# Patient Record
Sex: FEMALE | Race: WHITE | Employment: FULL TIME | URBAN - METROPOLITAN AREA
[De-identification: names, ages, dates, MRNs, and addresses within clinical notes are randomized per-mention and may not be internally consistent; named-entity substitution may affect disease eponyms.]

---

## 2019-01-28 ENCOUNTER — TELEPHONE (OUTPATIENT)
Dept: FAMILY MEDICINE CLINIC | Age: 54
End: 2019-01-28

## 2019-01-28 ENCOUNTER — HOSPITAL ENCOUNTER (EMERGENCY)
Age: 54
Discharge: HOME | End: 2019-01-28
Payer: COMMERCIAL

## 2019-01-28 VITALS
DIASTOLIC BLOOD PRESSURE: 78 MMHG | TEMPERATURE: 98.1 F | WEIGHT: 110 LBS | RESPIRATION RATE: 16 BRPM | OXYGEN SATURATION: 97 % | HEART RATE: 84 BPM | SYSTOLIC BLOOD PRESSURE: 138 MMHG

## 2019-01-28 DIAGNOSIS — M17.12 OSTEOARTHRITIS OF LEFT KNEE, UNSPECIFIED OSTEOARTHRITIS TYPE: Primary | ICD-10-CM

## 2019-01-28 PROCEDURE — 2709999900 HC NON-CHARGEABLE SUPPLY

## 2019-01-28 PROCEDURE — 99202 OFFICE O/P NEW SF 15 MIN: CPT

## 2019-01-28 PROCEDURE — 99202 OFFICE O/P NEW SF 15 MIN: CPT | Performed by: NURSE PRACTITIONER

## 2019-01-28 RX ORDER — NAPROXEN 500 MG/1
500 TABLET ORAL 2 TIMES DAILY WITH MEALS
Qty: 20 TABLET | Refills: 0 | Status: SHIPPED | OUTPATIENT
Start: 2019-01-28 | End: 2019-06-12 | Stop reason: ALTCHOICE

## 2019-01-28 RX ORDER — LISINOPRIL 10 MG/1
10 TABLET ORAL 2 TIMES DAILY
COMMUNITY
End: 2019-02-13 | Stop reason: SDUPTHER

## 2019-01-28 RX ORDER — METAXALONE 400 MG/1
400 TABLET ORAL 3 TIMES DAILY PRN
Qty: 20 TABLET | Refills: 0 | Status: SHIPPED | OUTPATIENT
Start: 2019-01-28 | End: 2019-02-01 | Stop reason: SDUPTHER

## 2019-01-28 RX ORDER — MAGNESIUM SULFATE 100 %
CRYSTALS MISCELLANEOUS
Qty: 1 G | Refills: 0 | Status: SHIPPED | OUTPATIENT
Start: 2019-01-28 | End: 2019-01-30

## 2019-01-28 RX ORDER — MENTHOL AND METHYL SALICYLATE 10; 30 G/100G; G/100G
CREAM TOPICAL
Qty: 1 BOTTLE | Refills: 0 | Status: SHIPPED | OUTPATIENT
Start: 2019-01-28 | End: 2020-06-01

## 2019-01-28 ASSESSMENT — ENCOUNTER SYMPTOMS
STRIDOR: 0
SHORTNESS OF BREATH: 0
CHEST TIGHTNESS: 0
COLOR CHANGE: 0
NAUSEA: 0
COUGH: 0
BACK PAIN: 0
WHEEZING: 0
VOMITING: 0
CHOKING: 0
APNEA: 0
CONSTIPATION: 0
DIARRHEA: 0
ABDOMINAL PAIN: 0

## 2019-01-28 ASSESSMENT — PAIN DESCRIPTION - PAIN TYPE: TYPE: ACUTE PAIN

## 2019-01-28 ASSESSMENT — PAIN DESCRIPTION - DESCRIPTORS: DESCRIPTORS: CRAMPING

## 2019-01-28 ASSESSMENT — PAIN SCALES - GENERAL: PAINLEVEL_OUTOF10: 10

## 2019-01-28 ASSESSMENT — PAIN - FUNCTIONAL ASSESSMENT: PAIN_FUNCTIONAL_ASSESSMENT: PREVENTS OR INTERFERES SOME ACTIVE ACTIVITIES AND ADLS

## 2019-01-28 ASSESSMENT — PAIN DESCRIPTION - ORIENTATION: ORIENTATION: LEFT

## 2019-01-28 ASSESSMENT — PAIN DESCRIPTION - LOCATION: LOCATION: KNEE

## 2019-01-30 ENCOUNTER — TELEPHONE (OUTPATIENT)
Dept: FAMILY MEDICINE CLINIC | Age: 54
End: 2019-01-30

## 2019-01-30 ENCOUNTER — OFFICE VISIT (OUTPATIENT)
Dept: FAMILY MEDICINE CLINIC | Age: 54
End: 2019-01-30
Payer: COMMERCIAL

## 2019-01-30 VITALS
RESPIRATION RATE: 20 BRPM | TEMPERATURE: 98.1 F | DIASTOLIC BLOOD PRESSURE: 74 MMHG | HEART RATE: 84 BPM | SYSTOLIC BLOOD PRESSURE: 160 MMHG

## 2019-01-30 DIAGNOSIS — M25.562 ACUTE PAIN OF LEFT KNEE: ICD-10-CM

## 2019-01-30 DIAGNOSIS — I10 ESSENTIAL HYPERTENSION: ICD-10-CM

## 2019-01-30 DIAGNOSIS — Z00.00 WELLNESS EXAMINATION: Primary | ICD-10-CM

## 2019-01-30 LAB
ALBUMIN SERPL-MCNC: 4.8 G/DL (ref 3.5–5.1)
ALP BLD-CCNC: 71 U/L (ref 38–126)
ALT SERPL-CCNC: 28 U/L (ref 11–66)
ANION GAP SERPL CALCULATED.3IONS-SCNC: 16 MEQ/L (ref 8–16)
AST SERPL-CCNC: 34 U/L (ref 5–40)
BASOPHILS # BLD: 0.8 %
BASOPHILS ABSOLUTE: 0.1 THOU/MM3 (ref 0–0.1)
BILIRUB SERPL-MCNC: 0.2 MG/DL (ref 0.3–1.2)
BILIRUBIN DIRECT: < 0.2 MG/DL (ref 0–0.3)
BUN BLDV-MCNC: 9 MG/DL (ref 7–22)
CALCIUM SERPL-MCNC: 8.7 MG/DL (ref 8.5–10.5)
CHLORIDE BLD-SCNC: 90 MEQ/L (ref 98–111)
CO2: 21 MEQ/L (ref 23–33)
CREAT SERPL-MCNC: 0.7 MG/DL (ref 0.4–1.2)
EOSINOPHIL # BLD: 1.2 %
EOSINOPHILS ABSOLUTE: 0.1 THOU/MM3 (ref 0–0.4)
ERYTHROCYTE [DISTWIDTH] IN BLOOD BY AUTOMATED COUNT: 11.9 % (ref 11.5–14.5)
ERYTHROCYTE [DISTWIDTH] IN BLOOD BY AUTOMATED COUNT: 45.1 FL (ref 35–45)
FOLATE: 16.2 NG/ML (ref 4.8–24.2)
GFR SERPL CREATININE-BSD FRML MDRD: 87 ML/MIN/1.73M2
GLUCOSE BLD-MCNC: 100 MG/DL (ref 70–108)
HCT VFR BLD CALC: 43.8 % (ref 37–47)
HEMOGLOBIN: 14.7 GM/DL (ref 12–16)
IMMATURE GRANS (ABS): 0.11 THOU/MM3 (ref 0–0.07)
IMMATURE GRANULOCYTES: 1 %
LYMPHOCYTES # BLD: 18.3 %
LYMPHOCYTES ABSOLUTE: 1.9 THOU/MM3 (ref 1–4.8)
MCH RBC QN AUTO: 34.2 PG (ref 26–33)
MCHC RBC AUTO-ENTMCNC: 33.6 GM/DL (ref 32.2–35.5)
MCV RBC AUTO: 101.9 FL (ref 81–99)
MONOCYTES # BLD: 7.6 %
MONOCYTES ABSOLUTE: 0.8 THOU/MM3 (ref 0.4–1.3)
NUCLEATED RED BLOOD CELLS: 0 /100 WBC
PLATELET # BLD: 353 THOU/MM3 (ref 130–400)
PMV BLD AUTO: 10.5 FL (ref 9.4–12.4)
POTASSIUM SERPL-SCNC: 4.9 MEQ/L (ref 3.5–5.2)
RBC # BLD: 4.3 MILL/MM3 (ref 4.2–5.4)
SEG NEUTROPHILS: 71.1 %
SEGMENTED NEUTROPHILS ABSOLUTE COUNT: 7.5 THOU/MM3 (ref 1.8–7.7)
SODIUM BLD-SCNC: 127 MEQ/L (ref 135–145)
TOTAL PROTEIN: 7.8 G/DL (ref 6.1–8)
VITAMIN B-12: 571 PG/ML (ref 211–911)
WBC # BLD: 10.5 THOU/MM3 (ref 4.8–10.8)

## 2019-01-30 PROCEDURE — 36415 COLL VENOUS BLD VENIPUNCTURE: CPT | Performed by: NURSE PRACTITIONER

## 2019-01-30 PROCEDURE — 99386 PREV VISIT NEW AGE 40-64: CPT | Performed by: NURSE PRACTITIONER

## 2019-01-30 RX ORDER — PREDNISONE 20 MG/1
20 TABLET ORAL 2 TIMES DAILY
Qty: 10 TABLET | Refills: 0 | Status: SHIPPED | OUTPATIENT
Start: 2019-01-30 | End: 2019-02-04

## 2019-01-30 RX ORDER — LISINOPRIL AND HYDROCHLOROTHIAZIDE 12.5; 1 MG/1; MG/1
1 TABLET ORAL DAILY
Qty: 30 TABLET | Refills: 0 | Status: SHIPPED | OUTPATIENT
Start: 2019-01-30 | End: 2019-02-13

## 2019-01-30 ASSESSMENT — ENCOUNTER SYMPTOMS
NAUSEA: 0
ABDOMINAL PAIN: 0
SORE THROAT: 0
DIARRHEA: 0
CONSTIPATION: 0
WHEEZING: 0
SHORTNESS OF BREATH: 0
COUGH: 0

## 2019-01-30 ASSESSMENT — PATIENT HEALTH QUESTIONNAIRE - PHQ9
SUM OF ALL RESPONSES TO PHQ9 QUESTIONS 1 & 2: 0
SUM OF ALL RESPONSES TO PHQ QUESTIONS 1-9: 0
1. LITTLE INTEREST OR PLEASURE IN DOING THINGS: 0
2. FEELING DOWN, DEPRESSED OR HOPELESS: 0
SUM OF ALL RESPONSES TO PHQ QUESTIONS 1-9: 0

## 2019-01-31 ENCOUNTER — TELEPHONE (OUTPATIENT)
Dept: FAMILY MEDICINE CLINIC | Age: 54
End: 2019-01-31

## 2019-01-31 ENCOUNTER — HOSPITAL ENCOUNTER (OUTPATIENT)
Dept: GENERAL RADIOLOGY | Age: 54
Discharge: HOME | End: 2019-01-31
Payer: COMMERCIAL

## 2019-01-31 ENCOUNTER — HOSPITAL ENCOUNTER (OUTPATIENT)
Age: 54
Discharge: HOME | End: 2019-01-31
Payer: COMMERCIAL

## 2019-01-31 DIAGNOSIS — M25.562 ACUTE PAIN OF LEFT KNEE: ICD-10-CM

## 2019-01-31 PROCEDURE — 73564 X-RAY EXAM KNEE 4 OR MORE: CPT

## 2019-02-01 ENCOUNTER — OFFICE VISIT (OUTPATIENT)
Dept: FAMILY MEDICINE CLINIC | Age: 54
End: 2019-02-01
Payer: COMMERCIAL

## 2019-02-01 VITALS
RESPIRATION RATE: 16 BRPM | BODY MASS INDEX: 22.89 KG/M2 | HEIGHT: 60 IN | WEIGHT: 116.6 LBS | SYSTOLIC BLOOD PRESSURE: 132 MMHG | HEART RATE: 66 BPM | DIASTOLIC BLOOD PRESSURE: 68 MMHG

## 2019-02-01 DIAGNOSIS — M25.562 ACUTE PAIN OF LEFT KNEE: Primary | ICD-10-CM

## 2019-02-01 DIAGNOSIS — I10 ESSENTIAL HYPERTENSION: ICD-10-CM

## 2019-02-01 PROCEDURE — 99214 OFFICE O/P EST MOD 30 MIN: CPT | Performed by: NURSE PRACTITIONER

## 2019-02-01 RX ORDER — NEBIVOLOL 2.5 MG/1
2.5 TABLET ORAL DAILY
Qty: 90 TABLET | Refills: 1 | Status: SHIPPED | OUTPATIENT
Start: 2019-02-01 | End: 2019-06-12 | Stop reason: ALTCHOICE

## 2019-02-01 RX ORDER — METAXALONE 400 MG/1
400 TABLET ORAL 3 TIMES DAILY PRN
Qty: 20 TABLET | Refills: 0 | Status: SHIPPED | OUTPATIENT
Start: 2019-02-01 | End: 2019-02-11

## 2019-02-04 ASSESSMENT — ENCOUNTER SYMPTOMS
SHORTNESS OF BREATH: 0
ABDOMINAL PAIN: 0
CONSTIPATION: 0
SORE THROAT: 0
WHEEZING: 0
NAUSEA: 0
COUGH: 0
DIARRHEA: 0

## 2019-02-13 ENCOUNTER — TELEPHONE (OUTPATIENT)
Dept: FAMILY MEDICINE CLINIC | Age: 54
End: 2019-02-13

## 2019-02-13 RX ORDER — LISINOPRIL 10 MG/1
10 TABLET ORAL 2 TIMES DAILY
Qty: 60 TABLET | Refills: 5 | Status: SHIPPED | OUTPATIENT
Start: 2019-02-13 | End: 2019-02-25 | Stop reason: ALTCHOICE

## 2019-02-21 ENCOUNTER — HOSPITAL ENCOUNTER (OUTPATIENT)
Age: 54
Discharge: HOME | End: 2019-02-21
Payer: COMMERCIAL

## 2019-02-21 LAB
ANION GAP SERPL CALCULATED.3IONS-SCNC: 13 MEQ/L (ref 8–16)
BASOPHILS # BLD: 0.7 %
BASOPHILS ABSOLUTE: 0.1 THOU/MM3 (ref 0–0.1)
BUN BLDV-MCNC: 10 MG/DL (ref 7–22)
CALCIUM SERPL-MCNC: 9.1 MG/DL (ref 8.5–10.5)
CHLORIDE BLD-SCNC: 85 MEQ/L (ref 98–111)
CO2: 23 MEQ/L (ref 23–33)
CREAT SERPL-MCNC: 0.5 MG/DL (ref 0.4–1.2)
EKG ATRIAL RATE: 62 BPM
EKG P AXIS: 58 DEGREES
EKG P-R INTERVAL: 180 MS
EKG Q-T INTERVAL: 380 MS
EKG QRS DURATION: 96 MS
EKG QTC CALCULATION (BAZETT): 385 MS
EKG R AXIS: 64 DEGREES
EKG T AXIS: 65 DEGREES
EKG VENTRICULAR RATE: 62 BPM
EOSINOPHIL # BLD: 1.9 %
EOSINOPHILS ABSOLUTE: 0.2 THOU/MM3 (ref 0–0.4)
ERYTHROCYTE [DISTWIDTH] IN BLOOD BY AUTOMATED COUNT: 11.4 % (ref 11.5–14.5)
ERYTHROCYTE [DISTWIDTH] IN BLOOD BY AUTOMATED COUNT: 41 FL (ref 35–45)
GFR SERPL CREATININE-BSD FRML MDRD: > 90 ML/MIN/1.73M2
GLUCOSE BLD-MCNC: 105 MG/DL (ref 70–108)
HCT VFR BLD CALC: 41.2 % (ref 37–47)
HEMOGLOBIN: 14.4 GM/DL (ref 12–16)
IMMATURE GRANS (ABS): 0.08 THOU/MM3 (ref 0–0.07)
IMMATURE GRANULOCYTES: 0.9 %
LYMPHOCYTES # BLD: 26.6 %
LYMPHOCYTES ABSOLUTE: 2.3 THOU/MM3 (ref 1–4.8)
MCH RBC QN AUTO: 34.4 PG (ref 26–33)
MCHC RBC AUTO-ENTMCNC: 35 GM/DL (ref 32.2–35.5)
MCV RBC AUTO: 98.3 FL (ref 81–99)
MONOCYTES # BLD: 10.8 %
MONOCYTES ABSOLUTE: 1 THOU/MM3 (ref 0.4–1.3)
NUCLEATED RED BLOOD CELLS: 0 /100 WBC
PLATELET # BLD: 313 THOU/MM3 (ref 130–400)
PMV BLD AUTO: 9.3 FL (ref 9.4–12.4)
POTASSIUM SERPL-SCNC: 4.7 MEQ/L (ref 3.5–5.2)
RBC # BLD: 4.19 MILL/MM3 (ref 4.2–5.4)
SEG NEUTROPHILS: 59.1 %
SEGMENTED NEUTROPHILS ABSOLUTE COUNT: 5.2 THOU/MM3 (ref 1.8–7.7)
SODIUM BLD-SCNC: 121 MEQ/L (ref 135–145)
WBC # BLD: 8.8 THOU/MM3 (ref 4.8–10.8)

## 2019-02-21 PROCEDURE — 93005 ELECTROCARDIOGRAM TRACING: CPT | Performed by: ORTHOPAEDIC SURGERY

## 2019-02-21 PROCEDURE — 80048 BASIC METABOLIC PNL TOTAL CA: CPT

## 2019-02-21 PROCEDURE — 36415 COLL VENOUS BLD VENIPUNCTURE: CPT

## 2019-02-21 PROCEDURE — 85025 COMPLETE CBC W/AUTO DIFF WBC: CPT

## 2019-02-23 PROCEDURE — 93010 ELECTROCARDIOGRAM REPORT: CPT | Performed by: INTERNAL MEDICINE

## 2019-02-25 ENCOUNTER — OFFICE VISIT (OUTPATIENT)
Dept: FAMILY MEDICINE CLINIC | Age: 54
End: 2019-02-25
Payer: COMMERCIAL

## 2019-02-25 VITALS
DIASTOLIC BLOOD PRESSURE: 64 MMHG | HEART RATE: 100 BPM | RESPIRATION RATE: 16 BRPM | SYSTOLIC BLOOD PRESSURE: 134 MMHG | HEIGHT: 61 IN | TEMPERATURE: 98.3 F | BODY MASS INDEX: 21.9 KG/M2 | WEIGHT: 116 LBS

## 2019-02-25 DIAGNOSIS — R94.31 NONSPECIFIC ST-T WAVE ELECTROCARDIOGRAPHIC CHANGES: ICD-10-CM

## 2019-02-25 DIAGNOSIS — E87.1 HYPONATREMIA: Primary | ICD-10-CM

## 2019-02-25 DIAGNOSIS — S83.241D ACUTE MEDIAL MENISCUS TEAR, RIGHT, SUBSEQUENT ENCOUNTER: ICD-10-CM

## 2019-02-25 DIAGNOSIS — I10 ESSENTIAL HYPERTENSION: ICD-10-CM

## 2019-02-25 LAB
T4 FREE: 0.85 NG/DL (ref 0.93–1.76)
TSH SERPL DL<=0.05 MIU/L-ACNC: 1.84 UIU/ML (ref 0.4–4.2)

## 2019-02-25 PROCEDURE — 99214 OFFICE O/P EST MOD 30 MIN: CPT | Performed by: NURSE PRACTITIONER

## 2019-02-25 RX ORDER — LISINOPRIL 10 MG/1
10 TABLET ORAL DAILY
Qty: 30 TABLET | Refills: 3 | Status: SHIPPED | OUTPATIENT
Start: 2019-02-25 | End: 2019-03-12 | Stop reason: ALTCHOICE

## 2019-02-25 RX ORDER — IBUPROFEN 800 MG/1
TABLET ORAL
Refills: 3 | COMMUNITY
Start: 2019-02-05 | End: 2019-09-30 | Stop reason: SDUPTHER

## 2019-02-25 RX ORDER — METAXALONE 800 MG/1
800 TABLET ORAL 3 TIMES DAILY
COMMUNITY
End: 2019-03-12 | Stop reason: SDUPTHER

## 2019-02-25 ASSESSMENT — ENCOUNTER SYMPTOMS
DIARRHEA: 0
COUGH: 0
CONSTIPATION: 0
WHEEZING: 0
ABDOMINAL PAIN: 0
SORE THROAT: 0
NAUSEA: 0
SHORTNESS OF BREATH: 0

## 2019-02-26 ENCOUNTER — NURSE ONLY (OUTPATIENT)
Dept: FAMILY MEDICINE CLINIC | Age: 54
End: 2019-02-26

## 2019-02-26 DIAGNOSIS — E87.1 HYPONATREMIA: ICD-10-CM

## 2019-02-26 LAB
OSMOLALITY URINE: 240 MOSMOL/KG (ref 250–750)
SODIUM URINE: 43 MEQ/L

## 2019-02-28 ENCOUNTER — OFFICE VISIT (OUTPATIENT)
Dept: CARDIOLOGY CLINIC | Age: 54
End: 2019-02-28
Payer: COMMERCIAL

## 2019-02-28 VITALS
BODY MASS INDEX: 23.56 KG/M2 | WEIGHT: 120 LBS | SYSTOLIC BLOOD PRESSURE: 172 MMHG | DIASTOLIC BLOOD PRESSURE: 78 MMHG | HEIGHT: 60 IN

## 2019-02-28 DIAGNOSIS — Z01.810 PREOP CARDIOVASCULAR EXAM: Primary | ICD-10-CM

## 2019-02-28 PROCEDURE — 99204 OFFICE O/P NEW MOD 45 MIN: CPT | Performed by: INTERNAL MEDICINE

## 2019-03-12 ENCOUNTER — OFFICE VISIT (OUTPATIENT)
Dept: FAMILY MEDICINE CLINIC | Age: 54
End: 2019-03-12
Payer: COMMERCIAL

## 2019-03-12 VITALS
DIASTOLIC BLOOD PRESSURE: 64 MMHG | BODY MASS INDEX: 22.73 KG/M2 | TEMPERATURE: 98.4 F | HEART RATE: 76 BPM | SYSTOLIC BLOOD PRESSURE: 142 MMHG | HEIGHT: 61 IN | OXYGEN SATURATION: 98 % | RESPIRATION RATE: 14 BRPM | WEIGHT: 120.4 LBS

## 2019-03-12 DIAGNOSIS — E87.1 HYPONATREMIA: ICD-10-CM

## 2019-03-12 DIAGNOSIS — S83.241D ACUTE MEDIAL MENISCUS TEAR, RIGHT, SUBSEQUENT ENCOUNTER: Primary | ICD-10-CM

## 2019-03-12 DIAGNOSIS — I10 ESSENTIAL HYPERTENSION: ICD-10-CM

## 2019-03-12 LAB
ANION GAP SERPL CALCULATED.3IONS-SCNC: 14 MEQ/L (ref 8–16)
BUN BLDV-MCNC: 12 MG/DL (ref 7–22)
CALCIUM SERPL-MCNC: 9.9 MG/DL (ref 8.5–10.5)
CHLORIDE BLD-SCNC: 99 MEQ/L (ref 98–111)
CO2: 25 MEQ/L (ref 23–33)
CREAT SERPL-MCNC: 0.6 MG/DL (ref 0.4–1.2)
GFR SERPL CREATININE-BSD FRML MDRD: > 90 ML/MIN/1.73M2
GLUCOSE BLD-MCNC: 102 MG/DL (ref 70–108)
POTASSIUM SERPL-SCNC: 5.6 MEQ/L (ref 3.5–5.2)
SODIUM BLD-SCNC: 138 MEQ/L (ref 135–145)

## 2019-03-12 PROCEDURE — 99214 OFFICE O/P EST MOD 30 MIN: CPT | Performed by: NURSE PRACTITIONER

## 2019-03-12 RX ORDER — METAXALONE 800 MG/1
800 TABLET ORAL 3 TIMES DAILY
Qty: 90 TABLET | Refills: 2 | Status: SHIPPED | OUTPATIENT
Start: 2019-03-12 | End: 2019-06-12 | Stop reason: ALTCHOICE

## 2019-03-12 RX ORDER — LISINOPRIL 10 MG/1
10 TABLET ORAL 2 TIMES DAILY
Qty: 60 TABLET | Refills: 5 | Status: SHIPPED | OUTPATIENT
Start: 2019-03-12 | End: 2019-08-20 | Stop reason: SDUPTHER

## 2019-03-12 ASSESSMENT — ENCOUNTER SYMPTOMS
DIARRHEA: 0
WHEEZING: 0
COUGH: 0
NAUSEA: 0
SORE THROAT: 0
CONSTIPATION: 0
SHORTNESS OF BREATH: 0
ABDOMINAL PAIN: 0

## 2019-03-18 ENCOUNTER — HOSPITAL ENCOUNTER (OUTPATIENT)
Dept: NON INVASIVE DIAGNOSTICS | Age: 54
Discharge: HOME | End: 2019-03-18
Payer: COMMERCIAL

## 2019-03-18 VITALS — BODY MASS INDEX: 23.56 KG/M2 | WEIGHT: 120 LBS | HEIGHT: 60 IN

## 2019-03-18 DIAGNOSIS — Z01.810 PREOP CARDIOVASCULAR EXAM: ICD-10-CM

## 2019-03-18 LAB
LV EF: 58 %
LVEF MODALITY: NORMAL

## 2019-03-18 PROCEDURE — 93306 TTE W/DOPPLER COMPLETE: CPT

## 2019-03-18 PROCEDURE — 93017 CV STRESS TEST TRACING ONLY: CPT

## 2019-03-18 PROCEDURE — 2709999900 HC NON-CHARGEABLE SUPPLY

## 2019-03-18 PROCEDURE — 78452 HT MUSCLE IMAGE SPECT MULT: CPT

## 2019-03-18 PROCEDURE — A9500 TC99M SESTAMIBI: HCPCS | Performed by: INTERNAL MEDICINE

## 2019-03-18 PROCEDURE — 3430000000 HC RX DIAGNOSTIC RADIOPHARMACEUTICAL: Performed by: INTERNAL MEDICINE

## 2019-03-18 RX ADMIN — Medication 11 MILLICURIE: at 10:35

## 2019-03-18 RX ADMIN — Medication 35 MILLICURIE: at 12:35

## 2019-03-21 ENCOUNTER — TELEPHONE (OUTPATIENT)
Dept: CARDIOLOGY CLINIC | Age: 54
End: 2019-03-21

## 2019-05-31 ENCOUNTER — OFFICE VISIT (OUTPATIENT)
Dept: CARDIOLOGY CLINIC | Age: 54
End: 2019-05-31
Payer: COMMERCIAL

## 2019-05-31 ENCOUNTER — TELEPHONE (OUTPATIENT)
Dept: FAMILY MEDICINE CLINIC | Age: 54
End: 2019-05-31

## 2019-05-31 VITALS
HEART RATE: 77 BPM | DIASTOLIC BLOOD PRESSURE: 83 MMHG | WEIGHT: 124.25 LBS | BODY MASS INDEX: 24.39 KG/M2 | SYSTOLIC BLOOD PRESSURE: 181 MMHG | HEIGHT: 60 IN

## 2019-05-31 DIAGNOSIS — S83.241D ACUTE MEDIAL MENISCUS TEAR, RIGHT, SUBSEQUENT ENCOUNTER: Primary | ICD-10-CM

## 2019-05-31 DIAGNOSIS — I25.10 ASCVD (ARTERIOSCLEROTIC CARDIOVASCULAR DISEASE): Primary | ICD-10-CM

## 2019-05-31 PROCEDURE — 99214 OFFICE O/P EST MOD 30 MIN: CPT | Performed by: INTERNAL MEDICINE

## 2019-05-31 RX ORDER — AMLODIPINE BESYLATE 5 MG/1
5 TABLET ORAL DAILY
Qty: 30 TABLET | Refills: 0 | Status: SHIPPED | OUTPATIENT
Start: 2019-05-31 | End: 2019-06-12 | Stop reason: ALTCHOICE

## 2019-05-31 NOTE — LETTER
[address] [facility] Cardiology  [facility] [address]  [place] [address]  Phone: [phone]  Fax: [phone]    Cedrick Bautista MD        May 31, 2019     Patient: Sejal Sage   YOB: 1965   Date of Visit: 5/31/2019       To Whom It May Concern: It is my medical opinion that Sejal Sage may return to work on 6-1-2019. If you have any questions or concerns, please don't hesitate to call.     Sincerely,        Cedrick Bautista MD

## 2019-05-31 NOTE — PROGRESS NOTES
[facility]  [address]  [address] [place] [address] [address] [address]  Dept: [phone]  Dept Fax: [phone]  Loc: [phone]    Visit Date: 5/31/2019    Ms. Isis Franklin is a 47 y.o. female  who presented for:  Chief Complaint   Patient presents with    Check-Up    Hypertension       HPI:   47 y oF chx HTN, HLD, smoker, anxiety and insomnia, is here for a follow up. She was last seen for preop for left knee surgery. She underwent surgery and is doing well. Denies any chest pain, sob, palpitations, lightheadedness, dizziness, orthopnea, PND or pedal edema. Current Outpatient Medications:     amLODIPine (NORVASC) 5 MG tablet, Take 1 tablet by mouth daily, Disp: 30 tablet, Rfl: 0    metaxalone (SKELAXIN) 800 MG tablet, Take 1 tablet by mouth 3 times daily, Disp: 90 tablet, Rfl: 2    lisinopril (PRINIVIL;ZESTRIL) 10 MG tablet, Take 1 tablet by mouth 2 times daily, Disp: 60 tablet, Rfl: 5    ibuprofen (ADVIL;MOTRIN) 800 MG tablet, TAKE 1 TABLET BY MOUTH THREE TIMES A DAY FOR 30 DAYS, Disp: , Rfl: 3    nebivolol (BYSTOLIC) 2.5 MG tablet, Take 1 tablet by mouth daily (Patient taking differently: Take 5 mg by mouth daily ), Disp: 90 tablet, Rfl: 1    menthol-methyl salicylate (ICY HOT) 13-53 % external cream, Follow package directions for topical use., Disp: 1 Bottle, Rfl: 0    naproxen (NAPROSYN) 500 MG tablet, Take 1 tablet by mouth 2 times daily (with meals) for 10 days, Disp: 20 tablet, Rfl: 0    Past Medical History  Jareth Sweeney  has a past medical history of Hypertension. Social History  Jareth Sweeney  reports that she has been smoking cigarettes. She has a 35.00 pack-year smoking history. She has never used smokeless tobacco. She reports that she drinks alcohol. She reports that she has current or past drug history. Drug: Marijuana.     Family History  Jareth Sweeney family history includes Alcohol Abuse in her father; Coronary Art Dis in her brother; are normal. No distension. There is no tenderness. Musculoskeletal: Normal range of motion. No edema. Neurological: Alert and oriented to person, place, and time. No cranial nerve deficit. Coordination normal.   Skin: Skin is warm and dry. Psychiatric: Normal mood and affect.        No results found for: CKTOTAL, CKMB, CKMBINDEX    Lab Results   Component Value Date    WBC 8.8 02/21/2019    RBC 4.19 02/21/2019    HGB 14.4 02/21/2019    HCT 41.2 02/21/2019    MCV 98.3 02/21/2019    MCH 34.4 02/21/2019    MCHC 35.0 02/21/2019     02/21/2019    MPV 9.3 02/21/2019       Lab Results   Component Value Date     03/12/2019    K 5.6 03/12/2019    CL 99 03/12/2019    CO2 25 03/12/2019    BUN 12 03/12/2019    LABALBU 4.8 01/30/2019    CREATININE 0.6 03/12/2019    CALCIUM 9.9 03/12/2019    LABGLOM >90 03/12/2019    GLUCOSE 102 03/12/2019       Lab Results   Component Value Date    ALKPHOS 71 01/30/2019    ALT 28 01/30/2019    AST 34 01/30/2019    PROT 7.8 01/30/2019    BILITOT 0.2 01/30/2019    BILIDIR <0.2 01/30/2019    LABALBU 4.8 01/30/2019       No results found for: MG    No results found for: INR, PROTIME      No results found for: LABA1C    No results found for: TRIG, HDL, LDLCALC, LDLDIRECT, LABVLDL    Lab Results   Component Value Date    TSH 1.840 02/25/2019         Testing Reviewed:      I haveindividually reviewed the below cardiac tests    EKG:    ECHO:   Results for orders placed during the hospital encounter of 03/18/19   Echo 2D w doppler w color complete    Narrative Transthoracic Echocardiography Report (TTE)     Demographics      Patient Name   Lkuas Elizalde       Gender              Female                  Sammie Lesch      MR #           [id number]     Race                                                   Ethnicity      Account #      [de-identified]     Room Number      Accession      [id number]     Date of Study       03/18/2019   Number      Date of Birth  1965    Referring Physician Kennedy Champagne Staci Reece MD      Age            47 year(s)    Sonographer         Naveen Lin                                                    T                                   Interpreting        Jacinta Reece MD                                Physician     Procedure    Type of Study      TTE procedure:ECHOCARDIOGRAM COMPLETE 2D W DOPPLER W COLOR. Procedure Date  Date: 03/18/2019 Start: 09:06 AM    Study Location: Echo Lab  Technical Quality: Adequate visualization    Indications:Preop cardiac evaluation. Additional Medical History:smoker, hypertension, hyperlipidemia, alcohol  abuse    Patient Status: Routine    Height: 60 inches Weight: 120 pounds BSA: 1.5 m^2 BMI: 23.44 kg/m^2    BP: 188/80 mmHg     Conclusions      Summary   Normal left ventricle size and systolic function. Ejection fraction was   estimated at 55-60%. Mild to moderate aortic regurgitation is noted. Mild mitral regurgitation is present. Mild tricuspid regurgitation. IVC size is within normal limits with normal respiratory phasic changes. Signature      ----------------------------------------------------------------   Electronically signed by Jacinta Reece MD (Interpreting   physician) on 03/19/2019 at 04:47 PM   ----------------------------------------------------------------      Findings      Mitral Valve   The mitral valve structure was normal with normal leaflet separation. DOPPLER: The transmitral velocity was within the normal range with no   evidence for mitral stenosis. Mild mitral regurgitation is present. Aortic Valve   The aortic valve was trileaflet with normal thickness and cuspal   separation. DOPPLER: Transaortic velocity was within the normal range with   no evidence of aortic stenosis. Mild to moderate aortic regurgitation is noted.       Tricuspid Valve   The tricuspid valve structure was normal with normal leaflet separation. DOPPLER: There was no evidence of tricuspid stenosis. Mild tricuspid regurgitation. Pulmonic Valve   The pulmonic valve was not well visualized . Left Atrium   Left atrial size was normal.      Left Ventricle   Normal left ventricle size and systolic function. Ejection fraction was   estimated at 55-60%. Right Atrium   Right atrial size was normal.      Right Ventricle   The right ventricular size was normal with normal systolic function and   wall thickness. Pericardial Effusion   The pericardium was normal in appearance with no evidence of a pericardial   effusion. Pleural Effusion   No evidence of pleural effusion. Aorta / Great Vessels   -Aortic root dimension within normal limits. -IVC size is within normal limits with normal respiratory phasic changes.      M-Mode/2D Measurements & Calculations      LV Diastolic   LV Systolic Dimension: 3  AV Cusp Separation: 1.7 cmLA   Dimension: 4.4 cm                        Dimension: 3.5 cmAO Root   cm             LV Volume Diastolic: 00.7 Dimension: 1.8 cmLA Area: 20.7   LV FS:31.8 %   ml                        cm^2   LV PW          LV Volume Systolic: 35 ml   Diastolic: 1   LV EDV/LV EDV Index: 87.7   cm             ml/58 m^2LV ESV/LV ESV   Septum         Index: 35 ml/23 m^2       RV Diastolic Dimension: 2.3 cm   Diastolic: 1.1 EF Calculated: 60.1 %   cm                                       LA/Aorta: 1.94                                               LA volume/Index: 60.2 ml /40m^2     Doppler Measurements & Calculations      MV Peak E-Wave: 106 cm/s   AV Peak Velocity: 197  LVOT Peak Velocity: 146   MV Peak A-Wave: 101 cm/s   cm/s                   cm/s   MV E/A Ratio: 1.05         AV Peak Gradient:      LVOT Peak Gradient: 9   MV Peak Gradient: 4.49     15.52 mmHg             mmHg   mmHg                                                     TV Peak E-Wave: 34.5   MV Deceleration Time: 201                         cm/s   msec TV Peak A-Wave: 46.7   MV P1/2t: 59 msec          AV P1/2t: 330 msec     cm/s   MVA by PHT:3.73 cm^2                                                     TV Peak Gradient: 0.48   MV E' Septal Velocity: 9                          mmHg   cm/s                       AV DVI (Vmax):0.74     TR Velocity:244 cm/s   MV A' Septal Velocity: 8.9                        TR Gradient:23.81 mmHg   cm/s                                              PV Peak Velocity: 107   MV E' Lateral Velocity:                           cm/s   9.5 cm/s                                          PV Peak Gradient: 4.58   MV A' Lateral Velocity:                           mmHg   10.8 cm/s   E/E' septal: 11.78   E/E' lateral: 11.16     [email or web address] [id number]%3d       STRESS:3/18/19: Lexiscan EKG stress test is not suggestive for ischemia.   Calculated gated LVEF 61 %.   The T.I.D. ratio was 1.22 .   There was a small sized, mild in intensity, fixed myocardial perfusion   defect of the apical wall.   This study was negative for ischemia.      Recommendation   Clinical correlation is recommended.   Medical management.   Aggressive risk factor management. CATH:    Assessment/Plan       Diagnosis Orders   1. ASCVD (arteriosclerotic cardiovascular disease)         Extensive family hx of CAD with multiple brother and mother   Heavy smoker  Hyponatremia  Alcohol abuse  HTN-uncontrolled     Reviewed echo and stress  Cut down on smoking  BP elevated, will add amlodipine 5mg daily  Advised to follow up with PCP   Advised to stop smoking because smoking increases risk of heart disease, morbidity, mortality and end organ damage. Advised to decrease alcohol intake  The patient is asked to make an attempt to improve diet and exercise patterns to aid in medical management of this problem.   Advised more plant based nutrition/meditarrean diet   Advised patient to call office or seek immediate medical attention if there is any new onset of  any chest pain, sob, palpitations, lightheadedness, dizziness, orthopnea, PND or pedal edema. All medication side effects were discussed in details. Thank youfor allowing me to participate in the care of this patient. Please do not hesitate to contact me for any further questions. Return in about 1 year (around 5/31/2020), or if symptoms worsen or fail to improve, for Regular follow up, Review testing.        Electronically signed by Lan Unger MD [facility]  5/31/2019 at 10:53 AM

## 2019-05-31 NOTE — TELEPHONE ENCOUNTER
Pt. Calling and said her cardiologist today wanted to see Dakotah Lozada before 6/12/19 concerning her blood pressure but she is not able to come in before that. She is on metaxalone 800mg and they are not working for her. She is wanting to know if he will prescribe Soma for her as she said this works. Please advise pt.  At [phone]

## 2019-05-31 NOTE — PROGRESS NOTES
Pt here for 3 mo check up     Pt denies chest pain, heart palpitations, dizziness, sob, peripheral edema    Pt continues with all over body pain     Pt c/o sees floaties at times , more anxiety

## 2019-06-03 RX ORDER — CARISOPRODOL 350 MG/1
350 TABLET ORAL 3 TIMES DAILY PRN
Qty: 42 TABLET | Refills: 0 | Status: SHIPPED | OUTPATIENT
Start: 2019-06-03 | End: 2019-06-12 | Stop reason: SDUPTHER

## 2019-06-12 ENCOUNTER — OFFICE VISIT (OUTPATIENT)
Dept: FAMILY MEDICINE CLINIC | Age: 54
End: 2019-06-12
Payer: COMMERCIAL

## 2019-06-12 VITALS
WEIGHT: 124.8 LBS | RESPIRATION RATE: 18 BRPM | DIASTOLIC BLOOD PRESSURE: 80 MMHG | TEMPERATURE: 98 F | SYSTOLIC BLOOD PRESSURE: 160 MMHG | HEART RATE: 90 BPM | BODY MASS INDEX: 24.37 KG/M2

## 2019-06-12 DIAGNOSIS — L02.91 ABSCESS: ICD-10-CM

## 2019-06-12 DIAGNOSIS — I10 ESSENTIAL HYPERTENSION: Primary | ICD-10-CM

## 2019-06-12 DIAGNOSIS — S83.241D ACUTE MEDIAL MENISCUS TEAR, RIGHT, SUBSEQUENT ENCOUNTER: ICD-10-CM

## 2019-06-12 PROBLEM — M25.562 ACUTE PAIN OF LEFT KNEE: Status: RESOLVED | Noted: 2019-01-30 | Resolved: 2019-06-12

## 2019-06-12 PROBLEM — M85.80 OSTEOPENIA: Status: ACTIVE | Noted: 2019-06-12

## 2019-06-12 PROCEDURE — 99214 OFFICE O/P EST MOD 30 MIN: CPT | Performed by: NURSE PRACTITIONER

## 2019-06-12 RX ORDER — AMLODIPINE BESYLATE 10 MG/1
10 TABLET ORAL DAILY
Qty: 90 TABLET | Refills: 1 | Status: SHIPPED | OUTPATIENT
Start: 2019-06-12 | End: 2019-12-01 | Stop reason: SDUPTHER

## 2019-06-12 RX ORDER — CARISOPRODOL 350 MG/1
350 TABLET ORAL 3 TIMES DAILY PRN
Qty: 42 TABLET | Refills: 0 | Status: SHIPPED | OUTPATIENT
Start: 2019-06-12 | End: 2019-07-01 | Stop reason: SDUPTHER

## 2019-06-12 ASSESSMENT — ENCOUNTER SYMPTOMS
NAUSEA: 0
DIARRHEA: 0
SHORTNESS OF BREATH: 0
ABDOMINAL PAIN: 0
CONSTIPATION: 0
SORE THROAT: 0
COUGH: 0
WHEEZING: 0

## 2019-06-12 NOTE — PATIENT INSTRUCTIONS
Patient Education        Meniscus Surgery: What to Expect at Home  Your Recovery  Meniscus surgery removes or fixes the cartilage (meniscus) between the bones in the knee. Each knee has two of these rubbery pads of cartilage, one on either side. Meniscus repair is usually done with arthroscopic surgery. Your doctor put a lighted tube--called an arthroscope or scope--and other surgical tools through small cuts (incisions) in your knee. The incisions leave scars that usually fade in time. You will feel tired for several days. Your knee will be swollen, and you may have numbness around the cuts the doctor made (incisions) on your knee. You can put ice on the knee to reduce swelling. Most of this will go away in a few days. You should soon start seeing improvement in your knee. You may be able to return to most of your regular activities within a few weeks. But it will be several months before you have complete use of your knee. It may take as long as 6 months before your knee is strong enough for hard physical work or certain sports. You will need to build your strength and the motion of your joint with rehabilitation (rehab) exercises. In time, your knee will likely be stronger and more stable than it was before the surgery. How soon you can return to sports or exercise depends on how well you follow your rehab program and how well your knee heals. Your doctor or physical therapist will give you an idea of when you can return to these activities. If you had a partial meniscectomy, you might be able to play sports in about 4 to 6 weeks. If you had meniscus repair, it may be 3 to 6 months before you can play sports. This care sheet gives you a general idea about how long it will take for you to recover. But each person recovers at a different pace. Follow the steps below to get better as quickly as possible. How can you care for yourself at home? Activity    · Rest when you feel tired.  Getting enough sleep will help you recover. Sleep with your knee raised, but not bent. Put a pillow under your foot.     · Keep your leg raised as much as possible for the first few days.     · You may shower 24 to 48 hours after surgery, if your doctor okays it. When you shower, keep your bandage and incisions dry by taping a sheet of plastic to cover them. If you have a brace, take it off if your doctor says it is okay. It might help to sit on a shower stool.     · You will be able to stand if you have a brace or use crutches. Do not put weight on your leg until your doctor says you can. You can move around the house to do daily tasks.     · If you have a brace, leave it on except when you exercise your knee or you shower. Be careful not to put the brace on too tight. You will use it for about 2 to 6 weeks.     · If your doctor does not want you to shower or remove your brace, you can take a sponge bath.     · Wait 2 weeks or until your doctor says it is okay before you take a bath, swim, use a hot tub, or soak your leg.     · You can drive when you are no longer using crutches or a knee brace, are no longer taking prescription pain medicine, and have some control over your knee. This usually takes 1 to 2 weeks.     · How soon you can return to work depends on your job. If you sit at work, you may be able to go back in 1 to 2 weeks. But if you are on your feet at work, it may take 4 to 6 weeks. If you are very physically active in your job, it may take 3 to 6 months. Diet    · You can eat your normal diet. If your stomach is upset, try bland, low-fat foods like plain rice, broiled chicken, toast, and yogurt. Drink plenty of fluids.     · You may notice that your bowel movements are not regular right after your surgery. This is common. Try to avoid constipation and straining with bowel movements. You may want to take a fiber supplement every day.  If you have not had a bowel movement after a couple of days, ask your doctor about taking a mild laxative. Medicines    · Your doctor will tell you if and when you can restart your medicines. He or she will also give you instructions about taking any new medicines.     · If you take blood thinners, such as warfarin (Coumadin), clopidogrel (Plavix), or aspirin, be sure to talk to your doctor. He or she will tell you if and when to start taking those medicines again. Make sure that you understand exactly what your doctor wants you to do.     · Be safe with medicines. Take pain medicines exactly as directed. ? If the doctor gave you a prescription medicine for pain, take it as prescribed. ? If you are not taking a prescription pain medicine, ask your doctor if you can take an over-the-counter medicine.     · If your doctor prescribed antibiotics, take them as directed. Do not stop taking them just because you feel better. You need to take the full course of antibiotics.     · If you think your pain medicine is making you sick to your stomach:  ? Take your medicine after meals (unless your doctor has told you not to). ? Ask your doctor for a different pain medicine. Incision care    · If you have a bandage over your incisions, keep the bandage clean and dry. Follow your doctor's instructions. Some doctors want to see you before you take it off, while others may let you take it off 48 to 72 hours after your surgery.     · If you have strips of tape on the incisions, leave the tape on for a week or until it falls off. Keep the area clean and dry. Exercise    · Rehab exercises are an important part of your treatment. Your first exercises will help you improve your knee's movement and regain your muscle strength. Ice and elevation    · To reduce swelling and pain, put ice or a cold pack on your knee for 10 to 20 minutes at a time. Do this every few hours.  Put a thin cloth between the ice and your skin.     · For 3 days after surgery, prop up the sore leg on a pillow when you ice it or anytime you sit or lie down. Try to keep it above the level of your heart. This will help reduce swelling.     · If your doctor gave you support stockings, wear them as long as he or she tells you to. These help prevent blood clots. Follow-up care is a key part of your treatment and safety. Be sure to make and go to all appointments, and call your doctor if you are having problems. It's also a good idea to know your test results and keep a list of the medicines you take. When should you call for help? Call 911 anytime you think you may need emergency care. For example, call if:    · You passed out (lost consciousness).     · You have severe trouble breathing.     · You have sudden chest pain and shortness of breath, or you cough up blood.    Call your doctor now or seek immediate medical care if:    · You have pain that does not go away after you take pain medicine.     · You have loose stitches, or your incisions come open.     · Bright red blood has soaked through the bandage over your incision.     · You have signs of infection, such as:  ? Increased pain, swelling, warmth, or redness. ? Red streaks leading from the incision. ? Pus draining from the incision. ? Swollen lymph nodes in your neck, armpits, or groin. ? A fever.     · You have signs of a blood clot, such as:  ? Pain in your calf, back of the knee, thigh, or groin. ? Redness and swelling in your leg or groin.    Watch closely for any changes in your health, and be sure to contact your doctor if:    · You feel a catching or locking in your knee.     · You are sick to your stomach or cannot keep fluids down.     · You have swelling, tingling, pain, or numbness in your toes that does not go away when you raise your knee above the level of your heart.     · You do not have a bowel movement after taking a laxative. Where can you learn more? Go to https://chjohnson.[employer]. org and sign in to your [employer] account.  Enter M873 in the [address] Information box to learn more about \"Meniscus Surgery: What to Expect at Home. \"     If you do not have an account, please click on the \"Sign Up Now\" link. Current as of: September 20, 2018  Content Version: 12.0  © 3062-0949 Healthwise, Incorporated. Care instructions adapted under license by [facility] ([facility]). If you have questions about a medical condition or this instruction, always ask your healthcare professional. [address] any warranty or liability for your use of this information.

## 2019-06-12 NOTE — PROGRESS NOTES
[address] [address]  Dept: [phone]  Dept Fax: [phone]  Loc: [phone]       SUBJECTIVE     Luda John is a 47 y. o.female here for follow-up postoperatively after a left knee meniscus repair, her hypertension and lesion on her right upper arm that seems to be getting worse over time. Patient is doing extremely well with her meniscus repair and she is back to work full-time. She states she feels like she returned a little bit too soon because her work is very physical and she has had a lot of muscle aches pains and cramping. She did visit a cardiologist postoperatively and he had taken her off of Bystolic and put her on amlodipine 5 mg. She has noticed somewhat of a reduction in her systolic blood pressure since then but has still been around 160. Patient Active Problem List   Diagnosis    Essential hypertension    Osteopenia       Current Outpatient Medications   Medication Sig Dispense Refill    carisoprodol (SOMA) 350 MG tablet Take 1 tablet by mouth 3 times daily as needed for Muscle spasms for up to 14 days. 42 tablet 0    amLODIPine (NORVASC) 10 MG tablet Take 1 tablet by mouth daily 90 tablet 1    lisinopril (PRINIVIL;ZESTRIL) 10 MG tablet Take 1 tablet by mouth 2 times daily 60 tablet 5    ibuprofen (ADVIL;MOTRIN) 800 MG tablet TAKE 1 TABLET BY MOUTH THREE TIMES A DAY FOR 30 DAYS  3    menthol-methyl salicylate (ICY HOT) 14-70 % external cream Follow package directions for topical use. 1 Bottle 0     No current facility-administered medications for this visit. Review of Systems   Constitutional: Negative for appetite change, diaphoresis, fatigue and fever. HENT: Negative for congestion, sore throat and tinnitus. Eyes: Negative for visual disturbance. Respiratory: Negative for cough, shortness of breath and wheezing.     Cardiovascular: Negative for chest pain and leg swelling. Gastrointestinal: Negative for abdominal pain, constipation, diarrhea and nausea. Genitourinary: Negative for frequency. Musculoskeletal: Positive for myalgias. Negative for arthralgias and neck stiffness. Skin: Negative for rash. Right upper arm lesion   Neurological: Negative for dizziness, weakness and headaches. Psychiatric/Behavioral: The patient is not nervous/anxious. All other systems reviewed and are negative. OBJECTIVE     BP (!) 160/80 (Site: Left Upper Arm)   Pulse 90   Temp 98 °F (36.7 °C) (Oral)   Resp 18   Wt 124 lb 12.8 oz (56.6 kg)   BMI 24.37 kg/m²     Wt Readings from Last 3 Encounters:   06/12/19 124 lb 12.8 oz (56.6 kg)   05/31/19 124 lb 4 oz (56.4 kg)   03/18/19 120 lb (54.4 kg)     Body mass index is 24.37 kg/m². Physical Exam   Constitutional: She is oriented to person, place, and time. She appears well-developed and well-nourished. No distress. Eyes: Conjunctivae are normal. Right eye exhibits no discharge. Left eye exhibits no discharge. Cardiovascular: Normal rate and regular rhythm. Pulmonary/Chest: Effort normal and breath sounds normal. No respiratory distress. Musculoskeletal: Normal range of motion. She exhibits no edema or tenderness. Neurological: She is alert and oriented to person, place, and time. Skin: Skin is warm and dry. No rash noted. She is not diaphoretic. No erythema. No pallor. Psychiatric: She has a normal mood and affect. Her behavior is normal. Judgment and thought content normal.   Nursing note and vitals reviewed.       No results found for: LABA1C    No results found for: CHOL, TRIG, HDL, LDLCALC, LDLDIRECT    The ASCVD Risk score (Biju Núñez., et al., 2013) failed to calculate for the following reasons:    Cannot find a previous HDL lab    Cannot find a previous total cholesterol lab    Lab Results   Component Value Date     03/12/2019    K 5.6 (H) 03/12/2019    CL 99 03/12/2019    CO2 25 03/12/2019    BUN 12 03/12/2019    CREATININE 0.6 03/12/2019    GLUCOSE 102 03/12/2019    CALCIUM 9.9 03/12/2019    PROT 7.8 01/30/2019    LABALBU 4.8 01/30/2019    BILITOT 0.2 (L) 01/30/2019    ALKPHOS 71 01/30/2019    AST 34 01/30/2019    ALT 28 01/30/2019    LABGLOM >90 03/12/2019       No results found for: LABMICR, [id number]    Lab Results   Component Value Date    TSH 1.840 02/25/2019       Lab Results   Component Value Date    WBC 8.8 02/21/2019    HGB 14.4 02/21/2019    HCT 41.2 02/21/2019    MCV 98.3 02/21/2019     02/21/2019       No results found for: PSA, PSADIA      There is no immunization history on file for this patient. Health Maintenance   Topic Date Due    Hepatitis C screen  1965    Pneumococcal 0-64 years Vaccine (1 of 1 - PPSV23) 02/10/1971    HIV screen  02/10/1980    DTaP/Tdap/Td vaccine (1 - Tdap) 02/10/1984    Cervical cancer screen  02/10/1986    Lipid screen  02/10/2005    Shingles Vaccine (1 of 2) 02/10/2015    Flu vaccine (Season Ended) 01/30/2020 (Originally 9/1/2019)    Potassium monitoring  03/12/2020    Creatinine monitoring  03/12/2020    Breast cancer screen  06/19/2020    Colon cancer screen colonoscopy  05/03/2027       Future Appointments   Date Time Provider Jim Song   8/20/2019  9:20 AM Gigi Simeon APRN - CNP [facility]       ASSESSMENT      Patient has a circular scar on the underside of her right upper arm from a previous large punch biopsy in which the remains of a spider bite that became infected were removed by a dermatologist.  Patient states this area is getting larger and more bothersome and she would like a referral back to dermatology to look at it again. He does not appear infected at all today. Patient's left knee exam is completely normal today. Patient is extremely happy about this and has no pain, no swelling or no postoperative complications. Diagnosis Orders   1.  Essential hypertension  amLODIPine (NORVASC) 10 MG tablet    Lipid Panel   2. Acute medial meniscus tear, right, subsequent encounter  carisoprodol (SOMA) 350 MG tablet   3. Abscess  External Referral To Dermatology       PLAN      1. Increasing amlodipine to 10 mg daily. 2.  Referral to dermatology. 3.  Follow-up when you are ready for your annual Pap exam.  4.  Lab given for lipid level which has not been drawn yet.     Electronically signed by SHERYL Bullock CNP on 6/12/2019 at 2:33 PM

## 2019-06-12 NOTE — LETTER
[address] [address]  Phone: [phone]  Fax: [phone]    SHERYL Da Silva CNP        June 12, 2019     Patient: Gary Phillips   YOB: 1965   Date of Visit: 6/12/2019       To Whom it May Concern:    Gary Phillips was seen in my clinic on 6/12/2019. If you have any questions or concerns, please don't hesitate to call.     Sincerely,         SHERYL Da Silva CNP

## 2019-07-01 DIAGNOSIS — S83.241D ACUTE MEDIAL MENISCUS TEAR, RIGHT, SUBSEQUENT ENCOUNTER: ICD-10-CM

## 2019-07-01 RX ORDER — CARISOPRODOL 350 MG/1
350 TABLET ORAL 2 TIMES DAILY PRN
Qty: 28 TABLET | Refills: 0 | Status: SHIPPED | OUTPATIENT
Start: 2019-07-01 | End: 2019-07-15

## 2019-07-01 NOTE — TELEPHONE ENCOUNTER
Vance Aleman called requesting a refill on the following medications:  Requested Prescriptions     Pending Prescriptions Disp Refills    carisoprodol (SOMA) 350 MG tablet 42 tablet 0     Sig: Take 1 tablet by mouth 3 times daily as needed for Muscle spasms for up to 14 days. Pharmacy verified: [facility]   . pv      Date of last visit: 6/12/19  Date of next visit (if applicable): 9/39/0167

## 2019-07-16 ENCOUNTER — TELEPHONE (OUTPATIENT)
Dept: FAMILY MEDICINE CLINIC | Age: 54
End: 2019-07-16

## 2019-07-16 RX ORDER — AMOXICILLIN 500 MG/1
500 CAPSULE ORAL 3 TIMES DAILY
Qty: 30 CAPSULE | Refills: 0 | Status: SHIPPED | OUTPATIENT
Start: 2019-07-16 | End: 2019-07-26

## 2019-08-20 ENCOUNTER — OFFICE VISIT (OUTPATIENT)
Dept: FAMILY MEDICINE CLINIC | Age: 54
End: 2019-08-20
Payer: COMMERCIAL

## 2019-08-20 VITALS
HEART RATE: 94 BPM | SYSTOLIC BLOOD PRESSURE: 138 MMHG | RESPIRATION RATE: 16 BRPM | BODY MASS INDEX: 23.67 KG/M2 | DIASTOLIC BLOOD PRESSURE: 70 MMHG | TEMPERATURE: 97.6 F | WEIGHT: 121.2 LBS

## 2019-08-20 DIAGNOSIS — I10 ESSENTIAL HYPERTENSION: Primary | ICD-10-CM

## 2019-08-20 DIAGNOSIS — G89.29 CHRONIC RIGHT SHOULDER PAIN: ICD-10-CM

## 2019-08-20 DIAGNOSIS — M25.511 CHRONIC RIGHT SHOULDER PAIN: ICD-10-CM

## 2019-08-20 PROCEDURE — 99214 OFFICE O/P EST MOD 30 MIN: CPT | Performed by: NURSE PRACTITIONER

## 2019-08-20 RX ORDER — BACLOFEN 10 MG/1
10 TABLET ORAL 2 TIMES DAILY
Qty: 60 TABLET | Refills: 0 | Status: SHIPPED | OUTPATIENT
Start: 2019-08-20 | End: 2019-09-30 | Stop reason: SDUPTHER

## 2019-08-20 RX ORDER — LISINOPRIL 10 MG/1
10 TABLET ORAL 2 TIMES DAILY
Qty: 180 TABLET | Refills: 1 | Status: SHIPPED | OUTPATIENT
Start: 2019-08-20 | End: 2020-03-02

## 2019-08-20 ASSESSMENT — ENCOUNTER SYMPTOMS
DIARRHEA: 0
BACK PAIN: 1
EYE DISCHARGE: 0
EYE REDNESS: 0
ALLERGIC/IMMUNOLOGIC NEGATIVE: 1
VOMITING: 0
SORE THROAT: 0
ABDOMINAL PAIN: 0
COUGH: 0
SHORTNESS OF BREATH: 0
NAUSEA: 0
WHEEZING: 0
RHINORRHEA: 0
EYE PAIN: 0
CONSTIPATION: 0
TROUBLE SWALLOWING: 0

## 2019-08-20 NOTE — PATIENT INSTRUCTIONS
THE MOST IMPORTANT ACTION YOU CAN TAKE TO IMPROVE YOUR CURRENT AND FUTURE HEALTH IS TO QUIT SMOKING. Call the [facility] at [place] NOW ([phone])    Smoking harms nonsmokers. When nonsmokers are around people who smoke, they absorb nicotine, carbon monoxide, and other ingredients of tobacco smoke. DO NOT SMOKE AROUND CHILDREN    Children exposed to secondhand smoke are at an increased risk of:  Sudden Infant Death Syndrome (SIDS), acute respiratory infections, inflammation of the middle ear, and severe asthma. Over a longer time, it causes heart disease and lung cancer. There is no safe level of exposure to secondhand smoke.

## 2019-08-20 NOTE — PROGRESS NOTES
vomiting. Endocrine: Negative. Genitourinary: Negative for dysuria, frequency and urgency. Musculoskeletal: Positive for arthralgias and back pain. Negative for myalgias. Skin: Negative for rash. Allergic/Immunologic: Negative. Neurological: Negative for dizziness, tremors, weakness and headaches. Hematological: Negative. Psychiatric/Behavioral: Negative for dysphoric mood and sleep disturbance. The patient is not nervous/anxious. OBJECTIVE     /70 (Site: Right Upper Arm)   Pulse 94   Temp 97.6 °F (36.4 °C) (Oral)   Resp 16   Wt 121 lb 3.2 oz (55 kg)   BMI 23.67 kg/m²     Wt Readings from Last 3 Encounters:   08/20/19 121 lb 3.2 oz (55 kg)   06/12/19 124 lb 12.8 oz (56.6 kg)   05/31/19 124 lb 4 oz (56.4 kg)     Body mass index is 23.67 kg/m². Physical Exam   Constitutional: She is oriented to person, place, and time. She appears well-developed and well-nourished. No distress. HENT:   Right Ear: External ear normal.   Left Ear: External ear normal.   Nose: Nose normal.   Eyes: Pupils are equal, round, and reactive to light. Conjunctivae and EOM are normal. Right eye exhibits no discharge. Left eye exhibits no discharge. Neck: Normal range of motion. No JVD present. Cardiovascular: Normal rate and regular rhythm. Murmur heard. Systolic murmur is present with a grade of 2/6. Pulmonary/Chest: Effort normal and breath sounds normal. No respiratory distress. Musculoskeletal: Normal range of motion. She exhibits no edema, tenderness or deformity. Neurological: She is alert and oriented to person, place, and time. Coordination normal.   Skin: Skin is warm and dry. Capillary refill takes less than 2 seconds. No rash noted. She is not diaphoretic. No erythema. No pallor. Psychiatric: She has a normal mood and affect.  Her behavior is normal. Judgment and thought content normal.       No results found for: LABA1C    No results found for: CHOL, TRIG, HDL, LDLCALC,

## 2019-09-27 DIAGNOSIS — S83.241D ACUTE MEDIAL MENISCUS TEAR, RIGHT, SUBSEQUENT ENCOUNTER: ICD-10-CM

## 2019-09-30 RX ORDER — IBUPROFEN 800 MG/1
TABLET ORAL
Qty: 90 TABLET | Refills: 3 | Status: SHIPPED | OUTPATIENT
Start: 2019-09-30 | End: 2020-06-15 | Stop reason: SDUPTHER

## 2019-09-30 RX ORDER — BACLOFEN 10 MG/1
10 TABLET ORAL 2 TIMES DAILY
Qty: 60 TABLET | Refills: 3 | Status: SHIPPED | OUTPATIENT
Start: 2019-09-30 | End: 2020-01-31 | Stop reason: SDUPTHER

## 2019-12-01 DIAGNOSIS — I10 ESSENTIAL HYPERTENSION: ICD-10-CM

## 2019-12-02 RX ORDER — AMLODIPINE BESYLATE 10 MG/1
TABLET ORAL
Qty: 90 TABLET | Refills: 2 | Status: SHIPPED | OUTPATIENT
Start: 2019-12-02 | End: 2020-08-28

## 2020-01-31 RX ORDER — BACLOFEN 10 MG/1
10 TABLET ORAL 2 TIMES DAILY
Qty: 60 TABLET | Refills: 3 | Status: SHIPPED | OUTPATIENT
Start: 2020-01-31 | End: 2020-03-01

## 2020-01-31 NOTE — TELEPHONE ENCOUNTER
Lawanda Merrill called requesting a refill on the following medications:  Requested Prescriptions     Pending Prescriptions Disp Refills    baclofen (LIORESAL) 10 MG tablet 60 tablet 3     Sig: Take 1 tablet by mouth 2 times daily     [facility]      Date of last visit: 8/20/19  Date of next visit (if applicable): Visit date not found

## 2020-03-02 RX ORDER — LISINOPRIL 10 MG/1
TABLET ORAL
Qty: 180 TABLET | Refills: 1 | Status: SHIPPED | OUTPATIENT
Start: 2020-03-02 | End: 2020-08-28

## 2020-06-01 ENCOUNTER — HOSPITAL ENCOUNTER (EMERGENCY)
Age: 55
Discharge: HOME | End: 2020-06-01
Payer: COMMERCIAL

## 2020-06-01 VITALS
OXYGEN SATURATION: 97 % | BODY MASS INDEX: 23.44 KG/M2 | RESPIRATION RATE: 16 BRPM | DIASTOLIC BLOOD PRESSURE: 72 MMHG | TEMPERATURE: 97.3 F | SYSTOLIC BLOOD PRESSURE: 192 MMHG | WEIGHT: 120 LBS | HEART RATE: 89 BPM

## 2020-06-01 PROCEDURE — 6360000002 HC RX W HCPCS: Performed by: NURSE PRACTITIONER

## 2020-06-01 PROCEDURE — 99213 OFFICE O/P EST LOW 20 MIN: CPT | Performed by: NURSE PRACTITIONER

## 2020-06-01 PROCEDURE — 99212 OFFICE O/P EST SF 10 MIN: CPT

## 2020-06-01 PROCEDURE — 96372 THER/PROPH/DIAG INJ SC/IM: CPT

## 2020-06-01 RX ORDER — BACLOFEN 10 MG/1
10 TABLET ORAL 3 TIMES DAILY
Qty: 21 TABLET | Refills: 0 | Status: SHIPPED | OUTPATIENT
Start: 2020-06-01 | End: 2020-06-23 | Stop reason: ALTCHOICE

## 2020-06-01 RX ORDER — PREDNISONE 20 MG/1
20 TABLET ORAL 2 TIMES DAILY
Qty: 10 TABLET | Refills: 0 | Status: SHIPPED | OUTPATIENT
Start: 2020-06-01 | End: 2020-06-06

## 2020-06-01 RX ORDER — CARISOPRODOL 350 MG/1
350 TABLET ORAL 3 TIMES DAILY PRN
Qty: 9 TABLET | Refills: 0 | Status: SHIPPED | OUTPATIENT
Start: 2020-06-01 | End: 2020-06-04

## 2020-06-01 RX ORDER — KETOROLAC TROMETHAMINE 30 MG/ML
30 INJECTION, SOLUTION INTRAMUSCULAR; INTRAVENOUS ONCE
Status: COMPLETED | OUTPATIENT
Start: 2020-06-01 | End: 2020-06-01

## 2020-06-01 RX ADMIN — KETOROLAC TROMETHAMINE 30 MG: 30 INJECTION, SOLUTION INTRAMUSCULAR at 16:55

## 2020-06-01 ASSESSMENT — PAIN SCALES - GENERAL
PAINLEVEL_OUTOF10: 5
PAINLEVEL_OUTOF10: 5

## 2020-06-01 ASSESSMENT — ENCOUNTER SYMPTOMS
BOWEL INCONTINENCE: 0
PHOTOPHOBIA: 0

## 2020-06-01 ASSESSMENT — PAIN DESCRIPTION - LOCATION: LOCATION: NECK

## 2020-06-01 NOTE — ED NOTES
Patient understood instructions verbally,  Follow up with PCP with any concerns, or worse neck pain ,ollow up with ED. E-script, ambulated self to lobby,stable condition.       Bear Galloway LPN  25/97/76 5543

## 2020-06-01 NOTE — ED PROVIDER NOTES
well-developed. She is not ill-appearing, toxic-appearing or diaphoretic. HENT:      Head: Normocephalic. Right Ear: External ear normal.      Left Ear: External ear normal.      Nose: Nose normal.   Neck:      Musculoskeletal: Neck supple. Decreased range of motion. Pain with movement and muscular tenderness present. No neck rigidity, injury or spinous process tenderness. Comments: Patient has limited range of motion with turning her head more to the right versus left. Patient denies any paresthesias and grasp are equal and strong patient rates her pain 5 on a 10 scale patient denies any pain to direct palpation on the spine but does have muscle tenderness. Cardiovascular:      Rate and Rhythm: Normal rate. Pulmonary:      Effort: Pulmonary effort is normal.   Musculoskeletal:         General: Tenderness present. No signs of injury. Cervical back: She exhibits decreased range of motion, tenderness, pain and spasm. She exhibits no bony tenderness and no deformity. Back:       Comments:  Cervical spine   Skin:     General: Skin is warm and dry. Capillary Refill: Capillary refill takes less than 2 seconds. Neurological:      Mental Status: She is alert and oriented to person, place, and time. Psychiatric:         Behavior: Behavior is cooperative. DIAGNOSTIC RESULTS     Labs:No results found for this visit on 06/01/20. IMAGING:    No orders to display         EKG:      URGENT CARE COURSE:     Vitals:    06/01/20 1626   Pulse: 91   Resp: 16   Temp: 97.3 °F (36.3 °C)   TempSrc: Temporal   SpO2: 97%   Weight: 120 lb (54.4 kg)       Medications   ketorolac (TORADOL) injection 30 mg (30 mg Intramuscular Given 6/1/20 1393)            PROCEDURES:  None    FINAL IMPRESSION      1. Chronic neck pain    2.  Strain of calf muscle, right, initial encounter          DISPOSITION/ PLAN      The patient or patient's representative and I have discussed the diagnosis and risks, and we Apply 1 patch topically daily    MENTHOL-METHYL SALICYLATE (ICY HOT) 44-66 % EXTERNAL CREAM    Follow package directions for topical use.        Current Discharge Medication List          SHERYL Ortiz CNP    (Please note that portions of this note were completed with a voice recognition program. Efforts were made to edit the dictations but occasionally words are mis-transcribed.)           SHERYL Ortiz CNP  06/01/20 4463

## 2020-06-04 ENCOUNTER — VIRTUAL VISIT (OUTPATIENT)
Dept: FAMILY MEDICINE CLINIC | Age: 55
End: 2020-06-04
Payer: COMMERCIAL

## 2020-06-04 PROCEDURE — 99214 OFFICE O/P EST MOD 30 MIN: CPT | Performed by: NURSE PRACTITIONER

## 2020-06-04 RX ORDER — CLINDAMYCIN HYDROCHLORIDE 300 MG/1
300 CAPSULE ORAL 2 TIMES DAILY
Qty: 30 CAPSULE | Refills: 0 | Status: SHIPPED | OUTPATIENT
Start: 2020-06-04 | End: 2020-06-14

## 2020-06-04 RX ORDER — CARISOPRODOL 350 MG/1
350 TABLET ORAL 3 TIMES DAILY PRN
Qty: 21 TABLET | Refills: 0 | Status: SHIPPED | OUTPATIENT
Start: 2020-06-04 | End: 2020-06-11

## 2020-06-04 ASSESSMENT — ENCOUNTER SYMPTOMS
CONSTIPATION: 0
COUGH: 0
VOMITING: 0
SORE THROAT: 0
BACK PAIN: 0
ALLERGIC/IMMUNOLOGIC NEGATIVE: 1
WHEEZING: 0
EYE REDNESS: 0
TROUBLE SWALLOWING: 0
DIARRHEA: 0
NAUSEA: 0
SHORTNESS OF BREATH: 0
ABDOMINAL PAIN: 0
RHINORRHEA: 0
EYE DISCHARGE: 0
EYE PAIN: 0

## 2020-06-04 NOTE — PROGRESS NOTES
[address] [address]  Dept: [phone]  Dept Fax: [phone]  Loc: [phone]       JENNYFER Powell is a 54 y. o.female who was seen in urgent care on June 1 for neck pain, strain of left calf. Was treated with Soma, Baclofen, and prednisone. She is uncertain of the etiology but thinks its related to an episode 2 weeks ago when se physically assisted her  when fell at home. She has increased pain when she works at her job, but Chirpify helps with this. Over she has made some improvement. Complains of labial cyst for several days that is hardened. Patient Active Problem List   Diagnosis    Essential hypertension    Osteopenia       Current Outpatient Medications   Medication Sig Dispense Refill    clindamycin (CLEOCIN) 300 MG capsule Take 1 capsule by mouth 2 times daily for 10 days 30 capsule 0    carisoprodol (SOMA) 350 MG tablet Take 1 tablet by mouth 3 times daily as needed for Muscle spasms for up to 7 days. 21 tablet 0    baclofen (LIORESAL) 10 MG tablet Take 1 tablet by mouth 3 times daily 21 tablet 0    predniSONE (DELTASONE) 20 MG tablet Take 1 tablet by mouth 2 times daily for 5 days 10 tablet 0    lisinopril (PRINIVIL;ZESTRIL) 10 MG tablet TAKE 1 TABLET BY MOUTH TWO TIMES A DAY  180 tablet 1    amLODIPine (NORVASC) 10 MG tablet TAKE 1 TABLET BY MOUTH ONE TIME A DAY  90 tablet 2    ibuprofen (ADVIL;MOTRIN) 800 MG tablet TAKE 1 TABLET BY MOUTH THREE TIMES A DAY FOR 30 DAYS 90 tablet 3     No current facility-administered medications for this visit. Review of Systems   Constitutional: Negative for activity change, fatigue and fever. HENT: Negative for congestion, ear pain, rhinorrhea, sore throat and trouble swallowing. Eyes: Negative for pain, discharge and redness. Respiratory: Negative for cough, shortness of breath and wheezing. Cardiovascular: Negative.

## 2020-06-16 ENCOUNTER — TELEPHONE (OUTPATIENT)
Dept: ADMINISTRATIVE | Age: 55
End: 2020-06-16

## 2020-06-16 RX ORDER — IBUPROFEN 800 MG/1
TABLET ORAL
Qty: 90 TABLET | Refills: 3 | Status: SHIPPED | OUTPATIENT
Start: 2020-06-16 | End: 2020-11-19 | Stop reason: SDUPTHER

## 2020-06-16 NOTE — TELEPHONE ENCOUNTER
Her neck is still bothering her, feels like her muscles are very tight. Very painful. Asking for more Soma, she is still using ibuprofen, too.      Work till [employer] [phone]

## 2020-06-16 NOTE — TELEPHONE ENCOUNTER
Left VM for her to return call to gather more information regarding the [facility] request.

## 2020-06-17 ENCOUNTER — TELEPHONE (OUTPATIENT)
Dept: FAMILY MEDICINE CLINIC | Age: 55
End: 2020-06-17

## 2020-06-17 RX ORDER — CARISOPRODOL 350 MG/1
350 TABLET ORAL 3 TIMES DAILY PRN
Qty: 21 TABLET | Refills: 0 | Status: SHIPPED | OUTPATIENT
Start: 2020-06-17 | End: 2020-06-24

## 2020-06-17 NOTE — TELEPHONE ENCOUNTER
Please notify the patient I sent a prescription for Soma, and also a neck x-ray since this is been going on so long.

## 2020-06-19 ENCOUNTER — HOSPITAL ENCOUNTER (OUTPATIENT)
Dept: GENERAL RADIOLOGY | Age: 55
Discharge: HOME | End: 2020-06-19
Payer: COMMERCIAL

## 2020-06-19 ENCOUNTER — HOSPITAL ENCOUNTER (OUTPATIENT)
Age: 55
Discharge: HOME | End: 2020-06-19
Payer: COMMERCIAL

## 2020-06-19 PROCEDURE — 72040 X-RAY EXAM NECK SPINE 2-3 VW: CPT

## 2020-06-22 ENCOUNTER — TELEPHONE (OUTPATIENT)
Dept: FAMILY MEDICINE CLINIC | Age: 55
End: 2020-06-22

## 2020-06-22 NOTE — TELEPHONE ENCOUNTER
Joanne Presley from Dr Dumont [place] office called. He is requesting that you order a CTA of the head and neck prior to patient being seen.  Ok?

## 2020-06-22 NOTE — TELEPHONE ENCOUNTER
----- Message from SHERYL Fernandez CNP sent at 6/22/2020  8:27 AM EDT -----  Please advise patient cervical x-ray showed mild spondylosis. An incidental finding is that of severe carotid artery calcification which means her carotid arteries are likely quite narrowed and this is a risk for stroke. I am putting in a referral to neurology for further evaluation.

## 2020-06-23 ENCOUNTER — OFFICE VISIT (OUTPATIENT)
Dept: FAMILY MEDICINE CLINIC | Age: 55
End: 2020-06-23
Payer: COMMERCIAL

## 2020-06-23 VITALS
HEIGHT: 60 IN | SYSTOLIC BLOOD PRESSURE: 140 MMHG | RESPIRATION RATE: 20 BRPM | TEMPERATURE: 98.8 F | WEIGHT: 121 LBS | DIASTOLIC BLOOD PRESSURE: 70 MMHG | BODY MASS INDEX: 23.75 KG/M2 | HEART RATE: 100 BPM

## 2020-06-23 PROCEDURE — 99214 OFFICE O/P EST MOD 30 MIN: CPT | Performed by: NURSE PRACTITIONER

## 2020-06-23 RX ORDER — BACLOFEN 10 MG/1
10 TABLET ORAL 3 TIMES DAILY
Qty: 45 TABLET | Refills: 0 | Status: SHIPPED | OUTPATIENT
Start: 2020-06-23 | End: 2020-07-08

## 2020-06-23 RX ORDER — HYDROCODONE BITARTRATE AND ACETAMINOPHEN 7.5; 325 MG/1; MG/1
1 TABLET ORAL EVERY 6 HOURS PRN
Qty: 28 TABLET | Refills: 0 | Status: SHIPPED | OUTPATIENT
Start: 2020-06-23 | End: 2020-06-30 | Stop reason: SDUPTHER

## 2020-06-23 SDOH — HEALTH STABILITY: MENTAL HEALTH: HOW MANY STANDARD DRINKS CONTAINING ALCOHOL DO YOU HAVE ON A TYPICAL DAY?: 3 OR 4

## 2020-06-23 SDOH — HEALTH STABILITY: MENTAL HEALTH: HOW OFTEN DO YOU HAVE A DRINK CONTAINING ALCOHOL?: 4 OR MORE TIMES A WEEK

## 2020-06-23 ASSESSMENT — ENCOUNTER SYMPTOMS
ABDOMINAL PAIN: 0
DIARRHEA: 0
NAUSEA: 0
SORE THROAT: 0
VOMITING: 0
COUGH: 0
CONSTIPATION: 0
ALLERGIC/IMMUNOLOGIC NEGATIVE: 1
EYE PAIN: 0
BACK PAIN: 1
RHINORRHEA: 0
TROUBLE SWALLOWING: 0
EYE DISCHARGE: 0
EYE REDNESS: 0
WHEEZING: 0
SHORTNESS OF BREATH: 0

## 2020-06-23 ASSESSMENT — PATIENT HEALTH QUESTIONNAIRE - PHQ9
SUM OF ALL RESPONSES TO PHQ9 QUESTIONS 1 & 2: 1
1. LITTLE INTEREST OR PLEASURE IN DOING THINGS: 1
SUM OF ALL RESPONSES TO PHQ QUESTIONS 1-9: 1
2. FEELING DOWN, DEPRESSED OR HOPELESS: 0
SUM OF ALL RESPONSES TO PHQ QUESTIONS 1-9: 1

## 2020-06-30 RX ORDER — HYDROCODONE BITARTRATE AND ACETAMINOPHEN 7.5; 325 MG/1; MG/1
1 TABLET ORAL EVERY 8 HOURS PRN
Qty: 42 TABLET | Refills: 0 | Status: SHIPPED | OUTPATIENT
Start: 2020-06-30 | End: 2020-07-14 | Stop reason: SDUPTHER

## 2020-07-02 ENCOUNTER — TELEPHONE (OUTPATIENT)
Dept: FAMILY MEDICINE CLINIC | Age: 55
End: 2020-07-02

## 2020-07-02 NOTE — TELEPHONE ENCOUNTER
Needs MRI's of lumbar and cervical spine ordered prior to being seen at [facility]. Ok to order?

## 2020-07-03 ENCOUNTER — HOSPITAL ENCOUNTER (OUTPATIENT)
Dept: CT IMAGING | Age: 55
Discharge: HOME | End: 2020-07-03
Payer: COMMERCIAL

## 2020-07-03 PROCEDURE — 6360000004 HC RX CONTRAST MEDICATION: Performed by: NURSE PRACTITIONER

## 2020-07-03 PROCEDURE — 70496 CT ANGIOGRAPHY HEAD: CPT

## 2020-07-03 PROCEDURE — 70498 CT ANGIOGRAPHY NECK: CPT

## 2020-07-03 RX ADMIN — IOPAMIDOL 85 ML: 755 INJECTION, SOLUTION INTRAVENOUS at 09:50

## 2020-07-06 ENCOUNTER — TELEPHONE (OUTPATIENT)
Dept: FAMILY MEDICINE CLINIC | Age: 55
End: 2020-07-06

## 2020-07-06 NOTE — TELEPHONE ENCOUNTER
----- Message from SHERYL Crabtree CNP sent at 7/6/2020  2:36 PM EDT -----  Please advise patient that her CT scans did not show any concerning structural issues. It did show she has some artery narrowing from plaque that is not overly concerning. I had Dr. Megan Becerril review it and he recommends that she takes a daily baby ASA and be placed on a statin, to try and help reduce the plaque. I will prescribe if she agrees.

## 2020-07-07 RX ORDER — CYCLOBENZAPRINE HCL 10 MG
10 TABLET ORAL 3 TIMES DAILY PRN
Qty: 90 TABLET | Refills: 0 | Status: SHIPPED | OUTPATIENT
Start: 2020-07-07 | End: 2020-08-14 | Stop reason: SDUPTHER

## 2020-07-07 RX ORDER — BACLOFEN 10 MG/1
10 TABLET ORAL 3 TIMES DAILY
Qty: 45 TABLET | Refills: 0 | Status: CANCELLED | OUTPATIENT
Start: 2020-07-07 | End: 2020-07-22

## 2020-07-08 ENCOUNTER — OFFICE VISIT (OUTPATIENT)
Dept: FAMILY MEDICINE CLINIC | Age: 55
End: 2020-07-08
Payer: COMMERCIAL

## 2020-07-08 VITALS
OXYGEN SATURATION: 98 % | HEIGHT: 60 IN | HEART RATE: 106 BPM | BODY MASS INDEX: 23.2 KG/M2 | RESPIRATION RATE: 14 BRPM | WEIGHT: 118.2 LBS | DIASTOLIC BLOOD PRESSURE: 81 MMHG | SYSTOLIC BLOOD PRESSURE: 120 MMHG | TEMPERATURE: 98.8 F

## 2020-07-08 PROBLEM — I65.23 CAROTID STENOSIS, NON-SYMPTOMATIC, BILATERAL: Status: ACTIVE | Noted: 2020-07-08

## 2020-07-08 PROBLEM — M54.16 CHRONIC RADICULAR LUMBAR PAIN: Status: ACTIVE | Noted: 2020-07-08

## 2020-07-08 PROBLEM — G89.29 CHRONIC RADICULAR LUMBAR PAIN: Status: ACTIVE | Noted: 2020-07-08

## 2020-07-08 PROCEDURE — 99214 OFFICE O/P EST MOD 30 MIN: CPT | Performed by: NURSE PRACTITIONER

## 2020-07-08 RX ORDER — ATORVASTATIN CALCIUM 20 MG/1
20 TABLET, FILM COATED ORAL DAILY
Qty: 90 TABLET | Refills: 1 | Status: SHIPPED | OUTPATIENT
Start: 2020-07-08 | End: 2021-01-11 | Stop reason: SDUPTHER

## 2020-07-08 ASSESSMENT — ENCOUNTER SYMPTOMS
EYE REDNESS: 0
CONSTIPATION: 0
NAUSEA: 0
BACK PAIN: 1
SHORTNESS OF BREATH: 0
EYE PAIN: 0
DIARRHEA: 0
RHINORRHEA: 0
WHEEZING: 0
ALLERGIC/IMMUNOLOGIC NEGATIVE: 1
COUGH: 0
SORE THROAT: 0
VOMITING: 0
ABDOMINAL PAIN: 0
TROUBLE SWALLOWING: 0
EYE DISCHARGE: 0

## 2020-07-08 NOTE — PROGRESS NOTES
[address] [address]  Dept: [phone]  Dept Fax: [phone]  Loc: [phone]       JENNYFER Singleton is a 54 y. o.female here for follow-up on her recent head and neck CTA scans. Patient Active Problem List   Diagnosis    Essential hypertension    Osteopenia    Carotid stenosis, non-symptomatic, bilateral    Chronic radicular lumbar pain       Current Outpatient Medications   Medication Sig Dispense Refill    atorvastatin (LIPITOR) 20 MG tablet Take 1 tablet by mouth daily 90 tablet 1    cyclobenzaprine (FLEXERIL) 10 MG tablet Take 1 tablet by mouth 3 times daily as needed for Muscle spasms 90 tablet 0    HYDROcodone-acetaminophen (NORCO) 7.5-325 MG per tablet Take 1 tablet by mouth every 8 hours as needed for Pain for up to 14 days. Intended supply: 7 days. Take lowest dose possible to manage pain 42 tablet 0    baclofen (LIORESAL) 10 MG tablet Take 1 tablet by mouth 3 times daily for 15 days 45 tablet 0    ibuprofen (ADVIL;MOTRIN) 800 MG tablet TAKE 1 TABLET BY MOUTH THREE TIMES A DAY FOR 30 DAYS 90 tablet 3    lisinopril (PRINIVIL;ZESTRIL) 10 MG tablet TAKE 1 TABLET BY MOUTH TWO TIMES A DAY  180 tablet 1    amLODIPine (NORVASC) 10 MG tablet TAKE 1 TABLET BY MOUTH ONE TIME A DAY  90 tablet 2     No current facility-administered medications for this visit. Review of Systems   Constitutional: Negative for activity change, fatigue and fever. HENT: Negative for congestion, ear pain, rhinorrhea, sore throat and trouble swallowing. Eyes: Negative for pain, discharge and redness. Respiratory: Negative for cough, shortness of breath and wheezing. Cardiovascular: Negative. Gastrointestinal: Negative for abdominal pain, constipation, diarrhea, nausea and vomiting. Endocrine: Negative. Genitourinary: Negative for dysuria, frequency and urgency.    Musculoskeletal: Positive for back pain, neck pain and neck stiffness. Negative for arthralgias and myalgias. Skin: Negative for rash. Allergic/Immunologic: Negative. Neurological: Negative for dizziness, tremors, weakness and headaches. Hematological: Negative. Psychiatric/Behavioral: Negative for dysphoric mood and sleep disturbance. The patient is not nervous/anxious. OBJECTIVE     /81   Pulse 106   Temp 98.8 °F (37.1 °C) (Temporal)   Resp 14   Ht 5' (1.524 m)   Wt 118 lb 3.2 oz (53.6 kg)   SpO2 98%   BMI 23.08 kg/m²     Wt Readings from Last 3 Encounters:   07/08/20 118 lb 3.2 oz (53.6 kg)   06/23/20 121 lb (54.9 kg)   06/01/20 120 lb (54.4 kg)     Body mass index is 23.08 kg/m². Physical Exam  Constitutional:       General: She is not in acute distress. Appearance: She is well-developed. She is not diaphoretic. HENT:      Right Ear: External ear normal.      Left Ear: External ear normal.      Nose: Nose normal.   Eyes:      General:         Right eye: No discharge. Left eye: No discharge. Conjunctiva/sclera: Conjunctivae normal.      Pupils: Pupils are equal, round, and reactive to light. Neck:      Musculoskeletal: Normal range of motion. Vascular: No JVD. Cardiovascular:      Rate and Rhythm: Normal rate and regular rhythm. Pulmonary:      Effort: Pulmonary effort is normal. No respiratory distress. Musculoskeletal: Normal range of motion. General: Tenderness present. No swelling or deformity. Skin:     General: Skin is warm and dry. Capillary Refill: Capillary refill takes less than 2 seconds. Coloration: Skin is not pale. Findings: No erythema or rash. Neurological:      Mental Status: She is alert and oriented to person, place, and time. Coordination: Coordination normal.   Psychiatric:         Behavior: Behavior normal.         Thought Content:  Thought content normal.         Judgment: Judgment normal.         No results found for: LABA1C    No results found for: CHOL, TRIG, HDL, LDLCALC, LDLDIRECT    The ASCVD Risk score (Diane Alfredo, et al., 2013) failed to calculate for the following reasons:    Cannot find a previous HDL lab    Cannot find a previous total cholesterol lab    Lab Results   Component Value Date     03/12/2019    K 5.6 (H) 03/12/2019    CL 99 03/12/2019    CO2 25 03/12/2019    BUN 12 03/12/2019    CREATININE 0.6 03/12/2019    GLUCOSE 102 03/12/2019    CALCIUM 9.9 03/12/2019    PROT 7.8 01/30/2019    LABALBU 4.8 01/30/2019    BILITOT 0.2 (L) 01/30/2019    ALKPHOS 71 01/30/2019    AST 34 01/30/2019    ALT 28 01/30/2019    LABGLOM >90 03/12/2019       No results found for: LABMICR, [id number]    Lab Results   Component Value Date    TSH 1.840 02/25/2019       Lab Results   Component Value Date    WBC 8.8 02/21/2019    HGB 14.4 02/21/2019    HCT 41.2 02/21/2019    MCV 98.3 02/21/2019     02/21/2019       No results found for: PSA, PSADIA      There is no immunization history on file for this patient.     Health Maintenance   Topic Date Due    Hepatitis C screen  1965    Pneumococcal 0-64 years Vaccine (1 of 1 - PPSV23) 02/10/1971    Lipid screen  02/10/1975    HIV screen  02/10/1980    DTaP/Tdap/Td vaccine (1 - Tdap) 02/10/1984    Cervical cancer screen  02/10/1986    Shingles Vaccine (1 of 2) 02/10/2015    Low dose CT lung screening  02/10/2020    Potassium monitoring  03/12/2020    Creatinine monitoring  03/12/2020    Breast cancer screen  06/19/2020    Flu vaccine (1) 09/01/2020    Colon cancer screen colonoscopy  05/03/2027    Hepatitis A vaccine  Aged Out    Hepatitis B vaccine  Aged Out    Hib vaccine  Aged Out    Meningococcal (ACWY) vaccine  Aged Out       Future Appointments   Date Time Provider Jim Song   7/14/2020  8:30 AM STR MRI RM1 STR[facility] MRI STR Radiolog   7/14/2020  9:00 AM STR MRI RM1 STRZ MRI STR Radiolog   7/21/2020 10:00 AM Tavia Rodriguez APRN - CNP [facility] [place]   7/27/2020 11:20 AM SHERYL Thompson CNP SRPX [address]       ASSESSMENT      CT results and implications reviewed. Also discussed the patient's fairly recent diagnosis of osteopenia. Diagnosis Orders   1. Osteopenia after menopause  [address]   2. High cholesterol  atorvastatin (LIPITOR) 20 MG tablet   3. Chronic radicular lumbar pain     4. Carotid stenosis, non-symptomatic, bilateral         PLAN      1. Prescription for Lipitor. Patient also to be taking aspirin 84 mg daily. 2.  Referral to bone fragility clinic. 3.  Continue with follow-up at orthopedics and scheduled MRI.     Electronically signed by SHERYL Ambrocio CNP on 7/8/2020 at 9:52 AM

## 2020-07-13 NOTE — TELEPHONE ENCOUNTER
[facility] called requesting a refill on the following medications:  Requested Prescriptions     Pending Prescriptions Disp Refills    HYDROcodone-acetaminophen (NORCO) 7.5-325 MG per tablet 42 tablet 0     Sig: Take 1 tablet by mouth every 8 hours as needed for Pain for up to 14 days. Intended supply: 7 days.  Take lowest dose possible to manage pain     MRI & Dr. Appointment at [facility] scheduled for Thursday 07/16/2020    Pharmacy verified:  [facility]    Date of last visit: 07/08/2020  Date of next visit (if applicable): 35/11/1721

## 2020-07-14 RX ORDER — HYDROCODONE BITARTRATE AND ACETAMINOPHEN 7.5; 325 MG/1; MG/1
1 TABLET ORAL EVERY 8 HOURS PRN
Qty: 90 TABLET | Refills: 0 | Status: SHIPPED | OUTPATIENT
Start: 2020-07-14 | End: 2020-09-09 | Stop reason: SDUPTHER

## 2020-07-16 ENCOUNTER — TELEPHONE (OUTPATIENT)
Dept: FAMILY MEDICINE CLINIC | Age: 55
End: 2020-07-16

## 2020-07-16 NOTE — TELEPHONE ENCOUNTER
Suman Goodrich referred Kimberly Koch to [facility] for a bone consult, for osteopenia, [facility]'s policy is to have referring doctor order Dexa scan, good for 2 yrs, her last dexa scan was . Please order. Jacki's appt is 08-04.

## 2020-07-21 ENCOUNTER — HOSPITAL ENCOUNTER (OUTPATIENT)
Dept: MRI IMAGING | Age: 55
Discharge: HOME | End: 2020-07-21
Payer: COMMERCIAL

## 2020-07-21 ENCOUNTER — OFFICE VISIT (OUTPATIENT)
Dept: FAMILY MEDICINE CLINIC | Age: 55
End: 2020-07-21
Payer: COMMERCIAL

## 2020-07-21 VITALS
HEART RATE: 93 BPM | OXYGEN SATURATION: 98 % | SYSTOLIC BLOOD PRESSURE: 130 MMHG | DIASTOLIC BLOOD PRESSURE: 62 MMHG | HEIGHT: 61 IN | BODY MASS INDEX: 22.54 KG/M2 | WEIGHT: 119.4 LBS | RESPIRATION RATE: 16 BRPM | TEMPERATURE: 98.1 F

## 2020-07-21 PROBLEM — Z87.891 HISTORY OF SMOKING 30 OR MORE PACK YEARS: Status: ACTIVE | Noted: 2020-07-21

## 2020-07-21 PROCEDURE — 90471 IMMUNIZATION ADMIN: CPT | Performed by: NURSE PRACTITIONER

## 2020-07-21 PROCEDURE — 99215 OFFICE O/P EST HI 40 MIN: CPT | Performed by: NURSE PRACTITIONER

## 2020-07-21 PROCEDURE — 90715 TDAP VACCINE 7 YRS/> IM: CPT | Performed by: NURSE PRACTITIONER

## 2020-07-21 PROCEDURE — 72148 MRI LUMBAR SPINE W/O DYE: CPT

## 2020-07-21 PROCEDURE — 72141 MRI NECK SPINE W/O DYE: CPT

## 2020-07-21 PROCEDURE — 90670 PCV13 VACCINE IM: CPT | Performed by: NURSE PRACTITIONER

## 2020-07-21 PROCEDURE — 90472 IMMUNIZATION ADMIN EACH ADD: CPT | Performed by: NURSE PRACTITIONER

## 2020-07-21 RX ORDER — ACETAMINOPHEN 500 MG
500 TABLET ORAL EVERY 6 HOURS PRN
COMMUNITY

## 2020-07-21 RX ORDER — LISINOPRIL 30 MG/1
15 TABLET ORAL EVERY EVENING
COMMUNITY
End: 2020-09-14

## 2020-07-21 RX ORDER — ASPIRIN 81 MG/1
81 TABLET ORAL DAILY
COMMUNITY

## 2020-07-21 ASSESSMENT — ENCOUNTER SYMPTOMS
CONSTIPATION: 0
BACK PAIN: 1
NAUSEA: 0
RHINORRHEA: 0
VOMITING: 0
ABDOMINAL PAIN: 0
TROUBLE SWALLOWING: 0
EYE PAIN: 0
EYE DISCHARGE: 0
SORE THROAT: 0
DIARRHEA: 0
EYE REDNESS: 0
COUGH: 0
WHEEZING: 0
SHORTNESS OF BREATH: 0
ALLERGIC/IMMUNOLOGIC NEGATIVE: 1

## 2020-07-21 NOTE — LETTER
[address]  [address]  [facility] [address]  Phone: [phone]  Fax: [phone]    SHERYL Sethi CNP        July 21, 2020     Patient: Lynn Vivar   YOB: 1965   Date of Visit: 7/21/2020       To Whom it May Concern:    Zoe Dancer was seen in my clinic on 7/21/2020. She may return to work on August 24, 2020. If you have any questions or concerns, please don't hesitate to call.     Sincerely,     Electronically signed by SHERYL Sethi CNP on 7/21/2020 at 10:14 AM

## 2020-07-21 NOTE — PROGRESS NOTES
[address] [place] [address]  [address] [address]  Dept: [phone]  Dept Fax: [phone]  Loc: [phone]       JENNYFER Jo is a 54 y. o.female for one month follow up on several issues. She has MRI scheduled for tomorrow, has an appointment with the bone fragility clinic but is scheduled for a DEXA scan first, and is currently not working due to all of her physical problems. Patient Active Problem List   Diagnosis    Essential hypertension    Osteopenia    Carotid stenosis, non-symptomatic, bilateral    Chronic radicular lumbar pain    History of smoking 30 or more pack years       Current Outpatient Medications   Medication Sig Dispense Refill    acetaminophen (TYLENOL) 500 MG tablet Take 500 mg by mouth every 6 hours as needed for Pain      lisinopril (PRINIVIL;ZESTRIL) 30 MG tablet Take 15 mg by mouth every evening      aspirin 81 MG EC tablet Take 81 mg by mouth daily      HYDROcodone-acetaminophen (NORCO) 7.5-325 MG per tablet Take 1 tablet by mouth every 8 hours as needed for Pain for up to 30 days. 90 tablet 0    atorvastatin (LIPITOR) 20 MG tablet Take 1 tablet by mouth daily 90 tablet 1    cyclobenzaprine (FLEXERIL) 10 MG tablet Take 1 tablet by mouth 3 times daily as needed for Muscle spasms 90 tablet 0    ibuprofen (ADVIL;MOTRIN) 800 MG tablet TAKE 1 TABLET BY MOUTH THREE TIMES A DAY FOR 30 DAYS 90 tablet 3    lisinopril (PRINIVIL;ZESTRIL) 10 MG tablet TAKE 1 TABLET BY MOUTH TWO TIMES A DAY  (Patient taking differently: Take 10 mg by mouth every morning ) 180 tablet 1    amLODIPine (NORVASC) 10 MG tablet TAKE 1 TABLET BY MOUTH ONE TIME A DAY  90 tablet 2     No current facility-administered medications for this visit. Review of Systems   Constitutional: Negative for activity change, fatigue and fever.    HENT: Negative for congestion, ear pain, rhinorrhea, sore throat and trouble swallowing. Eyes: Negative for pain, discharge and redness. Respiratory: Negative for cough, shortness of breath and wheezing. Cardiovascular: Negative. Gastrointestinal: Negative for abdominal pain, constipation, diarrhea, nausea and vomiting. Endocrine: Negative. Genitourinary: Negative for dysuria, frequency and urgency. Musculoskeletal: Positive for back pain. Negative for arthralgias and myalgias. Skin: Negative for rash. Allergic/Immunologic: Negative. Neurological: Negative for dizziness, tremors, weakness and headaches. Hematological: Negative. Psychiatric/Behavioral: Negative for dysphoric mood and sleep disturbance. The patient is not nervous/anxious. OBJECTIVE     /62   Pulse 93   Temp 98.1 °F (36.7 °C) (Oral)   Resp 16   Ht 5' 0.63\" (1.54 m)   Wt 119 lb 6.4 oz (54.2 kg)   SpO2 98%   BMI 22.84 kg/m²     Wt Readings from Last 3 Encounters:   07/21/20 119 lb 6.4 oz (54.2 kg)   07/08/20 118 lb 3.2 oz (53.6 kg)   06/23/20 121 lb (54.9 kg)     Body mass index is 22.84 kg/m². Physical Exam  Constitutional:       General: She is not in acute distress. Appearance: She is well-developed. She is not diaphoretic. HENT:      Right Ear: External ear normal.      Left Ear: External ear normal.      Nose: Nose normal.   Eyes:      General:         Right eye: No discharge. Left eye: No discharge. Conjunctiva/sclera: Conjunctivae normal.      Pupils: Pupils are equal, round, and reactive to light. Neck:      Musculoskeletal: Normal range of motion. Vascular: No JVD. Cardiovascular:      Rate and Rhythm: Normal rate and regular rhythm. Pulmonary:      Effort: Pulmonary effort is normal. No respiratory distress. Musculoskeletal: Normal range of motion. General: No tenderness or deformity. Skin:     General: Skin is warm and dry. Capillary Refill: Capillary refill takes less than 2 seconds.       Coloration: Skin is not pale.      Findings: No erythema or rash. Neurological:      Mental Status: She is alert and oriented to person, place, and time. Coordination: Coordination normal.   Psychiatric:         Behavior: Behavior normal.         Thought Content:  Thought content normal.         Judgment: Judgment normal.         No results found for: LABA1C    No results found for: CHOL, TRIG, HDL, LDLCALC, LDLDIRECT    The ASCVD Risk score (Brennen Greco et al., 2013) failed to calculate for the following reasons:    Cannot find a previous HDL lab    Cannot find a previous total cholesterol lab    Lab Results   Component Value Date     03/12/2019    K 5.6 (H) 03/12/2019    CL 99 03/12/2019    CO2 25 03/12/2019    BUN 12 03/12/2019    CREATININE 0.6 03/12/2019    GLUCOSE 102 03/12/2019    CALCIUM 9.9 03/12/2019    PROT 7.8 01/30/2019    LABALBU 4.8 01/30/2019    BILITOT 0.2 (L) 01/30/2019    ALKPHOS 71 01/30/2019    AST 34 01/30/2019    ALT 28 01/30/2019    LABGLOM >90 03/12/2019       No results found for: LABMICR, [id number]    Lab Results   Component Value Date    TSH 1.840 02/25/2019       Lab Results   Component Value Date    WBC 8.8 02/21/2019    HGB 14.4 02/21/2019    HCT 41.2 02/21/2019    MCV 98.3 02/21/2019     02/21/2019       No results found for: PSA, PSADIA    Immunization History   Administered Date(s) Administered    Pneumococcal Conjugate 13-valent ([id number]) 07/21/2020    Tdap (Boostrix, Adacel) 07/21/2020       Health Maintenance   Topic Date Due    Hepatitis C screen  1965    Lipid screen  02/10/1975    HIV screen  02/10/1980    Cervical cancer screen  02/10/1986    Shingles Vaccine (1 of 2) 02/10/2015    Low dose CT lung screening  02/10/2020    Potassium monitoring  03/12/2020    Creatinine monitoring  03/12/2020    Breast cancer screen  06/19/2020    Flu vaccine (1) 09/01/2020    Pneumococcal 0-64 years Vaccine (1 of 1 - PPSV23) 09/15/2020    Colon cancer screen colonoscopy 05/03/2027    DTaP/Tdap/Td vaccine (2 - Td) 07/21/2030    Hepatitis A vaccine  Aged Out    Hepatitis B vaccine  Aged Out    Hib vaccine  Aged Out    Meningococcal (ACWY) vaccine  Aged Out       Future Appointments   Date Time Provider Jim Song   7/21/2020  1:30 PM STR MRI RM1 STRZ MRI STR Radiolog   7/21/2020  2:00 PM STR MRI RM1 STRZ MRI STR Radiolog   7/27/2020 11:20 AM SHERYL Montiel CNP [facility] - [place]   7/28/2020 10:00 AM SHERYL Sheppard CNP [facility] - [address] [facility]   8/13/2020  2:50 PM [facility] STR Radiolog       ASSESSMENT       Diagnosis Orders   1. Osteopenia, unspecified location     2. Screening for breast cancer  SB DIGITAL SCREEN SELF REFERRAL W OR WO CAD BILATERAL   3. Cigarette nicotine dependence, uncomplicated  CT chest diagnostic low dose   4. Need for Tdap vaccination     5. Immunization due  Tdap (age 6y and older) IM (BOOSTRIX)    PREVNAR 13 IM (Pneumococcal conjugate vaccine 13-valent)   6. History of smoking 30 or more pack years         PLAN      1. Recommended she begin taking calcium 600 mg with vitamin D daily. 2.  Tdap and Prevnar 13 ordered today. 3.  Referral for breast cancer screening. 4.  Make an appointment in 1 week for Pap smear here in the office. 5. Low-dose CT scan ordered due to smoking history    Approximately 45 minutes was spent in evaluation, consultation and planning patient care.       Electronically signed by SHERYL Sheppard CNP on 7/21/2020 at 11:37 AM

## 2020-07-22 ENCOUNTER — TELEPHONE (OUTPATIENT)
Dept: FAMILY MEDICINE CLINIC | Age: 55
End: 2020-07-22

## 2020-07-22 NOTE — TELEPHONE ENCOUNTER
Patient notified. She had an appointment yesterday with them. They want to put her through therapy but she doesn't want to. She will be following up with them regarding her concerns.

## 2020-07-22 NOTE — TELEPHONE ENCOUNTER
----- Message from SHERYL Hernandes CNP sent at 7/22/2020  8:50 AM EDT -----  Please notify patient that her MRIs showed significant problems with stenosis and other issues. She will definitely need to be followed by orthopedics for all of this.

## 2020-07-27 ENCOUNTER — INITIAL CONSULT (OUTPATIENT)
Dept: NEUROLOGY | Age: 55
End: 2020-07-27
Payer: COMMERCIAL

## 2020-07-27 VITALS
HEIGHT: 60 IN | SYSTOLIC BLOOD PRESSURE: 98 MMHG | BODY MASS INDEX: 23.95 KG/M2 | WEIGHT: 122 LBS | DIASTOLIC BLOOD PRESSURE: 56 MMHG | HEART RATE: 92 BPM

## 2020-07-27 PROCEDURE — 99244 OFF/OP CNSLTJ NEW/EST MOD 40: CPT | Performed by: PSYCHIATRY & NEUROLOGY

## 2020-07-28 ENCOUNTER — OFFICE VISIT (OUTPATIENT)
Dept: FAMILY MEDICINE CLINIC | Age: 55
End: 2020-07-28
Payer: COMMERCIAL

## 2020-07-28 VITALS
RESPIRATION RATE: 20 BRPM | WEIGHT: 119 LBS | SYSTOLIC BLOOD PRESSURE: 106 MMHG | TEMPERATURE: 97.9 F | HEIGHT: 61 IN | HEART RATE: 80 BPM | DIASTOLIC BLOOD PRESSURE: 56 MMHG | BODY MASS INDEX: 22.47 KG/M2

## 2020-07-28 PROCEDURE — 99214 OFFICE O/P EST MOD 30 MIN: CPT | Performed by: NURSE PRACTITIONER

## 2020-07-28 RX ORDER — LISINOPRIL 10 MG/1
10 TABLET ORAL 2 TIMES DAILY
COMMUNITY
End: 2021-08-16 | Stop reason: SDUPTHER

## 2020-07-28 ASSESSMENT — ENCOUNTER SYMPTOMS
COUGH: 0
CONSTIPATION: 0
VOMITING: 0
RHINORRHEA: 0
EYE REDNESS: 0
DIARRHEA: 0
BACK PAIN: 0
EYE DISCHARGE: 0
ABDOMINAL PAIN: 0
SORE THROAT: 0
SHORTNESS OF BREATH: 0
WHEEZING: 0
ALLERGIC/IMMUNOLOGIC NEGATIVE: 1
EYE PAIN: 0
NAUSEA: 0
TROUBLE SWALLOWING: 0

## 2020-07-28 NOTE — PATIENT INSTRUCTIONS
are trying to quit smoking. · Consider signing up for a smoking cessation program, such as the American Lung Association's Freedom from Smoking program.  · Get text messaging support. Go to the website at www.smokefree. gov to sign up for the [facility] program.  · Set a quit date. Pick your date carefully so that it is not right in the middle of a big deadline or stressful time. Once you quit, do not even take a puff. Get rid of all ashtrays and lighters after your last cigarette. Clean your house and your clothes so that they do not smell of smoke. · Learn how to be a nonsmoker. Think about ways you can avoid those things that make you reach for a cigarette. ? Avoid situations that put you at greatest risk for smoking. For some people, it is hard to have a drink with friends without smoking. For others, they might skip a coffee break with coworkers who smoke. ? Change your daily routine. Take a different route to work or eat a meal in a different place. · Cut down on stress. Calm yourself or release tension by doing an activity you enjoy, such as reading a book, taking a hot bath, or gardening. · Talk to your doctor or pharmacist about nicotine replacement therapy, which replaces the nicotine in your body. You still get nicotine but you do not use tobacco. Nicotine replacement products help you slowly reduce the amount of nicotine you need. These products come in several forms, many of them available over-the-counter:  ? Nicotine patches  ? Nicotine gum and lozenges  ? Nicotine inhaler  · Ask your doctor about bupropion (Wellbutrin) or varenicline (Chantix), which are prescription medicines. They do not contain nicotine. They help you by reducing withdrawal symptoms, such as stress and anxiety. · Some people find hypnosis, acupuncture, and massage helpful for ending the smoking habit. · Eat a healthy diet and get regular exercise.  Having healthy habits will help your body move past its craving for nicotine. · Be prepared to keep trying. Most people are not successful the first few times they try to quit. Do not get mad at yourself if you smoke again. Make a list of things you learned and think about when you want to try again, such as next week, next month, or next year. Where can you learn more? Go to https://[employer] Networksperichewferdinand.[email or web address]. TATTOFF. org and sign in to your [employer] account. Enter U743 in the [employer] box to learn more about \"Stopping Smoking: Care Instructions. \"     If you do not have an account, please click on the \"Sign Up Now\" link. Current as of: March 12, 2020               Content Version: 12.5  © 2006-2020 Healthwise, Incorporated. Care instructions adapted under license by [facility] ([facility]). If you have questions about a medical condition or this instruction, always ask your healthcare professional. [address] any warranty or liability for your use of this information.

## 2020-07-28 NOTE — PROGRESS NOTES
[address] [address]  Dept: [phone]  Dept Fax: [phone]  Loc: [phone]       SUBJECTIVE     Kathryn Neal is a 54 y. o.female for routine Pap smear after her 3-year interval.  History of 1 abnormal Pap smear in the past with no finding on subsequent evaluation. Patient Active Problem List   Diagnosis    Essential hypertension    Osteopenia    Carotid stenosis, non-symptomatic, bilateral    Chronic radicular lumbar pain    History of smoking 30 or more pack years       Current Outpatient Medications   Medication Sig Dispense Refill    lisinopril (PRINIVIL;ZESTRIL) 10 MG tablet Take by mouth 10mg by mouth in the morning and 15mg by mouth nightly      acetaminophen (TYLENOL) 500 MG tablet Take 500 mg by mouth every 6 hours as needed for Pain      aspirin 81 MG EC tablet Take 81 mg by mouth daily      HYDROcodone-acetaminophen (NORCO) 7.5-325 MG per tablet Take 1 tablet by mouth every 8 hours as needed for Pain for up to 30 days. 90 tablet 0    atorvastatin (LIPITOR) 20 MG tablet Take 1 tablet by mouth daily 90 tablet 1    cyclobenzaprine (FLEXERIL) 10 MG tablet Take 1 tablet by mouth 3 times daily as needed for Muscle spasms 90 tablet 0    ibuprofen (ADVIL;MOTRIN) 800 MG tablet TAKE 1 TABLET BY MOUTH THREE TIMES A DAY FOR 30 DAYS 90 tablet 3    amLODIPine (NORVASC) 10 MG tablet TAKE 1 TABLET BY MOUTH ONE TIME A DAY  90 tablet 2    lisinopril (PRINIVIL;ZESTRIL) 30 MG tablet Take 15 mg by mouth every evening      lisinopril (PRINIVIL;ZESTRIL) 10 MG tablet TAKE 1 TABLET BY MOUTH TWO TIMES A DAY  (Patient taking differently: Take 10 mg by mouth every morning ) 180 tablet 1     No current facility-administered medications for this visit. Review of Systems   Constitutional: Negative for activity change, fatigue and fever.    HENT: Negative for congestion, ear pain, rhinorrhea, sore throat and trouble swallowing. Eyes: Negative for pain, discharge and redness. Respiratory: Negative for cough, shortness of breath and wheezing. Cardiovascular: Negative. Gastrointestinal: Negative for abdominal pain, constipation, diarrhea, nausea and vomiting. Endocrine: Negative. Genitourinary: Negative for dysuria, frequency and urgency. Musculoskeletal: Negative for arthralgias, back pain and myalgias. Skin: Negative for rash. Allergic/Immunologic: Negative. Neurological: Negative for dizziness, tremors, weakness and headaches. Hematological: Negative. Psychiatric/Behavioral: Negative for dysphoric mood and sleep disturbance. The patient is not nervous/anxious. OBJECTIVE     BP (!) 106/56   Pulse 80   Temp 97.9 °F (36.6 °C) (Temporal)   Resp 20   Ht 5' 1\" (1.549 m)   Wt 119 lb (54 kg)   BMI 22.48 kg/m²     Wt Readings from Last 3 Encounters:   07/28/20 119 lb (54 kg)   07/27/20 122 lb (55.3 kg)   07/21/20 119 lb 6.4 oz (54.2 kg)     Body mass index is 22.48 kg/m². Physical Exam  Exam conducted with a chaperone present. Constitutional:       General: She is not in acute distress. Appearance: She is well-developed. She is not diaphoretic. HENT:      Right Ear: External ear normal.      Left Ear: External ear normal.      Nose: Nose normal.   Eyes:      General:         Right eye: No discharge. Left eye: No discharge. Conjunctiva/sclera: Conjunctivae normal.      Pupils: Pupils are equal, round, and reactive to light. Neck:      Musculoskeletal: Normal range of motion. Vascular: No JVD. Cardiovascular:      Rate and Rhythm: Normal rate and regular rhythm. Pulmonary:      Effort: Pulmonary effort is normal. No respiratory distress. Abdominal:      Hernia: There is no hernia in the left inguinal area or right inguinal area. Genitourinary:     General: Normal vulva. Exam position: Supine. Pubic Area: No rash or pubic lice. Labia:         Right: No rash, tenderness, lesion or injury. Left: No rash, tenderness, lesion or injury. Urethra: No prolapse, urethral pain, urethral swelling or urethral lesion. Musculoskeletal: Normal range of motion. General: No tenderness or deformity. Lymphadenopathy:      Lower Body: No right inguinal adenopathy. No left inguinal adenopathy. Skin:     General: Skin is warm and dry. Capillary Refill: Capillary refill takes less than 2 seconds. Coloration: Skin is not pale. Findings: No erythema or rash. Neurological:      Mental Status: She is alert and oriented to person, place, and time. Coordination: Coordination normal.   Psychiatric:         Behavior: Behavior normal.         Thought Content:  Thought content normal.         Judgment: Judgment normal.         No results found for: LABA1C    No results found for: CHOL, TRIG, HDL, LDLCALC, LDLDIRECT    The ASCVD Risk score (Paul Ward, et al., 2013) failed to calculate for the following reasons:    Cannot find a previous HDL lab    Cannot find a previous total cholesterol lab    Lab Results   Component Value Date     03/12/2019    K 5.6 (H) 03/12/2019    CL 99 03/12/2019    CO2 25 03/12/2019    BUN 12 03/12/2019    CREATININE 0.6 03/12/2019    GLUCOSE 102 03/12/2019    CALCIUM 9.9 03/12/2019    PROT 7.8 01/30/2019    LABALBU 4.8 01/30/2019    BILITOT 0.2 (L) 01/30/2019    ALKPHOS 71 01/30/2019    AST 34 01/30/2019    ALT 28 01/30/2019    LABGLOM >90 03/12/2019       No results found for: LABMICR, [id number]    Lab Results   Component Value Date    TSH 1.840 02/25/2019       Lab Results   Component Value Date    WBC 8.8 02/21/2019    HGB 14.4 02/21/2019    HCT 41.2 02/21/2019    MCV 98.3 02/21/2019     02/21/2019       No results found for: PSA, PSADIA    Immunization History   Administered Date(s) Administered    Pneumococcal Conjugate 13-valent ([id number]) 07/21/2020    Tdap (Boostrix, Adacel) 07/21/2020       Health Maintenance   Topic Date Due    Hepatitis C screen  1965    Lipid screen  02/10/1975    HIV screen  02/10/1980    Cervical cancer screen  02/10/1986    Shingles Vaccine (1 of 2) 02/10/2015    Low dose CT lung screening  02/10/2020    Potassium monitoring  03/12/2020    Creatinine monitoring  03/12/2020    Breast cancer screen  06/19/2020    Flu vaccine (1) 09/01/2020    Pneumococcal 0-64 years Vaccine (1 of 1 - PPSV23) 09/15/2020    Colon cancer screen colonoscopy  05/03/2027    DTaP/Tdap/Td vaccine (2 - Td) 07/21/2030    Hepatitis A vaccine  Aged Out    Hepatitis B vaccine  Aged Out    Hib vaccine  Aged Out    Meningococcal (ACWY) vaccine  Aged Out       Future Appointments   Date Time Provider Jim Song   7/31/2020  1:45 PM Yesenia Akbar, PT [facility] [address] [facility]   8/13/2020  2:50 PM [facility]  [facility]OM[facility] STR Radiolog   8/17/2020 11:00 AM SHERYL Silva CNP [facility] - [address]      Patient tolerated the procedure very well, which was completed by NP student Jason Green in my presence. Diagnosis Orders   1. Encounter for Papanicolaou smear of cervix  PAP SMEAR       PLAN      Lab pending. We discussed vaginal dryness and different ways to deal with it.      Electronically signed by SHERYL Ribeiro CNP on 7/28/2020 at 10:59 AM

## 2020-07-30 ENCOUNTER — TELEPHONE (OUTPATIENT)
Dept: FAMILY MEDICINE CLINIC | Age: 55
End: 2020-07-30

## 2020-07-30 NOTE — TELEPHONE ENCOUNTER
If the diagnosis is for Cigarette nicotine dependence, uncomplicated, the order needs changed to a CT lung screening. CPT .  Thank you

## 2020-07-30 NOTE — TELEPHONE ENCOUNTER
I don't understand the problem. I also listed Z87.891 History of smoking more than 30 years, diagnosis.

## 2020-07-31 ENCOUNTER — HOSPITAL ENCOUNTER (OUTPATIENT)
Dept: PHYSICAL THERAPY | Age: 55
Setting detail: THERAPIES SERIES
Discharge: HOME | End: 2020-07-31
Payer: COMMERCIAL

## 2020-07-31 PROCEDURE — 97162 PT EVAL MOD COMPLEX 30 MIN: CPT

## 2020-07-31 NOTE — TELEPHONE ENCOUNTER
The order is currently for a CT chest without contrast CPT 18061. If the provider wants the pt to have a CT lung screening for the history of smoking then the order needs changed to an order of CPT .  The insurance will not cover it the way its currently ordered

## 2020-07-31 NOTE — FLOWSHEET NOTE
** PLEASE SIGN, DATE AND TIME CERTIFICATION BELOW AND RETURN TO [facility] OUTPATIENT REHABILITATION (FAX #: [phone]). ATTEST/CO-SIGN IF ACCESSING VIA [employer]. THANK YOU.**    I certify that I have examined the patient below and determined that Physical Medicine and Rehabilitation service is necessary and that I approve the established plan of care for up to 90 days or as specifically noted. Attestation, signature or co-signature of physician indicates approval of certification requirements.    ________________________ ____________ __________  Physician Signature   Date   Time  [address] [facility]  PHYSICAL THERAPY  [x] EVALUATION  [] DAILY NOTE (LAND) [] DAILY NOTE (AQUATIC ) [] PROGRESS NOTE [] DISCHARGE NOTE[x] [address] [facility]   [] [place] [address]    [] [address] [facility]   [] [facility]    Date: 2020  Patient Name:  Shanelle Coombs  : 1965  MRN: [id number]    Referring Practitioner Maria Antonia Driver MD   Diagnosis Other intervertebral disc degeneration, lumbar region [M51.36]  Spondylosis without myelopathy or radiculopathy, lumbar region [M47.816]  Spinal stenosis, lumbar region with neurogenic claudication [M48.062]  Spondylosis without myelopathy or radiculopathy, cervical region [M47.812]  Spondylolisthesis, cervical region [M43.12]  Radiculopathy, cervical region [M54.12]  Spinal stenosis, cervical region [M48.02]   Treatment Diagnosis Neck pain with radiculopathy, lumbar pain with radiculopathy. Difficulty tolerating standing and walking.     Date of Evaluation 20    Additional Pertinent History HTN, osteopenia, chronic lumbar pain,       Functional Outcome Measure Used Neck Disability Index, Oswestry   Functional Outcome Score    NDI 50%, Oswestry 54%  (20)       Insurance: Primary: Payor: Sarbjit Brian /  /  / ,   Secondary:    Authorization Information: No precertification, no visit limit, Telehealth is not covered, aquatic therapy not covered, modalities yes   Visit # 1, 1/10 for progress note   Visits Allowed:    Recertification Date: 7/29/6031   Physician Follow-Up: 9/9/2020   Physician Orders: Cervical spine PT order isometrics and strengthening with modalities, Lumbar spine PT order isometrics and strengthening with modaliteis as needed 2-3x per week for 6 weeks   History of Present Illness: See below     SUBJECTIVE: Patient reports of history of neck fracture since she was 12years old. \"I have had neck pain almost all my life. \" Went to urgent care on 6/1/2020 for increased neck pain which onset 2 weeks prior. Patient was holding  up in standing and  passed out and tried to catch her  in May 2020. Symptoms at that time were neck pain, back pain, bilateral arm and leg pain intermittent. Notes symptoms worsen with washing dishes, going down to do laundry, negotiating stairsteps, gardening, driving. Neck tends to bother her more on right>left more often. Notes cannot look up without neck pain and shooting pain down spine to low back. To manage symptoms after activity she sits in recliner sideways left side more so than right side. Medication: Norco, Flexeril, Tylenol-extra strength and alternates with Ibuprofen. Notes that this does make pain less and a little more manageable. Low back and neck pain are together most of the time. Back hurts at lumbosacral region, travels to both buttocks and goes down legs within 15 minutes with left back of her calf gets tense and tight/cramps>right calf. Denies of any symptoms into bilateral feet. Back is worse with walking, standing, negotiating steps  (ascending worse than descending) , bending and twisting. Positions of comfort is lying on her sides. Does not tolerate lying on her back very long. Sidelying in recliner helps the most.  She uses ice on her low back but only helps temporarily sometimes.  Medication includes Flexeril, Norco and Tylenol-Extra strength. MRI completed at [facility] for cspine, Back MRI at [facility]. Dr. Julian Fry told her she will probably have surgery on her spine. Ordered therapy due to insurance requiring it. Feels PT would help with pain management and strength. Social/Functional History and Current Status:  Medications and Allergies have been reviewed and are listed on Medical History Questionnaire. Anuja Wasserman lives with spouse in a multiple floor home with stairs and a handrail to enter. Task Previous Current   ADLs  Independent Independent   Ambulation Independent Independent   Transfers Independent Independent   Recreation Independent Assistance Required limited time to complete gardening and activities   Community Integration Independent Independent   Driving Active  Active    Work [employer]. Occupation: photo lab picking up paper, [employer], [address], returns, set up new products,   short term disability till 8/24/2020     OBJECTIVE:    Pain: 7/10 neck and back pain. Bilateral upper shoulders, buttocks to posterior thighs to knees. Palpation Note mild to moderate tension at upper traps and bilatera cervical region. Observation Note patient changin position frequently in session. Sitting, standing pacing in room. Patient demonstrated in session ability to complete floor transfer independently while showing therapist how she gardens. Posture Note left shoulder higher than right Note left neck tilt. Note left iliac crest higher than right with decreased wt shift noted through RLE. Note normal lumbar curve, Note forward neck and shoulders mild. Range of Motion Cervical rotation right 15 degrees, left 10 degrees, cervical flexion 2 inches chin from chest, (symptoms produced with neck flexion and neck extension down UEs. Neck in neutral position better. Instructed to avoid neck extension and flexion painfree ranges only. UE active ROM WFLS Above shoulder level increases her neck pain. Lumbar ROM: flexion major restriction, extension major restriction. Gaurded. supinelying SKTC right 115 degrees, left 120 degrees, SLR 90/90 position left 40 degrees, right 90 degrees from knee extension in 90/90 position. Strength MMT not completed at bilateral shoulders due to pain into neck with UE AROM. Biceps, wrist exte/flexors and  WNLS. LE strength hip flexors 4-/5, knee 4/5, ankle df/pf, inversion/eversion WNLS. Equal bilaterally. Sensation Intermittent reports of numbness /tingling reported at arms and legs. Bed Mobility Patient independent with bed mobility supine<>sit, no gaurding noted Prefers to left/right lie on right side during portions of eval due to not tolerated sitting or standing   Transfers Independent with transfers sit<>stand without UE support on armrests of chair. Ambulation Patient ambulates with normal gait pattern and radha in and out of clinic. Stairs Not assessed. Balance Not assessed. Special Tests Repeated movement testing: Cervical spine and lumbar spine would not be tolerated. 1 rep for assessing ROM increased and aggravated UE and LE symptoms. SLR test supine + for dural signs. TREATMENT   Precautions: Cervical and lumbar with bilateral UE and LE radiculopathy. Conservative plan for pain management. Pain: 5/10 to 6/10 during evaluation. X in shaded column indicates activity completed today   Modalities Parameters/  Location  Notes   US combo to upper trap and levator, bilateral cervical regions      CP with interferential estim to bilateral lumbar region, mid gluteal      Manual therapy: soft tissue mobilizations, muscle inhibition of traps, bilateral cervical, region.        Manual Therapy Time/Technique  Notes                     Exercise/Intervention Sets/Sec  Notes   Seated: scapular ex with retrorolls, scap retraction/depression              Supine in hooklying: abdominal engagement with isometrics       Marches with core engagement                                                          Specific Interventions Next Treatment: US combo bilateral upper trap, levator, bilateral cervical,  CP with interferential estim to bilateral lumbosacral. Seated scapular ex, posture, supine in hooklying abdominal engagements core work. Activity/Treatment Tolerance:Frequent position changes throughout session (sit<>stand, pacing, hooklying or sidelying on mat table. []  Patient tolerated treatment well  []  Patient limited by fatigue  [x]  Patient limited by pain   []  Patient limited by medical complications  []  Other:     Assessment: Pain complaints throughout session with frequent position changes throughout evaluation from sit<>stand, pacing, hooklying, sidelying on mat table. Major restriction lumbar ROM, cervical ROM major restriction in rotation right/left. Limited ROM produces bilateral UE/LE symptoms. Strength distal from shoulders elbow, wrist and  WNLS, Strength knees and ankles Bilaterally 4/5 to WNLs. Will follow 2x per week to address pain management, core engagement with conservative core strengthening ex, scap mobility/ stabilization, cervical rotation painfree ranges. Body Structures/Functions/Activity Limitations: impaired activity tolerance, impaired ROM, impaired sensation, impaired skin integrity, impaired strength and pain  Prognosis: fair    GOALS:  Patient Goal: To have less pain and be able to garden, work and be able to walk her dog. Walk further distances. Short Term Goals:  Time Frame: 4 weeks  1. Patient to report of decreased pain level from 5-6/10 to no greater than 2-3/10 at bilateral cervical region with less radicular symptoms BUE sitting. 2. Patient to report of decreased pain level from 5-6/10 to no greater than 3/10 at bilateral lumbosacral region with less radicular symptoms BLE with standing and walking.    3. Patient to demonstrate increased neck rotation right 15 degrees to 30 degrees and left rotation from 10 degrees to 20 degrees. With increased ease with scanning to right and left when walking and driving. 4. Patient to demonstrate painfree neck region with  AROM bilateral shoulders with flexion and abduction with increased ease with reaching at shoulder level and above. 5. Patient to demonstrate increased lumbar ROM with flexion from mid thighs to mid patellar level from major to moderate restriction with increased ease with dressing, retrieving object from thigh/knee height. Long Term Goals:  Time Frame: 8 weeks  1. Patient to demonstrate increased core strength with knowledge and demonstration of core engagement with conservative ex program of ex in hooklying position from 10 reps to 15 reps. 2. Neck Disability Index from 50% to 30%,   3. Oswestry from 54% to 30%. 4. Patient independent in HEP with ability to manage pain with positions in hooklying or sidelying, therap ex for core work conservatively, and posture awareness, tolerance of home activities of laundry, dishes and walking. Patient Education:   []  HEP/Education Completed: Plan of Care, Goals,    [employer] Access Code:  [x]  No new Education completed  []  Reviewed Prior HEP      []  Patient verbalized and/or demonstrated understanding of education provided. []  Patient unable to verbalize and/or demonstrate understanding of education provided. Will continue education. [x]  Barriers to learning: none    PLAN:  Treatment Recommendations: Strengthening, Range of Motion, Neuromuscular Re-education, Manual Therapy - Soft Tissue Mobilization, Pain Management, Home Exercise Program, Patient Education and Safety Education and Training    [x]  Plan of care initiated. Plan to see patient 2 times per week for 8 weeks to address the treatment planned outlined above.   []  Continue with current plan of care  []  Modify plan of care as follows:    []  Hold pending physician visit  []  Discharge    Time In 1415   Time Out 1503       Timed Code Minutes: Min Units   ADL (06412)     Aquatics (65458)     Gait (20493)     Manual Therapy (93728)     Massage (59003)     Neuro (05687)     Th. Activities (00403)     Th. Exercise (05216)     Ultrasound (91111)     Ionto (99218)     Manual E-Stim (04554)     eval 48 minutes back/neck     TOTAL SESSION TIME: 48 min       Electronically Signed by: Aline Barrett

## 2020-08-01 NOTE — PROGRESS NOTES
Chief Complaint   Patient presents with    Consultation     bilat carotid artery stenosis       Shelly Diana is a 54 y.o. female who presents today for evaluation of abnormal CTA neck showing Prominent calcified and noncalcified mural plaque at the bilateral carotid bifurcations with 58% stenosis at the bilateral internal carotid artery takeoffs by NASCET criteria. No flow-limiting stenosis or aneurysm in the intracranial circulation. There is a mild stenosis in the distal common carotid arteries. Moderate stenosis of the takeoff of the nondominant left vertebral artery. This was found as she had a neck xray that showed  Severe carotid artery calcification bilaterally. She was placed on Lipitor and baby aspirin. She denies any dizziness. No history of stroke. She is not diabetic. She has high blood pressure, well controlled on medication. She smokes approximately 1 ppd. She consumes 3-4 beers a couple of times a week. She  denies chest pain. No shortness of breath, no neck pain. No vision changes. No dysphagia. No fever. No rash. No weight loss. History provided by patient.         Past Medical History:   Diagnosis Date    Hypertension        Patient Active Problem List   Diagnosis    Essential hypertension    Osteopenia    Carotid stenosis, non-symptomatic, bilateral    Chronic radicular lumbar pain    History of smoking 30 or more pack years       Allergies   Allergen Reactions    Bactrim [Sulfamethoxazole-Trimethoprim] Rash    Codeine Nausea And Vomiting    Ultram [Tramadol Hcl] Other (See Comments)     Pt states causes shaking and feels bad       Current Outpatient Medications   Medication Sig Dispense Refill    acetaminophen (TYLENOL) 500 MG tablet Take 500 mg by mouth every 6 hours as needed for Pain      lisinopril (PRINIVIL;ZESTRIL) 30 MG tablet Take 15 mg by mouth every evening      aspirin 81 MG EC tablet Take 81 mg by mouth daily      HYDROcodone-acetaminophen (NORCO) 7.5-325 MG per tablet Take 1 tablet by mouth every 8 hours as needed for Pain for up to 30 days. 90 tablet 0    atorvastatin (LIPITOR) 20 MG tablet Take 1 tablet by mouth daily 90 tablet 1    cyclobenzaprine (FLEXERIL) 10 MG tablet Take 1 tablet by mouth 3 times daily as needed for Muscle spasms 90 tablet 0    ibuprofen (ADVIL;MOTRIN) 800 MG tablet TAKE 1 TABLET BY MOUTH THREE TIMES A DAY FOR 30 DAYS 90 tablet 3    lisinopril (PRINIVIL;ZESTRIL) 10 MG tablet TAKE 1 TABLET BY MOUTH TWO TIMES A DAY  (Patient taking differently: Take 10 mg by mouth every morning ) 180 tablet 1    amLODIPine (NORVASC) 10 MG tablet TAKE 1 TABLET BY MOUTH ONE TIME A DAY  90 tablet 2     No current facility-administered medications for this visit.         Social History     Socioeconomic History    Marital status:      Spouse name: None    Number of children: None    Years of education: None    Highest education level: None   Occupational History    None   Social Needs    Financial resource strain: None    Food insecurity     Worry: None     Inability: None    Transportation needs     Medical: None     Non-medical: None   Tobacco Use    Smoking status: Current Every Day Smoker     Packs/day: 1.00     Years: 35.00     Pack years: 35.00     Types: Cigarettes    Smokeless tobacco: Never Used    Tobacco comment: printed to avs   Substance and Sexual Activity    Alcohol use: Yes     Frequency: 4 or more times a week     Drinks per session: 3 or 4     Binge frequency: Less than monthly    Drug use: Yes     Frequency: 2.0 times per week     Types: Marijuana    Sexual activity: Yes     Partners: Female   Lifestyle    Physical activity     Days per week: None     Minutes per session: None    Stress: None   Relationships    Social connections     Talks on phone: None     Gets together: None     Attends Orthodox service: None     Active member of club or organization: None     Attends meetings of clubs or organizations: None Relationship status: None    Intimate partner violence     Fear of current or ex partner: None     Emotionally abused: None     Physically abused: None     Forced sexual activity: None   Other Topics Concern    None   Social History Narrative    None       Family History   Problem Relation Age of Onset    Heart Disease Mother     Diabetes Mother     Alcohol Abuse Father     Heart Disease Brother     Diabetes Maternal Aunt     Parkinsonism Maternal Cousin     Heart Disease Brother     Coronary Art Dis Brother        Review of Systems   All systems reviewed, and are all negative, except what is mentioned in HPI      Vitals:    07/27/20 1151   BP: (!) 98/56   Site: Left Upper Arm   Position: Sitting   Cuff Size: Medium Adult   Pulse: 92   Weight: 122 lb (55.3 kg)   Height: 5' (1.524 m)       Physical Examination:  General appearance - alert, well appearing, and in no distress, oriented to person, place, and time and normal weight  Mental status- Level of Alertness: awake  Orientation: person, place, time  Memory: normal  Fund of Knowledge: normal  Attention/Concentration: normal  Language: normal. Mood is normal.   Neck - supple, no significant adenopathy, carotids upstroke normal bilaterally. There is no axillary lymphadenopathy. There is no carotid bruit. No neck lymphadenopathy. No thyroid enlargement   Neurological -   Cranial Ggmomx-EL-IUT:.   Cranial nerve II: Normal   Cranial nerve III: Pupils: equal, round, reactive to light  Cranial nerves III, IV, VI: Extraocular Movements: intact   Cranial nerve V: Facial sensation: intact   Cranial nerve VII:Facial strength: intact   Cranial nerve VIII: Hearing: intact   Cranial nerve IX: Palate Elevation intact bilaterally  Cranial nerve XI: Shoulder shrug intact bilaterally  Cranial nerve XII: Tongue midline   neck supple without rigidity, there is  limitation of range of motion of the neck, bilaterally.   DTR's are brisk distal and symmetric  Babinski sign negative  Motor exam is 5/5 in the upper and lower extremities. Normal muscle tone. There is no muscle atrophy. Sensory is intact for light touch. Coordination: finger to nose, intact  Gait and station intact  Abnormal movement none, vibration normal, proprioception normal  Skin - warm, dry to touch, normal coloration, no rashes, no suspicious skin lesions  Superficial temporal artery pulses are normal.   There is no limitation of range of motion of the neck. There is no resting tremor, no pin rolling, no bradykinesia, no Hypohonia, normal blink rate. Musculoskeletal: Has no hand arthritis, no limitation of ROM in any of the four extremities. There is no leg edema. The Heart was regular in rate and rhythm. No heart murmur  Chest Clear, with  good effort. Abdomen soft, intact bowel sounds. I reviewed the MRI cervical spine and agree with interpretation. Results for orders placed during the hospital encounter of 07/21/20   MRI CERVICAL SPINE WO CONTRAST    Narrative PROCEDURE: MRI CERVICAL SPINE WO CONTRAST    CLINICAL INFORMATION: Cervical muscle pain, Neck pain, Cervical spondylosis . Neck and back pain after preventing  from falling. COMPARISON: CTA neck dated 7/3/2020. TECHNIQUE: Sagittal T1, T2 and STIR sequences were obtained through the cervical spine. Axial fast and echo and gradient echo T2-weighted images were obtained. FINDINGS:  There is minimal, grade 1, anterolisthesis of C6 relative to C7 on the basis of degenerative change, stable compared to prior exam. There is otherwise anatomic vertebral body height and alignment. Marrow signal is within normal limits. The craniocervical   junction appears intact. Cervical spinal cord is normal in caliber without focal area of abnormal T2 signal identified. Paraspinal soft tissues are unremarkable. At C2-3 there is no significant spinal canal or neuroforaminal stenosis.   At C3-4 there is a shallow disc bulge which contributes to mild spinal canal stenosis in association with posterior ligament flavum thickening. There is moderate left neural foraminal stenosis in association with uncovertebral joint degenerative change   and facet hypertrophy. At C4-5 there is a minimal disc bulge without significant spinal canal stenosis. There is mild right and moderate left neural foraminal stenosis in association with uncovertebral joint degenerative change and facet hypertrophy. At C5-6 there is a minimal disc bulge without significant spinal canal stenosis and moderate to severe right and severe left neural foraminal stenosis in association with uncovertebral joint degenerative change and facet hypertrophy. At C6-7 there is minimal partial uncovering the disc with superimposed shallow disc bulge which contributes to mild spinal canal stenosis and moderate right and mild left neural foraminal stenosis in association with uncovertebral joint degenerative   change and facet hypertrophy. At C7-T1 there is no significant spinal canal or neuroforaminal stenosis. Impression  Multilevel degenerative changes with up to mild spinal canal stenosis at C3-4 and up to severe neuroforaminal stenosis at C5-6. **This report has been created using voice recognition software. It may contain minor errors which are inherent in voice recognition technology. **    Final report electronically signed by Dr. Lew Matthews MD on 7/21/2020 3:42 PM     No results found for this or any previous visit. Results for orders placed during the hospital encounter of 07/03/20   CTA HEAD W WO CONTRAST    Narrative PROCEDURE: CTA HEAD W WO CONTRAST, CTA NECK W WO CONTRAST    CLINICAL INFORMATION: Stenosis of carotid artery, unspecified laterality . COMPARISON: No prior study.     TECHNIQUE: Helical CT from the aortic arch through the head in arterial phase during fast bolus administration of 85 mL Isovue-370 injected in the right forearm with multiplanar reconstructions to include volumetric maximum intensity projection   sequences. All CT scans at this facility use dose modulation, iterative reconstruction, and/or weight-based dosing when appropriate to reduce radiation dose to as low as reasonably achievable. FINDINGS:     CTA HEAD:  There are mural calcifications in the cavernous and clinoid segments of the right internal carotid artery without flow-limiting stenosis. No aneurysmal dilatation is identified in the intracranial segments of the internal carotid arteries. The bilateral   middle cerebral and anterior cerebral arteries are patent without focal abnormality identified. The basilar artery is patent without focal stenosis or visualized aneurysm. The bilateral posterior cerebral arteries are patent without focal abnormality   identified. Dural venous sinuses appear patent without focal filling defect. No focal areas of abnormal parenchymal enhancement are identified. Ventricles are normal in size. No mass effect is identified. The calvarium appears intact. Orbits are   unremarkable. There is mild mucosal thickening in the maxillary sinuses. Mastoid air cells are clear. There is leftward deviation of the nasal septum with superimposed leftward directed nasal septal spur which contacts the inferior turbinate on the left. CTA NECK:  There is a typical 3 vessel arch. There is no flow-limiting stenosis in the brachiocephalic artery or left subclavian arteries. There is calcified and noncalcified mural plaque in the distal segments of the common carotid arteries with areas of mild   stenosis. There is calcified and noncalcified mural plaque at the carotid bifurcations. On the right side, there is moderate to severe stenosis of the takeoff of the right external carotid artery. The narrowest luminal diameter in the proximal right   internal carotid artery is 1.5 mm with a point more distal, where the walls are parallel, measuring 3.6 mm.  On the left side, there is severe stenosis at the takeoff of left external carotid artery. Narrowest luminal diameter at the carotid bulb is 1.5   mm with a point more distal, where the walls are parallel, measuring 3.6 mm. Cervical segments of internal carotid arteries are otherwise patent without focal stenosis. The right vertebral artery is dominant. There is moderate stenosis of the takeoff of   the left vertebral artery. The vertebral arteries are otherwise patent without focal stenosis. There is streak artifact from dental amalgam which partially obscures oral and perioral soft tissues. The vocal cords are closely apposed limiting evaluation of this area. Given these caveats, mucosal surfaces of the aerodigestive tract are symmetric   without focal nodular thickening or visualized mass. No cervical lymphadenopathy is identified. The parotid, submandibular and thyroid glands are unremarkable. There are mild emphysematous changes in the visualized portions of the lungs. There are mild   degenerative changes of the cervical spine. Impression    1. No flow-limiting stenosis or aneurysm in the intracranial circulation. 2. Prominent calcified and noncalcified mural plaque at the bilateral carotid bifurcations with 58% stenosis at the bilateral internal carotid artery takeoffs by NASCET criteria. 3. There is a mild stenosis in the distal common carotid arteries. 4. Moderate stenosis of the takeoff of the nondominant left vertebral artery. **This report has been created using voice recognition software. It may contain minor errors which are inherent in voice recognition technology. **    Final report electronically signed by Dr. Corean Meigs, MD on 7/3/2020 11:09 AM    I reviewed the CTA neck and agree with interpretation.     Results for orders placed during the hospital encounter of 07/03/20   CTA NECK W WO CONTRAST    Narrative PROCEDURE: CTA HEAD W [address] INFORMATION: Stenosis of carotid artery, unspecified laterality . COMPARISON: No prior study. TECHNIQUE: Helical CT from the aortic arch through the head in arterial phase during fast bolus administration of 85 mL Isovue-370 injected in the right forearm with multiplanar reconstructions to include volumetric maximum intensity projection   sequences. All CT scans at this facility use dose modulation, iterative reconstruction, and/or weight-based dosing when appropriate to reduce radiation dose to as low as reasonably achievable. FINDINGS:     CTA HEAD:  There are mural calcifications in the cavernous and clinoid segments of the right internal carotid artery without flow-limiting stenosis. No aneurysmal dilatation is identified in the intracranial segments of the internal carotid arteries. The bilateral   middle cerebral and anterior cerebral arteries are patent without focal abnormality identified. The basilar artery is patent without focal stenosis or visualized aneurysm. The bilateral posterior cerebral arteries are patent without focal abnormality   identified. Dural venous sinuses appear patent without focal filling defect. No focal areas of abnormal parenchymal enhancement are identified. Ventricles are normal in size. No mass effect is identified. The calvarium appears intact. Orbits are   unremarkable. There is mild mucosal thickening in the maxillary sinuses. Mastoid air cells are clear. There is leftward deviation of the nasal septum with superimposed leftward directed nasal septal spur which contacts the inferior turbinate on the left. CTA NECK:  There is a typical 3 vessel arch. There is no flow-limiting stenosis in the brachiocephalic artery or left subclavian arteries. There is calcified and noncalcified mural plaque in the distal segments of the common carotid arteries with areas of mild   stenosis. There is calcified and noncalcified mural plaque at the carotid bifurcations.  On the right side, there is moderate to severe stenosis of the takeoff of the right external carotid artery. The narrowest luminal diameter in the proximal right   internal carotid artery is 1.5 mm with a point more distal, where the walls are parallel, measuring 3.6 mm. On the left side, there is severe stenosis at the takeoff of left external carotid artery. Narrowest luminal diameter at the carotid bulb is 1.5   mm with a point more distal, where the walls are parallel, measuring 3.6 mm. Cervical segments of internal carotid arteries are otherwise patent without focal stenosis. The right vertebral artery is dominant. There is moderate stenosis of the takeoff of   the left vertebral artery. The vertebral arteries are otherwise patent without focal stenosis. There is streak artifact from dental amalgam which partially obscures oral and perioral soft tissues. The vocal cords are closely apposed limiting evaluation of this area. Given these caveats, mucosal surfaces of the aerodigestive tract are symmetric   without focal nodular thickening or visualized mass. No cervical lymphadenopathy is identified. The parotid, submandibular and thyroid glands are unremarkable. There are mild emphysematous changes in the visualized portions of the lungs. There are mild   degenerative changes of the cervical spine. Impression    1. No flow-limiting stenosis or aneurysm in the intracranial circulation. 2. Prominent calcified and noncalcified mural plaque at the bilateral carotid bifurcations with 58% stenosis at the bilateral internal carotid artery takeoffs by NASCET criteria. 3. There is a mild stenosis in the distal common carotid arteries. 4. Moderate stenosis of the takeoff of the nondominant left vertebral artery. **This report has been created using voice recognition software. It may contain minor errors which are inherent in voice recognition technology. **    Final report electronically signed by Dr. Sidney Hameed MD [facility] on 7/3/2020 11:09 AM       We reviewed the patient records from referring provider and available information in the EHR       ASSESSMENT:      Diagnosis Orders   1. Bilateral carotid artery stenosis        This is a 70-year-old female who presents with bilateral carotid artery stenosis found on CTA head and neck. CTA head and neck were done following up on cervical spine x-ray that showed severe calcification of the bilateral carotid arteries. CTA head and neck done 7/3/2020 showed 58% stenosis at the bilateral internal carotid artery takeoffs. No flow-limiting stenosis or aneurysm in the intracranial circulation. Mild stenosis in the distal common carotid arteries. Moderate stenosis of the takeoff of the nondominant left vertebral artery. No known previous history of stroke. She denies any dizziness, no visual disturbance. Her exam is nonfocal.  Her risk factors for stroke include hypertension and smoking. She was started on Lipitor and baby aspirin by her PCP. We will arrange for her to undergo an MRI of the brain without contrast to evaluate for stroke. After detailed discussion with the patient we agreed on the following plan. Plan    1. MRI brain WO contrast  2. Continue with antiplatelets  3. Continue with lipid lowering medications, target LDL below 70  4. Proceed with lipid panel  5. Homocysteine level  6. You need to quit smoking as discussed. 7. Modify risk factors for stroke (blood pressure, cholesterol, diabetes, smoking cessation etc.. Control)  8. Call with any new symptoms or concerns. 9. Follow up after testing is completed         Time spent evaluating patient, reviewing records/imaging, counseling, arranging for care was 60 min. All patient's questions were answered. Please call if any questions.      Tonja Gaucher MD

## 2020-08-03 LAB — CYTOLOGY THIN PREP PAP: NORMAL

## 2020-08-06 ENCOUNTER — HOSPITAL ENCOUNTER (OUTPATIENT)
Dept: PHYSICAL THERAPY | Age: 55
Setting detail: THERAPIES SERIES
Discharge: HOME | End: 2020-08-06
Payer: COMMERCIAL

## 2020-08-06 PROCEDURE — 97035 APP MDLTY 1+ULTRASOUND EA 15: CPT

## 2020-08-06 PROCEDURE — 97110 THERAPEUTIC EXERCISES: CPT

## 2020-08-06 NOTE — FLOWSHEET NOTE
[address] [facility]  PHYSICAL THERAPY  [] EVALUATION  [x] DAILY NOTE (LAND) [] DAILY NOTE (AQUATIC ) [] PROGRESS NOTE [] DISCHARGE NOTE[x] OUTPATIENT REHABILITATION [facility]   [] [place] [address]    [] [facility]   [] Matthew Snyder    Date: 2020  Patient Name:  Noe Wilkinson  : 1965  MRN: [id number]    Referring Practitioner Pardeep Sheppard MD   Diagnosis Other intervertebral disc degeneration, lumbar region [M51.36]  Spondylosis without myelopathy or radiculopathy, lumbar region [M47.816]  Spinal stenosis, lumbar region with neurogenic claudication [M48.062]  Spondylosis without myelopathy or radiculopathy, cervical region [M47.812]  Spondylolisthesis, cervical region [M43.12]  Radiculopathy, cervical region [M54.12]  Spinal stenosis, cervical region [M48.02]   Treatment Diagnosis Neck pain with radiculopathy, lumbar pain with radiculopathy. Difficulty tolerating standing and walking. Date of Evaluation 20    Additional Pertinent History HTN, osteopenia, chronic lumbar pain,       Functional Outcome Measure Used Neck Disability Index, Oswestry   Functional Outcome Score    NDI 50%, Oswestry 54%  (20)       Insurance: Primary: Payor: Juan Howell /  /  / ,   Secondary:    Authorization Information: No precertification, no visit limit, Telehealth is not covered, aquatic therapy not covered, modalities yes   Visit # 2, 2/10 for progress note   Visits Allowed:    Recertification Date:    Physician Follow-Up: 2020   Physician Orders: Cervical spine PT order isometrics and strengthening with modalities, Lumbar spine PT order isometrics and strengthening with modaliteis as needed 2-3x per week for 6 weeks   History of Present Illness: See below     SUBJECTIVE: Patient reports not being able to walk or stand more than 15 min. TREATMENT   Precautions: Cervical and lumbar with bilateral UE and LE radiculopathy.  Conservative plan for pain management. Pain:7-8/10 Neck and back    X in shaded column indicates activity completed today   Modalities Parameters/  Location  Notes   US combo to upper trap and levator, bilateral cervical regions X 8 min 1.0w/cm2 x    CP with interferential estim to bilateral lumbar region, mid gluteal      Manual therapy: soft tissue mobilizations, muscle inhibition of traps, bilateral cervical, region. Manual Therapy Time/Technique  Notes                     Exercise/Intervention Sets/Sec  Notes                 Supine in hooklying: Glute sets,abdominal isometrics x10 ea 5 sec x    Pelvic tilts in hooklying x10  x    Marches with core engagement x1o ea  x           Seated shoulder shrugs x10  x    Shoulder rolls x10  x    cerv lat flexion stretch x3 15 sec x    cerv rot  x3 15 sec x    Chin tucks x5  x      Specific Interventions Next Treatment: US combo bilateral upper trap, levator, bilateral cervical,  CP with interferential estim to bilateral lumbosacral. Seated scapular ex, posture, supine in hooklying abdominal engagements core work. Activity/Treatment Tolerance:Frequent position changes throughout session (sit<>stand, pacing, hooklying or sidelying on mat table. []  Patient tolerated treatment well  []  Patient limited by fatigue  [x]  Patient limited by pain   []  Patient limited by medical complications  []  Other:     Assessment: Pain increased with all stretches and ther ex. Pain decreased after US/E stim . Prognosis: fair    GOALS:  Patient Goal: To have less pain and be able to garden, work and be able to walk her dog. Walk further distances. Short Term Goals:  Time Frame: 4 weeks  1. Patient to report of decreased pain level from 5-6/10 to no greater than 2-3/10 at bilateral cervical region with less radicular symptoms BUE sitting.   2. Patient to report of decreased pain level from 5-6/10 to no greater than 3/10 at bilateral lumbosacral region with less radicular symptoms BLE with standing and walking. 3. Patient to demonstrate increased neck rotation right 15 degrees to 30 degrees and left rotation from 10 degrees to 20 degrees. With increased ease with scanning to right and left when walking and driving. 4. Patient to demonstrate painfree neck region with  AROM bilateral shoulders with flexion and abduction with increased ease with reaching at shoulder level and above. 5. Patient to demonstrate increased lumbar ROM with flexion from mid thighs to mid patellar level from major to moderate restriction with increased ease with dressing, retrieving object from thigh/knee height. Long Term Goals:  Time Frame: 8 weeks  1. Patient to demonstrate increased core strength with knowledge and demonstration of core engagement with conservative ex program of ex in hooklying position from 10 reps to 15 reps. 2. Neck Disability Index from 50% to 30%,   3. Oswestry from 54% to 30%. 4. Patient independent in HEP with ability to manage pain with positions in hooklying or sidelying, therap ex for core work conservatively, and posture awareness, tolerance of home activities of laundry, dishes and walking. Patient Education:   []  HEP/Education Completed: Plan of Care, Goals,    Medbridge Access Code:  [x]  No new Education completed  []  Reviewed Prior HEP      []  Patient verbalized and/or demonstrated understanding of education provided. []  Patient unable to verbalize and/or demonstrate understanding of education provided. Will continue education. [x]  Barriers to learning: none    PLAN:  Treatment Recommendations: Strengthening, Range of Motion, Neuromuscular Re-education, Manual Therapy - Soft Tissue Mobilization, Pain Management, Home Exercise Program, Patient Education and Safety Education and Training    [x]  Plan of care initiated. Plan to see patient 2 times per week for 8 weeks to address the treatment planned outlined above.   []  Continue with current plan of care  [] Modify plan of care as follows:    []  Hold pending physician visit  []  Discharge    Time In 0755   Time Out 0840       Timed Code Minutes: Min Units   ADL (19947)     Aquatics (54770)     Gait (59637)     Manual Therapy (54483)     Massage (71955)     Neuro (19861)     Th. Activities (29159)     Th. Exercise (51893) 30 2   Ultrasound (09728) 15 1   Ionto (04227)     Manual E-Stim (75490)     eval 48 minutes back/neck     TOTAL SESSION TIME: 45 min       Electronically Signed by: Brionna Alfonso [id number]

## 2020-08-10 ENCOUNTER — HOSPITAL ENCOUNTER (OUTPATIENT)
Dept: PHYSICAL THERAPY | Age: 55
Setting detail: THERAPIES SERIES
Discharge: HOME | End: 2020-08-10
Payer: COMMERCIAL

## 2020-08-10 PROCEDURE — 97110 THERAPEUTIC EXERCISES: CPT

## 2020-08-10 PROCEDURE — 97032 APPL MODALITY 1+ESTIM EA 15: CPT

## 2020-08-10 NOTE — FLOWSHEET NOTE
[address] [facility]  PHYSICAL THERAPY  [] EVALUATION  [x] DAILY NOTE (LAND) [] DAILY NOTE (AQUATIC ) [] PROGRESS NOTE [] DISCHARGE NOTE[x] OUTPATIENT REHABILITATION CENTER [facility]   [] [place] [address]    [] [facility]   [] Aly Reynoso    Date: 8/10/2020  Patient Name:  Kathryn Neal  : 1965  MRN: [id number]    Referring Practitioner Gabriela Albarado MD   Diagnosis Other intervertebral disc degeneration, lumbar region [M51.36]  Spondylosis without myelopathy or radiculopathy, lumbar region [M47.816]  Spinal stenosis, lumbar region with neurogenic claudication [M48.062]  Spondylosis without myelopathy or radiculopathy, cervical region [M47.812]  Spondylolisthesis, cervical region [M43.12]  Radiculopathy, cervical region [M54.12]  Spinal stenosis, cervical region [M48.02]   Treatment Diagnosis Neck pain with radiculopathy, lumbar pain with radiculopathy. Difficulty tolerating standing and walking. Date of Evaluation 20    Additional Pertinent History HTN, osteopenia, chronic lumbar pain,       Functional Outcome Measure Used Neck Disability Index, Oswestry   Functional Outcome Score    NDI 50%, Oswestry 54%  (20)       Insurance: Primary: Payor: Juan Carlos Rucker /  /  / ,   Secondary:    Authorization Information: No precertification, no visit limit, Telehealth is not covered, aquatic therapy not covered, modalities yes   Visit # 3, 3/10 for progress note   Visits Allowed:    Recertification Date:    Physician Follow-Up: 2020   Physician Orders: Cervical spine PT order isometrics and strengthening with modalities, Lumbar spine PT order isometrics and strengthening with modaliteis as needed 2-3x per week for 6 weeks   History of Present Illness: See below     SUBJECTIVE: Patient reports increased pain after last session and through to the weekend. 8/10 in low back, 6/10 pain in neck.  Went to [facility] this weekend and sitting for a long period of time made her pain worse. Pt has not tried ice/heat at home, and in too much pain to try HEP. TREATMENT   Precautions: Cervical and lumbar with bilateral UE and LE radiculopathy. Conservative plan for pain management. Pain:  See above    X in shaded column indicates activity completed today   Modalities Parameters/  Location  Notes   US combo to upper trap and levator, bilateral cervical regions X 8 min 1.0w/cm2     CP with interferential estim to bilateral lumbar region, mid gluteal x12 min, 80-150hz x Completed with Lumbar exs, intensity ranging from 8.0 to 14.0, HP to cervical region x8min d/t pt c/o increased pain   Manual therapy: soft tissue mobilizations, muscle inhibition of traps, bilateral cervical, region. Manual Therapy Time/Technique  Notes                     Exercise/Intervention Sets/Sec  Notes   Supine SKTC x2 15 sec x    Supine LTR x10  x    Supine in hooklying: Glute sets,abdominal isometrics x10 ea 5 sec x    Pelvic tilts in hooklying x10 3 sec x    Marches with core engagement x10 ea  x           Seated shoulder shrugs x10 5 sec x    Shoulder rolls x10  x    cerv lat flexion stretch x3 15 sec x    cerv rot  x3 15 sec x    Chin tucks x10  x      Specific Interventions Next Treatment: US combo bilateral upper trap, levator, bilateral cervical,  CP with interferential estim to bilateral lumbosacral. Seated scapular ex, posture, supine in hooklying abdominal engagements core work. Activity/Treatment Tolerance:Frequent position changes throughout session (sit<>stand, pacing, hooklying or sidelying on mat table. []  Patient tolerated treatment well  []  Patient limited by fatigue  [x]  Patient limited by pain   []  Patient limited by medical complications  []  Other:     Assessment: Pt tolerated session fair, very limited ROM in cervical region, very guarded with mobility.  Pt c/o of pain in neck and shoulders while laying supine and completing lumbar exs- applied MHP to cervical region to attempt to decrease pain. Pt reports feeling better after session, 6/10 pain In LB, however increased cervical pain to 7-8/10. Prognosis: fair    GOALS:  Patient Goal: To have less pain and be able to garden, work and be able to walk her dog. Walk further distances. Short Term Goals:  Time Frame: 4 weeks  1. Patient to report of decreased pain level from 5-6/10 to no greater than 2-3/10 at bilateral cervical region with less radicular symptoms BUE sitting. 2. Patient to report of decreased pain level from 5-6/10 to no greater than 3/10 at bilateral lumbosacral region with less radicular symptoms BLE with standing and walking. 3. Patient to demonstrate increased neck rotation right 15 degrees to 30 degrees and left rotation from 10 degrees to 20 degrees. With increased ease with scanning to right and left when walking and driving. 4. Patient to demonstrate painfree neck region with  AROM bilateral shoulders with flexion and abduction with increased ease with reaching at shoulder level and above. 5. Patient to demonstrate increased lumbar ROM with flexion from mid thighs to mid patellar level from major to moderate restriction with increased ease with dressing, retrieving object from thigh/knee height. Long Term Goals:  Time Frame: 8 weeks  1. Patient to demonstrate increased core strength with knowledge and demonstration of core engagement with conservative ex program of ex in hooklying position from 10 reps to 15 reps. 2. Neck Disability Index from 50% to 30%,   3. Oswestry from 54% to 30%. 4. Patient independent in HEP with ability to manage pain with positions in hooklying or sidelying, therap ex for core work conservatively, and posture awareness, tolerance of home activities of laundry, dishes and walking.        Patient Education:   []  HEP/Education Completed: Plan of Care, Goals,    Medbridge Access Code:  [x]  No new Education completed  [x]  Reviewed Prior HEP      []  Patient verbalized and/or demonstrated understanding of education provided. []  Patient unable to verbalize and/or demonstrate understanding of education provided. Will continue education. [x]  Barriers to learning: none    PLAN:  Treatment Recommendations: Strengthening, Range of Motion, Neuromuscular Re-education, Manual Therapy - Soft Tissue Mobilization, Pain Management, Home Exercise Program, Patient Education and Safety Education and Training    []  Plan of care initiated. Plan to see patient 2 times per week for 8 weeks to address the treatment planned outlined above.   [x]  Continue with current plan of care  []  Modify plan of care as follows:    []  Hold pending physician visit  []  Discharge    Time In 0845   Time Out 0927       Timed Code Minutes: Min Units   ADL (28605)     Aquatics (56344)     Gait (25506)     Manual Therapy (15989)     Massage (93764)     Neuro (60907)     Th. Activities (92649)     Th. Exercise (51490) 30 2   Ultrasound (17851)     Ionto (22794)     Manual E-Stim (63656) 12 1   eval 48 minutes back/neck     TOTAL SESSION TIME: 42 min       Electronically Signed by: Shira Gallardo

## 2020-08-12 ENCOUNTER — HOSPITAL ENCOUNTER (OUTPATIENT)
Dept: CT IMAGING | Age: 55
Discharge: HOME | End: 2020-08-12
Payer: COMMERCIAL

## 2020-08-12 PROCEDURE — G0297 LDCT FOR LUNG CA SCREEN: HCPCS

## 2020-08-13 ENCOUNTER — HOSPITAL ENCOUNTER (OUTPATIENT)
Dept: PHYSICAL THERAPY | Age: 55
Setting detail: THERAPIES SERIES
Discharge: HOME | End: 2020-08-13
Payer: COMMERCIAL

## 2020-08-13 ENCOUNTER — HOSPITAL ENCOUNTER (OUTPATIENT)
Dept: WOMENS IMAGING | Age: 55
Discharge: HOME | End: 2020-08-13
Payer: COMMERCIAL

## 2020-08-13 PROCEDURE — 97032 APPL MODALITY 1+ESTIM EA 15: CPT

## 2020-08-13 PROCEDURE — 77080 DXA BONE DENSITY AXIAL: CPT

## 2020-08-13 PROCEDURE — 97035 APP MDLTY 1+ULTRASOUND EA 15: CPT

## 2020-08-13 PROCEDURE — 97110 THERAPEUTIC EXERCISES: CPT

## 2020-08-13 NOTE — PROGRESS NOTES
[address] [facility]  PHYSICAL THERAPY  [] EVALUATION  [x] DAILY NOTE (LAND) [] DAILY NOTE (AQUATIC ) [] PROGRESS NOTE [] DISCHARGE NOTE[x] OUTPATIENT REHABILITATION [facility]   [] [place] [address]    [] [facility]   [] Barrett Gordon    Date: 2020  Patient Name:  Dayanna Owens  : 1965  MRN: [id number]    Referring Practitioner Geneva Soares MD   Diagnosis Other intervertebral disc degeneration, lumbar region [M51.36]  Spondylosis without myelopathy or radiculopathy, lumbar region [M47.816]  Spinal stenosis, lumbar region with neurogenic claudication [M48.062]  Spondylosis without myelopathy or radiculopathy, cervical region [M47.812]  Spondylolisthesis, cervical region [M43.12]  Radiculopathy, cervical region [M54.12]  Spinal stenosis, cervical region [M48.02]   Treatment Diagnosis Neck pain with radiculopathy, lumbar pain with radiculopathy. Difficulty tolerating standing and walking. Date of Evaluation 20    Additional Pertinent History HTN, osteopenia, chronic lumbar pain,       Functional Outcome Measure Used Neck Disability Index, Oswestry   Functional Outcome Score    NDI 50%, Oswestry 54%  (20)       Insurance: Primary: Payor: Ana Laura Ceballos 150 /  /  / ,   Secondary:    Authorization Information: No precertification, no visit limit, Telehealth is not covered, aquatic therapy not covered, modalities yes   Visit # 3, 3/10 for progress note   Visits Allowed:    Recertification Date: 4661   Physician Follow-Up: 2020   Physician Orders: Cervical spine PT order isometrics and strengthening with modalities, Lumbar spine PT order isometrics and strengthening with modaliteis as needed 2-3x per week for 6 weeks   History of Present Illness: See below     SUBJECTIVE: Patient reports  fell about 2 months ago when  fell and she caught him unexpectedly.  Pt ambulated into clinic very guarded and stated pain is high today in further distances. Short Term Goals:  Time Frame: 4 weeks  1. Patient to report of decreased pain level from 5-6/10 to no greater than 2-3/10 at bilateral cervical region with less radicular symptoms BUE sitting. 2. Patient to report of decreased pain level from 5-6/10 to no greater than 3/10 at bilateral lumbosacral region with less radicular symptoms BLE with standing and walking. 3. Patient to demonstrate increased neck rotation right 15 degrees to 30 degrees and left rotation from 10 degrees to 20 degrees. With increased ease with scanning to right and left when walking and driving. 4. Patient to demonstrate painfree neck region with  AROM bilateral shoulders with flexion and abduction with increased ease with reaching at shoulder level and above. 5. Patient to demonstrate increased lumbar ROM with flexion from mid thighs to mid patellar level from major to moderate restriction with increased ease with dressing, retrieving object from thigh/knee height. Long Term Goals:  Time Frame: 8 weeks  1. Patient to demonstrate increased core strength with knowledge and demonstration of core engagement with conservative ex program of ex in hooklying position from 10 reps to 15 reps. 2. Neck Disability Index from 50% to 30%,   3. Oswestry from 54% to 30%. 4. Patient independent in HEP with ability to manage pain with positions in hooklying or sidelying, therap ex for core work conservatively, and posture awareness, tolerance of home activities of laundry, dishes and walking. Patient Education:   []  HEP/Education Completed: Plan of Care, Goals,    Medbridge Access Code:  [x]  No new Education completed  [x]  Reviewed Prior HEP      []  Patient verbalized and/or demonstrated understanding of education provided. []  Patient unable to verbalize and/or demonstrate understanding of education provided. Will continue education.   [x]  Barriers to learning: none    PLAN:  Treatment Recommendations: Strengthening, Range of Motion, Neuromuscular Re-education, Manual Therapy - Soft Tissue Mobilization, Pain Management, Home Exercise Program, Patient Education and Safety Education and Training    []  Plan of care initiated. Plan to see patient 2 times per week for 8 weeks to address the treatment planned outlined above.   [x]  Continue with current plan of care  []  Modify plan of care as follows:    []  Hold pending physician visit  []  Discharge    Time In 1330   Time Out 1414       Timed Code Minutes: Min Units   ADL (82236)     Aquatics (37631)     Gait (18938)     Manual Therapy (73363)     Massage (64570)     Neuro (99464)     Th. Activities (55954)     Th. Exercise (58525) 21 1   Ultrasound (13027) 8 1   Ionto (04762)     Manual E-Stim (67036) 15 1   eval 48 minutes back/neck     TOTAL SESSION TIME: 44 min       Electronically Signed by: Stacie Buckley

## 2020-08-14 RX ORDER — CYCLOBENZAPRINE HCL 10 MG
10 TABLET ORAL 3 TIMES DAILY PRN
Qty: 90 TABLET | Refills: 0 | Status: SHIPPED | OUTPATIENT
Start: 2020-08-14 | End: 2020-09-13

## 2020-08-17 ENCOUNTER — TELEPHONE (OUTPATIENT)
Dept: FAMILY MEDICINE CLINIC | Age: 55
End: 2020-08-17

## 2020-08-17 ENCOUNTER — HOSPITAL ENCOUNTER (OUTPATIENT)
Dept: PHYSICAL THERAPY | Age: 55
Setting detail: THERAPIES SERIES
Discharge: HOME | End: 2020-08-17
Payer: COMMERCIAL

## 2020-08-17 ENCOUNTER — OFFICE VISIT (OUTPATIENT)
Dept: RHEUMATOLOGY | Age: 55
End: 2020-08-17
Payer: COMMERCIAL

## 2020-08-17 VITALS
HEIGHT: 60 IN | DIASTOLIC BLOOD PRESSURE: 70 MMHG | OXYGEN SATURATION: 98 % | SYSTOLIC BLOOD PRESSURE: 100 MMHG | HEART RATE: 70 BPM | WEIGHT: 120 LBS | BODY MASS INDEX: 23.56 KG/M2

## 2020-08-17 PROCEDURE — 97140 MANUAL THERAPY 1/> REGIONS: CPT

## 2020-08-17 PROCEDURE — 97110 THERAPEUTIC EXERCISES: CPT

## 2020-08-17 PROCEDURE — 99243 OFF/OP CNSLTJ NEW/EST LOW 30: CPT | Performed by: NURSE PRACTITIONER

## 2020-08-17 PROCEDURE — 97035 APP MDLTY 1+ULTRASOUND EA 15: CPT

## 2020-08-17 ASSESSMENT — ENCOUNTER SYMPTOMS
EYE ITCHING: 0
SHORTNESS OF BREATH: 0
ABDOMINAL PAIN: 0
TROUBLE SWALLOWING: 0
DIARRHEA: 0
EYE PAIN: 0
NAUSEA: 0
COUGH: 0
BACK PAIN: 1
CONSTIPATION: 0

## 2020-08-17 NOTE — PROGRESS NOTES
[facility]  Bone Fragility Adult Consult/Referral     Visit Date: 8/17/2020  MRN: [id number]  Cc:   Chief Complaint   Patient presents with    Established New Doctor     BONE CONSULT- OSTEOPOROSIS         HPI:   Spencer Mccann  is a(n)55 y.o. female with a hx of HTN here today as a consultation from Cesar Llanes CNP for the evaluation of Osteopenia. She was diagnosed with osteopenia when most recent bone density scan on 8/13/2020 revealed T score -1.70 at left femoral neck. Patient admits to history of fracture. Neck fracture when she was 16 while lifting weights, broken right wrist and right ankle as a child, broken right shoulder in 2013 after fall from standing height and hitting her arm on steps. Broken ribs 4 years ago when she was leaning over the tub wall giving her grand daughter a bath. +fmhx of osteoporosis in maternal grandmother. She is . Has been postmenopausal since mid 40's. She does drink about 2-3 beers 3-4 times per week. Currently smokes about 1 PPD for 40 years. Does not drink any caffeine. Drinks carnation breakfast drinks with milk in them every day. Frequently consumes cheese. Just started taking calcium and vitamin D supplements. Denies any history of frequent falls. Exercises by walking at work, gardening, and housework.      + Pet, + throw rugs, - nightlights, -  railing in bathroom, - falls or near fall, - muscle weakness      ROS:  Review of Systems   Constitutional: Negative for fatigue, fever and unexpected weight change. HENT: Negative for congestion and trouble swallowing. Eyes: Negative for pain and itching. Respiratory: Negative for cough and shortness of breath. Cardiovascular: Negative for chest pain and leg swelling. Gastrointestinal: Negative for abdominal pain, constipation, diarrhea and nausea. Endocrine: Negative for cold intolerance and heat intolerance.    Genitourinary: Negative for difficulty urinating, frequency and urgency. Musculoskeletal: Positive for back pain and neck pain. Negative for arthralgias and joint swelling. Skin: Negative for rash. Neurological: Negative for dizziness, weakness, numbness and headaches. Psychiatric/Behavioral: The patient is not nervous/anxious.           PAST MEDICAL HISTORY  Past Medical History:   Diagnosis Date    Hypertension        SOCIAL HISTORY  Social History     Socioeconomic History    Marital status:      Spouse name: None    Number of children: None    Years of education: None    Highest education level: None   Occupational History    None   Social Needs    Financial resource strain: None    Food insecurity     Worry: None     Inability: None    Transportation needs     Medical: None     Non-medical: None   Tobacco Use    Smoking status: Current Every Day Smoker     Packs/day: 1.00     Years: 35.00     Pack years: 35.00     Types: Cigarettes    Smokeless tobacco: Never Used    Tobacco comment: printed to avs   Substance and Sexual Activity    Alcohol use: Yes     Frequency: 4 or more times a week     Drinks per session: 3 or 4     Binge frequency: Less than monthly    Drug use: Yes     Frequency: 2.0 times per week     Types: Marijuana    Sexual activity: Yes     Partners: Female   Lifestyle    Physical activity     Days per week: None     Minutes per session: None    Stress: None   Relationships    Social connections     Talks on phone: None     Gets together: None     Attends Anglican service: None     Active member of club or organization: None     Attends meetings of clubs or organizations: None     Relationship status: None    Intimate partner violence     Fear of current or ex partner: None     Emotionally abused: None     Physically abused: None     Forced sexual activity: None   Other Topics Concern    None   Social History Narrative    None       FAMILY HISTORY  Family History   Problem Relation Age of Onset    Heart Disease Mother  Diabetes Mother     Alcohol Abuse Father     Heart Disease Brother     Diabetes Maternal Aunt     Parkinsonism Maternal Cousin     Heart Disease Brother     Coronary Art Dis Brother        SURGICAL HISTORY  Past Surgical History:   Procedure Laterality Date     SECTION      x 4    DILATION AND CURETTAGE OF UTERUS      FOOT SURGERY      bone spurs removed both feet    KNEE SURGERY Left     reconstruction of knee       ALLERGIES  Allergies   Allergen Reactions    Bactrim [Sulfamethoxazole-Trimethoprim] Rash    Codeine Nausea And Vomiting    Ultram [Tramadol Hcl] Other (See Comments)     Pt states causes shaking and feels bad       CURRENTMEDICATIONS  Current Outpatient Medications   Medication Sig Dispense Refill    cyclobenzaprine (FLEXERIL) 10 MG tablet Take 1 tablet by mouth 3 times daily as needed for Muscle spasms 90 tablet 0    lisinopril (PRINIVIL;ZESTRIL) 10 MG tablet Take by mouth 10mg by mouth in the morning and 15mg by mouth nightly      acetaminophen (TYLENOL) 500 MG tablet Take 500 mg by mouth every 6 hours as needed for Pain      lisinopril (PRINIVIL;ZESTRIL) 30 MG tablet Take 15 mg by mouth every evening      aspirin 81 MG EC tablet Take 81 mg by mouth daily      atorvastatin (LIPITOR) 20 MG tablet Take 1 tablet by mouth daily 90 tablet 1    ibuprofen (ADVIL;MOTRIN) 800 MG tablet TAKE 1 TABLET BY MOUTH THREE TIMES A DAY FOR 30 DAYS 90 tablet 3    lisinopril (PRINIVIL;ZESTRIL) 10 MG tablet TAKE 1 TABLET BY MOUTH TWO TIMES A DAY  (Patient taking differently: Take 10 mg by mouth every morning ) 180 tablet 1    amLODIPine (NORVASC) 10 MG tablet TAKE 1 TABLET BY MOUTH ONE TIME A DAY  90 tablet 2     No current facility-administered medications for this visit. Objective:  /70 (Site: Left Upper Arm, Position: Sitting, Cuff Size: Medium Adult)   Pulse 70   Ht 5' (1.524 m)   Wt 120 lb (54.4 kg)   SpO2 98%   BMI 23.44 kg/m²     General: No distress. Alert. Eyes: PERRL. No conjunctival injection. ENT: No discharge. Pharynx clear. Neck: Trachea midline. Normal thyroid. Resp: No accessory muscle use. No crackles. No wheezing. No rhonchi. CV: Regular rate. Regularrhythm. No mumur or rub. No edema. GI: Non-tender. Non-distended. M/S:   Upper extremities:  Muscle strength 5/5, FROM, No Synovitis     Lower Extremities:   Muscle strength 5/5, FROM, No Synovitis     Neuro: Oriented to person, place, time. DTR's 2/4 bilat and equal  Psych: No anxiety or agitation. Skin: Warm and dry. No rash on exposed extremities. Lymph: No cervical LAD. No supraclavicular LAD.        Labs:  CBC  Lab Results   Component Value Date    WBC 8.8 02/21/2019    RBC 4.19 02/21/2019    HGB 14.4 02/21/2019    HCT 41.2 02/21/2019    MCV 98.3 02/21/2019    MCH 34.4 02/21/2019    MCHC 35.0 02/21/2019     02/21/2019       CMP  Lab Results   Component Value Date    CALCIUM 9.9 03/12/2019    LABALBU 4.8 01/30/2019    PROT 7.8 01/30/2019     03/12/2019    K 5.6 03/12/2019    CO2 25 03/12/2019    CL 99 03/12/2019    BUN 12 03/12/2019    CREATININE 0.6 03/12/2019    ALKPHOS 71 01/30/2019    ALT 28 01/30/2019    AST 34 01/30/2019       HgBA1c: No components found for: HGBA1C    Lab Results   Component Value Date    TSH 1.840 02/25/2019     No results found for: VITD25    No results found for: SEDRATE  No results found for: CRP    Lab Results   Component Value Date    CALCIUM 9.9 03/12/2019     Lab Results   Component Value Date     03/12/2019    K 5.6 (H) 03/12/2019    CL 99 03/12/2019    CO2 25 03/12/2019    BUN 12 03/12/2019    CREATININE 0.6 03/12/2019    GLUCOSE 102 03/12/2019    CALCIUM 9.9 03/12/2019    PROT 7.8 01/30/2019    LABALBU 4.8 01/30/2019    BILITOT 0.2 (L) 01/30/2019    ALKPHOS 71 01/30/2019    AST 34 01/30/2019    ALT 28 01/30/2019         RADIOLOGY:     DEXA 8/13/2020 (different scanner)  Left hip: - 1.70, 0.658  Right hip: -1.40, 0.689  Lumbar spine: -0.60, 0.976  FRAX: 11% major, 1.6% hip    DEXA 6/20/2018  Left hip: -1.30, 0.860  Right hip: -1.30. 0.842      Assessment and plan:  Osteopenia  - A 54year old postmenopausal female with history of osteopenia dating back at least 2 years. Recent DEXA with T score -1.70 at left femoral neck. Patient with history of a couple childhood fractures which were traumatic, as well as a shoulder fracture from fall from standing height onto steps. Smoking 1 PPD for 40 years, drinking 2-3 alcoholic beverages 3-4 times per week. +fmhx of OP in maternal grandmother.   - Recent DEXA reviewed with patient. - Check CMP, CBC, phos, mag, Vit D, sed rate, CRP, TSH for baseline and to screen for secondary osteoporosis. - Reviewed cervical and lumbar imaging in chart.   - Request records from [facility] for right shoulder imaging- if humerus fracture, may need to consider tx. Discussed possibility of starting alendronate 70 mg PO weekly. The potential for acute phase reaction, worsening renal function, osteonecrosis of the jaw, atypical femoral fracture and hypocalcemia was discussed with the patient. - Continue calcium (1200 mg daily) and vitamin D3 (2000 IU daily) supplementation  - Encouraged regular weight bearing exercises. - Education provided about fall risk prevention.    - Repeat DEXA 8/2022. Return in about 6 months (around 2/17/2021). Electronically signed by SHERYL Srivastava CNP on 8/17/2020 at 12:08 PM      Thank you for allowing me to participate in the care of this patient. Please call if there are any questions.

## 2020-08-17 NOTE — PROGRESS NOTES
lateral neck and upper trap 7/10  4/10 at low back. Noted right UE symptoms today to all fingers. These symptoms are new. TREATMENT   Precautions: Cervical and lumbar with bilateral UE and LE radiculopathy. Conservative plan for pain management. Pain:  7/10 right lateral neck and upper trap. Left not hurting as bad.  , 4/10 in low back (increases with walking)    X in shaded column indicates activity completed today   Modalities Parameters/  Location  Notes   US combo to upper trap and levator, bilateral cervical regions X 8 min 1.0w/cm2 x    CP with interferential estim to bilateral lumbar region, mid gluteal x15 min, 80-150hz x Completed with Lumbar exs, intensity ranging from 15-18, CP to cervical region x15 min    Manual therapy: soft tissue mobilizations, muscle inhibition of traps, bilateral cervical, region. 12 minutes  x supinelying position with pillow under knees. Manual Therapy Time/Technique  Notes                     Exercise/Intervention Sets/Sec  Notes   Supine SKTC x3 15 sec x    Supine LTR x10      Supine in hooklying: Glute sets,abdominal isometrics x10 ea 5 sec     Pelvic tilts in hooklying x10 3 sec x    Marches with core engagement x10 ea  x           Seated shoulder shrugs x10 5 sec     Shoulder rolls x10      cerv lat flexion stretch x3 15 sec     cerv rot  x3 15 sec     Chin tucks x10        Specific Interventions Next Treatment: US combo bilateral upper trap, levator, bilateral cervical,  CP with interferential estim to bilateral. Pain level decreased to lumbosacral 2/10, Neck 5/10  Activity/Treatment Tolerance:  [x]  Patient tolerated treatment well  []  Patient limited by fatigue  [x]  Patient limited by pain   []  Patient limited by medical complications  []  Other:     Assessment: Pt did well with session. Neck symptoms reduced 5/10. Lumbar spine 2/10. Neck reported of some continued soreness. Lumbar ROM with SKTC WNLS. Without pain behaviors.  Added manual therapy with soft tissue mobilizations to neck and upper trap due to more pain in this region. GOALS:  Patient Goal: To have less pain and be able to garden, work and be able to walk her dog. Walk further distances. Short Term Goals:  Time Frame: 4 weeks  1. Patient to report of decreased pain level from 5-6/10 to no greater than 2-3/10 at bilateral cervical region with less radicular symptoms BUE sitting. 2. Patient to report of decreased pain level from 5-6/10 to no greater than 3/10 at bilateral lumbosacral region with less radicular symptoms BLE with standing and walking. 3. Patient to demonstrate increased neck rotation right 15 degrees to 30 degrees and left rotation from 10 degrees to 20 degrees. With increased ease with scanning to right and left when walking and driving. 4. Patient to demonstrate painfree neck region with  AROM bilateral shoulders with flexion and abduction with increased ease with reaching at shoulder level and above. 5. Patient to demonstrate increased lumbar ROM with flexion from mid thighs to mid patellar level from major to moderate restriction with increased ease with dressing, retrieving object from thigh/knee height. Long Term Goals:  Time Frame: 8 weeks  1. Patient to demonstrate increased core strength with knowledge and demonstration of core engagement with conservative ex program of ex in hooklying position from 10 reps to 15 reps. 2. Neck Disability Index from 50% to 30%,   3. Oswestry from 54% to 30%. 4. Patient independent in HEP with ability to manage pain with positions in hooklying or sidelying, therap ex for core work conservatively, and posture awareness, tolerance of home activities of laundry, dishes and walking.        Patient Education: Luciano Benito for low back core and flexibility  []  HEP/Education Completed: Plan of Care, Goals,    Medbridge Access Code:  []  No new Education completed  [x]  Reviewed Prior HEP      []  Patient verbalized and/or demonstrated understanding of education provided. []  Patient unable to verbalize and/or demonstrate understanding of education provided. Will continue education. [x]  Barriers to learning: none    PLAN:  Treatment Recommendations: Strengthening, Range of Motion, Neuromuscular Re-education, Manual Therapy - Soft Tissue Mobilization, Pain Management, Home Exercise Program, Patient Education and Safety Education and Training    []  Plan of care initiated. Plan to see patient 2 times per week for 8 weeks to address the treatment planned outlined above.   [x]  Continue with current plan of care  []  Modify plan of care as follows:    []  Hold pending physician visit  []  Discharge    Time In 1000 am   Time Out 1042 am       Timed Code Minutes: Min Units   ADL (84661)     Aquatics (45596)     Gait (41165)     Manual Therapy (27618)     Massage (03431)     Neuro (08072)     Th. Activities (81834)     Th. Exercise (26213) 15 1   Ultrasound (30891) 8 1   Ionto (38520)     Manual E-Stim (84930) with ex  10 1   eval 48 minutes back/neck     TOTAL SESSION TIME: 42 min       Electronically Signed by: Chidi Centeno

## 2020-08-20 ENCOUNTER — HOSPITAL ENCOUNTER (OUTPATIENT)
Dept: PHYSICAL THERAPY | Age: 55
Setting detail: THERAPIES SERIES
Discharge: HOME | End: 2020-08-20
Payer: COMMERCIAL

## 2020-08-24 ENCOUNTER — HOSPITAL ENCOUNTER (OUTPATIENT)
Dept: PHYSICAL THERAPY | Age: 55
Setting detail: THERAPIES SERIES
Discharge: HOME | End: 2020-08-24
Payer: COMMERCIAL

## 2020-08-24 PROCEDURE — 97110 THERAPEUTIC EXERCISES: CPT

## 2020-08-24 PROCEDURE — 97035 APP MDLTY 1+ULTRASOUND EA 15: CPT

## 2020-08-24 NOTE — PROGRESS NOTES
[address] [facility]  PHYSICAL THERAPY  [] EVALUATION  [x] DAILY NOTE (LAND) [] DAILY NOTE (AQUATIC ) [] PROGRESS NOTE [] DISCHARGE NOTE[x] OUTPATIENT REHABILITATION CENTER [facility]   [] [place] [address]    [] [facility]   [] Adelina Agrawal    Date: 2020  Patient Name:  Tono Duncan  : 1965  MRN: [id number]    Referring Practitioner Vishal Infante MD   Diagnosis Other intervertebral disc degeneration, lumbar region [M51.36]  Spondylosis without myelopathy or radiculopathy, lumbar region [M47.816]  Spinal stenosis, lumbar region with neurogenic claudication [M48.062]  Spondylosis without myelopathy or radiculopathy, cervical region [M47.812]  Spondylolisthesis, cervical region [M43.12]  Radiculopathy, cervical region [M54.12]  Spinal stenosis, cervical region [M48.02]   Treatment Diagnosis Neck pain with radiculopathy, lumbar pain with radiculopathy. Difficulty tolerating standing and walking. Date of Evaluation 20    Additional Pertinent History HTN, osteopenia, chronic lumbar pain,       Functional Outcome Measure Used Neck Disability Index, Oswestry   Functional Outcome Score    NDI 50%, Oswestry 54%  (20)       Insurance: Primary: Payor: Khadar Kohler /  /  / ,   Secondary:    Authorization Information: No precertification, no visit limit, Telehealth is not covered, aquatic therapy not covered, modalities yes   Visit # 5, 5/10 for progress note   Visits Allowed:    Recertification Date: [id number]   Physician Follow-Up: 2020   Physician Orders: Cervical spine PT order isometrics and strengthening with modalities, Lumbar spine PT order isometrics and strengthening with modaliteis as needed 2-3x per week for 6 weeks   History of Present Illness: See below     SUBJECTIVE: Pt reports her \"back is killing her\" today. Pt reports her neck is still bothering her today but not as bad as her back.  7.5/10 pain in low back area and shooting down left leg and into buttock region. Neck pain mostly in R side, 4/10 today. Pt reports she is supposed to go back to work today and doesn't know how she is going to. Pt reports she is out of pain medication and has been alternating between ibuprofen and tylenol. Compliant with HEP. TREATMENT   Precautions: Cervical and lumbar with bilateral UE and LE radiculopathy. Conservative plan for pain management. Pain:  See above    X in shaded column indicates activity completed today   Modalities Parameters/  Location  Notes   US combo to upper trap and levator, bilateral cervical regions X 8 min 1.0w/cm2 x    CP with interferential estim to bilateral lumbar region, mid gluteal x15 min, 80-150hz x Completed with Lumbar exs, intensity ranging from 18-25, CP to cervical region x15 min    Manual therapy: soft tissue mobilizations, muscle inhibition of traps, bilateral cervical, region. 12 minutes   supinelying position with pillow under knees. Manual Therapy Time/Technique  Notes                     Exercise/Intervention Sets/Sec  Notes   Supine SKTC x3 15 sec x    Supine LTR x10  x    Supine in hooklying: Glute sets,abdominal isometrics x10 ea 5 sec x    Pelvic tilts in hooklying x10 3 sec     Marches with core engagement x10 ea             Seated shoulder shrugs x10 5 sec x    Shoulder rolls x10  x    cerv lat flexion stretch x3 15 sec x    cerv rot  x3 15 sec     Chin tucks x10        Specific Interventions Next Treatment: US combo bilateral upper trap, levator, bilateral cervical,  CP with interferential estim to bilateral. Pain level decreased to lumbosacral 2/10, Neck 5/10  Activity/Treatment Tolerance:  [x]  Patient tolerated treatment well  []  Patient limited by fatigue  [x]  Patient limited by pain   []  Patient limited by medical complications  []  Other:     Assessment: Pt tolerated session fair, pain decreased in LB to 3/10, however her neck remained at 4/10.     GOALS:  Patient Goal: To have less pain and be able to garden, work and be able to walk her dog. Walk further distances. Short Term Goals:  Time Frame: 4 weeks  1. Patient to report of decreased pain level from 5-6/10 to no greater than 2-3/10 at bilateral cervical region with less radicular symptoms BUE sitting. 2. Patient to report of decreased pain level from 5-6/10 to no greater than 3/10 at bilateral lumbosacral region with less radicular symptoms BLE with standing and walking. 3. Patient to demonstrate increased neck rotation right 15 degrees to 30 degrees and left rotation from 10 degrees to 20 degrees. With increased ease with scanning to right and left when walking and driving. 4. Patient to demonstrate painfree neck region with  AROM bilateral shoulders with flexion and abduction with increased ease with reaching at shoulder level and above. 5. Patient to demonstrate increased lumbar ROM with flexion from mid thighs to mid patellar level from major to moderate restriction with increased ease with dressing, retrieving object from thigh/knee height. Long Term Goals:  Time Frame: 8 weeks  1. Patient to demonstrate increased core strength with knowledge and demonstration of core engagement with conservative ex program of ex in hooklying position from 10 reps to 15 reps. 2. Neck Disability Index from 50% to 30%,   3. Oswestry from 54% to 30%. 4. Patient independent in HEP with ability to manage pain with positions in hooklying or sidelying, therap ex for core work conservatively, and posture awareness, tolerance of home activities of laundry, dishes and walking. Patient Education: Xuan Hubbard for low back core and flexibility  []  HEP/Education Completed: Plan of Care, Goals,    Medbridge Access Code:  []  No new Education completed  [x]  Reviewed Prior HEP      []  Patient verbalized and/or demonstrated understanding of education provided. []  Patient unable to verbalize and/or demonstrate understanding of education provided.   Will

## 2020-08-27 ENCOUNTER — HOSPITAL ENCOUNTER (OUTPATIENT)
Dept: PHYSICAL THERAPY | Age: 55
Setting detail: THERAPIES SERIES
Discharge: HOME | End: 2020-08-27
Payer: COMMERCIAL

## 2020-08-27 PROCEDURE — 97035 APP MDLTY 1+ULTRASOUND EA 15: CPT

## 2020-08-27 PROCEDURE — 97164 PT RE-EVAL EST PLAN CARE: CPT

## 2020-08-27 NOTE — PROGRESS NOTES
[facility]  PHYSICAL THERAPY  [] EVALUATION  [x] DAILY NOTE (LAND) [] DAILY NOTE (AQUATIC ) [] PROGRESS NOTE [] DISCHARGE NOTE[x] OUTPATIENT REHABILITATION CENTER [facility]   [] [place] [address]    [] [facility]   [] Morena Porter    Date: 2020  Patient Name:  Denver Rm  : 1965  MRN: [id number]    Referring Practitioner Mayelin Leon MD   Diagnosis Other intervertebral disc degeneration, lumbar region [M51.36]  Spondylosis without myelopathy or radiculopathy, lumbar region [M47.816]  Spinal stenosis, lumbar region with neurogenic claudication [M48.062]  Spondylosis without myelopathy or radiculopathy, cervical region [M47.812]  Spondylolisthesis, cervical region [M43.12]  Radiculopathy, cervical region [M54.12]  Spinal stenosis, cervical region [M48.02]   Treatment Diagnosis Neck pain with radiculopathy, lumbar pain with radiculopathy. Difficulty tolerating standing and walking. Date of Evaluation 20    Additional Pertinent History HTN, osteopenia, chronic lumbar pain,       Functional Outcome Measure Used Neck Disability Index, Oswestry   Functional Outcome Score    NDI 30%, Oswestry 50%  2020      Insurance: Primary: Payor: Ana Laura Ceballos 150 /  /  / ,   Secondary:    Authorization Information: No precertification, no visit limit, Telehealth is not covered, aquatic therapy not covered, modalities yes   Visit # 6,   for progress note 0/6    Visits Allowed:    Recertification Date:    Physician Follow-Up: 2020   Physician Orders: Cervical spine PT order isometrics and strengthening with modalities, Lumbar spine PT order isometrics and strengthening with modaliteis as needed 2-3x per week for 6 weeks   History of Present Illness: See below     SUBJECTIVE: Patient feels that her neck is getting better. Pain level and tightness less at neck. Notes no changes with her back pain, shooting pain down legs.  Notes pain with negotiating steps and walking less than 1 block. Follow up with doctor on 9/9/2020. Patient concerned about returning to work due to back pain with walking. TREATMENT   Precautions: Cervical and lumbar with bilateral UE and LE radiculopathy. Conservative plan for pain management. Pain:  Back 7/10 bilateral lumbosacral and buttock and shooting pain to left calf. X in shaded column indicates activity completed today   Modalities Parameters/  Location  Notes   US combo to upper trap and levator, bilateral cervical regions X 8 min 1.0w/cm2     US to bilateral lumbosacral region, mid gluteal region. 8 min @ 1.0 w/cm2 x Left sidelying position pillow between knees. CP with interferential estim to bilateral lumbar region, mid gluteal x15 min, 80-150hz  Completed with Lumbar exs, intensity ranging from 18-25, CP to cervical region x15 min    Manual therapy: soft tissue mobilizations, muscle inhibition of traps, bilateral cervical, region. 12 minutes   supinelying position with pillow under knees.                             Manual Therapy Time/Technique  Notes   Reassessment  x                Exercise/Intervention Sets/Sec  Notes   Supine SKTC x3 15 sec     Supine LTR x10      Supine in hooklying: Glute sets,abdominal isometrics x10 ea 5 sec     Pelvic tilts in hooklying x10 3 sec     Marches with core engagement x10 ea             Seated shoulder shrugs x10 5 sec     Shoulder rolls x10      cerv lat flexion stretch x3 15 sec     cerv rot  x3 15 sec     Chin tucks x10        Specific Interventions Next Treatment: US combo bilateral upper trap, levator, bilateral cervical,  CP with interferential estim to bilateral. Pain level decreased to lumbosacral 2/10, Neck 5/10  Activity/Treatment Tolerance:  [x]  Patient tolerated treatment well  []  Patient limited by fatigue  [x]  Patient limited by pain   []  Patient limited by medical complications  []  Other:     Assessment: Pt tolerated session fair, pain decreased in LB to 6/10, however her neck remained at 1-2/10. GOALS:  Patient Goal: To have less pain and be able to garden, work and be able to walk her dog. Walk further distances. GOAL NOT MET Difficulty walking a block. Difficulty just watering garden. Has not been able to pull weeds. Not working at present. Short Term Goals:  Time Frame: 4 weeks  1. Patient to report of decreased pain level from 5-6/10 to no greater than 2-3/10 at bilateral cervical region with less radicular symptoms BUE sitting. GOAL MET Neck is much better. 3/10 pain level average. 2. Patient to report of decreased pain level from 5-6/10 to no greater than 3/10 at bilateral lumbosacral region with less radicular symptoms BLE with standing and walking. GOAL NOT MET Back pain average at lumbosacral region and leg symptoms 6/10   3. Patient to demonstrate increased neck rotation right 15 degrees to 30 degrees and left rotation from 10 degrees to 20 degrees. With increased ease with scanning to right and left when walking and driving. GOAL MET left 60 degrees right 72 degrees rotation  4. Patient to demonstrate painfree neck region with  AROM bilateral shoulders with flexion and abduction with increased ease with reaching at shoulder level and above. GOAL MET NOTE full active ROM Bilateral shoulders without neck strain/pain noted. 5. Patient to demonstrate increased lumbar ROM with flexion from mid thighs to mid patellar level from major to moderate restriction with increased ease with dressing, retrieving object from thigh/knee height. GOAL MET Patient able to flex forward to distal patella region. Painful with motion in flexion. Revise goal: Patient to demonstrate improved trunk flexion with fingertips to mid shin level with increased ease with dressing, retrieving object from thigh/knee height. Long Term Goals:  Time Frame: 8 weeks  1.  Patient to demonstrate increased core strength with knowledge and demonstration of core engagement with conservative ex program of ex in hooklying position from 10 reps to 15 reps. ONGOING  2. Neck Disability Index from 50% to 30%, GOAL MET NDI 30%   3. Oswestry from 54% to 30%. GOAL NOT MET Oswestry 50%    4. Patient independent in HEP with ability to manage pain with positions in hooklying or sidelying, therap ex for core work conservatively, and posture awareness, tolerance of home activities of laundry, dishes and walking. ONGOING       Patient Education: Continue HEP Monitor pain following US to back. bAck pain decreased 5/10   []  HEP/Education Completed: Plan of Care, Goals,    Medbridge Access Code:  []  No new Education completed  [x]  Reviewed Prior HEP      []  Patient verbalized and/or demonstrated understanding of education provided. []  Patient unable to verbalize and/or demonstrate understanding of education provided. Will continue education. [x]  Barriers to learning: none    PLAN:  Treatment Recommendations: Strengthening, Range of Motion, Neuromuscular Re-education, Manual Therapy - Soft Tissue Mobilization, Pain Management, Home Exercise Program, Patient Education and Safety Education and Training    []  Plan of care initiated. Plan to see patient 2 times per week for 8 weeks to address the treatment planned outlined above.   [x]  Continue with current plan of care  []  Modify plan of care as follows:    []  Hold pending physician visit  []  Discharge    Time In 0945 am   Time Out 1030 am       Timed Code Minutes: Min Units   ADL (38865)     Aquatics (60350)     Gait (51156)     Manual Therapy (12175)     Massage (08121)     Neuro (23679)     Th. Activities (57126)     Th. Exercise (21947)     Ultrasound (68023) 8 1   Ionto (43518)     Manual E-Stim (36494) with ex      Reassesssment:  minutes back/neck     TOTAL SESSION TIME: 45 min       Electronically Signed by: Camilla Bloom PTA [id number]

## 2020-09-03 ENCOUNTER — HOSPITAL ENCOUNTER (OUTPATIENT)
Dept: PHYSICAL THERAPY | Age: 55
Setting detail: THERAPIES SERIES
Discharge: HOME | End: 2020-09-03
Payer: COMMERCIAL

## 2020-09-03 ENCOUNTER — NURSE ONLY (OUTPATIENT)
Dept: LAB | Age: 55
End: 2020-09-03

## 2020-09-03 LAB
ALBUMIN SERPL-MCNC: 4.7 G/DL (ref 3.5–5.1)
ALP BLD-CCNC: 71 U/L (ref 38–126)
ALT SERPL-CCNC: 51 U/L (ref 11–66)
ANION GAP SERPL CALCULATED.3IONS-SCNC: 13 MEQ/L (ref 8–16)
AST SERPL-CCNC: 33 U/L (ref 5–40)
BASOPHILS # BLD: 0.8 %
BASOPHILS ABSOLUTE: 0.1 THOU/MM3 (ref 0–0.1)
BILIRUB SERPL-MCNC: 0.2 MG/DL (ref 0.3–1.2)
BILIRUBIN DIRECT: < 0.2 MG/DL (ref 0–0.3)
BUN BLDV-MCNC: 10 MG/DL (ref 7–22)
C-REACTIVE PROTEIN: 0.25 MG/DL (ref 0–1)
CALCIUM SERPL-MCNC: 9.5 MG/DL (ref 8.5–10.5)
CHLORIDE BLD-SCNC: 102 MEQ/L (ref 98–111)
CHOLESTEROL, TOTAL: 209 MG/DL (ref 100–199)
CO2: 22 MEQ/L (ref 23–33)
CREAT SERPL-MCNC: 0.4 MG/DL (ref 0.4–1.2)
EOSINOPHIL # BLD: 1.4 %
EOSINOPHILS ABSOLUTE: 0.1 THOU/MM3 (ref 0–0.4)
ERYTHROCYTE [DISTWIDTH] IN BLOOD BY AUTOMATED COUNT: 11.9 % (ref 11.5–14.5)
ERYTHROCYTE [DISTWIDTH] IN BLOOD BY AUTOMATED COUNT: 44.8 FL (ref 35–45)
GFR SERPL CREATININE-BSD FRML MDRD: > 90 ML/MIN/1.73M2
GLUCOSE BLD-MCNC: 100 MG/DL (ref 70–108)
HCT VFR BLD CALC: 42 % (ref 37–47)
HDLC SERPL-MCNC: 81 MG/DL
HEMOGLOBIN: 13.9 GM/DL (ref 12–16)
HEPATITIS C ANTIBODY: NEGATIVE
IMMATURE GRANS (ABS): 0.08 THOU/MM3 (ref 0–0.07)
IMMATURE GRANULOCYTES: 0.8 %
LDL CHOLESTEROL CALCULATED: 113 MG/DL
LYMPHOCYTES # BLD: 26.9 %
LYMPHOCYTES ABSOLUTE: 2.7 THOU/MM3 (ref 1–4.8)
MAGNESIUM: 2.1 MG/DL (ref 1.6–2.4)
MCH RBC QN AUTO: 33.5 PG (ref 26–33)
MCHC RBC AUTO-ENTMCNC: 33.1 GM/DL (ref 32.2–35.5)
MCV RBC AUTO: 101.2 FL (ref 81–99)
MONOCYTES # BLD: 8.4 %
MONOCYTES ABSOLUTE: 0.9 THOU/MM3 (ref 0.4–1.3)
NUCLEATED RED BLOOD CELLS: 0 /100 WBC
PHOSPHORUS: 4 MG/DL (ref 2.4–4.7)
PLATELET # BLD: 295 THOU/MM3 (ref 130–400)
PMV BLD AUTO: 10.3 FL (ref 9.4–12.4)
POTASSIUM SERPL-SCNC: 4.6 MEQ/L (ref 3.5–5.2)
RBC # BLD: 4.15 MILL/MM3 (ref 4.2–5.4)
SEDIMENTATION RATE, ERYTHROCYTE: 5 MM/HR (ref 0–20)
SEG NEUTROPHILS: 61.7 %
SEGMENTED NEUTROPHILS ABSOLUTE COUNT: 6.3 THOU/MM3 (ref 1.8–7.7)
SODIUM BLD-SCNC: 137 MEQ/L (ref 135–145)
TOTAL PROTEIN: 7.6 G/DL (ref 6.1–8)
TRIGL SERPL-MCNC: 75 MG/DL (ref 0–199)
TSH SERPL DL<=0.05 MIU/L-ACNC: 0.81 UIU/ML (ref 0.4–4.2)
VITAMIN D 25-HYDROXY: 18 NG/ML (ref 30–100)
WBC # BLD: 10.2 THOU/MM3 (ref 4.8–10.8)

## 2020-09-03 PROCEDURE — 97035 APP MDLTY 1+ULTRASOUND EA 15: CPT

## 2020-09-03 PROCEDURE — 97110 THERAPEUTIC EXERCISES: CPT

## 2020-09-03 RX ORDER — ERGOCALCIFEROL 1.25 MG/1
50000 CAPSULE ORAL WEEKLY
Qty: 12 CAPSULE | Refills: 0 | Status: SHIPPED | OUTPATIENT
Start: 2020-09-03 | End: 2020-12-07 | Stop reason: SDUPTHER

## 2020-09-03 NOTE — PROGRESS NOTES
UE and LE radiculopathy. Conservative plan for pain management. Pain:  Back 6/10 bilateral lumbosacral and buttocks 4/10 in cervical     X in shaded column indicates activity completed today   Modalities Parameters/  Location  Notes   US combo to upper trap and levator, bilateral cervical regions X 8 min 1.0w/cm2     US to bilateral lumbosacral region, mid gluteal region. 8 min @ 1.0 w/cm2 x Left sidelying position pillow between knees. CP with interferential estim to bilateral lumbar region, mid gluteal x15 min, 80-150hz  Completed with Lumbar exs, intensity ranging from 18-25, CP to cervical region x15 min    Manual therapy: soft tissue mobilizations, muscle inhibition of traps, bilateral cervical, region. 12 minutes   supinelying position with pillow under knees. Manual Therapy Time/Technique  Notes   Reassessment                  Exercise/Intervention Sets/Sec  Notes   Hooklying SKTC with towel x3 15 sec x    Supine LTR x10  x    Supine in hooklying: Glute sets,abdominal isometrics x10 ea 5 sec x    Pelvic tilts in hooklying x10 3 sec x    Marches with core engagement x10 ea  x    Hip abduction  X10  x    Seated shoulder shrugs x10 5 sec x    Shoulder rolls x10  x    cerv lat flexion stretch x3 15 sec     cerv rot  x3 15 sec     Chin tucks in supine x10  x      Specific Interventions Next Treatment: US combo bilateral upper trap, levator, bilateral cervical,  CP with interferential estim to bilateral.       Activity/Treatment Tolerance:  [x]  Patient tolerated treatment well  []  Patient limited by fatigue  [x]  Patient limited by pain   []  Patient limited by medical complications  []  Other:     Assessment: Continued focus on core engagement to decrease pain levels in low back and sacrum. Added SKTC and continued with US. Pt pain decreased at end of session to 4/10 in back.     GOALS:  Patient Goal: To have less pain and be able to garden, work and be able to walk her dog. Walk further distances. GOAL NOT MET Difficulty walking a block. Difficulty just watering garden. Has not been able to pull weeds. Not working at present. Short Term Goals:  Time Frame: 4 weeks  1. Patient to report of decreased pain level from 5-6/10 to no greater than 2-3/10 at bilateral cervical region with less radicular symptoms BUE sitting. GOAL MET Neck is much better. 3/10 pain level average. 2. Patient to report of decreased pain level from 5-6/10 to no greater than 3/10 at bilateral lumbosacral region with less radicular symptoms BLE with standing and walking. GOAL NOT MET Back pain average at lumbosacral region and leg symptoms 6/10   3. Patient to demonstrate increased neck rotation right 15 degrees to 30 degrees and left rotation from 10 degrees to 20 degrees. With increased ease with scanning to right and left when walking and driving. GOAL MET left 60 degrees right 72 degrees rotation  4. Patient to demonstrate painfree neck region with  AROM bilateral shoulders with flexion and abduction with increased ease with reaching at shoulder level and above. GOAL MET NOTE full active ROM Bilateral shoulders without neck strain/pain noted. 5. Patient to demonstrate increased lumbar ROM with flexion from mid thighs to mid patellar level from major to moderate restriction with increased ease with dressing, retrieving object from thigh/knee height. GOAL MET Patient able to flex forward to distal patella region. Painful with motion in flexion. Revise goal: Patient to demonstrate improved trunk flexion with fingertips to mid shin level with increased ease with dressing, retrieving object from thigh/knee height. Long Term Goals:  Time Frame: 8 weeks  1. Patient to demonstrate increased core strength with knowledge and demonstration of core engagement with conservative ex program of ex in hooklying position from 10 reps to 15 reps. ONGOING  2.  Neck Disability Index from 50% to 30%, GOAL MET NDI 30%   3. Oswestry from 54% to 30%. GOAL NOT MET Oswestry 50%    4. Patient independent in HEP with ability to manage pain with positions in hooklying or sidelying, therap ex for core work conservatively, and posture awareness, tolerance of home activities of laundry, dishes and walking. ONGOING       Patient Education:    [x]  HEP/Education Completed: SKTC, continued with neck stretches previously given   Medbridge Access Code:  []  No new Education completed  [x]  Reviewed Prior HEP      [x]  Patient verbalized and/or demonstrated understanding of education provided. []  Patient unable to verbalize and/or demonstrate understanding of education provided. Will continue education. [x]  Barriers to learning: none    PLAN:  Treatment Recommendations: Strengthening, Range of Motion, Neuromuscular Re-education, Manual Therapy - Soft Tissue Mobilization, Pain Management, Home Exercise Program, Patient Education and Safety Education and Training    []  Plan of care initiated. Plan to see patient 2 times per week for 8 weeks to address the treatment planned outlined above.   [x]  Continue with current plan of care  []  Modify plan of care as follows:    []  Hold pending physician visit  []  Discharge    Time In 1000 am   Time Out 1038 am       Timed Code Minutes: Min Units   ADL (26278)     Aquatics (54019)     Gait (60737)     Manual Therapy (20981)     Massage (80798)     Neuro (60370)     Th. Activities (31509)     Th. Exercise (99779) 30 2   Ultrasound (53756) 8 1   Ionto (96421)     Manual E-Stim (03926) with ex      Reassesssment:  minutes back/neck     TOTAL SESSION TIME: 38 min       Electronically Signed by: Aminta Hinds

## 2020-09-04 LAB — HIV-2 AB: NEGATIVE

## 2020-09-08 ENCOUNTER — APPOINTMENT (OUTPATIENT)
Dept: PHYSICAL THERAPY | Age: 55
End: 2020-09-08
Payer: COMMERCIAL

## 2020-09-09 ENCOUNTER — PATIENT MESSAGE (OUTPATIENT)
Dept: FAMILY MEDICINE CLINIC | Age: 55
End: 2020-09-09

## 2020-09-09 RX ORDER — HYDROCODONE BITARTRATE AND ACETAMINOPHEN 7.5; 325 MG/1; MG/1
1 TABLET ORAL EVERY 8 HOURS PRN
Qty: 42 TABLET | Refills: 0 | Status: SHIPPED | OUTPATIENT
Start: 2020-09-09 | End: 2020-09-23

## 2020-09-09 NOTE — TELEPHONE ENCOUNTER
From: Anuja Wasserman  To: Liz Arrington APRN - CNP  Sent: 9/9/2020 12:06 PM EDT  Subject: Prescription Question    I am having surgery on my back on Sept 22. Instead of renewing my prescription they want me to go to pain management. I was wondering if you could prescribe 2 weeks of pain meds to hold me over until surgery. I have over $3000 in medical bills already and would just like to have some relief before the surgery without adding more expenses if possible. Thank you for considering this matter. If you have any questions please call. thanks again.  Geneva Pitts

## 2020-09-11 ENCOUNTER — HOSPITAL ENCOUNTER (OUTPATIENT)
Dept: PHYSICAL THERAPY | Age: 55
Setting detail: THERAPIES SERIES
Discharge: HOME | End: 2020-09-11
Payer: COMMERCIAL

## 2020-09-14 ENCOUNTER — OFFICE VISIT (OUTPATIENT)
Dept: FAMILY MEDICINE CLINIC | Age: 55
End: 2020-09-14
Payer: COMMERCIAL

## 2020-09-14 VITALS
TEMPERATURE: 97.2 F | DIASTOLIC BLOOD PRESSURE: 50 MMHG | WEIGHT: 122 LBS | HEART RATE: 100 BPM | SYSTOLIC BLOOD PRESSURE: 96 MMHG | HEIGHT: 60 IN | BODY MASS INDEX: 23.95 KG/M2 | RESPIRATION RATE: 16 BRPM

## 2020-09-14 PROCEDURE — 99214 OFFICE O/P EST MOD 30 MIN: CPT | Performed by: NURSE PRACTITIONER

## 2020-09-14 RX ORDER — CYCLOBENZAPRINE HCL 10 MG
10 TABLET ORAL 3 TIMES DAILY PRN
COMMUNITY
End: 2020-09-14 | Stop reason: SDUPTHER

## 2020-09-14 RX ORDER — CYCLOBENZAPRINE HCL 10 MG
10 TABLET ORAL 3 TIMES DAILY PRN
Qty: 60 TABLET | Refills: 0 | Status: SHIPPED | OUTPATIENT
Start: 2020-09-14 | End: 2020-10-19 | Stop reason: SDUPTHER

## 2020-09-14 ASSESSMENT — ENCOUNTER SYMPTOMS
SORE THROAT: 0
TROUBLE SWALLOWING: 0
BACK PAIN: 1
DIARRHEA: 0
EYE DISCHARGE: 0
COUGH: 0
CONSTIPATION: 0
RHINORRHEA: 0
ALLERGIC/IMMUNOLOGIC NEGATIVE: 1
VOMITING: 0
WHEEZING: 0
ABDOMINAL PAIN: 0
NAUSEA: 0
EYE PAIN: 0
EYE REDNESS: 0
SHORTNESS OF BREATH: 0

## 2020-09-14 NOTE — PROGRESS NOTES
Rfl:     aspirin 81 MG EC tablet, Take 81 mg by mouth daily, Disp: , Rfl:     atorvastatin (LIPITOR) 20 MG tablet, Take 1 tablet by mouth daily, Disp: 90 tablet, Rfl: 1    ibuprofen (ADVIL;MOTRIN) 800 MG tablet, TAKE 1 TABLET BY MOUTH THREE TIMES A DAY FOR 30 DAYS, Disp: 90 tablet, Rfl: 3        Family History   Problem Relation Age of Onset    Heart Disease Mother     Diabetes Mother     Alcohol Abuse Father     Heart Disease Brother     Diabetes Maternal Aunt     Parkinsonism Maternal Cousin     Heart Disease Brother     Coronary Art Dis Brother        Social History     Tobacco Use    Smoking status: Current Every Day Smoker     Packs/day: 1.00     Years: 35.00     Pack years: 35.00     Types: Cigarettes    Smokeless tobacco: Never Used    Tobacco comment: printed to avs   Substance Use Topics    Alcohol use: Yes     Frequency: 4 or more times a week     Drinks per session: 3 or 4     Binge frequency: Less than monthly    Drug use: Yes     Frequency: 2.0 times per week     Types: Marijuana       Review of Systems   Constitutional: Negative for activity change, fatigue and fever. HENT: Negative for congestion, ear pain, rhinorrhea, sore throat and trouble swallowing. Eyes: Negative for pain, discharge and redness. Respiratory: Negative for cough, shortness of breath and wheezing. Cardiovascular: Negative. Gastrointestinal: Negative for abdominal pain, constipation, diarrhea, nausea and vomiting. Endocrine: Negative. Genitourinary: Negative for dysuria, frequency and urgency. Musculoskeletal: Positive for back pain and neck pain. Negative for arthralgias and myalgias. Skin: Negative for rash. Allergic/Immunologic: Negative. Neurological: Negative for dizziness, tremors, weakness and headaches. Hematological: Negative. Psychiatric/Behavioral: Negative for dysphoric mood and sleep disturbance. The patient is not nervous/anxious.             OBJECTIVE     BP (!) 96/50 Pre-op testing  cyclobenzaprine (FLEXERIL) 10 MG tablet       STOP-Bang Questionnaire  * Do you currently see a pulmonologist?               If yes STOP, do not complete. 1.  Do you snore loudly (able to be heard in the next room)? No     2. Do you often feel tired or sleepy during the daytime? No     3. Has anyone ever told you that you stop breathing during your sleep? No            4. Do you have or are you being treated for high blood pressure? Yes            5. BMI more than 35? BMI (Calculated):     24     6. Age over 48 years? Yes     7. Neck Circumference greater than 17 inches for male or 16 inches for female?           14     8. Gender Male? No                TOTAL SCORE: 2    ZOILA - Low Risk : Yes to 0 - 2 questions  ZOILA - Intermediate Risk : Yes to 3 - 4 questions  ZOILA - High Risk : Yes to 5 - 8 questions       PLAN      Labs, chest x-ray, EKG reviewed and all are within acceptable limits. Patient is cleared for surgery. Paperwork will be faxed to [address] [place] [address].       Electronically signed by SHERYL Gonzalez CNP on 9/14/2020 at 9:28 AM

## 2020-09-14 NOTE — PATIENT INSTRUCTIONS
are trying to quit smoking. · Consider signing up for a smoking cessation program, such as the American Lung Association's Freedom from Smoking program.  · Get text messaging support. Go to the website at www.smokefree. gov to sign up for the [facility] program.  · Set a quit date. Pick your date carefully so that it is not right in the middle of a big deadline or stressful time. Once you quit, do not even take a puff. Get rid of all ashtrays and lighters after your last cigarette. Clean your house and your clothes so that they do not smell of smoke. · Learn how to be a nonsmoker. Think about ways you can avoid those things that make you reach for a cigarette. ? Avoid situations that put you at greatest risk for smoking. For some people, it is hard to have a drink with friends without smoking. For others, they might skip a coffee break with coworkers who smoke. ? Change your daily routine. Take a different route to work or eat a meal in a different place. · Cut down on stress. Calm yourself or release tension by doing an activity you enjoy, such as reading a book, taking a hot bath, or gardening. · Talk to your doctor or pharmacist about nicotine replacement therapy, which replaces the nicotine in your body. You still get nicotine but you do not use tobacco. Nicotine replacement products help you slowly reduce the amount of nicotine you need. These products come in several forms, many of them available over-the-counter:  ? Nicotine patches  ? Nicotine gum and lozenges  ? Nicotine inhaler  · Ask your doctor about bupropion (Wellbutrin) or varenicline (Chantix), which are prescription medicines. They do not contain nicotine. They help you by reducing withdrawal symptoms, such as stress and anxiety. · Some people find hypnosis, acupuncture, and massage helpful for ending the smoking habit. · Eat a healthy diet and get regular exercise.  Having healthy habits will help your body move past its craving for

## 2020-09-15 NOTE — PROGRESS NOTES
Pre-op clearance office visit, labs, cxr and ekg results were faxed to [facility] at [phone]. Confirmation was received.

## 2020-09-17 ENCOUNTER — HOSPITAL ENCOUNTER (OUTPATIENT)
Dept: WOMENS IMAGING | Age: 55
Discharge: HOME | End: 2020-09-17
Payer: COMMERCIAL

## 2020-09-17 PROCEDURE — 77063 BREAST TOMOSYNTHESIS BI: CPT

## 2020-09-22 ENCOUNTER — HOSPITAL ENCOUNTER (OUTPATIENT)
Dept: WOMENS IMAGING | Age: 55
Discharge: HOME | End: 2020-09-22

## 2020-09-22 PROCEDURE — 3209999900 MAM COMPARISON OF OUTSIDE IMAGES

## 2020-09-24 ENCOUNTER — PATIENT MESSAGE (OUTPATIENT)
Dept: FAMILY MEDICINE CLINIC | Age: 55
End: 2020-09-24

## 2020-09-24 NOTE — TELEPHONE ENCOUNTER
From: Fabiana West  To: AlexaSHERYL Lovell - CNP  Sent: 9/24/2020 3:55 PM EDT  Subject: Prescription Question    Hi, I had the back surgery and I have been in tremendous pain ever since. I asked them if I could take my prescription sooner than prescribed because I am not getting any relief what so ever. They told me that this could last 5-7 days yet they only prescribed me Percoset 5mg every 8 hrs. and the flexerall three times a day. I have been able to walk with minimal pain but sitting, lying and getting up and down is down right crazy! ! I didn't sleep at all the first night and didn't get to bed till 3am this morning and got up twice in pain and then was fully up by 7:45. This is crazy! If I can't get this under control I am going to have to go to the ER. What do you suggest I do? Thank you.

## 2020-09-25 ENCOUNTER — APPOINTMENT (OUTPATIENT)
Dept: MRI IMAGING | Age: 55
End: 2020-09-25
Payer: COMMERCIAL

## 2020-09-25 ENCOUNTER — HOSPITAL ENCOUNTER (EMERGENCY)
Age: 55
Discharge: HOME | End: 2020-09-25
Payer: COMMERCIAL

## 2020-09-25 VITALS
DIASTOLIC BLOOD PRESSURE: 75 MMHG | TEMPERATURE: 98.4 F | HEART RATE: 82 BPM | HEIGHT: 60 IN | OXYGEN SATURATION: 99 % | BODY MASS INDEX: 23.56 KG/M2 | RESPIRATION RATE: 18 BRPM | WEIGHT: 120 LBS | SYSTOLIC BLOOD PRESSURE: 138 MMHG

## 2020-09-25 LAB
ANION GAP SERPL CALCULATED.3IONS-SCNC: 14 MEQ/L (ref 8–16)
BASOPHILS # BLD: 0.6 %
BASOPHILS ABSOLUTE: 0.1 THOU/MM3 (ref 0–0.1)
BILIRUBIN URINE: NEGATIVE
BLOOD, URINE: NEGATIVE
BUN BLDV-MCNC: 10 MG/DL (ref 7–22)
CALCIUM SERPL-MCNC: 9.8 MG/DL (ref 8.5–10.5)
CHARACTER, URINE: CLEAR
CHLORIDE BLD-SCNC: 101 MEQ/L (ref 98–111)
CO2: 22 MEQ/L (ref 23–33)
COLOR: YELLOW
CREAT SERPL-MCNC: 0.5 MG/DL (ref 0.4–1.2)
EOSINOPHIL # BLD: 1.4 %
EOSINOPHILS ABSOLUTE: 0.2 THOU/MM3 (ref 0–0.4)
ERYTHROCYTE [DISTWIDTH] IN BLOOD BY AUTOMATED COUNT: 12.3 % (ref 11.5–14.5)
ERYTHROCYTE [DISTWIDTH] IN BLOOD BY AUTOMATED COUNT: 46.3 FL (ref 35–45)
GFR SERPL CREATININE-BSD FRML MDRD: > 90 ML/MIN/1.73M2
GLUCOSE BLD-MCNC: 101 MG/DL (ref 70–108)
GLUCOSE URINE: NEGATIVE MG/DL
HCT VFR BLD CALC: 41.1 % (ref 37–47)
HEMOGLOBIN: 13.6 GM/DL (ref 12–16)
IMMATURE GRANS (ABS): 0.14 THOU/MM3 (ref 0–0.07)
IMMATURE GRANULOCYTES: 1 %
KETONES, URINE: NEGATIVE
LEUKOCYTE ESTERASE, URINE: NEGATIVE
LYMPHOCYTES # BLD: 24 %
LYMPHOCYTES ABSOLUTE: 3.5 THOU/MM3 (ref 1–4.8)
MAGNESIUM: 2.1 MG/DL (ref 1.6–2.4)
MCH RBC QN AUTO: 33.5 PG (ref 26–33)
MCHC RBC AUTO-ENTMCNC: 33.1 GM/DL (ref 32.2–35.5)
MCV RBC AUTO: 101.2 FL (ref 81–99)
MONOCYTES # BLD: 9.8 %
MONOCYTES ABSOLUTE: 1.4 THOU/MM3 (ref 0.4–1.3)
NITRITE, URINE: NEGATIVE
NUCLEATED RED BLOOD CELLS: 0 /100 WBC
OSMOLALITY CALCULATION: 273 MOSMOL/KG (ref 275–300)
PH UA: 6.5 (ref 5–9)
PLATELET # BLD: 336 THOU/MM3 (ref 130–400)
PMV BLD AUTO: 9.5 FL (ref 9.4–12.4)
POTASSIUM SERPL-SCNC: 3.8 MEQ/L (ref 3.5–5.2)
PROTEIN UA: NEGATIVE
RBC # BLD: 4.06 MILL/MM3 (ref 4.2–5.4)
SEG NEUTROPHILS: 63.2 %
SEGMENTED NEUTROPHILS ABSOLUTE COUNT: 9.1 THOU/MM3 (ref 1.8–7.7)
SODIUM BLD-SCNC: 137 MEQ/L (ref 135–145)
SPECIFIC GRAVITY, URINE: < 1.005 (ref 1–1.03)
UROBILINOGEN, URINE: 0.2 EU/DL (ref 0–1)
WBC # BLD: 14.4 THOU/MM3 (ref 4.8–10.8)

## 2020-09-25 PROCEDURE — 36415 COLL VENOUS BLD VENIPUNCTURE: CPT

## 2020-09-25 PROCEDURE — 96375 TX/PRO/DX INJ NEW DRUG ADDON: CPT

## 2020-09-25 PROCEDURE — 96376 TX/PRO/DX INJ SAME DRUG ADON: CPT

## 2020-09-25 PROCEDURE — 99285 EMERGENCY DEPT VISIT HI MDM: CPT

## 2020-09-25 PROCEDURE — 6360000004 HC RX CONTRAST MEDICATION: Performed by: PHYSICIAN ASSISTANT

## 2020-09-25 PROCEDURE — A9579 GAD-BASE MR CONTRAST NOS,1ML: HCPCS | Performed by: PHYSICIAN ASSISTANT

## 2020-09-25 PROCEDURE — 99284 EMERGENCY DEPT VISIT MOD MDM: CPT

## 2020-09-25 PROCEDURE — 80048 BASIC METABOLIC PNL TOTAL CA: CPT

## 2020-09-25 PROCEDURE — 85025 COMPLETE CBC W/AUTO DIFF WBC: CPT

## 2020-09-25 PROCEDURE — 96374 THER/PROPH/DIAG INJ IV PUSH: CPT

## 2020-09-25 PROCEDURE — 81003 URINALYSIS AUTO W/O SCOPE: CPT

## 2020-09-25 PROCEDURE — 72158 MRI LUMBAR SPINE W/O & W/DYE: CPT

## 2020-09-25 PROCEDURE — 83735 ASSAY OF MAGNESIUM: CPT

## 2020-09-25 PROCEDURE — 6360000002 HC RX W HCPCS: Performed by: PHYSICIAN ASSISTANT

## 2020-09-25 RX ORDER — DEXAMETHASONE SODIUM PHOSPHATE 4 MG/ML
10 INJECTION, SOLUTION INTRA-ARTICULAR; INTRALESIONAL; INTRAMUSCULAR; INTRAVENOUS; SOFT TISSUE ONCE
Status: COMPLETED | OUTPATIENT
Start: 2020-09-25 | End: 2020-09-25

## 2020-09-25 RX ORDER — METHYLPREDNISOLONE 4 MG/1
TABLET ORAL
Qty: 1 KIT | Refills: 0 | Status: SHIPPED | OUTPATIENT
Start: 2020-09-25 | End: 2020-10-01

## 2020-09-25 RX ORDER — DIAZEPAM 5 MG/1
5 TABLET ORAL EVERY 6 HOURS PRN
Qty: 20 TABLET | Refills: 0 | Status: SHIPPED | OUTPATIENT
Start: 2020-09-25 | End: 2020-09-30

## 2020-09-25 RX ORDER — ONDANSETRON 2 MG/ML
4 INJECTION INTRAMUSCULAR; INTRAVENOUS ONCE
Status: COMPLETED | OUTPATIENT
Start: 2020-09-25 | End: 2020-09-25

## 2020-09-25 RX ORDER — DIAZEPAM 5 MG/ML
5 INJECTION, SOLUTION INTRAMUSCULAR; INTRAVENOUS ONCE
Status: COMPLETED | OUTPATIENT
Start: 2020-09-25 | End: 2020-09-25

## 2020-09-25 RX ADMIN — HYDROMORPHONE HYDROCHLORIDE 1 MG: 1 INJECTION, SOLUTION INTRAMUSCULAR; INTRAVENOUS; SUBCUTANEOUS at 12:28

## 2020-09-25 RX ADMIN — ONDANSETRON 4 MG: 2 INJECTION INTRAMUSCULAR; INTRAVENOUS at 12:28

## 2020-09-25 RX ADMIN — DIAZEPAM 5 MG: 5 INJECTION, SOLUTION INTRAMUSCULAR; INTRAVENOUS at 17:15

## 2020-09-25 RX ADMIN — HYDROMORPHONE HYDROCHLORIDE 1 MG: 1 INJECTION, SOLUTION INTRAMUSCULAR; INTRAVENOUS; SUBCUTANEOUS at 14:24

## 2020-09-25 RX ADMIN — GADOTERIDOL 10 ML: 279.3 INJECTION, SOLUTION INTRAVENOUS at 15:10

## 2020-09-25 RX ADMIN — DEXAMETHASONE SODIUM PHOSPHATE 10 MG: 4 INJECTION, SOLUTION INTRAMUSCULAR; INTRAVENOUS at 17:08

## 2020-09-25 ASSESSMENT — PAIN DESCRIPTION - ORIENTATION: ORIENTATION: LOWER

## 2020-09-25 ASSESSMENT — ENCOUNTER SYMPTOMS
RHINORRHEA: 0
SORE THROAT: 0
COUGH: 0
WHEEZING: 0
COLOR CHANGE: 0
NAUSEA: 0
EYE DISCHARGE: 0
EYE PAIN: 0
BACK PAIN: 1
VOMITING: 0
ABDOMINAL PAIN: 0
SHORTNESS OF BREATH: 0
DIARRHEA: 0
EYE ITCHING: 0

## 2020-09-25 ASSESSMENT — PAIN SCALES - GENERAL
PAINLEVEL_OUTOF10: 5
PAINLEVEL_OUTOF10: 5
PAINLEVEL_OUTOF10: 10

## 2020-09-25 ASSESSMENT — PAIN DESCRIPTION - PAIN TYPE: TYPE: SURGICAL PAIN

## 2020-09-25 ASSESSMENT — PAIN DESCRIPTION - DESCRIPTORS: DESCRIPTORS: SHARP

## 2020-09-25 ASSESSMENT — PAIN DESCRIPTION - LOCATION: LOCATION: BACK

## 2020-09-25 NOTE — ED PROVIDER NOTES
Arlene Yost 13 COMPLAINT       Chief Complaint   Patient presents with    Back Pain    Medication Refill       Nurses Notes reviewed and I agree except as notedin the HPI. HISTORY OF PRESENT ILLNESS    Ariadna Maldonado is a 54 y.o. female who presents discectomy done by Dr. Yohannes Lennon on . The patient is here for intractable pain. The patient states that she is not been able to sleep since she has had the surgery. She denies any bowel or bladder incontinence. She denies any radicular pain. She has been using Percocet at home without any relief. She contacted Dr. Yohannes Lennon was advised to come the emergency department. Location/Symptom: low back pain  Timing/Onset: 2020   Context/Setting: home  Quality: sharp  Duration: constant  Modifying Factors: none  Severity:10    REVIEW OF SYSTEMS     Review of Systems   Constitutional: Negative for activity change, appetite change, chills and fever. HENT: Negative for congestion, ear pain, rhinorrhea and sore throat. Eyes: Negative for pain, discharge and itching. Respiratory: Negative for cough, shortness of breath and wheezing. Cardiovascular: Negative for chest pain. Gastrointestinal: Negative for abdominal pain, diarrhea, nausea and vomiting. Genitourinary: Negative for difficulty urinating and dysuria. Musculoskeletal: Positive for back pain. Negative for arthralgias and myalgias. Skin: Negative for color change and rash. Neurological: Negative for dizziness, seizures, light-headedness and headaches. Psychiatric/Behavioral: Negative for agitation, confusion, self-injury and suicidal ideas. All other systems reviewed and are negative. PAST MEDICAL HISTORY    has a past medical history of Hypertension. SURGICAL HISTORY      has a past surgical history that includes  section;  Foot surgery; Dilation and curettage of uterus (); and knee lb (54.4 kg). Her oral temperature is 98.4 °F (36.9 °C). Her blood pressure is 138/75 and her pulse is 82. Her respiration is 18 and oxygen saturation is 99%. Physical Exam  Vitals signs and nursing note reviewed. Constitutional:       Appearance: She is well-developed. HENT:      Head: Normocephalic and atraumatic. Right Ear: Tympanic membrane normal.      Left Ear: Tympanic membrane normal.   Eyes:      Pupils: Pupils are equal, round, and reactive to light. Neck:      Musculoskeletal: Normal range of motion and neck supple. Cardiovascular:      Rate and Rhythm: Normal rate. Pulmonary:      Effort: Pulmonary effort is normal. No respiratory distress. Breath sounds: Normal breath sounds. Abdominal:      General: There is no distension. Palpations: Abdomen is soft. Musculoskeletal: Normal range of motion. Comments: Lumbar incision looks fine. No evidence of infection. Good strength and sensation lower extremities. Skin:     General: Skin is warm and dry. Neurological:      Mental Status: She is alert and oriented to person, place, and time. Psychiatric:         Behavior: Behavior normal.         DIFFERENTIAL DIAGNOSIS:   Intractable chronic low back pain. We will get an MRI to exclude any hematoma. Or abscess    DIAGNOSTIC RESULTS     EKG: All EKG's are interpreted by the Emergency Department Physician who either signs or Co-signs this chart in the absence of a cardiologist.      RADIOLOGY: non-plain film images(s) such as CT, Ultrasound and MRI are read by the radiologist.  MRI lumbar spine read per radiology       MRI Chuck Villegas 157 (Final result)   Result time 09/25/20 15:32:39   Final result by Sherlyn Leonardo MD (09/25/20 15:32:39)                 Impression:         1. The patient has undergone interval surgery at L5-S1 on the left side.  There is a 6.9 mm ventral and left-sided extradural defect resulting in mild to moderate canal, moderate to severe left and moderate right-sided foraminal stenosis. 2. There is mild canal and moderate bilateral foraminal stenosis at L4-5.   3. There is mild canal and mild to moderate bilateral foraminal stenosis at L3-4.   4. There is an enhancing nerve root within the thecal sac seen best on the sagittal contrast-enhanced images, possibly representing the right L3 nerve root. Please correlate clinically. 5. Degenerative change involving the sacroiliac joints bilaterally. 6. Increased signal intensity in the subcutaneous soft tissues dorsally between T12 and L5 which may represent postoperative change. **This report has been created using voice recognition software. It may contain minor errors which are inherent in voice recognition technology. **     Final report electronically signed by DR Donnlel Coles on 9/25/2020 3:32 PM             Narrative:     PROCEDURE: MRI LUMBAR SPINE W WO CONTRAST     CLINICAL INFORMATION: low back pain.  Rule out hematoma. COMPARISON: MRI scan of the lumbar spine dated 21 July 2020. TECHNIQUE: Sagittal and axial T1 and T2-weighted images were obtained to the lumbar spine. Postcontrast axial and sagittal T1-weighted images were also obtained. FINDINGS:     There are postoperative changes on the left side at L5. There is normal marrow signal in the remaining lumbar vertebral bodies. There is no evidence for acute fracture. The  tip of the conus is behind L1. At T12-L1, L1-2 and L2-3, there is no disc herniation, canal or foraminal stenosis. At L3-4, there is a 2.8 mm bulging disc and facet hypertrophy. This causes mild canal and mild-to-moderate bilateral foraminal stenosis. At L4-5, there is a 2.2 mm bulging disc and facet hypertrophy. This causes mild canal and moderate bilateral foraminal stenosis. At L5-S1, there appears be a left laminotomy defect. There is a 6.9 mm ventral and left-sided extradural defect.  This causes mild to moderate canal, moderate to severe left and moderate right-sided foraminal stenosis. There is degenerative change involving the sacroiliac joints bilaterally. This an enhancing nerve root within the thecal sac, seen best on the contrast-enhanced sagittal images, possibly representing the right L3 nerve root, please correlate clinically     There is increased signal intensity in the subcutaneous soft tissues dorsally between T12 and L5 which may represent postoperative changes. LABS:   Labs Reviewed   CBC WITH AUTO DIFFERENTIAL - Abnormal; Notable for the following components:       Result Value    WBC 14.4 (*)     RBC 4.06 (*)     .2 (*)     MCH 33.5 (*)     RDW-SD 46.3 (*)     Segs Absolute 9.1 (*)     Monocytes Absolute 1.4 (*)     Immature Grans (Abs) 0.14 (*)     All other components within normal limits   BASIC METABOLIC PANEL - Abnormal; Notable for the following components:    CO2 22 (*)     All other components within normal limits   OSMOLALITY - Abnormal; Notable for the following components:    Osmolality Calc 273.0 (*)     All other components within normal limits   MAGNESIUM   URINE RT REFLEX TO CULTURE   ANION GAP   GLOMERULAR FILTRATION RATE, ESTIMATED       EMERGENCY DEPARTMENT COURSE:   :    Vitals:    09/25/20 1430 09/25/20 1524 09/25/20 1625 09/25/20 1710   BP: (!) 98/50 111/61 125/65 138/75   Pulse: 87 85 80 82   Resp: 16 16 16 18   Temp:       TempSrc:       SpO2: 98% 97% 93% 99%   Weight:       Height:         Patient was seen history physical exam was performed. Patient was given Dilaudid x2 doses, was also given a dose of Valium and Decadron here. the patient is doing better. Dr. Loc Ackerman recommends discharge home with Valium and Medrol Dosepak. I did review the MRI scan with him. Patient is able to ambulate. See disposition below    CRITICAL CARE:  None    CONSULTS:  Dr. Sweeney Slight:  None    FINAL IMPRESSION      1.  Post-op pain          DISPOSITION/PLAN

## 2020-10-05 ENCOUNTER — OFFICE VISIT (OUTPATIENT)
Dept: FAMILY MEDICINE CLINIC | Age: 55
End: 2020-10-05
Payer: COMMERCIAL

## 2020-10-05 VITALS
DIASTOLIC BLOOD PRESSURE: 62 MMHG | RESPIRATION RATE: 16 BRPM | SYSTOLIC BLOOD PRESSURE: 108 MMHG | OXYGEN SATURATION: 98 % | HEART RATE: 104 BPM | BODY MASS INDEX: 23.75 KG/M2 | TEMPERATURE: 97.2 F | WEIGHT: 121.6 LBS

## 2020-10-05 PROCEDURE — 90686 IIV4 VACC NO PRSV 0.5 ML IM: CPT | Performed by: NURSE PRACTITIONER

## 2020-10-05 PROCEDURE — 99214 OFFICE O/P EST MOD 30 MIN: CPT | Performed by: NURSE PRACTITIONER

## 2020-10-05 PROCEDURE — 90471 IMMUNIZATION ADMIN: CPT | Performed by: NURSE PRACTITIONER

## 2020-10-05 RX ORDER — DIAZEPAM 2 MG/1
2 TABLET ORAL EVERY 12 HOURS PRN
Qty: 20 TABLET | Refills: 1 | Status: SHIPPED | OUTPATIENT
Start: 2020-10-05 | End: 2020-10-26 | Stop reason: SDUPTHER

## 2020-10-05 RX ORDER — HYDROCODONE BITARTRATE AND ACETAMINOPHEN 5; 325 MG/1; MG/1
1 TABLET ORAL EVERY 8 HOURS PRN
Qty: 21 TABLET | Refills: 0 | Status: SHIPPED | OUTPATIENT
Start: 2020-10-05 | End: 2021-04-12 | Stop reason: SDUPTHER

## 2020-10-05 ASSESSMENT — ENCOUNTER SYMPTOMS
CONSTIPATION: 0
RHINORRHEA: 0
WHEEZING: 0
VOMITING: 0
EYE DISCHARGE: 0
ALLERGIC/IMMUNOLOGIC NEGATIVE: 1
EYE REDNESS: 0
SORE THROAT: 0
TROUBLE SWALLOWING: 0
ABDOMINAL PAIN: 0
EYE PAIN: 0
SHORTNESS OF BREATH: 0
NAUSEA: 0
COUGH: 0
DIARRHEA: 0
BACK PAIN: 0

## 2020-10-05 NOTE — PROGRESS NOTES
[address] [address]  Dept: [phone]  Dept Fax: [phone]  Loc: [phone]       JENNYFER Martinez is a 54 y. o.female who is 11 days postop from lumbar surgery. She continues to have significant and treated postop pain and is having trouble moving through her activities of daily living due to that. Her anxiety is worsened because of it. She states that she does not have the radicular pain down her left leg anymore and that the surgical site has been healing well. No constipation or diarrhea, no fever. Patient also has a longstanding lesion on her left upper arm that she states is been getting bigger over the last few weeks. She had a punch biopsy at one time done for this and it was some type of benign cyst.      Patient Active Problem List   Diagnosis    Essential hypertension    Osteopenia    Carotid stenosis, non-symptomatic, bilateral    Chronic radicular lumbar pain    History of smoking 30 or more pack years       Current Outpatient Medications   Medication Sig Dispense Refill    HYDROcodone-acetaminophen (NORCO) 5-325 MG per tablet Take 1 tablet by mouth every 8 hours as needed for Pain for up to 7 days. Intended supply: 3 days. Take lowest dose possible to manage pain 21 tablet 0    diazePAM (VALIUM) 2 MG tablet Take 1 tablet by mouth every 12 hours as needed for Anxiety for up to 10 days.  20 tablet 1    cyclobenzaprine (FLEXERIL) 10 MG tablet Take 1 tablet by mouth 3 times daily as needed for Muscle spasms 60 tablet 0    vitamin D (ERGOCALCIFEROL) 1.25 MG (55614 UT) CAPS capsule Take 1 capsule by mouth once a week 12 capsule 0    amLODIPine (NORVASC) 10 MG tablet TAKE 1 TABLET BY MOUTH ONE TIME A DAY  90 tablet 3    lisinopril (PRINIVIL;ZESTRIL) 10 MG tablet 10mg by mouth in the morning and 15mg by mouth nightly       acetaminophen (TYLENOL) 500 MG tablet Take 500 mg by mouth every 6 hours as needed for Pain      aspirin 81 MG EC tablet Take 81 mg by mouth daily      atorvastatin (LIPITOR) 20 MG tablet Take 1 tablet by mouth daily 90 tablet 1    ibuprofen (ADVIL;MOTRIN) 800 MG tablet TAKE 1 TABLET BY MOUTH THREE TIMES A DAY FOR 30 DAYS 90 tablet 3     No current facility-administered medications for this visit. Review of Systems   Constitutional: Negative for activity change, fatigue and fever. HENT: Negative for congestion, ear pain, rhinorrhea, sore throat and trouble swallowing. Eyes: Negative for pain, discharge and redness. Respiratory: Negative for cough, shortness of breath and wheezing. Cardiovascular: Negative. Gastrointestinal: Negative for abdominal pain, constipation, diarrhea, nausea and vomiting. Endocrine: Negative. Genitourinary: Negative for dysuria, frequency and urgency. Musculoskeletal: Negative for arthralgias, back pain and myalgias. Skin: Negative for rash. Right upper arm cyst   Allergic/Immunologic: Negative. Neurological: Negative for dizziness, tremors, weakness and headaches. Hematological: Negative. Psychiatric/Behavioral: Positive for agitation. Negative for dysphoric mood and sleep disturbance. The patient is nervous/anxious. OBJECTIVE     /62   Pulse 104   Temp 97.2 °F (36.2 °C) (Temporal)   Resp 16   Wt 121 lb 9.6 oz (55.2 kg)   SpO2 98%   BMI 23.75 kg/m²     Wt Readings from Last 3 Encounters:   10/05/20 121 lb 9.6 oz (55.2 kg)   09/25/20 120 lb (54.4 kg)   09/14/20 122 lb (55.3 kg)     Body mass index is 23.75 kg/m².       Physical Exam    No results found for: LABA1C    Lab Results   Component Value Date    CHOL 209 (H) 09/03/2020    TRIG 75 09/03/2020    HDL 81 09/03/2020    LDLCALC 113 09/03/2020       The 10-year ASCVD risk score (Lo Mason, et al., 2013) is: 3.4%    Values used to calculate the score:      Age: 54 years      Sex: Female      Is Non-

## 2020-10-05 NOTE — LETTER
[facility]  [address]  [facility] [address]  Phone: [phone]  Fax: [phone]    SHERYL Limon CNP        October 5, 2020     Patient: Court Lennon   YOB: 1965   Date of Visit: 10/5/2020       To Whom It May Concern: It is my medical opinion that Fabrizio Kemp requires a disability parking placard for the following reasons:  She cannot walk 200 feet without stopping to rest.  Duration of need: 1 month    If you have any questions or concerns, please don't hesitate to call.     Sincerely,        SHERYL Limon CNP

## 2020-10-19 RX ORDER — CYCLOBENZAPRINE HCL 10 MG
10 TABLET ORAL 3 TIMES DAILY PRN
Qty: 60 TABLET | Refills: 0 | Status: SHIPPED | OUTPATIENT
Start: 2020-10-19 | End: 2020-11-06 | Stop reason: SDUPTHER

## 2020-11-06 RX ORDER — CYCLOBENZAPRINE HCL 10 MG
10 TABLET ORAL 3 TIMES DAILY PRN
Qty: 60 TABLET | Refills: 0 | Status: SHIPPED | OUTPATIENT
Start: 2020-11-06 | End: 2020-11-23 | Stop reason: SDUPTHER

## 2020-11-19 RX ORDER — IBUPROFEN 800 MG/1
TABLET ORAL
Qty: 90 TABLET | Refills: 3 | Status: SHIPPED | OUTPATIENT
Start: 2020-11-19 | End: 2021-01-11 | Stop reason: SDUPTHER

## 2020-11-23 RX ORDER — CYCLOBENZAPRINE HCL 10 MG
10 TABLET ORAL 3 TIMES DAILY PRN
Qty: 60 TABLET | Refills: 0 | Status: SHIPPED | OUTPATIENT
Start: 2020-11-23 | End: 2021-01-11 | Stop reason: SDUPTHER

## 2020-11-30 RX ORDER — ERGOCALCIFEROL 1.25 MG/1
CAPSULE ORAL
Qty: 12 CAPSULE | Refills: 0 | OUTPATIENT
Start: 2020-11-30

## 2020-12-07 RX ORDER — DIAZEPAM 2 MG/1
2 TABLET ORAL EVERY 12 HOURS PRN
Qty: 60 TABLET | Refills: 0 | Status: SHIPPED | OUTPATIENT
Start: 2020-12-07 | End: 2021-01-12 | Stop reason: SDUPTHER

## 2020-12-08 RX ORDER — ERGOCALCIFEROL 1.25 MG/1
50000 CAPSULE ORAL WEEKLY
Qty: 12 CAPSULE | Refills: 0 | Status: SHIPPED | OUTPATIENT
Start: 2020-12-08 | End: 2021-02-18 | Stop reason: SDUPTHER

## 2020-12-29 ENCOUNTER — NURSE TRIAGE (OUTPATIENT)
Dept: OTHER | Facility: CLINIC | Age: 55
End: 2020-12-29

## 2020-12-29 ENCOUNTER — APPOINTMENT (OUTPATIENT)
Dept: CT IMAGING | Age: 55
End: 2020-12-29
Payer: COMMERCIAL

## 2020-12-29 ENCOUNTER — HOSPITAL ENCOUNTER (EMERGENCY)
Age: 55
Discharge: HOME | End: 2020-12-29
Payer: COMMERCIAL

## 2020-12-29 VITALS
HEIGHT: 60 IN | BODY MASS INDEX: 23.56 KG/M2 | RESPIRATION RATE: 16 BRPM | WEIGHT: 120 LBS | TEMPERATURE: 98.5 F | OXYGEN SATURATION: 96 % | DIASTOLIC BLOOD PRESSURE: 89 MMHG | HEART RATE: 122 BPM | SYSTOLIC BLOOD PRESSURE: 126 MMHG

## 2020-12-29 LAB
ALBUMIN SERPL-MCNC: 5.1 G/DL (ref 3.5–5.1)
ALP BLD-CCNC: 72 U/L (ref 38–126)
ALT SERPL-CCNC: 28 U/L (ref 11–66)
ANION GAP SERPL CALCULATED.3IONS-SCNC: 17 MEQ/L (ref 8–16)
AST SERPL-CCNC: 27 U/L (ref 5–40)
BACTERIA: ABNORMAL /HPF
BASOPHILS # BLD: 0.3 %
BASOPHILS ABSOLUTE: 0 THOU/MM3 (ref 0–0.1)
BILIRUB SERPL-MCNC: 0.3 MG/DL (ref 0.3–1.2)
BILIRUBIN URINE: NEGATIVE
BLOOD, URINE: NEGATIVE
BUN BLDV-MCNC: 25 MG/DL (ref 7–22)
CALCIUM SERPL-MCNC: 10.4 MG/DL (ref 8.5–10.5)
CASTS 2: ABNORMAL /LPF
CASTS UA: ABNORMAL /LPF
CHARACTER, URINE: CLEAR
CHLORIDE BLD-SCNC: 94 MEQ/L (ref 98–111)
CO2: 20 MEQ/L (ref 23–33)
COLOR: YELLOW
CREAT SERPL-MCNC: 1.3 MG/DL (ref 0.4–1.2)
CRYSTALS, UA: ABNORMAL
EKG ATRIAL RATE: 79 BPM
EKG P AXIS: -17 DEGREES
EKG P-R INTERVAL: 130 MS
EKG Q-T INTERVAL: 374 MS
EKG QRS DURATION: 88 MS
EKG QTC CALCULATION (BAZETT): 428 MS
EKG R AXIS: 57 DEGREES
EKG T AXIS: 53 DEGREES
EKG VENTRICULAR RATE: 79 BPM
EOSINOPHIL # BLD: 0.5 %
EOSINOPHILS ABSOLUTE: 0.1 THOU/MM3 (ref 0–0.4)
EPITHELIAL CELLS, UA: ABNORMAL /HPF
ERYTHROCYTE [DISTWIDTH] IN BLOOD BY AUTOMATED COUNT: 13.2 % (ref 11.5–14.5)
ERYTHROCYTE [DISTWIDTH] IN BLOOD BY AUTOMATED COUNT: 46.9 FL (ref 35–45)
GFR SERPL CREATININE-BSD FRML MDRD: 42 ML/MIN/1.73M2
GLUCOSE BLD-MCNC: 131 MG/DL (ref 70–108)
GLUCOSE URINE: NEGATIVE MG/DL
HCT VFR BLD CALC: 43.7 % (ref 37–47)
HEMOGLOBIN: 14.7 GM/DL (ref 12–16)
IMMATURE GRANS (ABS): 0.08 THOU/MM3 (ref 0–0.07)
IMMATURE GRANULOCYTES: 0.5 %
KETONES, URINE: NEGATIVE
LEUKOCYTE ESTERASE, URINE: NEGATIVE
LIPASE: 43.4 U/L (ref 5.6–51.3)
LYMPHOCYTES # BLD: 19.9 %
LYMPHOCYTES ABSOLUTE: 3 THOU/MM3 (ref 1–4.8)
MCH RBC QN AUTO: 32.3 PG (ref 26–33)
MCHC RBC AUTO-ENTMCNC: 33.6 GM/DL (ref 32.2–35.5)
MCV RBC AUTO: 96 FL (ref 81–99)
MISCELLANEOUS 2: ABNORMAL
MONOCYTES # BLD: 8.9 %
MONOCYTES ABSOLUTE: 1.4 THOU/MM3 (ref 0.4–1.3)
NITRITE, URINE: NEGATIVE
NUCLEATED RED BLOOD CELLS: 0 /100 WBC
OSMOLALITY CALCULATION: 268.9 MOSMOL/KG (ref 275–300)
PH UA: 5 (ref 5–9)
PLATELET # BLD: 401 THOU/MM3 (ref 130–400)
PMV BLD AUTO: 9.6 FL (ref 9.4–12.4)
POTASSIUM REFLEX MAGNESIUM: 3.9 MEQ/L (ref 3.5–5.2)
PROTEIN UA: 30
RBC # BLD: 4.55 MILL/MM3 (ref 4.2–5.4)
RBC URINE: ABNORMAL /HPF
RENAL EPITHELIAL, UA: ABNORMAL
SEG NEUTROPHILS: 69.9 %
SEGMENTED NEUTROPHILS ABSOLUTE COUNT: 10.6 THOU/MM3 (ref 1.8–7.7)
SODIUM BLD-SCNC: 131 MEQ/L (ref 135–145)
SPECIFIC GRAVITY, URINE: > 1.03 (ref 1–1.03)
TOTAL PROTEIN: 8.5 G/DL (ref 6.1–8)
TROPONIN T: < 0.01 NG/ML
UROBILINOGEN, URINE: 0.2 EU/DL (ref 0–1)
WBC # BLD: 15.2 THOU/MM3 (ref 4.8–10.8)
WBC UA: ABNORMAL /HPF
YEAST: ABNORMAL

## 2020-12-29 PROCEDURE — 83690 ASSAY OF LIPASE: CPT

## 2020-12-29 PROCEDURE — 6360000004 HC RX CONTRAST MEDICATION: Performed by: EMERGENCY MEDICINE

## 2020-12-29 PROCEDURE — 36415 COLL VENOUS BLD VENIPUNCTURE: CPT

## 2020-12-29 PROCEDURE — 84484 ASSAY OF TROPONIN QUANT: CPT

## 2020-12-29 PROCEDURE — 80053 COMPREHEN METABOLIC PANEL: CPT

## 2020-12-29 PROCEDURE — 85025 COMPLETE CBC W/AUTO DIFF WBC: CPT

## 2020-12-29 PROCEDURE — 99283 EMERGENCY DEPT VISIT LOW MDM: CPT

## 2020-12-29 PROCEDURE — 93010 ELECTROCARDIOGRAM REPORT: CPT | Performed by: NUCLEAR MEDICINE

## 2020-12-29 PROCEDURE — 2500000003 HC RX 250 WO HCPCS: Performed by: EMERGENCY MEDICINE

## 2020-12-29 PROCEDURE — 93005 ELECTROCARDIOGRAM TRACING: CPT | Performed by: EMERGENCY MEDICINE

## 2020-12-29 PROCEDURE — 96375 TX/PRO/DX INJ NEW DRUG ADDON: CPT

## 2020-12-29 PROCEDURE — 74177 CT ABD & PELVIS W/CONTRAST: CPT

## 2020-12-29 PROCEDURE — 81001 URINALYSIS AUTO W/SCOPE: CPT

## 2020-12-29 PROCEDURE — 6360000002 HC RX W HCPCS: Performed by: EMERGENCY MEDICINE

## 2020-12-29 PROCEDURE — 2580000003 HC RX 258: Performed by: EMERGENCY MEDICINE

## 2020-12-29 PROCEDURE — 96374 THER/PROPH/DIAG INJ IV PUSH: CPT

## 2020-12-29 PROCEDURE — 6370000000 HC RX 637 (ALT 250 FOR IP): Performed by: EMERGENCY MEDICINE

## 2020-12-29 RX ORDER — 0.9 % SODIUM CHLORIDE 0.9 %
1000 INTRAVENOUS SOLUTION INTRAVENOUS ONCE
Status: COMPLETED | OUTPATIENT
Start: 2020-12-29 | End: 2020-12-29

## 2020-12-29 RX ORDER — KETOROLAC TROMETHAMINE 30 MG/ML
30 INJECTION, SOLUTION INTRAMUSCULAR; INTRAVENOUS ONCE
Status: COMPLETED | OUTPATIENT
Start: 2020-12-29 | End: 2020-12-29

## 2020-12-29 RX ORDER — ONDANSETRON 2 MG/ML
4 INJECTION INTRAMUSCULAR; INTRAVENOUS ONCE
Status: COMPLETED | OUTPATIENT
Start: 2020-12-29 | End: 2020-12-29

## 2020-12-29 RX ORDER — FAMOTIDINE 20 MG/1
20 TABLET, FILM COATED ORAL 2 TIMES DAILY
Qty: 60 TABLET | Refills: 3 | Status: SHIPPED | OUTPATIENT
Start: 2020-12-29 | End: 2022-02-09 | Stop reason: ALTCHOICE

## 2020-12-29 RX ADMIN — KETOROLAC TROMETHAMINE 30 MG: 30 INJECTION, SOLUTION INTRAMUSCULAR; INTRAVENOUS at 14:11

## 2020-12-29 RX ADMIN — IOPAMIDOL 80 ML: 755 INJECTION, SOLUTION INTRAVENOUS at 15:41

## 2020-12-29 RX ADMIN — LIDOCAINE HYDROCHLORIDE: 20 SOLUTION ORAL; TOPICAL at 16:45

## 2020-12-29 RX ADMIN — ONDANSETRON 4 MG: 2 INJECTION INTRAMUSCULAR; INTRAVENOUS at 14:11

## 2020-12-29 RX ADMIN — FAMOTIDINE 20 MG: 10 INJECTION INTRAVENOUS at 16:45

## 2020-12-29 RX ADMIN — SODIUM CHLORIDE 1000 ML: 9 INJECTION, SOLUTION INTRAVENOUS at 14:10

## 2020-12-29 ASSESSMENT — ENCOUNTER SYMPTOMS
NAUSEA: 1
SHORTNESS OF BREATH: 0
DIARRHEA: 0
VOMITING: 1
COUGH: 0
COLOR CHANGE: 0
BACK PAIN: 1
ABDOMINAL PAIN: 1

## 2020-12-29 ASSESSMENT — PAIN SCALES - GENERAL
PAINLEVEL_OUTOF10: 7
PAINLEVEL_OUTOF10: 8

## 2020-12-29 ASSESSMENT — PAIN DESCRIPTION - LOCATION: LOCATION: BACK;LEG

## 2020-12-29 ASSESSMENT — PAIN DESCRIPTION - FREQUENCY: FREQUENCY: INTERMITTENT

## 2020-12-29 ASSESSMENT — PAIN DESCRIPTION - PAIN TYPE: TYPE: ACUTE PAIN

## 2020-12-29 ASSESSMENT — PAIN DESCRIPTION - ORIENTATION: ORIENTATION: LEFT;LOWER

## 2020-12-29 ASSESSMENT — PAIN DESCRIPTION - DESCRIPTORS: DESCRIPTORS: SHARP

## 2020-12-29 NOTE — TELEPHONE ENCOUNTER
Patient called pre-service center [facility]) [address] with red flag complaint. Brief description of triage: abdominal pain    Triage indicates for patient to Go to Er now    Care advice provided, patient verbalizes understanding; denies any other questions or concerns; instructed to call back for any new or worsening symptoms. Attention Provider: Thank you for allowing me to participate in the care of your patient. The patient was connected to triage in response to information provided to the [facility]. Please do not respond through this encounter as the response is not directed to a shared pool. Reason for Disposition   SEVERE abdominal pain (e.g., excruciating)    Answer Assessment - Initial Assessment Questions  1. LOCATION: \"Where does it hurt? \"       Upper abdomen into chest/epigastric area    2. RADIATION: \"Does the pain shoot anywhere else? \" (e.g., chest, back)      Pain in epigastric area resolved as of yesterday and is now having Mid abdomen pain right above naval    3. ONSET: \"When did the pain begin? \" (e.g., minutes, hours or days ago)       Tuesday of last week    4. SUDDEN: \"Gradual or sudden onset? \"      Started when returned to work last week following a back surgery    5. PATTERN \"Does the pain come and go, or is it constant? \"     - If constant: \"Is it getting better, staying the same, or worsening? \"       (Note: Constant means the pain never goes away completely; most serious pain is constant and it progresses)      - If intermittent: \"How long does it last?\" \"Do you have pain now? \"      (Note: Intermittent means the pain goes away completely between bouts)      Sharp pain that comes and goes    6. SEVERITY: \"How bad is the pain? \"  (e.g., Scale 1-10; mild, moderate, or severe)     - MILD (1-3): doesn't interfere with normal activities, abdomen soft and not tender to touch      - MODERATE (4-7): interferes with normal activities or awakens from sleep, tender to touch      - SEVERE (8-10): excruciating pain, doubled over, unable to do any normal activities        8/10 sharp pain - lasts about 10-30 seconds    7. RECURRENT SYMPTOM: \"Have you ever had this type of abdominal pain before? \" If so, ask: \"When was the last time? \" and \"What happened that time? \"       No    8. AGGRAVATING FACTORS: \"Does anything seem to cause this pain? \" (e.g., foods, stress, alcohol)      Unsure, intermittent episodes of vomiting with onset of epigastric pain last week, no vomiting since yesterday, laying in shower improves pain, unsure if related to increased back pain after returning to work    9. CARDIAC SYMPTOMS: \"Do you have any of the following symptoms: chest pain, difficulty breathing, sweating, nausea? \"     See below    10. OTHER SYMPTOMS: \"Do you have any other symptoms? \" (e.g., fever, vomiting, diarrhea)        N/V, chills     11. PREGNANCY: \"Is there any chance you are pregnant? \" \"When was your last menstrual period? \"        NA    Protocols used: ABDOMINAL PAIN - UPPER-ADULT-OH

## 2020-12-29 NOTE — ED PROVIDER NOTES
Musculoskeletal: Positive for back pain. Negative for arthralgias. Skin: Negative for color change. Neurological: Negative for weakness and headaches. Psychiatric/Behavioral: Negative for behavioral problems. PAST MEDICAL HISTORY    has a past medical history of Hypertension. SURGICAL HISTORY      has a past surgical history that includes  section; Foot surgery; Dilation and curettage of uterus (); and knee surgery (Left). CURRENT MEDICATIONS       Discharge Medication List as of 2020  5:05 PM      CONTINUE these medications which have NOT CHANGED    Details   vitamin D (ERGOCALCIFEROL) 1.25 MG (62459 UT) CAPS capsule Take 1 capsule by mouth once a week, Disp-12 capsule, R-0Normal      diazePAM (VALIUM) 2 MG tablet Take 1 tablet by mouth every 12 hours as needed for Anxiety for up to 30 days. , Disp-60 tablet, R-0Normal      cyclobenzaprine (FLEXERIL) 10 MG tablet Take 1 tablet by mouth 3 times daily as needed for Muscle spasms, Disp-60 tablet, R-0Normal      ibuprofen (ADVIL;MOTRIN) 800 MG tablet TAKE 1 TABLET BY MOUTH THREE TIMES A DAY FOR 30 DAYS, Disp-90 tablet, R-3Normal      amLODIPine (NORVASC) 10 MG tablet TAKE 1 TABLET BY MOUTH ONE TIME A DAY , Disp-90 tablet,R-3Normal      lisinopril (PRINIVIL;ZESTRIL) 10 MG tablet 10mg by mouth in the morning and 15mg by mouth nightly Historical Med      acetaminophen (TYLENOL) 500 MG tablet Take 500 mg by mouth every 6 hours as needed for PainHistorical Med      aspirin 81 MG EC tablet Take 81 mg by mouth dailyHistorical Med      atorvastatin (LIPITOR) 20 MG tablet Take 1 tablet by mouth daily, Disp-90 tablet, R-1Normal             ALLERGIES     is allergic to bactrim [sulfamethoxazole-trimethoprim]; codeine; medrol [methylprednisolone]; and ultram [tramadol hcl].     FAMILY HISTORY Psychiatric:         Mood and Affect: Mood normal.         Behavior: Behavior normal.          DIFFERENTIAL DIAGNOSIS:   Peptic ulcer disease, GERD, pancreatitis, bowel obstruction less likely, cholecystitis, cholelithiasis    DIAGNOSTIC RESULTS     EKG: All EKG's are interpreted by the Emergency Department Physician who either signs or Co-signs this chart in the absence of a cardiologist.    Normal sinus rhythm ventricular rate 79 bpm.  HI interval 130, QRS duration 88, corrected  ms. No STEMI. RADIOLOGY: non-plainfilm images(s) such as CT, Ultrasound and MRI are read by the radiologist.    CT ABDOMEN PELVIS W IV CONTRAST Additional Contrast? None   Final Result   Diverticulosis without evidence for diverticulitis. **This report has been created using voice recognition software. It may contain minor errors which are inherent in voice recognition technology. **      Final report electronically signed by Dr. Jose Willis on 12/29/2020 3:55 PM          LABS:     Labs Reviewed   CBC WITH AUTO DIFFERENTIAL - Abnormal; Notable for the following components:       Result Value    WBC 15.2 (*)     RDW-SD 46.9 (*)     Platelets 333 (*)     Segs Absolute 10.6 (*)     Monocytes Absolute 1.4 (*)     Immature Grans (Abs) 0.08 (*)     All other components within normal limits   COMPREHENSIVE METABOLIC PANEL W/ REFLEX TO MG FOR LOW K - Abnormal; Notable for the following components:    Glucose 131 (*)     CREATININE 1.3 (*)     BUN 25 (*)     Sodium 131 (*)     Chloride 94 (*)     CO2 20 (*)     Total Protein 8.5 (*)     All other components within normal limits   ANION GAP - Abnormal; Notable for the following components:    Anion Gap 17.0 (*)     All other components within normal limits   GLOMERULAR FILTRATION RATE, ESTIMATED - Abnormal; Notable for the following components:    Est, Glom Filt Rate 42 (*)     All other components within normal limits

## 2020-12-29 NOTE — ED NOTES
Pt resting in bed. VSS. Upddated on POC. Pt ambulates to restroom without assistance.       Charli Kent RN  12/29/20 [id number]

## 2020-12-30 ENCOUNTER — PATIENT MESSAGE (OUTPATIENT)
Dept: FAMILY MEDICINE CLINIC | Age: 55
End: 2020-12-30

## 2020-12-30 NOTE — TELEPHONE ENCOUNTER
From: Rolan Chatterjee  To: SHERYL Stratton - CNP  Sent: 12/30/2020 9:28 AM EST  Subject: Non-Urgent Medical Question    Hi, I would like to know if you could write me out a light duty and no running vladislav for work. I am suppose to follow up with you in a week per the ER. Thankfully it was nothing serious but I do believe my stress and anxiety is getting the best of me let alone the pain. My first day back was Tues. I worked the whole day but was in excruciating pain. I thought maybe that was why I got sick. Wed. I had to leave an hour early because they put me up on vladislav and it killed me. I called in on Sat. and Mon. because I was so sick and in so much pain. They asked me about if I needed less hours or less days and I said let me give it my best effort first. I could pick it up today if you would do that for me so I can go back to work tomorrow.

## 2021-01-11 DIAGNOSIS — E78.00 HIGH CHOLESTEROL: ICD-10-CM

## 2021-01-11 DIAGNOSIS — S83.241D ACUTE MEDIAL MENISCUS TEAR, RIGHT, SUBSEQUENT ENCOUNTER: ICD-10-CM

## 2021-01-11 DIAGNOSIS — Z01.818 PRE-OP TESTING: ICD-10-CM

## 2021-01-12 ENCOUNTER — OFFICE VISIT (OUTPATIENT)
Dept: FAMILY MEDICINE CLINIC | Age: 56
End: 2021-01-12
Payer: COMMERCIAL

## 2021-01-12 VITALS
SYSTOLIC BLOOD PRESSURE: 110 MMHG | WEIGHT: 121 LBS | HEART RATE: 100 BPM | BODY MASS INDEX: 23.63 KG/M2 | DIASTOLIC BLOOD PRESSURE: 60 MMHG | RESPIRATION RATE: 20 BRPM

## 2021-01-12 DIAGNOSIS — G89.18 POST-OP PAIN: ICD-10-CM

## 2021-01-12 DIAGNOSIS — Z23 NEED FOR SHINGLES VACCINE: ICD-10-CM

## 2021-01-12 DIAGNOSIS — M54.16 CHRONIC RADICULAR LUMBAR PAIN: Primary | ICD-10-CM

## 2021-01-12 DIAGNOSIS — G89.29 CHRONIC RADICULAR LUMBAR PAIN: Primary | ICD-10-CM

## 2021-01-12 PROCEDURE — 90750 HZV VACC RECOMBINANT IM: CPT | Performed by: NURSE PRACTITIONER

## 2021-01-12 PROCEDURE — 99214 OFFICE O/P EST MOD 30 MIN: CPT | Performed by: NURSE PRACTITIONER

## 2021-01-12 PROCEDURE — 90471 IMMUNIZATION ADMIN: CPT | Performed by: NURSE PRACTITIONER

## 2021-01-12 RX ORDER — IBUPROFEN 800 MG/1
TABLET ORAL
Qty: 90 TABLET | Refills: 3 | Status: SHIPPED | OUTPATIENT
Start: 2021-01-12 | End: 2021-03-30 | Stop reason: ALTCHOICE

## 2021-01-12 RX ORDER — HYDROCODONE BITARTRATE AND ACETAMINOPHEN 5; 325 MG/1; MG/1
1 TABLET ORAL EVERY 8 HOURS PRN
Qty: 21 TABLET | Refills: 0 | Status: SHIPPED | OUTPATIENT
Start: 2021-01-12 | End: 2021-01-19 | Stop reason: SDUPTHER

## 2021-01-12 RX ORDER — DIAZEPAM 2 MG/1
2 TABLET ORAL EVERY 12 HOURS PRN
Qty: 60 TABLET | Refills: 0 | Status: SHIPPED | OUTPATIENT
Start: 2021-01-12 | End: 2021-02-11

## 2021-01-12 RX ORDER — CYCLOBENZAPRINE HCL 10 MG
10 TABLET ORAL 3 TIMES DAILY PRN
Qty: 60 TABLET | Refills: 0 | Status: SHIPPED | OUTPATIENT
Start: 2021-01-12 | End: 2021-04-12 | Stop reason: SDUPTHER

## 2021-01-12 RX ORDER — ATORVASTATIN CALCIUM 20 MG/1
20 TABLET, FILM COATED ORAL DAILY
Qty: 90 TABLET | Refills: 3 | Status: ON HOLD | OUTPATIENT
Start: 2021-01-12 | End: 2021-03-18 | Stop reason: SDUPTHER

## 2021-01-12 SDOH — HEALTH STABILITY: MENTAL HEALTH: HOW MANY STANDARD DRINKS CONTAINING ALCOHOL DO YOU HAVE ON A TYPICAL DAY?: 1 OR 2

## 2021-01-12 ASSESSMENT — PATIENT HEALTH QUESTIONNAIRE - PHQ9
1. LITTLE INTEREST OR PLEASURE IN DOING THINGS: 0
SUM OF ALL RESPONSES TO PHQ QUESTIONS 1-9: 0

## 2021-01-12 ASSESSMENT — ENCOUNTER SYMPTOMS
BACK PAIN: 1
SHORTNESS OF BREATH: 0
TROUBLE SWALLOWING: 0
DIARRHEA: 0
VOMITING: 0
ABDOMINAL PAIN: 0
EYE REDNESS: 0
CONSTIPATION: 0
COUGH: 0
ALLERGIC/IMMUNOLOGIC NEGATIVE: 1
RHINORRHEA: 0
WHEEZING: 0
NAUSEA: 0
EYE DISCHARGE: 0
EYE PAIN: 0
SORE THROAT: 0

## 2021-01-12 NOTE — PROGRESS NOTES
[address] [address]  Dept: [phone]  Dept Fax: [phone]  Loc: [phone]       JENNYFER Reeves is a 54 y. o.female here for follow-up with her ongoing chronic postoperative lumbar pain with radiation down her left leg. Patient has been struggling with this since October 2020. She has managed to go back to work and is able to work in some limited fashion, which is what she wants to do. She states the pain in her knees has been unbearable to the point that she has vomited. She has not really been on any pain management for a longer period of time as it was expected that she would be doing better. Patient Active Problem List   Diagnosis    Essential hypertension    Osteopenia    Carotid stenosis, non-symptomatic, bilateral    Chronic radicular lumbar pain    History of smoking 30 or more pack years       Current Outpatient Medications   Medication Sig Dispense Refill    cyclobenzaprine (FLEXERIL) 10 MG tablet Take 1 tablet by mouth 3 times daily as needed for Muscle spasms 60 tablet 0    atorvastatin (LIPITOR) 20 MG tablet Take 1 tablet by mouth daily 90 tablet 3    ibuprofen (ADVIL;MOTRIN) 800 MG tablet TAKE 1 TABLET BY MOUTH THREE TIMES A DAY FOR 30 DAYS (Patient taking differently: Take 800 mg by mouth 2 times daily as needed ) 90 tablet 3    HYDROcodone-acetaminophen (NORCO) 5-325 MG per tablet Take 1 tablet by mouth every 8 hours as needed for Pain for up to 7 days. Intended supply: 7 days. Take lowest dose possible to manage pain 21 tablet 0    diazePAM (VALIUM) 2 MG tablet Take 1 tablet by mouth every 12 hours as needed for Anxiety for up to 30 days.  60 tablet 0    famotidine (PEPCID) 20 MG tablet Take 1 tablet by mouth 2 times daily 60 tablet 3    vitamin D (ERGOCALCIFEROL) 1.25 MG (63451 UT) CAPS capsule Take 1 capsule by mouth once a week 12 capsule 0 Nose: Nose normal.   Eyes:      General:         Right eye: No discharge. Left eye: No discharge. Conjunctiva/sclera: Conjunctivae normal.      Pupils: Pupils are equal, round, and reactive to light. Neck:      Musculoskeletal: Normal range of motion. Vascular: No JVD. Cardiovascular:      Rate and Rhythm: Normal rate and regular rhythm. Pulmonary:      Effort: Pulmonary effort is normal. No respiratory distress. Musculoskeletal: Normal range of motion. General: No tenderness or deformity. Skin:     General: Skin is warm and dry. Capillary Refill: Capillary refill takes less than 2 seconds. Coloration: Skin is not pale. Findings: No erythema or rash. Neurological:      Mental Status: She is alert and oriented to person, place, and time. Coordination: Coordination normal.   Psychiatric:         Behavior: Behavior normal.         Thought Content:  Thought content normal.         Judgment: Judgment normal.         No results found for: LABA1C    Lab Results   Component Value Date    CHOL 209 (H) 09/03/2020    TRIG 75 09/03/2020    HDL 81 09/03/2020    LDLCALC 113 09/03/2020       The 10-year ASCVD risk score (Virgen Paula et al., 2013) is: 3.5%    Values used to calculate the score:      Age: 54 years      Sex: Female      Is Non- : No      Diabetic: No      Tobacco smoker: Yes      Systolic Blood Pressure: 763 mmHg      Is BP treated: Yes      HDL Cholesterol: 81 mg/dL      Total Cholesterol: 209 mg/dL    Lab Results   Component Value Date     (L) 12/29/2020    K 3.9 12/29/2020    CL 94 (L) 12/29/2020    CO2 20 (L) 12/29/2020    BUN 25 (H) 12/29/2020    CREATININE 1.3 (H) 12/29/2020    GLUCOSE 131 (H) 12/29/2020    CALCIUM 10.4 12/29/2020    PROT 8.5 (H) 12/29/2020    LABALBU 5.1 12/29/2020    BILITOT 0.3 12/29/2020    ALKPHOS 72 12/29/2020    AST 27 12/29/2020    ALT 28 12/29/2020    LABGLOM 42 (A) 12/29/2020 No results found for: Benja Sevillaml    Lab Results   Component Value Date    TSH 0.808 09/03/2020       Lab Results   Component Value Date    WBC 15.2 (H) 12/29/2020    HGB 14.7 12/29/2020    HCT 43.7 12/29/2020    MCV 96.0 12/29/2020     (H) 12/29/2020       No results found for: PSA, PSADIA    Immunization History   Administered Date(s) Administered    Influenza, Quadv, IM, PF (6 mo and older Fluzone, Flulaval, Fluarix, and 3 yrs and older Afluria) 10/05/2020    Pneumococcal Conjugate 13-valent (Martha Denver) 07/21/2020    Tdap (Boostrix, Adacel) 07/21/2020       Health Maintenance   Topic Date Due    Shingles Vaccine (1 of 2) 02/10/2015    Pneumococcal 0-64 years Vaccine (1 of 1 - PPSV23) 09/15/2020    Low dose CT lung screening  08/12/2021    Lipid screen  09/03/2021    Potassium monitoring  12/29/2021    Creatinine monitoring  12/29/2021    Breast cancer screen  09/17/2022    Cervical cancer screen  07/28/2023    Colon cancer screen colonoscopy  05/03/2027    DTaP/Tdap/Td vaccine (2 - Td) 07/21/2030    Flu vaccine  Completed    Hepatitis C screen  Completed    HIV screen  Completed    Hepatitis A vaccine  Aged Out    Hepatitis B vaccine  Aged Out    Hib vaccine  Aged Out    Meningococcal (ACWY) vaccine  Aged Out       Future Appointments   Date Time Provider Jim Song   2/17/2021 10:00 AM SHERYL Guthrie - CNP N [facility]       ASSESSMENT       Diagnosis Orders   1. Chronic radicular lumbar pain  HYDROcodone-acetaminophen (NORCO) 5-325 MG per tablet   2. Post-op pain  diazePAM (VALIUM) 2 MG tablet   3. Need for shingles vaccine  zoster recombinant adjuvanted vaccine [facility]) injection 50 mcg       PLAN      1. Patient will be restarted on Norco and is given 1 weeks worth. 2.  Valium refilled. 3.  If patient continues with unmanageable pain for couple of more months, she will be referred for second opinion. 4.  Patient requests Shingrix vaccination today. 5.  Follow-up in 3 months.   Electronically signed by SHERYL Teague CNP on 1/12/2021 at 1:52 PM

## 2021-01-12 NOTE — PATIENT INSTRUCTIONS
Patient Education        Stopping Smoking: Care Instructions  Your Care Instructions     Cigarette smokers crave the nicotine in cigarettes. Giving it up is much harder than simply changing a habit. Your body has to stop craving the nicotine. It is hard to quit, but you can do it. There are many tools that people use to quit smoking. You may find that combining tools works best for you. There are several steps to quitting. First you get ready to quit. Then you get support to help you. After that, you learn new skills and behaviors to become a nonsmoker. For many people, a necessary step is getting and using medicine. Your doctor will help you set up the plan that best meets your needs. You may want to attend a smoking cessation program to help you quit smoking. When you choose a program, look for one that has proven success. Ask your doctor for ideas. You will greatly increase your chances of success if you take medicine as well as get counseling or join a cessation program.  Some of the changes you feel when you first quit tobacco are uncomfortable. Your body will miss the nicotine at first, and you may feel short-tempered and grumpy. You may have trouble sleeping or concentrating. Medicine can help you deal with these symptoms. You may struggle with changing your smoking habits and rituals. The last step is the tricky one: Be prepared for the smoking urge to continue for a time. This is a lot to deal with, but keep at it. You will feel better. Follow-up care is a key part of your treatment and safety. Be sure to make and go to all appointments, and call your doctor if you are having problems. It's also a good idea to know your test results and keep a list of the medicines you take. How can you care for yourself at home? · Ask your family, friends, and coworkers for support. You have a better chance of quitting if you have help and support. · Join a support group, such as Nicotine Anonymous, for people who are trying to quit smoking. · Consider signing up for a smoking cessation program, such as the American Lung Association's Freedom from Smoking program.  · Get text messaging support. Go to the website at www.smokefree. gov to sign up for the [facility] program.  · Set a quit date. Pick your date carefully so that it is not right in the middle of a big deadline or stressful time. Once you quit, do not even take a puff. Get rid of all ashtrays and lighters after your last cigarette. Clean your house and your clothes so that they do not smell of smoke. · Learn how to be a nonsmoker. Think about ways you can avoid those things that make you reach for a cigarette. ? Avoid situations that put you at greatest risk for smoking. For some people, it is hard to have a drink with friends without smoking. For others, they might skip a coffee break with coworkers who smoke. ? Change your daily routine. Take a different route to work or eat a meal in a different place. · Cut down on stress. Calm yourself or release tension by doing an activity you enjoy, such as reading a book, taking a hot bath, or gardening. · Talk to your doctor or pharmacist about nicotine replacement therapy, which replaces the nicotine in your body. You still get nicotine but you do not use tobacco. Nicotine replacement products help you slowly reduce the amount of nicotine you need. These products come in several forms, many of them available over-the-counter:  ? Nicotine patches  ? Nicotine gum and lozenges  ? Nicotine inhaler  · Ask your doctor about bupropion (Wellbutrin) or varenicline (Chantix), which are prescription medicines. They do not contain nicotine. They help you by reducing withdrawal symptoms, such as stress and anxiety. · Some people find hypnosis, acupuncture, and massage helpful for ending the smoking habit. · Eat a healthy diet and get regular exercise. Having healthy habits will help your body move past its craving for nicotine. · Be prepared to keep trying. Most people are not successful the first few times they try to quit. Do not get mad at yourself if you smoke again. Make a list of things you learned and think about when you want to try again, such as next week, next month, or next year. Where can you learn more? Go to https://WithingspeEZbuildingEHS.Stylyt. org and sign in to your [employer] account. Enter X788 in the [employer] box to learn more about \"Stopping Smoking: Care Instructions. \"     If you do not have an account, please click on the \"Sign Up Now\" link. Current as of: March 12, 2020               Content Version: 12.6  © 6665-5239 [employer]. Care instructions adapted under license by [facility] ([facility]). If you have questions about a medical condition or this instruction, always ask your healthcare professional. [place] [address] any warranty or liability for your use of this information.

## 2021-01-12 NOTE — PROGRESS NOTES
Immunizations Administered     Name Date Dose Route    Zoster Recombinant (Shingrix) 1/12/2021 0.5 mL Intramuscular    Site: Deltoid- Left    Lot: [id number]    NDC: 91220-659-28        ABN waiver scanned to chart.

## 2021-01-12 NOTE — LETTER
[address]  [address]  [facility] [address]  Phone: [phone]  Fax: [phone]    SHERYL Garza CNP        January 12, 2021     Patient: Cammy [place]   YOB: 1965   Date of Visit: 1/12/2021       To Whom it May Concern:    Piyush Ballard was seen in my clinic on 1/12/2021. She should be permitted to work no more than 7 hours in one 24 hour day. Maximum days worked should be no more than 3 days in a row. She should not be permitted to \"run vladislav\". She should not be required to lift more than 15 pounds at one time. These restrictions will be reviewed in one month. If you have any questions or concerns, please don't hesitate to call.     Sincerely,     Electronically signed by SHERYL Garza CNP on 1/12/2021 at 1:44 PM

## 2021-01-18 DIAGNOSIS — G89.29 CHRONIC RADICULAR LUMBAR PAIN: ICD-10-CM

## 2021-01-18 DIAGNOSIS — E78.00 HIGH CHOLESTEROL: ICD-10-CM

## 2021-01-18 DIAGNOSIS — M54.16 CHRONIC RADICULAR LUMBAR PAIN: ICD-10-CM

## 2021-01-18 RX ORDER — HYDROCODONE BITARTRATE AND ACETAMINOPHEN 5; 325 MG/1; MG/1
1 TABLET ORAL EVERY 8 HOURS PRN
Qty: 21 TABLET | Refills: 0 | Status: CANCELLED | OUTPATIENT
Start: 2021-01-18 | End: 2021-01-25

## 2021-01-19 DIAGNOSIS — M54.16 CHRONIC RADICULAR LUMBAR PAIN: ICD-10-CM

## 2021-01-19 DIAGNOSIS — G89.29 CHRONIC RADICULAR LUMBAR PAIN: ICD-10-CM

## 2021-01-19 RX ORDER — HYDROCODONE BITARTRATE AND ACETAMINOPHEN 5; 325 MG/1; MG/1
1 TABLET ORAL EVERY 8 HOURS PRN
Qty: 90 TABLET | Refills: 0 | Status: SHIPPED | OUTPATIENT
Start: 2021-01-19 | End: 2021-02-16 | Stop reason: SDUPTHER

## 2021-01-19 RX ORDER — ATORVASTATIN CALCIUM 20 MG/1
20 TABLET, FILM COATED ORAL DAILY
Qty: 90 TABLET | Refills: 3 | OUTPATIENT
Start: 2021-01-19

## 2021-02-04 ENCOUNTER — OFFICE VISIT (OUTPATIENT)
Dept: FAMILY MEDICINE CLINIC | Age: 56
End: 2021-02-04
Payer: COMMERCIAL

## 2021-02-04 VITALS
RESPIRATION RATE: 18 BRPM | TEMPERATURE: 97.9 F | WEIGHT: 120 LBS | HEART RATE: 118 BPM | OXYGEN SATURATION: 97 % | BODY MASS INDEX: 23.44 KG/M2 | DIASTOLIC BLOOD PRESSURE: 50 MMHG | SYSTOLIC BLOOD PRESSURE: 110 MMHG

## 2021-02-04 DIAGNOSIS — M54.16 CHRONIC RADICULAR LUMBAR PAIN: Primary | ICD-10-CM

## 2021-02-04 DIAGNOSIS — G89.29 CHRONIC RADICULAR LUMBAR PAIN: Primary | ICD-10-CM

## 2021-02-04 PROCEDURE — 99214 OFFICE O/P EST MOD 30 MIN: CPT | Performed by: NURSE PRACTITIONER

## 2021-02-04 RX ORDER — CARISOPRODOL 350 MG/1
350 TABLET ORAL 3 TIMES DAILY PRN
Qty: 63 TABLET | Refills: 0 | Status: SHIPPED | OUTPATIENT
Start: 2021-02-04 | End: 2021-02-25

## 2021-02-04 ASSESSMENT — ENCOUNTER SYMPTOMS
COUGH: 0
SHORTNESS OF BREATH: 0
VOMITING: 0
WHEEZING: 0
CONSTIPATION: 0
TROUBLE SWALLOWING: 0
EYE DISCHARGE: 0
EYE PAIN: 0
SORE THROAT: 0
EYE REDNESS: 0
RHINORRHEA: 0
ALLERGIC/IMMUNOLOGIC NEGATIVE: 1
DIARRHEA: 0
ABDOMINAL PAIN: 0
BACK PAIN: 1
NAUSEA: 0

## 2021-02-04 NOTE — PROGRESS NOTES
[place] [address]  [facility] [address]  Dept: [phone]  Dept Fax: [phone]  Loc: [phone]     Visit Date:  2/4/2021      Patient:  Christiano Jules  YOB: 1965    HPI:     Chief Complaint   Patient presents with    Pain     left leg pain and lower back pain, needs intermittent FMLA. Patient is following up about her ongoing postoperative lumbar problems. She had surgery last October in 2020 and ever since then she has actually been worse with increased pain in her lumbar area, coccyx and radiation of pain and numbness down to her left leg and foot. She states her left foot feels like it is on fire and burning all of the time. States it is making it almost impossible for her to work and we have been giving her allowances, treating her with Norco.      Medications    Current Outpatient Medications:     carisoprodol (SOMA) 350 MG tablet, Take 1 tablet by mouth 3 times daily as needed for Muscle spasms for up to 21 days. , Disp: 63 tablet, Rfl: 0    HYDROcodone-acetaminophen (NORCO) 5-325 MG per tablet, Take 1 tablet by mouth every 8 hours as needed for Pain for up to 30 days. , Disp: 90 tablet, Rfl: 0    cyclobenzaprine (FLEXERIL) 10 MG tablet, Take 1 tablet by mouth 3 times daily as needed for Muscle spasms, Disp: 60 tablet, Rfl: 0    atorvastatin (LIPITOR) 20 MG tablet, Take 1 tablet by mouth daily, Disp: 90 tablet, Rfl: 3    ibuprofen (ADVIL;MOTRIN) 800 MG tablet, TAKE 1 TABLET BY MOUTH THREE TIMES A DAY FOR 30 DAYS (Patient taking differently: Take 800 mg by mouth 2 times daily as needed ), Disp: 90 tablet, Rfl: 3    diazePAM (VALIUM) 2 MG tablet, Take 1 tablet by mouth every 12 hours as needed for Anxiety for up to 30 days. , Disp: 60 tablet, Rfl: 0    famotidine (PEPCID) 20 MG tablet, Take 1 tablet by mouth 2 times daily, Disp: 60 tablet, Rfl: 3    vitamin D (ERGOCALCIFEROL) 1.25 MG (59153 UT) CAPS capsule, Take 1 capsule by mouth once a week, Disp: 12 capsule, Rfl: 0    amLODIPine (NORVASC) 10 MG tablet, TAKE 1 TABLET BY MOUTH ONE TIME A DAY , Disp: 90 tablet, Rfl: 3    lisinopril (PRINIVIL;ZESTRIL) 10 MG tablet, Take 10 mg by mouth 2 times daily , Disp: , Rfl:     acetaminophen (TYLENOL) 500 MG tablet, Take 500 mg by mouth every 6 hours as needed for Pain, Disp: , Rfl:     aspirin 81 MG EC tablet, Take 81 mg by mouth daily, Disp: , Rfl:     The patient is allergic to bactrim [sulfamethoxazole-trimethoprim]; codeine; medrol [methylprednisolone]; and ultram [tramadol hcl]. Past Medical History  Leobardo Garcia  has a past medical history of Hypertension. Subjective:      Review of Systems   Constitutional: Negative for activity change, fatigue and fever. HENT: Negative for congestion, ear pain, rhinorrhea, sore throat and trouble swallowing. Eyes: Negative for pain, discharge and redness. Respiratory: Negative for cough, shortness of breath and wheezing. Cardiovascular: Negative. Gastrointestinal: Negative for abdominal pain, constipation, diarrhea, nausea and vomiting. Endocrine: Negative. Genitourinary: Negative for dysuria, frequency and urgency. Musculoskeletal: Positive for back pain and myalgias. Negative for arthralgias. Skin: Negative for rash. Allergic/Immunologic: Negative. Neurological: Negative for dizziness, tremors, weakness and headaches. Hematological: Negative. Psychiatric/Behavioral: Negative for dysphoric mood and sleep disturbance. The patient is not nervous/anxious. Objective:     BP (!) 110/50   Pulse 118   Temp 97.9 °F (36.6 °C) (Temporal)   Resp 18   Wt 120 lb (54.4 kg)   SpO2 97%   BMI 23.44 kg/m²     Physical Exam  Constitutional:       General: She is not in acute distress. Appearance: She is well-developed. She is not diaphoretic.    HENT:      Right Ear: External ear normal.      Left Ear: External ear normal.      Nose: Nose normal. Eyes:      General:         Right eye: No discharge. Left eye: No discharge. Conjunctiva/sclera: Conjunctivae normal.      Pupils: Pupils are equal, round, and reactive to light. Neck:      Musculoskeletal: Normal range of motion. Vascular: No JVD. Cardiovascular:      Rate and Rhythm: Normal rate and regular rhythm. Pulmonary:      Effort: Pulmonary effort is normal. No respiratory distress. Musculoskeletal: Normal range of motion. General: Tenderness present. No deformity or signs of injury. Skin:     General: Skin is warm and dry. Capillary Refill: Capillary refill takes less than 2 seconds. Coloration: Skin is not pale. Findings: No erythema or rash. Neurological:      Mental Status: She is alert and oriented to person, place, and time. Coordination: Coordination normal.   Psychiatric:         Behavior: Behavior normal.         Thought Content: Thought content normal.         Judgment: Judgment normal.         Assessment/Plan:      Lonny Quiñones was seen today for pain. Patient is of course quite frustrated with what has become an extremely long process of chronic back issues, then arranging for surgery, and now doing worse with her outcome. She is working part-time at her job but is having a lot of loss of confidence in her left leg when she stands. Patient agrees to a second opinion referral from a different physician in a different system. Also agrees to being sent to pain management and I am also giving her Aidafreeman Bermeo today which is worked well for her in the past.    Diagnoses and all orders for this visit:    Chronic radicular lumbar pain  -     External Referral To Orthopedic Surgery  -     carisoprodol (SOMA) 350 MG tablet; Take 1 tablet by mouth 3 times daily as needed for Muscle spasms for up to 21 days. Return if symptoms worsen or fail to improve. Patient instructions given andreviewed.         Electronically signed by Spero Mcburney, APRN

## 2021-02-04 NOTE — PROGRESS NOTES
[address] [place] [address]  [facility] [address]  Dept: [phone]  Dept Fax: [phone]  Loc: [phone]     Visit Date:  2/4/2021      Patient:  Thad Rojas  YOB: 1965    HPI:     Chief Complaint   Patient presents with    Pain     left leg pain and lower back pain, needs intermittent FMLA. HPI    Medications    Current Outpatient Medications:     HYDROcodone-acetaminophen (NORCO) 5-325 MG per tablet, Take 1 tablet by mouth every 8 hours as needed for Pain for up to 30 days. , Disp: 90 tablet, Rfl: 0    cyclobenzaprine (FLEXERIL) 10 MG tablet, Take 1 tablet by mouth 3 times daily as needed for Muscle spasms, Disp: 60 tablet, Rfl: 0    atorvastatin (LIPITOR) 20 MG tablet, Take 1 tablet by mouth daily, Disp: 90 tablet, Rfl: 3    ibuprofen (ADVIL;MOTRIN) 800 MG tablet, TAKE 1 TABLET BY MOUTH THREE TIMES A DAY FOR 30 DAYS (Patient taking differently: Take 800 mg by mouth 2 times daily as needed ), Disp: 90 tablet, Rfl: 3    diazePAM (VALIUM) 2 MG tablet, Take 1 tablet by mouth every 12 hours as needed for Anxiety for up to 30 days. , Disp: 60 tablet, Rfl: 0    famotidine (PEPCID) 20 MG tablet, Take 1 tablet by mouth 2 times daily, Disp: 60 tablet, Rfl: 3    vitamin D (ERGOCALCIFEROL) 1.25 MG (38368 UT) CAPS capsule, Take 1 capsule by mouth once a week, Disp: 12 capsule, Rfl: 0    amLODIPine (NORVASC) 10 MG tablet, TAKE 1 TABLET BY MOUTH ONE TIME A DAY , Disp: 90 tablet, Rfl: 3    lisinopril (PRINIVIL;ZESTRIL) 10 MG tablet, Take 10 mg by mouth 2 times daily , Disp: , Rfl:     acetaminophen (TYLENOL) 500 MG tablet, Take 500 mg by mouth every 6 hours as needed for Pain, Disp: , Rfl:     aspirin 81 MG EC tablet, Take 81 mg by mouth daily, Disp: , Rfl:     The patient is allergic to bactrim [sulfamethoxazole-trimethoprim]; codeine; medrol [methylprednisolone]; and ultram [tramadol hcl].     Past Medical History  Zain Maciel  has a past medical history of Hypertension. Subjective:      Review of Systems    Objective:     BP (!) 110/50   Pulse 118   Temp 97.9 °F (36.6 °C) (Temporal)   Resp 18   Wt 120 lb (54.4 kg)   SpO2 97%   BMI 23.44 kg/m²     Physical Exam    Assessment/Plan:      There are no diagnoses linked to this encounter. No follow-ups on file. Patient instructions given andreviewed.         Electronically signed by SHERYL Santana CNP on 2/4/2021 at 2:04 PM

## 2021-02-10 ENCOUNTER — PATIENT MESSAGE (OUTPATIENT)
Dept: FAMILY MEDICINE CLINIC | Age: 56
End: 2021-02-10

## 2021-02-10 NOTE — LETTER
[address]  [address]  [facility] [address]  Phone: [phone]  Fax: [phone]    SHERYL Garza CNP        February 10, 2021     Patient: Cammy [place]   YOB: 1965   Date of Visit: 2/10/2021       To Whom it May Concern: It is my medical opinion that Sheyla Mcdaniel should remain out of work until 2/22/21. If you have any questions or concerns, please don't hesitate to call.     Sincerely,         SHERYL Garza CNP

## 2021-02-10 NOTE — TELEPHONE ENCOUNTER
From: Nayana Akbar  To: Harsha Howard, APRN - CNP  Sent: 2/10/2021 9:40 AM EST  Subject: Visit Follow-Up Question    Good morning Andrew. I have been down since late Sunday early Monday morning. I had to call off Mon., Tues. and today. I would like to know if you can give me the rest of this week and next week off. I can come in for appt. if needed or just come and  the letter. I have not been contacted by a pain management clinic yet and I am researching some doctors for my second opinion. I don't know if I am eligible for STD again or if it will just be FMLA but I would appreciate it very much if you could help me with this. Thanks so much for your time and assistance! I can be contacted at [phone] all day.

## 2021-02-15 ENCOUNTER — APPOINTMENT (OUTPATIENT)
Dept: INTERVENTIONAL RADIOLOGY/VASCULAR | Age: 56
End: 2021-02-15
Payer: COMMERCIAL

## 2021-02-15 ENCOUNTER — APPOINTMENT (OUTPATIENT)
Dept: CT IMAGING | Age: 56
End: 2021-02-15
Payer: COMMERCIAL

## 2021-02-15 ENCOUNTER — HOSPITAL ENCOUNTER (EMERGENCY)
Age: 56
Discharge: HOME | End: 2021-02-15
Attending: EMERGENCY MEDICINE
Payer: COMMERCIAL

## 2021-02-15 VITALS
HEIGHT: 60 IN | WEIGHT: 120 LBS | OXYGEN SATURATION: 99 % | HEART RATE: 96 BPM | RESPIRATION RATE: 18 BRPM | BODY MASS INDEX: 23.56 KG/M2 | DIASTOLIC BLOOD PRESSURE: 76 MMHG | TEMPERATURE: 97.7 F | SYSTOLIC BLOOD PRESSURE: 138 MMHG

## 2021-02-15 DIAGNOSIS — I73.9 PERIPHERAL VASCULAR DISEASE (HCC): Primary | ICD-10-CM

## 2021-02-15 DIAGNOSIS — M79.605 LEFT LEG PAIN: ICD-10-CM

## 2021-02-15 DIAGNOSIS — Z72.0 TOBACCO USE: ICD-10-CM

## 2021-02-15 LAB
ALBUMIN SERPL-MCNC: 4.4 G/DL (ref 3.5–5.1)
ALP BLD-CCNC: 59 U/L (ref 38–126)
ALT SERPL-CCNC: 27 U/L (ref 11–66)
ANION GAP SERPL CALCULATED.3IONS-SCNC: 9 MEQ/L (ref 8–16)
APTT: 30 SECONDS (ref 22–38)
AST SERPL-CCNC: 29 U/L (ref 5–40)
BASOPHILS # BLD: 0.3 %
BASOPHILS ABSOLUTE: 0 THOU/MM3 (ref 0–0.1)
BILIRUB SERPL-MCNC: 0.2 MG/DL (ref 0.3–1.2)
BUN BLDV-MCNC: 6 MG/DL (ref 7–22)
C-REACTIVE PROTEIN: < 0.3 MG/DL (ref 0–1)
CALCIUM SERPL-MCNC: 9.5 MG/DL (ref 8.5–10.5)
CHLORIDE BLD-SCNC: 105 MEQ/L (ref 98–111)
CO2: 21 MEQ/L (ref 23–33)
CREAT SERPL-MCNC: 0.4 MG/DL (ref 0.4–1.2)
EOSINOPHIL # BLD: 1.2 %
EOSINOPHILS ABSOLUTE: 0.1 THOU/MM3 (ref 0–0.4)
ERYTHROCYTE [DISTWIDTH] IN BLOOD BY AUTOMATED COUNT: 13.2 % (ref 11.5–14.5)
ERYTHROCYTE [DISTWIDTH] IN BLOOD BY AUTOMATED COUNT: 49.1 FL (ref 35–45)
GFR SERPL CREATININE-BSD FRML MDRD: > 90 ML/MIN/1.73M2
GLUCOSE BLD-MCNC: 96 MG/DL (ref 70–108)
HCT VFR BLD CALC: 37.9 % (ref 37–47)
HEMOGLOBIN: 12.7 GM/DL (ref 12–16)
IMMATURE GRANS (ABS): 0.05 THOU/MM3 (ref 0–0.07)
IMMATURE GRANULOCYTES: 0.4 %
INR BLD: 0.91 (ref 0.85–1.13)
LYMPHOCYTES # BLD: 23 %
LYMPHOCYTES ABSOLUTE: 2.7 THOU/MM3 (ref 1–4.8)
MCH RBC QN AUTO: 33.7 PG (ref 26–33)
MCHC RBC AUTO-ENTMCNC: 33.5 GM/DL (ref 32.2–35.5)
MCV RBC AUTO: 100.5 FL (ref 81–99)
MONOCYTES # BLD: 7.4 %
MONOCYTES ABSOLUTE: 0.9 THOU/MM3 (ref 0.4–1.3)
NUCLEATED RED BLOOD CELLS: 0 /100 WBC
OSMOLALITY CALCULATION: 267.6 MOSMOL/KG (ref 275–300)
PLATELET # BLD: 356 THOU/MM3 (ref 130–400)
PMV BLD AUTO: 9.8 FL (ref 9.4–12.4)
POTASSIUM REFLEX MAGNESIUM: 4.3 MEQ/L (ref 3.5–5.2)
RBC # BLD: 3.77 MILL/MM3 (ref 4.2–5.4)
SEDIMENTATION RATE, ERYTHROCYTE: 7 MM/HR (ref 0–20)
SEG NEUTROPHILS: 67.7 %
SEGMENTED NEUTROPHILS ABSOLUTE COUNT: 7.9 THOU/MM3 (ref 1.8–7.7)
SODIUM BLD-SCNC: 135 MEQ/L (ref 135–145)
TOTAL PROTEIN: 7.2 G/DL (ref 6.1–8)
URIC ACID: 4.9 MG/DL (ref 2.4–5.7)
WBC # BLD: 11.6 THOU/MM3 (ref 4.8–10.8)

## 2021-02-15 PROCEDURE — 85610 PROTHROMBIN TIME: CPT

## 2021-02-15 PROCEDURE — 85651 RBC SED RATE NONAUTOMATED: CPT

## 2021-02-15 PROCEDURE — 36415 COLL VENOUS BLD VENIPUNCTURE: CPT

## 2021-02-15 PROCEDURE — 80053 COMPREHEN METABOLIC PANEL: CPT

## 2021-02-15 PROCEDURE — 85025 COMPLETE CBC W/AUTO DIFF WBC: CPT

## 2021-02-15 PROCEDURE — 75635 CT ANGIO ABDOMINAL ARTERIES: CPT

## 2021-02-15 PROCEDURE — 99283 EMERGENCY DEPT VISIT LOW MDM: CPT

## 2021-02-15 PROCEDURE — 84550 ASSAY OF BLOOD/URIC ACID: CPT

## 2021-02-15 PROCEDURE — 93926 LOWER EXTREMITY STUDY: CPT

## 2021-02-15 PROCEDURE — 6360000004 HC RX CONTRAST MEDICATION: Performed by: EMERGENCY MEDICINE

## 2021-02-15 PROCEDURE — 6370000000 HC RX 637 (ALT 250 FOR IP): Performed by: PHYSICIAN ASSISTANT

## 2021-02-15 PROCEDURE — 86140 C-REACTIVE PROTEIN: CPT

## 2021-02-15 PROCEDURE — 85730 THROMBOPLASTIN TIME PARTIAL: CPT

## 2021-02-15 RX ORDER — ONDANSETRON 4 MG/1
4 TABLET, ORALLY DISINTEGRATING ORAL ONCE
Status: COMPLETED | OUTPATIENT
Start: 2021-02-15 | End: 2021-02-15

## 2021-02-15 RX ORDER — CYCLOBENZAPRINE HCL 10 MG
10 TABLET ORAL ONCE
Status: DISCONTINUED | OUTPATIENT
Start: 2021-02-15 | End: 2021-02-15

## 2021-02-15 RX ORDER — TIZANIDINE 4 MG/1
4 TABLET ORAL ONCE
Status: COMPLETED | OUTPATIENT
Start: 2021-02-15 | End: 2021-02-15

## 2021-02-15 RX ORDER — ONDANSETRON 4 MG/1
TABLET, ORALLY DISINTEGRATING ORAL
Status: DISCONTINUED
Start: 2021-02-15 | End: 2021-02-15 | Stop reason: HOSPADM

## 2021-02-15 RX ORDER — ONDANSETRON 2 MG/ML
4 INJECTION INTRAMUSCULAR; INTRAVENOUS ONCE
Status: DISCONTINUED | OUTPATIENT
Start: 2021-02-15 | End: 2021-02-15 | Stop reason: HOSPADM

## 2021-02-15 RX ORDER — ONDANSETRON 4 MG/1
4 TABLET, ORALLY DISINTEGRATING ORAL EVERY 8 HOURS PRN
Qty: 20 TABLET | Refills: 0 | Status: SHIPPED | OUTPATIENT
Start: 2021-02-15

## 2021-02-15 RX ORDER — CLOPIDOGREL BISULFATE 75 MG/1
75 TABLET ORAL DAILY
Qty: 30 TABLET | Refills: 0 | Status: ON HOLD | OUTPATIENT
Start: 2021-02-15 | End: 2021-03-08 | Stop reason: HOSPADM

## 2021-02-15 RX ORDER — OXYCODONE HYDROCHLORIDE AND ACETAMINOPHEN 5; 325 MG/1; MG/1
1 TABLET ORAL ONCE
Status: COMPLETED | OUTPATIENT
Start: 2021-02-15 | End: 2021-02-15

## 2021-02-15 RX ORDER — 0.9 % SODIUM CHLORIDE 0.9 %
500 INTRAVENOUS SOLUTION INTRAVENOUS ONCE
Status: DISCONTINUED | OUTPATIENT
Start: 2021-02-15 | End: 2021-02-15 | Stop reason: HOSPADM

## 2021-02-15 RX ADMIN — IOPAMIDOL 100 ML: 755 INJECTION, SOLUTION INTRAVENOUS at 17:59

## 2021-02-15 RX ADMIN — OXYCODONE HYDROCHLORIDE AND ACETAMINOPHEN 1 TABLET: 5; 325 TABLET ORAL at 16:51

## 2021-02-15 RX ADMIN — TIZANIDINE 4 MG: 4 TABLET ORAL at 19:49

## 2021-02-15 RX ADMIN — ONDANSETRON 4 MG: 4 TABLET, ORALLY DISINTEGRATING ORAL at 19:49

## 2021-02-15 ASSESSMENT — ENCOUNTER SYMPTOMS
COLOR CHANGE: 1
BACK PAIN: 1

## 2021-02-15 ASSESSMENT — PAIN DESCRIPTION - LOCATION: LOCATION: FOOT

## 2021-02-15 NOTE — ED PROVIDER NOTES
[facility]  eMERGENCY dEPARTMENT eNCOUnter          [address] [facility]       Chief Complaint   Patient presents with    Foot Pain     left    Numbness       Nurses Notes reviewed and I agree except as noted inthe HPI. HISTORY OF PRESENT ILLNESS    Rosalee Rodriguez is a 64 y.o. female who presents to the Emergency Department for the evaluation of left foot problem. Patient reports that she had back surgery for herniated disc in the fall 2020. However, she has had worsening pain in her left leg since the surgery and has been having recurrent problems for which she has followed with her PCP. Reports she returned to work in December and symptoms have been significantly worse since that time. She has been off of work last week and this week due to the pain. 1.5 days ago, she began noticing some redness in the left foot and ankle without any known injury. She states it has persisted with some mild edema and after consulting with multiple family members, they had concern about her symptoms and her PCP recommended she come to the ED for evaluation. Patient states that since her surgery she has continued to have pain in the left leg as well as numbness primarily in the foot, with the patient reporting it feels as though it is constantly asleep. She is also having increasing difficulty moving the left foot and ankle associated with this. The symptoms have been ongoing for the past 2 months, gradually worsening. She is now having to use a walker to get in and out of work at [employer]. States she cannot walk even 1 block anymore due to the severity of pain and weakness in her left leg. She states she has had follow-up MRI of her back after the surgery which did not give an explanation for her pain and she is in the process of being referred to pain management. She has not had any vascular studies performed in the past.  She denies any known coronary artery disease history.   She does have history of hypertension, hyperlipidemia and is a 1 pack/day smoker. The HPI was provided by the patient. REVIEW OF SYSTEMS     Review of Systems   Constitutional: Negative for chills and fever. Musculoskeletal: Positive for arthralgias, back pain and gait problem. Skin: Positive for color change. Neurological: Positive for weakness and numbness. All other systems reviewed and are negative. PAST MEDICAL HISTORY    has a past medical history of Hypertension. SURGICAL HISTORY      has a past surgical history that includes  section; Foot surgery; Dilation and curettage of uterus (); knee surgery (Left); and lumbar discectomy (2020). CURRENT MEDICATIONS       Discharge Medication List as of 2/15/2021  7:39 PM      CONTINUE these medications which have NOT CHANGED    Details   carisoprodol (SOMA) 350 MG tablet Take 1 tablet by mouth 3 times daily as needed for Muscle spasms for up to 21 days. , Disp-63 tablet, R-0Normal      HYDROcodone-acetaminophen (NORCO) 5-325 MG per tablet Take 1 tablet by mouth every 8 hours as needed for Pain for up to 30 days. , Disp-90 tablet, R-0Normal      cyclobenzaprine (FLEXERIL) 10 MG tablet Take 1 tablet by mouth 3 times daily as needed for Muscle spasms, Disp-60 tablet, R-0Normal      atorvastatin (LIPITOR) 20 MG tablet Take 1 tablet by mouth daily, Disp-90 tablet, R-3Normal      ibuprofen (ADVIL;MOTRIN) 800 MG tablet TAKE 1 TABLET BY MOUTH THREE TIMES A DAY FOR 30 DAYS, Disp-90 tablet, R-3Normal      famotidine (PEPCID) 20 MG tablet Take 1 tablet by mouth 2 times daily, Disp-60 tablet, R-3Normal      vitamin D (ERGOCALCIFEROL) 1.25 MG (01565 UT) CAPS capsule Take 1 capsule by mouth once a week, Disp-12 capsule, R-0Normal      amLODIPine (NORVASC) 10 MG tablet TAKE 1 TABLET BY MOUTH ONE TIME A DAY , Disp-90 tablet,R-3Normal      lisinopril (PRINIVIL;ZESTRIL) 10 MG tablet Take 10 mg by mouth 2 times daily Historical Med      acetaminophen (TYLENOL) 500 MG tablet Take 500 mg by mouth every 6 hours as needed for PainHistorical Med      aspirin 81 MG EC tablet Take 81 mg by mouth dailyHistorical Med             ALLERGIES     is allergic to bactrim [sulfamethoxazole-trimethoprim]; codeine; medrol [methylprednisolone]; and ultram [tramadol hcl]. FAMILY HISTORY     She indicated that her mother is . She indicated that her father is . She indicated that the status of her maternal aunt is unknown. She indicated that the status of her maternal cousin is unknown.   family history includes Alcohol Abuse in her father; Coronary Art Dis in her brother; Diabetes in her maternal aunt and mother; Heart Disease in her brother, brother, and mother; Parkinsonism in her maternal cousin. SOCIAL HISTORY      reports that she has been smoking cigarettes. She has a 35.00 pack-year smoking history. She has never used smokeless tobacco. She reports current alcohol use. She reports current drug use. Frequency: 2.00 times per week. Drug: Marijuana. PHYSICAL EXAM     INITIAL VITALS:  height is 5' (1.524 m) and weight is 120 lb (54.4 kg). Her oral temperature is 97.7 °F (36.5 °C). Her blood pressure is 138/76 and her pulse is 96. Her respiration is 18 and oxygen saturation is 99%. Physical Exam  Vitals signs and nursing note reviewed. Constitutional:       Appearance: Normal appearance. HENT:      Head: Normocephalic and atraumatic. Eyes:      Conjunctiva/sclera: Conjunctivae normal.   Neck:      Musculoskeletal: Normal range of motion. Cardiovascular:      Rate and Rhythm: Normal rate. Pulses:           Dorsalis pedis pulses are 1+ on the right side and 1+ on the left side. Pulmonary:      Effort: Pulmonary effort is normal. No respiratory distress. Musculoskeletal:      Left upper leg: Normal.      Left lower leg: Normal.      Left foot: No tenderness, bony tenderness or deformity. Comments:  Toes are cool bilaterally without **This report has been created using voice recognition software. It may contain minor errors which are inherent in voice recognition technology. **      Final report electronically signed by Dr. Pancho Amezquita on 2/15/2021 1:52 PM          LABS:      Labs Reviewed   CBC WITH AUTO DIFFERENTIAL - Abnormal; Notable for the following components:       Result Value    WBC 11.6 (*)     RBC 3.77 (*)     .5 (*)     MCH 33.7 (*)     RDW-SD 49.1 (*)     Segs Absolute 7.9 (*)     All other components within normal limits   COMPREHENSIVE METABOLIC PANEL W/ REFLEX TO MG FOR LOW K - Abnormal; Notable for the following components:    BUN 6 (*)     CO2 21 (*)     Total Bilirubin 0.2 (*)     All other components within normal limits   OSMOLALITY - Abnormal; Notable for the following components:    Osmolality Calc 267.6 (*)     All other components within normal limits   C-REACTIVE PROTEIN   SEDIMENTATION RATE   URIC ACID   PROTIME-INR   APTT   ANION GAP   GLOMERULAR FILTRATION RATE, ESTIMATED       EMERGENCY DEPARTMENT COURSE:   Vitals:    Vitals:    02/15/21 1144 02/15/21 1651   BP: 138/76    Pulse: 111 96   Resp: 18 18   Temp: 97.7 °F (36.5 °C)    TempSrc: Oral    SpO2: 99% 99%   Weight: 120 lb (54.4 kg)    Height: 5' (1.524 m)       3:41 PM EST: The patient was seen and evaluated. Patient presents for complaints of some ongoing left leg pain that is been present for several months now and progressively worsening with new color change over the past 36 hours. She arrives with minimally palpable pulses in the bilateral lower extremities. They are somewhat cool bilaterally without any evidence of cyanosis. Borderline delayed capillary refill in the left lower extremity. Laboratory results reveal 11,000 white blood cell count and otherwise reassuring results.   Arterial ultrasound shows severe peripheral vascular disease throughout the left lower extremity with diminished but pulsatile blood flow throughout the left lower extremity and no evidence of occlusion. Results were discussed with Dr. Obdulia Sotomayor, vascular surgery who recommends CTA with runoff. This was obtained and shows atherosclerotic calcification of the abdominal aorta and bilateral common iliac arteries and superficial femoral and popliteal arteries. There is relatively normal flow in the abdominal aorta, iliac arteries, common and superficial femoral arteries, popliteal arteries, anterior and posterior tibial arteries bilaterally and attenuated flow in the peroneal trunks bilaterally. These results were discussed with attending provider as well as vascular surgery. We are agreeable with discharge home and outpatient follow-up with the vascular office at this time. Per vascular surgery recommendation, will initiate Plavix treatment during this time as well. Patient will be given Zofran for home nausea control as well. She is receiving pain medication and muscle relaxers through her PCP and may continue to do so. Importance of smoking cessation was discussed extensively with the patient who verbalized understanding. Return precautions were discussed as well and she was agreeable with this, denying further needs upon discharge. CRITICAL CARE:   None    CONSULTS:  Vascular surgery    PROCEDURES:  None    FINAL IMPRESSION      1. Peripheral vascular disease ([address]    2. Left leg pain    3. Tobacco use          DISPOSITION/PLAN   Discharge    PATIENT REFERRED TO:  Claudia Funk, APRN - CNP  [address], [place] [facility] [address] [facility]  [phone]    Call in 1 day  to discuss your pain management    Shayla Lipscomb MD  [address]  [phone]    Call in 1 day  to schedule follow-up for Monday, as recommended by Dr. Boyd [facility] [phone]    If symptoms worsen      DISCHARGEMEDICATIONS:  Discharge Medication List as of 2/15/2021  7:39 PM      START taking these medications    Details clopidogrel (PLAVIX) 75 MG tablet Take 1 tablet by mouth daily, Disp-30 tablet, R-0Normal      ondansetron (ZOFRAN ODT) 4 MG disintegrating tablet Take 1 tablet by mouth every 8 hours as needed for Nausea or Vomiting, Disp-20 tablet, R-0Normal             (Please note that portions of this note were completedwith a voice recognition program.  Efforts were made to edit the dictations but occasionally words are mis-transcribed.)        Sheila Mcnulty PA-C  02/15/21 [id number]

## 2021-02-15 NOTE — ED NOTES
Patient presents to the ED with complaints of left foot red,swollen, painful and numb. She states that she has been having problems with her left leg and foot every since having surgery on her sciatica in September 2020. She has no other complaints at this time.       Gi Harrell LPN  53/25/95 5688

## 2021-02-16 ENCOUNTER — PATIENT MESSAGE (OUTPATIENT)
Dept: FAMILY MEDICINE CLINIC | Age: 56
End: 2021-02-16

## 2021-02-16 DIAGNOSIS — G89.29 CHRONIC RADICULAR LUMBAR PAIN: ICD-10-CM

## 2021-02-16 DIAGNOSIS — G89.29 CHRONIC RADICULAR LUMBAR PAIN: Primary | ICD-10-CM

## 2021-02-16 DIAGNOSIS — M54.16 CHRONIC RADICULAR LUMBAR PAIN: Primary | ICD-10-CM

## 2021-02-16 DIAGNOSIS — I73.9 ARTERIAL INSUFFICIENCY OF LOWER EXTREMITY (HCC): ICD-10-CM

## 2021-02-16 DIAGNOSIS — M54.16 CHRONIC RADICULAR LUMBAR PAIN: ICD-10-CM

## 2021-02-16 RX ORDER — HYDROCODONE BITARTRATE AND ACETAMINOPHEN 5; 325 MG/1; MG/1
1 TABLET ORAL EVERY 8 HOURS PRN
Qty: 90 TABLET | Refills: 0 | Status: SHIPPED | OUTPATIENT
Start: 2021-02-16 | End: 2021-03-11 | Stop reason: SDUPTHER

## 2021-02-16 RX ORDER — HYDROCODONE BITARTRATE AND ACETAMINOPHEN 5; 325 MG/1; MG/1
1 TABLET ORAL EVERY 8 HOURS PRN
Qty: 90 TABLET | Refills: 0 | Status: CANCELLED | OUTPATIENT
Start: 2021-02-16 | End: 2021-03-18

## 2021-02-16 NOTE — TELEPHONE ENCOUNTER
From: Charlene Lebron  To: Chen Talbot, APRN - CNP  Sent: 2/16/2021 11:01 AM EST  Subject: Visit Follow-Up Question    Would you like to see me before the appt. with Dr. Valeri Flores or after? My appt. with him is at 9:30 am Mon. which is my return to work date. Besides the nerve pain this made a lot of sense to me about the symptoms I have been having even before the surgery. I will also need my second dose of Shingles vaccine and have a serious talk about quitting smoking. Thank you, look forward to seeing you and discussing all of this.

## 2021-02-17 ENCOUNTER — OFFICE VISIT (OUTPATIENT)
Dept: RHEUMATOLOGY | Age: 56
End: 2021-02-17
Payer: COMMERCIAL

## 2021-02-17 ENCOUNTER — NURSE ONLY (OUTPATIENT)
Dept: LAB | Age: 56
End: 2021-02-17

## 2021-02-17 VITALS
DIASTOLIC BLOOD PRESSURE: 58 MMHG | HEIGHT: 60 IN | TEMPERATURE: 96.7 F | SYSTOLIC BLOOD PRESSURE: 112 MMHG | BODY MASS INDEX: 23.48 KG/M2 | HEART RATE: 78 BPM | OXYGEN SATURATION: 92 % | WEIGHT: 119.6 LBS

## 2021-02-17 DIAGNOSIS — E55.9 VITAMIN D DEFICIENCY: ICD-10-CM

## 2021-02-17 DIAGNOSIS — M85.80 OSTEOPENIA, UNSPECIFIED LOCATION: Primary | ICD-10-CM

## 2021-02-17 LAB — VITAMIN D 25-HYDROXY: 25 NG/ML (ref 30–100)

## 2021-02-17 PROCEDURE — 99213 OFFICE O/P EST LOW 20 MIN: CPT | Performed by: NURSE PRACTITIONER

## 2021-02-17 RX ORDER — ALENDRONATE SODIUM 70 MG/1
70 TABLET ORAL
Qty: 12 TABLET | Refills: 3 | Status: SHIPPED | OUTPATIENT
Start: 2021-02-17 | End: 2022-02-17 | Stop reason: SDUPTHER

## 2021-02-17 ASSESSMENT — ENCOUNTER SYMPTOMS
NAUSEA: 0
SHORTNESS OF BREATH: 0
EYE PAIN: 0
DIARRHEA: 0
COUGH: 0
BACK PAIN: 1
TROUBLE SWALLOWING: 0
CONSTIPATION: 0
EYE ITCHING: 0
ABDOMINAL PAIN: 0

## 2021-02-17 NOTE — PROGRESS NOTES
avs   Substance and Sexual Activity    Alcohol use: Yes     Frequency: 2-4 times a month     Drinks per session: 1 or 2     Binge frequency: Less than monthly    Drug use: Yes     Frequency: 2.0 times per week     Types: Marijuana    Sexual activity: Yes     Partners: Female   Lifestyle    Physical activity     Days per week: None     Minutes per session: None    Stress: None   Relationships    Social connections     Talks on phone: None     Gets together: None     Attends Restoration service: None     Active member of club or organization: None     Attends meetings of clubs or organizations: None     Relationship status: None    Intimate partner violence     Fear of current or ex partner: None     Emotionally abused: None     Physically abused: None     Forced sexual activity: None   Other Topics Concern    None   Social History Narrative    None       FAMILY HISTORY  Family History   Problem Relation Age of Onset    Heart Disease Mother     Diabetes Mother     Alcohol Abuse Father     Heart Disease Brother     Diabetes Maternal Aunt     Parkinsonism Maternal Cousin     Heart Disease Brother     Coronary Art Dis Brother        SURGICAL HISTORY  Past Surgical History:   Procedure Laterality Date     SECTION      x 4    DILATION AND CURETTAGE OF UTERUS  1996    FOOT SURGERY      bone spurs removed both feet    KNEE SURGERY Left     reconstruction of knee    LUMBAR DISCECTOMY  2020    Dr. Olsen. Leslee Lizarraga  Allergies   Allergen Reactions    Bactrim [Sulfamethoxazole-Trimethoprim] Rash    Codeine Nausea And Vomiting    Medrol [Methylprednisolone] Palpitations    Ultram [Tramadol Hcl] Other (See Comments)     Pt states causes shaking and feels bad       CURRENTMEDICATIONS  Current Outpatient Medications   Medication Sig Dispense Refill    HYDROcodone-acetaminophen (NORCO) 5-325 MG per tablet Take 1 tablet by mouth every 8 hours as needed for Pain for up to 30 days.  80 tablet 0    clopidogrel (PLAVIX) 75 MG tablet Take 1 tablet by mouth daily 30 tablet 0    ondansetron (ZOFRAN ODT) 4 MG disintegrating tablet Take 1 tablet by mouth every 8 hours as needed for Nausea or Vomiting 20 tablet 0    carisoprodol (SOMA) 350 MG tablet Take 1 tablet by mouth 3 times daily as needed for Muscle spasms for up to 21 days. 63 tablet 0    atorvastatin (LIPITOR) 20 MG tablet Take 1 tablet by mouth daily 90 tablet 3    ibuprofen (ADVIL;MOTRIN) 800 MG tablet TAKE 1 TABLET BY MOUTH THREE TIMES A DAY FOR 30 DAYS (Patient taking differently: Take 800 mg by mouth 2 times daily as needed ) 90 tablet 3    vitamin D (ERGOCALCIFEROL) 1.25 MG (93537 UT) CAPS capsule Take 1 capsule by mouth once a week 12 capsule 0    amLODIPine (NORVASC) 10 MG tablet TAKE 1 TABLET BY MOUTH ONE TIME A DAY  90 tablet 3    lisinopril (PRINIVIL;ZESTRIL) 10 MG tablet Take 10 mg by mouth 2 times daily       acetaminophen (TYLENOL) 500 MG tablet Take 500 mg by mouth every 6 hours as needed for Pain      aspirin 81 MG EC tablet Take 81 mg by mouth daily      cyclobenzaprine (FLEXERIL) 10 MG tablet Take 1 tablet by mouth 3 times daily as needed for Muscle spasms (Patient not taking: Reported on 2/17/2021) 60 tablet 0    famotidine (PEPCID) 20 MG tablet Take 1 tablet by mouth 2 times daily (Patient not taking: Reported on 2/17/2021) 60 tablet 3     No current facility-administered medications for this visit. Objective:  BP (!) 112/58 (Site: Left Upper Arm, Position: Sitting, Cuff Size: Medium Adult)   Pulse 78   Temp 96.7 °F (35.9 °C)   Ht 5' (1.524 m)   Wt 119 lb 9.6 oz (54.3 kg)   SpO2 92%   BMI 23.36 kg/m²     General: No distress. Alert. Eyes: PERRL. No conjunctival injection. ENT: No discharge. Pharynx clear. Neck: Trachea midline  Resp: No accessory muscle use. Lung sounds clear. CV: Regular rate. Regularrhythm. No mumur or rub. No edema.      M/S:   Upper extremities:  Muscle strength 5/5, FROM, No Synovitis     Lower Extremities:   Muscle strength 5/5, FROM, No Synovitis     Neuro: Oriented to person, place, time. DTR's 2/4 bilat and equal  Psych:  No anxiety or agitation. Skin: Warm and dry. No rash on exposed extremities.          Labs:  CBC  Lab Results   Component Value Date    WBC 11.6 02/15/2021    RBC 3.77 02/15/2021    HGB 12.7 02/15/2021    HCT 37.9 02/15/2021    .5 02/15/2021    MCH 33.7 02/15/2021    MCHC 33.5 02/15/2021     02/15/2021       CMP  Lab Results   Component Value Date    CALCIUM 9.5 02/15/2021    LABALBU 4.4 02/15/2021    PROT 7.2 02/15/2021     02/15/2021    K 4.3 02/15/2021    CO2 21 02/15/2021     02/15/2021    BUN 6 02/15/2021    CREATININE 0.4 02/15/2021    ALKPHOS 59 02/15/2021    ALT 27 02/15/2021    AST 29 02/15/2021       HgBA1c: No components found for: HGBA1C    Lab Results   Component Value Date    TSH 0.808 09/03/2020     Lab Results   Component Value Date    VITD25 18 09/03/2020       Lab Results   Component Value Date    SEDRATE 7 02/15/2021     Lab Results   Component Value Date    CRP < 0.30 02/15/2021       Lab Results   Component Value Date    VITD25 18 (L) 09/03/2020    CALCIUM 9.5 02/15/2021    MG 2.1 09/25/2020     Lab Results   Component Value Date     02/15/2021    K 4.3 02/15/2021     02/15/2021    CO2 21 (L) 02/15/2021    BUN 6 (L) 02/15/2021    CREATININE 0.4 02/15/2021    GLUCOSE 96 02/15/2021    CALCIUM 9.5 02/15/2021    PROT 7.2 02/15/2021    LABALBU 4.4 02/15/2021    BILITOT 0.2 (L) 02/15/2021    ALKPHOS 59 02/15/2021    AST 29 02/15/2021    ALT 27 02/15/2021         RADIOLOGY:     DEXA 8/13/2020 (different scanner)  Left hip: - 1.70, 0.658  Right hip: -1.40, 0.689  Lumbar spine: -0.60, 0.976  FRAX: 11% major, 1.6% hip     DEXA 6/20/2018  Left hip: -1.30, 0.860  Right hip: -1.30. 0.842         Assessment and plan:  Osteopenia  - A 54year old postmenopausal female with history of osteopenia dating back at least 2 years. Recent DEXA with T score -1.70 at left femoral neck. Patient with history of a couple childhood fractures which were traumatic, as well as a shoulder fracture from fall from standing height onto steps. Smoking 1 PPD for 40 years, drinking 2-3 alcoholic beverages 3-4 times per week. +fmhx of OP in maternal grandmother.   - start alendronate 70 mg PO weekly given osteopenia with prior fragility fracture. - Continue calcium & vit D supplements  - Encouraged regular weight bearing exercises. - Repeat DEXA 8/2022. Vit D def   - continue ergocalciferol 50,000 units weekly   -  repeat level now      No follow-ups on file. Electronically signed by SHERYL Bundy CNP on 2/17/2021 at 9:57 AM      Thank you for allowing me to participate in the care of this patient. Please call if there are any questions.

## 2021-02-18 RX ORDER — ERGOCALCIFEROL 1.25 MG/1
50000 CAPSULE ORAL WEEKLY
Qty: 12 CAPSULE | Refills: 0 | Status: SHIPPED | OUTPATIENT
Start: 2021-02-18 | End: 2021-05-17

## 2021-02-22 ENCOUNTER — OFFICE VISIT (OUTPATIENT)
Dept: CARDIOTHORACIC SURGERY | Age: 56
End: 2021-02-22
Payer: COMMERCIAL

## 2021-02-22 VITALS
WEIGHT: 118 LBS | DIASTOLIC BLOOD PRESSURE: 58 MMHG | HEIGHT: 60 IN | BODY MASS INDEX: 23.16 KG/M2 | HEART RATE: 98 BPM | SYSTOLIC BLOOD PRESSURE: 109 MMHG

## 2021-02-22 DIAGNOSIS — I73.9 LEFT LEG CLAUDICATION (HCC): ICD-10-CM

## 2021-02-22 DIAGNOSIS — I73.9 PAD (PERIPHERAL ARTERY DISEASE) (HCC): Primary | ICD-10-CM

## 2021-02-22 PROCEDURE — 99204 OFFICE O/P NEW MOD 45 MIN: CPT | Performed by: THORACIC SURGERY (CARDIOTHORACIC VASCULAR SURGERY)

## 2021-02-22 RX ORDER — CILOSTAZOL 50 MG/1
50 TABLET ORAL 2 TIMES DAILY
Qty: 14 TABLET | Refills: 0 | Status: SHIPPED | OUTPATIENT
Start: 2021-02-22 | End: 2021-03-04 | Stop reason: SINTOL

## 2021-02-22 RX ORDER — CILOSTAZOL 100 MG/1
100 TABLET ORAL 2 TIMES DAILY
Qty: 60 TABLET | Refills: 3 | Status: SHIPPED | OUTPATIENT
Start: 2021-02-22 | End: 2021-03-04 | Stop reason: SINTOL

## 2021-02-22 NOTE — PROGRESS NOTES
CT/CV Surgery New Patient Office Visit      Patient's Name/Date of Birth: Dania Delgado / 1965 (9 y.o.)      PCP: SHERYL Rosas CNP    Date: 2021     CC: Left lower extremity claudication at 1 block of walking with occasional rest pain for 6 months. Reddish skin color changes of the left foot. HPI:   We had the pleasure of seeing Dania Delgado in the office today, as you know this is a very pleasant 64y.o. year old female with a history of hypertension, hyperlipidemia, status post lumbar disc hernia surgery 2020, who has been complaining of pain in her left leg when she walks more than 1 block. She also reports occasional pain at night waking her up. She also reports reddish discoloration of left foot. She described the pain as a cramping in nature, moderate in intensity, associated with the weakness of the left leg. And it got worse over last 6-month. She denied any skin ulcer or nonhealing wound in the left lower extremity. She had a CTA of abdomen, pelvis and distal runoff. This showed small vessel disease below the trifurcation bilaterally. She also had a noninvasive vascular study. The JOAN of the left KIRBY 0.48, TPA 0.46, right KIRBY 0.94, and right PTA 0.91. PastMedical History:  Romi Rosales  has a past medical history of Hypertension. Past Surgical History:  The patient  has a past surgical history that includes  section; Foot surgery; Dilation and curettage of uterus (); knee surgery (Left); and lumbar discectomy (2020). Allergies: The patient is allergic to bactrim [sulfamethoxazole-trimethoprim]; codeine; medrol [methylprednisolone]; and ultram [tramadol hcl].     Medications:    Current Outpatient Medications:     cilostazol (PLETAL) 50 MG tablet, Take 1 tablet by mouth 2 times daily for 7 days, Disp: 14 tablet, Rfl: 0    cilostazol (PLETAL) 100 MG tablet, Take 1 tablet by mouth 2 times daily, Disp: 60 tablet, Rfl: 3   vitamin D (ERGOCALCIFEROL) 1.25 MG (25304 UT) CAPS capsule, Take 1 capsule by mouth once a week, Disp: 12 capsule, Rfl: 0    alendronate (FOSAMAX) 70 MG tablet, Take 1 tablet by mouth every 7 days, Disp: 12 tablet, Rfl: 3    HYDROcodone-acetaminophen (NORCO) 5-325 MG per tablet, Take 1 tablet by mouth every 8 hours as needed for Pain for up to 30 days. , Disp: 90 tablet, Rfl: 0    clopidogrel (PLAVIX) 75 MG tablet, Take 1 tablet by mouth daily, Disp: 30 tablet, Rfl: 0    ondansetron (ZOFRAN ODT) 4 MG disintegrating tablet, Take 1 tablet by mouth every 8 hours as needed for Nausea or Vomiting, Disp: 20 tablet, Rfl: 0    carisoprodol (SOMA) 350 MG tablet, Take 1 tablet by mouth 3 times daily as needed for Muscle spasms for up to 21 days. , Disp: 63 tablet, Rfl: 0    cyclobenzaprine (FLEXERIL) 10 MG tablet, Take 1 tablet by mouth 3 times daily as needed for Muscle spasms, Disp: 60 tablet, Rfl: 0    atorvastatin (LIPITOR) 20 MG tablet, Take 1 tablet by mouth daily, Disp: 90 tablet, Rfl: 3    ibuprofen (ADVIL;MOTRIN) 800 MG tablet, TAKE 1 TABLET BY MOUTH THREE TIMES A DAY FOR 30 DAYS (Patient taking differently: Take 800 mg by mouth 2 times daily as needed ), Disp: 90 tablet, Rfl: 3    famotidine (PEPCID) 20 MG tablet, Take 1 tablet by mouth 2 times daily, Disp: 60 tablet, Rfl: 3    amLODIPine (NORVASC) 10 MG tablet, TAKE 1 TABLET BY MOUTH ONE TIME A DAY , Disp: 90 tablet, Rfl: 3    lisinopril (PRINIVIL;ZESTRIL) 10 MG tablet, Take 10 mg by mouth 2 times daily , Disp: , Rfl:     acetaminophen (TYLENOL) 500 MG tablet, Take 500 mg by mouth every 6 hours as needed for Pain, Disp: , Rfl:     aspirin 81 MG EC tablet, Take 81 mg by mouth daily, Disp: , Rfl:     Family History: This patient's family history includes Alcohol Abuse in her father; Coronary Art Dis in her brother; Diabetes in her maternal aunt and mother; Heart Disease in her brother, brother, and mother; Parkinsonism in her maternal cousin.     Social History:  Sheyla Mcdaniel  reports that she has been smoking cigarettes. She has a 35.00 pack-year smoking history. She has never used smokeless tobacco. She reports current alcohol use. She reports current drug use. Frequency: 2.00 times per week. Drug: Marijuana. Vital Signs:   BP (!) 109/58 (Site: Right Upper Arm, Position: Sitting, Cuff Size: Medium Adult)   Pulse 98   Ht 5' (1.524 m)   Wt 118 lb (53.5 kg)   BMI 23.05 kg/m²     ROS:  Constitutional: Left leg claudication with cramping pain. HENT: Negative for congestion, facial swelling, sore throat, and changes in voice. Eyes: Negative for photophobia, redness, itching and visual disturbance. Respiratory: Negative for apnea, choking, shortness of breath, wheezing and stridor. Cardiovascular: No midsternal chest pain. Left leg claudication. Gastrointestinal: Negative for abdominal distention, constipation, nausea and vomiting. Endocrine: Negative for cold intolerance, heat intolerance, polyphagia and polyuria. Musculoskeletal: Negative for arthralgias, gait problem, and myalgias. Skin: Reddish discoloration of left foot. Allergic/Immunologic: Negative for food allergies and immunocompromised state. Neurological: Negative for dizziness, tremors, speech difficulty, weakness, numbness and headaches. Hematological: Negative for adenopathy. Does not bruise/bleed easily. Psychiatric/Behavioral: Negative for agitation, confusion, and dysphoric mood. Physical Exam:   General appearance:  Left leg claudication. HEENT:  Normal cephalic, atraumatic without obvious deformity. Conjunctivae/corneas clear. Neck: Supple, with full range of motion. No jugular venous distention. Trachea midline. Respiratory:  Normal respiratory effort. Clear to auscultation, bilaterally without rales/wheezes/rhonchi. Cardiovascular:  Regular rate and rhythm with normal S1/S2 without murmurs, rubs or gallops.   Abdomen: Soft, non-tender, non-distended with normal bowel sounds. Musculoskeletal:  No clubbing, cyanosis or edema bilaterally. Full range of motion without deformity. Skin: Reddish discoloration of left foot. No skin ulcer or nonhealing wound in the lower extremities. Neurologic:  Neurovascularly intact without any focal sensory/motor deficits. Psychiatric:  Alert and oriented, thought content appropriate, normal insight. Capillary Refill: Brisk,< 3 seconds   Peripheral Pulses: +2 palpable pulses over femoral artery. 1+ pulse over left femoral artery. Palpable pulse over right dorsalis pedis. Left dorsalis pedis pulses are not palpable. Hand-held Doppler exam: Biphasic Doppler signal over right DP and PT. Monophasic Doppler signal over left DP and PT. Labs:    CBC:  Lab Results   Component Value Date    WBC 11.6 02/15/2021    HGB 12.7 02/15/2021    HCT 37.9 02/15/2021    .5 02/15/2021     02/15/2021    INR 0.91 02/15/2021     BMP:   Lab Results   Component Value Date     02/15/2021    K 4.3 02/15/2021     02/15/2021    CO2 21 02/15/2021    PHOS 4.0 09/03/2020    BUN 6 02/15/2021    CREATININE 0.4 02/15/2021    MG 2.1 09/25/2020       Imaging  I have reviewed all imaging: A CTA of abdomen, pelvis, and distal runoff. Problem List:  Patient Active Problem List   Diagnosis    Essential hypertension    Osteopenia    Carotid stenosis, non-symptomatic, bilateral    Chronic radicular lumbar pain    History of smoking 30 or more pack years       Assessment: Critical limb ischemia, left lower extremity. With occasional rest pain. Plan 2/22/21:  1) cilostazol 1 week after trial dose with the 50 mg, and increased to 100 mg twice daily. 2) refer to interventional cardiology for femoral angiogram.    Thank you for allowing us to be involved in the patient's care.     Electronically by Les Townsend MD  on 2/22/2021 at 10:22 AM

## 2021-02-26 ENCOUNTER — TELEPHONE (OUTPATIENT)
Dept: CARDIOTHORACIC SURGERY | Age: 56
End: 2021-02-26

## 2021-02-26 NOTE — TELEPHONE ENCOUNTER
Patient called into office  Started taking Pletal 50 MG as prescribed yesterday and believes she is having a reaction to it  Described her symptoms as itchy skin, lips tingling, heart rate increase, nausea  She did take medication today for this reason    Dr Campbell Lazar - what would you recommend for this patient to do?    I will call patient back with advice

## 2021-03-01 ENCOUNTER — TELEPHONE (OUTPATIENT)
Dept: CARDIOLOGY CLINIC | Age: 56
End: 2021-03-01

## 2021-03-01 NOTE — TELEPHONE ENCOUNTER
Short term disability in Dr. Princess Tyler box. Patient aware paperwork will be addressed at appt on 3/4/2021.

## 2021-03-04 ENCOUNTER — OFFICE VISIT (OUTPATIENT)
Dept: CARDIOLOGY CLINIC | Age: 56
End: 2021-03-04
Payer: COMMERCIAL

## 2021-03-04 VITALS
HEIGHT: 60 IN | WEIGHT: 118 LBS | SYSTOLIC BLOOD PRESSURE: 162 MMHG | HEART RATE: 96 BPM | DIASTOLIC BLOOD PRESSURE: 78 MMHG | BODY MASS INDEX: 23.16 KG/M2

## 2021-03-04 DIAGNOSIS — I73.9 CLAUDICATION IN PERIPHERAL VASCULAR DISEASE (HCC): Primary | ICD-10-CM

## 2021-03-04 PROCEDURE — 99204 OFFICE O/P NEW MOD 45 MIN: CPT | Performed by: INTERNAL MEDICINE

## 2021-03-04 NOTE — PROGRESS NOTES
New patient here for check up ref by Dr. Yeimi Lorenzo femoral agniogram    Pt states med list is correct from 2/22/21    Pt has short tem disability forms

## 2021-03-04 NOTE — PROGRESS NOTES
[id number] [facility] [place] [address]  [facility] [address]  Dept: [phone]  Dept Fax: [phone]  Loc: [phone]    Visit Date: 3/4/2021    Ms. Sil Diaz is a 64 y.o. female  who presented for:  Chief Complaint   Patient presents with    New Patient     ref by Dr. Cleo Anaya fermoral angiogram     Hypertension       HPI:   63 yo F c hx of PAD, Smoker is referred by vascular surgery for abnormal JOAN and CTA with runoff. Patient complaints of severe claudication and event rest pain. JOAN of 0.48 on the left side. Had been to the ED also. Continues to smoke. Current Outpatient Medications:     vitamin D (ERGOCALCIFEROL) 1.25 MG (42757 UT) CAPS capsule, Take 1 capsule by mouth once a week, Disp: 12 capsule, Rfl: 0    alendronate (FOSAMAX) 70 MG tablet, Take 1 tablet by mouth every 7 days, Disp: 12 tablet, Rfl: 3    HYDROcodone-acetaminophen (NORCO) 5-325 MG per tablet, Take 1 tablet by mouth every 8 hours as needed for Pain for up to 30 days. , Disp: 90 tablet, Rfl: 0    clopidogrel (PLAVIX) 75 MG tablet, Take 1 tablet by mouth daily, Disp: 30 tablet, Rfl: 0    ondansetron (ZOFRAN ODT) 4 MG disintegrating tablet, Take 1 tablet by mouth every 8 hours as needed for Nausea or Vomiting, Disp: 20 tablet, Rfl: 0    cyclobenzaprine (FLEXERIL) 10 MG tablet, Take 1 tablet by mouth 3 times daily as needed for Muscle spasms, Disp: 60 tablet, Rfl: 0    atorvastatin (LIPITOR) 20 MG tablet, Take 1 tablet by mouth daily, Disp: 90 tablet, Rfl: 3    ibuprofen (ADVIL;MOTRIN) 800 MG tablet, TAKE 1 TABLET BY MOUTH THREE TIMES A DAY FOR 30 DAYS (Patient taking differently: Take 800 mg by mouth 2 times daily as needed ), Disp: 90 tablet, Rfl: 3    famotidine (PEPCID) 20 MG tablet, Take 1 tablet by mouth 2 times daily, Disp: 60 tablet, Rfl: 3    amLODIPine (NORVASC) 10 MG tablet, TAKE 1 TABLET BY MOUTH ONE TIME A DAY , Disp: 90 tablet, Rfl: 3   96   Ht 5' (1.524 m)   Wt 118 lb (53.5 kg)   BMI 23.05 kg/m²     Wt Readings from Last 3 Encounters:   03/04/21 118 lb (53.5 kg)   02/22/21 118 lb (53.5 kg)   02/17/21 119 lb 9.6 oz (54.3 kg)     BP Readings from Last 3 Encounters:   03/04/21 (!) 162/78   02/22/21 (!) 109/58   02/17/21 (!) 112/58       PHYSICAL EXAM  Constitutional: Oriented to person, place, and time. Appears well-developed and well-nourished. HENT:   Head: Normocephalic and atraumatic. Eyes: EOM are normal. Pupils are equal, round, and reactive to light. Neck: Normal range of motion. Neck supple. No JVD present. Cardiovascular: Normal rate , normal heart sounds and intact distal pulses. Pulmonary/Chest: Effort normal and breath sounds normal. No respiratory distress. No wheezes. No rales. Abdominal: Soft. Bowel sounds are normal. No distension. There is no tenderness. Musculoskeletal: Normal range of motion. No edema. Neurological: Alert and oriented to person, place, and time. No cranial nerve deficit. Coordination normal.   Skin: Skin is warm and dry. Psychiatric: Normal mood and affect.        No results found for: CKTOTAL, CKMB, CKMBINDEX    Lab Results   Component Value Date    WBC 11.6 02/15/2021    RBC 3.77 02/15/2021    HGB 12.7 02/15/2021    HCT 37.9 02/15/2021    .5 02/15/2021    MCH 33.7 02/15/2021    MCHC 33.5 02/15/2021     02/15/2021    MPV 9.8 02/15/2021       Lab Results   Component Value Date     02/15/2021    K 4.3 02/15/2021     02/15/2021    CO2 21 02/15/2021    BUN 6 02/15/2021    LABALBU 4.4 02/15/2021    CREATININE 0.4 02/15/2021    CALCIUM 9.5 02/15/2021    LABGLOM >90 02/15/2021    GLUCOSE 96 02/15/2021       Lab Results   Component Value Date    ALKPHOS 59 02/15/2021    ALT 27 02/15/2021    AST 29 02/15/2021    PROT 7.2 02/15/2021    BILITOT 0.2 02/15/2021    BILIDIR <0.2 09/03/2020    LABALBU 4.4 02/15/2021       Lab Results   Component Value Date    MG 2.1 09/25/2020 Lab Results   Component Value Date    INR 0.91 02/15/2021         No results found for: LABA1C    Lab Results   Component Value Date    TRIG 75 09/03/2020    HDL 81 09/03/2020    LDLCALC 113 09/03/2020       Lab Results   Component Value Date    TSH 0.808 09/03/2020         Testing Reviewed:      I haveindividually reviewed the below cardiac tests    EKG:    ECHO:   Results for orders placed during the hospital encounter of 03/18/19   Echo 2D w doppler w color complete    Narrative Transthoracic Echocardiography Report (TTE)     Demographics      Patient Name   De Osborn       Gender              Female                  Adwoa Carreon      MR #           [id number]     Race                                                   Ethnicity      Account #      [de-identified]     Room Number      Accession      [id number]     Date of Study       03/18/2019   Number      Date of Birth  1965    Referring Physician Leisa Durham MD      Age            47 year(s)    Sonographer         Ellen López                                                    RVT                                   Interpreting        Ashok Durham MD                                Physician     Procedure    Type of Study      TTE procedure:ECHOCARDIOGRAM COMPLETE 2D W DOPPLER W COLOR. Procedure Date  Date: 03/18/2019 Start: 09:06 AM    Study Location: Echo Lab  Technical Quality: Adequate visualization    Indications:Preop cardiac evaluation. Additional Medical History:smoker, hypertension, hyperlipidemia, alcohol  abuse    Patient Status: Routine    Height: 60 inches Weight: 120 pounds BSA: 1.5 m^2 BMI: 23.44 kg/m^2    BP: 188/80 mmHg     Conclusions      Summary   Normal left ventricle size and systolic function. Ejection fraction was   estimated at 55-60%. Mild to moderate aortic regurgitation is noted. Mild mitral regurgitation is present.    Mild tricuspid regurgitation. IVC size is within normal limits with normal respiratory phasic changes. Signature      ----------------------------------------------------------------   Electronically signed by Krystian Pfeiffer MD (Interpreting   physician) on 03/19/2019 at 04:47 PM   ----------------------------------------------------------------      Findings      Mitral Valve   The mitral valve structure was normal with normal leaflet separation. DOPPLER: The transmitral velocity was within the normal range with no   evidence for mitral stenosis. Mild mitral regurgitation is present. Aortic Valve   The aortic valve was trileaflet with normal thickness and cuspal   separation. DOPPLER: Transaortic velocity was within the normal range with   no evidence of aortic stenosis. Mild to moderate aortic regurgitation is noted. Tricuspid Valve   The tricuspid valve structure was normal with normal leaflet separation. DOPPLER: There was no evidence of tricuspid stenosis. Mild tricuspid regurgitation. Pulmonic Valve   The pulmonic valve was not well visualized . Left Atrium   Left atrial size was normal.      Left Ventricle   Normal left ventricle size and systolic function. Ejection fraction was   estimated at 55-60%. Right Atrium   Right atrial size was normal.      Right Ventricle   The right ventricular size was normal with normal systolic function and   wall thickness. Pericardial Effusion   The pericardium was normal in appearance with no evidence of a pericardial   effusion. Pleural Effusion   No evidence of pleural effusion. Aorta / Great Vessels   -Aortic root dimension within normal limits. -IVC size is within normal limits with normal respiratory phasic changes.      M-Mode/2D Measurements & Calculations      LV Diastolic   LV Systolic Dimension: 3  AV Cusp Separation: 1.7 cmLA   Dimension: 4.4 cm                        Dimension: 3.5 cmAO Root   cm             LV Volume claudication and rest pain  Severe PAD  Smoker    Reviewed JOAN and CTA with runoff  Smoking cessation advised  Unable to tolerate pletal, had allergic reaction  Continue Aspirin, /plavix, statin  Discussed peripheral angio  R/B/A were discussed, patient would liek to proceed. The patient is asked to make an attempt to improve diet and exercise patterns to aid in medical management of this problem. Advised more plant based nutrition/meditarrean diet   Advised patient to call office or seek immediate medical attention if there is any new onset of  any chest pain, sob, palpitations, lightheadedness, dizziness, orthopnea, PND or pedal edema. All medication side effects were discussed in details. Thank youfor allowing me to participate in the care of this patient. Please do not hesitate to contact me for any further questions. Return in about 3 months (around 6/4/2021), or if symptoms worsen or fail to improve, for Regular follow up, Review testing.        Electronically signed by Anirudh Adkins MD [facility]  3/4/2021 at 9:01 AM EST

## 2021-03-05 ENCOUNTER — PREP FOR PROCEDURE (OUTPATIENT)
Dept: CARDIOLOGY | Age: 56
End: 2021-03-05

## 2021-03-05 DIAGNOSIS — Z01.818 PREOP TESTING: Primary | ICD-10-CM

## 2021-03-05 RX ORDER — NITROGLYCERIN 0.4 MG/1
0.4 TABLET SUBLINGUAL EVERY 5 MIN PRN
Status: CANCELLED | OUTPATIENT
Start: 2021-03-05

## 2021-03-05 RX ORDER — ASPIRIN 325 MG
325 TABLET ORAL ONCE
Status: CANCELLED | OUTPATIENT
Start: 2021-03-05 | End: 2021-03-05

## 2021-03-05 RX ORDER — SODIUM CHLORIDE 0.9 % (FLUSH) 0.9 %
10 SYRINGE (ML) INJECTION PRN
Status: CANCELLED | OUTPATIENT
Start: 2021-03-05

## 2021-03-05 RX ORDER — DIPHENHYDRAMINE HYDROCHLORIDE 50 MG/ML
50 INJECTION INTRAMUSCULAR; INTRAVENOUS ONCE
Status: CANCELLED | OUTPATIENT
Start: 2021-03-05 | End: 2021-03-05

## 2021-03-05 RX ORDER — SODIUM CHLORIDE 0.9 % (FLUSH) 0.9 %
10 SYRINGE (ML) INJECTION EVERY 12 HOURS SCHEDULED
Status: CANCELLED | OUTPATIENT
Start: 2021-03-05

## 2021-03-05 RX ORDER — SODIUM CHLORIDE 9 MG/ML
INJECTION, SOLUTION INTRAVENOUS CONTINUOUS
Status: CANCELLED | OUTPATIENT
Start: 2021-03-05

## 2021-03-08 ENCOUNTER — TELEPHONE (OUTPATIENT)
Dept: CARDIOTHORACIC SURGERY | Age: 56
End: 2021-03-08

## 2021-03-08 ENCOUNTER — APPOINTMENT (OUTPATIENT)
Dept: CT IMAGING | Age: 56
End: 2021-03-08
Attending: INTERNAL MEDICINE
Payer: COMMERCIAL

## 2021-03-08 ENCOUNTER — HOSPITAL ENCOUNTER (OUTPATIENT)
Dept: INTERVENTIONAL RADIOLOGY/VASCULAR | Age: 56
Discharge: HOME | End: 2021-03-08
Attending: INTERNAL MEDICINE | Admitting: RADIOLOGY
Payer: COMMERCIAL

## 2021-03-08 VITALS
TEMPERATURE: 98.1 F | HEIGHT: 60 IN | HEART RATE: 90 BPM | BODY MASS INDEX: 23.56 KG/M2 | OXYGEN SATURATION: 98 % | DIASTOLIC BLOOD PRESSURE: 70 MMHG | SYSTOLIC BLOOD PRESSURE: 147 MMHG | WEIGHT: 120 LBS | RESPIRATION RATE: 22 BRPM

## 2021-03-08 DIAGNOSIS — Z01.818 PRE-OP EXAMINATION: Primary | ICD-10-CM

## 2021-03-08 DIAGNOSIS — I73.9 LEFT LEG CLAUDICATION (HCC): ICD-10-CM

## 2021-03-08 DIAGNOSIS — I73.9 CLAUDICATION IN PERIPHERAL VASCULAR DISEASE (HCC): ICD-10-CM

## 2021-03-08 DIAGNOSIS — I73.9 PAD (PERIPHERAL ARTERY DISEASE) (HCC): ICD-10-CM

## 2021-03-08 LAB
ABO: NORMAL
ACTIVATED CLOTTING TIME: 158 SECONDS (ref 1–150)
ACTIVATED CLOTTING TIME: 246 SECONDS (ref 1–150)
ANION GAP SERPL CALCULATED.3IONS-SCNC: 15 MEQ/L (ref 8–16)
ANTIBODY SCREEN: NORMAL
APTT: 32 SECONDS (ref 22–38)
BUN BLDV-MCNC: 7 MG/DL (ref 7–22)
CALCIUM SERPL-MCNC: 9.8 MG/DL (ref 8.5–10.5)
CHLORIDE BLD-SCNC: 104 MEQ/L (ref 98–111)
CHOLESTEROL, TOTAL: 188 MG/DL (ref 100–199)
CO2: 22 MEQ/L (ref 23–33)
CREAT SERPL-MCNC: 0.6 MG/DL (ref 0.4–1.2)
ERYTHROCYTE [DISTWIDTH] IN BLOOD BY AUTOMATED COUNT: 12.7 % (ref 11.5–14.5)
ERYTHROCYTE [DISTWIDTH] IN BLOOD BY AUTOMATED COUNT: 47.7 FL (ref 35–45)
GFR SERPL CREATININE-BSD FRML MDRD: > 90 ML/MIN/1.73M2
GLUCOSE BLD-MCNC: 135 MG/DL (ref 70–108)
HCT VFR BLD CALC: 40.5 % (ref 37–47)
HDLC SERPL-MCNC: 67 MG/DL
HEMOGLOBIN: 12.9 GM/DL (ref 12–16)
INR BLD: 0.92 (ref 0.85–1.13)
LDL CHOLESTEROL CALCULATED: 85 MG/DL
MCH RBC QN AUTO: 32.3 PG (ref 26–33)
MCHC RBC AUTO-ENTMCNC: 31.9 GM/DL (ref 32.2–35.5)
MCV RBC AUTO: 101.5 FL (ref 81–99)
PLATELET # BLD: 384 THOU/MM3 (ref 130–400)
PMV BLD AUTO: 9.7 FL (ref 9.4–12.4)
POTASSIUM REFLEX MAGNESIUM: 4.5 MEQ/L (ref 3.5–5.2)
RBC # BLD: 3.99 MILL/MM3 (ref 4.2–5.4)
RH FACTOR: NORMAL
SODIUM BLD-SCNC: 141 MEQ/L (ref 135–145)
TRIGL SERPL-MCNC: 181 MG/DL (ref 0–199)
WBC # BLD: 13 THOU/MM3 (ref 4.8–10.8)

## 2021-03-08 PROCEDURE — 75625 CONTRAST EXAM ABDOMINL AORTA: CPT | Performed by: INTERNAL MEDICINE

## 2021-03-08 PROCEDURE — 6360000002 HC RX W HCPCS: Performed by: INTERNAL MEDICINE

## 2021-03-08 PROCEDURE — 6370000000 HC RX 637 (ALT 250 FOR IP): Performed by: INTERNAL MEDICINE

## 2021-03-08 PROCEDURE — 80048 BASIC METABOLIC PNL TOTAL CA: CPT

## 2021-03-08 PROCEDURE — 36200 PLACE CATHETER IN AORTA: CPT | Performed by: INTERNAL MEDICINE

## 2021-03-08 PROCEDURE — 36415 COLL VENOUS BLD VENIPUNCTURE: CPT

## 2021-03-08 PROCEDURE — 36246 INS CATH ABD/L-EXT ART 2ND: CPT | Performed by: INTERNAL MEDICINE

## 2021-03-08 PROCEDURE — 85610 PROTHROMBIN TIME: CPT

## 2021-03-08 PROCEDURE — 86901 BLOOD TYPING SEROLOGIC RH(D): CPT

## 2021-03-08 PROCEDURE — 85027 COMPLETE CBC AUTOMATED: CPT

## 2021-03-08 PROCEDURE — 86850 RBC ANTIBODY SCREEN: CPT

## 2021-03-08 PROCEDURE — 85730 THROMBOPLASTIN TIME PARTIAL: CPT

## 2021-03-08 PROCEDURE — 2580000003 HC RX 258: Performed by: NURSE PRACTITIONER

## 2021-03-08 PROCEDURE — 6360000002 HC RX W HCPCS

## 2021-03-08 PROCEDURE — C1894 INTRO/SHEATH, NON-LASER: HCPCS

## 2021-03-08 PROCEDURE — 75716 ARTERY X-RAYS ARMS/LEGS: CPT | Performed by: INTERNAL MEDICINE

## 2021-03-08 PROCEDURE — C1887 CATHETER, GUIDING: HCPCS

## 2021-03-08 PROCEDURE — 2500000003 HC RX 250 WO HCPCS

## 2021-03-08 PROCEDURE — 6360000004 HC RX CONTRAST MEDICATION: Performed by: INTERNAL MEDICINE

## 2021-03-08 PROCEDURE — C1769 GUIDE WIRE: HCPCS

## 2021-03-08 PROCEDURE — 85347 COAGULATION TIME ACTIVATED: CPT

## 2021-03-08 PROCEDURE — 2709999900 HC NON-CHARGEABLE SUPPLY

## 2021-03-08 PROCEDURE — 86900 BLOOD TYPING SEROLOGIC ABO: CPT

## 2021-03-08 PROCEDURE — 74176 CT ABD & PELVIS W/O CONTRAST: CPT

## 2021-03-08 PROCEDURE — 80061 LIPID PANEL: CPT

## 2021-03-08 RX ORDER — MIDAZOLAM HYDROCHLORIDE 2 MG/2ML
1 INJECTION, SOLUTION INTRAMUSCULAR; INTRAVENOUS ONCE
Status: COMPLETED | OUTPATIENT
Start: 2021-03-08 | End: 2021-03-08

## 2021-03-08 RX ORDER — SODIUM CHLORIDE 0.9 % (FLUSH) 0.9 %
10 SYRINGE (ML) INJECTION EVERY 12 HOURS SCHEDULED
Status: DISCONTINUED | OUTPATIENT
Start: 2021-03-08 | End: 2021-03-08 | Stop reason: HOSPADM

## 2021-03-08 RX ORDER — FENTANYL CITRATE 50 UG/ML
50 INJECTION, SOLUTION INTRAMUSCULAR; INTRAVENOUS ONCE
Status: COMPLETED | OUTPATIENT
Start: 2021-03-08 | End: 2021-03-08

## 2021-03-08 RX ORDER — ACETAMINOPHEN 325 MG/1
650 TABLET ORAL EVERY 4 HOURS PRN
Status: DISCONTINUED | OUTPATIENT
Start: 2021-03-08 | End: 2021-03-08 | Stop reason: SDUPTHER

## 2021-03-08 RX ORDER — SODIUM CHLORIDE 9 MG/ML
INJECTION, SOLUTION INTRAVENOUS CONTINUOUS
Status: DISCONTINUED | OUTPATIENT
Start: 2021-03-08 | End: 2021-03-08 | Stop reason: HOSPADM

## 2021-03-08 RX ORDER — ASPIRIN 325 MG
325 TABLET ORAL ONCE
Status: DISCONTINUED | OUTPATIENT
Start: 2021-03-08 | End: 2021-03-08 | Stop reason: HOSPADM

## 2021-03-08 RX ORDER — SODIUM CHLORIDE 9 MG/ML
75 INJECTION, SOLUTION INTRAVENOUS CONTINUOUS
Status: DISCONTINUED | OUTPATIENT
Start: 2021-03-08 | End: 2021-03-08 | Stop reason: HOSPADM

## 2021-03-08 RX ORDER — DIPHENHYDRAMINE HYDROCHLORIDE 50 MG/ML
50 INJECTION INTRAMUSCULAR; INTRAVENOUS ONCE
Status: DISCONTINUED | OUTPATIENT
Start: 2021-03-08 | End: 2021-03-08

## 2021-03-08 RX ORDER — ONDANSETRON 2 MG/ML
4 INJECTION INTRAMUSCULAR; INTRAVENOUS EVERY 6 HOURS PRN
Status: DISCONTINUED | OUTPATIENT
Start: 2021-03-08 | End: 2021-03-08 | Stop reason: HOSPADM

## 2021-03-08 RX ORDER — MORPHINE SULFATE 2 MG/ML
2 INJECTION, SOLUTION INTRAMUSCULAR; INTRAVENOUS
Status: DISCONTINUED | OUTPATIENT
Start: 2021-03-08 | End: 2021-03-08 | Stop reason: HOSPADM

## 2021-03-08 RX ORDER — SODIUM CHLORIDE 0.9 % (FLUSH) 0.9 %
10 SYRINGE (ML) INJECTION EVERY 12 HOURS SCHEDULED
Status: DISCONTINUED | OUTPATIENT
Start: 2021-03-08 | End: 2021-03-08 | Stop reason: SDUPTHER

## 2021-03-08 RX ORDER — HEPARIN SODIUM 1000 [USP'U]/ML
5000 INJECTION, SOLUTION INTRAVENOUS; SUBCUTANEOUS ONCE
Status: COMPLETED | OUTPATIENT
Start: 2021-03-08 | End: 2021-03-08

## 2021-03-08 RX ORDER — SODIUM CHLORIDE 0.9 % (FLUSH) 0.9 %
10 SYRINGE (ML) INJECTION PRN
Status: DISCONTINUED | OUTPATIENT
Start: 2021-03-08 | End: 2021-03-08 | Stop reason: SDUPTHER

## 2021-03-08 RX ORDER — SODIUM CHLORIDE 0.9 % (FLUSH) 0.9 %
10 SYRINGE (ML) INJECTION PRN
Status: DISCONTINUED | OUTPATIENT
Start: 2021-03-08 | End: 2021-03-08 | Stop reason: HOSPADM

## 2021-03-08 RX ORDER — FENTANYL CITRATE 50 UG/ML
25 INJECTION, SOLUTION INTRAMUSCULAR; INTRAVENOUS
Status: DISCONTINUED | OUTPATIENT
Start: 2021-03-08 | End: 2021-03-08 | Stop reason: HOSPADM

## 2021-03-08 RX ORDER — HYDROCODONE BITARTRATE AND ACETAMINOPHEN 5; 325 MG/1; MG/1
1 TABLET ORAL EVERY 8 HOURS PRN
Status: DISCONTINUED | OUTPATIENT
Start: 2021-03-08 | End: 2021-03-08 | Stop reason: HOSPADM

## 2021-03-08 RX ORDER — ATROPINE SULFATE 0.4 MG/ML
0.5 AMPUL (ML) INJECTION
Status: DISCONTINUED | OUTPATIENT
Start: 2021-03-08 | End: 2021-03-08 | Stop reason: HOSPADM

## 2021-03-08 RX ORDER — NITROGLYCERIN 0.4 MG/1
0.4 TABLET SUBLINGUAL EVERY 5 MIN PRN
Status: DISCONTINUED | OUTPATIENT
Start: 2021-03-08 | End: 2021-03-08 | Stop reason: HOSPADM

## 2021-03-08 RX ADMIN — FENTANYL CITRATE 50 MCG: 50 INJECTION INTRAMUSCULAR; INTRAVENOUS at 09:19

## 2021-03-08 RX ADMIN — FENTANYL CITRATE 25 MCG: 50 INJECTION, SOLUTION INTRAMUSCULAR; INTRAVENOUS at 13:09

## 2021-03-08 RX ADMIN — HEPARIN SODIUM 5000 UNITS: 1000 INJECTION INTRAVENOUS; SUBCUTANEOUS at 09:01

## 2021-03-08 RX ADMIN — MIDAZOLAM 1 MG: 1 INJECTION INTRAMUSCULAR; INTRAVENOUS at 08:33

## 2021-03-08 RX ADMIN — FENTANYL CITRATE 50 MCG: 50 INJECTION INTRAMUSCULAR; INTRAVENOUS at 08:31

## 2021-03-08 RX ADMIN — IOPAMIDOL 160 ML: 612 INJECTION, SOLUTION INTRAVENOUS at 09:35

## 2021-03-08 RX ADMIN — MIDAZOLAM 1 MG: 1 INJECTION INTRAMUSCULAR; INTRAVENOUS at 08:31

## 2021-03-08 RX ADMIN — MIDAZOLAM 1 MG: 1 INJECTION INTRAMUSCULAR; INTRAVENOUS at 08:59

## 2021-03-08 RX ADMIN — FENTANYL CITRATE 50 MCG: 50 INJECTION INTRAMUSCULAR; INTRAVENOUS at 08:59

## 2021-03-08 RX ADMIN — SODIUM CHLORIDE: 9 INJECTION, SOLUTION INTRAVENOUS at 06:43

## 2021-03-08 RX ADMIN — HYDROCODONE BITARTRATE AND ACETAMINOPHEN 1 TABLET: 5; 325 TABLET ORAL at 10:50

## 2021-03-08 ASSESSMENT — PAIN DESCRIPTION - PROGRESSION
CLINICAL_PROGRESSION: GRADUALLY IMPROVING
CLINICAL_PROGRESSION: GRADUALLY WORSENING
CLINICAL_PROGRESSION: GRADUALLY IMPROVING

## 2021-03-08 ASSESSMENT — PAIN DESCRIPTION - ORIENTATION: ORIENTATION: LOWER

## 2021-03-08 ASSESSMENT — PAIN SCALES - GENERAL
PAINLEVEL_OUTOF10: 8
PAINLEVEL_OUTOF10: 4
PAINLEVEL_OUTOF10: 4

## 2021-03-08 ASSESSMENT — PAIN DESCRIPTION - DESCRIPTORS: DESCRIPTORS: ACHING

## 2021-03-08 ASSESSMENT — PAIN DESCRIPTION - LOCATION
LOCATION: LEG;FOOT;BUTTOCKS
LOCATION: BACK

## 2021-03-08 ASSESSMENT — PAIN DESCRIPTION - PAIN TYPE
TYPE: CHRONIC PAIN
TYPE: CHRONIC PAIN

## 2021-03-08 ASSESSMENT — PAIN DESCRIPTION - ONSET: ONSET: ON-GOING

## 2021-03-08 NOTE — PROGRESS NOTES
2505 Patient received in IR for angiogram of the lower extremities with possible intervention. 2115 This procedure has been fully reviewed with the patient and written informed consent has been obtained. 2179 Procedure started with Dr. Maddie Light. 4102 Access into the right femoral artery and 5fr sheath placed. 0840 Angiogram of lower extremities started. 4684 Exchanging sheath for a 6fr ANL . Proceeding into intervention. 0900 Left external Iliac arterial pressure 122/51 (91).  0910 Dr. Sera Dawkins in the room and reviewing the films with Dr. Maddie Light. 0615 Procedure completed; patient tolerated well. ACT drawn and running. 0919 ACt=246. Complains of back pain rated an 8 on scale 1-10 and gave Fentanyl. See MAR.  6531 Exchanged 6fr ANL1 sheath to a short 6fr sheath. 8001 Transduced line to the right femoral artery 6fr sheath. Tegaderm over to secure. Pain is rated a 4 on scale 1-10 in her back. 4282 Patient on bed; comfort ensured. Still has back pain. Stated she gets bad back pain now and then  0940 Called report to [facility] on 2e. 7850 Patient taken to 2e via bed.

## 2021-03-08 NOTE — PROGRESS NOTES
Pt called out in tears, having pain at lower back. RN in room. Pt in tears, shivering stating 10/10 pain at lower back. RN had pt roll to right side, so rolled blanket could be placed under lower back. After patient rolled back, right groin site felt slightly firm, either small hematoma or pt is just so tense with her pain. Manual pressure applied, pt still crying in pain. RN called for additional assistance from IR. Ramakrishna Kaiser from IR in room and took over manual pressure and right groin site. RN called Dr Purnima Nguyễn. RN back in room and helped to turn pt to her right side and prop pillow under her left hip. Dr Purnima Nguyễn in room. See orders. Pt states pain now 8/10. Ramakrishna Kaiser from IR held a total of 20 minutes to right groin. Site soft, no evidence of bleeding or hematoma. Pt medicated, see MAR. Within 5 mins of IV pain med, pt rates pain 4/10. PT's  arrived back to room. Pt is calm and relaxed. Right groin remains soft. Pt remains propped to right side. Awaiting transport to CT scan. Will continue to monitor.

## 2021-03-08 NOTE — TELEPHONE ENCOUNTER
Patient scheduled for surgery on 3/16/21  She is on ASA 81 mg - when does she need to d/c prior to surgery? Please advise asap. Thank you.

## 2021-03-08 NOTE — PROGRESS NOTES
Care taken over from IR, right groin stable. Arterial sheath intact, flushes easily. No bleeding or hematoma noted. Patient given post procedure safety instructions, patient verbalized understanding. Pt's  at bedside. Pt c/o back pain, has chronic pain. See flowsheets. Dr Candelaria Carrion for PRN pain medication- no response at this time. 0.9 NS infusing. Call light within reach. Will continue to monitor.

## 2021-03-08 NOTE — PROGRESS NOTES
Pt admitted to  2E09 ambulatory for peripheral angiogram with Dr Yajaira Duff. Pt NPO. Patient accompanied by her , Ally Stephens. Vital signs obtained. Assessment and data collection initiated. Oriented to room. Policies and procedures for 2E explained   All questions answered with no further questions at this time. Fall prevention and safety precautions discussed with patient.

## 2021-03-08 NOTE — H&P
both feet    KNEE SURGERY Left     reconstruction of knee    LUMBAR DISCECTOMY  09/22/2020    Dr. Wayne Mena [Cilostazol] Other (See Comments)     Pt states numbness in tongue, heart palpitations, itchy, nausea    Bactrim [Sulfamethoxazole-Trimethoprim] Rash    Codeine Nausea And Vomiting    Medrol [Methylprednisolone] Palpitations    Ultram [Tramadol Hcl] Other (See Comments)     Pt states causes shaking and feels bad         MEDICATIONS   Coumadin Use Last 5 Days [x]No []Yes  Antiplatelet drug therapy use last 5 days  []No [x]Yes  Other anticoagulant use last 5 days  [x]No []Yes      No current facility-administered medications on file prior to encounter. Current Outpatient Medications on File Prior to Encounter   Medication Sig Dispense Refill    vitamin D (ERGOCALCIFEROL) 1.25 MG (53061 UT) CAPS capsule Take 1 capsule by mouth once a week 12 capsule 0    alendronate (FOSAMAX) 70 MG tablet Take 1 tablet by mouth every 7 days 12 tablet 3    HYDROcodone-acetaminophen (NORCO) 5-325 MG per tablet Take 1 tablet by mouth every 8 hours as needed for Pain for up to 30 days.  90 tablet 0    clopidogrel (PLAVIX) 75 MG tablet Take 1 tablet by mouth daily 30 tablet 0    ondansetron (ZOFRAN ODT) 4 MG disintegrating tablet Take 1 tablet by mouth every 8 hours as needed for Nausea or Vomiting 20 tablet 0    cyclobenzaprine (FLEXERIL) 10 MG tablet Take 1 tablet by mouth 3 times daily as needed for Muscle spasms 60 tablet 0    atorvastatin (LIPITOR) 20 MG tablet Take 1 tablet by mouth daily 90 tablet 3    ibuprofen (ADVIL;MOTRIN) 800 MG tablet TAKE 1 TABLET BY MOUTH THREE TIMES A DAY FOR 30 DAYS (Patient taking differently: Take 800 mg by mouth 2 times daily as needed ) 90 tablet 3    famotidine (PEPCID) 20 MG tablet Take 1 tablet by mouth 2 times daily 60 tablet 3    amLODIPine (NORVASC) 10 MG tablet TAKE 1 TABLET BY MOUTH ONE TIME A DAY  90 tablet 3    lisinopril (PRINIVIL;ZESTRIL) 10 MG tablet Take 10 mg by mouth 2 times daily       acetaminophen (TYLENOL) 500 MG tablet Take 500 mg by mouth every 6 hours as needed for Pain      aspirin 81 MG EC tablet Take 81 mg by mouth daily         Current Facility-Administered Medications   Medication Dose Route Frequency Provider Last Rate Last Admin    0.9 % sodium chloride infusion   Intravenous Continuous Olden Player, APRN - CNP 75 mL/hr at 03/08/21 0643 New Bag at 03/08/21 5133    aspirin tablet 325 mg  325 mg Oral Once Olden Player, APRN - CNP        famotidine (PEPCID) injection 20 mg  20 mg Intravenous Once Olden Player, APRN - CNP        nitroGLYCERIN (NITROSTAT) SL tablet 0.4 mg  0.4 mg Sublingual Q5 Min PRN Olden Player, APRN - CNP        sodium chloride flush 0.9 % injection 10 mL  10 mL Intravenous 2 times per day Olden Player, APRN - CNP        sodium chloride flush 0.9 % injection 10 mL  10 mL Intravenous PRN Olden Player, APRN - CNP        fentaNYL (SUBLIMAZE) injection 50 mcg  50 mcg Intravenous Once Ann Marie Meier MD        midazolam PF (VERSED) injection 1 mg  1 mg Intravenous Once Teja Hayward MD                 PHYSICAL:     /62 Comment: left  Pulse 90   Resp 20   Ht 5' (1.524 m)   Wt 120 lb (54.4 kg)   SpO2 98%   BMI 23.44 kg/m²     Heart:  [x]Regular rate and rhythm  []Other:    Lungs:  [x]Clear    []Other:    Abdomen: [x]Soft    []Other:    Mental Status: [x]Alert & Oriented  []Other:   Ext:                [x]No edema       []Other:         No results for input(s): CKTOTAL, CKMB, CKMBINDEX, TROPONINI in the last 72 hours.     Lab Results   Component Value Date    WBC 13.0 03/08/2021    RBC 3.99 03/08/2021    HGB 12.9 03/08/2021    HCT 40.5 03/08/2021    .5 03/08/2021    MCH 32.3 03/08/2021    MCHC 31.9 03/08/2021     03/08/2021    MPV 9.7 03/08/2021       Lab Results   Component Value Date     03/08/2021    K 4.5 03/08/2021  03/08/2021    CO2 22 03/08/2021    BUN 7 03/08/2021    LABALBU 4.4 02/15/2021    CREATININE 0.6 03/08/2021    CALCIUM 9.8 03/08/2021    LABGLOM >90 03/08/2021    GLUCOSE 135 03/08/2021       Lab Results   Component Value Date    ALKPHOS 59 02/15/2021    ALT 27 02/15/2021    AST 29 02/15/2021    PROT 7.2 02/15/2021    BILITOT 0.2 02/15/2021    BILIDIR <0.2 09/03/2020    LABALBU 4.4 02/15/2021       Lab Results   Component Value Date    MG 2.1 09/25/2020       No components found for: PTPATWAR, PTINRWAR    No results found for: LABA1C    Lab Results   Component Value Date    TRIG 181 03/08/2021    HDL 67 03/08/2021    LDLCALC 85 03/08/2021       Lab Results   Component Value Date    TSH 0.808 09/03/2020            SEDATION  Planned agent:[x]Midazolam []Meperidine [x]Sublimaze []Morphine []Diazepam  []Other:         ASA Classification:  []1 [x]2 []3 []4 []5  Class 1: A normal healthy patient  Class 2: Pt with mild to moderate systemic disease  Class 3: Severe systemic disease or disturbance  Class 4: Severe systemic disorders that are already life threatening. Class 5: Moribund pt with little chances of survival, for more than 24 hours. Mallampati I Airway Classification:   []1 [x]2 []3 []4      [x]Pre-procedure diagnostic studies complete and results available. Comment:    [x]Previous sedation/anesthesia experiences assessed. Comment:    [x]The patient is an appropriate candidate to undergo the planned procedure sedation and anesthesia. (Refer to nursing sedation/analgesia documentation record)  [x]Formulation and discussion of sedation/procedure plan, risks, and expectations with patient and/or responsible adult completed. [x]Patient examined immediately prior to the procedure.  (Refer to nursing sedation/analgesia documentation record)        Susanne Corea MD, Clare Perry  Electronically signed 3/8/2021 at 8:27 AM

## 2021-03-08 NOTE — PROGRESS NOTES
6fr arterial sheath removed from right groin by MARY Simms. Tip intact. Manual pressure applied with Quick-clot x 20 minutes. Site remains soft. No bleeding or hematoma. Tegaderam and 4x4 applied over quick-clot. Pt tolerated sheath pull well.

## 2021-03-08 NOTE — PROGRESS NOTES
PT OOB and then ambulated in the [place] without complication. Right groin with dressing dry and intact. No bleeding or hematoma. Pt returned to chair, will continue to monitor.

## 2021-03-08 NOTE — PROGRESS NOTES
Right groin soft, no bleeding or palpable hematoma. Home-bonifacio given and discussed with patient and her . Both verbalized understanding of how to care for procedure site, activity restrictions and stopping her Plavix. Dr Aurelia Dallas office will be in touch with patient to schedule surgery. Pt discharged home per wheelchair with her  via central transport.

## 2021-03-08 NOTE — OP NOTE
[address] [address]  Sedation/Analgesia Post Sedation Record        Pt Name: Renée Shah  MRN: [id number]  YOB: 1965  Procedure Performed By: Kassy Bo MD, Mellissa Henson, [address]  Primary Care Physician: SHERYL Perez CNP        POST-PROCEDURE    Physicians/Assistants: Kassy Bo MD, SAM Steven    Procedure Performed:  Lower ext angiogram                                  Sedation/Anesthesia:  Local Anesthesia and IV Conscious Sedation with continuous O2 monitoring    Estimated Blood Loss: 10 cc     Specimens Removed:  [x]None []Other:      Disposition of Specimen:  []Pathology []Other        Complications:   [x]None Immediate []Other:       Post Procedure Diagnosis/Findings:    Peripheral Artery Disease   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 China Balderas  Vascular surgery to schedule for endarterectomy  Monitor groin access site.                Kassy Bo MD, Mellissa Henson, [address]  Electronically signed 3/8/2021 at 9:34 AM

## 2021-03-09 ENCOUNTER — TELEPHONE (OUTPATIENT)
Dept: FAMILY MEDICINE CLINIC | Age: 56
End: 2021-03-09

## 2021-03-09 NOTE — TELEPHONE ENCOUNTER
[facility] Transitions Initial Follow Up Call    Call within 2 business days of discharge: Yes     Patient: David Youssef Patient : 1965    MRN: [id number]  Reason for Admission: Claudication in peripheral vascular disease    Discharge Date: 3/8/21 RARS: No data recorded     Spoke with: LM for call back    Discharge department/facility: [facility]    Non-face-to-face services provided:       Follow Up  Future Appointments   Date Time Provider Jim Song   3/11/2021  3:00 PM SHERYL Parmar - CNP [facility]   3/12/2021  1:00 PM [facility] 1 STR[facility] PAT [facility] HOD   6/3/2021 10:00 AM Alonzo Doran MD N [facility]   2022 11:00 AM SHERYL Shetty CNP N SRPX [facility] [place] [address]

## 2021-03-09 NOTE — TELEPHONE ENCOUNTER
Surgery scheduled for 3/16/21 at 730AM with Dr Jes Paniagua surgery instructions are as follows:  - Arrive to hospitals at [facility] at 530AM  - NPO after midnight the night before surgery  - STOP taking ASA 81MG 7 days prior to surgery (3/9/21)  - STOP taking LISINOPRIL 10 MG 2 days prior to surgery (3/14/21)  - PAT visit on Friday 3/12/21 at 1200PM  - COVI-19 test to be done tomorrow if possible. Order placed. LMOM to contact office to discuss information.

## 2021-03-10 ENCOUNTER — TELEPHONE (OUTPATIENT)
Dept: CARDIOTHORACIC SURGERY | Age: 56
End: 2021-03-10

## 2021-03-10 RX ORDER — SODIUM CHLORIDE 0.9 % (FLUSH) 0.9 %
10 SYRINGE (ML) INJECTION PRN
Status: CANCELLED | OUTPATIENT
Start: 2021-03-10

## 2021-03-10 RX ORDER — SODIUM CHLORIDE 0.9 % (FLUSH) 0.9 %
10 SYRINGE (ML) INJECTION EVERY 12 HOURS SCHEDULED
Status: CANCELLED | OUTPATIENT
Start: 2021-03-10

## 2021-03-10 NOTE — TELEPHONE ENCOUNTER
Spoke to patient. Informed her of surgery preps. She will get COVID-19 test today at [facility]. She voiced understanding. Denied questions or concerns at this time.

## 2021-03-10 NOTE — TELEPHONE ENCOUNTER
PRIOR AUTHORIZATION:    Satinder Sanchez [facility] to initiate authorization  Spoke to Woo zaldivar who informed me that an inpatient request will need a prior auth form filled out and faxed with clinicals  Call reference # [id number]    Faxed form and clinicals to [phone]

## 2021-03-10 NOTE — PROGRESS NOTES
PAT appointment reminder call  Arrival time and location given 3/12/21 arrive at 8755  Bring drivers license and insurance  Bring list of medications with dosage and frequency  If possible bring caregiver for appointment  Appointment may last 2 hours

## 2021-03-11 ENCOUNTER — OFFICE VISIT (OUTPATIENT)
Dept: FAMILY MEDICINE CLINIC | Age: 56
End: 2021-03-11
Payer: COMMERCIAL

## 2021-03-11 ENCOUNTER — HOSPITAL ENCOUNTER (OUTPATIENT)
Age: 56
Discharge: HOME | End: 2021-03-11
Payer: COMMERCIAL

## 2021-03-11 VITALS
BODY MASS INDEX: 23.24 KG/M2 | RESPIRATION RATE: 18 BRPM | SYSTOLIC BLOOD PRESSURE: 128 MMHG | HEART RATE: 90 BPM | DIASTOLIC BLOOD PRESSURE: 60 MMHG | WEIGHT: 119 LBS | TEMPERATURE: 98.2 F

## 2021-03-11 DIAGNOSIS — I73.9 CLAUDICATION IN PERIPHERAL VASCULAR DISEASE (HCC): ICD-10-CM

## 2021-03-11 DIAGNOSIS — Z87.891 HISTORY OF SMOKING 30 OR MORE PACK YEARS: ICD-10-CM

## 2021-03-11 DIAGNOSIS — Z98.890 STATUS POST ANGIOGRAPHY OF EXTREMITY: ICD-10-CM

## 2021-03-11 DIAGNOSIS — Z23 NEED FOR SHINGLES VACCINE: ICD-10-CM

## 2021-03-11 DIAGNOSIS — G89.29 CHRONIC RADICULAR LUMBAR PAIN: ICD-10-CM

## 2021-03-11 DIAGNOSIS — Z09 HOSPITAL DISCHARGE FOLLOW-UP: Primary | ICD-10-CM

## 2021-03-11 DIAGNOSIS — Z71.6 ENCOUNTER FOR SMOKING CESSATION COUNSELING: ICD-10-CM

## 2021-03-11 DIAGNOSIS — M54.16 CHRONIC RADICULAR LUMBAR PAIN: ICD-10-CM

## 2021-03-11 PROCEDURE — 99214 OFFICE O/P EST MOD 30 MIN: CPT | Performed by: NURSE PRACTITIONER

## 2021-03-11 PROCEDURE — 90750 HZV VACC RECOMBINANT IM: CPT | Performed by: NURSE PRACTITIONER

## 2021-03-11 PROCEDURE — 90471 IMMUNIZATION ADMIN: CPT | Performed by: NURSE PRACTITIONER

## 2021-03-11 PROCEDURE — U0003 INFECTIOUS AGENT DETECTION BY NUCLEIC ACID (DNA OR RNA); SEVERE ACUTE RESPIRATORY SYNDROME CORONAVIRUS 2 (SARS-COV-2) (CORONAVIRUS DISEASE [COVID-19]), AMPLIFIED PROBE TECHNIQUE, MAKING USE OF HIGH THROUGHPUT TECHNOLOGIES AS DESCRIBED BY CMS-2020-01-R: HCPCS

## 2021-03-11 RX ORDER — HYDROCODONE BITARTRATE AND ACETAMINOPHEN 5; 325 MG/1; MG/1
1 TABLET ORAL EVERY 8 HOURS PRN
Qty: 90 TABLET | Refills: 0 | Status: SHIPPED | OUTPATIENT
Start: 2021-03-11 | End: 2021-03-12 | Stop reason: SDUPTHER

## 2021-03-11 ASSESSMENT — ENCOUNTER SYMPTOMS
RHINORRHEA: 0
SORE THROAT: 0
BACK PAIN: 0
EYE REDNESS: 0
SHORTNESS OF BREATH: 0
WHEEZING: 0
EYE PAIN: 0
NAUSEA: 0
TROUBLE SWALLOWING: 0
ABDOMINAL PAIN: 0
DIARRHEA: 0
COUGH: 0
ALLERGIC/IMMUNOLOGIC NEGATIVE: 1
EYE DISCHARGE: 0
VOMITING: 0
CONSTIPATION: 0

## 2021-03-11 NOTE — PROGRESS NOTES
Immunizations Administered     Name Date Dose Route    Zoster Recombinant (Shingrix) 3/11/2021 0.5 mL Intramuscular    Site: Deltoid- Left    Lot: [id number]    [address]: 22557-807-24

## 2021-03-11 NOTE — TELEPHONE ENCOUNTER
UPDATE:    NO AUTHORIZATION REQUIRED  CPT code 36795 is covered by patient's health care plan when medically necessary  Facility will need approval upon admission and during length of stay

## 2021-03-11 NOTE — PROGRESS NOTES
[address] [address] [address]  [facility] [address]  Dept: [phone]  Dept Fax: [phone]  Loc: [phone]     Visit Date:  3/11/2021      Patient:  Alcira Herrmann  YOB: 1965    HPI:     Chief Complaint   Patient presents with    Follow-up     from angiogram procedure she had done 02/08. Tuesday she gets bypass done on her hip for the blood clot.  Immunizations     due for 2nd shingrix        Patient following up after recent angiogram of her left hip and femoral artery. Patient is found to have 100% blockage and this is the reason for her longstanding left leg pain. She is scheduled for an arterial bypass within a couple of weeks. She is also due for her second Shingrix vaccine today. Also medication refill for pain control related to her poor circulation in the left leg and foot. Medications    Current Outpatient Medications:     nicotine polacrilex (NICORETTE) 2 MG gum, Take 1 each by mouth as needed for Smoking cessation, Disp: 110 each, Rfl: 3    HYDROcodone-acetaminophen (NORCO) 5-325 MG per tablet, Take 1 tablet by mouth every 8 hours as needed for Pain for up to 30 days. , Disp: 90 tablet, Rfl: 0    vitamin D (ERGOCALCIFEROL) 1.25 MG (85318 UT) CAPS capsule, Take 1 capsule by mouth once a week, Disp: 12 capsule, Rfl: 0    alendronate (FOSAMAX) 70 MG tablet, Take 1 tablet by mouth every 7 days, Disp: 12 tablet, Rfl: 3    ondansetron (ZOFRAN ODT) 4 MG disintegrating tablet, Take 1 tablet by mouth every 8 hours as needed for Nausea or Vomiting, Disp: 20 tablet, Rfl: 0    cyclobenzaprine (FLEXERIL) 10 MG tablet, Take 1 tablet by mouth 3 times daily as needed for Muscle spasms, Disp: 60 tablet, Rfl: 0    atorvastatin (LIPITOR) 20 MG tablet, Take 1 tablet by mouth daily, Disp: 90 tablet, Rfl: 3    ibuprofen (ADVIL;MOTRIN) 800 MG tablet, TAKE 1 TABLET BY MOUTH THREE TIMES A DAY FOR 30 DAYS (Patient taking differently: Take 800 mg by mouth 2 times daily as needed ), Disp: 90 tablet, Rfl: 3    famotidine (PEPCID) 20 MG tablet, Take 1 tablet by mouth 2 times daily, Disp: 60 tablet, Rfl: 3    amLODIPine (NORVASC) 10 MG tablet, TAKE 1 TABLET BY MOUTH ONE TIME A DAY , Disp: 90 tablet, Rfl: 3    lisinopril (PRINIVIL;ZESTRIL) 10 MG tablet, Take 10 mg by mouth 2 times daily , Disp: , Rfl:     acetaminophen (TYLENOL) 500 MG tablet, Take 500 mg by mouth every 6 hours as needed for Pain, Disp: , Rfl:     aspirin 81 MG EC tablet, Take 81 mg by mouth daily, Disp: , Rfl:     The patient is allergic to pletal [cilostazol]; bactrim [sulfamethoxazole-trimethoprim]; codeine; medrol [methylprednisolone]; and ultram [tramadol hcl]. Past Medical History  Darwin Doctor  has a past medical history of Arthritis, Blood circulation, collateral, Hyperlipidemia, Hypertension, and PAD (peripheral artery disease) ([facility] [address] Subjective:      Review of Systems   Constitutional: Negative for activity change, fatigue and fever. HENT: Negative for congestion, ear pain, rhinorrhea, sore throat and trouble swallowing. Eyes: Negative for pain, discharge and redness. Respiratory: Negative for cough, shortness of breath and wheezing. Cardiovascular: Negative. Gastrointestinal: Negative for abdominal pain, constipation, diarrhea, nausea and vomiting. Endocrine: Negative. Genitourinary: Negative for dysuria, frequency and urgency. Musculoskeletal: Positive for arthralgias, gait problem and myalgias. Negative for back pain. Skin: Negative for rash. Allergic/Immunologic: Negative. Neurological: Negative for dizziness, tremors, weakness and headaches. Hematological: Negative. Psychiatric/Behavioral: Negative for dysphoric mood and sleep disturbance. The patient is not nervous/anxious.         Objective:     /60   Pulse 90   Temp 98.2 °F (36.8 °C) (Oral)   Resp 18   Wt 119 lb (54 kg)   BMI 23.24 kg/m²     Physical Exam  Constitutional:       General: She is not in acute distress. Appearance: She is well-developed. She is not diaphoretic. HENT:      Right Ear: External ear normal.      Left Ear: External ear normal.      Nose: Nose normal.   Eyes:      General:         Right eye: No discharge. Left eye: No discharge. Conjunctiva/sclera: Conjunctivae normal.      Pupils: Pupils are equal, round, and reactive to light. Neck:      Musculoskeletal: Normal range of motion. Vascular: No JVD. Cardiovascular:      Rate and Rhythm: Normal rate and regular rhythm. Pulmonary:      Effort: Pulmonary effort is normal. No respiratory distress. Musculoskeletal: Normal range of motion. General: Tenderness present. No deformity. Right lower leg: No edema. Left lower leg: No edema. Skin:     General: Skin is warm and dry. Capillary Refill: Capillary refill takes less than 2 seconds. Coloration: Skin is not pale. Findings: No erythema or rash. Neurological:      Mental Status: She is alert and oriented to person, place, and time. Coordination: Coordination normal.   Psychiatric:         Behavior: Behavior normal.         Thought Content: Thought content normal.         Judgment: Judgment normal.         Assessment/Plan:      Carrie Ladd was seen today for follow-up and immunizations. Diagnoses and all orders for this visit:    Hospital discharge follow-up    Claudication in peripheral vascular disease ([address]  -     HYDROcodone-acetaminophen (NORCO) 5-325 MG per tablet; Take 1 tablet by mouth every 8 hours as needed for Pain for up to 30 days. Status post angiography of extremity     Noted lack of pedal pulse in the left foot. Encounter for smoking cessation counseling  -     nicotine polacrilex (NICORETTE) 2 MG gum; Take 1 each by mouth as needed for Smoking cessation    Chronic radicular lumbar pain  -     HYDROcodone-acetaminophen (NORCO) 5-325 MG per tablet;  Take 1 tablet by mouth every 8 hours as needed for Pain for up to 30 days. Return if symptoms worsen or fail to improve. Patient instructions given andreviewed.         Electronically signed by SHERYL Quintanilla CNP on 3/11/2021 at 3:08 PM

## 2021-03-12 ENCOUNTER — HOSPITAL ENCOUNTER (OUTPATIENT)
Dept: PREADMISSION TESTING | Age: 56
Discharge: HOME | End: 2021-03-16
Payer: COMMERCIAL

## 2021-03-12 VITALS
HEIGHT: 60 IN | SYSTOLIC BLOOD PRESSURE: 101 MMHG | HEART RATE: 77 BPM | WEIGHT: 119.05 LBS | TEMPERATURE: 97.7 F | RESPIRATION RATE: 16 BRPM | BODY MASS INDEX: 23.37 KG/M2 | DIASTOLIC BLOOD PRESSURE: 55 MMHG

## 2021-03-12 DIAGNOSIS — Z01.818 PREOP TESTING: ICD-10-CM

## 2021-03-12 LAB
ABO: NORMAL
ANION GAP SERPL CALCULATED.3IONS-SCNC: 14 MEQ/L (ref 8–16)
ANTIBODY SCREEN: NORMAL
AVERAGE GLUCOSE: 93 MG/DL (ref 70–126)
BILIRUBIN URINE: NEGATIVE
BLOOD, URINE: NEGATIVE
BUN BLDV-MCNC: 9 MG/DL (ref 7–22)
CALCIUM SERPL-MCNC: 9.6 MG/DL (ref 8.5–10.5)
CHARACTER, URINE: CLEAR
CHLORIDE BLD-SCNC: 105 MEQ/L (ref 98–111)
CO2: 21 MEQ/L (ref 23–33)
COLOR: YELLOW
CREAT SERPL-MCNC: 0.5 MG/DL (ref 0.4–1.2)
ERYTHROCYTE [DISTWIDTH] IN BLOOD BY AUTOMATED COUNT: 13 % (ref 11.5–14.5)
ERYTHROCYTE [DISTWIDTH] IN BLOOD BY AUTOMATED COUNT: 49.2 FL (ref 35–45)
GFR SERPL CREATININE-BSD FRML MDRD: > 90 ML/MIN/1.73M2
GLUCOSE BLD-MCNC: 112 MG/DL (ref 70–108)
GLUCOSE, URINE: NEGATIVE MG/DL
HBA1C MFR BLD: 5.1 % (ref 4.4–6.4)
HCT VFR BLD CALC: 35.7 % (ref 37–47)
HEMOGLOBIN: 11.5 GM/DL (ref 12–16)
INR BLD: 0.96 (ref 0.85–1.13)
KETONES, URINE: NEGATIVE
LEUKOCYTE EST, POC: NEGATIVE
MCH RBC QN AUTO: 33.1 PG (ref 26–33)
MCHC RBC AUTO-ENTMCNC: 32.2 GM/DL (ref 32.2–35.5)
MCV RBC AUTO: 102.9 FL (ref 81–99)
MRSA SCREEN RT-PCR: NEGATIVE
NITRITE, URINE: NEGATIVE
PH UA: 6 (ref 5–9)
PLATELET # BLD: 304 THOU/MM3 (ref 130–400)
PMV BLD AUTO: 9.9 FL (ref 9.4–12.4)
POTASSIUM SERPL-SCNC: 4.4 MEQ/L (ref 3.5–5.2)
PROTEIN UA: NEGATIVE MG/DL
RBC # BLD: 3.47 MILL/MM3 (ref 4.2–5.4)
RH FACTOR: NORMAL
SARS-COV-2: NOT DETECTED
SODIUM BLD-SCNC: 140 MEQ/L (ref 135–145)
SOURCE: NORMAL
SPECIFIC GRAVITY UA: 1 (ref 1–1.03)
UROBILINOGEN, URINE: 0.2 EU/DL (ref 0–1)
WBC # BLD: 12.9 THOU/MM3 (ref 4.8–10.8)

## 2021-03-12 PROCEDURE — 80048 BASIC METABOLIC PNL TOTAL CA: CPT

## 2021-03-12 PROCEDURE — 87081 CULTURE SCREEN ONLY: CPT

## 2021-03-12 PROCEDURE — 87641 MR-STAPH DNA AMP PROBE: CPT

## 2021-03-12 PROCEDURE — 81003 URINALYSIS AUTO W/O SCOPE: CPT

## 2021-03-12 PROCEDURE — 83036 HEMOGLOBIN GLYCOSYLATED A1C: CPT

## 2021-03-12 PROCEDURE — 85027 COMPLETE CBC AUTOMATED: CPT

## 2021-03-12 PROCEDURE — 85610 PROTHROMBIN TIME: CPT

## 2021-03-12 PROCEDURE — 86901 BLOOD TYPING SEROLOGIC RH(D): CPT

## 2021-03-12 PROCEDURE — 36415 COLL VENOUS BLD VENIPUNCTURE: CPT

## 2021-03-12 PROCEDURE — 86900 BLOOD TYPING SEROLOGIC ABO: CPT

## 2021-03-12 PROCEDURE — 86850 RBC ANTIBODY SCREEN: CPT

## 2021-03-12 PROCEDURE — 87147 CULTURE TYPE IMMUNOLOGIC: CPT

## 2021-03-12 RX ORDER — HYDROCODONE BITARTRATE AND ACETAMINOPHEN 5; 325 MG/1; MG/1
1 TABLET ORAL EVERY 6 HOURS PRN
Qty: 120 TABLET | Refills: 0 | Status: ON HOLD | OUTPATIENT
Start: 2021-03-12 | End: 2021-03-18 | Stop reason: HOSPADM

## 2021-03-12 ASSESSMENT — PAIN DESCRIPTION - LOCATION: LOCATION: LEG

## 2021-03-12 ASSESSMENT — PAIN DESCRIPTION - ORIENTATION: ORIENTATION: LEFT

## 2021-03-12 ASSESSMENT — PAIN SCALES - GENERAL: PAINLEVEL_OUTOF10: 4

## 2021-03-12 NOTE — PROGRESS NOTES
NPO after midnight  Bring insurance info and 's license  Wear comfortable clean clothing  Do not bring jewelry   Shower as instructed prior to surgery  Bring medications in original bottles  Follow all instructions given by your physician   needed at discharge   Routine preop instructions given for pain, hand hygiene, fall prevention, infection prevention, anesthesia, and coughing and deep breathing.  Do not shave the surgical area! If needed, your nurse will use clippers to remove any excess hair at the surgical site when you come in for surgery. Do not use a razor on the site of your surgery for at least 2 days prior to surgery because shaving can leave tiny nicks in the skin which may allow germs to enter and cause infection. Information regarding hand hygiene, MRSA, general anesthesia, fall precautions, coughing and deep breathing, and blood transfusions reviewed and handouts given to patient. Questions answered. Call [facility] [phone] for any questions  Report to [facility] [place] on 2nd floor  If you would become ill prior to surgery, please call the surgeon  Please bring and wear mask  You will be receiving a phone call one or two days before surgery to review covid screening exposure    Covid screening questionnaire complete and negative for symptoms or exposure see chart for documentation. Covid test negative 3/11/21 at [facility]. [facility]    Please limit your exposure to the public after you have your covid test  Please call your doctor immediately if you develop any symptoms of covid prior to your surgery    [employer]® KIT SHOWER [employer]   __3/14______ (date)   FIRST SHOWER: Two days before surgery: Take a shower and wash your entire body, including your hair and scalp in the following manner:   Wash your hair using normal shampoo. Make sure you rinse the shampoo from your hair and body. Wash your face with your regular soap or cleanser.    Use one of the sponges in the [employer] and apply 1/3 of the Chlorhexidine soap to the sponge, wash from your neck down. This is very important. Do not use the soap on your face or hair and avoid private areas.  Rinse your body thoroughly. This is very important.  Using a fresh, clean towel, dry your body.  Dress in freshly washed clothes.  Do not use lotions, powders, or creams after this shower. __3/15______ (date)   SECOND SHOWER: The day before surgery: Take a shower and wash your entire body, including your hair and scalp in the following manner:   Wash your hair using normal shampoo. Make sure you rinse the shampoo from your hair and body. Wash your face with your regular soap or cleanser.  Use one of the sponges in the [facility] kit and apply 1/3 of the Chlorhexidine soap to the sponge, wash from your neck down. This is very important. Do not use the soap on your face or hair and avoid private areas.  Rinse your body thoroughly. This is very important.  Using a fresh, clean towel, dry your body.  Dress in freshly washed clothes.  Fresh clean sheets and pillow cases should be used after this shower.  Do not use lotions, powders, or creams after this shower. __3/16______ (date)   THE FINAL SHOWER: The morning of surgery: Take a shower and wash your entire body, including your hair and scalp in the following manner:   Wash your hair using normal shampoo. Make sure you rinse the shampoo from your hair and body. Wash your face with your regular soap or cleanser.  Use one of the sponges in the [facility] kit and apply 1/3 of the Chlorhexidine soap to the sponge, wash from your neck down. This is very important. Do not use the soap on your face or hair and avoid private areas.  Rinse your body thoroughly. This is very important.  Using a fresh, clean towel, dry your body.  Dress warmly with freshly washed clean clothes. Keeping warm before surgery decreases your risk of developing and infection.    Do not use lotions, powders, or creams after this shower.

## 2021-03-12 NOTE — PROGRESS NOTES
In preparation for their surgical procedure above patient was screened for Obstructive Sleep Apnea (ZOILA) using the STOP-Bang Questionnaire by the Pre-Admission Testing department. This is a pre-surgical screening tool for patient safety and serves as a recommendation, this WILL NOT cause cancellation of surgery. STOP-Bang Questionnaire  * Do you currently see a pulmonologist?  No     If yes STOP, do not complete. Patient follows with Dr.     1.  Do you snore loudly (able to be heard in the next room)? No    2. Do you often feel tired or sleepy during the daytime? No       3. Has anyone ever told you that you stop breathing during your sleep? No    4. Do you have or are you being treated for high blood pressure? Yes      5. BMI more than 35? BMI (Calculated): 23.4        No    6. Age over 48 years? 64 y.o. Yes    7. Neck Circumference greater than 17 inches for male or 16 inches for female? Measured           (visits only)            Not Applicable    8. Gender Male? No      TOTAL SCORE: 2    ZOILA - Low Risk : Yes to 0 - 2 questions  ZOILA - Intermediate Risk : Yes to 3 - 4 questions  ZOILA - High Risk : Yes to 5 - 8 questions    Adapted from:   STOP Questionnaire: A Tool to Screen Patients for Obstructive Sleep Apnea   [facility] [facility] CHARLA Mayers.B.B.S., Leonarda Henry M.D., [facility]. Chika Young, Ph.D., CHARLA Khoury.B.B.S., CHARLA Bolaños.Sc., Anita Vazquez M.D., [facility]. JEREMY Spivey.P.C.    Anesthesiology 2008; 737:046-70 Copyright 2008, the [address] of Anesthesiologists, [facility] 37.   ----------------------------------------------------------------------------------------------------------------

## 2021-03-14 LAB — VRE CULTURE: NORMAL

## 2021-03-15 ENCOUNTER — ANESTHESIA EVENT (OUTPATIENT)
Dept: OPERATING ROOM | Age: 56
DRG: 254 | End: 2021-03-15
Payer: COMMERCIAL

## 2021-03-15 LAB — MRSA SCREEN: NORMAL

## 2021-03-15 NOTE — PROGRESS NOTES
Attempted to contact Patient regarding COVID-19 screen. No answer at this time.   Test was done on 3/11/21 at [facility]

## 2021-03-16 ENCOUNTER — HOSPITAL ENCOUNTER (INPATIENT)
Age: 56
LOS: 2 days | Discharge: HOME | DRG: 254 | End: 2021-03-18
Attending: THORACIC SURGERY (CARDIOTHORACIC VASCULAR SURGERY) | Admitting: THORACIC SURGERY (CARDIOTHORACIC VASCULAR SURGERY)
Payer: COMMERCIAL

## 2021-03-16 ENCOUNTER — ANESTHESIA (OUTPATIENT)
Dept: OPERATING ROOM | Age: 56
DRG: 254 | End: 2021-03-16
Payer: COMMERCIAL

## 2021-03-16 VITALS — OXYGEN SATURATION: 100 % | DIASTOLIC BLOOD PRESSURE: 60 MMHG | SYSTOLIC BLOOD PRESSURE: 132 MMHG | TEMPERATURE: 97 F

## 2021-03-16 DIAGNOSIS — E78.00 HIGH CHOLESTEROL: ICD-10-CM

## 2021-03-16 DIAGNOSIS — I73.9 PAD (PERIPHERAL ARTERY DISEASE) (HCC): Primary | ICD-10-CM

## 2021-03-16 LAB
MRSA SCREEN RT-PCR: NEGATIVE
VANCOMYCIN RESISTANT ENTEROCOCCUS: NEGATIVE

## 2021-03-16 PROCEDURE — C1768 GRAFT, VASCULAR: HCPCS | Performed by: THORACIC SURGERY (CARDIOTHORACIC VASCULAR SURGERY)

## 2021-03-16 PROCEDURE — 6370000000 HC RX 637 (ALT 250 FOR IP): Performed by: PHYSICIAN ASSISTANT

## 2021-03-16 PROCEDURE — 6360000002 HC RX W HCPCS

## 2021-03-16 PROCEDURE — 6360000002 HC RX W HCPCS: Performed by: PHYSICIAN ASSISTANT

## 2021-03-16 PROCEDURE — APPSS60 APP SPLIT SHARED TIME 46-60 MINUTES: Performed by: PHYSICIAN ASSISTANT

## 2021-03-16 PROCEDURE — 3700000000 HC ANESTHESIA ATTENDED CARE: Performed by: THORACIC SURGERY (CARDIOTHORACIC VASCULAR SURGERY)

## 2021-03-16 PROCEDURE — 88311 DECALCIFY TISSUE: CPT

## 2021-03-16 PROCEDURE — 2500000003 HC RX 250 WO HCPCS

## 2021-03-16 PROCEDURE — 87641 MR-STAPH DNA AMP PROBE: CPT

## 2021-03-16 PROCEDURE — 2580000003 HC RX 258

## 2021-03-16 PROCEDURE — 3600000004 HC SURGERY LEVEL 4 BASE: Performed by: THORACIC SURGERY (CARDIOTHORACIC VASCULAR SURGERY)

## 2021-03-16 PROCEDURE — 88304 TISSUE EXAM BY PATHOLOGIST: CPT

## 2021-03-16 PROCEDURE — 04CL0ZZ EXTIRPATION OF MATTER FROM LEFT FEMORAL ARTERY, OPEN APPROACH: ICD-10-PCS | Performed by: THORACIC SURGERY (CARDIOTHORACIC VASCULAR SURGERY)

## 2021-03-16 PROCEDURE — 35371 RECHANNELING OF ARTERY: CPT | Performed by: PHYSICIAN ASSISTANT

## 2021-03-16 PROCEDURE — 7100000000 HC PACU RECOVERY - FIRST 15 MIN: Performed by: THORACIC SURGERY (CARDIOTHORACIC VASCULAR SURGERY)

## 2021-03-16 PROCEDURE — 94761 N-INVAS EAR/PLS OXIMETRY MLT: CPT

## 2021-03-16 PROCEDURE — 2580000003 HC RX 258: Performed by: THORACIC SURGERY (CARDIOTHORACIC VASCULAR SURGERY)

## 2021-03-16 PROCEDURE — 6360000002 HC RX W HCPCS: Performed by: ANESTHESIOLOGY

## 2021-03-16 PROCEDURE — 2700000000 HC OXYGEN THERAPY PER DAY

## 2021-03-16 PROCEDURE — 7100000001 HC PACU RECOVERY - ADDTL 15 MIN: Performed by: THORACIC SURGERY (CARDIOTHORACIC VASCULAR SURGERY)

## 2021-03-16 PROCEDURE — 3700000001 HC ADD 15 MINUTES (ANESTHESIA): Performed by: THORACIC SURGERY (CARDIOTHORACIC VASCULAR SURGERY)

## 2021-03-16 PROCEDURE — 2000000000 HC ICU R&B

## 2021-03-16 PROCEDURE — 87081 CULTURE SCREEN ONLY: CPT

## 2021-03-16 PROCEDURE — 6360000002 HC RX W HCPCS: Performed by: THORACIC SURGERY (CARDIOTHORACIC VASCULAR SURGERY)

## 2021-03-16 PROCEDURE — 35371 RECHANNELING OF ARTERY: CPT | Performed by: THORACIC SURGERY (CARDIOTHORACIC VASCULAR SURGERY)

## 2021-03-16 PROCEDURE — 87086 URINE CULTURE/COLONY COUNT: CPT

## 2021-03-16 PROCEDURE — 2720000010 HC SURG SUPPLY STERILE: Performed by: THORACIC SURGERY (CARDIOTHORACIC VASCULAR SURGERY)

## 2021-03-16 PROCEDURE — 87500 VANOMYCIN DNA AMP PROBE: CPT

## 2021-03-16 PROCEDURE — 3600000014 HC SURGERY LEVEL 4 ADDTL 15MIN: Performed by: THORACIC SURGERY (CARDIOTHORACIC VASCULAR SURGERY)

## 2021-03-16 PROCEDURE — 2580000003 HC RX 258: Performed by: PHYSICIAN ASSISTANT

## 2021-03-16 PROCEDURE — 04UL07Z SUPPLEMENT LEFT FEMORAL ARTERY WITH AUTOLOGOUS TISSUE SUBSTITUTE, OPEN APPROACH: ICD-10-PCS | Performed by: THORACIC SURGERY (CARDIOTHORACIC VASCULAR SURGERY)

## 2021-03-16 PROCEDURE — 2709999900 HC NON-CHARGEABLE SUPPLY: Performed by: THORACIC SURGERY (CARDIOTHORACIC VASCULAR SURGERY)

## 2021-03-16 DEVICE — XENOSURE BIOLOGIC PATCH, 0.8CM X 8CM, EIFU
Type: IMPLANTABLE DEVICE | Status: FUNCTIONAL
Brand: XENOSURE BIOLOGIC PATCH

## 2021-03-16 RX ORDER — MIDAZOLAM HYDROCHLORIDE 1 MG/ML
INJECTION INTRAMUSCULAR; INTRAVENOUS PRN
Status: DISCONTINUED | OUTPATIENT
Start: 2021-03-16 | End: 2021-03-16 | Stop reason: SDUPTHER

## 2021-03-16 RX ORDER — LABETALOL 20 MG/4 ML (5 MG/ML) INTRAVENOUS SYRINGE
PRN
Status: DISCONTINUED | OUTPATIENT
Start: 2021-03-16 | End: 2021-03-16 | Stop reason: SDUPTHER

## 2021-03-16 RX ORDER — OXYCODONE HYDROCHLORIDE 5 MG/1
5 TABLET ORAL EVERY 4 HOURS PRN
Status: DISCONTINUED | OUTPATIENT
Start: 2021-03-16 | End: 2021-03-18 | Stop reason: HOSPADM

## 2021-03-16 RX ORDER — LISINOPRIL 10 MG/1
10 TABLET ORAL 2 TIMES DAILY
Status: DISCONTINUED | OUTPATIENT
Start: 2021-03-16 | End: 2021-03-18 | Stop reason: HOSPADM

## 2021-03-16 RX ORDER — PROPOFOL 10 MG/ML
INJECTION, EMULSION INTRAVENOUS PRN
Status: DISCONTINUED | OUTPATIENT
Start: 2021-03-16 | End: 2021-03-16 | Stop reason: SDUPTHER

## 2021-03-16 RX ORDER — HEPARIN SODIUM 1000 [USP'U]/ML
INJECTION, SOLUTION INTRAVENOUS; SUBCUTANEOUS PRN
Status: DISCONTINUED | OUTPATIENT
Start: 2021-03-16 | End: 2021-03-16 | Stop reason: SDUPTHER

## 2021-03-16 RX ORDER — FENTANYL CITRATE 50 UG/ML
50 INJECTION, SOLUTION INTRAMUSCULAR; INTRAVENOUS EVERY 5 MIN PRN
Status: DISCONTINUED | OUTPATIENT
Start: 2021-03-16 | End: 2021-03-16 | Stop reason: HOSPADM

## 2021-03-16 RX ORDER — SODIUM CHLORIDE 0.9 % (FLUSH) 0.9 %
10 SYRINGE (ML) INJECTION EVERY 12 HOURS SCHEDULED
Status: DISCONTINUED | OUTPATIENT
Start: 2021-03-16 | End: 2021-03-16 | Stop reason: HOSPADM

## 2021-03-16 RX ORDER — ONDANSETRON 2 MG/ML
INJECTION INTRAMUSCULAR; INTRAVENOUS PRN
Status: DISCONTINUED | OUTPATIENT
Start: 2021-03-16 | End: 2021-03-16 | Stop reason: SDUPTHER

## 2021-03-16 RX ORDER — ALENDRONATE SODIUM 70 MG/1
70 TABLET ORAL
Status: DISCONTINUED | OUTPATIENT
Start: 2021-03-16 | End: 2021-03-16 | Stop reason: RX

## 2021-03-16 RX ORDER — ACETAMINOPHEN 325 MG/1
650 TABLET ORAL EVERY 4 HOURS PRN
Status: DISCONTINUED | OUTPATIENT
Start: 2021-03-16 | End: 2021-03-18 | Stop reason: HOSPADM

## 2021-03-16 RX ORDER — AMLODIPINE BESYLATE 10 MG/1
10 TABLET ORAL DAILY
Status: DISCONTINUED | OUTPATIENT
Start: 2021-03-16 | End: 2021-03-18 | Stop reason: HOSPADM

## 2021-03-16 RX ORDER — SODIUM CHLORIDE 0.9 % (FLUSH) 0.9 %
10 SYRINGE (ML) INJECTION PRN
Status: DISCONTINUED | OUTPATIENT
Start: 2021-03-16 | End: 2021-03-16 | Stop reason: HOSPADM

## 2021-03-16 RX ORDER — PHENYLEPHRINE HYDROCHLORIDE 10 MG/ML
INJECTION INTRAVENOUS PRN
Status: DISCONTINUED | OUTPATIENT
Start: 2021-03-16 | End: 2021-03-16 | Stop reason: SDUPTHER

## 2021-03-16 RX ORDER — PROTAMINE SULFATE 10 MG/ML
INJECTION, SOLUTION INTRAVENOUS PRN
Status: DISCONTINUED | OUTPATIENT
Start: 2021-03-16 | End: 2021-03-16 | Stop reason: SDUPTHER

## 2021-03-16 RX ORDER — LIDOCAINE HCL/PF 100 MG/5ML
SYRINGE (ML) INJECTION PRN
Status: DISCONTINUED | OUTPATIENT
Start: 2021-03-16 | End: 2021-03-16 | Stop reason: SDUPTHER

## 2021-03-16 RX ORDER — PROMETHAZINE HYDROCHLORIDE 25 MG/1
12.5 TABLET ORAL EVERY 6 HOURS PRN
Status: DISCONTINUED | OUTPATIENT
Start: 2021-03-16 | End: 2021-03-18 | Stop reason: HOSPADM

## 2021-03-16 RX ORDER — POLYETHYLENE GLYCOL 3350 17 G
2 POWDER IN PACKET (EA) ORAL PRN
Status: DISCONTINUED | OUTPATIENT
Start: 2021-03-16 | End: 2021-03-18 | Stop reason: HOSPADM

## 2021-03-16 RX ORDER — FAMOTIDINE 20 MG/1
20 TABLET, FILM COATED ORAL 2 TIMES DAILY PRN
Status: DISCONTINUED | OUTPATIENT
Start: 2021-03-16 | End: 2021-03-18 | Stop reason: HOSPADM

## 2021-03-16 RX ORDER — MORPHINE SULFATE 4 MG/ML
4 INJECTION, SOLUTION INTRAMUSCULAR; INTRAVENOUS
Status: DISCONTINUED | OUTPATIENT
Start: 2021-03-16 | End: 2021-03-18 | Stop reason: HOSPADM

## 2021-03-16 RX ORDER — OXYCODONE HYDROCHLORIDE 5 MG/1
10 TABLET ORAL EVERY 4 HOURS PRN
Status: DISCONTINUED | OUTPATIENT
Start: 2021-03-16 | End: 2021-03-18 | Stop reason: HOSPADM

## 2021-03-16 RX ORDER — LABETALOL 20 MG/4 ML (5 MG/ML) INTRAVENOUS SYRINGE
5 EVERY 10 MIN PRN
Status: DISCONTINUED | OUTPATIENT
Start: 2021-03-16 | End: 2021-03-16 | Stop reason: HOSPADM

## 2021-03-16 RX ORDER — SODIUM CHLORIDE 0.9 % (FLUSH) 0.9 %
10 SYRINGE (ML) INJECTION PRN
Status: DISCONTINUED | OUTPATIENT
Start: 2021-03-16 | End: 2021-03-18 | Stop reason: HOSPADM

## 2021-03-16 RX ORDER — SODIUM CHLORIDE 0.9 % (FLUSH) 0.9 %
10 SYRINGE (ML) INJECTION EVERY 12 HOURS SCHEDULED
Status: DISCONTINUED | OUTPATIENT
Start: 2021-03-16 | End: 2021-03-18 | Stop reason: HOSPADM

## 2021-03-16 RX ORDER — DEXTROSE AND SODIUM CHLORIDE 5; .45 G/100ML; G/100ML
INJECTION, SOLUTION INTRAVENOUS CONTINUOUS
Status: DISCONTINUED | OUTPATIENT
Start: 2021-03-16 | End: 2021-03-18 | Stop reason: HOSPADM

## 2021-03-16 RX ORDER — SODIUM CHLORIDE, SODIUM LACTATE, POTASSIUM CHLORIDE, CALCIUM CHLORIDE 600; 310; 30; 20 MG/100ML; MG/100ML; MG/100ML; MG/100ML
INJECTION, SOLUTION INTRAVENOUS CONTINUOUS PRN
Status: DISCONTINUED | OUTPATIENT
Start: 2021-03-16 | End: 2021-03-16 | Stop reason: SDUPTHER

## 2021-03-16 RX ORDER — MORPHINE SULFATE 2 MG/ML
2 INJECTION, SOLUTION INTRAMUSCULAR; INTRAVENOUS
Status: DISCONTINUED | OUTPATIENT
Start: 2021-03-16 | End: 2021-03-18 | Stop reason: HOSPADM

## 2021-03-16 RX ORDER — CYCLOBENZAPRINE HCL 10 MG
10 TABLET ORAL 3 TIMES DAILY PRN
Status: DISCONTINUED | OUTPATIENT
Start: 2021-03-16 | End: 2021-03-18 | Stop reason: HOSPADM

## 2021-03-16 RX ORDER — ONDANSETRON 2 MG/ML
4 INJECTION INTRAMUSCULAR; INTRAVENOUS EVERY 6 HOURS PRN
Status: DISCONTINUED | OUTPATIENT
Start: 2021-03-16 | End: 2021-03-18 | Stop reason: HOSPADM

## 2021-03-16 RX ORDER — ATORVASTATIN CALCIUM 20 MG/1
20 TABLET, FILM COATED ORAL DAILY
Status: DISCONTINUED | OUTPATIENT
Start: 2021-03-16 | End: 2021-03-18 | Stop reason: HOSPADM

## 2021-03-16 RX ORDER — SODIUM CHLORIDE 9 MG/ML
INJECTION, SOLUTION INTRAVENOUS CONTINUOUS PRN
Status: DISCONTINUED | OUTPATIENT
Start: 2021-03-16 | End: 2021-03-16 | Stop reason: SDUPTHER

## 2021-03-16 RX ORDER — ROCURONIUM BROMIDE 10 MG/ML
INJECTION, SOLUTION INTRAVENOUS PRN
Status: DISCONTINUED | OUTPATIENT
Start: 2021-03-16 | End: 2021-03-16 | Stop reason: SDUPTHER

## 2021-03-16 RX ORDER — FENTANYL CITRATE 50 UG/ML
INJECTION, SOLUTION INTRAMUSCULAR; INTRAVENOUS PRN
Status: DISCONTINUED | OUTPATIENT
Start: 2021-03-16 | End: 2021-03-16 | Stop reason: SDUPTHER

## 2021-03-16 RX ORDER — ONDANSETRON 2 MG/ML
4 INJECTION INTRAMUSCULAR; INTRAVENOUS
Status: DISCONTINUED | OUTPATIENT
Start: 2021-03-16 | End: 2021-03-16 | Stop reason: HOSPADM

## 2021-03-16 RX ORDER — MEPERIDINE HYDROCHLORIDE 25 MG/ML
12.5 INJECTION INTRAMUSCULAR; INTRAVENOUS; SUBCUTANEOUS EVERY 5 MIN PRN
Status: DISCONTINUED | OUTPATIENT
Start: 2021-03-16 | End: 2021-03-16 | Stop reason: HOSPADM

## 2021-03-16 RX ORDER — DEXAMETHASONE SODIUM PHOSPHATE 10 MG/ML
INJECTION, EMULSION INTRAMUSCULAR; INTRAVENOUS PRN
Status: DISCONTINUED | OUTPATIENT
Start: 2021-03-16 | End: 2021-03-16 | Stop reason: SDUPTHER

## 2021-03-16 RX ADMIN — FENTANYL CITRATE 50 MCG: 50 INJECTION, SOLUTION INTRAMUSCULAR; INTRAVENOUS at 09:04

## 2021-03-16 RX ADMIN — CEFAZOLIN 2 G: 10 INJECTION, POWDER, FOR SOLUTION INTRAVENOUS at 08:43

## 2021-03-16 RX ADMIN — PROTAMINE SULFATE 20 MG: 10 INJECTION, SOLUTION INTRAVENOUS at 11:21

## 2021-03-16 RX ADMIN — PROTAMINE SULFATE 30 MG: 10 INJECTION, SOLUTION INTRAVENOUS at 11:12

## 2021-03-16 RX ADMIN — LABETALOL 20 MG/4 ML (5 MG/ML) INTRAVENOUS SYRINGE 5 MG: at 12:02

## 2021-03-16 RX ADMIN — SODIUM CHLORIDE: 9 INJECTION, SOLUTION INTRAVENOUS at 08:02

## 2021-03-16 RX ADMIN — SODIUM CHLORIDE, PRESERVATIVE FREE 10 ML: 5 INJECTION INTRAVENOUS at 20:27

## 2021-03-16 RX ADMIN — PHENYLEPHRINE HYDROCHLORIDE 100 MCG: 10 INJECTION INTRAVENOUS at 08:31

## 2021-03-16 RX ADMIN — PROPOFOL 100 MG: 10 INJECTION, EMULSION INTRAVENOUS at 09:06

## 2021-03-16 RX ADMIN — SODIUM CHLORIDE, POTASSIUM CHLORIDE, SODIUM LACTATE AND CALCIUM CHLORIDE: 600; 310; 30; 20 INJECTION, SOLUTION INTRAVENOUS at 11:52

## 2021-03-16 RX ADMIN — CYCLOBENZAPRINE 10 MG: 10 TABLET, FILM COATED ORAL at 16:31

## 2021-03-16 RX ADMIN — HEPARIN SODIUM 3000 UNITS: 1000 INJECTION INTRAVENOUS; SUBCUTANEOUS at 10:36

## 2021-03-16 RX ADMIN — OXYCODONE 10 MG: 5 TABLET ORAL at 19:03

## 2021-03-16 RX ADMIN — LABETALOL 20 MG/4 ML (5 MG/ML) INTRAVENOUS SYRINGE 5 MG: at 09:24

## 2021-03-16 RX ADMIN — ONDANSETRON HYDROCHLORIDE 4 MG: 4 INJECTION, SOLUTION INTRAMUSCULAR; INTRAVENOUS at 11:03

## 2021-03-16 RX ADMIN — HYDROMORPHONE HYDROCHLORIDE 0.25 MG: 1 INJECTION, SOLUTION INTRAMUSCULAR; INTRAVENOUS; SUBCUTANEOUS at 12:40

## 2021-03-16 RX ADMIN — LISINOPRIL 10 MG: 10 TABLET ORAL at 14:40

## 2021-03-16 RX ADMIN — OXYCODONE 10 MG: 5 TABLET ORAL at 23:15

## 2021-03-16 RX ADMIN — MORPHINE SULFATE 4 MG: 4 INJECTION, SOLUTION INTRAMUSCULAR; INTRAVENOUS at 13:35

## 2021-03-16 RX ADMIN — ROCURONIUM BROMIDE 50 MG: 10 INJECTION INTRAVENOUS at 08:10

## 2021-03-16 RX ADMIN — PROPOFOL 50 MG: 10 INJECTION, EMULSION INTRAVENOUS at 08:14

## 2021-03-16 RX ADMIN — FENTANYL CITRATE 100 MCG: 50 INJECTION, SOLUTION INTRAMUSCULAR; INTRAVENOUS at 11:49

## 2021-03-16 RX ADMIN — LABETALOL 20 MG/4 ML (5 MG/ML) INTRAVENOUS SYRINGE 5 MG: at 12:07

## 2021-03-16 RX ADMIN — Medication 100 MG: at 08:09

## 2021-03-16 RX ADMIN — FENTANYL CITRATE 50 MCG: 50 INJECTION, SOLUTION INTRAMUSCULAR; INTRAVENOUS at 08:40

## 2021-03-16 RX ADMIN — HYDROMORPHONE HYDROCHLORIDE 0.25 MG: 1 INJECTION, SOLUTION INTRAMUSCULAR; INTRAVENOUS; SUBCUTANEOUS at 12:35

## 2021-03-16 RX ADMIN — FENTANYL CITRATE 50 MCG: 50 INJECTION, SOLUTION INTRAMUSCULAR; INTRAVENOUS at 12:19

## 2021-03-16 RX ADMIN — FENTANYL CITRATE 50 MCG: 50 INJECTION, SOLUTION INTRAMUSCULAR; INTRAVENOUS at 10:20

## 2021-03-16 RX ADMIN — SODIUM CHLORIDE, POTASSIUM CHLORIDE, SODIUM LACTATE AND CALCIUM CHLORIDE: 600; 310; 30; 20 INJECTION, SOLUTION INTRAVENOUS at 09:14

## 2021-03-16 RX ADMIN — LABETALOL 20 MG/4 ML (5 MG/ML) INTRAVENOUS SYRINGE 5 MG: at 09:07

## 2021-03-16 RX ADMIN — OXYCODONE 10 MG: 5 TABLET ORAL at 14:36

## 2021-03-16 RX ADMIN — MORPHINE SULFATE 4 MG: 4 INJECTION, SOLUTION INTRAMUSCULAR; INTRAVENOUS at 20:32

## 2021-03-16 RX ADMIN — MORPHINE SULFATE 4 MG: 4 INJECTION, SOLUTION INTRAMUSCULAR; INTRAVENOUS at 17:46

## 2021-03-16 RX ADMIN — PROTAMINE SULFATE 20 MG: 10 INJECTION, SOLUTION INTRAVENOUS at 11:52

## 2021-03-16 RX ADMIN — FENTANYL CITRATE 50 MCG: 50 INJECTION, SOLUTION INTRAMUSCULAR; INTRAVENOUS at 12:11

## 2021-03-16 RX ADMIN — LISINOPRIL 10 MG: 10 TABLET ORAL at 20:28

## 2021-03-16 RX ADMIN — MIDAZOLAM HYDROCHLORIDE 2 MG: 1 INJECTION, SOLUTION INTRAMUSCULAR; INTRAVENOUS at 07:59

## 2021-03-16 RX ADMIN — MORPHINE SULFATE 4 MG: 4 INJECTION, SOLUTION INTRAMUSCULAR; INTRAVENOUS at 15:35

## 2021-03-16 RX ADMIN — PHENYLEPHRINE HYDROCHLORIDE 100 MCG: 10 INJECTION INTRAVENOUS at 08:37

## 2021-03-16 RX ADMIN — CEFAZOLIN 2000 MG: 10 INJECTION, POWDER, FOR SOLUTION INTRAVENOUS at 16:34

## 2021-03-16 RX ADMIN — FENTANYL CITRATE 100 MCG: 50 INJECTION, SOLUTION INTRAMUSCULAR; INTRAVENOUS at 09:44

## 2021-03-16 RX ADMIN — DEXAMETHASONE SODIUM PHOSPHATE 10 MG: 10 INJECTION, EMULSION INTRAMUSCULAR; INTRAVENOUS at 08:40

## 2021-03-16 RX ADMIN — ROCURONIUM BROMIDE 50 MG: 10 INJECTION INTRAVENOUS at 09:06

## 2021-03-16 RX ADMIN — ATORVASTATIN CALCIUM 20 MG: 20 TABLET, FILM COATED ORAL at 14:40

## 2021-03-16 RX ADMIN — HYDROMORPHONE HYDROCHLORIDE 0.25 MG: 1 INJECTION, SOLUTION INTRAMUSCULAR; INTRAVENOUS; SUBCUTANEOUS at 12:50

## 2021-03-16 RX ADMIN — FENTANYL CITRATE 100 MCG: 50 INJECTION, SOLUTION INTRAMUSCULAR; INTRAVENOUS at 08:09

## 2021-03-16 RX ADMIN — PROPOFOL 150 MG: 10 INJECTION, EMULSION INTRAVENOUS at 08:09

## 2021-03-16 RX ADMIN — PHENYLEPHRINE HYDROCHLORIDE 50 MCG: 10 INJECTION INTRAVENOUS at 08:51

## 2021-03-16 RX ADMIN — AMLODIPINE BESYLATE 10 MG: 10 TABLET ORAL at 14:40

## 2021-03-16 RX ADMIN — HYDROMORPHONE HYDROCHLORIDE 0.25 MG: 1 INJECTION, SOLUTION INTRAMUSCULAR; INTRAVENOUS; SUBCUTANEOUS at 12:45

## 2021-03-16 RX ADMIN — HEPARIN SODIUM 5000 UNITS: 1000 INJECTION INTRAVENOUS; SUBCUTANEOUS at 09:33

## 2021-03-16 RX ADMIN — DEXTROSE AND SODIUM CHLORIDE: 5; 450 INJECTION, SOLUTION INTRAVENOUS at 14:38

## 2021-03-16 RX ADMIN — FENTANYL CITRATE 50 MCG: 50 INJECTION, SOLUTION INTRAMUSCULAR; INTRAVENOUS at 10:53

## 2021-03-16 RX ADMIN — MORPHINE SULFATE 4 MG: 4 INJECTION, SOLUTION INTRAMUSCULAR; INTRAVENOUS at 22:40

## 2021-03-16 ASSESSMENT — PULMONARY FUNCTION TESTS
PIF_VALUE: 2
PIF_VALUE: 18
PIF_VALUE: 18
PIF_VALUE: 15
PIF_VALUE: 18
PIF_VALUE: 18
PIF_VALUE: 17
PIF_VALUE: 4
PIF_VALUE: 18
PIF_VALUE: 26
PIF_VALUE: 18
PIF_VALUE: 15
PIF_VALUE: 17
PIF_VALUE: 18
PIF_VALUE: 19
PIF_VALUE: 15
PIF_VALUE: 18
PIF_VALUE: 15
PIF_VALUE: 18
PIF_VALUE: 15
PIF_VALUE: 18
PIF_VALUE: 17
PIF_VALUE: 17
PIF_VALUE: 15
PIF_VALUE: 18
PIF_VALUE: 17
PIF_VALUE: 17
PIF_VALUE: 16
PIF_VALUE: 18
PIF_VALUE: 19
PIF_VALUE: 18
PIF_VALUE: 15
PIF_VALUE: 21
PIF_VALUE: 18
PIF_VALUE: 15
PIF_VALUE: 19
PIF_VALUE: 18
PIF_VALUE: 12
PIF_VALUE: 18
PIF_VALUE: 17
PIF_VALUE: 18
PIF_VALUE: 15
PIF_VALUE: 18
PIF_VALUE: 19
PIF_VALUE: 18
PIF_VALUE: 17
PIF_VALUE: 12
PIF_VALUE: 17
PIF_VALUE: 17
PIF_VALUE: 0
PIF_VALUE: 19
PIF_VALUE: 18
PIF_VALUE: 18
PIF_VALUE: 19
PIF_VALUE: 18
PIF_VALUE: 17
PIF_VALUE: 18
PIF_VALUE: 1
PIF_VALUE: 18
PIF_VALUE: 19
PIF_VALUE: 18
PIF_VALUE: 18
PIF_VALUE: 0
PIF_VALUE: 18
PIF_VALUE: 16
PIF_VALUE: 18
PIF_VALUE: 15
PIF_VALUE: 17
PIF_VALUE: 18
PIF_VALUE: 17
PIF_VALUE: 18
PIF_VALUE: 19
PIF_VALUE: 17
PIF_VALUE: 18
PIF_VALUE: 0
PIF_VALUE: 18
PIF_VALUE: 18
PIF_VALUE: 17
PIF_VALUE: 18
PIF_VALUE: 19
PIF_VALUE: 18
PIF_VALUE: 15
PIF_VALUE: 15
PIF_VALUE: 17
PIF_VALUE: 18
PIF_VALUE: 17
PIF_VALUE: 18
PIF_VALUE: 17
PIF_VALUE: 17
PIF_VALUE: 15
PIF_VALUE: 19
PIF_VALUE: 17
PIF_VALUE: 1
PIF_VALUE: 19
PIF_VALUE: 17
PIF_VALUE: 19
PIF_VALUE: 17
PIF_VALUE: 17
PIF_VALUE: 18
PIF_VALUE: 17
PIF_VALUE: 18
PIF_VALUE: 18
PIF_VALUE: 19
PIF_VALUE: 19
PIF_VALUE: 18
PIF_VALUE: 2
PIF_VALUE: 18
PIF_VALUE: 18
PIF_VALUE: 15
PIF_VALUE: 18
PIF_VALUE: 18
PIF_VALUE: 1
PIF_VALUE: 18
PIF_VALUE: 17
PIF_VALUE: 18
PIF_VALUE: 18

## 2021-03-16 ASSESSMENT — PAIN DESCRIPTION - DESCRIPTORS
DESCRIPTORS: ACHING;CONSTANT
DESCRIPTORS: ACHING
DESCRIPTORS: ACHING;CONSTANT
DESCRIPTORS: ACHING

## 2021-03-16 ASSESSMENT — PAIN SCALES - GENERAL
PAINLEVEL_OUTOF10: 10
PAINLEVEL_OUTOF10: 7
PAINLEVEL_OUTOF10: 4
PAINLEVEL_OUTOF10: 4
PAINLEVEL_OUTOF10: 9
PAINLEVEL_OUTOF10: 10

## 2021-03-16 ASSESSMENT — PAIN DESCRIPTION - ONSET
ONSET: ON-GOING
ONSET: ON-GOING

## 2021-03-16 ASSESSMENT — PAIN DESCRIPTION - LOCATION
LOCATION: BACK;LEG

## 2021-03-16 ASSESSMENT — PAIN DESCRIPTION - PROGRESSION
CLINICAL_PROGRESSION: GRADUALLY IMPROVING
CLINICAL_PROGRESSION: GRADUALLY IMPROVING

## 2021-03-16 ASSESSMENT — LIFESTYLE VARIABLES: SMOKING_STATUS: 1

## 2021-03-16 ASSESSMENT — PAIN DESCRIPTION - ORIENTATION: ORIENTATION: LEFT

## 2021-03-16 ASSESSMENT — PAIN DESCRIPTION - FREQUENCY: FREQUENCY: CONTINUOUS

## 2021-03-16 ASSESSMENT — PAIN DESCRIPTION - PAIN TYPE: TYPE: ACUTE PAIN;CHRONIC PAIN

## 2021-03-16 NOTE — H&P
Family History: This patient's family history includes Alcohol Abuse in her father; Coronary Art Dis in her brother; Diabetes in her maternal aunt and mother; Heart Disease in her brother, brother, and mother; Parkinsonism in her maternal cousin. Social History:  Parminder Venegas  reports that she has been smoking cigarettes. She has a 8.75 pack-year smoking history. She has never used smokeless tobacco. She reports current alcohol use. She reports current drug use. Frequency: 2.00 times per week. Drug: Marijuana. ROS:  Constitutional: Negative for activity change, chills, fatigue, fever and unexpected weight change. HENT: Negative for congestion, facial swelling, sore throat, and changes in voice. Eyes: Negative for photophobia, redness, itching and visual disturbance. Respiratory: Negative for apnea, choking, shortness of breath, wheezing and stridor. Cardiovascular: Negative for chest pain, palpitations and leg swelling. Gastrointestinal: Negative for abdominal distention, constipation, nausea and vomiting. Endocrine: Negative for cold intolerance, heat intolerance, polyphagia and polyuria. Musculoskeletal: Negative for arthralgias, gait problem, and myalgias. Skin: Negative for color change, rash and wound. Allergic/Immunologic: Negative for food allergies and immunocompromised state. Neurological: Negative for dizziness, tremors, speech difficulty, weakness, numbness and headaches. Hematological: Negative for adenopathy. Does not bruise/bleed easily. Psychiatric/Behavioral: Negative for agitation, confusion, and dysphoric mood. Physical Exam:   General appearance:  No apparent distress, appears stated age and cooperative. HEENT:  Normal cephalic, atraumatic without obvious deformity. Conjunctivae/corneas clear. Neck: Supple, with full range of motion. No jugular venous distention. Trachea midline. Respiratory:  Normal respiratory effort.  Clear to auscultation, bilaterally without rales/wheezes/rhonchi  Cardiovascular:  Regular rate and rhythm with normal S1/S2 without murmurs, rubs or gallops. Abdomen: Soft, non-tender, non-distended with normal bowel sounds. Musculoskeletal:  No clubbing, cyanosis or edema bilaterally. Full range of motion without deformity. Skin: Skin color, texture, turgor normal.  No rashes or lesions. Neurologic:  Neurovascularly intact without any focal sensory/motor deficits. Psychiatric:  Alert and oriented, thought content appropriate, normal insight. Peripheral Pulses: +2 palpable pulses over femoral artery. 1+ pulse over left femoral artery. Palpable pulse over right dorsalis pedis. Left dorsalis pedis pulses are not palpable. Hand-held Doppler exam: Biphasic Doppler signal over right DP and PT. Monophasic Doppler signal over left DP and PT. Assessment:   Patient Active Problem List   Diagnosis    Essential hypertension    Osteopenia    Carotid stenosis, non-symptomatic, bilateral    Chronic radicular lumbar pain    History of smoking 30 or more pack years    [facility]    PAD (peripheral artery disease) ([facility]     Plan: 3/16/21  1. Left femoral endarterectomy with Dr. Alex Valerio    The plan of care was discussed in detail with Dr. Alex Valerio    Issues discussed in detail with the patient, who understands and has no further questions. Time spent with patient: 80 minutes, of which more than 50% was spent counseling/coordinating the patient's care.     Electronically signed by Gallo Ashford PA-C on 3/16/2021 at 7:34 AM

## 2021-03-16 NOTE — OP NOTE
Operative Note      Patient: Dania Delgado  YOB: 1965  MRN: [id number]    Date of Procedure: 3/16/2021    Pre-Op Diagnosis: Critical limb ischemia, left lower extremity. Post-Op Diagnosis: Critical limb ischemia, left lower extremity, caused by calcified plaque. Name of procedure: Left common femoral artery endarterectomy and repair with bovine pericardial patch    Surgeon(s):  Chantel Samson MD    Assistant:   Physician Assistant: Ly Dash PA-C   Because of the complexity of the procedure, the physician assistant scrubbed throughout the surgery. Anesthesia: General    Estimated Blood Loss (mL): Less than 100 mL. Complications: None. Specimens:   ID Type Source Tests Collected by Time Destination   A : Left Femoral Plaque Tissue Leg SURGICAL PATHOLOGY Chantel Samson MD 3/16/2021 1010        Implants:  Implant Name Type Inv. Item Serial No.  Lot No. LRB No. Used Action   GRAFT VASC W0.8XL8CM THK0.35-0.75MM CAR PERICARD PROC BOV  GRAFT VASC W0.8XL8CM THK0.35-0.75MM CAR PERICARD PROC BOV  [facility] [employer] [id number] Left 1 Implanted         Drains:   Urethral Catheter Non-latex;Straight-tip; Temperature probe 16 fr (Active)   Urine Color Yellow 03/16/21 1217   Urine Appearance Clear 03/16/21 1217       Findings: Severely calcified plaque causing critical stenosis of common femoral artery. Detailed Description of Procedure: The patient was brought into the operating room, and was placed in a supine position. After a routine preparation, a general endotracheal anesthesia was obtained. An intraoperative ultrasound was performed to locate the left common femoral artery. Skin comfort was made. Timeout was called and all necessary measures were taken. The entire left leg and abdomen was painted and draped in a sterile fashion. A longitudinal skin incision was made. The left common femoral artery was exposed. After the dissection, vascular control was obtained.  The

## 2021-03-16 NOTE — PROGRESS NOTES
1217: pt arrived to pacu. Pt drowsy but arouses easily on command. Left femoral dressing cdi. Pedal and post tib pulses via doppler. Left foot warm with good color. Anderson with clear yellow urine. Left radial art line. 1230: Dr Panda Forth at bedside assessing patient.  No needs expressed  1237 : Rn called report to Toni Zacarias

## 2021-03-16 NOTE — ANESTHESIA PRE PROCEDURE
Department of Anesthesiology  Preprocedure Note       Name:  Thad Rojas   Age:  64 y.o.  :  1965                                          MRN:  [id number]         Date:  3/16/2021      Surgeon: Alex Reilly):  Ale Jarquin MD    Procedure: Procedure(s):  LEFT  FEMORAL ARTERY ENDARTERECTOMY    Medications prior to admission:   Prior to Admission medications    Medication Sig Start Date End Date Taking? Authorizing Provider   HYDROcodone-acetaminophen (NORCO) 5-325 MG per tablet Take 1 tablet by mouth every 6 hours as needed for Pain for up to 30 days.  3/12/21 4/11/21 Yes SHERYL James CNP   atorvastatin (LIPITOR) 20 MG tablet Take 1 tablet by mouth daily 21  Yes SHERYL James CNP   ibuprofen (ADVIL;MOTRIN) 800 MG tablet TAKE 1 TABLET BY MOUTH THREE TIMES A DAY FOR 30 DAYS  Patient taking differently: Take 800 mg by mouth 2 times daily as needed  21  Yes SHERYL James CNP   amLODIPine (NORVASC) 10 MG tablet TAKE 1 TABLET BY MOUTH ONE TIME A DAY  20  Yes SHERYL James CNP   acetaminophen (TYLENOL) 500 MG tablet Take 500 mg by mouth every 6 hours as needed for Pain   Yes Historical Provider, MD   nicotine polacrilex (NICORETTE) 2 MG gum Take 1 each by mouth as needed for Smoking cessation 3/11/21   SHERYL James CNP   vitamin D (ERGOCALCIFEROL) 1.25 MG (19383 UT) CAPS capsule Take 1 capsule by mouth once a week  Patient taking differently: Take 50,000 Units by mouth once a week 21  SHERYL Mederos CNP   alendronate (FOSAMAX) 70 MG tablet Take 1 tablet by mouth every 7 days  Patient taking differently: Take 70 mg by mouth every 7 days 21   SHERYL Mederos CNP   ondansetron (ZOFRAN ODT) 4 MG disintegrating tablet Take 1 tablet by mouth every 8 hours as needed for Nausea or Vomiting 2/15/21   Tracey Campa PA-C   cyclobenzaprine (FLEXERIL) 10 MG tablet Take 1 tablet by mouth 3 times daily as needed for Muscle spasms 1/12/21   Malcolm Henao APRN - CNP   famotidine (PEPCID) 20 MG tablet Take 1 tablet by mouth 2 times daily  Patient taking differently: Take 20 mg by mouth 2 times daily as needed  12/29/20   Daniela Encarnacion APRN - CNP   lisinopril (PRINIVIL;ZESTRIL) 10 MG tablet Take 10 mg by mouth 2 times daily     Historical Provider, MD   aspirin 81 MG EC tablet Take 81 mg by mouth daily    Historical Provider, MD       Current medications:    Current Facility-Administered Medications   Medication Dose Route Frequency Provider Last Rate Last Admin    ceFAZolin (ANCEF) 2000 mg in dextrose 5 % 50 mL IVPB  2,000 mg Intravenous On Call to [address] [place], [place] [address]RADHA        sodium chloride flush 0.9 % injection 10 mL  10 mL Intravenous 2 times per day Saurav Betts PA-C        sodium chloride flush 0.9 % injection 10 mL  10 mL Intravenous PRN Saurav Betts PA-C        alendronate (FOSAMAX) tablet 70 mg  70 mg Oral Q7 Days Cata Asp, PA-C        amLODIPine (NORVASC) tablet 10 mg  1 tablet Oral Daily Cata Asp, PA-C        atorvastatin (LIPITOR) tablet 20 mg  20 mg Oral Daily Cata Asp, PA-C        cyclobenzaprine (FLEXERIL) tablet 10 mg  10 mg Oral TID PRN Cata Asp, PA-C        famotidine (PEPCID) tablet 20 mg  20 mg Oral BID PRN Cata Asp, PA-C        lisinopril (PRINIVIL;ZESTRIL) tablet 10 mg  10 mg Oral BID Cata Asp, PA-C        nicotine polacrilex (NICORETTE) gum 2 mg  2 mg Oral PRN Cata Asp, PA-C           Allergies:     Allergies   Allergen Reactions    Pletal [Cilostazol] Other (See Comments)     Pt states numbness in tongue, heart palpitations, itchy, nausea    Bactrim [Sulfamethoxazole-Trimethoprim] Rash    Codeine Nausea And Vomiting    Medrol [Methylprednisolone] Palpitations    Ultram [Tramadol Hcl] Other (See Comments)     Pt states causes shaking and feels bad       Problem List:    Patient Active Problem List   Diagnosis Code    Essential hypertension I10    Osteopenia M85.80    Carotid stenosis, non-symptomatic, bilateral I65.23    Chronic radicular lumbar pain M54.16, G89.29    History of smoking 30 or more pack years Z87.891    Claudication in peripheral vascular disease ([facility]) I73.9    PAD (peripheral artery disease) ([facility]) I73.9       Past Medical History:        Diagnosis Date    Arthritis     Blood circulation, collateral     Hyperlipidemia     Hypertension     PAD (peripheral artery disease) ([facility] 2021       Past Surgical History:        Procedure Laterality Date    BACK SURGERY       SECTION      x 4    COLONOSCOPY      DILATION AND CURETTAGE OF UTERUS      ENDOSCOPY, COLON, DIAGNOSTIC      FOOT SURGERY      bone spurs removed both feet    KNEE SURGERY Left     reconstruction of knee    LUMBAR DISCECTOMY  2020     [facility]. Rene Saavedra History:    Social History     Tobacco Use    Smoking status: Current Every Day Smoker     Packs/day: 0.25     Years: 35.00     Pack years: 8.75     Types: Cigarettes    Smokeless tobacco: Never Used    Tobacco comment: printed to avs   Substance Use Topics    Alcohol use: Yes     Frequency: 2-4 times a month     Drinks per session: 1 or 2     Binge frequency: Less than monthly                                Ready to quit: Not Answered  Counseling given: Not Answered  Comment: printed to avs      Vital Signs (Current):   Vitals:    21 0556   BP: 129/62   Pulse: 80   Resp: 16   Temp: 97.4 °F (36.3 °C)   TempSrc: Temporal   SpO2: 94%   Weight: 117 lb 9.6 oz (53.3 kg)   Height: 5' (1.524 m)                                              BP Readings from Last 3 Encounters:   21 129/62   21 (!) 101/55   21 128/60       NPO Status: Time of last liquid consumption: 2330                        Time of last solid consumption: 1630                        Date of last liquid consumption: 03/15/21                        Date of last solid food consumption: 03/15/21    BMI:   Wt Readings from Last 3 Encounters:   03/16/21 117 lb 9.6 oz (53.3 kg)   03/12/21 119 lb 0.8 oz (54 kg)   03/11/21 119 lb (54 kg)     Body mass index is 22.97 kg/m². CBC:   Lab Results   Component Value Date    WBC 12.9 03/12/2021    RBC 3.47 03/12/2021    HGB 11.5 03/12/2021    HCT 35.7 03/12/2021    .9 03/12/2021     03/12/2021       CMP:   Lab Results   Component Value Date     03/12/2021    K 4.4 03/12/2021    K 4.5 03/08/2021     03/12/2021    CO2 21 03/12/2021    BUN 9 03/12/2021    CREATININE 0.5 03/12/2021    LABGLOM >90 03/12/2021    GLUCOSE 112 03/12/2021    PROT 7.2 02/15/2021    CALCIUM 9.6 03/12/2021    BILITOT 0.2 02/15/2021    ALKPHOS 59 02/15/2021    AST 29 02/15/2021    ALT 27 02/15/2021       POC Tests: No results for input(s): POCGLU, POCNA, POCK, POCCL, POCBUN, POCHEMO, POCHCT in the last 72 hours. Coags:   Lab Results   Component Value Date    INR 0.96 03/12/2021    APTT 32.0 03/08/2021       HCG (If Applicable): No results found for: PREGTESTUR, PREGSERUM, HCG, HCGQUANT     ABGs: No results found for: PHART, PO2ART, IRM2QQA, IWB0ZDW, BEART, Z8HJSCZK     Type & Screen (If Applicable):  Lab Results   Component Value Date    LABRH NEG 03/12/2021       Drug/Infectious Status (If Applicable):  Lab Results   Component Value Date    HEPCAB Negative 09/03/2020       COVID-19 Screening (If Applicable):   Lab Results   Component Value Date    COVID19 Not Detected 03/11/2021           Anesthesia Evaluation  Patient summary reviewed and Nursing notes reviewed no history of anesthetic complications:   Airway: Mallampati: II  TM distance: >3 FB   Neck ROM: full  Mouth opening: > = 3 FB Dental:          Pulmonary:   (+) decreased breath sounds,  current smoker          Patient did not smoke on day of surgery.                  Cardiovascular:  Exercise tolerance: good (>4 METS),   (+) hypertension:,       ECG reviewed Neuro/Psych:   Negative Neuro/Psych ROS              GI/Hepatic/Renal: Neg GI/Hepatic/Renal ROS            Endo/Other: Negative Endo/Other ROS             Pt had no PAT visit       Abdominal:           Vascular:   + PVD, aortic or cerebral, . Anesthesia Plan      general     ASA 3       Induction: intravenous. MIPS: Postoperative opioids intended and Prophylactic antiemetics administered. Anesthetic plan and risks discussed with patient and spouse. Plan discussed with CRNA.                   [facility] Swati, DO   3/16/2021

## 2021-03-16 NOTE — ANESTHESIA PROCEDURE NOTES
Arterial Line:    An arterial line was placed using surface landmarks, in the OR for the following indication(s): continuous blood pressure monitoring and blood sampling needed. A 20 gauge (size), 1 and 3/4 inch (length), Arrow (type) catheter was placed, Seldinger technique used, into the left radial artery, secured by suture, tape and Tegaderm. Anesthesia type: General    Events:  patient tolerated procedure well with no complications and EBL 0mL. 3/16/2021 8:17 AM3/16/2021 8:21 AM  Anesthesiologist: Areli Farias DO  Resident/CRNA: SHERYL Dodd - CRNA  Other anesthesia staff: Abelino Spatz  Performed:  Other anesthesia staff   Preanesthetic Checklist  Completed: patient identified, IV checked, site marked, risks and benefits discussed, surgical consent, monitors and equipment checked, pre-op evaluation, timeout performed, anesthesia consent given, oxygen available and patient being monitored

## 2021-03-17 LAB
ANION GAP SERPL CALCULATED.3IONS-SCNC: 9 MEQ/L (ref 8–16)
BUN BLDV-MCNC: 6 MG/DL (ref 7–22)
CALCIUM SERPL-MCNC: 8.6 MG/DL (ref 8.5–10.5)
CHLORIDE BLD-SCNC: 108 MEQ/L (ref 98–111)
CO2: 23 MEQ/L (ref 23–33)
CREAT SERPL-MCNC: 0.4 MG/DL (ref 0.4–1.2)
ERYTHROCYTE [DISTWIDTH] IN BLOOD BY AUTOMATED COUNT: 12.9 % (ref 11.5–14.5)
ERYTHROCYTE [DISTWIDTH] IN BLOOD BY AUTOMATED COUNT: 48.8 FL (ref 35–45)
GFR SERPL CREATININE-BSD FRML MDRD: > 90 ML/MIN/1.73M2
GLUCOSE BLD-MCNC: 133 MG/DL (ref 70–108)
HCT VFR BLD CALC: 28.7 % (ref 37–47)
HEMOGLOBIN: 9.3 GM/DL (ref 12–16)
MCH RBC QN AUTO: 33 PG (ref 26–33)
MCHC RBC AUTO-ENTMCNC: 32.4 GM/DL (ref 32.2–35.5)
MCV RBC AUTO: 101.8 FL (ref 81–99)
PLATELET # BLD: 265 THOU/MM3 (ref 130–400)
PMV BLD AUTO: 9.7 FL (ref 9.4–12.4)
POTASSIUM SERPL-SCNC: 3.8 MEQ/L (ref 3.5–5.2)
RBC # BLD: 2.82 MILL/MM3 (ref 4.2–5.4)
SODIUM BLD-SCNC: 140 MEQ/L (ref 135–145)
WBC # BLD: 14.6 THOU/MM3 (ref 4.8–10.8)

## 2021-03-17 PROCEDURE — 6360000002 HC RX W HCPCS: Performed by: PHYSICIAN ASSISTANT

## 2021-03-17 PROCEDURE — 85027 COMPLETE CBC AUTOMATED: CPT

## 2021-03-17 PROCEDURE — 6370000000 HC RX 637 (ALT 250 FOR IP): Performed by: PHYSICIAN ASSISTANT

## 2021-03-17 PROCEDURE — APPSS30 APP SPLIT SHARED TIME 16-30 MINUTES: Performed by: PHYSICIAN ASSISTANT

## 2021-03-17 PROCEDURE — 2580000003 HC RX 258: Performed by: PHYSICIAN ASSISTANT

## 2021-03-17 PROCEDURE — 6370000000 HC RX 637 (ALT 250 FOR IP): Performed by: THORACIC SURGERY (CARDIOTHORACIC VASCULAR SURGERY)

## 2021-03-17 PROCEDURE — 80048 BASIC METABOLIC PNL TOTAL CA: CPT

## 2021-03-17 PROCEDURE — 36415 COLL VENOUS BLD VENIPUNCTURE: CPT

## 2021-03-17 PROCEDURE — 2060000000 HC ICU INTERMEDIATE R&B

## 2021-03-17 RX ORDER — CLOPIDOGREL BISULFATE 75 MG/1
75 TABLET ORAL DAILY
Status: DISCONTINUED | OUTPATIENT
Start: 2021-03-17 | End: 2021-03-18 | Stop reason: HOSPADM

## 2021-03-17 RX ORDER — ASPIRIN 81 MG/1
81 TABLET, CHEWABLE ORAL DAILY
Status: DISCONTINUED | OUTPATIENT
Start: 2021-03-17 | End: 2021-03-18 | Stop reason: HOSPADM

## 2021-03-17 RX ORDER — KETOROLAC TROMETHAMINE 30 MG/ML
30 INJECTION, SOLUTION INTRAMUSCULAR; INTRAVENOUS EVERY 6 HOURS PRN
Status: DISCONTINUED | OUTPATIENT
Start: 2021-03-17 | End: 2021-03-17

## 2021-03-17 RX ORDER — KETOROLAC TROMETHAMINE 30 MG/ML
15 INJECTION, SOLUTION INTRAMUSCULAR; INTRAVENOUS EVERY 6 HOURS
Status: DISCONTINUED | OUTPATIENT
Start: 2021-03-17 | End: 2021-03-18 | Stop reason: HOSPADM

## 2021-03-17 RX ADMIN — MORPHINE SULFATE 4 MG: 4 INJECTION, SOLUTION INTRAMUSCULAR; INTRAVENOUS at 00:45

## 2021-03-17 RX ADMIN — ASPIRIN 81 MG: 81 TABLET, CHEWABLE ORAL at 09:02

## 2021-03-17 RX ADMIN — MORPHINE SULFATE 4 MG: 4 INJECTION, SOLUTION INTRAMUSCULAR; INTRAVENOUS at 11:08

## 2021-03-17 RX ADMIN — MORPHINE SULFATE 4 MG: 4 INJECTION, SOLUTION INTRAMUSCULAR; INTRAVENOUS at 09:07

## 2021-03-17 RX ADMIN — OXYCODONE 10 MG: 5 TABLET ORAL at 21:00

## 2021-03-17 RX ADMIN — SODIUM CHLORIDE, PRESERVATIVE FREE 10 ML: 5 INJECTION INTRAVENOUS at 07:52

## 2021-03-17 RX ADMIN — MORPHINE SULFATE 4 MG: 4 INJECTION, SOLUTION INTRAMUSCULAR; INTRAVENOUS at 15:30

## 2021-03-17 RX ADMIN — OXYCODONE 10 MG: 5 TABLET ORAL at 16:22

## 2021-03-17 RX ADMIN — OXYCODONE 10 MG: 5 TABLET ORAL at 07:39

## 2021-03-17 RX ADMIN — MORPHINE SULFATE 4 MG: 4 INJECTION, SOLUTION INTRAMUSCULAR; INTRAVENOUS at 21:58

## 2021-03-17 RX ADMIN — OXYCODONE 10 MG: 5 TABLET ORAL at 11:54

## 2021-03-17 RX ADMIN — CYCLOBENZAPRINE 10 MG: 10 TABLET, FILM COATED ORAL at 18:32

## 2021-03-17 RX ADMIN — CYCLOBENZAPRINE 10 MG: 10 TABLET, FILM COATED ORAL at 09:02

## 2021-03-17 RX ADMIN — MORPHINE SULFATE 4 MG: 4 INJECTION, SOLUTION INTRAMUSCULAR; INTRAVENOUS at 13:24

## 2021-03-17 RX ADMIN — MORPHINE SULFATE 4 MG: 4 INJECTION, SOLUTION INTRAMUSCULAR; INTRAVENOUS at 06:04

## 2021-03-17 RX ADMIN — ACETAMINOPHEN 650 MG: 325 TABLET ORAL at 10:48

## 2021-03-17 RX ADMIN — MORPHINE SULFATE 4 MG: 4 INJECTION, SOLUTION INTRAMUSCULAR; INTRAVENOUS at 02:42

## 2021-03-17 RX ADMIN — ACETAMINOPHEN 650 MG: 325 TABLET ORAL at 16:22

## 2021-03-17 RX ADMIN — CLOPIDOGREL BISULFATE 75 MG: 75 TABLET ORAL at 09:02

## 2021-03-17 RX ADMIN — AMLODIPINE BESYLATE 10 MG: 10 TABLET ORAL at 07:49

## 2021-03-17 RX ADMIN — CYCLOBENZAPRINE 10 MG: 10 TABLET, FILM COATED ORAL at 00:42

## 2021-03-17 RX ADMIN — MORPHINE SULFATE 2 MG: 2 INJECTION, SOLUTION INTRAMUSCULAR; INTRAVENOUS at 18:32

## 2021-03-17 RX ADMIN — OXYCODONE 10 MG: 5 TABLET ORAL at 03:25

## 2021-03-17 RX ADMIN — LISINOPRIL 10 MG: 10 TABLET ORAL at 20:59

## 2021-03-17 RX ADMIN — LISINOPRIL 10 MG: 10 TABLET ORAL at 07:49

## 2021-03-17 RX ADMIN — CEFAZOLIN 2000 MG: 10 INJECTION, POWDER, FOR SOLUTION INTRAVENOUS at 00:42

## 2021-03-17 RX ADMIN — ACETAMINOPHEN 650 MG: 325 TABLET ORAL at 21:08

## 2021-03-17 RX ADMIN — ATORVASTATIN CALCIUM 20 MG: 20 TABLET, FILM COATED ORAL at 07:49

## 2021-03-17 RX ADMIN — KETOROLAC TROMETHAMINE 15 MG: 30 INJECTION, SOLUTION INTRAMUSCULAR; INTRAVENOUS at 17:51

## 2021-03-17 ASSESSMENT — PAIN SCALES - GENERAL
PAINLEVEL_OUTOF10: 1
PAINLEVEL_OUTOF10: 2
PAINLEVEL_OUTOF10: 9
PAINLEVEL_OUTOF10: 8
PAINLEVEL_OUTOF10: 10
PAINLEVEL_OUTOF10: 10
PAINLEVEL_OUTOF10: 9
PAINLEVEL_OUTOF10: 9
PAINLEVEL_OUTOF10: 8
PAINLEVEL_OUTOF10: 10
PAINLEVEL_OUTOF10: 8
PAINLEVEL_OUTOF10: 10
PAINLEVEL_OUTOF10: 4
PAINLEVEL_OUTOF10: 8
PAINLEVEL_OUTOF10: 8

## 2021-03-17 ASSESSMENT — PAIN DESCRIPTION - DESCRIPTORS
DESCRIPTORS: BURNING;ACHING
DESCRIPTORS: ACHING;CONSTANT

## 2021-03-17 ASSESSMENT — PAIN DESCRIPTION - PROGRESSION
CLINICAL_PROGRESSION: GRADUALLY IMPROVING
CLINICAL_PROGRESSION: GRADUALLY IMPROVING

## 2021-03-17 ASSESSMENT — PAIN DESCRIPTION - FREQUENCY
FREQUENCY: CONTINUOUS

## 2021-03-17 ASSESSMENT — PAIN DESCRIPTION - ONSET
ONSET: ON-GOING
ONSET: PROGRESSIVE

## 2021-03-17 ASSESSMENT — PAIN DESCRIPTION - PAIN TYPE: TYPE: ACUTE PAIN;CHRONIC PAIN

## 2021-03-17 ASSESSMENT — PAIN DESCRIPTION - LOCATION
LOCATION: BACK;LEG
LOCATION: BACK;LEG

## 2021-03-17 NOTE — ANESTHESIA POSTPROCEDURE EVALUATION
Department of Anesthesiology  Postprocedure Note    Patient: Charlene Lebron  MRN: [id number]  YOB: 1965  Date of evaluation: 3/17/2021  Time:  7:31 AM     Procedure Summary     Date: 03/16/21 Room / Location: [facility]  / [facility]    Anesthesia Start: 0802 Anesthesia Stop: 7075    Procedure: LEFT  FEMORAL ARTERY ENDARTERECTOMY (Left Leg Upper) Diagnosis: (PAD)    Surgeons: Zo Medrano MD Responsible Provider: Oskar Appiah DO    Anesthesia Type: general ASA Status: 3          Anesthesia Type: general    Estee Phase I: Estee Score: 8    Estee Phase II:      Last vitals: Reviewed and per EMR flowsheets.        Anesthesia Post Evaluation    Patient location during evaluation: PACU  Patient participation: complete - patient participated  Level of consciousness: awake and alert  Pain score: 3  Airway patency: patent  Nausea & Vomiting: no nausea and no vomiting  Complications: no  Cardiovascular status: hemodynamically stable and blood pressure returned to baseline  Respiratory status: spontaneous ventilation, room air and acceptable  Hydration status: stable

## 2021-03-17 NOTE — PROGRESS NOTES
0830 removed lt radial a line . pressure held x5 minutes. Dry dressing appllied. No redness, no bruising, no bleeding noted. 1703 removed العراقي. 0900 pt assisted with walker up to chair. tolerated well.

## 2021-03-17 NOTE — FLOWSHEET NOTE
03/17/21 0930   Patient Goal of the Day(Whiteboard)   Patient Daily Goal reviewed with Patient   Patient's reason for admission PAD - L. Femoral Endartectomy   Precautions   Precautions Fall risk   Negative Pressure Room No   Positive Pressure Room No   Safe Environment   Arm Bands On ID; Allergies   Fall Risk Interventions   Toilet Every 2 Hours-In Advance of Need Yes   Hourly Visual Checks Awake; In bed   Fall Visual Posted Fall sign posted   Room Door Open Yes   Alarm On Bed   Mobility   Activity In bed   Repositioned Turns self;Semi fowlers;Pillow support   Patient Turned Turns self   Head of Bed Elevated  Self regulated   Heels/Feet Heel(s) elevated off bed; Foot of bed elevated   Range of Motion Active; All extremities   Patient Arrival To Unit 0930   Transport Method Wheelchair   Anti-Embolism Devices Bilateral;Pneumatic compression devices, below knee   Anti-Embolism Intervention Medication  (Aspirin & Plavix)   Pain Assessment   Pain Assessment 0-10   Pain Level 4   Pain Type Acute pain;Chronic pain;Surgical pain   Pain Location Back;Leg   Pain Descriptors Aching;Constant   Pain Frequency Continuous   Pain Onset On-going       Pt admitted to  [facility] via wheelchair from ICU - 4D 11. Complaints: Pain - revascularization. IV D5W/0.45 NaCl infusing into the hand right, condition patent and no redness at a rate of 50 mls/ hour with about 200 mls in the bag still. IV site free of s/s of infection or infiltration. Vital signs obtained. Assessment and data collection initiated. Two nurse skin assessment performed by [facility] RN and García Davis RN. Oriented to room. Policies and procedures for  explained. All questions answered with no further questions at this time. Fall prevention and safety brochure discussed with patient. Bed alarm on. Call light in reach. Patient also has an INT in the left hand, 18 g - on room air - telemetry applied - A+O x 4 - pulses found on bilateral feet with doppler.

## 2021-03-17 NOTE — PROGRESS NOTES
CT/CV Surgery Progress Note    3/17/2021 8:01 AM  Surgeon:  Dr. Katia Gonzalez     Subjective: Ms. Michela Davies is resting comfortably in bed, alert, and in no acute distress. Pt denies chest pressure, SOB, fever,chills, N/V/D. Vital Signs: BP (!) 85/49   Pulse 87   Temp 98.2 °F (36.8 °C) (Oral)   Resp 17   Ht 5' (1.524 m)   Wt 124 lb 9 oz (56.5 kg)   SpO2 97%   BMI 24.33 kg/m²    Temp (24hrs), Av.6 °F (35.9 °C), Min:95.9 °F (35.5 °C), Max:98.6 °F (37 °C)    Labs:   CBC:   Recent Labs     21  0445   WBC 14.6*   HGB 9.3*   HCT 28.7*   .8*        BMP:   Recent Labs     21  0445      K 3.8      CO2 23   BUN 6*   CREATININE 0.4       Intake/Output Summary (Last 24 hours) at 3/17/2021 0801  Last data filed at 3/17/2021 0334  Gross per 24 hour   Intake 4216 ml   Output 3551 ml   Net 665 ml     Scheduled Meds:    aspirin  81 mg Oral Daily    clopidogrel  75 mg Oral Daily    amLODIPine  10 mg Oral Daily    atorvastatin  20 mg Oral Daily    lisinopril  10 mg Oral BID    sodium chloride flush  10 mL Intravenous 2 times per day     ROS: All neg unless specifically mentioned in subjective section. Exam:  General Appearance: alert ,conversing, in no acute distress  Cardiovascular: normal rate, regular rhythm, normal S1 and S2, no murmurs, rubs, clicks, or gallops  Pulmonary/Chest: clear to auscultation bilaterally- no wheezes, rales or rhonchi, normal air movement, no respiratory distress  Neurological: alert, oriented, normal speech, no focal findings or movement disorder noted  Left groin: Incision healing appropriately with dressing in place.     Pulses:  Lt DP/PT 1+     Assessment:   Patient Active Problem List   Diagnosis    Essential hypertension    Osteopenia    Carotid stenosis, non-symptomatic, bilateral    Chronic radicular lumbar pain    History of smoking 30 or more pack years    [facility]    [facility]     Plan:   1. CXR reviewed- Continue daily CXR's   2. Continue current therapy   -     Transfer to step down  3.  Home tomorrow     The plan of care was discussed in detail with Dr. Froy Escobedo     Electronically signed by Edy Gonzales PA-C on 3/17/2021 at 8:01 AM

## 2021-03-17 NOTE — PLAN OF CARE
Problem: Pain Control  Goal: Maintain pain level at or below patient's acceptable level (or 5 if patient is unable to determine acceptable level)  Outcome: Not Met This Shift  Goal: Improvement in pain related behaviors BP/HR WNL  Outcome: Not Met This Shift     Problem: Pain Control  Goal: Improvement in pain related behaviors BP/HR WNL  Outcome: Not Met This Shift     Problem: Pain Control  Goal: Improvement in pain related behaviors BP/HR WNL  Outcome: Not Met This Shift     Problem: Neurological  Goal: Maximum potential motor/sensory/cognitive function  Outcome: Ongoing     Problem: Cardiovascular  Goal: No DVT, peripheral vascular complications  Outcome: Ongoing  Goal: Hemodynamic stability  Outcome: Ongoing  Goal: Anticoagulate/Hct stable  Outcome: Ongoing  Goal: Agreement to quit smoking  Outcome: Ongoing  Goal: Weight maintained or lost  Outcome: Ongoing  Goal: Understanding of dietary restrictions  Outcome: Ongoing     Problem: Respiratory  Goal: No pulmonary complications  Outcome: Ongoing  Goal: O2 Sat > 90%  Outcome: Ongoing     Problem: GI  Goal: No bowel complications  Outcome: Ongoing  Goal: Bowel movement at least every other day  Outcome: Ongoing     Problem:   Goal: Adequate urinary output  Outcome: Ongoing  Goal: No urinary complication  Outcome: Ongoing     Problem: Nutrition  Goal: Optimal nutrition therapy  Outcome: Ongoing  Goal: Understanding of nutritional guidelines  Outcome: Ongoing     Problem: Skin Integrity/Risk  Goal: No skin breakdown during hospitalization  Outcome: Ongoing  Goal: Wound healing  Outcome: Ongoing     Problem: Musculor/Skeletal Functional Status  Goal: Highest potential functional level  Outcome: Ongoing  Goal: Absence of falls  Outcome: Ongoing     Problem: Intellectual/Education/Knowledge Deficit  Goal: Teaching initiated upon admission  Outcome: Ongoing

## 2021-03-18 ENCOUNTER — TELEPHONE (OUTPATIENT)
Dept: FAMILY MEDICINE CLINIC | Age: 56
End: 2021-03-18

## 2021-03-18 VITALS
DIASTOLIC BLOOD PRESSURE: 78 MMHG | HEIGHT: 60 IN | TEMPERATURE: 97.9 F | SYSTOLIC BLOOD PRESSURE: 177 MMHG | BODY MASS INDEX: 24.45 KG/M2 | WEIGHT: 124.56 LBS | OXYGEN SATURATION: 98 % | HEART RATE: 82 BPM | RESPIRATION RATE: 18 BRPM

## 2021-03-18 LAB
MRSA SCREEN: NORMAL
URINE CULTURE, ROUTINE: NORMAL

## 2021-03-18 PROCEDURE — 6370000000 HC RX 637 (ALT 250 FOR IP): Performed by: PHYSICIAN ASSISTANT

## 2021-03-18 PROCEDURE — 2580000003 HC RX 258: Performed by: PHYSICIAN ASSISTANT

## 2021-03-18 PROCEDURE — 6370000000 HC RX 637 (ALT 250 FOR IP): Performed by: THORACIC SURGERY (CARDIOTHORACIC VASCULAR SURGERY)

## 2021-03-18 PROCEDURE — 6360000002 HC RX W HCPCS: Performed by: PHYSICIAN ASSISTANT

## 2021-03-18 PROCEDURE — APPSS30 APP SPLIT SHARED TIME 16-30 MINUTES: Performed by: PHYSICIAN ASSISTANT

## 2021-03-18 RX ORDER — OXYCODONE HYDROCHLORIDE 5 MG/1
5 TABLET ORAL EVERY 8 HOURS PRN
Qty: 21 TABLET | Refills: 0 | Status: SHIPPED | OUTPATIENT
Start: 2021-03-18 | End: 2021-03-25

## 2021-03-18 RX ORDER — ATORVASTATIN CALCIUM 40 MG/1
40 TABLET, FILM COATED ORAL DAILY
Qty: 30 TABLET | Refills: 1 | Status: SHIPPED | OUTPATIENT
Start: 2021-03-18 | End: 2021-09-01 | Stop reason: SDUPTHER

## 2021-03-18 RX ORDER — CLOPIDOGREL BISULFATE 75 MG/1
75 TABLET ORAL DAILY
Qty: 30 TABLET | Refills: 3 | Status: SHIPPED | OUTPATIENT
Start: 2021-03-18 | End: 2021-08-11 | Stop reason: SDUPTHER

## 2021-03-18 RX ADMIN — MORPHINE SULFATE 4 MG: 4 INJECTION, SOLUTION INTRAMUSCULAR; INTRAVENOUS at 01:44

## 2021-03-18 RX ADMIN — ACETAMINOPHEN 650 MG: 325 TABLET ORAL at 05:53

## 2021-03-18 RX ADMIN — ATORVASTATIN CALCIUM 20 MG: 20 TABLET, FILM COATED ORAL at 09:50

## 2021-03-18 RX ADMIN — KETOROLAC TROMETHAMINE 15 MG: 30 INJECTION, SOLUTION INTRAMUSCULAR; INTRAVENOUS at 05:53

## 2021-03-18 RX ADMIN — MORPHINE SULFATE 2 MG: 2 INJECTION, SOLUTION INTRAMUSCULAR; INTRAVENOUS at 07:58

## 2021-03-18 RX ADMIN — OXYCODONE 10 MG: 5 TABLET ORAL at 05:53

## 2021-03-18 RX ADMIN — LISINOPRIL 10 MG: 10 TABLET ORAL at 09:50

## 2021-03-18 RX ADMIN — ASPIRIN 81 MG: 81 TABLET, CHEWABLE ORAL at 09:50

## 2021-03-18 RX ADMIN — MORPHINE SULFATE 4 MG: 4 INJECTION, SOLUTION INTRAMUSCULAR; INTRAVENOUS at 04:03

## 2021-03-18 RX ADMIN — AMLODIPINE BESYLATE 10 MG: 10 TABLET ORAL at 09:50

## 2021-03-18 RX ADMIN — SODIUM CHLORIDE, PRESERVATIVE FREE 10 ML: 5 INJECTION INTRAVENOUS at 09:51

## 2021-03-18 RX ADMIN — OXYCODONE 10 MG: 5 TABLET ORAL at 01:00

## 2021-03-18 RX ADMIN — OXYCODONE 5 MG: 5 TABLET ORAL at 11:30

## 2021-03-18 RX ADMIN — CLOPIDOGREL BISULFATE 75 MG: 75 TABLET ORAL at 09:50

## 2021-03-18 RX ADMIN — KETOROLAC TROMETHAMINE 15 MG: 30 INJECTION, SOLUTION INTRAMUSCULAR; INTRAVENOUS at 00:07

## 2021-03-18 ASSESSMENT — PAIN SCALES - GENERAL
PAINLEVEL_OUTOF10: 9
PAINLEVEL_OUTOF10: 10
PAINLEVEL_OUTOF10: 7
PAINLEVEL_OUTOF10: 9
PAINLEVEL_OUTOF10: 10

## 2021-03-18 NOTE — PROCEDURES
[address] [place] [address]                            CARDIAC CATHETERIZATION    PATIENT NAME: Lis Patel                   :        1965  MED REC NO:   [id number]                           ROOM:       0009  ACCOUNT NO:   [de-identified]                           ADMIT DATE: 2021  PROVIDER:     Arnol Islas MD    DATE OF PROCEDURE:  2021    PERIPHERAL ANGIOGRAM AND ANGIOPLASTY REPORT    INDICATION FOR STUDY:  Severe lifestyle limiting claudication and  abnormal noninvasive testing. DESCRIPTION OF PROCEDURE:  After written informed consent was obtained,  the patient was brought to the special procedure laboratory in fasting  nonsedated state. Preprocedure time-out was performed and 2% lidocaine  was used to anesthetize subcutaneous tissue overlying the right groin. Using ultrasound and fluoroscopy guidance, we were able to place a  5-Polish arterial sheath in the right common femoral artery. Once the  sheath was placed, we passed a VCF catheter along with Glidewire into  the distal abdominal aorta and then at the level of renal arteries. Once there, we injected diluted contrast and DSA images were performed. ESTIMATED BLOOD LOSS:  Less than 20 mL. After this image, we were able to bring down the VCF catheter at the  iliac bifurcations and then again DSA images with bolus-eze technique  was taken to look at the femoral and below the knee vessels. FINDINGS:  Bilateral renal arteries have mild diffuse disease, but  patent. Distal abdominal aorta was small, but patent with mild luminal  irregularities. Bilateral iliac arteries were small, but patent with moderate luminal  irregularities. The right common femoral artery was patent with mild luminal  irregularities and then left common femoral artery was totally occluded. There is bridging collaterals to feed the superficial femoral artery.

## 2021-03-18 NOTE — PROGRESS NOTES
Discharge teaching and instructions given to patient. AVS reviewed. Printed prescriptions given to patient. Patient voiced understanding regarding prescriptions, follow up appointments, and care of self at home. Discharged in a wheelchair to  home with support per family. Pt awake and alert, vs stable at time of discharge.

## 2021-03-18 NOTE — PROGRESS NOTES
CLINICAL PHARMACY: DISCHARGE MED RECONCILIATION/REVIEW    [facility]) Select Patient?: Yes  Total # of Interventions Recommended: 1 - Increased Dose #: 1   -   Total # Interventions Accepted: 1  Intervention Severity:   - Level 1 Intervention Present?: No   - Level 2 #: 0   - Level 3 #: 1   Time Spent (min): 30    Additional Documentation:    Bernie Mayen, PharmD, BCPS   3/18/2021  11:51 AM

## 2021-03-18 NOTE — DISCHARGE INSTR - COC
Continuity of Care Form    Patient Name: Camilla Ramirez   :  1965  MRN:  [id number]    Admit date:  3/16/2021  Discharge date:  3/18/2021      Code Status Order: Full Code   Advance Directives:   Advance Care Flowsheet Documentation     Date/Time Healthcare Directive Type of Healthcare Directive Copy in [address] Agent's Name Healthcare Agent's Phone Number    [phone]  No, patient does not have an advance directive for healthcare treatment -- -- -- -- --          Admitting Physician:  Jose Santos MD  PCP: SHERYL Robledo - CNP    Discharging Nurse: [address] [place] [address] Unit/Room#: 4K-08/008-A  Discharging Unit Phone Number: ***    Emergency Contact:   Extended Emergency Contact Information  Primary Emergency Contact: Damon Bai [address] Phone: [phone]  Mobile Phone: [phone]  Relation: Spouse  Secondary Emergency Contact: Kyaw Salehkarlie [address] Phone: [phone]  Mobile Phone: [phone]  Relation: Child    Past Surgical History:  Past Surgical History:   Procedure Laterality Date    BACK SURGERY       SECTION      x 4    COLONOSCOPY      DILATION AND CURETTAGE OF UTERUS  1996    ENDOSCOPY, COLON, DIAGNOSTIC      FEMORAL ENDARTERECTOMY Left 3/16/2021    LEFT  FEMORAL ARTERY ENDARTERECTOMY performed by Jose Santos MD at [facility]      bone spurs removed both feet    KNEE SURGERY Left     reconstruction of knee    LUMBAR DISCECTOMY  2020    Dr. Yang Nicholson    SKIN BIOPSY         Immunization History:   Immunization History   Administered Date(s) Administered    Influenza, Quadv, IM, PF (6 mo and older Fluzone, Flulaval, Fluarix, and 3 yrs and older Afluria) 10/05/2020    Pneumococcal Conjugate 13-valent ([id number]) 2020    Tdap (Boostrix, Adacel) 2020    Zoster Recombinant (Shingrix) 2021, 2021       Active Problems: Last Modified Barium Swallow with Video (Video Swallowing Test): not done    Treatments at the Time of Hospital Discharge:   Respiratory Treatments: none  Oxygen Therapy:  is not on home oxygen therapy. Ventilator:    - No ventilator support    Rehab Therapies: Physical Therapy and Occupational Therapy  Weight Bearing Status/Restrictions: No weight bearing restirctions  Other Medical Equipment (for information only, NOT a DME order):  walker  Other Treatments: none    Patient's personal belongings (please select all that are sent with patient):  Alejandro    RN SIGNATURE:  Electronically signed by Meghana Tomas RN on 3/18/21 at 11:20 AM EDT    CASE MANAGEMENT/SOCIAL WORK SECTION    Inpatient Status Date: ***    Readmission Risk Assessment Score:  Readmission Risk              Risk of Unplanned Readmission:        9           Discharging to Facility/ Agency   · Name:   · Address:  · Phone:  · Fax:    Dialysis Facility (if applicable)   · Name:  · Address:  · Dialysis Schedule:  · Phone:  · Fax:    / signature: {Esignature:[id number]}    PHYSICIAN SECTION    Prognosis: {Prognosis:8395687298}    Condition at Discharge: [address] [place] [address] Patient Condition:556718132}    Rehab Potential (if transferring to Rehab): {Prognosis:9834526832}    Recommended Labs or Other Treatments After Discharge: ***    Physician Certification: I certify the above information and transfer of Cleveland Mcmullen  is necessary for the continuing treatment of the diagnosis listed and that she requires {Admit to Appropriate Level of Care:33138} for {GREATER/LESS:654752016} 30 days.      Update Admission H&P: {CHP DME Changes in VILAB:402514657}    PHYSICIAN SIGNATURE:  {Esignature:[id number]}

## 2021-03-18 NOTE — FLOWSHEET NOTE
[facility]. [facility] Giovana Dubois [facility] PROGRESS NOTE      Patient: Skip Sanchez  Room #: 7B-07/656-E            YOB: 1965  Age: 64 y.o. Gender: female            Admit Date & Time: 3/16/2021  5:36 AM    Assessment:  Carrie Ladd is a 64year old female who is getting ready to leave. She has been at [address] [place] [address] for two nights and is praising God for her recovery. She had difficulty with her back she said. She is looking forward to going home today. Interventions:  Prayers and words of encouragement given    Outcomes:  thankful    Plan: 1. Care Plan:  Continue spiritual and emotional care for patient and family. Including prayers.      Electronically signed by Brii Ramirez on 3/18/2021 at 11:26 AM.  [address] [place] [address]  [phone]

## 2021-03-18 NOTE — DISCHARGE SUMMARY
CT/CV Surgery Discharge Summary     Pt Name: Christiano Jules  MRN: [id number]  YOB: 1965  Primary Care Physician: SHERYL Acharya CNP    Admit date:  3/16/2021  5:36 AM      Discharge date: 03/18/21     Disposition: Home    Admitting Diagnosis:   Critical limb ischemia, left lower extremity, caused by calcified plaque. Discharge Diagnosis:   Critical limb ischemia, left lower extremity, caused by calcified plaque. Condition: Stable    Problem List:   Patient Active Problem List   Diagnosis Code    Essential hypertension I10    Osteopenia M85.80    Carotid stenosis, non-symptomatic, bilateral I65.23    Chronic radicular lumbar pain M54.16, G89.29    History of smoking 30 or more pack years Z87.891    Claudication in peripheral vascular disease ([facility] I73.9    PAD (peripheral artery disease) ([facility] I73.9         Procedures:     Left common femoral artery endarterectomy and repair with bovine pericardial patch    Reason for Admission:   Per H&P, \"We had the pleasure of seeing Thuy Earlyle the office today, as you know this is a very pleasant 64y.o. year old female with a history of hypertension, hyperlipidemia, status post lumbar disc hernia surgery September 2020, who has been complaining of pain in her left leg when she walks more than 1 block, but also at times during the night that will awaken her. The pain as a cramping in nature, moderate in intensity, associated with the weakness of the left leg.  An abn CTA abd aorta with run-off lead to an arteriogram with findings of total occlusion of the left common femoral artery on 3/8/21. \"    Hospital Course: Following an uncomplicated  procedure, the patient had an unremarkable and progressive convalescence without adverse events.     At time of discharge, Deveron Nyhan was alert, tolerating a regular diet, having bowel movements, ambulating on her own accord and had adequate analgesia on oral pain medications, and had no signs of cessation     ondansetron 4 MG disintegrating tablet  Commonly known as: Zofran ODT  Take 1 tablet by mouth every 8 hours as needed for Nausea or Vomiting        STOP taking these medications    HYDROcodone-acetaminophen 5-325 MG per tablet  Commonly known as: Norco           Where to Get Your Medications      These medications were sent to [facility] [place], [address] - F [phone] [address] [facility]/ [facility]asen[facility]    Phone: [phone]   · clopidogrel 75 MG tablet     You can get these medications from any pharmacy    Bring a paper prescription for each of these medications  · oxyCODONE 5 MG immediate release tablet             Follow-up:    1  Follow up with Dr. Ar Fink in 1-2 weeks   2. Continue ASA and Plavix  3. The pt was agreeable to discharge and future plan of care. All questions were thoroughly answered.        Andrew Orona   Electronincally signed 3/18/2021 at 8:34 AM    CC: SHERYL James - CNP

## 2021-03-18 NOTE — PLAN OF CARE
Problem: Pain Control  Goal: Maintain pain level at or below patient's acceptable level (or 5 if patient is unable to determine acceptable level)  Outcome: Ongoing  Goal: Improvement in pain related behaviors BP/HR WNL  Outcome: Ongoing     Problem: Neurological  Goal: Maximum potential motor/sensory/cognitive function  Outcome: Ongoing     Problem: Cardiovascular  Goal: No DVT, peripheral vascular complications  Outcome: Ongoing  Goal: Hemodynamic stability  Outcome: Ongoing  Goal: Anticoagulate/Hct stable  Outcome: Ongoing  Goal: Agreement to quit smoking  Outcome: Ongoing  Goal: Weight maintained or lost  Outcome: Ongoing  Goal: Understanding of dietary restrictions  Outcome: Ongoing     Problem: Respiratory  Goal: No pulmonary complications  Outcome: Ongoing  Goal: O2 Sat > 90%  Outcome: Ongoing     Problem: GI  Goal: No bowel complications  Outcome: Ongoing  Goal: Bowel movement at least every other day  Outcome: Ongoing     Problem:   Goal: Adequate urinary output  Outcome: Ongoing  Goal: No urinary complication  Outcome: Ongoing     Problem: Nutrition  Goal: Optimal nutrition therapy  Outcome: Ongoing  Goal: Understanding of nutritional guidelines  Outcome: Ongoing     Problem: Skin Integrity/Risk  Goal: No skin breakdown during hospitalization  Outcome: Ongoing  Goal: Wound healing  Outcome: Ongoing     Problem: Musculor/Skeletal Functional Status  Goal: Highest potential functional level  Outcome: Ongoing  Goal: Absence of falls  Outcome: Ongoing     Problem: Intellectual/Education/Knowledge Deficit  Goal: Teaching initiated upon admission  Outcome: Ongoing  Goal: Written Disposition Instruction form completed  Outcome: Ongoing     Problem: Spiritual  Goal: Coping methods/spiritual issues explored  Outcome: Ongoing  Goal: Acknowledge understanding of advance directives  Outcome: Ongoing  Goal: Supportive care provided  Outcome: Ongoing

## 2021-03-18 NOTE — CARE COORDINATION
3/18/21, 9:04 AM EDT    Patient goals/plan/ treatment preferences discussed by  and . Patient goals/plan/ treatment preferences reviewed with patient/ family. Patient/ family verbalize understanding of discharge plan and are in agreement with goal/plan/treatment preferences. Understanding was demonstrated using the teach back method. AVS provided by RN at time of discharge, which includes all necessary medical information pertaining to the patients current course of illness, treatment, post-discharge goals of care, and treatment preferences.

## 2021-03-18 NOTE — TELEPHONE ENCOUNTER
.Transition of Care visit scheduled. 3/25/2021  Patient is being discharged to Home  Date of discharge 3/18/21  Discharge from facility [facility]  Reason for admission Critical limb ischemia, left lower extremity, caused by calcified plaque.

## 2021-03-19 ENCOUNTER — TELEPHONE (OUTPATIENT)
Dept: PHARMACY | Age: 56
End: 2021-03-19

## 2021-03-22 ENCOUNTER — TELEPHONE (OUTPATIENT)
Dept: FAMILY MEDICINE CLINIC | Age: 56
End: 2021-03-22

## 2021-03-24 ENCOUNTER — TELEPHONE (OUTPATIENT)
Dept: PHARMACY | Age: 56
End: 2021-03-24

## 2021-03-24 NOTE — TELEPHONE ENCOUNTER
Referral to [facility]. [facility] received from CLEMENTINA Almanzar CNP for Smoking Cessation Patient Education/Counseling. Called patient, left message with instructions for patient to call the clinic back at [phone], option 2.

## 2021-03-29 ENCOUNTER — OFFICE VISIT (OUTPATIENT)
Dept: CARDIOTHORACIC SURGERY | Age: 56
End: 2021-03-29

## 2021-03-29 ENCOUNTER — HOSPITAL ENCOUNTER (OUTPATIENT)
Dept: INTERVENTIONAL RADIOLOGY/VASCULAR | Age: 56
Discharge: HOME | End: 2021-03-29
Payer: COMMERCIAL

## 2021-03-29 VITALS
HEART RATE: 81 BPM | HEIGHT: 60 IN | BODY MASS INDEX: 22.58 KG/M2 | DIASTOLIC BLOOD PRESSURE: 72 MMHG | WEIGHT: 115 LBS | SYSTOLIC BLOOD PRESSURE: 137 MMHG

## 2021-03-29 DIAGNOSIS — S30.1XXA HEMATOMA OF GROIN, INITIAL ENCOUNTER: Primary | ICD-10-CM

## 2021-03-29 DIAGNOSIS — S30.1XXA HEMATOMA OF GROIN, INITIAL ENCOUNTER: ICD-10-CM

## 2021-03-29 PROCEDURE — 99024 POSTOP FOLLOW-UP VISIT: CPT | Performed by: THORACIC SURGERY (CARDIOTHORACIC VASCULAR SURGERY)

## 2021-03-29 PROCEDURE — 93926 LOWER EXTREMITY STUDY: CPT

## 2021-03-29 RX ORDER — CEPHALEXIN 500 MG/1
500 CAPSULE ORAL 3 TIMES DAILY
Qty: 30 CAPSULE | Refills: 0 | Status: SHIPPED | OUTPATIENT
Start: 2021-03-29 | End: 2021-06-14 | Stop reason: ALTCHOICE

## 2021-03-29 NOTE — PROGRESS NOTES
Neri Moura return to cardiovascular surgery clinic for follow-up. She is a 64years old female, status post left common femoral artery endarterectomy and repair with a bovine pericardial patch on 03/16/2021. She reports good recovery except minor oozing from the right groin puncture site, and redness and mild drainage from the left groin incision site. She reports much improved her left leg symptoms with less claudication. And there is no rest pain. Physical examination: A mild induration in the lateral right groin puncture site. Left groin incision; localized superficial wound infection. There is good Doppler signal over posterior tibial artery and dorsalis pedis in the foot. Assessment: Cannot rule out pseudoaneurysm right groin. Left groin superficial wound infection. Plan: Arterial Doppler of right groin to rule out pseudoaneurysm. Local wound care for left groin wound infection. P.o. Keflex. And follow-up in 1 week.

## 2021-03-30 ENCOUNTER — OFFICE VISIT (OUTPATIENT)
Dept: FAMILY MEDICINE CLINIC | Age: 56
End: 2021-03-30
Payer: COMMERCIAL

## 2021-03-30 VITALS
TEMPERATURE: 98.3 F | RESPIRATION RATE: 16 BRPM | BODY MASS INDEX: 22.81 KG/M2 | DIASTOLIC BLOOD PRESSURE: 80 MMHG | HEART RATE: 98 BPM | WEIGHT: 116.8 LBS | SYSTOLIC BLOOD PRESSURE: 120 MMHG

## 2021-03-30 DIAGNOSIS — M54.16 CHRONIC RADICULAR LUMBAR PAIN: ICD-10-CM

## 2021-03-30 DIAGNOSIS — Z78.9 PRESENCE OF SURGICAL INCISION: ICD-10-CM

## 2021-03-30 DIAGNOSIS — Z09 HOSPITAL DISCHARGE FOLLOW-UP: Primary | ICD-10-CM

## 2021-03-30 DIAGNOSIS — G89.29 CHRONIC RADICULAR LUMBAR PAIN: ICD-10-CM

## 2021-03-30 DIAGNOSIS — Z98.890 H/O ENDARTERECTOMY: ICD-10-CM

## 2021-03-30 PROCEDURE — 99495 TRANSJ CARE MGMT MOD F2F 14D: CPT | Performed by: NURSE PRACTITIONER

## 2021-03-30 PROCEDURE — 1111F DSCHRG MED/CURRENT MED MERGE: CPT | Performed by: NURSE PRACTITIONER

## 2021-03-30 RX ORDER — CARISOPRODOL 350 MG/1
350 TABLET ORAL 3 TIMES DAILY PRN
Qty: 15 TABLET | Refills: 0 | Status: SHIPPED | OUTPATIENT
Start: 2021-03-30 | End: 2021-04-05 | Stop reason: SDUPTHER

## 2021-03-30 ASSESSMENT — ENCOUNTER SYMPTOMS
WHEEZING: 0
ALLERGIC/IMMUNOLOGIC NEGATIVE: 1
NAUSEA: 0
EYE REDNESS: 0
VOMITING: 0
CONSTIPATION: 0
RHINORRHEA: 0
BACK PAIN: 0
EYE DISCHARGE: 0
EYE PAIN: 0
SORE THROAT: 0
ABDOMINAL PAIN: 0
DIARRHEA: 0
SHORTNESS OF BREATH: 0
TROUBLE SWALLOWING: 0
COUGH: 0

## 2021-03-30 NOTE — PROGRESS NOTES
Post-Discharge Transitional Care Management Services or Hospital Follow Up      Christiano Jules   YOB: 1965    Date of Office Visit:  3/30/2021  Date of Hospital Admission: 3/16/21  Date of Hospital Discharge: 3/18/21  Readmission Risk Score(high >=14%.  Medium >=10%):Readmission Risk Score: 9      Care management risk score Rising risk (score 2-5) and Complex Care (Scores >=6): 8     Non face to face  following discharge, date last encounter closed (first attempt may have been earlier): 3/22/2021  5:07 PM 3/22/2021  5:07 PM    Call initiated 2 business days of discharge: Yes     Patient Active Problem List   Diagnosis    Essential hypertension    Osteopenia    Carotid stenosis, non-symptomatic, bilateral    Chronic radicular lumbar pain    History of smoking 30 or more pack years    Claudication in peripheral vascular disease ([facility]    PAD (peripheral artery disease) ([facility]       Allergies   Allergen Reactions    Pletal [Cilostazol] Other (See Comments)     Pt states numbness in tongue, heart palpitations, itchy, nausea    Bactrim [Sulfamethoxazole-Trimethoprim] Rash    Codeine Nausea And Vomiting    Medrol [Methylprednisolone] Palpitations    Ultram [Tramadol Hcl] Other (See Comments)     Pt states causes shaking and feels bad       Medications listed as ordered at the time of discharge from hospital   Georgette STEPHENSON   Home Medication Instructions VÍCTOR:    Printed on:03/30/21 1057   Medication Information                      acetaminophen (TYLENOL) 500 MG tablet  Take 500 mg by mouth every 6 hours as needed for Pain             alendronate (FOSAMAX) 70 MG tablet  Take 1 tablet by mouth every 7 days             amLODIPine (NORVASC) 10 MG tablet  TAKE 1 TABLET BY MOUTH ONE TIME A DAY              aspirin 81 MG EC tablet  Take 81 mg by mouth daily             atorvastatin (LIPITOR) 40 MG tablet  Take 1 tablet by mouth daily             carisoprodol (SOMA) 350 MG tablet  Take 1 tablet by mouth 3 times daily as needed for Muscle spasms for up to 5 days. cephALEXin (KEFLEX) 500 MG capsule  Take 1 capsule by mouth 3 times daily             clopidogrel (PLAVIX) 75 MG tablet  Take 1 tablet by mouth daily             cyclobenzaprine (FLEXERIL) 10 MG tablet  Take 1 tablet by mouth 3 times daily as needed for Muscle spasms             famotidine (PEPCID) 20 MG tablet  Take 1 tablet by mouth 2 times daily             lisinopril (PRINIVIL;ZESTRIL) 10 MG tablet  Take 10 mg by mouth 2 times daily              ondansetron (ZOFRAN ODT) 4 MG disintegrating tablet  Take 1 tablet by mouth every 8 hours as needed for Nausea or Vomiting             vitamin D (ERGOCALCIFEROL) 1.25 MG (83303 UT) CAPS capsule  Take 1 capsule by mouth once a week                   Medications marked \"taking\" at this time  Outpatient Medications Marked as Taking for the 3/30/21 encounter (Office Visit) with SHERYL Bucio CNP   Medication Sig Dispense Refill    carisoprodol (SOMA) 350 MG tablet Take 1 tablet by mouth 3 times daily as needed for Muscle spasms for up to 5 days.  15 tablet 0    cephALEXin (KEFLEX) 500 MG capsule Take 1 capsule by mouth 3 times daily 30 capsule 0    clopidogrel (PLAVIX) 75 MG tablet Take 1 tablet by mouth daily 30 tablet 3    atorvastatin (LIPITOR) 40 MG tablet Take 1 tablet by mouth daily 30 tablet 1    vitamin D (ERGOCALCIFEROL) 1.25 MG (80869 UT) CAPS capsule Take 1 capsule by mouth once a week (Patient taking differently: Take 50,000 Units by mouth once a week mondays) 12 capsule 0    alendronate (FOSAMAX) 70 MG tablet Take 1 tablet by mouth every 7 days (Patient taking differently: Take 70 mg by mouth every 7 days Mondays) 12 tablet 3    ondansetron (ZOFRAN ODT) 4 MG disintegrating tablet Take 1 tablet by mouth every 8 hours as needed for Nausea or Vomiting 20 tablet 0    famotidine (PEPCID) 20 MG tablet Take 1 tablet by mouth 2 times daily (Patient taking differently: Take 20 mg by mouth 2 times daily as needed ) 60 tablet 3    amLODIPine (NORVASC) 10 MG tablet TAKE 1 TABLET BY MOUTH ONE TIME A DAY  90 tablet 3    lisinopril (PRINIVIL;ZESTRIL) 10 MG tablet Take 10 mg by mouth 2 times daily       acetaminophen (TYLENOL) 500 MG tablet Take 500 mg by mouth every 6 hours as needed for Pain      aspirin 81 MG EC tablet Take 81 mg by mouth daily          Medications patient taking as of now reconciled against medications ordered at time of hospital discharge: Yes    Chief Complaint   Patient presents with    Follow-Up from [address] [place] [address] 03/18/2021 [facility] Endocrodectomy       Patient status post left femoral endarterectomy. She is here with her  today. She is doing well with minimal pain now compared to her preop status. She is worried about white pus draining from her left groin incisional area where staples are still in place. She denies fever or constitutional symptoms of infection. She is beginning to eat more normally, has normal bowel movements, and sleep is adequate. Inpatient course: Discharge summary reviewed- see chart. Interval history/Current status: stable. Antibiotics prescribed. Follow up with surgeon as planned. Review of Systems   Constitutional: Negative for activity change, fatigue and fever. HENT: Negative for congestion, ear pain, rhinorrhea, sore throat and trouble swallowing. Eyes: Negative for pain, discharge and redness. Respiratory: Negative for cough, shortness of breath and wheezing. Cardiovascular: Negative. Gastrointestinal: Negative for abdominal pain, constipation, diarrhea, nausea and vomiting. Endocrine: Negative. Genitourinary: Negative for dysuria, frequency and urgency. Musculoskeletal: Negative for arthralgias, back pain and myalgias. Skin: Negative for rash. Pus drainage from left groin incision     Allergic/Immunologic: Negative.     Neurological: Negative for dizziness, tremors, weakness and headaches. Hematological: Negative. Psychiatric/Behavioral: Negative for dysphoric mood and sleep disturbance. The patient is not nervous/anxious. Vitals:    03/30/21 0925 03/30/21 0928   BP: (!) 142/80 120/80   Pulse: 98    Resp: 16    Temp: 98.3 °F (36.8 °C)    TempSrc: Oral    Weight: 116 lb 12.8 oz (53 kg)      Body mass index is 22.81 kg/m². Wt Readings from Last 3 Encounters:   03/30/21 116 lb 12.8 oz (53 kg)   03/29/21 115 lb (52.2 kg)   03/17/21 124 lb 9 oz (56.5 kg)     BP Readings from Last 3 Encounters:   03/30/21 120/80   03/29/21 137/72   03/18/21 (!) 177/78       Physical Exam  Constitutional:       General: She is not in acute distress. Appearance: She is well-developed. She is not diaphoretic. HENT:      Right Ear: External ear normal.      Left Ear: External ear normal.      Nose: Nose normal.   Eyes:      General:         Right eye: No discharge. Left eye: No discharge. Conjunctiva/sclera: Conjunctivae normal.      Pupils: Pupils are equal, round, and reactive to light. Neck:      Musculoskeletal: Normal range of motion. Vascular: No JVD. Cardiovascular:      Rate and Rhythm: Normal rate and regular rhythm. Pulmonary:      Effort: Pulmonary effort is normal. No respiratory distress. Musculoskeletal: Normal range of motion. General: Tenderness present. No swelling, deformity or signs of injury. Skin:     General: Skin is warm and dry. Capillary Refill: Capillary refill takes less than 2 seconds. Coloration: Skin is not pale. Findings: Erythema present. No rash. Neurological:      Mental Status: She is alert and oriented to person, place, and time. Coordination: Coordination normal.   Psychiatric:         Behavior: Behavior normal.         Thought Content: Thought content normal.         Judgment: Judgment normal.             Assessment/Plan:  1.  Hospital discharge follow-up    - [place] DISCHARGE MEDS RECONCILED W/ CURRENT

## 2021-03-31 NOTE — TELEPHONE ENCOUNTER
Called pt. Discussed program, pt requests call back 2 weeks as she currently cannot drive. Will plan to call pt on 4/14.

## 2021-04-01 LAB
AEROBIC CULTURE: ABNORMAL
AEROBIC CULTURE: ABNORMAL
GRAM STAIN RESULT: ABNORMAL
ORGANISM: ABNORMAL
ORGANISM: ABNORMAL

## 2021-04-05 DIAGNOSIS — M54.16 CHRONIC RADICULAR LUMBAR PAIN: ICD-10-CM

## 2021-04-05 DIAGNOSIS — G89.29 CHRONIC RADICULAR LUMBAR PAIN: ICD-10-CM

## 2021-04-05 RX ORDER — CARISOPRODOL 350 MG/1
350 TABLET ORAL 3 TIMES DAILY PRN
Qty: 30 TABLET | Refills: 0 | Status: SHIPPED | OUTPATIENT
Start: 2021-04-05 | End: 2021-04-15

## 2021-04-07 ENCOUNTER — OFFICE VISIT (OUTPATIENT)
Dept: CARDIOTHORACIC SURGERY | Age: 56
End: 2021-04-07

## 2021-04-07 VITALS
WEIGHT: 115 LBS | HEART RATE: 85 BPM | BODY MASS INDEX: 22.58 KG/M2 | DIASTOLIC BLOOD PRESSURE: 69 MMHG | HEIGHT: 60 IN | SYSTOLIC BLOOD PRESSURE: 137 MMHG

## 2021-04-07 DIAGNOSIS — I73.9 PVD (PERIPHERAL VASCULAR DISEASE) (HCC): Primary | ICD-10-CM

## 2021-04-07 PROCEDURE — 99024 POSTOP FOLLOW-UP VISIT: CPT | Performed by: PHYSICIAN ASSISTANT

## 2021-04-07 RX ORDER — CIPROFLOXACIN 500 MG/1
500 TABLET, FILM COATED ORAL 2 TIMES DAILY
Qty: 10 TABLET | Refills: 0 | Status: SHIPPED | OUTPATIENT
Start: 2021-04-07 | End: 2021-04-12

## 2021-04-12 ENCOUNTER — OFFICE VISIT (OUTPATIENT)
Dept: CARDIOTHORACIC SURGERY | Age: 56
End: 2021-04-12

## 2021-04-12 VITALS
HEIGHT: 60 IN | HEART RATE: 100 BPM | BODY MASS INDEX: 23.16 KG/M2 | DIASTOLIC BLOOD PRESSURE: 71 MMHG | WEIGHT: 118 LBS | SYSTOLIC BLOOD PRESSURE: 126 MMHG | OXYGEN SATURATION: 98 %

## 2021-04-12 DIAGNOSIS — I73.9 PVD (PERIPHERAL VASCULAR DISEASE) (HCC): Primary | ICD-10-CM

## 2021-04-12 DIAGNOSIS — Z01.818 PRE-OP TESTING: ICD-10-CM

## 2021-04-12 DIAGNOSIS — G89.18 POST-OP PAIN: ICD-10-CM

## 2021-04-12 PROCEDURE — 99024 POSTOP FOLLOW-UP VISIT: CPT | Performed by: PHYSICIAN ASSISTANT

## 2021-04-12 RX ORDER — CYCLOBENZAPRINE HCL 10 MG
10 TABLET ORAL 3 TIMES DAILY PRN
Qty: 60 TABLET | Refills: 0 | Status: SHIPPED | OUTPATIENT
Start: 2021-04-12 | End: 2021-05-12 | Stop reason: ALTCHOICE

## 2021-04-12 RX ORDER — HYDROCODONE BITARTRATE AND ACETAMINOPHEN 5; 325 MG/1; MG/1
1 TABLET ORAL EVERY 8 HOURS PRN
Qty: 90 TABLET | Refills: 0 | Status: SHIPPED | OUTPATIENT
Start: 2021-04-12 | End: 2021-05-12 | Stop reason: SDUPTHER

## 2021-04-12 NOTE — PROGRESS NOTES
Rekha Moura return to cardiovascular surgery clinic for follow-up. She is a 64years old female, status post left common femoral artery endarterectomy and repair with a bovine pericardial patch on 03/16/2021. She reports much improved her left leg symptoms with less claudication and no rest pain. She reports good recovery since the staples were removed and taking Keflex and Cipro. She would like to start PAD rehab clinic. Physical examination: A mild induration in the lateral left groin incision site with 1mm x 1mm x 0.5mm opening in lower 1/3 of incision with near resolution with full healing (SEE MEDIA). There is good Doppler signal over posterior tibial artery and dorsalis pedis in the left foot and 1+ DP/PT in right foot. Assessment:  1. Left groin superficial wound infection - nearly fully resolved - Cipro and Keflex - culture reviewed and will start Cipro 500 mg one po bid x 5 days - staples removed today  2. PVD - continue with Plavix, ASA, and statin - Importance to stop smoking addressed again, as she states she still smokes 3-4 cigs/day    Plan: F/U OV in one month to reassess performance at PVD rehab clinic. Pt instructed it is ok to continue doing routine ADLs. She will continue to be off work until f/u OV in one month. If rehab is going well, she will be able to return to work from our standpoint.

## 2021-04-14 NOTE — TELEPHONE ENCOUNTER
Called pt, she enrolled with a smoking cessation program through her employer, declines enrollment w/ [facility] at this time.

## 2021-05-03 ENCOUNTER — PATIENT MESSAGE (OUTPATIENT)
Dept: FAMILY MEDICINE CLINIC | Age: 56
End: 2021-05-03

## 2021-05-03 DIAGNOSIS — G89.18 POST-OP PAIN: Primary | ICD-10-CM

## 2021-05-03 RX ORDER — CARISOPRODOL 350 MG/1
350 TABLET ORAL 3 TIMES DAILY PRN
Qty: 30 TABLET | Refills: 0 | Status: SHIPPED | OUTPATIENT
Start: 2021-05-03 | End: 2021-05-12 | Stop reason: ALTCHOICE

## 2021-05-03 NOTE — TELEPHONE ENCOUNTER
From: Paul De La Paz  To: Dilma Bermudez APRN - CNP  Sent: 5/3/2021 10:29 AM EDT  Subject: Prescription Question    Good morning Andrew, I haven't been making a lot of progress mostly because of the infection set my healing back. It is still not completely closed and I am still not comfortable wearing underwear. The flexural is not doing the trick right now and I wanted to know if you could prescribe me some Soma again. At this time they have me scheduled to go back to work on the 17 but I do not think I will be ready. Just wanted to give you a heads up. Thanks again and you can call me at [phone] if you have any questions. Have a great day.      Augustine Mansfield

## 2021-05-10 ENCOUNTER — HOSPITAL ENCOUNTER (OUTPATIENT)
Dept: INTERVENTIONAL RADIOLOGY/VASCULAR | Age: 56
Discharge: HOME | End: 2021-05-10
Payer: COMMERCIAL

## 2021-05-10 DIAGNOSIS — I73.9 PVD (PERIPHERAL VASCULAR DISEASE) (HCC): ICD-10-CM

## 2021-05-10 PROCEDURE — 93922 UPR/L XTREMITY ART 2 LEVELS: CPT

## 2021-05-12 ENCOUNTER — PATIENT MESSAGE (OUTPATIENT)
Dept: FAMILY MEDICINE CLINIC | Age: 56
End: 2021-05-12

## 2021-05-12 DIAGNOSIS — I73.9 CLAUDICATION IN PERIPHERAL VASCULAR DISEASE (HCC): Primary | ICD-10-CM

## 2021-05-12 DIAGNOSIS — G89.18 POST-OP PAIN: ICD-10-CM

## 2021-05-12 RX ORDER — HYDROCODONE BITARTRATE AND ACETAMINOPHEN 5; 325 MG/1; MG/1
1 TABLET ORAL EVERY 8 HOURS PRN
Qty: 90 TABLET | Refills: 0 | Status: SHIPPED | OUTPATIENT
Start: 2021-05-12 | End: 2021-06-10 | Stop reason: SDUPTHER

## 2021-05-12 RX ORDER — CYCLOBENZAPRINE HCL 10 MG
10 TABLET ORAL 2 TIMES DAILY PRN
Qty: 60 TABLET | Refills: 1 | Status: SHIPPED | OUTPATIENT
Start: 2021-05-12 | End: 2021-06-11

## 2021-05-12 NOTE — TELEPHONE ENCOUNTER
From: Paul De La Paz  To: Dilma Bermudez APRN - CNP  Sent: 5/12/2021 9:03 AM EDT  Subject: Prescription Question    Good morning Andrew, I requested a refill for my pain meds this morning. I go back to the heart Dr. Cb Larson. and am going to ask them to extend my time off because my incision is still not healed. I am making some progress but still unable to wear underwear and even uncomfortable to wear loose sweats or shorts. Since I have been down for so long I think that is effecting muscles all over my body, I know I am getting old. I would like to have the Soma as well but if I have to choose one or the other I need the pain more. I will be making an appointment with you within the next month to go over my meds, latest blood test results and any further care you think I may need. Until then, thank you for all you have done for me! Have a great day!

## 2021-05-17 ENCOUNTER — OFFICE VISIT (OUTPATIENT)
Dept: CARDIOTHORACIC SURGERY | Age: 56
End: 2021-05-17

## 2021-05-17 VITALS
BODY MASS INDEX: 23.75 KG/M2 | DIASTOLIC BLOOD PRESSURE: 78 MMHG | HEART RATE: 106 BPM | WEIGHT: 121 LBS | HEIGHT: 60 IN | SYSTOLIC BLOOD PRESSURE: 149 MMHG

## 2021-05-17 DIAGNOSIS — I73.9 PVD (PERIPHERAL VASCULAR DISEASE) (HCC): Primary | ICD-10-CM

## 2021-05-17 PROCEDURE — 99024 POSTOP FOLLOW-UP VISIT: CPT | Performed by: PHYSICIAN ASSISTANT

## 2021-05-17 RX ORDER — ERGOCALCIFEROL 1.25 MG/1
CAPSULE ORAL
Qty: 12 CAPSULE | Refills: 0 | Status: SHIPPED | OUTPATIENT
Start: 2021-05-17 | End: 2021-05-19

## 2021-05-17 NOTE — PROGRESS NOTES
Melonie Moura return to cardiovascular surgery clinic for follow-up. She is a 64years old female, status post left common femoral artery endarterectomy and repair with a bovine pericardial patch on 03/16/2021. She reports much improved her left leg symptoms with less claudication and no rest pain. Physical examination: Left groin wound is well-healed, no signs of infection. FROM of left hip                                      Bilat DPSs  2+ and equal  Vitals:    05/17/21 1005   BP: (!) 149/78   Pulse: 106         PROCEDURE: VL JOAN BILATERAL LIMITED 1-2 LEVELS       CLINICAL INFORMATION: PVD (peripheral vascular disease) ([facility])       COMPARISON: Arterial Doppler 2/15/2021       TECHNIQUE: Ankle-brachial indices were calculated bilaterally. . Doppler waveforms were also generated for both ankles.           FINDINGS: ABIs and Doppler waveforms are within normal limits. There is been marked improvement in the ABIs left side since prior study.       JOAN   RIGHT       KIRBY----->1.02   PTA----->1.07           JOAN    LEFT       KIRBY----->0.96   PTA----->0.96               Impression   Normal study.               **This report has been created using voice recognition software.  It may contain minor errors which are inherent in voice recognition technology. **       Final report electronically signed by Dr. Nelson Peres on 5/10/2021 7:59 AM     Assessment:  1. Left groin superficial wound infection - has resolved and wound is well-healed  2. PVD - continue with Plavix, ASA, and statin - Importance to stop smoking addressed again, as she states she is in smoking cessation class     Plan: Continue current medical therapy as above. Plan to return to work full duty on 6/7/21           Follow-up as needed.

## 2021-05-18 ENCOUNTER — TELEPHONE (OUTPATIENT)
Dept: CARDIOTHORACIC SURGERY | Age: 56
End: 2021-05-18

## 2021-05-18 NOTE — TELEPHONE ENCOUNTER
Short term disability paperwork and office notes sent to [address] [place] [address] Management at [phone]

## 2021-05-19 RX ORDER — ERGOCALCIFEROL 1.25 MG/1
CAPSULE ORAL
Qty: 12 CAPSULE | Refills: 0 | Status: SHIPPED | OUTPATIENT
Start: 2021-05-19 | End: 2022-02-09 | Stop reason: ALTCHOICE

## 2021-06-03 ENCOUNTER — OFFICE VISIT (OUTPATIENT)
Dept: CARDIOLOGY CLINIC | Age: 56
End: 2021-06-03
Payer: COMMERCIAL

## 2021-06-03 VITALS
WEIGHT: 126 LBS | SYSTOLIC BLOOD PRESSURE: 108 MMHG | HEART RATE: 92 BPM | HEIGHT: 60 IN | DIASTOLIC BLOOD PRESSURE: 58 MMHG | BODY MASS INDEX: 24.74 KG/M2

## 2021-06-03 DIAGNOSIS — I73.9 PAD (PERIPHERAL ARTERY DISEASE) (HCC): Primary | ICD-10-CM

## 2021-06-03 PROCEDURE — 99213 OFFICE O/P EST LOW 20 MIN: CPT | Performed by: INTERNAL MEDICINE

## 2021-06-03 NOTE — PROGRESS NOTES
Pt here for 3 mo check up - peripheral angiogram     Pt states med list is correct from 5/17/21 appt     Pt continues with tightness at area in groin

## 2021-06-03 NOTE — PROGRESS NOTES
[id number] [facility] [address] [place] [address]  Dept: [phone]  Dept Fax: [phone]  Loc: [phone]    Visit Date: 6/3/2021    Ms. Randall Lee is a 64 y.o. female  who presented for:  Chief Complaint   Patient presents with    Check-Up     ASCVD       HPI:   65 yo F c hx of PAD, Smoker , PAD underwent endarterectomy by vascular surgery. .  She is doing well. Continues to smoke. Had injection at surgical site. Has been seeing vascular surgery. Her claudication symptoms have completely gone. Current Outpatient Medications:     vitamin D (ERGOCALCIFEROL) 1.25 MG (79810 UT) CAPS capsule, TAKE 1 CAPSULE BY MOUTH ONE TIME A WEEK , Disp: 12 capsule, Rfl: 0    HYDROcodone-acetaminophen (NORCO) 5-325 MG per tablet, Take 1 tablet by mouth every 8 hours as needed for Pain for up to 30 days. , Disp: 90 tablet, Rfl: 0    cyclobenzaprine (FLEXERIL) 10 MG tablet, Take 1 tablet by mouth 2 times daily as needed for Muscle spasms, Disp: 60 tablet, Rfl: 1    cephALEXin (KEFLEX) 500 MG capsule, Take 1 capsule by mouth 3 times daily, Disp: 30 capsule, Rfl: 0    clopidogrel (PLAVIX) 75 MG tablet, Take 1 tablet by mouth daily, Disp: 30 tablet, Rfl: 3    atorvastatin (LIPITOR) 40 MG tablet, Take 1 tablet by mouth daily, Disp: 30 tablet, Rfl: 1    alendronate (FOSAMAX) 70 MG tablet, Take 1 tablet by mouth every 7 days (Patient taking differently: Take 70 mg by mouth every 7 days Mondays), Disp: 12 tablet, Rfl: 3    ondansetron (ZOFRAN ODT) 4 MG disintegrating tablet, Take 1 tablet by mouth every 8 hours as needed for Nausea or Vomiting, Disp: 20 tablet, Rfl: 0    famotidine (PEPCID) 20 MG tablet, Take 1 tablet by mouth 2 times daily (Patient taking differently: Take 20 mg by mouth 2 times daily as needed ), Disp: 60 tablet, Rfl: 3    amLODIPine (NORVASC) 10 MG tablet, TAKE 1 TABLET BY MOUTH ONE TIME A DAY , Disp: 90 tablet, Rfl: 3   lisinopril (PRINIVIL;ZESTRIL) 10 MG tablet, Take 10 mg by mouth 2 times daily , Disp: , Rfl:     acetaminophen (TYLENOL) 500 MG tablet, Take 500 mg by mouth every 6 hours as needed for Pain, Disp: , Rfl:     aspirin 81 MG EC tablet, Take 81 mg by mouth daily, Disp: , Rfl:     Past Medical History  Magy Walker  has a past medical history of Arthritis, Blood circulation, collateral, Hyperlipidemia, Hypertension, and PAD (peripheral artery disease) ([facility] [address] Social History  Magy Walker  reports that she has been smoking cigarettes. She has a 8.75 pack-year smoking history. She has never used smokeless tobacco. She reports current alcohol use. She reports current drug use. Frequency: 2.00 times per week. Drug: Marijuana. Family History  Magy Walker family history includes Alcohol Abuse in her father; Coronary Art Dis in her brother; Diabetes in her maternal aunt and mother; Heart Disease in her brother, brother, and mother; Parkinsonism in her maternal cousin. Past Surgical History   Past Surgical History:   Procedure Laterality Date    BACK SURGERY       SECTION      x 4    COLONOSCOPY      DILATION AND CURETTAGE OF UTERUS      ENDOSCOPY, COLON, DIAGNOSTIC      FEMORAL ENDARTERECTOMY Left 3/16/2021    LEFT  FEMORAL ARTERY ENDARTERECTOMY performed by Dary Osborne MD at [facility]      bone spurs removed both feet    KNEE SURGERY Left     reconstruction of knee    LUMBAR DISCECTOMY  2020    Dr. Pepe Barreto BIOPSY         Subjective:     REVIEW OF SYSTEMS  Constitutional: denies sweats, chills and fever  HENT: denies  congestion, sinus pressure, sneezing and sore throat. Eyes: denies  pain, discharge, redness and itching. Respiratory: denies apnea, cough  Gastrointestinal: denies blood in stool, constipation, diarrhea   Endocrine: denies cold intolerance, heat intolerance, polydipsia. Genitourinary: denies dysuria, enuresis, flank pain and hematuria.    Musculoskeletal: denies arthralgias, joint swelling and neck pain. Neurological: denies numbness and headaches. Psychiatric/Behavioral: denies agitation, confusion, decreased concentration and dysphoric mood    All others reviewed and are negative. Objective:     BP (!) 108/58   Pulse 92   Ht 5' (1.524 m)   Wt 126 lb (57.2 kg)   BMI 24.61 kg/m²     Wt Readings from Last 3 Encounters:   06/03/21 126 lb (57.2 kg)   05/17/21 121 lb (54.9 kg)   04/12/21 118 lb (53.5 kg)     BP Readings from Last 3 Encounters:   06/03/21 (!) 108/58   05/17/21 (!) 149/78   04/12/21 126/71       PHYSICAL EXAM  Constitutional: Oriented to person, place, and time. Appears well-developed and well-nourished. HENT:   Head: Normocephalic and atraumatic. Eyes: EOM are normal. Pupils are equal, round, and reactive to light. Neck: Normal range of motion. Neck supple. No JVD present. Cardiovascular: Normal rate , normal heart sounds and intact distal pulses. Pulmonary/Chest: Effort normal and breath sounds normal. No respiratory distress. No wheezes. No rales. Abdominal: Soft. Bowel sounds are normal. No distension. There is no tenderness. Musculoskeletal: Normal range of motion. No edema. Neurological: Alert and oriented to person, place, and time. No cranial nerve deficit. Coordination normal.   Skin: Skin is warm and dry. Psychiatric: Normal mood and affect.        No results found for: CKTOTAL, CKMB, CKMBINDEX    Lab Results   Component Value Date    WBC 14.6 03/17/2021    RBC 2.82 03/17/2021    HGB 9.3 03/17/2021    HCT 28.7 03/17/2021    .8 03/17/2021    MCH 33.0 03/17/2021    MCHC 32.4 03/17/2021     03/17/2021    MPV 9.7 03/17/2021       Lab Results   Component Value Date     03/17/2021    K 3.8 03/17/2021    K 4.5 03/08/2021     03/17/2021    CO2 23 03/17/2021    BUN 6 03/17/2021    LABALBU 4.4 02/15/2021    CREATININE 0.4 03/17/2021    CALCIUM 8.6 03/17/2021    LABGLOM >90 03/17/2021    GLUCOSE 133 03/17/2021 Lab Results   Component Value Date    ALKPHOS 59 02/15/2021    ALT 27 02/15/2021    AST 29 02/15/2021    PROT 7.2 02/15/2021    BILITOT 0.2 02/15/2021    BILIDIR <0.2 09/03/2020    LABALBU 4.4 02/15/2021       Lab Results   Component Value Date    MG 2.1 09/25/2020       Lab Results   Component Value Date    INR 0.96 03/12/2021    INR 0.92 03/08/2021    INR 0.91 02/15/2021         Lab Results   Component Value Date    LABA1C 5.1 03/12/2021       Lab Results   Component Value Date    TRIG 181 03/08/2021    HDL 67 03/08/2021    LDLCALC 85 03/08/2021       Lab Results   Component Value Date    TSH 0.808 09/03/2020         Testing Reviewed:      I haveindividually reviewed the below cardiac tests    EKG:    ECHO:   Results for orders placed during the hospital encounter of 03/18/19   Echo 2D w doppler w color complete    Narrative Transthoracic Echocardiography Report (TTE)     Demographics      Patient Name   Jo Dominion       Gender              Female                  [place]      MR #           [id number]     Race                                                   Ethnicity      Account #      [de-identified]     Room Number      Accession      [id number]     Date of Study       03/18/2019   Number      Date of Birth  1965    Referring Physician Costa Jim MD      Age            47 year(s)    Sonographer         Lauar Rojas                                                    T                                   Interpreting        Tony Jim MD                                Physician     Procedure    Type of Study      TTE procedure:ECHOCARDIOGRAM COMPLETE 2D W DOPPLER W COLOR. Procedure Date  Date: 03/18/2019 Start: 09:06 AM    Study Location: Echo Lab  Technical Quality: Adequate visualization    Indications:Preop cardiac evaluation.     Additional Medical History:smoker, hypertension, hyperlipidemia, alcohol  abuse    Patient Status: Routine    Height: 60 inches Weight: 120 pounds BSA: 1.5 m^2 BMI: 23.44 kg/m^2    BP: 188/80 mmHg     Conclusions      Summary   Normal left ventricle size and systolic function. Ejection fraction was   estimated at 55-60%. Mild to moderate aortic regurgitation is noted. Mild mitral regurgitation is present. Mild tricuspid regurgitation. IVC size is within normal limits with normal respiratory phasic changes. Signature      ----------------------------------------------------------------   Electronically signed by Que Shah MD (Interpreting   physician) on 03/19/2019 at 04:47 PM   ----------------------------------------------------------------      Findings      Mitral Valve   The mitral valve structure was normal with normal leaflet separation. DOPPLER: The transmitral velocity was within the normal range with no   evidence for mitral stenosis. Mild mitral regurgitation is present. Aortic Valve   The aortic valve was trileaflet with normal thickness and cuspal   separation. DOPPLER: Transaortic velocity was within the normal range with   no evidence of aortic stenosis. Mild to moderate aortic regurgitation is noted. Tricuspid Valve   The tricuspid valve structure was normal with normal leaflet separation. DOPPLER: There was no evidence of tricuspid stenosis. Mild tricuspid regurgitation. Pulmonic Valve   The pulmonic valve was not well visualized . Left Atrium   Left atrial size was normal.      Left Ventricle   Normal left ventricle size and systolic function. Ejection fraction was   estimated at 55-60%. Right Atrium   Right atrial size was normal.      Right Ventricle   The right ventricular size was normal with normal systolic function and   wall thickness. Pericardial Effusion   The pericardium was normal in appearance with no evidence of a pericardial   effusion.       Pleural Effusion   No evidence of pleural effusion. Aorta / Great Vessels   -Aortic root dimension within normal limits. -IVC size is within normal limits with normal respiratory phasic changes.      M-Mode/2D Measurements & Calculations      LV Diastolic   LV Systolic Dimension: 3  AV Cusp Separation: 1.7 cmLA   Dimension: 4.4 cm                        Dimension: 3.5 cmAO Root   cm             LV Volume Diastolic: 26.8 Dimension: 1.8 cmLA Area: 20.7   LV FS:31.8 %   ml                        cm^2   LV PW          LV Volume Systolic: 35 ml   Diastolic: 1   LV EDV/LV EDV Index: 87.7   cm             ml/58 m^2LV ESV/LV ESV   Septum         Index: 35 ml/23 m^2       RV Diastolic Dimension: 2.3 cm   Diastolic: 1.1 EF Calculated: 60.1 %   cm                                       LA/Aorta: 1.94                                               LA volume/Index: 60.2 ml /40m^2     Doppler Measurements & Calculations      MV Peak E-Wave: 106 cm/s   AV Peak Velocity: 197  LVOT Peak Velocity: 146   MV Peak A-Wave: 101 cm/s   cm/s                   cm/s   MV E/A Ratio: 1.05         AV Peak Gradient:      LVOT Peak Gradient: 9   MV Peak Gradient: 4.49     15.52 mmHg             mmHg   mmHg                                                     TV Peak E-Wave: 34.5   MV Deceleration Time: 201                         cm/s   msec                                              TV Peak A-Wave: 46.7   MV P1/2t: 59 msec          AV P1/2t: 330 msec     cm/s   MVA by PHT:3.73 cm^2                                                     TV Peak Gradient: 0.48   MV E' Septal Velocity: 9                          mmHg   cm/s                       AV DVI (Vmax):0.74     TR Velocity:244 cm/s   MV A' Septal Velocity: 8.9                        TR Gradient:23.81 mmHg   cm/s                                              PV Peak Velocity: 107   MV E' Lateral Velocity:                           cm/s   9.5 cm/s                                          PV Peak Gradient: 4.58   MV A' Lateral Velocity:                           mmHg   10.8 cm/s   E/E' septal: 11.78   E/E' lateral: 11.16     [email or web address] [id number]%3d       STRESS:    CATH:    Assessment/Plan       Diagnosis Orders   1. PAD (peripheral artery disease) (HCC)       S/p L endarterectomy   Severe PAD  Smoker    Doing well  Smoking cessation advised  Unable to tolerate pletal, had allergic reaction  Continue Aspirin, /plavix, statin  Doing well since endarterectomy  The patient is asked to make an attempt to improve diet and exercise patterns to aid in medical management of this problem. Advised more plant based nutrition/meditarrean diet   Advised patient to call office or seek immediate medical attention if there is any new onset of  any chest pain, sob, palpitations, lightheadedness, dizziness, orthopnea, PND or pedal edema. All medication side effects were discussed in details. Thank youfor allowing me to participate in the care of this patient. Please do not hesitate to contact me for any further questions. Return if symptoms worsen or fail to improve, for Regular follow up, Review testing.        Electronically signed by Sveta Parekh MD [facility]  6/3/2021 at 9:01 AM EST

## 2021-06-10 DIAGNOSIS — I73.9 CLAUDICATION IN PERIPHERAL VASCULAR DISEASE (HCC): ICD-10-CM

## 2021-06-10 DIAGNOSIS — G89.18 POST-OP PAIN: ICD-10-CM

## 2021-06-10 RX ORDER — CYCLOBENZAPRINE HCL 10 MG
10 TABLET ORAL 2 TIMES DAILY PRN
Qty: 60 TABLET | Refills: 1 | Status: CANCELLED | OUTPATIENT
Start: 2021-06-10 | End: 2021-07-10

## 2021-06-11 RX ORDER — HYDROCODONE BITARTRATE AND ACETAMINOPHEN 5; 325 MG/1; MG/1
1 TABLET ORAL EVERY 8 HOURS PRN
Qty: 90 TABLET | Refills: 0 | Status: SHIPPED | OUTPATIENT
Start: 2021-06-11 | End: 2021-06-14

## 2021-06-14 ENCOUNTER — OFFICE VISIT (OUTPATIENT)
Dept: FAMILY MEDICINE CLINIC | Age: 56
End: 2021-06-14
Payer: COMMERCIAL

## 2021-06-14 VITALS
DIASTOLIC BLOOD PRESSURE: 68 MMHG | BODY MASS INDEX: 23.47 KG/M2 | TEMPERATURE: 97.9 F | WEIGHT: 120.2 LBS | OXYGEN SATURATION: 98 % | RESPIRATION RATE: 16 BRPM | SYSTOLIC BLOOD PRESSURE: 134 MMHG | HEART RATE: 97 BPM

## 2021-06-14 DIAGNOSIS — G89.29 CHRONIC RADICULAR LUMBAR PAIN: ICD-10-CM

## 2021-06-14 DIAGNOSIS — Z71.6 ENCOUNTER FOR SMOKING CESSATION COUNSELING: ICD-10-CM

## 2021-06-14 DIAGNOSIS — Z00.00 ANNUAL PHYSICAL EXAM: Primary | ICD-10-CM

## 2021-06-14 DIAGNOSIS — M54.16 CHRONIC RADICULAR LUMBAR PAIN: ICD-10-CM

## 2021-06-14 PROBLEM — R01.1 SYSTOLIC MURMUR: Status: ACTIVE | Noted: 2021-06-14

## 2021-06-14 PROCEDURE — 99396 PREV VISIT EST AGE 40-64: CPT | Performed by: NURSE PRACTITIONER

## 2021-06-14 RX ORDER — BUPROPION HYDROCHLORIDE 150 MG/1
150 TABLET ORAL EVERY MORNING
Qty: 30 TABLET | Refills: 3 | Status: SHIPPED | OUTPATIENT
Start: 2021-06-14 | End: 2021-12-14 | Stop reason: SDUPTHER

## 2021-06-14 RX ORDER — CYCLOBENZAPRINE HCL 10 MG
10 TABLET ORAL 3 TIMES DAILY PRN
Qty: 60 TABLET | Refills: 0 | Status: SHIPPED | OUTPATIENT
Start: 2021-06-14 | End: 2021-06-14 | Stop reason: ALTCHOICE

## 2021-06-14 RX ORDER — IBUPROFEN 800 MG/1
800 TABLET ORAL EVERY 6 HOURS PRN
COMMUNITY
End: 2022-01-25

## 2021-06-14 RX ORDER — CYCLOBENZAPRINE HCL 10 MG
10 TABLET ORAL 3 TIMES DAILY PRN
Qty: 90 TABLET | Refills: 1 | Status: SHIPPED | OUTPATIENT
Start: 2021-06-14 | End: 2021-08-11 | Stop reason: SDUPTHER

## 2021-06-14 SDOH — ECONOMIC STABILITY: FOOD INSECURITY: WITHIN THE PAST 12 MONTHS, THE FOOD YOU BOUGHT JUST DIDN'T LAST AND YOU DIDN'T HAVE MONEY TO GET MORE.: NEVER TRUE

## 2021-06-14 SDOH — ECONOMIC STABILITY: FOOD INSECURITY: WITHIN THE PAST 12 MONTHS, YOU WORRIED THAT YOUR FOOD WOULD RUN OUT BEFORE YOU GOT MONEY TO BUY MORE.: NEVER TRUE

## 2021-06-14 ASSESSMENT — ENCOUNTER SYMPTOMS
COUGH: 0
EYE DISCHARGE: 0
ABDOMINAL PAIN: 0
EYE REDNESS: 0
SORE THROAT: 0
NAUSEA: 0
WHEEZING: 0
DIARRHEA: 0
EYE PAIN: 0
CONSTIPATION: 0
TROUBLE SWALLOWING: 0
RHINORRHEA: 0
ALLERGIC/IMMUNOLOGIC NEGATIVE: 1
SHORTNESS OF BREATH: 0
BACK PAIN: 0
VOMITING: 0

## 2021-06-14 ASSESSMENT — SOCIAL DETERMINANTS OF HEALTH (SDOH): HOW HARD IS IT FOR YOU TO PAY FOR THE VERY BASICS LIKE FOOD, HOUSING, MEDICAL CARE, AND HEATING?: NOT HARD AT ALL

## 2021-06-14 NOTE — PROGRESS NOTES
2021    Sandie Ravi (:  1965) is a 64 y.o. female, here for a preventive medicine evaluation. Patient Active Problem List   Diagnosis    Essential hypertension    Osteopenia    Carotid stenosis, non-symptomatic, bilateral    Chronic radicular lumbar pain    History of smoking 30 or more pack years    Claudication in peripheral vascular disease ([facility] [address]    PAD (peripheral artery disease) ([facility])    Systolic murmur       Review of Systems   Constitutional: Negative for activity change, fatigue and fever. HENT: Negative for congestion, ear pain, rhinorrhea, sore throat and trouble swallowing. Eyes: Negative for pain, discharge and redness. Respiratory: Negative for cough, shortness of breath and wheezing. Cardiovascular: Negative. Gastrointestinal: Negative for abdominal pain, constipation, diarrhea, nausea and vomiting. Endocrine: Negative. Genitourinary: Negative for dysuria, frequency and urgency. Musculoskeletal: Negative for arthralgias, back pain and myalgias. Skin: Negative for rash. Allergic/Immunologic: Negative. Neurological: Negative for dizziness, tremors, weakness and headaches. Hematological: Negative. Psychiatric/Behavioral: Negative for dysphoric mood and sleep disturbance. The patient is not nervous/anxious. Prior to Visit Medications    Medication Sig Taking?  Authorizing Provider   ibuprofen (ADVIL;MOTRIN) 800 MG tablet Take 800 mg by mouth every 6 hours as needed for Pain Yes Historical Provider, MD   cyclobenzaprine (FLEXERIL) 10 MG tablet Take 1 tablet by mouth 3 times daily as needed for Muscle spasms Yes SHERYL Smith CNP   buPROPion (WELLBUTRIN XL) 150 MG extended release tablet Take 1 tablet by mouth every morning Yes SHERYL Smith CNP   vitamin D (ERGOCALCIFEROL) 1.25 MG (71154 UT) CAPS capsule TAKE 1 CAPSULE BY MOUTH ONE TIME A WEEK  Yes SHERYL Burgos CNP   clopidogrel (PLAVIX) 75 MG tablet Take 1 tablet by mouth daily Yes Raegan Espitia PA-C   atorvastatin (LIPITOR) 40 MG tablet Take 1 tablet by mouth daily Yes Raegan Espitia PA-C   alendronate (FOSAMAX) 70 MG tablet Take 1 tablet by mouth every 7 days  Patient taking differently: Take 70 mg by mouth every 7 days  Yes SHERYL Thomson CNP   ondansetron (ZOFRAN ODT) 4 MG disintegrating tablet Take 1 tablet by mouth every 8 hours as needed for Nausea or Vomiting Yes Karma Hollingsworth PA-C   famotidine (PEPCID) 20 MG tablet Take 1 tablet by mouth 2 times daily  Patient taking differently: Take 20 mg by mouth 2 times daily as needed  Yes SHERYL Rand CNP   amLODIPine (NORVASC) 10 MG tablet TAKE 1 TABLET BY MOUTH ONE TIME A DAY  Yes SHERYL Silver CNP   lisinopril (PRINIVIL;ZESTRIL) 10 MG tablet Take 10 mg by mouth 2 times daily  Yes Historical Provider, MD   acetaminophen (TYLENOL) 500 MG tablet Take 500 mg by mouth every 6 hours as needed for Pain Yes Historical Provider, MD   aspirin 81 MG EC tablet Take 81 mg by mouth daily Yes Historical Provider, MD        Allergies   Allergen Reactions    Pletal [Cilostazol] Other (See Comments)     Pt states numbness in tongue, heart palpitations, itchy, nausea    Bactrim [Sulfamethoxazole-Trimethoprim] Rash    Codeine Nausea And Vomiting    Medrol [Methylprednisolone] Palpitations    Ultram [Tramadol Hcl] Other (See Comments)     Pt states causes shaking and feels bad       Past Medical History:   Diagnosis Date    Arthritis     Blood circulation, collateral     Hyperlipidemia     Hypertension     [facility] 2021       Past Surgical History:   Procedure Laterality Date    BACK SURGERY       SECTION      x 4    COLONOSCOPY      DILATION AND CURETTAGE OF UTERUS      ENDOSCOPY, COLON, DIAGNOSTIC      FEMORAL ENDARTERECTOMY Left 3/16/2021    LEFT  FEMORAL ARTERY ENDARTERECTOMY performed by Ashleigh Berkowitz MD at [address] SURGERY      bone spurs removed both feet    KNEE SURGERY Left     reconstruction of knee    LUMBAR DISCECTOMY  09/22/2020    Dr. Cain Longest History     Socioeconomic History    Marital status:      Spouse name: Not on file    Number of children: Not on file    Years of education: Not on file    Highest education level: Not on file   Occupational History    Not on file   Tobacco Use    Smoking status: Current Every Day Smoker     Packs/day: 0.25     Years: 35.00     Pack years: 8.75     Types: Cigarettes    Smokeless tobacco: Never Used    Tobacco comment: 3-5 cigs a day   Vaping Use    Vaping Use: Former   Substance and Sexual Activity    Alcohol use: Yes    Drug use: Yes     Frequency: 2.0 times per week     Types: Marijuana     Comment: last used yesterday    Sexual activity: Yes     Partners: Male   Other Topics Concern    Not on file   Social History Narrative    Not on file     Social Determinants of Health     Financial Resource Strain: Low Risk     Difficulty of Paying Living Expenses: Not hard at all   Food Insecurity: No Food Insecurity    Worried About [address] in the Last Year: Never true    [address] [facility] [address] in the Last Year: Never true   Transportation Needs:     Lack of Transportation (Medical):      Lack of Transportation (Non-Medical):    Physical Activity:     Days of Exercise per Week:     Minutes of Exercise per Session:    Stress:     Feeling of Stress :    Social Connections:     Frequency of Communication with Friends and Family:     Frequency of Social Gatherings with Friends and Family:     Attends [employer] Services:     Active Member of Clubs or Organizations:     Attends Club or Organization Meetings:     Marital Status:    Intimate Partner Violence:     Fear of Current or Ex-Partner:     Emotionally Abused:     Physically Abused:     Sexually Abused:         Family History   Problem Relation Age of Onset    Heart Disease Mother     Diabetes Mother     Alcohol Abuse Father     Heart Disease Brother     Diabetes Maternal Aunt     Parkinsonism Maternal Cousin     Heart Disease Brother     Coronary Art Dis Brother        ADVANCE DIRECTIVE: N, <no information>    Vitals:    06/14/21 1217   BP: 134/68   Site: Right Upper Arm   Pulse: 97   Resp: 16   Temp: 97.9 °F (36.6 °C)   TempSrc: Oral   SpO2: 98%   Weight: 120 lb 3.2 oz (54.5 kg)     Estimated body mass index is 23.47 kg/m² as calculated from the following:    Height as of 6/3/21: 5' (1.524 m). Weight as of this encounter: 120 lb 3.2 oz (54.5 kg). Physical Exam  Vitals reviewed. Constitutional:       General: She is not in acute distress. Appearance: Normal appearance. She is well-developed. She is not toxic-appearing or diaphoretic. HENT:      Head: Normocephalic. No right periorbital erythema or left periorbital erythema. Jaw: No trismus. Right Ear: Hearing, tympanic membrane, ear canal and external ear normal.      Left Ear: Hearing, tympanic membrane, ear canal and external ear normal.      Nose: Nose normal. No mucosal edema or rhinorrhea. Mouth/Throat:      Mouth: Mucous membranes are moist.      Dentition: Normal dentition. Pharynx: Uvula midline. No posterior oropharyngeal erythema. Tonsils: No tonsillar exudate. 0 on the right. 0 on the left. Eyes:      General: Lids are normal.         Right eye: No discharge. Left eye: No discharge. Conjunctiva/sclera: Conjunctivae normal.      Right eye: Right conjunctiva is not injected. No chemosis. Left eye: No chemosis. Pupils: Pupils are equal, round, and reactive to light. Neck:      Vascular: No JVD. Trachea: Trachea normal.   Cardiovascular:      Rate and Rhythm: Normal rate and regular rhythm. Heart sounds: Normal heart sounds. No murmur heard. Pulmonary:      Effort: Pulmonary effort is normal. No respiratory distress.       Breath sounds: Normal breath sounds. No stridor. No wheezing. Abdominal:      General: Bowel sounds are normal. There is no distension. Palpations: Abdomen is soft. Tenderness: There is no abdominal tenderness. Musculoskeletal:         General: No tenderness or signs of injury. Normal range of motion. Cervical back: Full passive range of motion without pain and normal range of motion. Lymphadenopathy:      Cervical: No cervical adenopathy. Skin:     General: Skin is warm and dry. Capillary Refill: Capillary refill takes less than 2 seconds. Findings: No rash. Neurological:      Mental Status: She is alert and oriented to person, place, and time. GCS: GCS eye subscore is 4. GCS verbal subscore is 5. GCS motor subscore is 6. Cranial Nerves: No cranial nerve deficit. Coordination: Coordination normal.      Gait: Gait normal.   Psychiatric:         Mood and Affect: Mood is not anxious or depressed. Speech: Speech normal.         Behavior: Behavior normal. Behavior is not withdrawn or hyperactive. Behavior is cooperative. Thought Content: Thought content normal.         Judgment: Judgment normal.         No flowsheet data found.     Lab Results   Component Value Date    CHOL 188 03/08/2021    CHOL 209 09/03/2020    TRIG 181 03/08/2021    TRIG 75 09/03/2020    HDL 67 03/08/2021    HDL 81 09/03/2020    LDLCALC 85 03/08/2021    LDLCALC 113 09/03/2020    GLUCOSE 133 03/17/2021    LABA1C 5.1 03/12/2021       The 10-year ASCVD risk score (Rodrick Pagan et al., 2013) is: 6%    Values used to calculate the score:      Age: 64 years      Sex: Female      Is Non- : No      Diabetic: No      Tobacco smoker: Yes      Systolic Blood Pressure: 908 mmHg      Is BP treated: Yes      HDL Cholesterol: 67 mg/dL      Total Cholesterol: 188 mg/dL    Immunization History   Administered Date(s) Administered    Influenza, Quadv, IM, PF (6 mo and older Fluzone, Flulaval, Fluarix, and 3 yrs and older Afluria) 10/05/2020    Pneumococcal Conjugate 13-valent (Arthuro Crystal) 07/21/2020    Tdap (Boostrix, Adacel) 07/21/2020    Zoster Recombinant (Shingrix) 01/12/2021, 03/11/2021       Health Maintenance   Topic Date Due    COVID-19 Vaccine (1) Never done    Pneumococcal 0-64 years Vaccine (1 of 2 - PPSV23) 09/15/2020    Lipid screen  03/08/2022    Potassium monitoring  03/17/2022    Creatinine monitoring  03/17/2022    Breast cancer screen  09/17/2022    Cervical cancer screen  07/28/2023    Colon cancer screen colonoscopy  05/03/2027    DTaP/Tdap/Td vaccine (2 - Td or Tdap) 07/21/2030    Flu vaccine  Completed    Shingles Vaccine  Completed    Hepatitis C screen  Completed    HIV screen  Completed    Hepatitis A vaccine  Aged Out    Hepatitis B vaccine  Aged Out    Hib vaccine  Aged Out    Meningococcal (ACWY) vaccine  Aged Out          ASSESSMENT/PLAN:  1. Annual physical exam  -     Basic Metabolic Panel; Future  -     Lipid, Fasting; Future  -     Hepatic Function Panel; Future  -     CBC Auto Differential; Future  2. Chronic radicular lumbar pain  -     cyclobenzaprine (FLEXERIL) 10 MG tablet; Take 1 tablet by mouth 3 times daily as needed for Muscle spasms, Disp-90 tablet, R-1Normal  3. Encounter for smoking cessation counseling  -     buPROPion (WELLBUTRIN XL) 150 MG extended release tablet; Take 1 tablet by mouth every morning, Disp-30 tablet, R-3Normal        Return in about 1 year (around 6/14/2022). An electronic signature was used to authenticate this note.     --SHERYL Barfield - CNP on 6/14/2021 at 12:48 PM

## 2021-07-12 ENCOUNTER — PATIENT MESSAGE (OUTPATIENT)
Dept: FAMILY MEDICINE CLINIC | Age: 56
End: 2021-07-12

## 2021-07-12 DIAGNOSIS — G89.29 CHRONIC RADICULAR LUMBAR PAIN: Primary | ICD-10-CM

## 2021-07-12 DIAGNOSIS — M54.16 CHRONIC RADICULAR LUMBAR PAIN: Primary | ICD-10-CM

## 2021-07-13 RX ORDER — HYDROCODONE BITARTRATE AND ACETAMINOPHEN 5; 325 MG/1; MG/1
1 TABLET ORAL EVERY 12 HOURS PRN
Qty: 60 TABLET | Refills: 0 | Status: SHIPPED | OUTPATIENT
Start: 2021-07-13 | End: 2021-08-12

## 2021-08-11 ENCOUNTER — PATIENT MESSAGE (OUTPATIENT)
Dept: FAMILY MEDICINE CLINIC | Age: 56
End: 2021-08-11

## 2021-08-11 ENCOUNTER — TELEPHONE (OUTPATIENT)
Dept: FAMILY MEDICINE CLINIC | Age: 56
End: 2021-08-11

## 2021-08-11 DIAGNOSIS — Z87.891 HISTORY OF SMOKING 30 OR MORE PACK YEARS: Primary | ICD-10-CM

## 2021-08-11 DIAGNOSIS — M54.16 CHRONIC RADICULAR LUMBAR PAIN: ICD-10-CM

## 2021-08-11 DIAGNOSIS — G89.29 CHRONIC RADICULAR LUMBAR PAIN: ICD-10-CM

## 2021-08-11 RX ORDER — CLOPIDOGREL BISULFATE 75 MG/1
75 TABLET ORAL DAILY
Qty: 30 TABLET | Refills: 5 | Status: SHIPPED | OUTPATIENT
Start: 2021-08-11 | End: 2022-02-09 | Stop reason: SDUPTHER

## 2021-08-11 RX ORDER — CYCLOBENZAPRINE HCL 10 MG
10 TABLET ORAL 3 TIMES DAILY PRN
Qty: 90 TABLET | Refills: 1 | Status: SHIPPED | OUTPATIENT
Start: 2021-08-11 | End: 2021-10-11 | Stop reason: SDUPTHER

## 2021-08-11 RX ORDER — HYDROCODONE BITARTRATE AND ACETAMINOPHEN 5; 325 MG/1; MG/1
1 TABLET ORAL EVERY 12 HOURS PRN
Qty: 60 TABLET | Refills: 0 | OUTPATIENT
Start: 2021-08-11 | End: 2021-09-10

## 2021-08-11 NOTE — TELEPHONE ENCOUNTER
Called and LM to call back. Patient is on recall list to have CT Lung screening done this month. Order placed.

## 2021-08-16 DIAGNOSIS — I10 ESSENTIAL HYPERTENSION: ICD-10-CM

## 2021-08-16 RX ORDER — AMLODIPINE BESYLATE 10 MG/1
TABLET ORAL
Qty: 90 TABLET | Refills: 3 | Status: SHIPPED | OUTPATIENT
Start: 2021-08-16 | End: 2022-08-26

## 2021-08-16 RX ORDER — LISINOPRIL 10 MG/1
10 TABLET ORAL 2 TIMES DAILY
Qty: 180 TABLET | Refills: 3 | Status: SHIPPED | OUTPATIENT
Start: 2021-08-16 | End: 2022-08-26

## 2021-09-01 ENCOUNTER — PATIENT MESSAGE (OUTPATIENT)
Dept: FAMILY MEDICINE CLINIC | Age: 56
End: 2021-09-01

## 2021-09-01 DIAGNOSIS — E78.00 HIGH CHOLESTEROL: ICD-10-CM

## 2021-09-02 RX ORDER — ATORVASTATIN CALCIUM 40 MG/1
40 TABLET, FILM COATED ORAL DAILY
Qty: 90 TABLET | Refills: 1 | Status: SHIPPED | OUTPATIENT
Start: 2021-09-02 | End: 2022-02-09 | Stop reason: SDUPTHER

## 2021-10-11 DIAGNOSIS — G89.29 CHRONIC RADICULAR LUMBAR PAIN: ICD-10-CM

## 2021-10-11 DIAGNOSIS — M54.16 CHRONIC RADICULAR LUMBAR PAIN: ICD-10-CM

## 2021-10-11 RX ORDER — CYCLOBENZAPRINE HCL 10 MG
10 TABLET ORAL 3 TIMES DAILY PRN
Qty: 90 TABLET | Refills: 1 | Status: SHIPPED | OUTPATIENT
Start: 2021-10-11 | End: 2021-12-15 | Stop reason: SDUPTHER

## 2021-10-25 ENCOUNTER — PATIENT MESSAGE (OUTPATIENT)
Dept: FAMILY MEDICINE CLINIC | Age: 56
End: 2021-10-25

## 2021-10-25 DIAGNOSIS — K08.89 PAIN, DENTAL: Primary | ICD-10-CM

## 2021-10-25 RX ORDER — HYDROCODONE BITARTRATE AND ACETAMINOPHEN 5; 325 MG/1; MG/1
1 TABLET ORAL EVERY 6 HOURS PRN
Qty: 12 TABLET | Refills: 0 | Status: SHIPPED | OUTPATIENT
Start: 2021-10-25 | End: 2021-10-28

## 2021-10-25 NOTE — TELEPHONE ENCOUNTER
From: Lis Lock  To: SHERYL Ibarra - CNP  Sent: 10/25/2021 8:27 AM EDT  Subject: Non-Urgent Medical Question    Good morning, I have been having some major pain in my mouth due to a loose tooth. I want to go get it pulled but wanted to know if I should get off the blood thinners for a few days before or what. I want to make sure I don't bleed too much. I plan on getting it pulled Thursday, it would be great if you could also give me some pain medication to last until then because it is so bad. Look forward to hearing from you about this matter.  Thanks,   Kristy Anderson

## 2021-12-14 ENCOUNTER — PATIENT MESSAGE (OUTPATIENT)
Dept: FAMILY MEDICINE CLINIC | Age: 56
End: 2021-12-14

## 2021-12-14 DIAGNOSIS — M54.16 CHRONIC RADICULAR LUMBAR PAIN: ICD-10-CM

## 2021-12-14 DIAGNOSIS — G89.29 CHRONIC RADICULAR LUMBAR PAIN: ICD-10-CM

## 2021-12-15 RX ORDER — CYCLOBENZAPRINE HCL 10 MG
10 TABLET ORAL 3 TIMES DAILY PRN
Qty: 90 TABLET | Refills: 1 | Status: SHIPPED | OUTPATIENT
Start: 2021-12-15 | End: 2022-02-15 | Stop reason: SDUPTHER

## 2021-12-15 NOTE — TELEPHONE ENCOUNTER
From: Rika Barajas  To: Evi Dickens  Sent: 12/14/2021 6:30 PM EST  Subject: Prescription Question    Hi Grecia Reed, Happy Holidays. I was wondering if you could refill my prescription for flexerall. Thank you and I hope you have a Merry Greg and Happy New Year. My  and I will see you at the beginning of the year.     Luisito Quevedo

## 2022-01-25 DIAGNOSIS — G89.29 CHRONIC RADICULAR LUMBAR PAIN: Primary | ICD-10-CM

## 2022-01-25 DIAGNOSIS — M54.16 CHRONIC RADICULAR LUMBAR PAIN: Primary | ICD-10-CM

## 2022-01-25 RX ORDER — CARISOPRODOL 350 MG/1
350 TABLET ORAL 3 TIMES DAILY PRN
Qty: 9 TABLET | Refills: 0 | Status: SHIPPED | OUTPATIENT
Start: 2022-01-25 | End: 2022-01-28

## 2022-01-25 RX ORDER — IBUPROFEN 600 MG/1
600 TABLET ORAL EVERY 6 HOURS PRN
Qty: 60 TABLET | Refills: 0 | Status: SHIPPED | OUTPATIENT
Start: 2022-01-25 | End: 2022-06-15 | Stop reason: ALTCHOICE

## 2022-02-02 ENCOUNTER — HOSPITAL ENCOUNTER (OUTPATIENT)
Age: 57
Discharge: HOME | End: 2022-02-02
Payer: COMMERCIAL

## 2022-02-02 DIAGNOSIS — Z00.00 ANNUAL PHYSICAL EXAM: ICD-10-CM

## 2022-02-02 LAB
ALBUMIN SERPL-MCNC: 4.5 G/DL (ref 3.5–5.1)
ALP BLD-CCNC: 84 U/L (ref 38–126)
ALT SERPL-CCNC: 33 U/L (ref 11–66)
ANION GAP SERPL CALCULATED.3IONS-SCNC: 11 MEQ/L (ref 8–16)
AST SERPL-CCNC: 24 U/L (ref 5–40)
BASOPHILS # BLD: 0.7 %
BASOPHILS ABSOLUTE: 0.1 THOU/MM3 (ref 0–0.1)
BILIRUB SERPL-MCNC: 0.2 MG/DL (ref 0.3–1.2)
BILIRUBIN DIRECT: < 0.2 MG/DL (ref 0–0.3)
BUN BLDV-MCNC: 10 MG/DL (ref 7–22)
CALCIUM SERPL-MCNC: 9.3 MG/DL (ref 8.5–10.5)
CHLORIDE BLD-SCNC: 105 MEQ/L (ref 98–111)
CHOLESTEROL, FASTING: 209 MG/DL (ref 100–199)
CO2: 22 MEQ/L (ref 23–33)
CREAT SERPL-MCNC: 0.6 MG/DL (ref 0.4–1.2)
EOSINOPHIL # BLD: 1.5 %
EOSINOPHILS ABSOLUTE: 0.1 THOU/MM3 (ref 0–0.4)
ERYTHROCYTE [DISTWIDTH] IN BLOOD BY AUTOMATED COUNT: 12.3 % (ref 11.5–14.5)
ERYTHROCYTE [DISTWIDTH] IN BLOOD BY AUTOMATED COUNT: 46.1 FL (ref 35–45)
GFR SERPL CREATININE-BSD FRML MDRD: > 90 ML/MIN/1.73M2
GLUCOSE BLD-MCNC: 104 MG/DL (ref 70–108)
HCT VFR BLD CALC: 39.6 % (ref 37–47)
HDLC SERPL-MCNC: 72 MG/DL
HEMOGLOBIN: 13.2 GM/DL (ref 12–16)
IMMATURE GRANS (ABS): 0.05 THOU/MM3 (ref 0–0.07)
IMMATURE GRANULOCYTES: 0.7 %
LDL CHOLESTEROL CALCULATED: 110 MG/DL
LYMPHOCYTES # BLD: 24.9 %
LYMPHOCYTES ABSOLUTE: 1.8 THOU/MM3 (ref 1–4.8)
MCH RBC QN AUTO: 34.1 PG (ref 26–33)
MCHC RBC AUTO-ENTMCNC: 33.3 GM/DL (ref 32.2–35.5)
MCV RBC AUTO: 102.3 FL (ref 81–99)
MONOCYTES # BLD: 10.4 %
MONOCYTES ABSOLUTE: 0.8 THOU/MM3 (ref 0.4–1.3)
NUCLEATED RED BLOOD CELLS: 0 /100 WBC
PLATELET # BLD: 412 THOU/MM3 (ref 130–400)
PMV BLD AUTO: 9.5 FL (ref 9.4–12.4)
POTASSIUM SERPL-SCNC: 4.9 MEQ/L (ref 3.5–5.2)
RBC # BLD: 3.87 MILL/MM3 (ref 4.2–5.4)
SEG NEUTROPHILS: 61.8 %
SEGMENTED NEUTROPHILS ABSOLUTE COUNT: 4.5 THOU/MM3 (ref 1.8–7.7)
SODIUM BLD-SCNC: 138 MEQ/L (ref 135–145)
TOTAL PROTEIN: 7.3 G/DL (ref 6.1–8)
TRIGLYCERIDE, FASTING: 137 MG/DL (ref 0–199)
WBC # BLD: 7.3 THOU/MM3 (ref 4.8–10.8)

## 2022-02-02 PROCEDURE — 36415 COLL VENOUS BLD VENIPUNCTURE: CPT

## 2022-02-02 PROCEDURE — 85025 COMPLETE CBC W/AUTO DIFF WBC: CPT

## 2022-02-02 PROCEDURE — 82248 BILIRUBIN DIRECT: CPT

## 2022-02-02 PROCEDURE — 80061 LIPID PANEL: CPT

## 2022-02-02 PROCEDURE — 80053 COMPREHEN METABOLIC PANEL: CPT

## 2022-02-09 ENCOUNTER — OFFICE VISIT (OUTPATIENT)
Dept: FAMILY MEDICINE CLINIC | Age: 57
End: 2022-02-09
Payer: COMMERCIAL

## 2022-02-09 VITALS
SYSTOLIC BLOOD PRESSURE: 136 MMHG | TEMPERATURE: 97.7 F | HEIGHT: 62 IN | BODY MASS INDEX: 22.52 KG/M2 | DIASTOLIC BLOOD PRESSURE: 70 MMHG | RESPIRATION RATE: 18 BRPM | OXYGEN SATURATION: 98 % | WEIGHT: 122.4 LBS | HEART RATE: 113 BPM

## 2022-02-09 DIAGNOSIS — E78.00 HIGH CHOLESTEROL: ICD-10-CM

## 2022-02-09 DIAGNOSIS — M79.661 RIGHT CALF PAIN: ICD-10-CM

## 2022-02-09 DIAGNOSIS — Z00.00 ENCOUNTER FOR WELL ADULT EXAM WITHOUT ABNORMAL FINDINGS: Primary | ICD-10-CM

## 2022-02-09 DIAGNOSIS — Z98.890 H/O ENDARTERECTOMY: ICD-10-CM

## 2022-02-09 PROBLEM — M54.2 CHRONIC NECK PAIN: Status: ACTIVE | Noted: 2022-02-09

## 2022-02-09 PROBLEM — G89.29 CHRONIC NECK PAIN: Status: ACTIVE | Noted: 2022-02-09

## 2022-02-09 PROCEDURE — 99396 PREV VISIT EST AGE 40-64: CPT | Performed by: NURSE PRACTITIONER

## 2022-02-09 RX ORDER — CLOPIDOGREL BISULFATE 75 MG/1
75 TABLET ORAL DAILY
Qty: 90 TABLET | Refills: 3 | Status: SHIPPED | OUTPATIENT
Start: 2022-02-09

## 2022-02-09 RX ORDER — HYDROCODONE BITARTRATE AND ACETAMINOPHEN 5; 325 MG/1; MG/1
1 TABLET ORAL EVERY 6 HOURS PRN
Qty: 28 TABLET | Refills: 0 | Status: SHIPPED | OUTPATIENT
Start: 2022-02-09 | End: 2022-02-16 | Stop reason: SDUPTHER

## 2022-02-09 RX ORDER — ATORVASTATIN CALCIUM 40 MG/1
40 TABLET, FILM COATED ORAL DAILY
Qty: 90 TABLET | Refills: 3 | Status: SHIPPED | OUTPATIENT
Start: 2022-02-09 | End: 2022-03-16 | Stop reason: SDUPTHER

## 2022-02-09 ASSESSMENT — ENCOUNTER SYMPTOMS
DIARRHEA: 0
COUGH: 0
SORE THROAT: 0
RHINORRHEA: 0
EYE DISCHARGE: 0
NAUSEA: 0
BACK PAIN: 0
VOMITING: 0
TROUBLE SWALLOWING: 0
EYE PAIN: 0
WHEEZING: 0
CONSTIPATION: 0
EYE REDNESS: 0
SHORTNESS OF BREATH: 0
ABDOMINAL PAIN: 0
ALLERGIC/IMMUNOLOGIC NEGATIVE: 1

## 2022-02-09 ASSESSMENT — PATIENT HEALTH QUESTIONNAIRE - PHQ9
SUM OF ALL RESPONSES TO PHQ QUESTIONS 1-9: 0
SUM OF ALL RESPONSES TO PHQ9 QUESTIONS 1 & 2: 0
SUM OF ALL RESPONSES TO PHQ QUESTIONS 1-9: 0
1. LITTLE INTEREST OR PLEASURE IN DOING THINGS: 0
2. FEELING DOWN, DEPRESSED OR HOPELESS: 0

## 2022-02-09 NOTE — PROGRESS NOTES
[address] [address] [address]  [address] [facility] [address]  Dept: [phone]  Dept Fax: [phone]  Loc: [phone]     Visit Date:  2/9/2022      Patient:  Tabby Ybarra  YOB: 1965    HPI:     Chief Complaint   Patient presents with    Annual Exam     HTN, PAD    Leg Pain     some swelling, denies redness or bump, x1mo, right calf, worsening       Patient here for annual physical, medication refills and complaint of 1 month of right calf pain. Patient is working full-time plus overtime and walks about 18,000 steps per day. States her right calf was swollen somewhat after her workday the other day. Checks her foot pulse and says it is always present in her foot does not turn blue or cold. Medications    Current Outpatient Medications:     clopidogrel (PLAVIX) 75 MG tablet, Take 1 tablet by mouth daily, Disp: 90 tablet, Rfl: 3    atorvastatin (LIPITOR) 40 MG tablet, Take 1 tablet by mouth daily, Disp: 90 tablet, Rfl: 3    HYDROcodone-acetaminophen (NORCO) 5-325 MG per tablet, Take 1 tablet by mouth every 6 hours as needed for Pain for up to 7 days. Intended supply: 7 days.  Take lowest dose possible to manage pain, Disp: 28 tablet, Rfl: 0    ibuprofen (ADVIL;MOTRIN) 600 MG tablet, Take 1 tablet by mouth every 6 hours as needed for Pain, Disp: 60 tablet, Rfl: 0    amLODIPine (NORVASC) 10 MG tablet, TAKE 1 TABLET BY MOUTH ONE TIME A DAY, Disp: 90 tablet, Rfl: 3    lisinopril (PRINIVIL;ZESTRIL) 10 MG tablet, Take 1 tablet by mouth 2 times daily, Disp: 180 tablet, Rfl: 3    alendronate (FOSAMAX) 70 MG tablet, Take 1 tablet by mouth every 7 days (Patient taking differently: Take 70 mg by mouth every 7 days Mondays), Disp: 12 tablet, Rfl: 3    ondansetron (ZOFRAN ODT) 4 MG disintegrating tablet, Take 1 tablet by mouth every 8 hours as needed for Nausea or Vomiting, Disp: 20 tablet, Rfl: 0    acetaminophen (TYLENOL) 500 MG tablet, Take 500 mg by mouth every 6 hours as needed for Pain, Disp: , Rfl:     aspirin 81 MG EC tablet, Take 81 mg by mouth daily, Disp: , Rfl:     The patient is allergic to pletal [cilostazol], bactrim [sulfamethoxazole-trimethoprim], codeine, medrol [methylprednisolone], and ultram [tramadol hcl]. Past Medical History  Isaiah Yates  has a past medical history of Arthritis, Blood circulation, collateral, Hyperlipidemia, Hypertension, and PAD (peripheral artery disease) ([facility] [address] Subjective:      Review of Systems   Constitutional: Negative for activity change, fatigue and fever. HENT: Negative for congestion, ear pain, rhinorrhea, sore throat and trouble swallowing. Eyes: Negative for pain, discharge and redness. Respiratory: Negative for cough, shortness of breath and wheezing. Cardiovascular: Negative. Gastrointestinal: Negative for abdominal pain, constipation, diarrhea, nausea and vomiting. Endocrine: Negative. Genitourinary: Negative for dysuria, frequency and urgency. Musculoskeletal: Positive for myalgias. Negative for arthralgias and back pain. Skin: Negative for rash. Allergic/Immunologic: Negative. Neurological: Negative for dizziness, tremors, weakness and headaches. Hematological: Negative. Psychiatric/Behavioral: Negative for dysphoric mood and sleep disturbance. The patient is not nervous/anxious. Objective:     /70 (Site: Right Upper Arm)   Pulse 113   Temp 97.7 °F (36.5 °C) (Oral)   Resp 18   Ht 5' 1.5\" (1.562 m)   Wt 122 lb 6.4 oz (55.5 kg)   SpO2 98%   BMI 22.75 kg/m²     Physical Exam  Vitals reviewed. Constitutional:       General: She is not in acute distress. Appearance: Normal appearance. She is well-developed. She is not toxic-appearing or diaphoretic. HENT:      Head: Normocephalic. No right periorbital erythema or left periorbital erythema. Jaw: No trismus.       Right Ear: Hearing and external ear normal.      Left Ear: Hearing and external ear normal.      Nose: Nose normal. No mucosal edema or rhinorrhea. Mouth/Throat:      Mouth: Mucous membranes are moist.      Dentition: Normal dentition. Pharynx: Uvula midline. No posterior oropharyngeal erythema. Tonsils: No tonsillar exudate. 0 on the right. 0 on the left. Eyes:      General: Lids are normal.         Right eye: No discharge. Left eye: No discharge. Conjunctiva/sclera: Conjunctivae normal.      Right eye: Right conjunctiva is not injected. No chemosis. Left eye: No chemosis. Pupils: Pupils are equal, round, and reactive to light. Neck:      Vascular: No JVD. Trachea: Trachea normal.   Cardiovascular:      Rate and Rhythm: Normal rate and regular rhythm. Heart sounds: Normal heart sounds. No murmur heard. Pulmonary:      Effort: Pulmonary effort is normal. No respiratory distress. Breath sounds: Normal breath sounds. No stridor. No wheezing. Abdominal:      General: Bowel sounds are normal. There is no distension. Palpations: Abdomen is soft. Tenderness: There is no abdominal tenderness. Musculoskeletal:         General: Tenderness present. No swelling or signs of injury. Normal range of motion. Cervical back: Full passive range of motion without pain and normal range of motion. Right lower leg: No edema. Left lower leg: No edema. Lymphadenopathy:      Cervical: No cervical adenopathy. Skin:     General: Skin is warm and dry. Capillary Refill: Capillary refill takes less than 2 seconds. Findings: No rash. Neurological:      Mental Status: She is alert and oriented to person, place, and time. GCS: GCS eye subscore is 4. GCS verbal subscore is 5. GCS motor subscore is 6. Cranial Nerves: No cranial nerve deficit. Coordination: Coordination normal.      Gait: Gait normal.   Psychiatric:         Mood and Affect: Mood is not anxious or depressed.          Speech: Speech normal.         Behavior: Behavior normal. Behavior is not withdrawn or hyperactive. Behavior is cooperative. Thought Content: Thought content normal.         Judgment: Judgment normal.         Assessment/Plan:      Cher Shaikh was seen today for annual exam and leg pain. Diagnoses and all orders for this visit:    Encounter for well adult exam without abnormal findings    High cholesterol  -     atorvastatin (LIPITOR) 40 MG tablet; Take 1 tablet by mouth daily    Right calf pain  -     VL DUP LOWER EXTREMITY ARTERIES RIGHT; Future  -     HYDROcodone-acetaminophen (NORCO) 5-325 MG per tablet; Take 1 tablet by mouth every 6 hours as needed for Pain for up to 7 days. Intended supply: 7 days. Take lowest dose possible to manage pain    H/O endarterectomy  -     clopidogrel (PLAVIX) 75 MG tablet; Take 1 tablet by mouth daily    Due to patient's history of severe PAD and previous vascular surgery on her left leg within the past year, she will be sent for ultrasound of her right leg to see if the same condition is developing. Labs that were completed are reviewed today. Return in about 6 months (around 8/9/2022). Patient instructions given tanad.         Electronically signed by SHERYL Ward CNP on 2/9/2022 at 11:24 AM

## 2022-02-09 NOTE — PATIENT INSTRUCTIONS
THE MOST IMPORTANT ACTION YOU CAN TAKE TO IMPROVE YOUR CURRENT AND FUTURE HEALTH IS TO QUIT SMOKING. Call the [facility] NOW ([phone])    Smoking harms nonsmokers. When nonsmokers are around people who smoke, they absorb nicotine, carbon monoxide, and other ingredients of tobacco smoke. DO NOT SMOKE AROUND CHILDREN    Children exposed to secondhand smoke are at an increased risk of:  Sudden Infant Death Syndrome (SIDS), acute respiratory infections, inflammation of the middle ear, and severe asthma. Over a longer time, it causes heart disease and lung cancer. There is no safe level of exposure to secondhand smoke. Well Visit, Women 48 to 72: Care Instructions  Overview     Well visits can help you stay healthy. Your doctor has checked your overall health and may have suggested ways to take good care of yourself. Your doctor also may have recommended tests. At home, you can help prevent illness with healthy eating, regular exercise, and other steps. Follow-up care is a key part of your treatment and safety. Be sure to make and go to all appointments, and call your doctor if you are having problems. It's also a good idea to know your test results and keep a list of the medicines you take. How can you care for yourself at home? · Get screening tests that you and your doctor decide on. Screening helps find diseases before any symptoms appear. · Eat healthy foods. Choose fruits, vegetables, whole grains, protein, and low-fat dairy foods. Limit fat, especially saturated fat. Reduce salt in your diet. · Limit alcohol. Have no more than 1 drink a day or 7 drinks a week. · Get at least 30 minutes of exercise on most days of the week. Walking is a good choice. You also may want to do other activities, such as running, swimming, cycling, or playing tennis or team sports. · Reach and stay at a healthy weight.  This will lower your risk for many problems, such as obesity, diabetes, heart disease, and high blood pressure. · Do not smoke. Smoking can make health problems worse. If you need help quitting, talk to your doctor about stop-smoking programs and medicines. These can increase your chances of quitting for good. · Care for your mental health. It is easy to get weighed down by worry and stress. Learn strategies to manage stress, like deep breathing and mindfulness, and stay connected with your family and community. If you find you often feel sad or hopeless, talk with your doctor. Treatment can help. · Talk to your doctor about whether you have any risk factors for sexually transmitted infections (STIs). You can help prevent STIs if you wait to have sex with a new partner (or partners) until you've each been tested for STIs. It also helps if you use condoms (male or female condoms) and if you limit your sex partners to one person who only has sex with you. Vaccines are available for some STIs. · If you think you may have a problem with alcohol or drug use, talk to your doctor. This includes prescription medicines (such as amphetamines and opioids) and illegal drugs (such as cocaine and methamphetamine). Your doctor can help you figure out what type of treatment is best for you. · Protect your skin from too much sun. When you're outdoors from 10 a.m. to 4 p.m., stay in the shade or cover up with clothing and a hat with a wide brim. Wear sunglasses that block UV rays. Even when it's cloudy, put broad-spectrum sunscreen (SPF 30 or higher) on any exposed skin. · See a dentist one or two times a year for checkups and to have your teeth cleaned. · Wear a seat belt in the car. When should you call for help? Watch closely for changes in your health, and be sure to contact your doctor if you have any problems or symptoms that concern you. Where can you learn more? Go to https://pascale.health-partners. org and sign in to your Klique account.  Enter J799 in the Search Health Information box to learn more about \"Well Visit, Women 50 to 72: Care Instructions. \"     If you do not have an account, please click on the \"Sign Up Now\" link. Current as of: October 6, 2021               Content Version: 13.1  © 4889-5137 Healthwise, Incorporated. Care instructions adapted under license by [facility] ([facility]). If you have questions about a medical condition or this instruction, always ask your healthcare professional. [address] any warranty or liability for your use of this information. Patient Education        High Cholesterol: Care Instructions  Overview     Cholesterol is a type of fat in your blood. It is needed for many body functions, such as making new cells. Cholesterol is made by your body. It also comes from food you eat. High cholesterol means that you have too much of the fat in your blood. This raises your risk of a heart attack and stroke. LDL and HDL are part of your total cholesterol. LDL is the \"bad\" cholesterol. High LDL can raise your risk for coronary artery disease, heart attack, and stroke. HDL is the \"good\" cholesterol. It helps clear bad cholesterol from the body. High HDL is linked with a lower risk of coronary artery disease, heart attack, and stroke. Your cholesterol levels help your doctor find out your risk for having a heart attack or stroke. You and your doctor can talk about whether you need to lower your risk and what treatment is best for you. Treatment options include a heart-healthy lifestyle and medicine. Both options can help lower your cholesterol and your risk. The way you choose to lower your risk will depend on how high your risk is for heart attack and stroke. It will also depend on how you feel about taking medicines. Follow-up care is a key part of your treatment and safety. Be sure to make and go to all appointments, and call your doctor if you are having problems.  It's also a good idea to know your test results and keep a list of the medicines you take. How can you care for yourself at home? · Eat heart-healthy foods. ? Eat fruits, vegetables, whole grains, beans, and other high-fiber foods. ? Eat lean proteins, such as seafood, lean meats, beans, nuts, and soy products. ? Eat healthy fats, such as canola and olive oil. ? Choose foods that are low in saturated fat. ? Limit sodium and alcohol. ? Limit drinks and foods with added sugar. · Be physically active. Try to do moderate activity at least 2½ hours a week. Or try to do vigorous activity at least 1¼ hours a week. You may want to walk or try other activities, such as running, swimming, cycling, or playing tennis or team sports. · Stay at a healthy weight or lose weight by making the changes in eating and physical activity listed above. Losing just a small amount of weight, even 5 to 10 pounds, can help reduce your risk for having a heart attack or stroke. · Do not smoke. · Manage other health problems. These include diabetes and high blood pressure. If you think you may have a problem with alcohol or drug use, talk to your doctor. · If you take medicine, take it exactly as prescribed. Call your doctor if you think you are having a problem with your medicine. · Check with your doctor or pharmacist before you use any other medicines, including over-the-counter medicines. Make sure your doctor knows all of the medicines, vitamins, herbal products, and supplements you take. Taking some medicines together can cause problems. When should you call for help? Watch closely for changes in your health, and be sure to contact your doctor if:    · You need help making lifestyle changes.     · You have questions about your medicine. Where can you learn more? Go to https://chhectoreb.Woopie. org and sign in to your Ourpalm account. Enter M534 in the Dexcom box to learn more about \"High Cholesterol: Care Instructions. \"     If you do not have an account, please click on the \"Sign Up Now\" link. Current as of: April 29, 2021               Content Version: 13.1  © 2006-2021 [employer]. Care instructions adapted under license by [facility] ([facility]). If you have questions about a medical condition or this instruction, always ask your healthcare professional. [address] any warranty or liability for your use of this information. Patient Education        Well Visit, Women 48 to 72: Care Instructions  Overview     Well visits can help you stay healthy. Your doctor has checked your overall health and may have suggested ways to take good care of yourself. Your doctor also may have recommended tests. At home, you can help prevent illness with healthy eating, regular exercise, and other steps. Follow-up care is a key part of your treatment and safety. Be sure to make and go to all appointments, and call your doctor if you are having problems. It's also a good idea to know your test results and keep a list of the medicines you take. How can you care for yourself at home? · Get screening tests that you and your doctor decide on. Screening helps find diseases before any symptoms appear. · Eat healthy foods. Choose fruits, vegetables, whole grains, protein, and low-fat dairy foods. Limit fat, especially saturated fat. Reduce salt in your diet. · Limit alcohol. Have no more than 1 drink a day or 7 drinks a week. · Get at least 30 minutes of exercise on most days of the week. Walking is a good choice. You also may want to do other activities, such as running, swimming, cycling, or playing tennis or team sports. · Reach and stay at a healthy weight. This will lower your risk for many problems, such as obesity, diabetes, heart disease, and high blood pressure. · Do not smoke. Smoking can make health problems worse. If you need help quitting, talk to your doctor about stop-smoking programs and medicines.  These can increase your chances of quitting for good. · Care for your mental health. It is easy to get weighed down by worry and stress. Learn strategies to manage stress, like deep breathing and mindfulness, and stay connected with your family and community. If you find you often feel sad or hopeless, talk with your doctor. Treatment can help. · Talk to your doctor about whether you have any risk factors for sexually transmitted infections (STIs). You can help prevent STIs if you wait to have sex with a new partner (or partners) until you've each been tested for STIs. It also helps if you use condoms (male or female condoms) and if you limit your sex partners to one person who only has sex with you. Vaccines are available for some STIs. · If you think you may have a problem with alcohol or drug use, talk to your doctor. This includes prescription medicines (such as amphetamines and opioids) and illegal drugs (such as cocaine and methamphetamine). Your doctor can help you figure out what type of treatment is best for you. · Protect your skin from too much sun. When you're outdoors from 10 a.m. to 4 p.m., stay in the shade or cover up with clothing and a hat with a wide brim. Wear sunglasses that block UV rays. Even when it's cloudy, put broad-spectrum sunscreen (SPF 30 or higher) on any exposed skin. · See a dentist one or two times a year for checkups and to have your teeth cleaned. · Wear a seat belt in the car. When should you call for help? Watch closely for changes in your health, and be sure to contact your doctor if you have any problems or symptoms that concern you. Where can you learn more? Go to https://pascale.health-partners. org and sign in to your Scripped account. Enter D214 in the My Team Zone box to learn more about \"Well Visit, Women 50 to 72: Care Instructions. \"     If you do not have an account, please click on the \"Sign Up Now\" link.   Current as of: October 6, 2021               Content Version: 13.1  © 5260-2726 Healthwise, Incorporated. Care instructions adapted under license by [facility] ([facility]). If you have questions about a medical condition or this instruction, always ask your healthcare professional. [address] any warranty or liability for your use of this information.

## 2022-02-11 ENCOUNTER — HOSPITAL ENCOUNTER (OUTPATIENT)
Dept: INTERVENTIONAL RADIOLOGY/VASCULAR | Age: 57
Discharge: HOME | End: 2022-02-11
Payer: COMMERCIAL

## 2022-02-11 DIAGNOSIS — M79.661 RIGHT CALF PAIN: ICD-10-CM

## 2022-02-11 PROCEDURE — 93926 LOWER EXTREMITY STUDY: CPT

## 2022-02-14 ENCOUNTER — TELEPHONE (OUTPATIENT)
Dept: FAMILY MEDICINE CLINIC | Age: 57
End: 2022-02-14

## 2022-02-14 DIAGNOSIS — I70.201 STENOSIS OF RIGHT POPLITEAL ARTERY (HCC): Primary | ICD-10-CM

## 2022-02-14 NOTE — TELEPHONE ENCOUNTER
Patient informed, angiography of her right leg ordered and faxed to [address] [facility] at [phone]

## 2022-02-14 NOTE — TELEPHONE ENCOUNTER
----- Message from SHERYL Rodriguez CNP sent at 2/12/2022 10:00 AM EST -----  Please order the noted angiography of her right leg, and advise patient of the findings here of narrowing of the artery of her leg, which is most likely causing her pain symptoms.

## 2022-02-15 ENCOUNTER — PATIENT MESSAGE (OUTPATIENT)
Dept: FAMILY MEDICINE CLINIC | Age: 57
End: 2022-02-15

## 2022-02-15 ENCOUNTER — OFFICE VISIT (OUTPATIENT)
Dept: FAMILY MEDICINE CLINIC | Age: 57
End: 2022-02-15
Payer: COMMERCIAL

## 2022-02-15 VITALS
HEART RATE: 88 BPM | RESPIRATION RATE: 20 BRPM | TEMPERATURE: 98.7 F | HEIGHT: 61 IN | DIASTOLIC BLOOD PRESSURE: 60 MMHG | WEIGHT: 124 LBS | SYSTOLIC BLOOD PRESSURE: 130 MMHG | BODY MASS INDEX: 23.41 KG/M2

## 2022-02-15 DIAGNOSIS — I70.209 POPLITEAL ARTERY STENOSIS (HCC): ICD-10-CM

## 2022-02-15 DIAGNOSIS — G89.29 CHRONIC RADICULAR LUMBAR PAIN: ICD-10-CM

## 2022-02-15 DIAGNOSIS — M54.16 CHRONIC RADICULAR LUMBAR PAIN: ICD-10-CM

## 2022-02-15 PROCEDURE — 99212 OFFICE O/P EST SF 10 MIN: CPT | Performed by: NURSE PRACTITIONER

## 2022-02-15 RX ORDER — CYCLOBENZAPRINE HCL 10 MG
10 TABLET ORAL 3 TIMES DAILY PRN
Qty: 90 TABLET | Refills: 1 | Status: SHIPPED | OUTPATIENT
Start: 2022-02-15 | End: 2022-03-16 | Stop reason: SDUPTHER

## 2022-02-15 ASSESSMENT — ENCOUNTER SYMPTOMS
ALLERGIC/IMMUNOLOGIC NEGATIVE: 1
EYE REDNESS: 0
CONSTIPATION: 0
RHINORRHEA: 0
VOMITING: 0
EYE DISCHARGE: 0
ABDOMINAL PAIN: 0
BACK PAIN: 0
WHEEZING: 0
DIARRHEA: 0
SORE THROAT: 0
COUGH: 0
EYE PAIN: 0
NAUSEA: 0
TROUBLE SWALLOWING: 0
SHORTNESS OF BREATH: 0

## 2022-02-15 NOTE — PATIENT INSTRUCTIONS
Patient Education        Stopping Smoking: Care Instructions  Your Care Instructions     Cigarette smokers crave the nicotine in cigarettes. Giving it up is much harder than simply changing a habit. Your body has to stop craving the nicotine. It is hard to quit, but you can do it. There are many tools that people use to quit smoking. You may find that combining tools works best for you. There are several steps to quitting. First you get ready to quit. Then you get support to help you. After that, you learn new skills and behaviors to become a nonsmoker. For many people, a necessary step is getting and using medicine. Your doctor will help you set up the plan that best meets your needs. You may want to attend a smoking cessation program to help you quit smoking. When you choose a program, look for one that has proven success. Ask your doctor for ideas. You will greatly increase your chances of success if you take medicine as well as get counseling or join a cessation program.  Some of the changes you feel when you first quit tobacco are uncomfortable. Your body will miss the nicotine at first, and you may feel short-tempered and grumpy. You may have trouble sleeping or concentrating. Medicine can help you deal with these symptoms. You may struggle with changing your smoking habits and rituals. The last step is the tricky one: Be prepared for the smoking urge to continue for a time. This is a lot to deal with, but keep at it. You will feel better. Follow-up care is a key part of your treatment and safety. Be sure to make and go to all appointments, and call your doctor if you are having problems. It's also a good idea to know your test results and keep a list of the medicines you take. How can you care for yourself at home? · Ask your family, friends, and coworkers for support. You have a better chance of quitting if you have help and support.   · Join a support group, such as Nicotine Anonymous, for people who are trying to quit smoking. · Consider signing up for a smoking cessation program, such as the American Lung Association's Freedom from Smoking program.  · Get text messaging support. Go to the website at www.smokefree. gov to sign up for the [facility] program.  · Set a quit date. Pick your date carefully so that it is not right in the middle of a big deadline or stressful time. Once you quit, do not even take a puff. Get rid of all ashtrays and lighters after your last cigarette. Clean your house and your clothes so that they do not smell of smoke. · Learn how to be a nonsmoker. Think about ways you can avoid those things that make you reach for a cigarette. ? Avoid situations that put you at greatest risk for smoking. For some people, it is hard to have a drink with friends without smoking. For others, they might skip a coffee break with coworkers who smoke. ? Change your daily routine. Take a different route to work or eat a meal in a different place. · Cut down on stress. Calm yourself or release tension by doing an activity you enjoy, such as reading a book, taking a hot bath, or gardening. · Talk to your doctor or pharmacist about nicotine replacement therapy, which replaces the nicotine in your body. You still get nicotine but you do not use tobacco. Nicotine replacement products help you slowly reduce the amount of nicotine you need. These products come in several forms, many of them available over-the-counter:  ? Nicotine patches  ? Nicotine gum and lozenges  ? Nicotine inhaler  · Ask your doctor about bupropion (Wellbutrin) or varenicline (Chantix), which are prescription medicines. They do not contain nicotine. They help you by reducing withdrawal symptoms, such as stress and anxiety. · Some people find hypnosis, acupuncture, and massage helpful for ending the smoking habit. · Eat a healthy diet and get regular exercise.  Having healthy habits will help your body move past its craving for nicotine. · Be prepared to keep trying. Most people are not successful the first few times they try to quit. Do not get mad at yourself if you smoke again. Make a list of things you learned and think about when you want to try again, such as next week, next month, or next year. Where can you learn more? Go to https://OnetoOnetextperichewferdinand.8Trip. org and sign in to your [employer] account. Enter W336 in the Migo.me box to learn more about \"Stopping Smoking: Care Instructions. \"     If you do not have an account, please click on the \"Sign Up Now\" link. Current as of: February 11, 2021               Content Version: 13.1  © 2006-2021 Healthwise, Incorporated. Care instructions adapted under license by [facility] ([facility]). If you have questions about a medical condition or this instruction, always ask your healthcare professional. [address] any warranty or liability for your use of this information.

## 2022-02-15 NOTE — TELEPHONE ENCOUNTER
From: Raymon Brown  To: Bonnie Shi  Sent: 2/15/2022 12:16 PM EST  Subject: muscle relaxer    [employer], I am sorry I forgot to ask for a refill on my muscle relaxers please. Thanks have a great day.     Vadim Mistry

## 2022-02-15 NOTE — PROGRESS NOTES
[address] [address] [address]  [facility] [address]  Dept: [phone]  Dept Fax: [phone]  Loc: [phone]     Visit Date:  2/15/2022      Patient:  Clyde Haines  YOB: 1965    HPI:     Chief Complaint   Patient presents with    Results     DIscuss doppler results. Scheduled for angiogram 2/21. C/o pain in right calf, numbness and tingling in foot-? if she should be working. Patient had a follow-up on recent Doppler results of popliteal stenosis. She has an angiogram scheduled for next week. Medications    Current Outpatient Medications:     clopidogrel (PLAVIX) 75 MG tablet, Take 1 tablet by mouth daily, Disp: 90 tablet, Rfl: 3    atorvastatin (LIPITOR) 40 MG tablet, Take 1 tablet by mouth daily, Disp: 90 tablet, Rfl: 3    HYDROcodone-acetaminophen (NORCO) 5-325 MG per tablet, Take 1 tablet by mouth every 6 hours as needed for Pain for up to 7 days. Intended supply: 7 days.  Take lowest dose possible to manage pain, Disp: 28 tablet, Rfl: 0    ibuprofen (ADVIL;MOTRIN) 600 MG tablet, Take 1 tablet by mouth every 6 hours as needed for Pain, Disp: 60 tablet, Rfl: 0    amLODIPine (NORVASC) 10 MG tablet, TAKE 1 TABLET BY MOUTH ONE TIME A DAY, Disp: 90 tablet, Rfl: 3    lisinopril (PRINIVIL;ZESTRIL) 10 MG tablet, Take 1 tablet by mouth 2 times daily, Disp: 180 tablet, Rfl: 3    alendronate (FOSAMAX) 70 MG tablet, Take 1 tablet by mouth every 7 days (Patient taking differently: Take 70 mg by mouth every 7 days Mondays), Disp: 12 tablet, Rfl: 3    ondansetron (ZOFRAN ODT) 4 MG disintegrating tablet, Take 1 tablet by mouth every 8 hours as needed for Nausea or Vomiting, Disp: 20 tablet, Rfl: 0    acetaminophen (TYLENOL) 500 MG tablet, Take 500 mg by mouth every 6 hours as needed for Pain, Disp: , Rfl:     aspirin 81 MG EC tablet, Take 81 mg by mouth daily, Disp: , Rfl:     The patient is allergic to pletal [cilostazol], bactrim [sulfamethoxazole-trimethoprim], codeine, medrol [methylprednisolone], and ultram [tramadol hcl]. Past Medical History  Bob Jo  has a past medical history of Arthritis, Blood circulation, collateral, Hyperlipidemia, Hypertension, and PAD (peripheral artery disease) ([facility] [address] Subjective:      Review of Systems   Constitutional: Negative for activity change, fatigue and fever. HENT: Negative for congestion, ear pain, rhinorrhea, sore throat and trouble swallowing. Eyes: Negative for pain, discharge and redness. Respiratory: Negative for cough, shortness of breath and wheezing. Cardiovascular: Negative. Gastrointestinal: Negative for abdominal pain, constipation, diarrhea, nausea and vomiting. Endocrine: Negative. Genitourinary: Negative for dysuria, frequency and urgency. Musculoskeletal: Negative for arthralgias, back pain and myalgias. Right lower leg pain   Skin: Negative for rash. Allergic/Immunologic: Negative. Neurological: Negative for dizziness, tremors, weakness and headaches. Hematological: Negative. Psychiatric/Behavioral: Negative for dysphoric mood and sleep disturbance. The patient is not nervous/anxious. Objective:     /60   Pulse 88   Temp 98.7 °F (37.1 °C) (Oral)   Resp 20   Ht 5' 1\" (1.549 m)   Wt 124 lb (56.2 kg)   BMI 23.43 kg/m²     Physical Exam  Constitutional:       General: She is not in acute distress. Appearance: She is well-developed. She is not diaphoretic. HENT:      Right Ear: External ear normal.      Left Ear: External ear normal.      Nose: Nose normal.   Eyes:      General:         Right eye: No discharge. Left eye: No discharge. Conjunctiva/sclera: Conjunctivae normal.      Pupils: Pupils are equal, round, and reactive to light. Neck:      Vascular: No JVD. Cardiovascular:      Rate and Rhythm: Normal rate and regular rhythm.    Pulmonary:      Effort: Pulmonary effort is normal. No respiratory distress. Musculoskeletal:         General: Tenderness present. No deformity. Normal range of motion. Cervical back: Normal range of motion. Skin:     General: Skin is warm and dry. Capillary Refill: Capillary refill takes less than 2 seconds. Coloration: Skin is not pale. Findings: No erythema or rash. Neurological:      Mental Status: She is alert and oriented to person, place, and time. Coordination: Coordination normal.   Psychiatric:         Behavior: Behavior normal.         Thought Content: Thought content normal.         Judgment: Judgment normal.         Assessment/Plan:      Satnam Orourke was seen today for results. Diagnoses and all orders for this visit:    Popliteal artery stenosis (HCC)    We discussed such the stenosis of her popliteal artery, which measures the very significant stenosis she had in her left leg last year which required major surgery. After her angiogram next week we will develop a course of action for possible surgery and likely more time off of work. Pain is currently managed by Norco.  She does work but after several hours the pain is too bad and she generally has quit working. She averages about 18,000 steps a day in her job. Return if symptoms worsen or fail to improve. Patient instructions given andreviewed.         Electronically signed by SHERYL Jewell CNP on 2/15/2022 at 10:58 AM

## 2022-02-15 NOTE — LETTER
[address]  [address]  [facility] [address]  Phone: [phone]  Fax: [phone]    SHERYL Doran CNP        February 15, 2022     Patient: Forrest Gonsalves   YOB: 1965   Date of Visit: 2/15/2022       To Whom it May Concern:    Marshall Curran was seen in my clinic on 2/15/2022. She should be excused from work on February 14-15 and may return on Feb. 16, 2022. If you have any questions or concerns, please don't hesitate to call.     Sincerely,     Electronically signed by SHERYL Doran CNP on 2/15/2022 at 10:38 AM

## 2022-02-15 NOTE — LETTER
[facility]  [address]  [facility] [address]  Phone: [phone]  Fax: [phone]    SHERYL Stewart CNP        February 15, 2022     Patient: Keke Yoon   YOB: 1965   Date of Visit: 2/15/2022       To Whom it May Concern:    Savannah Cuello was seen in my clinic on 2/15/2022. She should be excused from work on February 21, 2022. If you have any questions or concerns, please don't hesitate to call.     Sincerely,     Electronically signed by SHERYL Stewart CNP on 2/15/2022 at 10:38 AM

## 2022-02-16 DIAGNOSIS — M79.661 RIGHT CALF PAIN: ICD-10-CM

## 2022-02-16 RX ORDER — HYDROCODONE BITARTRATE AND ACETAMINOPHEN 5; 325 MG/1; MG/1
1 TABLET ORAL EVERY 6 HOURS PRN
Qty: 28 TABLET | Refills: 0 | Status: SHIPPED | OUTPATIENT
Start: 2022-02-16 | End: 2022-02-28 | Stop reason: SDUPTHER

## 2022-02-16 NOTE — TELEPHONE ENCOUNTER
Please approve or deny     Last Visit Date:  2/15/2022       Next Visit Date:    Visit date not found       Dispense: 28  Refills: 0  Ordered: 2/9/22

## 2022-02-17 ENCOUNTER — OFFICE VISIT (OUTPATIENT)
Dept: RHEUMATOLOGY | Age: 57
End: 2022-02-17
Payer: COMMERCIAL

## 2022-02-17 VITALS
HEIGHT: 61 IN | SYSTOLIC BLOOD PRESSURE: 106 MMHG | OXYGEN SATURATION: 98 % | HEART RATE: 74 BPM | WEIGHT: 124 LBS | DIASTOLIC BLOOD PRESSURE: 64 MMHG | RESPIRATION RATE: 16 BRPM | BODY MASS INDEX: 23.41 KG/M2

## 2022-02-17 DIAGNOSIS — M85.80 OSTEOPENIA, UNSPECIFIED LOCATION: Primary | ICD-10-CM

## 2022-02-17 DIAGNOSIS — Z78.0 POSTMENOPAUSAL: ICD-10-CM

## 2022-02-17 DIAGNOSIS — E55.9 VITAMIN D DEFICIENCY: ICD-10-CM

## 2022-02-17 PROCEDURE — 99213 OFFICE O/P EST LOW 20 MIN: CPT | Performed by: NURSE PRACTITIONER

## 2022-02-17 RX ORDER — ALENDRONATE SODIUM 70 MG/1
70 TABLET ORAL
Qty: 12 TABLET | Refills: 3 | Status: SHIPPED | OUTPATIENT
Start: 2022-02-17 | End: 2022-05-13 | Stop reason: SDUPTHER

## 2022-02-17 ASSESSMENT — ENCOUNTER SYMPTOMS
BACK PAIN: 1
EYE ITCHING: 0
EYE PAIN: 0
DIARRHEA: 0
ABDOMINAL PAIN: 0
COUGH: 0
SHORTNESS OF BREATH: 0
TROUBLE SWALLOWING: 0
NAUSEA: 0
CONSTIPATION: 0

## 2022-02-17 NOTE — PROGRESS NOTES
[facility]  Bone Fragility Follow up     Visit Date: 2/17/2022  MRN: [id number]  Cc:   Chief Complaint   Patient presents with    Follow-up     1 year follow up - osteopenia     Other     R knee blockage - clot - monday angiogram       HPI:   Juan Carlos Sung  is a(n)57 y.o. female here today for follow up of Osteopenia. No falls or fractures. Still smoking- about 5-10 cigarettes per day- trying to quit. Taking calcium, just ran out of vit D supplement. ROS:  Review of Systems   Constitutional: Negative for fatigue, fever and unexpected weight change. HENT: Negative for congestion and trouble swallowing. Eyes: Negative for pain and itching. Respiratory: Negative for cough and shortness of breath. Cardiovascular: Negative for chest pain and leg swelling. Gastrointestinal: Negative for abdominal pain, constipation, diarrhea and nausea. Endocrine: Negative for cold intolerance and heat intolerance. Genitourinary: Negative for difficulty urinating, frequency and urgency. Musculoskeletal: Positive for back pain and neck pain. Negative for arthralgias and joint swelling. Skin: Negative for rash. Neurological: Negative for dizziness, weakness, numbness and headaches. Psychiatric/Behavioral: The patient is not nervous/anxious.           PAST MEDICAL HISTORY  Past Medical History:   Diagnosis Date    Arthritis     Blood circulation, collateral     Hyperlipidemia     Hypertension     PAD (peripheral artery disease) ([facility] [address] 02/2021       SOCIAL HISTORY  Social History     Socioeconomic History    Marital status:      Spouse name: None    Number of children: None    Years of education: None    Highest education level: None   Occupational History    None   Tobacco Use    Smoking status: Current Every Day Smoker     Packs/day: 1.00     Years: 25.00     Pack years: 25.00     Types: Cigarettes    Smokeless tobacco: Never Used    Tobacco comment: Down 1/4PPD since 2020   Vaping Use    Vaping Use: Former   Substance and Sexual Activity    Alcohol use: Yes     Alcohol/week: 10.0 standard drinks     Types: 10 Cans of beer per week    Drug use: Yes     Frequency: 2.0 times per week     Types: Marijuana Lorna Renner)    Sexual activity: Yes     Partners: Male   Other Topics Concern    None   Social History Narrative    None     Social Determinants of Health     Financial Resource Strain: Low Risk     Difficulty of Paying Living Expenses: Not hard at all   Food Insecurity: No Food Insecurity    Worried About Running Out of Food in the Last Year: Never true    José Miguel of Food in the Last Year: Never true   Transportation Needs:     Lack of Transportation (Medical): Not on file    Lack of Transportation (Non-Medical):  Not on file   Physical Activity:     Days of Exercise per Week: Not on file    Minutes of Exercise per Session: Not on file   Stress:     Feeling of Stress : Not on file   Social Connections:     Frequency of Communication with Friends and Family: Not on file    Frequency of Social Gatherings with Friends and Family: Not on file    Attends Jehovah's witness Services: Not on file    Active Member of [address] [place] [address] [employer] or Organizations: Not on file    Attends Club or Organization Meetings: Not on file    Marital Status: Not on file   Intimate Partner Violence:     Fear of Current or Ex-Partner: Not on file    Emotionally Abused: Not on file    Physically Abused: Not on file    Sexually Abused: Not on file   Housing Stability:     Unable to Pay for Housing in the Last Year: Not on file    Number of [place] in the Last Year: Not on file    Unstable Housing in the Last Year: Not on file       FAMILY HISTORY  Family History   Problem Relation Age of Onset    Heart Disease Mother     Diabetes Mother     Alcohol Abuse Father     Heart Disease Brother     Diabetes Maternal Aunt     Parkinsonism Maternal Cousin     Heart Disease Brother     Coronary Art Dis Brother        SURGICAL HISTORY  Past Surgical History:   Procedure Laterality Date    BACK SURGERY       SECTION      x 4    COLONOSCOPY      DILATION AND CURETTAGE OF UTERUS      ENDOSCOPY, COLON, DIAGNOSTIC      FEMORAL ENDARTERECTOMY Left 3/16/2021    LEFT  FEMORAL ARTERY ENDARTERECTOMY performed by Adela Bhatti MD at [address] [place] [address]      bone spurs removed both feet    KNEE SURGERY Left     reconstruction of knee    LUMBAR DISCECTOMY  2020    Dr. Ramo Harris   Allergen Reactions    Pletal [Cilostazol] Other (See Comments)     Pt states numbness in tongue, heart palpitations, itchy, nausea    Bactrim [Sulfamethoxazole-Trimethoprim] Rash    Codeine Nausea And Vomiting    Medrol [Methylprednisolone] Palpitations    Ultram [Tramadol Hcl] Other (See Comments)     Pt states causes shaking and feels bad       CURRENTMEDICATIONS  Current Outpatient Medications   Medication Sig Dispense Refill    HYDROcodone-acetaminophen (NORCO) 5-325 MG per tablet Take 1 tablet by mouth every 6 hours as needed for Pain for up to 14 days. Intended supply: 14 days.  Take lowest dose possible to manage pain 28 tablet 0    cyclobenzaprine (FLEXERIL) 10 MG tablet Take 1 tablet by mouth 3 times daily as needed for Muscle spasms 90 tablet 1    clopidogrel (PLAVIX) 75 MG tablet Take 1 tablet by mouth daily 90 tablet 3    atorvastatin (LIPITOR) 40 MG tablet Take 1 tablet by mouth daily 90 tablet 3    ibuprofen (ADVIL;MOTRIN) 600 MG tablet Take 1 tablet by mouth every 6 hours as needed for Pain 60 tablet 0    amLODIPine (NORVASC) 10 MG tablet TAKE 1 TABLET BY MOUTH ONE TIME A DAY 90 tablet 3    lisinopril (PRINIVIL;ZESTRIL) 10 MG tablet Take 1 tablet by mouth 2 times daily 180 tablet 3    alendronate (FOSAMAX) 70 MG tablet Take 1 tablet by mouth every 7 days (Patient taking differently: Take 70 mg by mouth every 7 days ) 12 tablet 3    ondansetron (ZOFRAN ODT) 4 MG disintegrating tablet Take 1 tablet by mouth every 8 hours as needed for Nausea or Vomiting 20 tablet 0    acetaminophen (TYLENOL) 500 MG tablet Take 500 mg by mouth every 6 hours as needed for Pain      aspirin 81 MG EC tablet Take 81 mg by mouth daily       No current facility-administered medications for this visit. Objective:  /64 (Site: Left Upper Arm, Position: Sitting, Cuff Size: Large Adult)   Pulse 74   Resp 16   Ht 5' 1\" (1.549 m)   Wt 124 lb (56.2 kg)   SpO2 98%   BMI 23.43 kg/m²     General: No distress. Alert. Eyes: No conjunctival injection. ENT: No discharge. Pharynx clear. Neck: Trachea midline  Resp: No accessory muscle use. Lung sounds clear. CV: Regular rate. Regularrhythm. No mumur or rub. No edema. M/S:   Upper extremities:  Muscle strength 5/5    Lower Extremities:   Muscle strength 5/5    Neuro: Oriented to person, place, time. Psych:  No anxiety or agitation. Skin: Warm and dry. No rash on exposed extremities.          Labs:  CBC  Lab Results   Component Value Date    WBC 7.3 02/02/2022    RBC 3.87 02/02/2022    HGB 13.2 02/02/2022    HCT 39.6 02/02/2022    .3 02/02/2022    MCH 34.1 02/02/2022    MCHC 33.3 02/02/2022     02/02/2022       CMP  Lab Results   Component Value Date    CALCIUM 9.3 02/02/2022    LABALBU 4.5 02/02/2022    PROT 7.3 02/02/2022     02/02/2022    K 4.9 02/02/2022    K 4.5 03/08/2021    CO2 22 02/02/2022     02/02/2022    BUN 10 02/02/2022    CREATININE 0.6 02/02/2022    ALKPHOS 84 02/02/2022    ALT 33 02/02/2022    AST 24 02/02/2022       HgBA1c: No components found for: HGBA1C    Lab Results   Component Value Date    TSH 0.808 09/03/2020     Lab Results   Component Value Date    VITD25 25 02/17/2021       Lab Results   Component Value Date    SEDRATE 7 02/15/2021     Lab Results   Component Value Date    CRP < 0.30 02/15/2021       Lab Results   Component Value Date    VITD25 25 (L) 02/17/2021    CALCIUM 9.3 02/02/2022    MG 2.1 09/25/2020     Lab Results   Component Value Date     02/02/2022    K 4.9 02/02/2022     02/02/2022    CO2 22 (L) 02/02/2022    BUN 10 02/02/2022    CREATININE 0.6 02/02/2022    GLUCOSE 104 02/02/2022    CALCIUM 9.3 02/02/2022    PROT 7.3 02/02/2022    LABALBU 4.5 02/02/2022    BILITOT 0.2 (L) 02/02/2022    ALKPHOS 84 02/02/2022    AST 24 02/02/2022    ALT 33 02/02/2022         RADIOLOGY:     DEXA 8/13/2020 (different scanner)  Left hip: - 1.70, 0.658  Right hip: -1.40, 0.689  Lumbar spine: -0.60, 0.976  FRAX: 11% major, 1.6% hip     DEXA 6/20/2018  Left hip: -1.30, 0.860  Right hip: -1.30. 0.842         Assessment and plan:  Osteopenia  - A 54year old postmenopausal female with history of osteopenia dating back at least 2 years. Recent DEXA with T score -1.70 at left femoral neck. Patient with history of a couple childhood fractures which were traumatic, as well as a shoulder fracture from fall from standing height onto steps. Smoking 1 PPD for 40 years, drinking 2-3 alcoholic beverages 3-4 times per week. +fmhx of OP in maternal grandmother.    - alendronate 70 mg PO weekly    - calcium & vit D supplements   - repeat DEXA 8/2022- ordered    Vit D def   - repeat level ordered    No follow-ups on file. Electronically signed by SHERYL Parker CNP on 2/17/2022 at 11:18 AM      Thank you for allowing me to participate in the care of this patient. Please call if there are any questions.

## 2022-02-18 RX ORDER — FENTANYL CITRATE 50 UG/ML
50 INJECTION, SOLUTION INTRAMUSCULAR; INTRAVENOUS ONCE
Status: CANCELLED | OUTPATIENT
Start: 2022-02-18 | End: 2022-02-18

## 2022-02-18 RX ORDER — SODIUM CHLORIDE 450 MG/100ML
INJECTION, SOLUTION INTRAVENOUS CONTINUOUS
Status: CANCELLED | OUTPATIENT
Start: 2022-02-18

## 2022-02-18 RX ORDER — MIDAZOLAM HYDROCHLORIDE 1 MG/ML
1 INJECTION INTRAMUSCULAR; INTRAVENOUS ONCE
Status: CANCELLED | OUTPATIENT
Start: 2022-02-18 | End: 2022-02-18

## 2022-02-21 ENCOUNTER — HOSPITAL ENCOUNTER (OUTPATIENT)
Dept: INTERVENTIONAL RADIOLOGY/VASCULAR | Age: 57
Discharge: HOME | End: 2022-02-21
Attending: RADIOLOGY | Admitting: RADIOLOGY
Payer: COMMERCIAL

## 2022-02-21 VITALS
TEMPERATURE: 97.7 F | HEIGHT: 61 IN | DIASTOLIC BLOOD PRESSURE: 55 MMHG | RESPIRATION RATE: 20 BRPM | WEIGHT: 126 LBS | BODY MASS INDEX: 23.79 KG/M2 | SYSTOLIC BLOOD PRESSURE: 116 MMHG | OXYGEN SATURATION: 96 % | HEART RATE: 95 BPM

## 2022-02-21 DIAGNOSIS — I70.201 STENOSIS OF RIGHT POPLITEAL ARTERY (HCC): ICD-10-CM

## 2022-02-21 LAB
CREAT SERPL-MCNC: 0.6 MG/DL (ref 0.4–1.2)
ERYTHROCYTE [DISTWIDTH] IN BLOOD BY AUTOMATED COUNT: 11.7 % (ref 11.5–14.5)
ERYTHROCYTE [DISTWIDTH] IN BLOOD BY AUTOMATED COUNT: 44.8 FL (ref 35–45)
GFR SERPL CREATININE-BSD FRML MDRD: > 90 ML/MIN/1.73M2
HCT VFR BLD CALC: 39.4 % (ref 37–47)
HEMOGLOBIN: 12.5 GM/DL (ref 12–16)
INR BLD: 0.89 (ref 0.85–1.13)
MCH RBC QN AUTO: 33 PG (ref 26–33)
MCHC RBC AUTO-ENTMCNC: 31.7 GM/DL (ref 32.2–35.5)
MCV RBC AUTO: 104 FL (ref 81–99)
PLATELET # BLD: 344 THOU/MM3 (ref 130–400)
PMV BLD AUTO: 9.6 FL (ref 9.4–12.4)
RBC # BLD: 3.79 MILL/MM3 (ref 4.2–5.4)
WBC # BLD: 11.4 THOU/MM3 (ref 4.8–10.8)

## 2022-02-21 PROCEDURE — 75716 ARTERY X-RAYS ARMS/LEGS: CPT

## 2022-02-21 PROCEDURE — 6360000002 HC RX W HCPCS: Performed by: RADIOLOGY

## 2022-02-21 PROCEDURE — 6370000000 HC RX 637 (ALT 250 FOR IP): Performed by: RADIOLOGY

## 2022-02-21 PROCEDURE — 85610 PROTHROMBIN TIME: CPT

## 2022-02-21 PROCEDURE — 85027 COMPLETE CBC AUTOMATED: CPT

## 2022-02-21 PROCEDURE — 2709999900 IR ANGIOGRAM EXTREMITY RIGHT

## 2022-02-21 PROCEDURE — 75625 CONTRAST EXAM ABDOMINL AORTA: CPT

## 2022-02-21 PROCEDURE — 6360000004 HC RX CONTRAST MEDICATION: Performed by: RADIOLOGY

## 2022-02-21 PROCEDURE — 37224 HC PLASTY UNI FEMPOP: CPT

## 2022-02-21 PROCEDURE — 2580000003 HC RX 258: Performed by: RADIOLOGY

## 2022-02-21 PROCEDURE — 36415 COLL VENOUS BLD VENIPUNCTURE: CPT

## 2022-02-21 PROCEDURE — 82565 ASSAY OF CREATININE: CPT

## 2022-02-21 PROCEDURE — 75774 ARTERY X-RAY EACH VESSEL: CPT

## 2022-02-21 RX ORDER — CLOPIDOGREL BISULFATE 75 MG/1
TABLET ORAL
Status: COMPLETED | OUTPATIENT
Start: 2022-02-21 | End: 2022-02-21

## 2022-02-21 RX ORDER — HYDROCODONE BITARTRATE AND ACETAMINOPHEN 5; 325 MG/1; MG/1
1 TABLET ORAL EVERY 4 HOURS PRN
Status: DISCONTINUED | OUTPATIENT
Start: 2022-02-21 | End: 2022-02-21 | Stop reason: HOSPADM

## 2022-02-21 RX ORDER — SODIUM CHLORIDE 450 MG/100ML
INJECTION, SOLUTION INTRAVENOUS CONTINUOUS
Status: DISCONTINUED | OUTPATIENT
Start: 2022-02-21 | End: 2022-02-21 | Stop reason: HOSPADM

## 2022-02-21 RX ORDER — HEPARIN SODIUM 1000 [USP'U]/ML
INJECTION, SOLUTION INTRAVENOUS; SUBCUTANEOUS
Status: COMPLETED | OUTPATIENT
Start: 2022-02-21 | End: 2022-02-21

## 2022-02-21 RX ORDER — MIDAZOLAM HYDROCHLORIDE 1 MG/ML
1 INJECTION INTRAMUSCULAR; INTRAVENOUS ONCE
Status: COMPLETED | OUTPATIENT
Start: 2022-02-21 | End: 2022-02-21

## 2022-02-21 RX ORDER — FENTANYL CITRATE 50 UG/ML
50 INJECTION, SOLUTION INTRAMUSCULAR; INTRAVENOUS ONCE
Status: COMPLETED | OUTPATIENT
Start: 2022-02-21 | End: 2022-02-21

## 2022-02-21 RX ORDER — BACITRACIN, NEOMYCIN, POLYMYXIN B 400; 3.5; 5 [USP'U]/G; MG/G; [USP'U]/G
OINTMENT TOPICAL
Status: COMPLETED | OUTPATIENT
Start: 2022-02-21 | End: 2022-02-21

## 2022-02-21 RX ORDER — FENTANYL CITRATE 50 UG/ML
INJECTION, SOLUTION INTRAMUSCULAR; INTRAVENOUS
Status: COMPLETED | OUTPATIENT
Start: 2022-02-21 | End: 2022-02-21

## 2022-02-21 RX ORDER — HYDROCODONE BITARTRATE AND ACETAMINOPHEN 5; 325 MG/1; MG/1
1 TABLET ORAL ONCE
Status: COMPLETED | OUTPATIENT
Start: 2022-02-21 | End: 2022-02-21

## 2022-02-21 RX ORDER — MIDAZOLAM HYDROCHLORIDE 1 MG/ML
INJECTION INTRAMUSCULAR; INTRAVENOUS
Status: COMPLETED | OUTPATIENT
Start: 2022-02-21 | End: 2022-02-21

## 2022-02-21 RX ADMIN — FENTANYL CITRATE 25 MCG: 50 INJECTION, SOLUTION INTRAMUSCULAR; INTRAVENOUS at 10:07

## 2022-02-21 RX ADMIN — MIDAZOLAM 1 MG: 1 INJECTION INTRAMUSCULAR; INTRAVENOUS at 09:54

## 2022-02-21 RX ADMIN — CLOPIDOGREL BISULFATE 75 MG: 75 TABLET, FILM COATED ORAL at 11:47

## 2022-02-21 RX ADMIN — HYDROCODONE BITARTRATE AND ACETAMINOPHEN 1 TABLET: 5; 325 TABLET ORAL at 16:44

## 2022-02-21 RX ADMIN — SODIUM CHLORIDE: 4.5 INJECTION, SOLUTION INTRAVENOUS at 07:46

## 2022-02-21 RX ADMIN — FENTANYL CITRATE 25 MCG: 50 INJECTION, SOLUTION INTRAMUSCULAR; INTRAVENOUS at 10:42

## 2022-02-21 RX ADMIN — BACITRACIN ZINC, NEOMYCIN SULFATE, AND POLYMYXIN B SULFATE 1 EACH: 400; 3.5; 5 OINTMENT TOPICAL at 11:40

## 2022-02-21 RX ADMIN — FENTANYL CITRATE 50 MCG: 50 INJECTION INTRAMUSCULAR; INTRAVENOUS at 09:54

## 2022-02-21 RX ADMIN — HEPARIN SODIUM 4000 UNITS: 1000 INJECTION INTRAVENOUS; SUBCUTANEOUS at 10:49

## 2022-02-21 RX ADMIN — HYDROCODONE BITARTRATE AND ACETAMINOPHEN 1 TABLET: 5; 325 TABLET ORAL at 12:27

## 2022-02-21 RX ADMIN — IOPAMIDOL 150 ML: 612 INJECTION, SOLUTION INTRAVENOUS at 11:13

## 2022-02-21 RX ADMIN — MIDAZOLAM 0.5 MG: 1 INJECTION INTRAMUSCULAR; INTRAVENOUS at 10:07

## 2022-02-21 ASSESSMENT — PAIN SCALES - GENERAL
PAINLEVEL_OUTOF10: 7
PAINLEVEL_OUTOF10: 7

## 2022-02-21 NOTE — PROGRESS NOTES
Pt admitted to  2E15 ambulatory for peripheral angiogram.  Pt NPO. Patient unaccompanied, spouse, Cindi Trammell, will pick pt up at discharge. Vital signs obtained. Assessment and data collection initiated. Oriented to room. Policies and procedures for 2E explained   All questions answered with no further questions at this time. Fall prevention and safety precautions discussed with patient. Care plan reviewed with patient. Patient verbalize understanding of the plan of care and contribute to goal setting.     Data collection and medication review per L Darien DOMINGUEZ  4023 spoke to [facility] in [facility]   Informed him of pt history of left femoral artery endarterectomy  0920 to procedure per bed

## 2022-02-21 NOTE — FLOWSHEET NOTE
Received in Cath Recovery. Report received from [facility]. Left femoral site has no signs of bleeding or swelling, area soft. Tegaderm dressing clean, dry, and intact. IV 1000 0.9 NS infusing at 75 ml per hour in left forearm. Monitor showing NSR. See Post Cath Assessment flowsheet.

## 2022-02-21 NOTE — H&P
[address] [address]  Sedation/Analgesia History & Physical    Pt Name: Steven Schmitt  MRN: [id number]  YOB: 1965  Provider Performing Procedure: Jacqlyn Closs, MD, MD  Primary Care Physician: SHERYL Bryant - [facility]    PRE-PROCEDURE   DNR-CCA/DNR-CC []Yes [x]No  Brief History/Pre-Procedure Diagnosis: Right leg pain          MEDICAL HISTORY  []CAD/Valve  []Liver Disease  []Lung Disease []Diabetes  [x]Hypertension []Renal Disease  []Additional information:       has a past medical history of Arthritis, Blood circulation, collateral, COVID-19, Hyperlipidemia, Hypertension, and PAD (peripheral artery disease) ([facility] [address] SURGICAL HISTORY   has a past surgical history that includes  section; Foot surgery; Dilation and curettage of uterus (); knee surgery (Left); lumbar discectomy (2020); back surgery; skin biopsy; Colonoscopy; Endoscopy, colon, diagnostic; and Femoral Endarterectomy (Left, 3/16/2021).   Additional information:       ALLERGIES   Allergies as of 2022 - Fully Reviewed 2022   Allergen Reaction Noted    Pletal [cilostazol] Other (See Comments) 2021    Bactrim [sulfamethoxazole-trimethoprim] Rash 2019    Codeine Nausea And Vomiting 2019    Medrol [methylprednisolone] Palpitations 10/05/2020    Ultram [tramadol hcl] Other (See Comments) 2019     Additional information:       MEDICATIONS   Coumadin Use Last 5 Days [x]No []Yes  Antiplatelet drug therapy use last 5 days  [x]No []Yes  Other anticoagulant use last 5 days  [x]No []Yes    Current Facility-Administered Medications:     0.45 % sodium chloride infusion, , IntraVENous, Continuous, Nelson Peres MD, Last Rate: 20 mL/hr at 22 0746, New Bag at 22 0746    fentaNYL (SUBLIMAZE) injection 50 mcg, 50 mcg, IntraVENous, Once, Nelson Peres MD    iopamidol (ISOVUE-300) 61 % injection 100 mL, 100 mL, IntraVENous, Once, Nelson Peres MD    midazolam (VERSED) injection 1 mg, 1 mg, IntraVENous, Once, Monae Maxwell MD  Prior to Admission medications    Medication Sig Start Date End Date Taking? Authorizing Provider   HYDROcodone-acetaminophen (NORCO) 5-325 MG per tablet Take 1 tablet by mouth every 6 hours as needed for Pain for up to 14 days. Intended supply: 14 days.  Take lowest dose possible to manage pain 2/16/22 3/2/22 Yes SHERYL Caro CNP   cyclobenzaprine (FLEXERIL) 10 MG tablet Take 1 tablet by mouth 3 times daily as needed for Muscle spasms 2/15/22 3/17/22 Yes SHERYL Caro - CNP   clopidogrel (PLAVIX) 75 MG tablet Take 1 tablet by mouth daily 2/9/22  Yes SHERYL Caro CNP   atorvastatin (LIPITOR) 40 MG tablet Take 1 tablet by mouth daily 2/9/22  Yes SHERYL Caro CNP   ibuprofen (ADVIL;MOTRIN) 600 MG tablet Take 1 tablet by mouth every 6 hours as needed for Pain 1/25/22  Yes SHERYL Caro CNP   amLODIPine (NORVASC) 10 MG tablet TAKE 1 TABLET BY MOUTH ONE TIME A DAY 8/16/21  Yes SHERYL Caro CNP   lisinopril (PRINIVIL;ZESTRIL) 10 MG tablet Take 1 tablet by mouth 2 times daily 8/16/21  Yes SHERYL Caro CNP   acetaminophen (TYLENOL) 500 MG tablet Take 500 mg by mouth every 6 hours as needed for Pain   Yes Historical Provider, MD   aspirin 81 MG EC tablet Take 81 mg by mouth daily   Yes Historical Provider, MD   alendronate (FOSAMAX) 70 MG tablet Take 1 tablet by mouth every 7 days 2/17/22   SHERYL Davila - CNP   ondansetron (ZOFRAN ODT) 4 MG disintegrating tablet Take 1 tablet by mouth every 8 hours as needed for Nausea or Vomiting 2/15/21   Maricruz Nguyễn PA-C     Additional information:       VITAL SIGNS   Vitals:    02/21/22 0944   BP: 139/61   Pulse: 78   Resp: 13   Temp:    SpO2: 98%       PHYSICAL:   Heart:  [x]Regular rate and rhythm  []Other:    Lungs:  [x]Clear    []Other:    Abdomen: [x]Soft    []Other:    Mental Status: [x]Alert & Oriented  []Other:      PLANNED PROCEDURE   []Biospy [x]Arteriogram              []Drainage   []Mediport Insertion  []Fistulogram []IV access       []Vertebroplasty / Augmentation  []IVC filter []Dialysis catheter []Biliary drainage  []Other: []CAPD Catheter []Nephrostomy Tube / Stent  SEDATION  Planned agent:[x]Midazolam []Meperidine [x]Sublimaze []Dilaudid []Morphine     []Diazepam  []Other:     ASA Classification:  []1 [x]2 []3 []4 []5  Class 1: A normal healthy patient  Class 2: Pt with mild to moderate systemic disease  Class 3: Severe systemic disease or disturbance  Class 4: Severe systemic disorders that are already life threatening. Class 5: Moribund pt with little chances of survival, for more than 24 hours. Mallampati I Airway Classification:   []1 [x]2 []3 []4    [x]Pre-procedure diagnostic studies complete and results available. Comment:    [x]Previous sedation/anesthesia experiences assessed. Comment:    [x]The patient is an appropriate candidate to undergo the planned procedure sedation and anesthesia. (Refer to nursing sedation/analgesia documentation record)  [x]Formulation and discussion of sedation/procedure plan, risks, and expectations with patient and/or responsible adult completed. [x]Patient examined immediately prior to the procedure.  (Refer to nursing sedation/analgesia documentation record)    Yanci Tatum MD, MD  Electronically signed 2/21/2022 at 9:52 AM

## 2022-02-21 NOTE — PROGRESS NOTES
6751 Patient received in IR for procedure. 6718 This procedure has been fully reviewed with the patient and written informed consent has been obtained. 2252 Patient prepped for procedure. 1005 Procedure started with Dr. Avinash Rasheed. 1008 Access with use of sonosite to left femoral with 5Fr sheath. 1033 5Fr sheath exchanged for 6Fr ANL1.  1045 Angioplasty of popliteal using Picacho 4 x 40 balloon. 1110 Procedure completed; patient tolerated well. 1113 Sheath removed and manual pressure being held by CLEMENTINA Michaud RT.   1140 Bacitracin oint, quick clot, gauze and op site to left femoral site; area soft to touch with no bleeding noted. 1144 Patient on bed; comfort ensured. Left femoral dressing remains dry and intact with area soft. 1150 Report called to Ama Gunn on 2E.  [phone] Patient taken to 2E via bed. Left femoral dressing remains dry and intact with area soft.

## 2022-02-21 NOTE — OP NOTE
Department of Radiology  Post Procedure Progress Note      Pre-Procedure Diagnosis:  Right lower extremity pain    Procedure Performed: Aortogram, RLE arteriogram with angioplasty. Anesthesia: local / versed and fentanyl    Findings: successful     Immediate Complications:  None    Estimated Blood Loss: minimal    SEE DICTATED PROCEDURE NOTE FOR COMPLETE DETAILS.     Electronically signed by Ivan Park MD on 2/21/2022 at 11:31 AM

## 2022-02-21 NOTE — PROGRESS NOTES
1430 report from [facility], bedside rounding done. 1500 Patient resting in bed, states her back hurts from laying, head of bed put up slightly. [phone] Patient states back pain and pain above lt  groin site at a 7, states the back pain is not new but is really hurting now, no  Evidence of swelling seen. States site above insertion site hurts to touch, hurts when she coughs states she can not take this pain any longer. Radiology callled spoke with [facility], states she will come up and check site. Yariel 3970 from radiology in to check site and back , states no problem seen, patient still complaining of pain. Both groins without swelling, bruising or bleeding, states most pain is above insertion site. 1615 Patient up in [place], holding on to nurse and  states her back is killing her, patient grabbed rail on wall and hugging wall, states she cannot walk. Patient returned to bed with help of manager, Ellie Gilliland. Patient laid face down in bed, one leg hanging over side and 1 bent at 90 degree angle, patient states I have to do this to get pressure off the area above insertion site. Radiology called and asked Dr Jed Birmingham to come evaluate patient. 36 Dr Jed Birmingham in to see patient, returned patient to bathroom and hallway, patient is walking better, patient was able to urinate on the toliet. [address] given again as ordered. 1730 Patient feeling better, states pain now a 4. States she feels she can go home now. 1745 Discharge instructions given, voices understanding. 1750 Discharged per wheelchair, stable condition.

## 2022-02-28 DIAGNOSIS — M79.661 RIGHT CALF PAIN: ICD-10-CM

## 2022-02-28 RX ORDER — HYDROCODONE BITARTRATE AND ACETAMINOPHEN 5; 325 MG/1; MG/1
1 TABLET ORAL EVERY 6 HOURS PRN
Qty: 28 TABLET | Refills: 0 | Status: SHIPPED | OUTPATIENT
Start: 2022-02-28 | End: 2022-03-14

## 2022-03-01 ENCOUNTER — HOSPITAL ENCOUNTER (OUTPATIENT)
Dept: INTERVENTIONAL RADIOLOGY/VASCULAR | Age: 57
Discharge: HOME | End: 2022-03-01
Payer: COMMERCIAL

## 2022-03-01 DIAGNOSIS — I70.201 STENOSIS OF RIGHT POPLITEAL ARTERY (HCC): ICD-10-CM

## 2022-03-01 PROCEDURE — 99212 OFFICE O/P EST SF 10 MIN: CPT

## 2022-03-09 ENCOUNTER — HOSPITAL ENCOUNTER (OUTPATIENT)
Age: 57
Discharge: HOME | End: 2022-03-09
Payer: COMMERCIAL

## 2022-03-09 DIAGNOSIS — E55.9 VITAMIN D DEFICIENCY: ICD-10-CM

## 2022-03-09 DIAGNOSIS — E55.9 VITAMIN D DEFICIENCY: Primary | ICD-10-CM

## 2022-03-09 LAB — VITAMIN D 25-HYDROXY: 15 NG/ML (ref 30–100)

## 2022-03-09 PROCEDURE — 36415 COLL VENOUS BLD VENIPUNCTURE: CPT

## 2022-03-09 PROCEDURE — 82306 VITAMIN D 25 HYDROXY: CPT

## 2022-03-09 RX ORDER — ERGOCALCIFEROL 1.25 MG/1
50000 CAPSULE ORAL WEEKLY
Qty: 12 CAPSULE | Refills: 1 | Status: SHIPPED | OUTPATIENT
Start: 2022-03-09 | End: 2022-08-25

## 2022-03-16 DIAGNOSIS — E78.00 HIGH CHOLESTEROL: ICD-10-CM

## 2022-03-16 DIAGNOSIS — M54.16 CHRONIC RADICULAR LUMBAR PAIN: ICD-10-CM

## 2022-03-16 DIAGNOSIS — G89.29 CHRONIC RADICULAR LUMBAR PAIN: ICD-10-CM

## 2022-03-16 RX ORDER — ATORVASTATIN CALCIUM 40 MG/1
40 TABLET, FILM COATED ORAL DAILY
Qty: 90 TABLET | Refills: 3 | Status: SHIPPED | OUTPATIENT
Start: 2022-03-16

## 2022-03-16 RX ORDER — CYCLOBENZAPRINE HCL 10 MG
10 TABLET ORAL 3 TIMES DAILY PRN
Qty: 90 TABLET | Refills: 1 | Status: SHIPPED | OUTPATIENT
Start: 2022-03-16 | End: 2022-04-15

## 2022-03-31 ENCOUNTER — OFFICE VISIT (OUTPATIENT)
Dept: FAMILY MEDICINE CLINIC | Age: 57
End: 2022-03-31
Payer: COMMERCIAL

## 2022-03-31 ENCOUNTER — PATIENT MESSAGE (OUTPATIENT)
Dept: FAMILY MEDICINE CLINIC | Age: 57
End: 2022-03-31

## 2022-03-31 VITALS
RESPIRATION RATE: 16 BRPM | OXYGEN SATURATION: 97 % | TEMPERATURE: 97.9 F | SYSTOLIC BLOOD PRESSURE: 128 MMHG | BODY MASS INDEX: 24.39 KG/M2 | DIASTOLIC BLOOD PRESSURE: 74 MMHG | WEIGHT: 127 LBS | HEART RATE: 100 BPM

## 2022-03-31 DIAGNOSIS — M54.2 CHRONIC NECK PAIN: ICD-10-CM

## 2022-03-31 DIAGNOSIS — G89.29 CHRONIC NECK PAIN: ICD-10-CM

## 2022-03-31 DIAGNOSIS — J01.00 ACUTE NON-RECURRENT MAXILLARY SINUSITIS: Primary | ICD-10-CM

## 2022-03-31 PROCEDURE — 99214 OFFICE O/P EST MOD 30 MIN: CPT | Performed by: NURSE PRACTITIONER

## 2022-03-31 RX ORDER — DOXYCYCLINE HYCLATE 100 MG
100 TABLET ORAL 2 TIMES DAILY
Qty: 20 TABLET | Refills: 0 | Status: SHIPPED | OUTPATIENT
Start: 2022-03-31 | End: 2022-04-10

## 2022-03-31 RX ORDER — FLUTICASONE PROPIONATE 50 MCG
2 SPRAY, SUSPENSION (ML) NASAL DAILY
Qty: 48 G | Refills: 1 | Status: SHIPPED | OUTPATIENT
Start: 2022-03-31 | End: 2022-07-26

## 2022-03-31 RX ORDER — HYDROCODONE BITARTRATE AND ACETAMINOPHEN 5; 325 MG/1; MG/1
1 TABLET ORAL EVERY 8 HOURS PRN
Qty: 9 TABLET | Refills: 0 | Status: SHIPPED | OUTPATIENT
Start: 2022-03-31 | End: 2022-04-03

## 2022-03-31 ASSESSMENT — ENCOUNTER SYMPTOMS
SORE THROAT: 1
SHORTNESS OF BREATH: 0
EYE DISCHARGE: 0
EYE PAIN: 0
HOARSE VOICE: 0
WHEEZING: 0
CONSTIPATION: 0
COUGH: 1
ABDOMINAL PAIN: 0
ALLERGIC/IMMUNOLOGIC NEGATIVE: 1
SINUS PRESSURE: 1
EYE REDNESS: 0
NAUSEA: 0
SWOLLEN GLANDS: 0
RHINORRHEA: 1
BACK PAIN: 0
TROUBLE SWALLOWING: 0
DIARRHEA: 0
VOMITING: 0

## 2022-03-31 NOTE — PATIENT INSTRUCTIONS
Patient Education        Sinusitis: Care Instructions  Your Care Instructions     Sinusitis is an infection of the lining of the sinus cavities in your head. Sinusitis often follows a cold. It causes pain and pressure in your head andface. In most cases, sinusitis gets better on its own in 1 to 2 weeks. But some mildsymptoms may last for several weeks. Sometimes antibiotics are needed. Follow-up care is a key part of your treatment and safety. Be sure to make and go to all appointments, and call your doctor if you are having problems. It's also a good idea to know your test results and keep alist of the medicines you take. How can you care for yourself at home?  Take an over-the-counter pain medicine, such as acetaminophen (Tylenol), ibuprofen (Advil, Motrin), or naproxen (Aleve). Read and follow all instructions on the label.  If the doctor prescribed antibiotics, take them as directed. Do not stop taking them just because you feel better. You need to take the full course of antibiotics.  Be careful when taking over-the-counter cold or flu medicines and Tylenol at the same time. Many of these medicines have acetaminophen, which is Tylenol. Read the labels to make sure that you are not taking more than the recommended dose. Too much acetaminophen (Tylenol) can be harmful.  Breathe warm, moist air from a steamy shower, a hot bath, or a sink filled with hot water. Avoid cold, dry air. Using a humidifier in your home may help. Follow the directions for cleaning the machine.  Use saline (saltwater) nasal washes. This can help keep your nasal passages open and wash out mucus and bacteria. You can buy saline nose drops at a grocery store or drugstore. Or you can make your own at home by adding 1 teaspoon of salt and 1 teaspoon of baking soda to 2 cups of distilled water. If you make your own, fill a bulb syringe with the solution, insert the tip into your nostril, and squeeze gently. Satnam Patter your nose.    Put a hot, wet towel or a warm gel pack on your face 3 or 4 times a day for 5 to 10 minutes each time.  Try a decongestant nasal spray like oxymetazoline (Afrin). Do not use it for more than 3 days in a row. Using it for more than 3 days can make your congestion worse. When should you call for help? Call your doctor now or seek immediate medical care if:     You have new or worse swelling or redness in your face or around your eyes.      You have a new or higher fever. Watch closely for changes in your health, and be sure to contact your doctor if:     You have new or worse facial pain.      The mucus from your nose becomes thicker (like pus) or has new blood in it.      You are not getting better as expected. Where can you learn more? Go to https://[email or web address] org and sign in to your KBLE account. Enter K740 in the Earthineer box to learn more about \"Sinusitis: Care Instructions. \"     If you do not have an account, please click on the \"Sign Up Now\" link. Current as of: September 8, 2021               Content Version: 13.2  © 6328-3780 [employer]. Care instructions adapted under license by [facility] ([facility]). If you have questions about a medical condition or this instruction, always ask your healthcare professional. [address] any warranty or liability for your use of this information.

## 2022-03-31 NOTE — PROGRESS NOTES
[address] [address] [place] [facility] [address]  Dept: [phone]  Dept Fax: [phone]  Loc: [phone]     Visit Date:  3/31/2022      Patient:  Stephane Alexis  YOB: 1965    HPI:     Chief Complaint   Patient presents with    Facial Pain     sinus/facial pain, left ear pain and feels like its causing pain in her neck and shoulder as well. Has had green and yellow mucus, and a loss of appetite. She also notices that she is loosing her voice. Sinusitis  This is a new problem. The current episode started in the past 7 days. The problem has been gradually worsening since onset. There has been no fever. The pain is mild. Associated symptoms include chills, congestion, coughing, ear pain, headaches, sinus pressure and a sore throat. Pertinent negatives include no diaphoresis, hoarse voice, neck pain, shortness of breath, sneezing or swollen glands. Past treatments include oral decongestants. The treatment provided mild relief. Medications    Current Outpatient Medications:     doxycycline hyclate (VIBRA-TABS) 100 MG tablet, Take 1 tablet by mouth 2 times daily for 10 days, Disp: 20 tablet, Rfl: 0    HYDROcodone-acetaminophen (NORCO) 5-325 MG per tablet, Take 1 tablet by mouth every 8 hours as needed for Pain for up to 3 days. Intended supply: 3 days.  Take lowest dose possible to manage pain, Disp: 9 tablet, Rfl: 0    atorvastatin (LIPITOR) 40 MG tablet, Take 1 tablet by mouth daily, Disp: 90 tablet, Rfl: 3    cyclobenzaprine (FLEXERIL) 10 MG tablet, Take 1 tablet by mouth 3 times daily as needed for Muscle spasms, Disp: 90 tablet, Rfl: 1    vitamin D (ERGOCALCIFEROL) 1.25 MG (33292 UT) CAPS capsule, Take 1 capsule by mouth once a week, Disp: 12 capsule, Rfl: 1    alendronate (FOSAMAX) 70 MG tablet, Take 1 tablet by mouth every 7 days, Disp: 12 tablet, Rfl: 3    clopidogrel (PLAVIX) 75 MG tablet, Take 1 tablet by mouth daily, Disp: 90 tablet, Rfl: 3    ibuprofen (ADVIL;MOTRIN) 600 MG tablet, Take 1 tablet by mouth every 6 hours as needed for Pain, Disp: 60 tablet, Rfl: 0    amLODIPine (NORVASC) 10 MG tablet, TAKE 1 TABLET BY MOUTH ONE TIME A DAY, Disp: 90 tablet, Rfl: 3    lisinopril (PRINIVIL;ZESTRIL) 10 MG tablet, Take 1 tablet by mouth 2 times daily, Disp: 180 tablet, Rfl: 3    ondansetron (ZOFRAN ODT) 4 MG disintegrating tablet, Take 1 tablet by mouth every 8 hours as needed for Nausea or Vomiting, Disp: 20 tablet, Rfl: 0    acetaminophen (TYLENOL) 500 MG tablet, Take 500 mg by mouth every 6 hours as needed for Pain, Disp: , Rfl:     aspirin 81 MG EC tablet, Take 81 mg by mouth daily, Disp: , Rfl:     The patient is allergic to pletal [cilostazol], bactrim [sulfamethoxazole-trimethoprim], codeine, medrol [methylprednisolone], and ultram [tramadol hcl]. Past Medical History  Morena Webb  has a past medical history of Arthritis, Hyperlipidemia, and Hypertension. Subjective:      Review of Systems   Constitutional: Positive for chills and fatigue. Negative for activity change, diaphoresis and fever. HENT: Positive for congestion, ear pain, postnasal drip, rhinorrhea, sinus pressure and sore throat. Negative for hoarse voice, sneezing and trouble swallowing. Eyes: Negative for pain, discharge and redness. Respiratory: Positive for cough. Negative for shortness of breath and wheezing. Cardiovascular: Negative. Gastrointestinal: Negative for abdominal pain, constipation, diarrhea, nausea and vomiting. Endocrine: Negative. Genitourinary: Negative for dysuria, frequency and urgency. Musculoskeletal: Negative for arthralgias, back pain, myalgias and neck pain. Skin: Negative for rash. Allergic/Immunologic: Negative. Neurological: Positive for headaches. Negative for dizziness, tremors and weakness. Hematological: Negative.     Psychiatric/Behavioral: Negative for dysphoric mood and sleep disturbance. The patient is not nervous/anxious. Objective:     /74   Pulse 100   Temp 97.9 °F (36.6 °C) (Oral)   Resp 16   Wt 127 lb (57.6 kg)   SpO2 97%   BMI 24.39 kg/m²     Physical Exam  Constitutional:       General: She is not in acute distress. Appearance: Normal appearance. She is well-developed. She is ill-appearing. She is not diaphoretic. HENT:      Right Ear: Hearing, ear canal and external ear normal. A middle ear effusion is present. Tympanic membrane is bulging. Left Ear: Hearing, ear canal and external ear normal. A middle ear effusion is present. Tympanic membrane is bulging. Nose: Mucosal edema, congestion and rhinorrhea present. Right Sinus: Maxillary sinus tenderness present. Left Sinus: Maxillary sinus tenderness present. Mouth/Throat:      Dentition: Normal dentition. Pharynx: Uvula midline. Posterior oropharyngeal erythema present. Tonsils: No tonsillar exudate. 0 on the right. 0 on the left. Eyes:      General: Lids are normal.         Right eye: No discharge. Left eye: No discharge. Conjunctiva/sclera: Conjunctivae normal.      Right eye: Right conjunctiva is not injected. No chemosis. Left eye: No chemosis. Pupils: Pupils are equal, round, and reactive to light. Neck:      Vascular: No JVD. Trachea: Trachea normal.   Cardiovascular:      Rate and Rhythm: Regular rhythm. Heart sounds: Normal heart sounds. No murmur heard. Pulmonary:      Effort: Pulmonary effort is normal. No respiratory distress. Breath sounds: Normal breath sounds. No stridor. No wheezing. Abdominal:      General: Bowel sounds are normal.      Palpations: Abdomen is soft. Tenderness: There is no abdominal tenderness. Musculoskeletal:      Cervical back: Full passive range of motion without pain and normal range of motion. Lymphadenopathy:      Cervical: No cervical adenopathy.    Skin:     General: Skin is warm.      Capillary Refill: Capillary refill takes less than 2 seconds. Findings: No rash. Neurological:      Mental Status: She is alert and oriented to person, place, and time. GCS: GCS eye subscore is 4. GCS verbal subscore is 5. GCS motor subscore is 6. Cranial Nerves: No cranial nerve deficit. Coordination: Coordination normal.      Gait: Gait normal.   Psychiatric:         Mood and Affect: Mood is not anxious or depressed. Speech: Speech normal.         Behavior: Behavior normal. Behavior is not withdrawn or hyperactive. Behavior is cooperative. Thought Content: Thought content normal.         Judgment: Judgment normal.         Assessment/Plan:      Petra Darby was seen today for facial pain. Diagnoses and all orders for this visit:    Acute non-recurrent maxillary sinusitis  -     doxycycline hyclate (VIBRA-TABS) 100 MG tablet; Take 1 tablet by mouth 2 times daily for 10 days    Chronic neck pain  -     HYDROcodone-acetaminophen (NORCO) 5-325 MG per tablet; Take 1 tablet by mouth every 8 hours as needed for Pain for up to 3 days. Intended supply: 3 days. Take lowest dose possible to manage pain        No follow-ups on file. Patient instructions given andreviewed.         Electronically signed by SHERYL Brooke CNP on 3/31/2022 at 2:24 PM

## 2022-04-26 ENCOUNTER — TELEPHONE (OUTPATIENT)
Dept: FAMILY MEDICINE CLINIC | Age: 57
End: 2022-04-26

## 2022-04-26 DIAGNOSIS — M54.9 ACUTE UPPER BACK PAIN: Primary | ICD-10-CM

## 2022-04-26 RX ORDER — HYDROCODONE BITARTRATE AND ACETAMINOPHEN 5; 325 MG/1; MG/1
1 TABLET ORAL EVERY 8 HOURS PRN
Qty: 9 TABLET | Refills: 0 | Status: SHIPPED | OUTPATIENT
Start: 2022-04-26 | End: 2022-04-29

## 2022-04-26 NOTE — TELEPHONE ENCOUNTER
Said she is doing extra work over the past few days and her lower back and shoulders are very tight. She is asking for \"a few days worth of pain pills and if they do not help, she will make an appointment\".     Jarad FELIZ

## 2022-05-09 ENCOUNTER — PATIENT MESSAGE (OUTPATIENT)
Dept: FAMILY MEDICINE CLINIC | Age: 57
End: 2022-05-09

## 2022-05-09 DIAGNOSIS — M54.9 ACUTE UPPER BACK PAIN: Primary | ICD-10-CM

## 2022-05-10 DIAGNOSIS — K04.7 DENTAL INFECTION: Primary | ICD-10-CM

## 2022-05-10 RX ORDER — CLINDAMYCIN HYDROCHLORIDE 300 MG/1
300 CAPSULE ORAL 2 TIMES DAILY PRN
Qty: 20 CAPSULE | Refills: 0 | Status: SHIPPED | OUTPATIENT
Start: 2022-05-10 | End: 2022-05-20

## 2022-05-10 RX ORDER — KETOROLAC TROMETHAMINE 10 MG/1
10 TABLET, FILM COATED ORAL EVERY 8 HOURS PRN
Qty: 15 TABLET | Refills: 0 | Status: SHIPPED | OUTPATIENT
Start: 2022-05-10 | End: 2022-06-09

## 2022-05-10 NOTE — TELEPHONE ENCOUNTER
From: Pio Valderrama  To: Hayden Gold  Sent: 5/9/2022 11:00 PM EDT  Subject: pain and antibiotic medicine    Hi Artielena Heath, I have been working over and doing my best to keep up since Kikedarrick Douglas is not working. I have had no relief in my neck and shoulders since I last talked to you. Now I am dealing with a loose tooth that is causing me tremendous pain and swelling. If you could help me with some pain med and antibiotic I would greatly appreciate it. If you need to see me please call on my cell and I will try to work it out with my managers. I have a daughter due for her first baby any second and just trying to keep up with the house and everything is stressing me out I think. I hope to be able to get into a dentist soon but money is tight and I am just trying to get through this tough time right now. Thank you and look forward to hearing from you.    Lissa Simental

## 2022-05-13 RX ORDER — CARISOPRODOL 350 MG/1
350 TABLET ORAL 3 TIMES DAILY PRN
Qty: 30 TABLET | Refills: 0 | Status: SHIPPED | OUTPATIENT
Start: 2022-05-13 | End: 2022-05-24 | Stop reason: SDUPTHER

## 2022-05-23 ENCOUNTER — PATIENT MESSAGE (OUTPATIENT)
Dept: FAMILY MEDICINE CLINIC | Age: 57
End: 2022-05-23

## 2022-05-23 DIAGNOSIS — M54.9 ACUTE UPPER BACK PAIN: ICD-10-CM

## 2022-05-24 RX ORDER — CARISOPRODOL 350 MG/1
350 TABLET ORAL 3 TIMES DAILY PRN
Qty: 30 TABLET | Refills: 0 | Status: SHIPPED | OUTPATIENT
Start: 2022-05-24 | End: 2022-06-03

## 2022-05-24 NOTE — TELEPHONE ENCOUNTER
From: Zulma Giordano  To: Karthikeyan Mcclain  Sent: 5/23/2022 9:14 PM EDT  Subject: Prescription refill    Hi Traci Talamantes, I have been at [facility] all day, Clara Fox went through surgery fine and the doctor said everything went great. On the other hand I am still have some issues with my neck and shoulders now going down my back. I am making an appointment with you after the 1st when I go to [facility] for my follow up. Can you please refill the Soma's until I come see you and we can figure out what may be going on. I would appreciate it very much. Thanks and see you soon.      Jessica Knapp

## 2022-06-01 ENCOUNTER — HOSPITAL ENCOUNTER (OUTPATIENT)
Dept: INTERVENTIONAL RADIOLOGY/VASCULAR | Age: 57
Discharge: HOME | End: 2022-06-01
Payer: COMMERCIAL

## 2022-06-01 DIAGNOSIS — I70.201 STENOSIS OF RIGHT POPLITEAL ARTERY (HCC): ICD-10-CM

## 2022-06-01 PROCEDURE — 93925 LOWER EXTREMITY STUDY: CPT

## 2022-06-09 ENCOUNTER — OFFICE VISIT (OUTPATIENT)
Dept: CARDIOLOGY CLINIC | Age: 57
End: 2022-06-09
Payer: COMMERCIAL

## 2022-06-09 VITALS
HEART RATE: 80 BPM | HEIGHT: 60 IN | DIASTOLIC BLOOD PRESSURE: 60 MMHG | BODY MASS INDEX: 24.94 KG/M2 | SYSTOLIC BLOOD PRESSURE: 122 MMHG | WEIGHT: 127 LBS

## 2022-06-09 DIAGNOSIS — R06.02 SOB (SHORTNESS OF BREATH): Primary | ICD-10-CM

## 2022-06-09 PROCEDURE — 93000 ELECTROCARDIOGRAM COMPLETE: CPT | Performed by: INTERNAL MEDICINE

## 2022-06-09 PROCEDURE — 99213 OFFICE O/P EST LOW 20 MIN: CPT | Performed by: INTERNAL MEDICINE

## 2022-06-09 NOTE — PROGRESS NOTES
[address] [facility] [place] [address] [place] [address]  Dept: [phone]  Dept Fax: [phone]  Loc: [phone]    Visit Date: 6/9/2022    Ms. Earlene Arriaza is a 62 y.o. female  who presented for:  Chief Complaint   Patient presents with    1 Year Follow Up       HPI:   63 yo F c hx of PAD, Smoker, PAD underwent endarterectomy by vascular surgery. .  She is doing well. Continues to smoke. Had injection at surgical site. Has been seeing vascular surgery. Her claudication symptoms have completely gone. She comes today for a follow up. Current Outpatient Medications:     meloxicam (MOBIC) 15 MG tablet, Take 1 tablet by mouth daily, Disp: 30 tablet, Rfl: 3    HYDROcodone-acetaminophen (NORCO) 5-325 MG per tablet, Take 1 tablet by mouth every 8 hours as needed for Pain for up to 5 days. Intended supply: 3 days.  Take lowest dose possible to manage pain, Disp: 15 tablet, Rfl: 0    alendronate (FOSAMAX) 70 MG tablet, Take 1 tablet by mouth every 7 days, Disp: 12 tablet, Rfl: 3    fluticasone (FLONASE) 50 MCG/ACT nasal spray, 2 sprays by Each Nostril route daily, Disp: 48 g, Rfl: 1    atorvastatin (LIPITOR) 40 MG tablet, Take 1 tablet by mouth daily, Disp: 90 tablet, Rfl: 3    vitamin D (ERGOCALCIFEROL) 1.25 MG (13045 UT) CAPS capsule, Take 1 capsule by mouth once a week, Disp: 12 capsule, Rfl: 1    clopidogrel (PLAVIX) 75 MG tablet, Take 1 tablet by mouth daily, Disp: 90 tablet, Rfl: 3    amLODIPine (NORVASC) 10 MG tablet, TAKE 1 TABLET BY MOUTH ONE TIME A DAY, Disp: 90 tablet, Rfl: 3    lisinopril (PRINIVIL;ZESTRIL) 10 MG tablet, Take 1 tablet by mouth 2 times daily, Disp: 180 tablet, Rfl: 3    ondansetron (ZOFRAN ODT) 4 MG disintegrating tablet, Take 1 tablet by mouth every 8 hours as needed for Nausea or Vomiting, Disp: 20 tablet, Rfl: 0    acetaminophen (TYLENOL) 500 MG tablet, Take 500 mg by mouth every 6 hours as needed for Pain, Disp: , Rfl:     aspirin 81 MG EC tablet, Take 81 mg by mouth daily, Disp: , Rfl:     ibuprofen (ADVIL;MOTRIN) 600 MG tablet, Take 1 tablet by mouth every 6 hours as needed for Pain, Disp: 60 tablet, Rfl: 0    Past Medical History  Patrick Morataya  has a past medical history of Arthritis, Hyperlipidemia, and Hypertension. Social History  Patrick Morataya  reports that she has been smoking cigarettes. She has a 12.50 pack-year smoking history. She has never used smokeless tobacco. She reports current alcohol use of about 8.0 standard drinks of alcohol per week. She reports current drug use. Frequency: 2.00 times per week. Drug: Marijuana louise November). Family History  Patrick Morataya family history includes Alcohol Abuse in her father; Coronary Art Dis in her brother; Diabetes in her maternal aunt and mother; Heart Disease in her brother, brother, brother, and mother; Parkinsonism in her maternal cousin. Past Surgical History   Past Surgical History:   Procedure Laterality Date    BACK SURGERY       SECTION      x 4    COLONOSCOPY      DILATION AND CURETTAGE OF UTERUS      ENDOSCOPY, COLON, DIAGNOSTIC      FEMORAL ENDARTERECTOMY Left 3/16/2021    LEFT  FEMORAL ARTERY ENDARTERECTOMY performed by Tiffanie Cueva MD at [facility]      bone spurs removed both feet    KNEE SURGERY Left     reconstruction of knee    LUMBAR DISCECTOMY  2020    Dr. Patricia Ledesma BIOPSY         Subjective:     REVIEW OF SYSTEMS  Constitutional: denies sweats, chills and fever  HENT: denies  congestion, sinus pressure, sneezing and sore throat. Eyes: denies  pain, discharge, redness and itching. Respiratory: denies apnea, cough  Gastrointestinal: denies blood in stool, constipation, diarrhea   Endocrine: denies cold intolerance, heat intolerance, polydipsia. Genitourinary: denies dysuria, enuresis, flank pain and hematuria. Musculoskeletal: denies arthralgias, joint swelling and neck pain.    Neurological: denies numbness and headaches. Psychiatric/Behavioral: denies agitation, confusion, decreased concentration and dysphoric mood    All others reviewed and are negative. Objective:     /60   Pulse 80   Ht 5' (1.524 m)   Wt 127 lb (57.6 kg)   BMI 24.80 kg/m²     Wt Readings from Last 3 Encounters:   06/09/22 127 lb (57.6 kg)   03/31/22 127 lb (57.6 kg)   02/21/22 126 lb (57.2 kg)     BP Readings from Last 3 Encounters:   06/09/22 122/60   03/31/22 128/74   02/21/22 (!) 116/55       PHYSICAL EXAM  Constitutional: Oriented to person, place, and time. Appears well-developed and well-nourished. HENT:   Head: Normocephalic and atraumatic. Eyes: EOM are normal. Pupils are equal, round, and reactive to light. Neck: Normal range of motion. Neck supple. No JVD present. Cardiovascular: Normal rate , normal heart sounds and intact distal pulses. Pulmonary/Chest: Effort normal and breath sounds normal. No respiratory distress. No wheezes. No rales. Abdominal: Soft. Bowel sounds are normal. No distension. There is no tenderness. Musculoskeletal: Normal range of motion. No edema. Neurological: Alert and oriented to person, place, and time. No cranial nerve deficit. Coordination normal.   Skin: Skin is warm and dry. Psychiatric: Normal mood and affect.        No results found for: CKTOTAL, CKMB, CKMBINDEX    Lab Results   Component Value Date    WBC 11.4 02/21/2022    RBC 3.79 02/21/2022    HGB 12.5 02/21/2022    HCT 39.4 02/21/2022    .0 02/21/2022    MCH 33.0 02/21/2022    MCHC 31.7 02/21/2022     02/21/2022    MPV 9.6 02/21/2022       Lab Results   Component Value Date     02/02/2022    K 4.9 02/02/2022    K 4.5 03/08/2021     02/02/2022    CO2 22 02/02/2022    BUN 10 02/02/2022    LABALBU 4.5 02/02/2022    CREATININE 0.6 02/21/2022    CALCIUM 9.3 02/02/2022    LABGLOM >90 02/21/2022    GLUCOSE 104 02/02/2022       Lab Results   Component Value Date    ALKPHOS 84 02/02/2022    ALT 33 02/02/2022    AST 24 02/02/2022    PROT 7.3 02/02/2022    BILITOT 0.2 02/02/2022    BILIDIR <0.2 02/02/2022    LABALBU 4.5 02/02/2022       Lab Results   Component Value Date    MG 2.1 09/25/2020       Lab Results   Component Value Date    INR 0.89 02/21/2022    INR 0.96 03/12/2021    INR 0.92 03/08/2021         Lab Results   Component Value Date    LABA1C 5.1 03/12/2021       Lab Results   Component Value Date    TRIG 181 03/08/2021    HDL 72 02/02/2022    LDLCALC 110 02/02/2022       Lab Results   Component Value Date    TSH 0.808 09/03/2020         Testing Reviewed:      I haveindividually reviewed the below cardiac tests    EKG:    ECHO:   Results for orders placed during the hospital encounter of 03/18/19   Echo 2D w doppler w color complete    Narrative Transthoracic Echocardiography Report (TTE)     Demographics      Patient Name   Kristina Dash       Gender              Female                  Loly Dixon      MR #           [id number]     Race                                                   Ethnicity      Account #      [de-identified]     Room Number      Accession      [id number]     Date of Study       03/18/2019   Number      Date of Birth  1965    Referring Physician Tiffanie Travis MD      Age            47 year(s)    Sonographer         Maryana Becker RVT                                   Interpreting        Mikal Travis MD                                Physician     Procedure    Type of Study      TTE procedure:ECHOCARDIOGRAM COMPLETE 2D W DOPPLER W COLOR. Procedure Date  Date: 03/18/2019 Start: 09:06 AM    Study Location: Echo Lab  Technical Quality: Adequate visualization    Indications:Preop cardiac evaluation.     Additional Medical History:smoker, hypertension, hyperlipidemia, alcohol  abuse    Patient Status: Routine    Height: 60 inches Weight: 120 pounds BSA: 1.5 m^2 BMI: 23.44 kg/m^2    BP: 188/80 mmHg     Conclusions      Summary   Normal left ventricle size and systolic function. Ejection fraction was   estimated at 55-60%. Mild to moderate aortic regurgitation is noted. Mild mitral regurgitation is present. Mild tricuspid regurgitation. IVC size is within normal limits with normal respiratory phasic changes. Signature      ----------------------------------------------------------------   Electronically signed by Yuval Babin MD (Interpreting   physician) on 03/19/2019 at 04:47 PM   ----------------------------------------------------------------      Findings      Mitral Valve   The mitral valve structure was normal with normal leaflet separation. DOPPLER: The transmitral velocity was within the normal range with no   evidence for mitral stenosis. Mild mitral regurgitation is present. Aortic Valve   The aortic valve was trileaflet with normal thickness and cuspal   separation. DOPPLER: Transaortic velocity was within the normal range with   no evidence of aortic stenosis. Mild to moderate aortic regurgitation is noted. Tricuspid Valve   The tricuspid valve structure was normal with normal leaflet separation. DOPPLER: There was no evidence of tricuspid stenosis. Mild tricuspid regurgitation. Pulmonic Valve   The pulmonic valve was not well visualized . Left Atrium   Left atrial size was normal.      Left Ventricle   Normal left ventricle size and systolic function. Ejection fraction was   estimated at 55-60%. Right Atrium   Right atrial size was normal.      Right Ventricle   The right ventricular size was normal with normal systolic function and   wall thickness. Pericardial Effusion   The pericardium was normal in appearance with no evidence of a pericardial   effusion. Pleural Effusion   No evidence of pleural effusion. Aorta / Great Vessels   -Aortic root dimension within normal limits.    -IVC size is within normal limits with normal respiratory phasic changes.      M-Mode/2D Measurements & Calculations      LV Diastolic   LV Systolic Dimension: 3  AV Cusp Separation: 1.7 cmLA   Dimension: 4.4 cm                        Dimension: 3.5 cmAO Root   cm             LV Volume Diastolic: 68.1 Dimension: 1.8 cmLA Area: 20.7   LV FS:31.8 %   ml                        cm^2   LV PW          LV Volume Systolic: 35 ml   Diastolic: 1   LV EDV/LV EDV Index: 87.7   cm             ml/58 m^2LV ESV/LV ESV   Septum         Index: 35 ml/23 m^2       RV Diastolic Dimension: 2.3 cm   Diastolic: 1.1 EF Calculated: 60.1 %   cm                                       LA/Aorta: 1.94                                               LA volume/Index: 60.2 ml /40m^2     Doppler Measurements & Calculations      MV Peak E-Wave: 106 cm/s   AV Peak Velocity: 197  LVOT Peak Velocity: 146   MV Peak A-Wave: 101 cm/s   cm/s                   cm/s   MV E/A Ratio: 1.05         AV Peak Gradient:      LVOT Peak Gradient: 9   MV Peak Gradient: 4.49     15.52 mmHg             mmHg   mmHg                                                     TV Peak E-Wave: 34.5   MV Deceleration Time: 201                         cm/s   msec                                              TV Peak A-Wave: 46.7   MV P1/2t: 59 msec          AV P1/2t: 330 msec     cm/s   MVA by PHT:3.73 cm^2                                                     TV Peak Gradient: 0.48   MV E' Septal Velocity: 9                          mmHg   cm/s                       AV DVI (Vmax):0.74     TR Velocity:244 cm/s   MV A' Septal Velocity: 8.9                        TR Gradient:23.81 mmHg   cm/s                                              PV Peak Velocity: 107   MV E' Lateral Velocity:                           cm/s   9.5 cm/s                                          PV Peak Gradient: 4.58   MV A' Lateral Velocity:                           mmHg   10.8 cm/s   E/E' septal: 11.78   E/E' lateral: 11.16 [id number]       STRESS:    CATH:    Assessment/Plan       Diagnosis Orders   1. SOB (shortness of breath)  EKG 12 Lead     S/p L endarterectomy   Severe PAD  Smoker    Doing well since endarterectomy  Was seeing us for PAD but was referred to IR lately , underwent angioplasty in 3/2022  Doing ok now  Reports intermittent electrical pain  The patient is asked to make an attempt to improve diet and exercise patterns to aid in medical management of this problem. Advised more plant based nutrition/meditarrean diet   Advised patient to call office or seek immediate medical attention if there is any new onset of  any chest pain, sob, palpitations, lightheadedness, dizziness, orthopnea, PND or pedal edema. All medication side effects were discussed in details. Thank youfor allowing me to participate in the care of this patient. Please do not hesitate to contact me for any further questions. Return in about 1 year (around 6/9/2023), or if symptoms worsen or fail to improve, for Review testing, Regular follow up.        Electronically signed by Wallace Nair MD [facility]  6/9/2022 at 9:01 AM EST

## 2022-06-15 ENCOUNTER — OFFICE VISIT (OUTPATIENT)
Dept: FAMILY MEDICINE CLINIC | Age: 57
End: 2022-06-15
Payer: COMMERCIAL

## 2022-06-15 VITALS
RESPIRATION RATE: 18 BRPM | TEMPERATURE: 98 F | DIASTOLIC BLOOD PRESSURE: 54 MMHG | WEIGHT: 126.8 LBS | BODY MASS INDEX: 24.76 KG/M2 | HEART RATE: 85 BPM | SYSTOLIC BLOOD PRESSURE: 114 MMHG | OXYGEN SATURATION: 96 %

## 2022-06-15 DIAGNOSIS — G89.29 CHRONIC RIGHT SHOULDER PAIN: Primary | ICD-10-CM

## 2022-06-15 DIAGNOSIS — M25.511 CHRONIC RIGHT SHOULDER PAIN: Primary | ICD-10-CM

## 2022-06-15 DIAGNOSIS — M54.2 CHRONIC NECK PAIN: ICD-10-CM

## 2022-06-15 DIAGNOSIS — G89.29 CHRONIC NECK PAIN: ICD-10-CM

## 2022-06-15 DIAGNOSIS — M81.0 OSTEOPOROSIS WITHOUT CURRENT PATHOLOGICAL FRACTURE, UNSPECIFIED OSTEOPOROSIS TYPE: ICD-10-CM

## 2022-06-15 PROCEDURE — 99214 OFFICE O/P EST MOD 30 MIN: CPT | Performed by: NURSE PRACTITIONER

## 2022-06-15 RX ORDER — CYCLOBENZAPRINE HCL 10 MG
10 TABLET ORAL 3 TIMES DAILY PRN
Qty: 30 TABLET | Refills: 2 | Status: SHIPPED | OUTPATIENT
Start: 2022-06-15 | End: 2022-07-26 | Stop reason: SDUPTHER

## 2022-06-15 RX ORDER — HYDROCODONE BITARTRATE AND ACETAMINOPHEN 5; 325 MG/1; MG/1
1 TABLET ORAL EVERY 8 HOURS PRN
Qty: 21 TABLET | Refills: 0 | Status: SHIPPED | OUTPATIENT
Start: 2022-06-15 | End: 2022-06-15 | Stop reason: ALTCHOICE

## 2022-06-15 RX ORDER — HYDROCODONE BITARTRATE AND ACETAMINOPHEN 5; 325 MG/1; MG/1
1 TABLET ORAL EVERY 8 HOURS PRN
Qty: 42 TABLET | Refills: 0 | Status: SHIPPED | OUTPATIENT
Start: 2022-06-15 | End: 2022-06-30 | Stop reason: SDUPTHER

## 2022-06-15 RX ORDER — PREDNISONE 10 MG/1
10 TABLET ORAL 2 TIMES DAILY
Qty: 14 TABLET | Refills: 0 | Status: SHIPPED | OUTPATIENT
Start: 2022-06-15 | End: 2022-06-22

## 2022-06-15 SDOH — ECONOMIC STABILITY: FOOD INSECURITY: WITHIN THE PAST 12 MONTHS, THE FOOD YOU BOUGHT JUST DIDN'T LAST AND YOU DIDN'T HAVE MONEY TO GET MORE.: NEVER TRUE

## 2022-06-15 SDOH — ECONOMIC STABILITY: FOOD INSECURITY: WITHIN THE PAST 12 MONTHS, YOU WORRIED THAT YOUR FOOD WOULD RUN OUT BEFORE YOU GOT MONEY TO BUY MORE.: NEVER TRUE

## 2022-06-15 ASSESSMENT — ENCOUNTER SYMPTOMS
EYE DISCHARGE: 0
SORE THROAT: 0
VOMITING: 0
COUGH: 0
TROUBLE SWALLOWING: 0
NAUSEA: 0
RHINORRHEA: 0
BACK PAIN: 0
CONSTIPATION: 0
DIARRHEA: 0
SHORTNESS OF BREATH: 0
EYE PAIN: 0
EYE REDNESS: 0
WHEEZING: 0
ALLERGIC/IMMUNOLOGIC NEGATIVE: 1
ABDOMINAL PAIN: 0

## 2022-06-15 ASSESSMENT — SOCIAL DETERMINANTS OF HEALTH (SDOH): HOW HARD IS IT FOR YOU TO PAY FOR THE VERY BASICS LIKE FOOD, HOUSING, MEDICAL CARE, AND HEATING?: NOT HARD AT ALL

## 2022-06-15 NOTE — PROGRESS NOTES
[address] [address] [address] [place] [address]  Dept: [phone]  Dept Fax: [phone]  Loc: [phone]     Visit Date:  6/15/2022      Patient:  Scarlett Kumar  YOB: 1965    HPI:     Chief Complaint   Patient presents with    Pain     x2mos, worsening right shoulder, neck, and LBP, today pain is so severe it's making her nauseous, discuss restarting daily flexeril    Other     left leg has nerve damage, req FMLA intermittent pain       Patient is here with ongoing complaint for the last couple of months of right shoulder and cervical pain that is making it difficult to do her job, and the pain has been keeping her awake at nighttime. Patient has had a longstanding intermittent cervical pain and even had surgery at age 12. She has been trying to manage this with NSAIDs and muscle relaxers and states she still has pain to the degree she has trouble lifting above the level plane sometimes. No history of injury or surgery to the shoulder. States the pain is tight and muscle relaxers do help somewhat. Also requesting FML intermittent for flareups of her chronic left leg pain from her previous issue and surgery on the leg. Other  Associated symptoms include arthralgias, myalgias and neck pain. Pertinent negatives include no abdominal pain, congestion, coughing, fatigue, fever, headaches, nausea, rash, sore throat, vomiting or weakness. Medications    Current Outpatient Medications:     cyclobenzaprine (FLEXERIL) 10 MG tablet, Take 1 tablet by mouth 3 times daily as needed for Muscle spasms, Disp: 30 tablet, Rfl: 2    HYDROcodone-acetaminophen (NORCO) 5-325 MG per tablet, Take 1 tablet by mouth every 8 hours as needed for Pain for up to 14 days. , Disp: 42 tablet, Rfl: 0    predniSONE (DELTASONE) 10 MG tablet, Take 1 tablet by mouth 2 times daily for 7 days, Disp: 14 tablet, Rfl: 0    meloxicam (MOBIC) 15 MG tablet, Take 1 tablet by mouth daily, Disp: 30 tablet, Rfl: 3    alendronate (FOSAMAX) 70 MG tablet, Take 1 tablet by mouth every 7 days, Disp: 12 tablet, Rfl: 3    fluticasone (FLONASE) 50 MCG/ACT nasal spray, 2 sprays by Each Nostril route daily, Disp: 48 g, Rfl: 1    atorvastatin (LIPITOR) 40 MG tablet, Take 1 tablet by mouth daily, Disp: 90 tablet, Rfl: 3    vitamin D (ERGOCALCIFEROL) 1.25 MG (84544 UT) CAPS capsule, Take 1 capsule by mouth once a week, Disp: 12 capsule, Rfl: 1    clopidogrel (PLAVIX) 75 MG tablet, Take 1 tablet by mouth daily, Disp: 90 tablet, Rfl: 3    amLODIPine (NORVASC) 10 MG tablet, TAKE 1 TABLET BY MOUTH ONE TIME A DAY, Disp: 90 tablet, Rfl: 3    lisinopril (PRINIVIL;ZESTRIL) 10 MG tablet, Take 1 tablet by mouth 2 times daily, Disp: 180 tablet, Rfl: 3    ondansetron (ZOFRAN ODT) 4 MG disintegrating tablet, Take 1 tablet by mouth every 8 hours as needed for Nausea or Vomiting, Disp: 20 tablet, Rfl: 0    acetaminophen (TYLENOL) 500 MG tablet, Take 500 mg by mouth every 6 hours as needed for Pain, Disp: , Rfl:     aspirin 81 MG EC tablet, Take 81 mg by mouth daily, Disp: , Rfl:     The patient is allergic to pletal [cilostazol], bactrim [sulfamethoxazole-trimethoprim], codeine, medrol [methylprednisolone], and ultram [tramadol hcl]. Past Medical History  Jessica Knapp  has a past medical history of Arthritis, Hyperlipidemia, and Hypertension. Subjective:      Review of Systems   Constitutional: Negative for activity change, fatigue and fever. HENT: Negative for congestion, ear pain, rhinorrhea, sore throat and trouble swallowing. Eyes: Negative for pain, discharge and redness. Respiratory: Negative for cough, shortness of breath and wheezing. Cardiovascular: Negative. Gastrointestinal: Negative for abdominal pain, constipation, diarrhea, nausea and vomiting. Endocrine: Negative. Genitourinary: Negative for dysuria, frequency and urgency.    Musculoskeletal: Positive for arthralgias, myalgias, neck pain and neck stiffness. Negative for back pain. Skin: Negative for rash. Allergic/Immunologic: Negative. Neurological: Negative for dizziness, tremors, weakness and headaches. Hematological: Negative. Psychiatric/Behavioral: Negative for dysphoric mood and sleep disturbance. The patient is not nervous/anxious. Objective:     BP (!) 114/54 (Site: Right Upper Arm)   Pulse 85   Temp 98 °F (36.7 °C) (Oral)   Resp 18   Wt 126 lb 12.8 oz (57.5 kg)   SpO2 96%   BMI 24.76 kg/m²     Physical Exam  Constitutional:       General: She is not in acute distress. Appearance: She is well-developed. She is not diaphoretic. HENT:      Right Ear: External ear normal.      Left Ear: External ear normal.      Nose: Nose normal.   Eyes:      General:         Right eye: No discharge. Left eye: No discharge. Conjunctiva/sclera: Conjunctivae normal.      Pupils: Pupils are equal, round, and reactive to light. Neck:      Vascular: No JVD. Cardiovascular:      Rate and Rhythm: Normal rate and regular rhythm. Pulmonary:      Effort: Pulmonary effort is normal. No respiratory distress. Musculoskeletal:         General: No tenderness or deformity. Normal range of motion. Cervical back: Normal range of motion. Skin:     General: Skin is warm and dry. Capillary Refill: Capillary refill takes less than 2 seconds. Coloration: Skin is not pale. Findings: No erythema or rash. Neurological:      Mental Status: She is alert and oriented to person, place, and time. Coordination: Coordination normal.   Psychiatric:         Behavior: Behavior normal.         Thought Content: Thought content normal.         Judgment: Judgment normal.         Assessment/Plan:      Rhianna Ballesteros was seen today for pain and other.     Diagnoses and all orders for this visit:    Chronic right shoulder pain  -     [facility] - Emily Keith MD, Orthopedic Surgery, [email or web address]  - cyclobenzaprine (FLEXERIL) 10 MG tablet; Take 1 tablet by mouth 3 times daily as needed for Muscle spasms  -     Discontinue: HYDROcodone-acetaminophen (NORCO) 5-325 MG per tablet; Take 1 tablet by mouth every 8 hours as needed for Pain for up to 7 days. Intended supply: 7 days. Take lowest dose possible to manage pain  -     [facility]  -     HYDROcodone-acetaminophen (NORCO) 5-325 MG per tablet; Take 1 tablet by mouth every 8 hours as needed for Pain for up to 14 days. -     predniSONE (DELTASONE) 10 MG tablet; Take 1 tablet by mouth 2 times daily for 7 days    Chronic neck pain  -     cyclobenzaprine (FLEXERIL) 10 MG tablet; Take 1 tablet by mouth 3 times daily as needed for Muscle spasms  -     Discontinue: HYDROcodone-acetaminophen (NORCO) 5-325 MG per tablet; Take 1 tablet by mouth every 8 hours as needed for Pain for up to 7 days. Intended supply: 7 days. Take lowest dose possible to manage pain  -     [facility]  -     HYDROcodone-acetaminophen (NORCO) 5-325 MG per tablet; Take 1 tablet by mouth every 8 hours as needed for Pain for up to 14 days. -     predniSONE (DELTASONE) 10 MG tablet; Take 1 tablet by mouth 2 times daily for 7 days    Osteoporosis without current pathological fracture, unspecified osteoporosis type    Patient is being referred to shoulder specialty at [facility]. She does owe them money so this may be a problem getting in. She will contact them and see what kind of payment plan she might be able to afford. Issues that complicate the matter far patient had a significant palpitation reaction to a dose of IM steroids last year. She agreed that we would try very low-dose amount and see if that is tolerable as our options are fairly narrow for treatment of this pain. She states the Mobic is not helping a lot and she is willing to use more Flexeril. We had a long discussion about chronically being on narcotics, she does not tolerate tramadol. She has already signed a pain contract previously. Refill for [employer] today. Physical therapy ordered. Patient completed a cervical MRI in 2020 and it did show significant problems at that time. She was unable to follow-up due to the more urgent nature of vascular blockage in her left leg. Return in about 3 months (around 9/15/2022), or if symptoms worsen or fail to improve. Patient instructions given andreviewed.         Electronically signed by SHERYL Alexandra CNP on 6/15/2022 at 11:58 AM

## 2022-06-15 NOTE — PATIENT INSTRUCTIONS
THE MOST IMPORTANT ACTION YOU CAN TAKE TO IMPROVE YOUR CURRENT AND FUTURE HEALTH IS TO QUIT SMOKING. Call the [facility] ([phone])    Smoking harms nonsmokers. When nonsmokers are around people who smoke, they absorb nicotine, carbon monoxide, and other ingredients of tobacco smoke. DO NOT SMOKE AROUND CHILDREN    Children exposed to secondhand smoke are at an increased risk of:  Sudden Infant Death Syndrome (SIDS), acute respiratory infections, inflammation of the middle ear, and severe asthma. Over a longer time, it causes heart disease and lung cancer. There is no safe level of exposure to secondhand smoke. Patient Education        Back Pain, Emergency or Urgent Symptoms: Care Instructions  Your Care Instructions     Many people have back pain at one time or another. In most cases, pain gets better with self-care that includes over-the-counter pain medicine, ice, heat,and exercises. Unless you have symptoms of a severe injury or heart attack, you may be able to give yourself a few days before you call a doctor. But some back problems arevery serious. Do not ignore symptoms that need to be checked right away. Follow-up care is a key part of your treatment and safety. Be sure to make and go to all appointments, and call your doctor if you are having problems. It's also a good idea to know your test results and keep alist of the medicines you take. How can you care for yourself at home?  Sit or lie in positions that are most comfortable and that reduce your pain. Try one of these positions when you lie down:  ? Lie on your back with your knees bent and supported by large pillows. ? Lie on the floor with your legs on the seat of a sofa or chair. ? Lie on your side with your knees and hips bent and a pillow between your legs. ? Lie on your stomach if it does not make pain worse.    Do not sit up in bed, and avoid soft couches and twisted positions. Bed rest can help relieve pain at first, but it delays healing. Avoid bed rest after the first day.  Change positions every 30 minutes. If you must sit for long periods of time, take breaks from sitting. Get up and walk around, or lie flat.  Try using a heating pad on a low or medium setting, for 15 to 20 minutes every 2 or 3 hours. Try a warm shower in place of one session with the heating pad. You can also buy single-use heat wraps that last up to 8 hours. You can also try ice or cold packs on your back for 10 to 20 minutes at a time, several times a day. (Put a thin cloth between the ice pack and your skin.) This reduces pain and makes it easier to be active and exercise.  Take pain medicines exactly as directed. ? If the doctor gave you a prescription medicine for pain, take it as prescribed. ? If you are not taking a prescription pain medicine, ask your doctor if you can take an over-the-counter medicine. When should you call for help? Call 911 anytime you think you may need emergency care. For example, call if:     You are unable to move a leg at all.      You have back pain with severe belly pain.      You have symptoms of a heart attack. These may include:  ? Chest pain or pressure, or a strange feeling in the chest.  ? Sweating. ? Shortness of breath. ? Nausea or vomiting. ? Pain, pressure, or a strange feeling in the back, neck, jaw, or upper belly or in one or both shoulders or arms. ? Lightheadedness or sudden weakness. ? A fast or irregular heartbeat. After you call 911, the  may tell you to chew 1 adult-strength or 2 to 4 low-dose aspirin. Wait for an ambulance. Do not try to drive yourself. Call your doctor now or seek immediate medical care if:     You have new or worse symptoms in your arms, legs, chest, belly, or buttocks. Symptoms may include:  ? Numbness or tingling. ? Weakness.   ? Pain.      You lose bladder or bowel control.      You have back pain and:  ? You have injured your back while lifting or doing some other activity. Call if the pain is severe, has not gone away after 1 or 2 days, and you cannot do your normal daily activities. ? You have had a back injury before that needed treatment. ? Your pain has lasted longer than 4 weeks. ? You have had weight loss you cannot explain. ? You have a fever. ? You are age 48 or older. ? You have cancer now or have had it before. Watch closely for changes in your health, and be sure to contact your doctor ifyou are not getting better as expected. Where can you learn more? Go to https://[employer]. org and sign in to your [employer] account. Enter D460 in the Exosect box to learn more about \"Back Pain, Emergency or Urgent Symptoms: Care Instructions. \"     If you do not have an account, please click on the \"Sign Up Now\" link. Current as of: July 1, 2021               Content Version: 13.2  © 2006-2022 [employer]. Care instructions adapted under license by [facility] ([facility]). If you have questions about a medical condition or this instruction, always ask your healthcare professional. [place] [address] any warranty or liability for your use of this information.

## 2022-06-28 ENCOUNTER — PATIENT MESSAGE (OUTPATIENT)
Dept: FAMILY MEDICINE CLINIC | Age: 57
End: 2022-06-28

## 2022-06-28 DIAGNOSIS — G89.29 CHRONIC NECK PAIN: ICD-10-CM

## 2022-06-28 DIAGNOSIS — G89.29 CHRONIC RIGHT SHOULDER PAIN: ICD-10-CM

## 2022-06-28 DIAGNOSIS — M25.511 CHRONIC RIGHT SHOULDER PAIN: ICD-10-CM

## 2022-06-28 DIAGNOSIS — M54.2 CHRONIC NECK PAIN: ICD-10-CM

## 2022-06-29 RX ORDER — HYDROCODONE BITARTRATE AND ACETAMINOPHEN 5; 325 MG/1; MG/1
1 TABLET ORAL EVERY 8 HOURS PRN
Qty: 42 TABLET | Refills: 0 | OUTPATIENT
Start: 2022-06-29 | End: 2022-07-13

## 2022-06-29 RX ORDER — PREDNISONE 10 MG/1
TABLET ORAL
Qty: 30 TABLET | Refills: 0 | Status: SHIPPED | OUTPATIENT
Start: 2022-06-29 | End: 2022-07-09

## 2022-06-29 RX ORDER — PREDNISONE 10 MG/1
10 TABLET ORAL 2 TIMES DAILY
Qty: 14 TABLET | Refills: 0 | OUTPATIENT
Start: 2022-06-29 | End: 2022-07-06

## 2022-06-29 NOTE — TELEPHONE ENCOUNTER
From: Noris Boyce  To: Rich Chacon  Sent: 6/28/2022 5:58 PM EDT  Subject: Pain Medicine    Hi Tisha Flow, I have an appointment for PT on June 22 and my appointment with [facility] isn't until the end of July. I wanted to let you know that I had no problem with the steroids as long as I ate. I do think that the inflammation medicine is starting to work or it was the steroids because it seems to be getting a little better but it is still pretty bad at this time. Just wanted to know if you would give me another refill and hopefully it will continue to go down and I won't need them anymore. Thank you for my FMLA it was approved. Have a great day.     Cynthia Mares

## 2022-06-30 RX ORDER — HYDROCODONE BITARTRATE AND ACETAMINOPHEN 5; 325 MG/1; MG/1
1 TABLET ORAL EVERY 8 HOURS PRN
Qty: 21 TABLET | Refills: 0 | Status: SHIPPED | OUTPATIENT
Start: 2022-06-30 | End: 2022-07-06 | Stop reason: SDUPTHER

## 2022-07-06 ENCOUNTER — PATIENT MESSAGE (OUTPATIENT)
Dept: FAMILY MEDICINE CLINIC | Age: 57
End: 2022-07-06

## 2022-07-06 DIAGNOSIS — G89.29 CHRONIC RIGHT SHOULDER PAIN: ICD-10-CM

## 2022-07-06 DIAGNOSIS — G89.29 CHRONIC NECK PAIN: ICD-10-CM

## 2022-07-06 DIAGNOSIS — M25.511 CHRONIC RIGHT SHOULDER PAIN: ICD-10-CM

## 2022-07-06 DIAGNOSIS — M54.2 CHRONIC NECK PAIN: ICD-10-CM

## 2022-07-06 RX ORDER — HYDROCODONE BITARTRATE AND ACETAMINOPHEN 5; 325 MG/1; MG/1
1 TABLET ORAL EVERY 8 HOURS PRN
Qty: 42 TABLET | Refills: 0 | Status: ON HOLD | OUTPATIENT
Start: 2022-07-06 | End: 2022-07-22 | Stop reason: HOSPADM

## 2022-07-06 NOTE — TELEPHONE ENCOUNTER
From: Leslie Mendosa  To: Maynor Stokes  Sent: 7/6/2022 11:54 AM EDT  Subject: Pain Medicine    Good afternoon Latesha Barker,   I was going to try not to ask but my appointment for PT isn't until the 13th and I already had to use one of my FMLA days. I am still taking the steroids as prescribed but the pain has got much worse and moving down in my shoulder blade. I would rate it a 9 at this moment. I would appreciate another refill of [place] to make it to PT and see what they can do. The [facility] couldn't get me in until the end of this month. Thank you and have a great day. Kelin Chávez.

## 2022-07-19 ENCOUNTER — APPOINTMENT (OUTPATIENT)
Dept: CT IMAGING | Age: 57
DRG: 872 | End: 2022-07-19
Payer: COMMERCIAL

## 2022-07-19 ENCOUNTER — HOSPITAL ENCOUNTER (INPATIENT)
Age: 57
LOS: 2 days | Discharge: HOME | DRG: 872 | End: 2022-07-22
Attending: EMERGENCY MEDICINE | Admitting: INTERNAL MEDICINE
Payer: COMMERCIAL

## 2022-07-19 DIAGNOSIS — D72.829 LEUKOCYTOSIS, UNSPECIFIED TYPE: ICD-10-CM

## 2022-07-19 DIAGNOSIS — E83.51 HYPOCALCEMIA: ICD-10-CM

## 2022-07-19 DIAGNOSIS — E87.1 HYPONATREMIA: ICD-10-CM

## 2022-07-19 DIAGNOSIS — N10 ACUTE PYELONEPHRITIS: Primary | ICD-10-CM

## 2022-07-19 LAB
ALBUMIN SERPL-MCNC: 3.5 G/DL (ref 3.5–5.1)
ALP BLD-CCNC: 104 U/L (ref 38–126)
ALT SERPL-CCNC: 24 U/L (ref 11–66)
ANION GAP SERPL CALCULATED.3IONS-SCNC: 15 MEQ/L (ref 8–16)
AST SERPL-CCNC: 20 U/L (ref 5–40)
BASOPHILS # BLD: 0.1 %
BASOPHILS ABSOLUTE: 0 THOU/MM3 (ref 0–0.1)
BILIRUB SERPL-MCNC: 0.3 MG/DL (ref 0.3–1.2)
BUN BLDV-MCNC: 27 MG/DL (ref 7–22)
CALCIUM SERPL-MCNC: 7.9 MG/DL (ref 8.5–10.5)
CHLORIDE BLD-SCNC: 94 MEQ/L (ref 98–111)
CO2: 16 MEQ/L (ref 23–33)
CREAT SERPL-MCNC: 1.1 MG/DL (ref 0.4–1.2)
EOSINOPHIL # BLD: 0.2 %
EOSINOPHILS ABSOLUTE: 0 THOU/MM3 (ref 0–0.4)
ERYTHROCYTE [DISTWIDTH] IN BLOOD BY AUTOMATED COUNT: 13.4 % (ref 11.5–14.5)
ERYTHROCYTE [DISTWIDTH] IN BLOOD BY AUTOMATED COUNT: 48.7 FL (ref 35–45)
GFR SERPL CREATININE-BSD FRML MDRD: 51 ML/MIN/1.73M2
GLUCOSE BLD-MCNC: 119 MG/DL (ref 70–108)
HCT VFR BLD CALC: 31.1 % (ref 37–47)
HEMOGLOBIN: 10.3 GM/DL (ref 12–16)
IMMATURE GRANS (ABS): 0.1 THOU/MM3 (ref 0–0.07)
IMMATURE GRANULOCYTES: 0.6 %
LACTIC ACID, SEPSIS: 0.5 MMOL/L (ref 0.5–1.9)
LIPASE: 11.6 U/L (ref 5.6–51.3)
LYMPHOCYTES # BLD: 4.9 %
LYMPHOCYTES ABSOLUTE: 0.8 THOU/MM3 (ref 1–4.8)
MCH RBC QN AUTO: 32.6 PG (ref 26–33)
MCHC RBC AUTO-ENTMCNC: 33.1 GM/DL (ref 32.2–35.5)
MCV RBC AUTO: 98.4 FL (ref 81–99)
MONOCYTES # BLD: 9.1 %
MONOCYTES ABSOLUTE: 1.6 THOU/MM3 (ref 0.4–1.3)
NUCLEATED RED BLOOD CELLS: 0 /100 WBC
OSMOLALITY CALCULATION: 257.8 MOSMOL/KG (ref 275–300)
PLATELET # BLD: 256 THOU/MM3 (ref 130–400)
PMV BLD AUTO: 9.7 FL (ref 9.4–12.4)
POTASSIUM REFLEX MAGNESIUM: 4.4 MEQ/L (ref 3.5–5.2)
RBC # BLD: 3.16 MILL/MM3 (ref 4.2–5.4)
SEG NEUTROPHILS: 85.1 %
SEGMENTED NEUTROPHILS ABSOLUTE COUNT: 14.7 THOU/MM3 (ref 1.8–7.7)
SODIUM BLD-SCNC: 125 MEQ/L (ref 135–145)
TOTAL PROTEIN: 7.3 G/DL (ref 6.1–8)
TROPONIN T: < 0.01 NG/ML
WBC # BLD: 17.3 THOU/MM3 (ref 4.8–10.8)

## 2022-07-19 PROCEDURE — 36415 COLL VENOUS BLD VENIPUNCTURE: CPT

## 2022-07-19 PROCEDURE — 83735 ASSAY OF MAGNESIUM: CPT

## 2022-07-19 PROCEDURE — 93005 ELECTROCARDIOGRAM TRACING: CPT | Performed by: EMERGENCY MEDICINE

## 2022-07-19 PROCEDURE — 6360000002 HC RX W HCPCS: Performed by: EMERGENCY MEDICINE

## 2022-07-19 PROCEDURE — 6360000002 HC RX W HCPCS

## 2022-07-19 PROCEDURE — 83605 ASSAY OF LACTIC ACID: CPT

## 2022-07-19 PROCEDURE — 83690 ASSAY OF LIPASE: CPT

## 2022-07-19 PROCEDURE — 85025 COMPLETE CBC W/AUTO DIFF WBC: CPT

## 2022-07-19 PROCEDURE — 87086 URINE CULTURE/COLONY COUNT: CPT

## 2022-07-19 PROCEDURE — 87186 SC STD MICRODIL/AGAR DIL: CPT

## 2022-07-19 PROCEDURE — 74177 CT ABD & PELVIS W/CONTRAST: CPT

## 2022-07-19 PROCEDURE — 2580000003 HC RX 258: Performed by: EMERGENCY MEDICINE

## 2022-07-19 PROCEDURE — 83036 HEMOGLOBIN GLYCOSYLATED A1C: CPT

## 2022-07-19 PROCEDURE — 84484 ASSAY OF TROPONIN QUANT: CPT

## 2022-07-19 PROCEDURE — 87077 CULTURE AEROBIC IDENTIFY: CPT

## 2022-07-19 PROCEDURE — 80053 COMPREHEN METABOLIC PANEL: CPT

## 2022-07-19 PROCEDURE — 96361 HYDRATE IV INFUSION ADD-ON: CPT

## 2022-07-19 PROCEDURE — 99223 1ST HOSP IP/OBS HIGH 75: CPT | Performed by: INTERNAL MEDICINE

## 2022-07-19 PROCEDURE — 81001 URINALYSIS AUTO W/SCOPE: CPT

## 2022-07-19 PROCEDURE — 84100 ASSAY OF PHOSPHORUS: CPT

## 2022-07-19 PROCEDURE — 96375 TX/PRO/DX INJ NEW DRUG ADDON: CPT

## 2022-07-19 PROCEDURE — 6360000004 HC RX CONTRAST MEDICATION: Performed by: EMERGENCY MEDICINE

## 2022-07-19 PROCEDURE — 96365 THER/PROPH/DIAG IV INF INIT: CPT

## 2022-07-19 PROCEDURE — 82306 VITAMIN D 25 HYDROXY: CPT

## 2022-07-19 PROCEDURE — 99285 EMERGENCY DEPT VISIT HI MDM: CPT

## 2022-07-19 RX ORDER — ONDANSETRON 2 MG/ML
4 INJECTION INTRAMUSCULAR; INTRAVENOUS ONCE
Status: COMPLETED | OUTPATIENT
Start: 2022-07-19 | End: 2022-07-19

## 2022-07-19 RX ORDER — 0.9 % SODIUM CHLORIDE 0.9 %
1000 INTRAVENOUS SOLUTION INTRAVENOUS ONCE
Status: COMPLETED | OUTPATIENT
Start: 2022-07-19 | End: 2022-07-19

## 2022-07-19 RX ORDER — MORPHINE SULFATE 4 MG/ML
4 INJECTION, SOLUTION INTRAMUSCULAR; INTRAVENOUS ONCE
Status: COMPLETED | OUTPATIENT
Start: 2022-07-19 | End: 2022-07-19

## 2022-07-19 RX ORDER — ONDANSETRON 2 MG/ML
INJECTION INTRAMUSCULAR; INTRAVENOUS
Status: COMPLETED
Start: 2022-07-19 | End: 2022-07-19

## 2022-07-19 RX ADMIN — MORPHINE SULFATE 4 MG: 4 INJECTION, SOLUTION INTRAMUSCULAR; INTRAVENOUS at 22:07

## 2022-07-19 RX ADMIN — ONDANSETRON 4 MG: 2 INJECTION INTRAMUSCULAR; INTRAVENOUS at 22:08

## 2022-07-19 RX ADMIN — IOPAMIDOL 80 ML: 755 INJECTION, SOLUTION INTRAVENOUS at 22:46

## 2022-07-19 RX ADMIN — HYDROMORPHONE HYDROCHLORIDE 0.5 MG: 1 INJECTION, SOLUTION INTRAMUSCULAR; INTRAVENOUS; SUBCUTANEOUS at 23:23

## 2022-07-19 RX ADMIN — SODIUM CHLORIDE 1000 ML: 9 INJECTION, SOLUTION INTRAVENOUS at 22:09

## 2022-07-19 ASSESSMENT — PAIN DESCRIPTION - ORIENTATION: ORIENTATION: RIGHT;LEFT;MID

## 2022-07-19 ASSESSMENT — PAIN DESCRIPTION - LOCATION
LOCATION: ABDOMEN;BACK
LOCATION: ABDOMEN;BACK

## 2022-07-19 ASSESSMENT — PAIN - FUNCTIONAL ASSESSMENT: PAIN_FUNCTIONAL_ASSESSMENT: 0-10

## 2022-07-19 ASSESSMENT — PAIN SCALES - GENERAL
PAINLEVEL_OUTOF10: 9
PAINLEVEL_OUTOF10: 10

## 2022-07-20 ENCOUNTER — APPOINTMENT (OUTPATIENT)
Dept: ULTRASOUND IMAGING | Age: 57
DRG: 872 | End: 2022-07-20
Payer: COMMERCIAL

## 2022-07-20 PROBLEM — E87.1 HYPONATREMIA: Status: ACTIVE | Noted: 2022-07-20

## 2022-07-20 PROBLEM — E87.1 HYPO-OSMOLALITY AND HYPONATREMIA: Status: ACTIVE | Noted: 2022-07-20

## 2022-07-20 PROBLEM — K57.30 DIVERTICULOSIS OF COLON WITHOUT DIVERTICULITIS: Status: ACTIVE | Noted: 2022-07-20

## 2022-07-20 PROBLEM — N10 ACUTE PYELONEPHRITIS: Status: ACTIVE | Noted: 2022-07-20

## 2022-07-20 PROBLEM — E83.51 HYPOCALCEMIA: Status: ACTIVE | Noted: 2022-07-20

## 2022-07-20 PROBLEM — N30.00 ACUTE CYSTITIS WITHOUT HEMATURIA: Status: ACTIVE | Noted: 2022-07-20

## 2022-07-20 LAB
ANION GAP SERPL CALCULATED.3IONS-SCNC: 12 MEQ/L (ref 8–16)
AVERAGE GLUCOSE: 99 MG/DL (ref 70–126)
BACTERIA: ABNORMAL /HPF
BILIRUBIN URINE: NEGATIVE
BLOOD, URINE: ABNORMAL
BUN BLDV-MCNC: 17 MG/DL (ref 7–22)
CALCIUM IONIZED: 0.91 MMOL/L (ref 1.12–1.32)
CALCIUM SERPL-MCNC: 7.4 MG/DL (ref 8.5–10.5)
CASTS 2: ABNORMAL /LPF
CASTS UA: ABNORMAL /LPF
CHARACTER, URINE: ABNORMAL
CHLORIDE BLD-SCNC: 101 MEQ/L (ref 98–111)
CO2: 18 MEQ/L (ref 23–33)
COLOR: YELLOW
CREAT SERPL-MCNC: 0.8 MG/DL (ref 0.4–1.2)
CRYSTALS, UA: ABNORMAL
CRYSTALS, UA: ABNORMAL
EKG ATRIAL RATE: 104 BPM
EKG P AXIS: 67 DEGREES
EKG P-R INTERVAL: 154 MS
EKG Q-T INTERVAL: 318 MS
EKG QRS DURATION: 78 MS
EKG QTC CALCULATION (BAZETT): 418 MS
EKG R AXIS: 60 DEGREES
EKG T AXIS: 64 DEGREES
EKG VENTRICULAR RATE: 104 BPM
EPITHELIAL CELLS, UA: ABNORMAL /HPF
FERRITIN: 426 NG/ML (ref 10–291)
FOLATE: 14.2 NG/ML (ref 4.8–24.2)
GFR SERPL CREATININE-BSD FRML MDRD: 74 ML/MIN/1.73M2
GLUCOSE BLD-MCNC: 129 MG/DL (ref 70–108)
GLUCOSE URINE: NEGATIVE MG/DL
HBA1C MFR BLD: 5.3 % (ref 4.4–6.4)
IRON SATURATION: 8 % (ref 20–50)
IRON: 19 UG/DL (ref 50–170)
KETONES, URINE: ABNORMAL
LEUKOCYTE ESTERASE, URINE: ABNORMAL
MAGNESIUM: 2.5 MG/DL (ref 1.6–2.4)
MISCELLANEOUS 2: ABNORMAL
NITRITE, URINE: NEGATIVE
PH UA: 5.5 (ref 5–9)
PHOSPHORUS: 3.3 MG/DL (ref 2.4–4.7)
POTASSIUM REFLEX MAGNESIUM: 3.7 MEQ/L (ref 3.5–5.2)
PROTEIN UA: 100
RBC URINE: ABNORMAL /HPF
RENAL EPITHELIAL, UA: ABNORMAL
SODIUM BLD-SCNC: 131 MEQ/L (ref 135–145)
SPECIFIC GRAVITY, URINE: 1.02 (ref 1–1.03)
TOTAL IRON BINDING CAPACITY: 250 UG/DL (ref 171–450)
UROBILINOGEN, URINE: 1 EU/DL (ref 0–1)
VITAMIN B-12: > 2000 PG/ML (ref 211–911)
VITAMIN D 25-HYDROXY: 18 NG/ML (ref 30–100)
WBC UA: ABNORMAL /HPF
YEAST: ABNORMAL

## 2022-07-20 PROCEDURE — 82746 ASSAY OF FOLIC ACID SERUM: CPT

## 2022-07-20 PROCEDURE — 82728 ASSAY OF FERRITIN: CPT

## 2022-07-20 PROCEDURE — 83550 IRON BINDING TEST: CPT

## 2022-07-20 PROCEDURE — 87040 BLOOD CULTURE FOR BACTERIA: CPT

## 2022-07-20 PROCEDURE — 6370000000 HC RX 637 (ALT 250 FOR IP): Performed by: INTERNAL MEDICINE

## 2022-07-20 PROCEDURE — 93010 ELECTROCARDIOGRAM REPORT: CPT | Performed by: INTERNAL MEDICINE

## 2022-07-20 PROCEDURE — 76705 ECHO EXAM OF ABDOMEN: CPT

## 2022-07-20 PROCEDURE — 82330 ASSAY OF CALCIUM: CPT

## 2022-07-20 PROCEDURE — 83540 ASSAY OF IRON: CPT

## 2022-07-20 PROCEDURE — 6360000002 HC RX W HCPCS: Performed by: EMERGENCY MEDICINE

## 2022-07-20 PROCEDURE — 6360000002 HC RX W HCPCS: Performed by: INTERNAL MEDICINE

## 2022-07-20 PROCEDURE — 1200000000 HC SEMI PRIVATE

## 2022-07-20 PROCEDURE — 36415 COLL VENOUS BLD VENIPUNCTURE: CPT

## 2022-07-20 PROCEDURE — 80048 BASIC METABOLIC PNL TOTAL CA: CPT

## 2022-07-20 PROCEDURE — 82607 VITAMIN B-12: CPT

## 2022-07-20 PROCEDURE — 6370000000 HC RX 637 (ALT 250 FOR IP): Performed by: PHYSICIAN ASSISTANT

## 2022-07-20 PROCEDURE — 2580000003 HC RX 258: Performed by: INTERNAL MEDICINE

## 2022-07-20 PROCEDURE — 2580000003 HC RX 258: Performed by: EMERGENCY MEDICINE

## 2022-07-20 RX ORDER — ONDANSETRON 2 MG/ML
4 INJECTION INTRAMUSCULAR; INTRAVENOUS EVERY 6 HOURS PRN
Status: DISCONTINUED | OUTPATIENT
Start: 2022-07-20 | End: 2022-07-22 | Stop reason: HOSPADM

## 2022-07-20 RX ORDER — ACETAMINOPHEN 650 MG/1
650 SUPPOSITORY RECTAL EVERY 6 HOURS PRN
Status: DISCONTINUED | OUTPATIENT
Start: 2022-07-20 | End: 2022-07-22 | Stop reason: HOSPADM

## 2022-07-20 RX ORDER — ENOXAPARIN SODIUM 100 MG/ML
40 INJECTION SUBCUTANEOUS DAILY
Status: DISCONTINUED | OUTPATIENT
Start: 2022-07-20 | End: 2022-07-22 | Stop reason: HOSPADM

## 2022-07-20 RX ORDER — AMLODIPINE BESYLATE 10 MG/1
10 TABLET ORAL DAILY
Status: DISCONTINUED | OUTPATIENT
Start: 2022-07-20 | End: 2022-07-22 | Stop reason: HOSPADM

## 2022-07-20 RX ORDER — ASPIRIN 81 MG/1
81 TABLET ORAL DAILY
Status: DISCONTINUED | OUTPATIENT
Start: 2022-07-20 | End: 2022-07-22 | Stop reason: HOSPADM

## 2022-07-20 RX ORDER — HYDROCODONE BITARTRATE AND ACETAMINOPHEN 5; 325 MG/1; MG/1
1 TABLET ORAL EVERY 4 HOURS PRN
Status: DISCONTINUED | OUTPATIENT
Start: 2022-07-20 | End: 2022-07-22 | Stop reason: HOSPADM

## 2022-07-20 RX ORDER — SODIUM CHLORIDE 9 MG/ML
INJECTION, SOLUTION INTRAVENOUS CONTINUOUS
Status: DISCONTINUED | OUTPATIENT
Start: 2022-07-20 | End: 2022-07-20

## 2022-07-20 RX ORDER — SODIUM CHLORIDE 0.9 % (FLUSH) 0.9 %
10 SYRINGE (ML) INJECTION EVERY 12 HOURS SCHEDULED
Status: DISCONTINUED | OUTPATIENT
Start: 2022-07-20 | End: 2022-07-22 | Stop reason: HOSPADM

## 2022-07-20 RX ORDER — POLYETHYLENE GLYCOL 3350 17 G/17G
17 POWDER, FOR SOLUTION ORAL DAILY PRN
Status: DISCONTINUED | OUTPATIENT
Start: 2022-07-20 | End: 2022-07-22 | Stop reason: HOSPADM

## 2022-07-20 RX ORDER — SODIUM CHLORIDE 9 MG/ML
1000 INJECTION, SOLUTION INTRAVENOUS CONTINUOUS
Status: DISCONTINUED | OUTPATIENT
Start: 2022-07-20 | End: 2022-07-20

## 2022-07-20 RX ORDER — CIPROFLOXACIN 2 MG/ML
400 INJECTION, SOLUTION INTRAVENOUS EVERY 12 HOURS
Status: DISCONTINUED | OUTPATIENT
Start: 2022-07-20 | End: 2022-07-22 | Stop reason: ALTCHOICE

## 2022-07-20 RX ORDER — LISINOPRIL 10 MG/1
10 TABLET ORAL 2 TIMES DAILY
Status: DISCONTINUED | OUTPATIENT
Start: 2022-07-20 | End: 2022-07-22 | Stop reason: HOSPADM

## 2022-07-20 RX ORDER — SODIUM CHLORIDE 9 MG/ML
INJECTION, SOLUTION INTRAVENOUS CONTINUOUS
Status: DISCONTINUED | OUTPATIENT
Start: 2022-07-20 | End: 2022-07-22 | Stop reason: HOSPADM

## 2022-07-20 RX ORDER — CLOPIDOGREL BISULFATE 75 MG/1
75 TABLET ORAL DAILY
Status: DISCONTINUED | OUTPATIENT
Start: 2022-07-20 | End: 2022-07-22 | Stop reason: HOSPADM

## 2022-07-20 RX ORDER — SODIUM CHLORIDE 9 MG/ML
INJECTION, SOLUTION INTRAVENOUS CONTINUOUS
Status: DISCONTINUED | OUTPATIENT
Start: 2022-07-20 | End: 2022-07-20 | Stop reason: SDUPTHER

## 2022-07-20 RX ORDER — ONDANSETRON 4 MG/1
4 TABLET, ORALLY DISINTEGRATING ORAL EVERY 8 HOURS PRN
Status: DISCONTINUED | OUTPATIENT
Start: 2022-07-20 | End: 2022-07-22 | Stop reason: HOSPADM

## 2022-07-20 RX ORDER — SODIUM CHLORIDE 9 MG/ML
INJECTION, SOLUTION INTRAVENOUS PRN
Status: DISCONTINUED | OUTPATIENT
Start: 2022-07-20 | End: 2022-07-22 | Stop reason: HOSPADM

## 2022-07-20 RX ORDER — ATORVASTATIN CALCIUM 40 MG/1
40 TABLET, FILM COATED ORAL DAILY
Status: DISCONTINUED | OUTPATIENT
Start: 2022-07-20 | End: 2022-07-22 | Stop reason: HOSPADM

## 2022-07-20 RX ORDER — HYDROCODONE BITARTRATE AND ACETAMINOPHEN 5; 325 MG/1; MG/1
1 TABLET ORAL EVERY 8 HOURS PRN
Status: DISCONTINUED | OUTPATIENT
Start: 2022-07-20 | End: 2022-07-20

## 2022-07-20 RX ORDER — ACETAMINOPHEN 325 MG/1
650 TABLET ORAL EVERY 6 HOURS PRN
Status: DISCONTINUED | OUTPATIENT
Start: 2022-07-20 | End: 2022-07-22 | Stop reason: HOSPADM

## 2022-07-20 RX ORDER — SODIUM CHLORIDE 0.9 % (FLUSH) 0.9 %
10 SYRINGE (ML) INJECTION PRN
Status: DISCONTINUED | OUTPATIENT
Start: 2022-07-20 | End: 2022-07-22 | Stop reason: HOSPADM

## 2022-07-20 RX ADMIN — HYDROMORPHONE HYDROCHLORIDE 0.5 MG: 1 INJECTION, SOLUTION INTRAMUSCULAR; INTRAVENOUS; SUBCUTANEOUS at 22:46

## 2022-07-20 RX ADMIN — HYDROCODONE BITARTRATE AND ACETAMINOPHEN 1 TABLET: 5; 325 TABLET ORAL at 02:10

## 2022-07-20 RX ADMIN — HYDROCODONE BITARTRATE AND ACETAMINOPHEN 1 TABLET: 5; 325 TABLET ORAL at 21:17

## 2022-07-20 RX ADMIN — SODIUM CHLORIDE 1000 ML: 9 INJECTION, SOLUTION INTRAVENOUS at 01:30

## 2022-07-20 RX ADMIN — ASPIRIN 81 MG: 81 TABLET, COATED ORAL at 08:00

## 2022-07-20 RX ADMIN — LIDOCAINE HYDROCHLORIDE: 20 SOLUTION ORAL; TOPICAL at 12:09

## 2022-07-20 RX ADMIN — ACETAMINOPHEN 325MG 650 MG: 325 TABLET ORAL at 22:45

## 2022-07-20 RX ADMIN — LISINOPRIL 10 MG: 10 TABLET ORAL at 21:17

## 2022-07-20 RX ADMIN — HYDROMORPHONE HYDROCHLORIDE 0.5 MG: 1 INJECTION, SOLUTION INTRAMUSCULAR; INTRAVENOUS; SUBCUTANEOUS at 13:07

## 2022-07-20 RX ADMIN — CIPROFLOXACIN 400 MG: 2 INJECTION, SOLUTION INTRAVENOUS at 21:17

## 2022-07-20 RX ADMIN — ATORVASTATIN CALCIUM 40 MG: 40 TABLET, FILM COATED ORAL at 22:45

## 2022-07-20 RX ADMIN — CIPROFLOXACIN 400 MG: 2 INJECTION, SOLUTION INTRAVENOUS at 08:09

## 2022-07-20 RX ADMIN — CALCIUM GLUCONATE 1000 MG: 98 INJECTION, SOLUTION INTRAVENOUS at 10:05

## 2022-07-20 RX ADMIN — CLOPIDOGREL BISULFATE 75 MG: 75 TABLET ORAL at 08:00

## 2022-07-20 RX ADMIN — HYDROMORPHONE HYDROCHLORIDE 0.5 MG: 1 INJECTION, SOLUTION INTRAMUSCULAR; INTRAVENOUS; SUBCUTANEOUS at 06:43

## 2022-07-20 RX ADMIN — SODIUM CHLORIDE: 9 INJECTION, SOLUTION INTRAVENOUS at 21:15

## 2022-07-20 RX ADMIN — HYDROMORPHONE HYDROCHLORIDE 0.5 MG: 1 INJECTION, SOLUTION INTRAMUSCULAR; INTRAVENOUS; SUBCUTANEOUS at 16:20

## 2022-07-20 RX ADMIN — AMLODIPINE BESYLATE 10 MG: 10 TABLET ORAL at 08:01

## 2022-07-20 RX ADMIN — HYDROCODONE BITARTRATE AND ACETAMINOPHEN 1 TABLET: 5; 325 TABLET ORAL at 08:01

## 2022-07-20 RX ADMIN — LISINOPRIL 10 MG: 10 TABLET ORAL at 12:09

## 2022-07-20 RX ADMIN — HYDROMORPHONE HYDROCHLORIDE 0.5 MG: 1 INJECTION, SOLUTION INTRAMUSCULAR; INTRAVENOUS; SUBCUTANEOUS at 10:00

## 2022-07-20 RX ADMIN — LIDOCAINE HYDROCHLORIDE: 20 SOLUTION ORAL; TOPICAL at 16:22

## 2022-07-20 RX ADMIN — LIDOCAINE HYDROCHLORIDE: 20 SOLUTION ORAL; TOPICAL at 08:01

## 2022-07-20 RX ADMIN — CALCIUM GLUCONATE 1000 MG: 98 INJECTION, SOLUTION INTRAVENOUS at 02:05

## 2022-07-20 RX ADMIN — LIDOCAINE HYDROCHLORIDE: 20 SOLUTION ORAL; TOPICAL at 21:18

## 2022-07-20 RX ADMIN — LIDOCAINE HYDROCHLORIDE: 20 SOLUTION ORAL; TOPICAL at 04:52

## 2022-07-20 RX ADMIN — SODIUM CHLORIDE: 9 INJECTION, SOLUTION INTRAVENOUS at 12:13

## 2022-07-20 RX ADMIN — HYDROCODONE BITARTRATE AND ACETAMINOPHEN 1 TABLET: 5; 325 TABLET ORAL at 17:04

## 2022-07-20 RX ADMIN — ENOXAPARIN SODIUM 40 MG: 100 INJECTION SUBCUTANEOUS at 08:01

## 2022-07-20 RX ADMIN — CEFTRIAXONE SODIUM 1000 MG: 1 INJECTION, POWDER, FOR SOLUTION INTRAMUSCULAR; INTRAVENOUS at 01:31

## 2022-07-20 RX ADMIN — HYDROCODONE BITARTRATE AND ACETAMINOPHEN 1 TABLET: 5; 325 TABLET ORAL at 12:09

## 2022-07-20 RX ADMIN — HYDROMORPHONE HYDROCHLORIDE 0.5 MG: 1 INJECTION, SOLUTION INTRAMUSCULAR; INTRAVENOUS; SUBCUTANEOUS at 03:27

## 2022-07-20 RX ADMIN — HYDROMORPHONE HYDROCHLORIDE 0.5 MG: 1 INJECTION, SOLUTION INTRAMUSCULAR; INTRAVENOUS; SUBCUTANEOUS at 19:33

## 2022-07-20 ASSESSMENT — PAIN DESCRIPTION - DIRECTION
RADIATING_TOWARDS: FLANK

## 2022-07-20 ASSESSMENT — PAIN DESCRIPTION - ONSET
ONSET: ON-GOING

## 2022-07-20 ASSESSMENT — ENCOUNTER SYMPTOMS
ABDOMINAL PAIN: 1
ALLERGIC/IMMUNOLOGIC NEGATIVE: 1
BACK PAIN: 1
SHORTNESS OF BREATH: 0
RESPIRATORY NEGATIVE: 1
EYES NEGATIVE: 1
VOMITING: 0
COUGH: 0
COLOR CHANGE: 0

## 2022-07-20 ASSESSMENT — PAIN DESCRIPTION - DESCRIPTORS
DESCRIPTORS: SHARP;STABBING
DESCRIPTORS: SHARP;SPASM
DESCRIPTORS: SHARP;STABBING
DESCRIPTORS: SHARP;STABBING
DESCRIPTORS: STABBING
DESCRIPTORS: STABBING;SHARP
DESCRIPTORS: SHARP;STABBING
DESCRIPTORS: SHARP;BURNING;STABBING
DESCRIPTORS: SHARP;STABBING
DESCRIPTORS: SHARP;SPASM
DESCRIPTORS: SPASM;SHARP
DESCRIPTORS: SPASM;SHARP

## 2022-07-20 ASSESSMENT — PAIN DESCRIPTION - LOCATION
LOCATION: BACK
LOCATION: ABDOMEN;BACK
LOCATION: ABDOMEN;BACK
LOCATION: BACK
LOCATION: BACK
LOCATION: ABDOMEN;BACK
LOCATION: ABDOMEN;BACK
LOCATION: BACK

## 2022-07-20 ASSESSMENT — PAIN DESCRIPTION - PAIN TYPE
TYPE: ACUTE PAIN

## 2022-07-20 ASSESSMENT — PAIN SCALES - GENERAL
PAINLEVEL_OUTOF10: 7
PAINLEVEL_OUTOF10: 7
PAINLEVEL_OUTOF10: 3
PAINLEVEL_OUTOF10: 7
PAINLEVEL_OUTOF10: 8
PAINLEVEL_OUTOF10: 5
PAINLEVEL_OUTOF10: 3
PAINLEVEL_OUTOF10: 9
PAINLEVEL_OUTOF10: 7
PAINLEVEL_OUTOF10: 7
PAINLEVEL_OUTOF10: 6
PAINLEVEL_OUTOF10: 3
PAINLEVEL_OUTOF10: 4
PAINLEVEL_OUTOF10: 3
PAINLEVEL_OUTOF10: 4
PAINLEVEL_OUTOF10: 6
PAINLEVEL_OUTOF10: 3
PAINLEVEL_OUTOF10: 7
PAINLEVEL_OUTOF10: 6

## 2022-07-20 ASSESSMENT — PAIN - FUNCTIONAL ASSESSMENT
PAIN_FUNCTIONAL_ASSESSMENT: PREVENTS OR INTERFERES SOME ACTIVE ACTIVITIES AND ADLS
PAIN_FUNCTIONAL_ASSESSMENT: ACTIVITIES ARE NOT PREVENTED
PAIN_FUNCTIONAL_ASSESSMENT: PREVENTS OR INTERFERES SOME ACTIVE ACTIVITIES AND ADLS
PAIN_FUNCTIONAL_ASSESSMENT: PREVENTS OR INTERFERES SOME ACTIVE ACTIVITIES AND ADLS
PAIN_FUNCTIONAL_ASSESSMENT: ACTIVITIES ARE NOT PREVENTED
PAIN_FUNCTIONAL_ASSESSMENT: PREVENTS OR INTERFERES SOME ACTIVE ACTIVITIES AND ADLS
PAIN_FUNCTIONAL_ASSESSMENT: ACTIVITIES ARE NOT PREVENTED
PAIN_FUNCTIONAL_ASSESSMENT: ACTIVITIES ARE NOT PREVENTED

## 2022-07-20 ASSESSMENT — PAIN DESCRIPTION - ORIENTATION
ORIENTATION: ANTERIOR;POSTERIOR
ORIENTATION: RIGHT;LEFT
ORIENTATION: POSTERIOR;LOWER
ORIENTATION: LEFT;RIGHT
ORIENTATION: ANTERIOR;POSTERIOR
ORIENTATION: POSTERIOR;LOWER
ORIENTATION: LOWER;POSTERIOR
ORIENTATION: POSTERIOR;LOWER
ORIENTATION: RIGHT;LEFT
ORIENTATION: POSTERIOR;LOWER
ORIENTATION: RIGHT;LEFT
ORIENTATION: POSTERIOR;LOWER
ORIENTATION: POSTERIOR;LOWER
ORIENTATION: RIGHT;LEFT

## 2022-07-20 ASSESSMENT — PAIN DESCRIPTION - FREQUENCY
FREQUENCY: CONTINUOUS

## 2022-07-20 ASSESSMENT — LIFESTYLE VARIABLES
HOW MANY STANDARD DRINKS CONTAINING ALCOHOL DO YOU HAVE ON A TYPICAL DAY: 3 OR 4
HOW OFTEN DO YOU HAVE A DRINK CONTAINING ALCOHOL: 2-3 TIMES A WEEK

## 2022-07-20 NOTE — ED PROVIDER NOTES
[address] [address] EMERGENCY DEPT    EMERGENCY MEDICINE     Pt Name: Joanna Levy  MRN: [id number]  [place] 1965  Date of evaluation: 2022  Provider: Jory Cameron MD    CHIEF COMPLAINT       Chief Complaint   Patient presents with    Abdominal Pain    Back Pain       HISTORY OF PRESENT ILLNESS    Joanna Levy is a pleasant 62 y.o. female   Presents to the emergency department from home complaining of diffuse back and abdominal pain. She reports her abdomen feels more distended. She denies any nausea or vomiting, she reports normal bowel movements. She denies any fevers or weakness. She reports mild dysuria, no hematuria. No cp/sob. No other complaints, no other known exacerbating/relieving factors. Triage notes and Nursing notes were reviewed by myself. Any discrepancies are addressed above.     PAST MEDICAL HISTORY     Past Medical History:   Diagnosis Date    Arthritis     Hyperlipidemia     Hypertension        SURGICAL HISTORY       Past Surgical History:   Procedure Laterality Date    BACK SURGERY       SECTION      x 4    COLONOSCOPY      DILATION AND CURETTAGE OF UTERUS      ENDOSCOPY, COLON, DIAGNOSTIC      FEMORAL ENDARTERECTOMY Left 3/16/2021    LEFT  FEMORAL ARTERY ENDARTERECTOMY performed by Alvaro Orellana MD at [address] [place] [address]      bone spurs removed both feet    KNEE SURGERY Left     reconstruction of knee    LUMBAR DISCECTOMY  2020    Dr. Singh April       Previous Medications    ACETAMINOPHEN (TYLENOL) 500 MG TABLET    Take 500 mg by mouth every 6 hours as needed for Pain    ALENDRONATE (FOSAMAX) 70 MG TABLET    Take 1 tablet by mouth every 7 days    AMLODIPINE (NORVASC) 10 MG TABLET    TAKE 1 TABLET BY MOUTH ONE TIME A DAY    ASPIRIN 81 MG EC TABLET    Take 81 mg by mouth daily    ATORVASTATIN (LIPITOR) 40 MG TABLET    Take 1 tablet by mouth daily    CLOPIDOGREL (PLAVIX) 75 MG TABLET    Take 1 tablet by mouth daily    FLUTICASONE (FLONASE) 50 MCG/ACT NASAL SPRAY    2 sprays by Each Nostril route daily    HYDROCODONE-ACETAMINOPHEN (NORCO) 5-325 MG PER TABLET    Take 1 tablet by mouth every 8 hours as needed for Pain for up to 14 days. LISINOPRIL (PRINIVIL;ZESTRIL) 10 MG TABLET    Take 1 tablet by mouth 2 times daily    MELOXICAM (MOBIC) 15 MG TABLET    Take 1 tablet by mouth daily    ONDANSETRON (ZOFRAN ODT) 4 MG DISINTEGRATING TABLET    Take 1 tablet by mouth every 8 hours as needed for Nausea or Vomiting    VITAMIN D (ERGOCALCIFEROL) 1.25 MG (08385 UT) CAPS CAPSULE    Take 1 capsule by mouth once a week       ALLERGIES     Pletal [cilostazol], Bactrim [sulfamethoxazole-trimethoprim], Codeine, Medrol [methylprednisolone], and Ultram [tramadol hcl]    FAMILY HISTORY       Family History   Problem Relation Age of Onset    Heart Disease Mother     Diabetes Mother     Alcohol Abuse Father     Heart Disease Brother     Diabetes Maternal Aunt     Parkinsonism Maternal Cousin     Heart Disease Brother     Coronary Art Dis Brother     Heart Disease Brother         SOCIAL HISTORY       Social History     Socioeconomic History    Marital status:      Spouse name: Brianna Henderson    Number of children: 4    Years of education: None    Highest education level: None   Tobacco Use    Smoking status: Every Day     Packs/day: 0.50     Years: 25.00     Pack years: 12.50     Types: Cigarettes    Smokeless tobacco: Never    Tobacco comments:     Down 1/4PPD since 2020   Vaping Use    Vaping Use: Former   Substance and Sexual Activity    Alcohol use:  Yes     Alcohol/week: 8.0 standard drinks     Types: 8 Cans of beer per week     Comment: 8 can beer per week    Drug use: Yes     Frequency: 2.0 times per week     Types: Marijuana [place])     Comment: Twice week    Sexual activity: Yes     Partners: Male     Social Determinants of Health     Financial Resource Strain: Low Risk     Difficulty of Paying Living Expenses: Not hard at all   Food Insecurity: No Food Insecurity    Worried About Running Out of Food in the Last Year: Never true    Ran Out of Food in the Last Year: Never true       REVIEW OF SYSTEMS     Review of Systems   Constitutional:  Negative for chills and fever. Respiratory:  Negative for cough and shortness of breath. Cardiovascular:  Negative for chest pain and leg swelling. Gastrointestinal:  Positive for abdominal pain. Negative for vomiting. Genitourinary:  Positive for dysuria and flank pain. Musculoskeletal:  Positive for back pain. Skin:  Negative for color change and rash. All other systems reviewed and are negative. Except as noted above the remainder of the review of systems was reviewed and is. PHYSICAL EXAM    (up to 7 for level 4, 8 or more for level 5)     ED Triage Vitals   BP Temp Temp Source Heart Rate Resp SpO2 Height Weight   07/19/22 2210 07/19/22 2134 07/19/22 2134 07/19/22 2134 07/19/22 2134 07/19/22 2134 07/19/22 2134 07/19/22 2134   134/71 97.6 °F (36.4 °C) Oral (!) 108 16 99 % 5' (1.524 m) 125 lb (56.7 kg)       Physical Exam  Vitals and nursing note reviewed. HENT:      Head: Normocephalic and atraumatic. Nose: Nose normal.      Mouth/Throat:      Lips: Pink. Mouth: Mucous membranes are moist.   Eyes:      General: Lids are normal.      Conjunctiva/sclera: Conjunctivae normal.   Neck:      Vascular: No JVD. Cardiovascular:      Rate and Rhythm: Normal rate and regular rhythm. Heart sounds: No murmur heard. No friction rub. No gallop. Pulmonary:      Effort: Pulmonary effort is normal.      Breath sounds: Normal breath sounds and air entry. No wheezing, rhonchi or rales. Abdominal:      General: Bowel sounds are normal.      Palpations: Abdomen is soft. Tenderness: There is generalized abdominal tenderness. There is right CVA tenderness and left CVA tenderness. There is no guarding or rebound. Musculoskeletal:      Cervical back: Neck supple.    Skin: General: Skin is warm and dry. Findings: No rash. Neurological:      Mental Status: She is alert. GCS: GCS eye subscore is 4. GCS verbal subscore is 5. GCS motor subscore is 6. Sensory: Sensation is intact. Motor: Motor function is intact. Psychiatric:         Behavior: Behavior is cooperative. DIAGNOSTIC RESULTS     EKG:(none if blank)  All EKG's are interpreted by the[facility] Phys[facility] who either signs or Co-signs this chart in the absence of a cardiologist.    [phone]: sinus tach at rate of 104, no stemi, septal infarct age undetermined    RADIOLOGY: (none if blank)   Interpretation per the Radiologistbelow, if available at the time of this note:    CT ABDOMEN PELVIS W IV CONTRAST Additional Contrast? None   Final Result   1. There are changes of severe bilateral pyelonephritis. Likely associated    cystitis. 2. Mild colonic diverticulosis without diverticulitis. This document has been electronically signed by: Bhavna Scott MD on    07/19/2022 11:29 PM      All CTs at this facility use dose modulation techniques and iterative    reconstructions, and/or weight-based dosing   when appropriate to reduce radiation to a low as reasonably achievable.           LABS:  Labs Reviewed   CBC WITH AUTO DIFFERENTIAL - Abnormal; Notable for the following components:       Result Value    WBC 17.3 (*)     RBC 3.16 (*)     Hemoglobin 10.3 (*)     Hematocrit 31.1 (*)     RDW-SD 48.7 (*)     Segs Absolute 14.7 (*)     Lymphocytes Absolute 0.8 (*)     Monocytes Absolute 1.6 (*)     Immature Grans (Abs) 0.10 (*)     All other components within normal limits   COMPREHENSIVE METABOLIC PANEL W/ REFLEX TO MG FOR LOW K - Abnormal; Notable for the following components:    Glucose 119 (*)     BUN 27 (*)     Sodium 125 (*)     Chloride 94 (*)     CO2 16 (*)     Calcium 7.9 (*)     All other components within normal limits   GLOMERULAR FILTRATION RATE, ESTIMATED - Abnormal; Notable for the following components:    Est, Glom Filt Rate 51 (*)     All other components within normal limits   OSMOLALITY - Abnormal; Notable for the following components:    Osmolality Calc 257.8 (*)     All other components within normal limits   URINE WITH REFLEXED MICRO - Abnormal; Notable for the following components:    Ketones, Urine TRACE (*)     Blood, Urine MODERATE (*)     Protein,  (*)     Leukocyte Esterase, Urine SMALL (*)     Character, Urine CLOUDY (*)     All other components within normal limits   CULTURE, REFLEXED, URINE   CULTURE, BLOOD 1   CULTURE, BLOOD 2   LIPASE   TROPONIN   LACTATE, SEPSIS   ANION GAP       All other labs were within normal range or not returned as of this dictation. Please note, any cultures that may have been sent were not resulted at the time of this patient visit. EMERGENCY DEPARTMENT COURSE andMedical Decision Making:     MDM  /   Pt presents to the ER with back and abd pain, mildly tachycardic, bp borderline low, +leukocytosis, ct showing b/l pyelonephritis, also found to have hyponatremia, hypocalcemia, no evidence of severe sepsis or shock, but will admit, continue to monitor, hydrate, iv abx.         Strict returnprecautions and follow up instructions were discussed with the patient with which the patient agrees      ED Medications administered this visit:    Medications   cefTRIAXone (ROCEPHIN) 1000 mg IVPB in 50 mL D5W minibag (has no administration in time range)   0.9 % sodium chloride infusion (has no administration in time range)   calcium gluconate 1,000 mg in dextrose 5 % 100 mL IVPB (has no administration in time range)   0.9 % sodium chloride bolus (0 mLs IntraVENous Stopped 7/19/22 2323)   morphine injection 4 mg (4 mg IntraVENous Given 7/19/22 2207)   ondansetron (ZOFRAN) injection 4 mg (4 mg IntraVENous Given 7/19/22 2208)   iopamidol (ISOVUE-370) 76 % injection 80 mL (80 mLs IntraVENous Given 7/19/22 2246)   HYDROmorphone (DILAUDID) injection 0.5 mg (0.5 mg IntraVENous Given 7/19/22 8207)         Procedures: (None if blank)         CLINICAL IMPRESSION     1. Acute pyelonephritis    2. Leukocytosis, unspecified type    3. Hyponatremia    4. Hypocalcemia          DISPOSITION/PLAN   DISPOSITION Decision To Admit 07/20/2022 01:02:06 AM      PATIENT REFERRED TO:  No follow-up provider specified.     DISCHARGE MEDICATIONS:  New Prescriptions    No medications on file           (Please note that portions of this note were completed with a voice recognition program.  Efforts were made to edit the dictations but occasionallywords are mis-transcribed.)      Patrica Syed MD,FACEP (electronically signed)  Attending Physician, Emergency Department         Lamont Mccollum MD  07/20/22 4144

## 2022-07-20 NOTE — PLAN OF CARE
Progressing  Flowsheets (Taken 7/20/2022 0745)  Maintains or returns to baseline bowel function:   Assess bowel function   Encourage oral fluids to ensure adequate hydration   Administer IV fluids as ordered to ensure adequate hydration   Administer ordered medications as needed     Problem: Gastrointestinal - Adult  Goal: Maintains adequate nutritional intake  7/20/2022 1042 by Louis Dennis RN  Outcome: Progressing  Flowsheets (Taken 7/20/2022 0745)  Maintains adequate nutritional intake:   Monitor percentage of each meal consumed   Assist with meals as needed   Monitor intake and output, weight and lab values     Problem: Skin/Tissue Integrity - Adult  Goal: Skin integrity remains intact  Outcome: Progressing  Flowsheets (Taken 7/20/2022 0745)  Skin Integrity Remains Intact: Monitor for areas of redness and/or skin breakdown     Problem: Skin/Tissue Integrity - Adult  Goal: Oral mucous membranes remain intact  Outcome: Progressing  Flowsheets (Taken 7/20/2022 0745)  Oral Mucous Membranes Remain Intact: Assess oral mucosa and hygiene practices     Problem: Musculoskeletal - Adult  Goal: Return mobility to safest level of function  Outcome: Progressing  Flowsheets (Taken 7/20/2022 0745)  Return Mobility to Safest Level of Function:   Assess patient stability and activity tolerance for standing, transferring and ambulating with or without assistive devices   Assist with transfers and ambulation using safe patient handling equipment as needed     Problem: Musculoskeletal - Adult  Goal: Return ADL status to a safe level of function  Outcome: Progressing  Flowsheets (Taken 7/20/2022 0745)  Return ADL Status to a Safe Level of Function:   Administer medication as ordered   Assess activities of daily living deficits and provide assistive devices as needed     Problem: Genitourinary - Adult  Goal: Absence of urinary retention  Outcome: Progressing  Flowsheets (Taken 7/20/2022 0745)  Absence of urinary retention:   Assess patients ability to void and empty bladder   Monitor intake/output and perform bladder scan as needed     Problem: Discharge Planning  Goal: Discharge to home or other facility with appropriate resources  7/20/2022 1042 by Won Rodriguez RN  Outcome: Progressing  Flowsheets (Taken 7/20/2022 0709)  Discharge to home or other facility with appropriate resources: Identify barriers to discharge with patient and caregiver     Care plan reviewed with patient. Patient verbalizes understanding of the plan of care and contributes to goal setting.

## 2022-07-20 NOTE — ED TRIAGE NOTES
Patient presents to the ED with complaints of abdominal pain and back pain that has been ongoing for the last three days. Patient reports some recent abdominal distension as well and mild weight gain, Patient reports the pain is her general abdomen and radiates around her sides into her back. Patient rates the pain as a 9/10 in severity. Patient reports a history of back surgery approximately one year ago.

## 2022-07-20 NOTE — CARE COORDINATION
7/20/22, 8:24 AM EDT  DISCHARGE PLANNING EVALUATION:    Ree Ricardo       Admitted: 7/19/2022/ 2127   Hospital day: 0   Location: -01/001-A Reason for admit: Hypocalcemia [E83.51]  Hyponatremia [E87.1]  Acute pyelonephritis [N10]  Acute cystitis without hematuria [N30.00]  Leukocytosis, unspecified type [D72.829]   PMH:  has a past medical history of Acute cystitis without hematuria, Acute pyelonephritis, Arthritis, Hyperlipidemia, and Hypertension. Procedure: N/A  Barriers to Discharge:  Patient presents with abdominal pain and back pain. Na 125, Magnesium 2.5, I Mitch 0.91, WBC 17.3, blood cultures pending. IV fluids, Calcium Gluconate x 1 dose, IV Cipro, Lovenox, prn pain medications and Zofran, Lactic Acid and TSH in a.m., up with assistance. PCP: SHERYL Schmidt CNP  Readmission Risk Score: 12.9%  Patient's Healthcare Decision Maker: Patient Declined (Legal Next of Kin Remains as Decision Maker)    Patient Goals/Plan/Treatment Preferences: Met with Pam Chahal. She resides at home with her  and son. Pam Chahal verifies her insurance and PCP. She is able to afford her medications and does not have a need for DME. She will have transportation to home at discharge and denies a need for services. Transportation/Food Security/Housekeeping Addressed:  No issues identified.

## 2022-07-20 NOTE — PLAN OF CARE
Problem: Pain  Goal: Verbalizes/displays adequate comfort level or baseline comfort level  7/20/2022 0526 by Stephanie Young RN  Outcome: Progressing  Note: Patient's pain relieved by PRN pain meds. Rated 8/10; verbalized satisfaction with the interventions implemented. Repositioning and rest provided. Problem: Safety - Adult  Goal: Free from fall injury  7/20/2022 0526 by Stephanie Young RN  Outcome: Progressing  Note: Patient's safety maintained. Call light and possessions within reach, bed alarm on and placed in low position, side rails upx2, non skid socks on, up with assistance, calls out appropriately, hourly rounding. Problem: Metabolic/Fluid and Electrolytes - Adult  Goal: Electrolytes maintained within normal limits  7/20/2022 0554 by Stephanie Young RN  Outcome: Progressing  Flowsheets (Taken 7/20/2022 0520)  Electrolytes maintained within normal limits:   Monitor labs and assess patient for signs and symptoms of electrolyte imbalances   Administer electrolyte replacement as ordered  Note: Patient's electrolytes monitored and replaced as ordered. Problem: Gastrointestinal - Adult  Goal: Minimal or absence of nausea and vomiting  7/20/2022 0554 by Stephanie Young RN  Outcome: Progressing  Note: Patient's nausea relieved; receiving IV fluids for hydration. Goal: Maintains or returns to baseline bowel function  7/20/2022 0554 by Stephanie Young RN  Outcome: Progressing  Note: Patient's bowel sounds hypoactive, receiving IV fluid for hydration, up with assistance. Goal: Maintains adequate nutritional intake  7/20/2022 0554 by Stephanie Young RN  Outcome: Progressing  Note: Patient on regular diet. Problem: Discharge Planning  Goal: Discharge to home or other facility with appropriate resources  7/20/2022 0554 by Stephanie Young RN  Outcome: Progressing  Note: Patient to return home with . Social work consulted for discharge planning. Care plan reviewed with patient and family.

## 2022-07-20 NOTE — FLOWSHEET NOTE
07/20/22 0935   Encounter Summary   Encounter Overview/Reason  Initial Encounter;Spiritual/Emotional Needs   Service Provided For: Patient and family together   Referral/Consult From: Nataliya   Last Encounter  07/20/22  (ACP)   Complexity of Encounter Moderate   Begin Time 0915   End Time  0935   Total Time Calculated 20 min   Encounter    Type Initial Screen/Assessment   Spiritual/Emotional needs   Type Spiritual Support   Assessment/Intervention/Outcome   Assessment Calm   Intervention Nurtured Hope;Prayer (assurance of)/[place]   Outcome Comfort   Advance Care Planning     Advance Care Planning Inpatient Note  Spiritual Care Department    Today's Date: 7/20/2022  Unit: [facility]    Received request from IDT Member. Upon review of chart and communication with care team, patient's decision making abilities are not in question. . Patient and Child/Children was/were present in the room during visit. Goals of ACP Conversation:  ACP [facility] Decision Makers:    Primary: Randylizz Fidel [phone]. Summary:  The  had a ACP conversation with the patient and her daughter had questions. Assessment:  The pt inquired about a Advance Directive. Interventions:  Encouraged ongoing ACP conversation with future decision makers and loved ones      Outcomes/Plan:  ACP Discussion: The pt will complete after review.      Electronically signed by Yousuf [facility] on 7/20/2022 at 11:21 AM

## 2022-07-20 NOTE — H&P
Patient: Rika Barajas    Unit/Bed: 5K-01/001-A    YOB: 1965    MRN: [id number]    Acct: [de-identified]     Admitting Diagnosis: Hypocalcemia [E83.51]  Hyponatremia [E87.1]  Acute pyelonephritis [N10]  Acute cystitis without hematuria [N30.00]  Leukocytosis, unspecified type [D72.829]    Admit Date:  7/19/2022    CC: Back and lower abdominal pains x4 days    HPI :  22-year-old female patient presented to the ED today with pain 4-day history of right back pain radiating   to lower abdomen more than 10/10 intensity. She has no history of renal calculi. Pain is not associated with nausea ,vomiting ,fever or chills. Denies frequency of menstruation or dysuria. UA is positive for UTI. Patient has chronic back pain at baseline with prior surgery. Initial vital signs in the ED temperature 36.4 °C, respirate of 16, heart rate 108 beats minute, blood pressure 1 3471, oxygen saturation 99% on room air. Initial labs in the ED-serum sodium 125, potassium 4.4, chloride 94, CO2 16, BUN 27, creatinine 1.1, anion gap 15.0, blood sugar 119, calcium 7.9, osmolality 257.8, total protein 7.3, troponin first set less than 0.010, albumin 3.1, Alkaline Phosphatase 104, ALT 24, AST 20, T Bili 0.3. WBC 17.3, hemoglobin 10.3, MCV 98.4, platelets 295. UA is positive for UTI. A twelve-lead EKG which reviewed shows sinus tachycardia 104 bpm.  QTC of 480 ms. Q waves in V1 and V2. No ST segment elevation or depression. ED treatment included IV Dilaudid and morphine. IV Rocephin for UTI. IV Zofran and normal saline infusion. Review of Systems   HENT: Negative. Eyes: Negative. Respiratory: Negative. Cardiovascular: Negative. Gastrointestinal:  Positive for abdominal pain. Endocrine: Negative. Genitourinary: Negative. Musculoskeletal: Negative. Skin: Negative. Allergic/Immunologic: Negative. Neurological: Negative. Hematological: Negative. Psychiatric/Behavioral: Negative. All other systems reviewed and are negative. Past Medical History:        Diagnosis Date    Acute cystitis without hematuria 2022    Acute pyelonephritis 2022    Arthritis     Hyperlipidemia     Hypertension        Past Surgical History:        Procedure Laterality Date    BACK SURGERY       SECTION      x 4    COLONOSCOPY      DILATION AND CURETTAGE OF UTERUS      ENDOSCOPY, COLON, DIAGNOSTIC      FEMORAL ENDARTERECTOMY Left 3/16/2021    LEFT  FEMORAL ARTERY ENDARTERECTOMY performed by Dinora Lundberg MD at [address] [facility]      bone spurs removed both feet    KNEE SURGERY Left     reconstruction of knee    LUMBAR DISCECTOMY  2020    Dr. Suarez Rape         Medications Prior to Admission:    Prior to Admission medications    Medication Sig Start Date End Date Taking? Authorizing Provider   HYDROcodone-acetaminophen (NORCO) 5-325 MG per tablet Take 1 tablet by mouth every 8 hours as needed for Pain for up to 14 days.  22  SHERYL Devi CNP   meloxicam (MOBIC) 15 MG tablet Take 1 tablet by mouth daily 22   SHERYL Devi CNP   alendronate (FOSAMAX) 70 MG tablet Take 1 tablet by mouth every 7 days 22   SHERYL Frye CNP   fluticasone [facility]) 50 MCG/ACT nasal spray 2 sprays by Each Nostril route daily 3/31/22   SHERYL Devi CNP   atorvastatin (LIPITOR) 40 MG tablet Take 1 tablet by mouth daily 3/16/22   SHERYL Devi CNP   vitamin D (ERGOCALCIFEROL) 1.25 MG (00162 UT) CAPS capsule Take 1 capsule by mouth once a week 3/9/22   SHERYL Frye CNP   clopidogrel (PLAVIX) 75 MG tablet Take 1 tablet by mouth daily 22   SHERYL Devi CNP   amLODIPine (NORVASC) 10 MG tablet TAKE 1 TABLET BY MOUTH ONE TIME A DAY 21   SHERYL Devi CNP   lisinopril (PRINIVIL;ZESTRIL) 10 MG tablet Take 1 tablet by mouth 2 times daily 21   Salena Sanchez ANNEMARIE SmithN - CNP   ondansetron (ZOFRAN ODT) 4 MG disintegrating tablet Take 1 tablet by mouth every 8 hours as needed for Nausea or Vomiting  Patient not taking: Reported on 7/20/2022 2/15/21   Jair Phillips PA-C   acetaminophen (TYLENOL) 500 MG tablet Take 500 mg by mouth every 6 hours as needed for Pain    Historical Provider, MD   aspirin 81 MG EC tablet Take 81 mg by mouth daily    Historical Provider, MD       Allergies:    Pletal [cilostazol], Bactrim [sulfamethoxazole-trimethoprim], Codeine, Medrol [methylprednisolone], and Ultram [tramadol hcl]    Social History:    TOBACCO:   reports that she has been smoking cigarettes. She has a 12.50 pack-year smoking history. She has never used smokeless tobacco.    ETOH:   reports current alcohol use of about 8.0 standard drinks per week. Family History:        Problem Relation Age of Onset    Heart Disease Mother     Diabetes Mother     Alcohol Abuse Father     Heart Disease Brother     Diabetes Maternal Aunt     Parkinsonism Maternal Cousin     Heart Disease Brother     Coronary Art Dis Brother     Heart Disease Brother        Objective:   BP (!) 109/58   Pulse 93   Temp 98.7 °F (37.1 °C) (Oral)   Resp 18   Ht 5' (1.524 m)   Wt 128 lb 14.4 oz (58.5 kg)   SpO2 97%   BMI 25.17 kg/m²     Intake/Output Summary (Last 24 hours) at 7/20/2022 0736  Last data filed at 7/20/2022 0513  Gross per 24 hour   Intake 245.15 ml   Output 300 ml   Net -54.85 ml       Physical Exam  Vitals and nursing note reviewed. Constitutional:       General: She is not in acute distress. Appearance: Normal appearance. She is normal weight. She is not ill-appearing, toxic-appearing or diaphoretic. HENT:      Head: Normocephalic and atraumatic. Right Ear: External ear normal.      Left Ear: External ear normal.      Nose: Nose normal. No congestion or rhinorrhea. Mouth/Throat:      Mouth: Mucous membranes are moist.      Pharynx: Oropharynx is clear.  No oropharyngeal exudate or posterior oropharyngeal erythema. Eyes:      General: No scleral icterus. Right eye: No discharge. Left eye: No discharge. Extraocular Movements: Extraocular movements intact. Conjunctiva/sclera: Conjunctivae normal.      Pupils: Pupils are equal, round, and reactive to light. Neck:      Vascular: Carotid bruit present. Cardiovascular:      Rate and Rhythm: Regular rhythm. Tachycardia present. Pulses: Normal pulses. Heart sounds: No murmur heard. No friction rub. No gallop. Pulmonary:      Effort: Pulmonary effort is normal. No respiratory distress. Breath sounds: Normal breath sounds. No stridor. No wheezing, rhonchi or rales. Chest:      Chest wall: No tenderness. Abdominal:      General: Bowel sounds are normal. There is no distension. Palpations: Abdomen is soft. There is no mass. Tenderness: There is abdominal tenderness. There is right CVA tenderness. There is no guarding or rebound. Hernia: No hernia is present. Comments: Suprapubic tenderness. Musculoskeletal:         General: No swelling, tenderness, deformity or signs of injury. Normal range of motion. Cervical back: Normal range of motion and neck supple. No rigidity or tenderness. Right lower leg: No edema. Left lower leg: No edema. Lymphadenopathy:      Cervical: No cervical adenopathy. Skin:     General: Skin is warm. Coloration: Skin is not jaundiced or pale. Findings: No bruising, erythema, lesion or rash. Neurological:      General: No focal deficit present. Mental Status: She is alert and oriented to person, place, and time. Mental status is at baseline. Cranial Nerves: No cranial nerve deficit. Sensory: No sensory deficit. Motor: No weakness.       Coordination: Coordination normal.      Gait: Gait normal.      Deep Tendon Reflexes: Reflexes normal.   Psychiatric:         Mood and Affect: Mood normal. Behavior: Behavior normal.         Thought Content: Thought content normal.         Judgment: Judgment normal.     Medications:    sodium chloride flush  10 mL IntraVENous 2 times per day    enoxaparin  40 mg SubCUTAneous Daily    amLODIPine  10 mg Oral Daily    aspirin  81 mg Oral Daily    atorvastatin  40 mg Oral Daily    clopidogrel  75 mg Oral Daily    ciprofloxacin  400 mg IntraVENous Q12H    GI cocktail   Oral 4x Daily    calcium gluconate IVPB  1,000 mg IntraVENous Once       Continuous Infusions:   sodium chloride 125 mL/hr at 07/20/22 0314    sodium chloride      sodium chloride         PRN Meds:    Data:    CBC:   Recent Labs     07/19/22 2154   WBC 17.3*   RBC 3.16*   HGB 10.3*   HCT 31.1*   MCV 98.4          BMP:   Recent Labs     07/19/22 2154   *   K 4.4   CL 94*   CO2 16*   PHOS 3.3   BUN 27*   CREATININE 1.1       PT/INR: No results for input(s): PROTIME, INR in the last 72 hours. LIVER PROFILE:   Recent Labs     07/19/22 2154   AST 20   ALT 24   BILITOT 0.3   ALKPHOS 104       Latest Reference Range & Units 7/20/22 03:31   Calcium, Ion 1.12 - 1.32 mmol/L 0.91 (L)     ABG. None. URINALYSIS. Latest Reference Range & Units 7/19/22 22:10   Color, UA STRAW-YELLOW  YELLOW   Glucose, UA NEGATIVE mg/dl NEGATIVE   Bilirubin, Urine NEGATIVE  NEGATIVE   Ketones, Urine NEGATIVE  TRACE ! Blood, Urine NEGATIVE  MODERATE !   pH, UA 5.0 - 9.0  5.5   Protein, UA NEGATIVE  100 ! Urobilinogen, Urine 0.0 - 1.0 eu/dl 1.0   Nitrite, Urine NEGATIVE  NEGATIVE   Leukocyte Esterase, Urine NEGATIVE  SMALL ! Casts UA NONE SEEN /lpf 8-15 HYALINE   CASTS 2 NONE SEEN /lpf 8-15 C. GRAN   WBC, UA 0-4/hpf /hpf 50-75   RBC, UA 0-2/hpf /hpf 5-10   Epithelial Cells, UA 3-5/hpf /hpf 0-2   Renal Epithelial, UA NONE SEEN  NONE SEEN   Bacteria, UA FEW/NONE SEEN /hpf FEW   Yeast, Urine NONE SEEN  NONE SEEN   Crystals, UA NONE SEEN   NONE SEEN  NONE SEEN  NONE SEEN   Character, Urine CLEAR-SL CLOUD  CLOUDY ! Specific Gravity, Urine 1.002 - 1.030  1.017     SEROLOGY   9/3/20 11:05   Hepatitis C Ab Negative     TUMOR MARKERS. None. MICROBIOLOGY   None. HISTOPATHOLOGY. None. TOXICOLOGY. None. ENDOSCOPE STUDIES. None. SURGICAL PROCEDURES. None. CARDIAC PROCEDURES. Diagnostic Cath Lab report -3/8/2021  SUMMARY:   1. Total occlusion of the left common femoral artery with bridging collaterals to the        superficial femoral artery below. 2.  The vessels in the lower extremity were extremely small in caliber,might be due to        chronic smoking. RECOMMENDATIONS:   1.  I discussed the case and reviewed the films with Vascular Surgery. 2.  Vascular Surgery to discuss and schedule endarterectomy of the left       common femoral artery. 3.  Monitor groin access site closely for bleeding. Kenroy Telles MD.    IR PROCEDURES. Aortofemoral angiogram with runoff-2/21/2022. FINDINGS:  AORTA:   The aorta is unremarkable. The renal arteries are widely patent. PELVIC VESSELS:   Some calcified plaque is noted in the right common iliac artery. Both internal and external iliac arteries are widely patent. Angiogram right leg:  CFA AND PROFUNDA:   The common and deep femoral arteries are widely patent. SFA AND POPLITEAL ARTERY:   The superficial femoral artery is widely patent. There is greater than 50% stenosis in the popliteal artery. TRIFURCATION VESSELS:   All 3 trifurcation vessels are widely patent. This point it was decided to perform an intervention. An exchange length 0.035 inch stiff Glidewire was advanced into the right lower extremity. A long 6 Mongolian ANL sheath was advanced over the aortic bifurcation. Angioplasty was performed in the popliteal artery. Post angioplasty images demonstrate   significantly improved flow through the distal right thigh.      Given the patient's small arteries and close proximity of access to the patient's existing left   femoral popliteal bypass graft, manual pressure was utilized for hemostasis. The patient tolerated the procedure well with no immediate postprocedural complication. IMPRESSION:  Multifocal stenosis of greater than 50% stenosis in the right popliteal artery successfully   treated with angioplasty with an excellent result. RADIOLOGY. CT abdomen pelvis with IV contrast 7/19/2022. FINDINGS:  No consolidation or effusion. The bilateral kidneys are enlarged and heterogeneous, left greater than right. There is severe uroepithelial thickening of the bilateral renal collecting systems. There is infiltration of fat adjacent to the bilateral kidneys and bilateral ureters. No calculus or hydronephrosis. There is a focus of calcification associated with the lateral cortex of the spleen. The liver and pancreas are unremarkable. There is a 1 cm left adrenal nodule, compatible with an adenoma, without change. The right adrenal gland is unremarkable. No calcified gallstones or bile duct dilatation. No bowel obstruction, pneumoperitoneum, or pneumatosis. Generalized thickening of the bladder wall. Unremarkable uterus and adnexa. Normal appendix. The bones are intact. IMPRESSION:  1. There are changes of severe bilateral pyelonephritis. Likely associated cystitis. 2. Mild colonic diverticulosis without diverticulitis. This document has been electronically signed by: Tomasa Covington MD on       07/19/2022 11:29 PM.    Noel Bolton. SUMMARY:   Normal left ventricle size and systolic function. Ejection fraction was estimated at 55-60%. Mild to moderate aortic regurgitation is noted. Mild mitral regurgitation is present. Mild tricuspid regurgitation. IVC size is within normal limits with normal respiratory phasic changes. ASSESSMENT AND  PLAN. 1.CARDIOVASCULAR.      Hypertension-on amlodipine and lisinopril. Dyslipidemia-on atorvastatin. PAD w/ left common femoral artery endarterectomy and right popliteal artery angioplasty -      on aspirin and Plavix. Carotid atherosclerotic disease with left carotid bruit. 2.RENAL AND ELECTROLYTES. Acute hypo-osmolar hyponatremia - IVF w/ NS. Serum sodium 125, osmolality 257.8. Acute hypocalcemia serum calcium 7.9 - serum ionized calcium 0.91. Calcium gluconate. 3.ID.     B/l acute pyelonephritis and cystitis - continue IV Cipro pending cultures. 4. ENDO.     TSH and A1c check. 5.GI. Uncomplicated diverticulosis without diverticulitis. 6.MUSCULOSKELETAL. Acute on chronic back pain is prior history of surgery    7. LIFE STYLE. Chronic tobacco use disorder. 8.HEM-ONC. Normocytic anemia-Hgb 10.3 and 98.4. C15, folic acid and serum iron check. 9.UROLOGY. T/c consider CT abdomen pelvis without contrast to rule out right renal calculi. 10.DISPO. Planned d/c to home vs rehab soon.       Electronically signed by Phill Chen MD on 7/20/2022 at 7:36 AM

## 2022-07-20 NOTE — PLAN OF CARE
Pt was admitted early this AM (7/20) due to back pain and abdominal pain. Pt was found to have pyelonephritis. Pt started on IV ABX. Urine & blood cultures pending. Pt resting in bed with no issues during evaluation.      Electronically signed by RYLAND Cheek on 7/20/2022 at 12:56 PM

## 2022-07-21 LAB
ANION GAP SERPL CALCULATED.3IONS-SCNC: 12 MEQ/L (ref 8–16)
BUN BLDV-MCNC: 9 MG/DL (ref 7–22)
CALCIUM IONIZED: 0.98 MMOL/L (ref 1.12–1.32)
CALCIUM SERPL-MCNC: 7.5 MG/DL (ref 8.5–10.5)
CHLORIDE BLD-SCNC: 105 MEQ/L (ref 98–111)
CO2: 17 MEQ/L (ref 23–33)
CREAT SERPL-MCNC: 0.5 MG/DL (ref 0.4–1.2)
ERYTHROCYTE [DISTWIDTH] IN BLOOD BY AUTOMATED COUNT: 14 % (ref 11.5–14.5)
ERYTHROCYTE [DISTWIDTH] IN BLOOD BY AUTOMATED COUNT: 52.9 FL (ref 35–45)
GFR SERPL CREATININE-BSD FRML MDRD: > 90 ML/MIN/1.73M2
GLUCOSE BLD-MCNC: 96 MG/DL (ref 70–108)
HCT VFR BLD CALC: 29 % (ref 37–47)
HEMOGLOBIN: 9.1 GM/DL (ref 12–16)
LACTIC ACID: 0.8 MMOL/L (ref 0.5–2)
MCH RBC QN AUTO: 32.3 PG (ref 26–33)
MCHC RBC AUTO-ENTMCNC: 31.4 GM/DL (ref 32.2–35.5)
MCV RBC AUTO: 102.8 FL (ref 81–99)
ORGANISM: ABNORMAL
PLATELET # BLD: 255 THOU/MM3 (ref 130–400)
PMV BLD AUTO: 9.8 FL (ref 9.4–12.4)
POTASSIUM REFLEX MAGNESIUM: 4.3 MEQ/L (ref 3.5–5.2)
RBC # BLD: 2.82 MILL/MM3 (ref 4.2–5.4)
SODIUM BLD-SCNC: 134 MEQ/L (ref 135–145)
TSH SERPL DL<=0.05 MIU/L-ACNC: 1.74 UIU/ML (ref 0.4–4.2)
URINE CULTURE REFLEX: ABNORMAL
WBC # BLD: 11 THOU/MM3 (ref 4.8–10.8)

## 2022-07-21 PROCEDURE — 6370000000 HC RX 637 (ALT 250 FOR IP): Performed by: INTERNAL MEDICINE

## 2022-07-21 PROCEDURE — 85027 COMPLETE CBC AUTOMATED: CPT

## 2022-07-21 PROCEDURE — 6360000002 HC RX W HCPCS: Performed by: INTERNAL MEDICINE

## 2022-07-21 PROCEDURE — 2580000003 HC RX 258: Performed by: INTERNAL MEDICINE

## 2022-07-21 PROCEDURE — 99232 SBSQ HOSP IP/OBS MODERATE 35: CPT | Performed by: PHYSICIAN ASSISTANT

## 2022-07-21 PROCEDURE — 36415 COLL VENOUS BLD VENIPUNCTURE: CPT

## 2022-07-21 PROCEDURE — 6370000000 HC RX 637 (ALT 250 FOR IP): Performed by: PHYSICIAN ASSISTANT

## 2022-07-21 PROCEDURE — 1200000000 HC SEMI PRIVATE

## 2022-07-21 PROCEDURE — 80048 BASIC METABOLIC PNL TOTAL CA: CPT

## 2022-07-21 PROCEDURE — 84443 ASSAY THYROID STIM HORMONE: CPT

## 2022-07-21 PROCEDURE — 83605 ASSAY OF LACTIC ACID: CPT

## 2022-07-21 PROCEDURE — 82330 ASSAY OF CALCIUM: CPT

## 2022-07-21 RX ADMIN — LIDOCAINE HYDROCHLORIDE: 20 SOLUTION ORAL; TOPICAL at 18:09

## 2022-07-21 RX ADMIN — LIDOCAINE HYDROCHLORIDE: 20 SOLUTION ORAL; TOPICAL at 20:03

## 2022-07-21 RX ADMIN — AMLODIPINE BESYLATE 10 MG: 10 TABLET ORAL at 10:02

## 2022-07-21 RX ADMIN — HYDROMORPHONE HYDROCHLORIDE 0.5 MG: 1 INJECTION, SOLUTION INTRAMUSCULAR; INTRAVENOUS; SUBCUTANEOUS at 11:46

## 2022-07-21 RX ADMIN — SODIUM CHLORIDE: 9 INJECTION, SOLUTION INTRAVENOUS at 16:50

## 2022-07-21 RX ADMIN — LIDOCAINE HYDROCHLORIDE: 20 SOLUTION ORAL; TOPICAL at 14:18

## 2022-07-21 RX ADMIN — HYDROCODONE BITARTRATE AND ACETAMINOPHEN 1 TABLET: 5; 325 TABLET ORAL at 06:01

## 2022-07-21 RX ADMIN — ENOXAPARIN SODIUM 40 MG: 100 INJECTION SUBCUTANEOUS at 08:09

## 2022-07-21 RX ADMIN — HYDROCODONE BITARTRATE AND ACETAMINOPHEN 1 TABLET: 5; 325 TABLET ORAL at 18:42

## 2022-07-21 RX ADMIN — SODIUM CHLORIDE: 9 INJECTION, SOLUTION INTRAVENOUS at 06:07

## 2022-07-21 RX ADMIN — CLOPIDOGREL BISULFATE 75 MG: 75 TABLET ORAL at 08:10

## 2022-07-21 RX ADMIN — HYDROCODONE BITARTRATE AND ACETAMINOPHEN 1 TABLET: 5; 325 TABLET ORAL at 22:50

## 2022-07-21 RX ADMIN — HYDROCODONE BITARTRATE AND ACETAMINOPHEN 1 TABLET: 5; 325 TABLET ORAL at 10:02

## 2022-07-21 RX ADMIN — LIDOCAINE HYDROCHLORIDE: 20 SOLUTION ORAL; TOPICAL at 08:09

## 2022-07-21 RX ADMIN — HYDROMORPHONE HYDROCHLORIDE 0.5 MG: 1 INJECTION, SOLUTION INTRAMUSCULAR; INTRAVENOUS; SUBCUTANEOUS at 02:45

## 2022-07-21 RX ADMIN — HYDROMORPHONE HYDROCHLORIDE 0.5 MG: 1 INJECTION, SOLUTION INTRAMUSCULAR; INTRAVENOUS; SUBCUTANEOUS at 16:46

## 2022-07-21 RX ADMIN — ASPIRIN 81 MG: 81 TABLET, COATED ORAL at 08:10

## 2022-07-21 RX ADMIN — LISINOPRIL 10 MG: 10 TABLET ORAL at 10:02

## 2022-07-21 RX ADMIN — HYDROMORPHONE HYDROCHLORIDE 0.5 MG: 1 INJECTION, SOLUTION INTRAMUSCULAR; INTRAVENOUS; SUBCUTANEOUS at 23:59

## 2022-07-21 RX ADMIN — LISINOPRIL 10 MG: 10 TABLET ORAL at 20:03

## 2022-07-21 RX ADMIN — CIPROFLOXACIN 400 MG: 2 INJECTION, SOLUTION INTRAVENOUS at 08:13

## 2022-07-21 RX ADMIN — HYDROMORPHONE HYDROCHLORIDE 0.5 MG: 1 INJECTION, SOLUTION INTRAMUSCULAR; INTRAVENOUS; SUBCUTANEOUS at 06:58

## 2022-07-21 RX ADMIN — ATORVASTATIN CALCIUM 40 MG: 40 TABLET, FILM COATED ORAL at 20:03

## 2022-07-21 RX ADMIN — HYDROCODONE BITARTRATE AND ACETAMINOPHEN 1 TABLET: 5; 325 TABLET ORAL at 14:25

## 2022-07-21 RX ADMIN — HYDROCODONE BITARTRATE AND ACETAMINOPHEN 1 TABLET: 5; 325 TABLET ORAL at 01:37

## 2022-07-21 RX ADMIN — HYDROMORPHONE HYDROCHLORIDE 0.5 MG: 1 INJECTION, SOLUTION INTRAMUSCULAR; INTRAVENOUS; SUBCUTANEOUS at 20:01

## 2022-07-21 RX ADMIN — CIPROFLOXACIN 400 MG: 2 INJECTION, SOLUTION INTRAVENOUS at 23:59

## 2022-07-21 ASSESSMENT — PAIN - FUNCTIONAL ASSESSMENT
PAIN_FUNCTIONAL_ASSESSMENT: PREVENTS OR INTERFERES SOME ACTIVE ACTIVITIES AND ADLS

## 2022-07-21 ASSESSMENT — PAIN SCALES - GENERAL
PAINLEVEL_OUTOF10: 2
PAINLEVEL_OUTOF10: 7
PAINLEVEL_OUTOF10: 5
PAINLEVEL_OUTOF10: 3
PAINLEVEL_OUTOF10: 4
PAINLEVEL_OUTOF10: 6
PAINLEVEL_OUTOF10: 2
PAINLEVEL_OUTOF10: 3
PAINLEVEL_OUTOF10: 7
PAINLEVEL_OUTOF10: 4
PAINLEVEL_OUTOF10: 10
PAINLEVEL_OUTOF10: 7
PAINLEVEL_OUTOF10: 6
PAINLEVEL_OUTOF10: 2
PAINLEVEL_OUTOF10: 7
PAINLEVEL_OUTOF10: 7
PAINLEVEL_OUTOF10: 5

## 2022-07-21 ASSESSMENT — PAIN DESCRIPTION - DIRECTION
RADIATING_TOWARDS: FLANK

## 2022-07-21 ASSESSMENT — PAIN DESCRIPTION - PAIN TYPE
TYPE: ACUTE PAIN

## 2022-07-21 ASSESSMENT — PAIN DESCRIPTION - ORIENTATION
ORIENTATION: LOWER;RIGHT;LEFT
ORIENTATION: POSTERIOR;LOWER
ORIENTATION: RIGHT;LEFT
ORIENTATION: RIGHT;LEFT
ORIENTATION: POSTERIOR;LOWER
ORIENTATION: RIGHT;LOWER
ORIENTATION: RIGHT;LEFT
ORIENTATION: LOWER
ORIENTATION: RIGHT;LEFT
ORIENTATION: POSTERIOR;LOWER
ORIENTATION: RIGHT;LEFT
ORIENTATION: RIGHT;LEFT

## 2022-07-21 ASSESSMENT — PAIN DESCRIPTION - DESCRIPTORS
DESCRIPTORS: ACHING;DISCOMFORT
DESCRIPTORS: SHARP;ACHING
DESCRIPTORS: ACHING;DISCOMFORT
DESCRIPTORS: SHARP;STABBING
DESCRIPTORS: SHARP;ACHING
DESCRIPTORS: ACHING;DISCOMFORT
DESCRIPTORS: SHARP;ACHING
DESCRIPTORS: SHARP;STABBING
DESCRIPTORS: ACHING;DISCOMFORT
DESCRIPTORS: SHARP;STABBING
DESCRIPTORS: ACHING;DISCOMFORT
DESCRIPTORS: SHARP;STABBING

## 2022-07-21 ASSESSMENT — PAIN DESCRIPTION - LOCATION
LOCATION: BACK
LOCATION: FLANK;BACK
LOCATION: FLANK;BACK
LOCATION: BACK
LOCATION: GENERALIZED
LOCATION: BACK
LOCATION: FLANK;BACK

## 2022-07-21 ASSESSMENT — PAIN DESCRIPTION - ONSET
ONSET: ON-GOING

## 2022-07-21 ASSESSMENT — PAIN DESCRIPTION - FREQUENCY
FREQUENCY: CONTINUOUS

## 2022-07-21 NOTE — PLAN OF CARE
Problem: Pain  Goal: Verbalizes/displays adequate comfort level or baseline comfort level  Recent Flowsheet Documentation  Taken 7/21/2022 0757 by Kristie Allen RN  Verbalizes/displays adequate comfort level or baseline comfort level:   Assess pain using appropriate pain scale   Encourage patient to monitor pain and request assistance     Problem: Safety - Adult  Goal: Free from fall injury  Recent Flowsheet Documentation  Taken 7/21/2022 1056 by Kristie Allen RN  Free From Fall Injury: Instruct family/caregiver on patient safety     Problem: Metabolic/Fluid and Electrolytes - Adult  Goal: Electrolytes maintained within normal limits  Recent Flowsheet Documentation  Taken 7/21/2022 0801 by Kristie Allen RN  Electrolytes maintained within normal limits: Monitor labs and assess patient for signs and symptoms of electrolyte imbalances     Problem: Gastrointestinal - Adult  Goal: Minimal or absence of nausea and vomiting  Recent Flowsheet Documentation  Taken 7/21/2022 0801 by Kristie Allen RN  Minimal or absence of nausea and vomiting:   Administer IV fluids as ordered to ensure adequate hydration   Administer ordered antiemetic medications as needed     Problem: Gastrointestinal - Adult  Goal: Maintains or returns to baseline bowel function  Recent Flowsheet Documentation  Taken 7/21/2022 0801 by Kristie Allen RN  Maintains or returns to baseline bowel function: Assess bowel function     Problem: Gastrointestinal - Adult  Goal: Maintains adequate nutritional intake  Recent Flowsheet Documentation  Taken 7/21/2022 0801 by Kristie Allen RN  Maintains adequate nutritional intake: Monitor percentage of each meal consumed     Problem: Discharge Planning  Goal: Discharge to home or other facility with appropriate resources  Recent Flowsheet Documentation  Taken 7/21/2022 0801 by Kristie Allen RN  Discharge to home or other facility with appropriate resources: Identify barriers to discharge with patient and caregiver     Problem: Skin/Tissue Integrity - Adult  Goal: Skin integrity remains intact  Recent Flowsheet Documentation  Taken 7/21/2022 0801 by Komal Oreilly RN  Skin Integrity Remains Intact:   Monitor for areas of redness and/or skin breakdown   Assess vascular access sites hourly     Problem: Skin/Tissue Integrity - Adult  Goal: Oral mucous membranes remain intact  Recent Flowsheet Documentation  Taken 7/21/2022 0801 by Komal Oreilly RN  Oral Mucous Membranes Remain Intact: Assess oral mucosa and hygiene practices     Problem: Musculoskeletal - Adult  Goal: Return mobility to safest level of function  Recent Flowsheet Documentation  Taken 7/21/2022 0801 by Komal Oreilly RN  Return Mobility to Safest Level of Function:   Assess patient stability and activity tolerance for standing, transferring and ambulating with or without assistive devices   Assist with transfers and ambulation using safe patient handling equipment as needed     Problem: Musculoskeletal - Adult  Goal: Return ADL status to a safe level of function  Recent Flowsheet Documentation  Taken 7/21/2022 0801 by Komal Oreilly RN  Return ADL Status to a Safe Level of Function: Administer medication as ordered     Problem: Genitourinary - Adult  Goal: Absence of urinary retention  Recent Flowsheet Documentation  Taken 7/21/2022 0801 by Komal Oreilly RN  Absence of urinary retention:   Assess patients ability to void and empty bladder   Monitor intake/output and perform bladder scan as needed    Care plan reviewed with patient. Patient verbalized understanding of the plan of care and contributes to goal setting.

## 2022-07-21 NOTE — CARE COORDINATION
7/21/22, 11:08 AM EDT    DISCHARGE ON [facility] [employer] 666 day: 1  Location: -01/001-A Reason for admit: Hypocalcemia [E83.51]  Hyponatremia [E87.1]  Acute pyelonephritis [N10]  Acute cystitis without hematuria [N30.00]  Leukocytosis, unspecified type [D72.829]   Procedure:   7/19 CT abdomen: 1. There are changes of severe bilateral pyelonephritis. Likely associated cystitis. 2. Mild colonic diverticulosis without diverticulitis. 7/20 US gallbladder: 1. Possible cholelithiasis. There are tiny nonshadowing mobile foci. These could also represent sludge balls. 2. Dilated common bile duct measuring 8 mm. Barriers to Discharge: Hospitalist following. IVF. IV cipro. Pain and nausea control. GI cocktail. PCP: SHERYL Witt CNP  Readmission Risk Score: 12.8%  Patient Goals/Plan/Treatment Preferences: Plans home with . Denied needs.

## 2022-07-21 NOTE — PLAN OF CARE
Problem: Pain  Goal: Verbalizes/displays adequate comfort level or baseline comfort level  Outcome: Progressing  Note: Patient's pain relieved by PRN pain meds. Rated 7/10; verbalized satisfaction with the interventions implemented. Repositioning and rest provided. Problem: Safety - Adult  Goal: Free from fall injury  Outcome: Progressing  Note: Patient's safety maintained. Call light and possessions within reach, bed alarm on and placed in low position, side rails upx2, non skid socks on, up with assistance, calls out appropriately, hourly rounding. Problem: Metabolic/Fluid and Electrolytes - Adult  Goal: Electrolytes maintained within normal limits  Outcome: Progressing  Note:  Patient's electrolytes monitored and replaced as ordered. Problem: Gastrointestinal - Adult  Goal: Minimal or absence of nausea and vomiting  Outcome: Progressing  Note: Patient has no nausea and vomiting at this time; receiving IV fluids for hydration. Goal: Maintains or returns to baseline bowel function  Outcome: Progressing  Note: Patient's bowel sounds hypoactive, receiving IV fluid for hydration, up with assistance. Goal: Maintains adequate nutritional intake  Outcome: Progressing  Note: Patient tolerating regular diet. Problem: Genitourinary - Adult  Goal: Absence of urinary retention  Outcome: Progressing  Absence of urinary retention:   Assess patients ability to void and empty bladder   Monitor intake/output and perform bladder scan as needed  Note: Patient voiding well; no bladder retention noted. Problem: Discharge Planning  Goal: Discharge to home or other facility with appropriate resources  Outcome: Progressing  Note: Patient to return home with  and son. Care plan reviewed with patient. Patient verbalize understanding of the plan of care and contribute to goal setting.

## 2022-07-22 VITALS
HEART RATE: 106 BPM | WEIGHT: 128.9 LBS | OXYGEN SATURATION: 100 % | HEIGHT: 60 IN | BODY MASS INDEX: 25.31 KG/M2 | RESPIRATION RATE: 18 BRPM | TEMPERATURE: 98.2 F | DIASTOLIC BLOOD PRESSURE: 75 MMHG | SYSTOLIC BLOOD PRESSURE: 161 MMHG

## 2022-07-22 LAB
ANION GAP SERPL CALCULATED.3IONS-SCNC: 12 MEQ/L (ref 8–16)
BUN BLDV-MCNC: 4 MG/DL (ref 7–22)
CALCIUM SERPL-MCNC: 8.8 MG/DL (ref 8.5–10.5)
CHLORIDE BLD-SCNC: 100 MEQ/L (ref 98–111)
CO2: 23 MEQ/L (ref 23–33)
CREAT SERPL-MCNC: 0.5 MG/DL (ref 0.4–1.2)
ERYTHROCYTE [DISTWIDTH] IN BLOOD BY AUTOMATED COUNT: 13.7 % (ref 11.5–14.5)
ERYTHROCYTE [DISTWIDTH] IN BLOOD BY AUTOMATED COUNT: 48.5 FL (ref 35–45)
GFR SERPL CREATININE-BSD FRML MDRD: > 90 ML/MIN/1.73M2
GLUCOSE BLD-MCNC: 112 MG/DL (ref 70–108)
HCT VFR BLD CALC: 30 % (ref 37–47)
HEMOGLOBIN: 9.9 GM/DL (ref 12–16)
MCH RBC QN AUTO: 32.1 PG (ref 26–33)
MCHC RBC AUTO-ENTMCNC: 33 GM/DL (ref 32.2–35.5)
MCV RBC AUTO: 97.4 FL (ref 81–99)
PLATELET # BLD: 359 THOU/MM3 (ref 130–400)
PMV BLD AUTO: 9.5 FL (ref 9.4–12.4)
POTASSIUM REFLEX MAGNESIUM: 3.6 MEQ/L (ref 3.5–5.2)
RBC # BLD: 3.08 MILL/MM3 (ref 4.2–5.4)
SODIUM BLD-SCNC: 135 MEQ/L (ref 135–145)
WBC # BLD: 11 THOU/MM3 (ref 4.8–10.8)

## 2022-07-22 PROCEDURE — 85027 COMPLETE CBC AUTOMATED: CPT

## 2022-07-22 PROCEDURE — 6360000002 HC RX W HCPCS

## 2022-07-22 PROCEDURE — 6370000000 HC RX 637 (ALT 250 FOR IP): Performed by: INTERNAL MEDICINE

## 2022-07-22 PROCEDURE — 6360000002 HC RX W HCPCS: Performed by: INTERNAL MEDICINE

## 2022-07-22 PROCEDURE — 6370000000 HC RX 637 (ALT 250 FOR IP): Performed by: PHYSICIAN ASSISTANT

## 2022-07-22 PROCEDURE — 80048 BASIC METABOLIC PNL TOTAL CA: CPT

## 2022-07-22 PROCEDURE — 2580000003 HC RX 258: Performed by: INTERNAL MEDICINE

## 2022-07-22 PROCEDURE — 36415 COLL VENOUS BLD VENIPUNCTURE: CPT

## 2022-07-22 PROCEDURE — 99239 HOSP IP/OBS DSCHRG MGMT >30: CPT | Performed by: PHYSICIAN ASSISTANT

## 2022-07-22 RX ORDER — CIPROFLOXACIN 500 MG/1
500 TABLET, FILM COATED ORAL EVERY 12 HOURS SCHEDULED
Status: DISCONTINUED | OUTPATIENT
Start: 2022-07-22 | End: 2022-07-22 | Stop reason: HOSPADM

## 2022-07-22 RX ORDER — AMOXICILLIN AND CLAVULANATE POTASSIUM 875; 125 MG/1; MG/1
1 TABLET, FILM COATED ORAL 2 TIMES DAILY
Qty: 20 TABLET | Refills: 0 | Status: SHIPPED | OUTPATIENT
Start: 2022-07-22 | End: 2022-08-01

## 2022-07-22 RX ORDER — CALCIUM GLUCONATE 20 MG/ML
2000 INJECTION, SOLUTION INTRAVENOUS ONCE
Status: COMPLETED | OUTPATIENT
Start: 2022-07-22 | End: 2022-07-22

## 2022-07-22 RX ADMIN — CIPROFLOXACIN 400 MG: 2 INJECTION, SOLUTION INTRAVENOUS at 07:47

## 2022-07-22 RX ADMIN — ASPIRIN 81 MG: 81 TABLET, COATED ORAL at 07:40

## 2022-07-22 RX ADMIN — ACETAMINOPHEN 325MG 650 MG: 325 TABLET ORAL at 08:58

## 2022-07-22 RX ADMIN — SODIUM CHLORIDE: 9 INJECTION, SOLUTION INTRAVENOUS at 06:14

## 2022-07-22 RX ADMIN — ONDANSETRON 4 MG: 2 INJECTION INTRAMUSCULAR; INTRAVENOUS at 03:06

## 2022-07-22 RX ADMIN — AMLODIPINE BESYLATE 10 MG: 10 TABLET ORAL at 07:38

## 2022-07-22 RX ADMIN — HYDROCODONE BITARTRATE AND ACETAMINOPHEN 1 TABLET: 5; 325 TABLET ORAL at 03:03

## 2022-07-22 RX ADMIN — HYDROCODONE BITARTRATE AND ACETAMINOPHEN 1 TABLET: 5; 325 TABLET ORAL at 07:37

## 2022-07-22 RX ADMIN — HYDROMORPHONE HYDROCHLORIDE 0.5 MG: 1 INJECTION, SOLUTION INTRAMUSCULAR; INTRAVENOUS; SUBCUTANEOUS at 04:11

## 2022-07-22 RX ADMIN — CLOPIDOGREL BISULFATE 75 MG: 75 TABLET ORAL at 07:40

## 2022-07-22 RX ADMIN — LIDOCAINE HYDROCHLORIDE: 20 SOLUTION ORAL; TOPICAL at 07:37

## 2022-07-22 RX ADMIN — LISINOPRIL 10 MG: 10 TABLET ORAL at 07:38

## 2022-07-22 RX ADMIN — ENOXAPARIN SODIUM 40 MG: 100 INJECTION SUBCUTANEOUS at 07:40

## 2022-07-22 RX ADMIN — CALCIUM GLUCONATE 2000 MG: 20 INJECTION, SOLUTION INTRAVENOUS at 03:29

## 2022-07-22 ASSESSMENT — PAIN SCALES - GENERAL
PAINLEVEL_OUTOF10: 6
PAINLEVEL_OUTOF10: 2
PAINLEVEL_OUTOF10: 2
PAINLEVEL_OUTOF10: 5
PAINLEVEL_OUTOF10: 6
PAINLEVEL_OUTOF10: 7
PAINLEVEL_OUTOF10: 5
PAINLEVEL_OUTOF10: 2

## 2022-07-22 ASSESSMENT — PAIN DESCRIPTION - ONSET
ONSET: ON-GOING

## 2022-07-22 ASSESSMENT — PAIN DESCRIPTION - ORIENTATION
ORIENTATION: RIGHT;LEFT

## 2022-07-22 ASSESSMENT — PAIN DESCRIPTION - FREQUENCY
FREQUENCY: CONTINUOUS

## 2022-07-22 ASSESSMENT — PAIN DESCRIPTION - DIRECTION
RADIATING_TOWARDS: FLANK

## 2022-07-22 ASSESSMENT — PAIN DESCRIPTION - DESCRIPTORS
DESCRIPTORS: ACHING;SHARP
DESCRIPTORS: ACHING;DISCOMFORT
DESCRIPTORS: ACHING;SHARP
DESCRIPTORS: ACHING;SHARP

## 2022-07-22 ASSESSMENT — PAIN DESCRIPTION - LOCATION
LOCATION: BACK

## 2022-07-22 ASSESSMENT — PAIN DESCRIPTION - PAIN TYPE
TYPE: ACUTE PAIN

## 2022-07-22 ASSESSMENT — PAIN - FUNCTIONAL ASSESSMENT
PAIN_FUNCTIONAL_ASSESSMENT: PREVENTS OR INTERFERES SOME ACTIVE ACTIVITIES AND ADLS

## 2022-07-22 NOTE — PLAN OF CARE
Problem: Musculoskeletal - Adult  Goal: Return mobility to safest level of function  Recent Flowsheet Documentation  Taken 7/22/2022 0734 by Katie Sykes RN  Return Mobility to Safest Level of Function:   Assess patient stability and activity tolerance for standing, transferring and ambulating with or without assistive devices   Assist with transfers and ambulation using safe patient handling equipment as needed     Problem: Musculoskeletal - Adult  Goal: Return ADL status to a safe level of function  Recent Flowsheet Documentation  Taken 7/22/2022 0734 by Katie Sykes RN  Return ADL Status to a Safe Level of Function: Administer medication as ordered     Problem: Genitourinary - Adult  Goal: Absence of urinary retention  Recent Flowsheet Documentation  Taken 7/22/2022 0734 by Katie Sykes RN  Absence of urinary retention:   Assess patients ability to void and empty bladder   Monitor intake/output and perform bladder scan as needed     Problem: Discharge Planning  Goal: Discharge to home or other facility with appropriate resources  Recent Flowsheet Documentation  Taken 7/22/2022 0734 by Katie Sykes RN  Discharge to home or other facility with appropriate resources: Identify barriers to discharge with patient and caregiver

## 2022-07-22 NOTE — PLAN OF CARE
Problem: Pain  Goal: Verbalizes/displays adequate comfort level or baseline comfort level  Outcome: Progressing  Note: Patient's pain relieved by PRN pain meds. Rated 7/10; verbalized satisfaction with the interventions implemented. Repositioning and rest provided. Problem: Safety - Adult  Goal: Free from fall injury  Outcome: Progressing  Note: Patient's safety maintained. Call light and possessions within reach, bed alarm on and placed in low position, side rails upx2, non skid socks on, up with assistance, calls out appropriately, hourly rounding. Problem: Metabolic/Fluid and Electrolytes - Adult  Goal: Electrolytes maintained within normal limits  Outcome: Progressing  Note: Patient's electrolytes monitored and replaced as ordered. Problem: Gastrointestinal - Adult  Goal: Minimal or absence of nausea and vomiting  Outcome: Progressing  Note: Patient's nausea relieved by Zofran; receiving IV fluids for hydration. Goal: Maintains or returns to baseline bowel function  Outcome: Progressing  Note: Patient's bowel sounds active, receiving IV fluid for hydration, ambulating hallway. Goal: Maintains adequate nutritional intake  Outcome: Progressing  Note: Patient tolerating regular diet. Problem: Genitourinary - Adult  Goal: Absence of urinary retention  Outcome: Progressing  Note: Patient voiding well; no bladder retention noted. Problem: Discharge Planning  Goal: Discharge to home or other facility with appropriate resources  Outcome: Progressing  Note: Patient to return home with  and son at discharge. Care plan reviewed with patient. Patient verbalize understanding of the plan of care and contribute to goal setting.

## 2022-07-22 NOTE — PLAN OF CARE
Patient discharged. Problem: Pain  Goal: Verbalizes/displays adequate comfort level or baseline comfort level  7/22/2022 1115 by Jenn Carmen RN  Outcome: Completed  7/22/2022 0358 by Hector Corrales RN  Outcome: Progressing  Note: Patient's pain relieved by PRN pain meds. Rated 7/10; verbalized satisfaction with the interventions implemented. Repositioning and rest provided. Problem: Safety - Adult  Goal: Free from fall injury  7/22/2022 1115 by Jenn Carmen RN  Outcome: Completed  7/22/2022 0358 by Hector Corrales RN  Outcome: Progressing  Flowsheets (Taken 7/21/2022 2001)  Free From Fall Injury: Instruct family/caregiver on patient safety  Note: Patient's safety maintained. Call light and possessions within reach, bed alarm on and placed in low position, side rails upx2, non skid socks on, up with assistance, calls out appropriately, hourly rounding. Problem: Metabolic/Fluid and Electrolytes - Adult  Goal: Electrolytes maintained within normal limits  7/22/2022 1115 by Jenn Carmen RN  Outcome: Completed  Flowsheets (Taken 7/22/2022 0734)  Electrolytes maintained within normal limits: Monitor labs and assess patient for signs and symptoms of electrolyte imbalances  7/22/2022 0358 by Hector Corrales RN  Outcome: Progressing  Note: Patient's electrolytes monitored and replaced as ordered. Problem: Gastrointestinal - Adult  Goal: Minimal or absence of nausea and vomiting  7/22/2022 1115 by Jenn Carmen RN  Outcome: Completed  Flowsheets (Taken 7/22/2022 0734)  Minimal or absence of nausea and vomiting:   Administer IV fluids as ordered to ensure adequate hydration   Administer ordered antiemetic medications as needed  7/22/2022 0358 by Hector Corrales RN  Outcome: Progressing  Note: Patient's nausea relieved by Zofran; receiving IV fluids for hydration.   Goal: Maintains or returns to baseline bowel function  7/22/2022 1115 by Jenn Carmen RN  Outcome: Completed  Flowsheets (Taken 7/22/2022 0734)  Maintains or returns to baseline bowel function: Assess bowel function  7/22/2022 0358 by Darinel Omalley RN  Outcome: Progressing  Note: Patient's bowel sounds active, receiving IV fluid for hydration, ambulating hallway. Goal: Maintains adequate nutritional intake  7/22/2022 1115 by Gamal Thomas RN  Outcome: Completed  Flowsheets (Taken 7/22/2022 0734)  Maintains adequate nutritional intake:   Monitor percentage of each meal consumed   Identify factors contributing to decreased intake, treat as appropriate  7/22/2022 0358 by Darinel Omalley RN  Outcome: Progressing  Note: Patient tolerating regular diet.      Problem: Skin/Tissue Integrity - Adult  Goal: Skin integrity remains intact  7/22/2022 1115 by Gamal Thomas RN  Outcome: Completed  Flowsheets (Taken 7/22/2022 0734)  Skin Integrity Remains Intact: Monitor for areas of redness and/or skin breakdown  7/22/2022 0358 by Darinel Omalley RN  Outcome: Progressing  Goal: Oral mucous membranes remain intact  7/22/2022 1115 by Gamal Thomas RN  Outcome: Completed  Flowsheets (Taken 7/22/2022 0734)  Oral Mucous Membranes Remain Intact: Assess oral mucosa and hygiene practices  7/22/2022 0358 by Darinel Omalley RN  Outcome: Progressing     Problem: Musculoskeletal - Adult  Goal: Return mobility to safest level of function  7/22/2022 1115 by Gamal Thomas RN  Outcome: Completed  Flowsheets (Taken 7/22/2022 0734)  Return Mobility to Safest Level of Function:   Assess patient stability and activity tolerance for standing, transferring and ambulating with or without assistive devices   Assist with transfers and ambulation using safe patient handling equipment as needed  7/22/2022 0358 by Darinel Omalley RN  Outcome: Progressing  Goal: Return ADL status to a safe level of function  7/22/2022 1115 by Gamal Thomas RN  Outcome: Completed  Flowsheets (Taken 7/22/2022 0734)  Return ADL Status to a Safe Level of Function: Administer medication as ordered  7/22/2022 0358 by Sherryle Conte, RN  Outcome: Progressing     Problem: Genitourinary - Adult  Goal: Absence of urinary retention  7/22/2022 1115 by Elly Castle RN  Outcome: Completed  Flowsheets (Taken 7/22/2022 0734)  Absence of urinary retention:   Assess patients ability to void and empty bladder   Monitor intake/output and perform bladder scan as needed  7/22/2022 0358 by Sherryle Conte, RN  Outcome: Progressing  Note: Patient voiding well; no bladder retention noted. Problem: Discharge Planning  Goal: Discharge to home or other facility with appropriate resources  7/22/2022 1115 by Elly Castle RN  Outcome: Completed  Flowsheets (Taken 7/22/2022 0734)  Discharge to home or other facility with appropriate resources: Identify barriers to discharge with patient and caregiver  7/22/2022 0358 by Sherryle Conte, RN  Outcome: Progressing  Note: Patient to return home with  and son at discharge.

## 2022-07-22 NOTE — CARE COORDINATION
7/22/22, 10:37 AM EDT    Patient goals/plan/ treatment preferences discussed by  and . Patient goals/plan/ treatment preferences reviewed with patient/ family. Patient/ family verbalize understanding of discharge plan and are in agreement with goal/plan/treatment preferences. Understanding was demonstrated using the teach back method. AVS provided by RN at time of discharge, which includes all necessary medical information pertaining to the patients current course of illness, treatment, post-discharge goals of care, and treatment preferences.      Services At/After Discharge: None

## 2022-07-22 NOTE — PROGRESS NOTES
Pharmacy Intravenous to Oral Protocol  Medication changed per P&T protocol: cipro 400 mg IV Q12H     Patient meets criteria based on the followin. IV therapy > 24 hours - yes  2. Nausea/vomiting - no prn anti-emetic use  3. Regular diet - yes  4. Tolerating other oral medications: yes  5. Afebrile for 24 hours: yes  6.  WBC trending downward: yes            Ami Oxford, PharmD, BCPS, [facility] 2022 9:13 AM

## 2022-07-22 NOTE — PROGRESS NOTES
Discharge paperwork reviewed with patient. Education included changes to medication, follow-up appointments, antibiotic therapy, picking up medication at pharmacy. All questions answered to patient's satisfaction. AVS left with patient.

## 2022-07-22 NOTE — DISCHARGE SUMMARY
Hospitalist Discharge Summary        Patient: Wayne Guillen  YOB: 1965  MRN: [id number]   Acct: [de-identified]    Primary Care Physician: SHERYL Mehta - CNP    Admit date  7/19/2022    Discharge date: 7/22/2022 11:20 AM    Chief Complaint on presentation :-  Flank Pain     Discharge Assessment and Plan:-   Acute pyelonephritis: UA suspicious. Urine culture growing gram negative bacilli. CT A/P showed \"There are changes of severe bilateral pyelonephritis. \" WBC trending down. Pt is afebrile. Culture grew E Coli- prescribed 10 days of Augmentin on DC. Electrolyte Abnormalities:  Hyponatremia: Sodium 125 on arrival. Today, 135. Resolved. Hypocalcemia  Metabolic Acidosis, NAG  Anemia, chronic: Hgb stable on DC. Essential HTN: BP stable. Continue home medications. HLD: Statin. CAD, PAD: ASA & Plavix. Pt has had angioplasty in the past and left common femoral artery endarterectomy. Initial H and P and Hospital course:-  Initial H&P \"62year-old female patient presented to the ED today with pain 4-day history of right back pain radiating  to lower abdomen more than 10/10 intensity. She has no history of renal calculi. Pain is not associated with nausea ,vomiting ,fever or chills. Denies frequency of menstruation or dysuria. UA is positive for UTI. Patient has chronic back pain at baseline with prior surgery. Initial vital signs in the ED temperature 36.4 °C, respirate of 16, heart rate 108 beats minute, blood pressure 1 3471, oxygen saturation 99% on room air. Initial labs in the ED-serum sodium 125, potassium 4.4, chloride 94, CO2 16, BUN 27, creatinine 1.1, anion gap 15.0, blood sugar 119, calcium 7.9, osmolality 257.8, total protein 7.3, troponin first set less than 0.010, albumin 3.1, Alkaline Phosphatase 104, ALT 24, AST 20, T Bili 0.3. WBC 17.3, hemoglobin 10.3, MCV 98.4, platelets 833. UA is positive for UTI.      A twelve-lead EKG which reviewed shows sinus tachycardia 104 bpm.  QTC of 480 ms. Q waves in V1 and V2. No ST segment elevation or depression. ED treatment included IV Dilaudid and morphine. IV Rocephin for UTI. IV Zofran and normal saline infusion. \"     7/20: Pt was admitted early this AM (7/20) due to back pain and abdominal pain. Pt was found to have pyelonephritis. Pt started on IV ABX. Urine & blood cultures pending. Pt resting in bed with no issues during evaluation. 7/21: No acute events overnight. Pt states that she is mildly nausea this AM. No new issues. On the day of discharge, it was explained to the patient that it was very important to follow up with his PCP to have continued care. Appointments were made and information was given. Physical Exam:-  Vitals:   Patient Vitals for the past 24 hrs:   BP Temp Temp src Pulse Resp SpO2   07/22/22 0807 -- -- -- -- 18 --   07/22/22 0734 (!) 161/75 98.2 °F (36.8 °C) Oral (!) 106 18 100 %   07/22/22 0441 -- -- -- -- 16 --   07/22/22 0333 -- -- -- -- 16 --   07/22/22 0259 (!) 158/74 98 °F (36.7 °C) Oral 100 16 98 %   07/22/22 0029 -- -- -- -- 16 --   07/21/22 2330 133/65 97.8 °F (36.6 °C) -- 98 18 94 %   07/21/22 2320 -- -- -- -- 16 --   07/21/22 2031 -- -- -- -- 16 --   07/21/22 2001 (!) 161/80 97.9 °F (36.6 °C) Oral 98 18 95 %   07/21/22 1912 -- -- -- -- 16 --   07/21/22 1842 -- -- -- -- 18 --   07/21/22 1716 -- -- -- -- 18 --   07/21/22 1646 -- -- -- -- 20 --   07/21/22 1619 (!) 123/57 98.4 °F (36.9 °C) Oral (!) 101 20 95 %     Weight:   Weight: 128 lb 14.4 oz (58.5 kg)   24 hour intake/output:     Intake/Output Summary (Last 24 hours) at 7/22/2022 1457  Last data filed at 7/22/2022 1018  Gross per 24 hour   Intake 3886.37 ml   Output --   Net 3886.37 ml       General appearance: No apparent distress, appears stated age and cooperative. HEENT: Normal cephalic, atraumatic without obvious deformity. Pupils equal, round, and reactive to light. Extra ocular muscles intact. Conjunctivae/corneas clear. Neck: Supple, with full range of motion. No jugular venous distention. Trachea midline. Respiratory:  Normal respiratory effort. Clear to auscultation, bilaterally without Rales/Wheezes/Rhonchi. Cardiovascular: Regular rate and rhythm with normal S1/S2 without murmurs, rubs or gallops. Abdomen: Soft, non-tender, non-distended with normal bowel sounds. Musculoskeletal:  No clubbing, cyanosis or edema bilaterally. CVAT   Skin: Skin color, texture, turgor normal.  No rashes or lesions. Neurologic:  Neurovascularly intact without any focal sensory/motor deficits. Cranial nerves: II-XII intact, grossly non-focal.  Psychiatric: Alert and oriented, thought content appropriate, normal insight  Capillary Refill: Brisk,< 3 seconds   Peripheral Pulses: +2 palpable, equal bilaterally       Discharge Medications:-      Medication List        START taking these medications      amoxicillin-clavulanate 875-125 MG per tablet  Commonly known as: AUGMENTIN  Take 1 tablet by mouth in the morning and 1 tablet before bedtime. Do all this for 10 days.             CONTINUE taking these medications      acetaminophen 500 MG tablet  Commonly known as: TYLENOL     alendronate 70 MG tablet  Commonly known as: FOSAMAX  Take 1 tablet by mouth every 7 days     amLODIPine 10 MG tablet  Commonly known as: NORVASC  TAKE 1 TABLET BY MOUTH ONE TIME A DAY     aspirin 81 MG EC tablet     atorvastatin 40 MG tablet  Commonly known as: LIPITOR  Take 1 tablet by mouth daily     clopidogrel 75 MG tablet  Commonly known as: PLAVIX  Take 1 tablet by mouth daily     fluticasone 50 MCG/ACT nasal spray  Commonly known as: FLONASE  2 sprays by Each Nostril route daily     lisinopril 10 MG tablet  Commonly known as: PRINIVIL;ZESTRIL  Take 1 tablet by mouth 2 times daily     meloxicam 15 MG tablet  Commonly known as: Mobic  Take 1 tablet by mouth daily     vitamin D 1.25 MG (22167 UT) Caps capsule  Commonly known as: ERGOCALCIFEROL  Take 1 capsule by mouth once a week            STOP taking these medications      HYDROcodone-acetaminophen 5-325 MG per tablet  Commonly known as: Norco            ASK your doctor about these medications      ondansetron 4 MG disintegrating tablet  Commonly known as: Zofran ODT  Take 1 tablet by mouth every 8 hours as needed for Nausea or Vomiting               Where to Get Your Medications        These medications were sent to [address] [place] [address] [address] - [place], [place] - [address] - P [phone] - F [phone] [address] [place] [address]      Phone: [phone]   amoxicillin-clavulanate 875-125 MG per tablet          Labs :-  Recent Results (from the past 72 hour(s))   CBC with Auto Differential    Collection Time: 07/19/22  9:54 PM   Result Value Ref Range    WBC 17.3 (H) 4.8 - 10.8 thou/mm3    RBC 3.16 (L) 4.20 - 5.40 mill/mm3    Hemoglobin 10.3 (L) 12.0 - 16.0 gm/dl    Hematocrit 31.1 (L) 37.0 - 47.0 %    MCV 98.4 81.0 - 99.0 fL    MCH 32.6 26.0 - 33.0 pg    MCHC 33.1 32.2 - 35.5 gm/dl    RDW-CV 13.4 11.5 - 14.5 %    RDW-SD 48.7 (H) 35.0 - 45.0 fL    Platelets 084 596 - 926 thou/mm3    MPV 9.7 9.4 - 12.4 fL    Seg Neutrophils 85.1 %    Lymphocytes 4.9 %    Monocytes 9.1 %    Eosinophils 0.2 %    Basophils 0.1 %    Immature Granulocytes 0.6 %    Segs Absolute 14.7 (H) 1.8 - 7.7 thou/mm3    Lymphocytes Absolute 0.8 (L) 1.0 - 4.8 thou/mm3    Monocytes Absolute 1.6 (H) 0.4 - 1.3 thou/mm3    Eosinophils Absolute 0.0 0.0 - 0.4 thou/mm3    Basophils Absolute 0.0 0.0 - 0.1 thou/mm3    Immature Grans (Abs) 0.10 (H) 0.00 - 0.07 thou/mm3    nRBC 0 /100 wbc   Comprehensive Metabolic Panel w/ Reflex to MG    Collection Time: 07/19/22  9:54 PM   Result Value Ref Range    Glucose 119 (H) 70 - 108 mg/dL    Creatinine 1.1 0.4 - 1.2 mg/dL    BUN 27 (H) 7 - 22 mg/dL    Sodium 125 (L) 135 - 145 meq/L    Potassium reflex Magnesium 4.4 3.5 - 5.2 meq/L    Chloride 94 (L) 98 - 111 meq/L    CO2 16 (L) 23 - 33 meq/L Calcium 7.9 (L) 8.5 - 10.5 mg/dL    AST 20 5 - 40 U/L    Alkaline Phosphatase 104 38 - 126 U/L    Total Protein 7.3 6.1 - 8.0 g/dL    Albumin 3.5 3.5 - 5.1 g/dL    Total Bilirubin 0.3 0.3 - 1.2 mg/dL    ALT 24 11 - 66 U/L   Lipase    Collection Time: 07/19/22  9:54 PM   Result Value Ref Range    Lipase 11.6 5.6 - 51.3 U/L   Troponin    Collection Time: 07/19/22  9:54 PM   Result Value Ref Range    Troponin T < 0.010 ng/ml   Anion Gap    Collection Time: 07/19/22  9:54 PM   Result Value Ref Range    Anion Gap 15.0 8.0 - 16.0 meq/L   Glomerular Filtration Rate, Estimated    Collection Time: 07/19/22  9:54 PM   Result Value Ref Range    Est, Glom Filt Rate 51 (A) ml/min/1.73m2   Osmolality    Collection Time: 07/19/22  9:54 PM   Result Value Ref Range    Osmolality Calc 257.8 (L) 275.0 - 300.0 mOsmol/kg   Vitamin D 25 Hydroxy    Collection Time: 07/19/22  9:54 PM   Result Value Ref Range    Vit D, 25-Hydroxy 18 (L) 30 - 100 ng/ml   Hemoglobin A1C    Collection Time: 07/19/22  9:54 PM   Result Value Ref Range    Hemoglobin A1C 5.3 4.4 - 6.4 %    AVERAGE GLUCOSE 99 70 - 126 mg/dL   Magnesium    Collection Time: 07/19/22  9:54 PM   Result Value Ref Range    Magnesium 2.5 (H) 1.6 - 2.4 mg/dL   Phosphorus    Collection Time: 07/19/22  9:54 PM   Result Value Ref Range    Phosphorus 3.3 2.4 - 4.7 mg/dL   EKG 12 Lead    Collection Time: 07/19/22 10:05 PM   Result Value Ref Range    Ventricular Rate 104 BPM    Atrial Rate 104 BPM    P-R Interval 154 ms    QRS Duration 78 ms    Q-T Interval 318 ms    QTc Calculation (Bazett) 418 ms    P Axis 67 degrees    R Axis 60 degrees    T Axis 64 degrees   Urine with Reflexed Micro    Collection Time: 07/19/22 10:10 PM   Result Value Ref Range    Glucose, Ur NEGATIVE NEGATIVE mg/dl    Bilirubin Urine NEGATIVE NEGATIVE    Ketones, Urine TRACE (A) NEGATIVE    Specific Gravity, Urine 1.017 1.002 - 1.030    Blood, Urine MODERATE (A) NEGATIVE    pH, UA 5.5 5.0 - 9.0    Protein,  (A) NEGATIVE    Urobilinogen, Urine 1.0 0.0 - 1.0 eu/dl    Nitrite, Urine NEGATIVE NEGATIVE    Leukocyte Esterase, Urine SMALL (A) NEGATIVE    Color, UA YELLOW STRAW-YELLOW    Character, Urine CLOUDY (A) CLEAR-SL CLOUD    RBC, UA 5-10 0-2/hpf /hpf    WBC, UA 50-75 0-4/hpf /hpf    Epithelial Cells, UA 0-2 3-5/hpf /hpf    Bacteria, UA FEW FEW/NONE SEEN /hpf    Casts UA 8-15 HYALINE NONE SEEN /lpf    Crystals, UA NONE SEEN NONE SEEN    Renal Epithelial, UA NONE SEEN NONE SEEN    Yeast, UA NONE SEEN NONE SEEN    CASTS 2 8-15 C. GRAN NONE SEEN /lpf    Crystals, UA NONE SEEN NONE SEEN    MISCELLANEOUS 2 NONE SEEN    Culture, Reflexed, Urine    Collection Time: 07/19/22 10:10 PM    Specimen: Urine, catheter   Result Value Ref Range    Organism Escherichia coli (A)     Urine Culture Reflex Bernice count: >100,000 CFU/mL         Susceptibility    Escherichia coli - BACTERIAL SUSCEPTIBILITY PANEL BY YULIANA     amoxicillin-clavulanate 8 Sensitive mcg/mL     cefOXitin <=4 Sensitive mcg/mL     cefTRIAXone <=1 Sensitive mcg/mL     ampicillin >=32 Resistant mcg/mL     gentamicin <=1 Sensitive mcg/mL     trimethoprim-sulfamethoxazole <=20 Sensitive mcg/mL     ciprofloxacin <=0.25 Sensitive mcg/mL     tetracycline <=1 Sensitive mcg/mL     nitrofurantoin <=16 Sensitive mcg/mL   Lactate, Sepsis    Collection Time: 07/19/22 10:12 PM   Result Value Ref Range    Lactic Acid, Sepsis 0.5 0.5 - 1.9 mmol/L   Culture, Blood 1    Collection Time: 07/20/22 12:39 AM    Specimen: Blood   Result Value Ref Range    Blood Culture, Routine No growth 24 hours. No growth 48 hours. Culture, Blood 2    Collection Time: 07/20/22 12:44 AM    Specimen: Blood   Result Value Ref Range    Blood Culture, Routine No growth 24 hours. No growth 48 hours.      Vitamin B12 & Folate    Collection Time: 07/20/22  3:07 AM   Result Value Ref Range    Vitamin B-12 > 2000 (H) 211 - 911 pg/mL    Folate 14.2 4.8 - 24.2 ng/mL   Iron    Collection Time: 07/20/22  3:07 AM   Result Value Ref Range    Iron 19 (L) 50 - 170 ug/dL   IRON SATURATION    Collection Time: 07/20/22  3:07 AM   Result Value Ref Range    Iron Saturation 8 (L) 20 - 50 %   Ferritin    Collection Time: 07/20/22  3:07 AM   Result Value Ref Range    Ferritin 426 (H) 10 - 291 ng/mL   Iron Binding Capacity    Collection Time: 07/20/22  3:07 AM   Result Value Ref Range    TIBC 250 171 - 450 ug/dL   Calcium, Ionized    Collection Time: 07/20/22  3:31 AM   Result Value Ref Range    Calcium, Ionized 0.91 (L) 1.12 - 1.32 mmol/L   Basic Metabolic Panel w/ Reflex to MG    Collection Time: 07/20/22 10:32 AM   Result Value Ref Range    Sodium 131 (L) 135 - 145 meq/L    Potassium reflex Magnesium 3.7 3.5 - 5.2 meq/L    Chloride 101 98 - 111 meq/L    CO2 18 (L) 23 - 33 meq/L    Glucose 129 (H) 70 - 108 mg/dL    BUN 17 7 - 22 mg/dL    Creatinine 0.8 0.4 - 1.2 mg/dL    Calcium 7.4 (L) 8.5 - 10.5 mg/dL   Anion Gap    Collection Time: 07/20/22 10:32 AM   Result Value Ref Range    Anion Gap 12.0 8.0 - 16.0 meq/L   Glomerular Filtration Rate, Estimated    Collection Time: 07/20/22 10:32 AM   Result Value Ref Range    Est, Glom Filt Rate 74 (A) ml/min/1.73m2   Lactic acid, plasma    Collection Time: 07/21/22  5:20 AM   Result Value Ref Range    Lactic Acid 0.8 0.5 - 2.0 mmol/L   TSH with Reflex    Collection Time: 07/21/22  5:20 AM   Result Value Ref Range    TSH 1.740 0.400 - 4.200 uIU/mL   Basic Metabolic Panel w/ Reflex to MG    Collection Time: 07/21/22  5:20 AM   Result Value Ref Range    Sodium 134 (L) 135 - 145 meq/L    Potassium reflex Magnesium 4.3 3.5 - 5.2 meq/L    Chloride 105 98 - 111 meq/L    CO2 17 (L) 23 - 33 meq/L    Glucose 96 70 - 108 mg/dL    BUN 9 7 - 22 mg/dL    Creatinine 0.5 0.4 - 1.2 mg/dL    Calcium 7.5 (L) 8.5 - 10.5 mg/dL   CBC    Collection Time: 07/21/22  5:20 AM   Result Value Ref Range    WBC 11.0 (H) 4.8 - 10.8 thou/mm3    RBC 2.82 (L) 4.20 - 5.40 mill/mm3    Hemoglobin 9.1 (L) 12.0 - 16.0 gm/dl    Hematocrit 29.0 (L) 37.0 - 47.0 %    .8 (H) 81.0 - 99.0 fL    MCH 32.3 26.0 - 33.0 pg    MCHC 31.4 (L) 32.2 - 35.5 gm/dl    RDW-CV 14.0 11.5 - 14.5 %    RDW-SD 52.9 (H) 35.0 - 45.0 fL    Platelets 207 405 - 983 thou/mm3    MPV 9.8 9.4 - 12.4 fL   Anion Gap    Collection Time: 07/21/22  5:20 AM   Result Value Ref Range    Anion Gap 12.0 8.0 - 16.0 meq/L   Glomerular Filtration Rate, Estimated    Collection Time: 07/21/22  5:20 AM   Result Value Ref Range    Est, Glom Filt Rate >90 ml/min/1.73m2   Calcium, Ionized    Collection Time: 07/21/22 11:41 AM   Result Value Ref Range    Calcium, Ionized 0.98 (L) 1.12 - 1.32 mmol/L   Basic Metabolic Panel w/ Reflex to MG    Collection Time: 07/22/22  9:28 AM   Result Value Ref Range    Sodium 135 135 - 145 meq/L    Potassium reflex Magnesium 3.6 3.5 - 5.2 meq/L    Chloride 100 98 - 111 meq/L    CO2 23 23 - 33 meq/L    Glucose 112 (H) 70 - 108 mg/dL    BUN 4 (L) 7 - 22 mg/dL    Creatinine 0.5 0.4 - 1.2 mg/dL    Calcium 8.8 8.5 - 10.5 mg/dL   CBC    Collection Time: 07/22/22  9:28 AM   Result Value Ref Range    WBC 11.0 (H) 4.8 - 10.8 thou/mm3    RBC 3.08 (L) 4.20 - 5.40 mill/mm3    Hemoglobin 9.9 (L) 12.0 - 16.0 gm/dl    Hematocrit 30.0 (L) 37.0 - 47.0 %    MCV 97.4 81.0 - 99.0 fL    MCH 32.1 26.0 - 33.0 pg    MCHC 33.0 32.2 - 35.5 gm/dl    RDW-CV 13.7 11.5 - 14.5 %    RDW-SD 48.5 (H) 35.0 - 45.0 fL    Platelets 716 969 - 606 thou/mm3    MPV 9.5 9.4 - 12.4 fL   Anion Gap    Collection Time: 07/22/22  9:28 AM   Result Value Ref Range    Anion Gap 12.0 8.0 - 16.0 meq/L   Glomerular Filtration Rate, Estimated    Collection Time: 07/22/22  9:28 AM   Result Value Ref Range    Est, Glom Filt Rate >90 ml/min/1.73m2        Microbiology:    Blood culture #1:   Lab Results   Component Value Date/Time    BC No growth 24 hours. No growth 48 hours.   07/20/2022 12:44 AM       Blood culture #2:No results found for: BLOODCULT2    Organism:    Lab Results   Component Value Date/Time    LABGRAM 03/30/2021 09:56 AM     Rare segmented neutrophils observed. Rare epithelial cells observed. Few gram positive cocci in pairs. MRSA culture only:No results found for: [facility]    Urine culture:   Lab Results   Component Value Date/Time    LABURIN No growth-preliminary No growth  03/16/2021 02:25 PM     Lab Results   Component Value Date/Time    ORG Escherichia coli 07/19/2022 10:10 PM        Respiratory culture: No results found for: CULTRESP    Aerobic and Anaerobic :  Lab Results   Component Value Date/Time    LABAERO  03/30/2021 09:56 AM     moderate growth In the treatment of gram positive infections, GENTAMICIN should be CONSIDERED a SYNERGYSTIC agent ONLY. Ciprofloxacin and Levofloxacin, regardless of in vitrosensitivity, should not be used for staphylococcal infectionsother than uncomplicated lower UTIs. Moxifloxacin, regardless of in vitro sensitivity, shouldnot be used for staphylococcal infections. LABAERO  03/30/2021 09:56 AM     light growth Enterobacter species which may be initially susceptible to third generation cephalosporins may develop resistance within three to four days of initiation of this antimicrobialtherapy. No results found for: FRANCISCOE    Urinalysis:      Lab Results   Component Value Date/Time    NITRU NEGATIVE 07/19/2022 10:10 PM    WBCUA 50-75 07/19/2022 10:10 PM    BACTERIA FEW 07/19/2022 10:10 PM    RBCUA 5-10 07/19/2022 10:10 PM    BLOODU MODERATE 07/19/2022 10:10 PM    SPECGRAV 1.005 03/12/2021 01:20 PM    GLUCOSEU NEGATIVE 07/19/2022 10:10 PM       Radiology:-  CT ABDOMEN PELVIS W IV CONTRAST Additional Contrast? None    Result Date: 7/19/2022  CT abdomen and pelvis with contrast Comparison: CT,[facility] - CT ABDOMEN PELVIS WO CONTRAST - 03/08/2021 01:58 PM EST Findings: No consolidation or effusion. The bilateral kidneys are enlarged and heterogeneous, left greater than right. There is severe uroepithelial thickening of the bilateral renal collecting systems.  There is infiltration of fat adjacent to the bilateral kidneys and bilateral ureters. No calculus or hydronephrosis. There is a focus of calcification associated with the lateral cortex of the spleen. The liver and pancreas are unremarkable. There is a 1 cm left adrenal nodule, compatible with an adenoma, without change. The right adrenal gland is unremarkable. No calcified gallstones or bile duct dilatation. No bowel obstruction, pneumoperitoneum, or pneumatosis. Generalized thickening of the bladder wall. Unremarkable uterus and adnexa. Normal appendix. The bones are intact. 1. There are changes of severe bilateral pyelonephritis. Likely associated cystitis. 2. Mild colonic diverticulosis without diverticulitis. This document has been electronically signed by: Bhavna Scott MD on 07/19/2022 11:29 PM All CTs at this facility use dose modulation techniques and iterative reconstructions, and/or weight-based dosing when appropriate to reduce radiation to a low as reasonably achievable. US GALLBLADDER RUQ    Result Date: 7/20/2022  PROCEDURE: US GALLBLADDER RUQ CLINICAL INFORMATION: Right right upper quadrant tenderness rule out cholecystitis. COMPARISON: CT abdomen pelvis 7/19/2022. TECHNIQUE: Multiplanar sonographic images were obtained of the structures in the right upper quadrant. FINDINGS: Liver - L= 20.8 cm Gallbladder - 11.5 x 3.5 x 3.5 cm Gallbladder Wall - 0.1 cm Common Duct - 0.8 cm Saldana's Sign: negative The liver is of normal echogenicity. No masses are noted. The pancreatic head and body are within normal limits. The pancreatic duct is of normal caliber. The tail is not well seen due to overlying bowel gas. The gallbladder is mildly distended. There is no pericholecystic fluid. There is no gallbladder wall edema. There are few tiny mobile echogenic foci. These could represent small nonshadowing stones. The common bile duct is mildly dilated and measures 8 mm.   There is no hydronephrosis in the visualized aspects of the right kidney. 1. Possible cholelithiasis. There are tiny nonshadowing mobile foci. These could also represent sludge balls. 2. Dilated common bile duct measuring 8 mm. **This report has been created using voice recognition software. It may contain minor errors which are inherent in voice recognition technology. ** Final report electronically signed by Dr. Fiorella Garrett on 7/20/2022 7:27 AM       Follow-up scheduled after discharge :-    in the next few days with SHERYL Solis - CNP    Consultations during this hospital stay:-  [] NONE [] Cardiology  [] Nephrology  [] Hemo onco   [] GI   [] ID  [] Endocrine  [] Pulm    [] Neuro    [] Psych   [] Urology  [] ENT   [] G SURGERY   []Ortho    []CV surg    [] Palliative  [] Hospice [] Pain management   []    []TCU   [] PT/OT  OTHERS:-    Disposition: home  Condition at Discharge: Stable    Time Spent:- 40 minutes    Electronically signed by RYLAND Kraft on 7/22/22 at 2:57 PM EDT   Discharging Hospitalist

## 2022-07-25 ENCOUNTER — CARE COORDINATION (OUTPATIENT)
Dept: CASE MANAGEMENT | Age: 57
End: 2022-07-25

## 2022-07-25 ENCOUNTER — PATIENT MESSAGE (OUTPATIENT)
Dept: FAMILY MEDICINE CLINIC | Age: 57
End: 2022-07-25

## 2022-07-25 DIAGNOSIS — N10 ACUTE PYELONEPHRITIS: Primary | ICD-10-CM

## 2022-07-25 LAB
BLOOD CULTURE, ROUTINE: NORMAL
BLOOD CULTURE, ROUTINE: NORMAL

## 2022-07-25 NOTE — CARE COORDINATION
[facility] Transitions Initial Follow Up Call    Call within 2 business days of discharge: Yes    Patient: Diogo Bella Patient : 1965   MRN: [id number]  Reason for Admission: R flank pain/pylonephritis  Discharge Date: 22 RARS: Readmission Risk Score: 12.8      Last Discharge [facility]       Date Complaint Diagnosis Description Type Department Provider    22 Abdominal Pain; Back Pain Acute pyelonephritis . .. ED to Hosp-Admission (Discharged) (ADMITTED) STRZ 5K Kamari Trujillo, [address]; Margarita Pel. .. Spoke with: Melody Tuan today and she says she is feeling weak today and lightheaded when she is up. Denies R flank pain, fever, n/v/d, sob, dysuria, hematuria. Meds reviewed and are correct. Stopped [facility]. BP: sitting - 142/76  standing- 125/75  Will message PCP for HFU tomorrow. Has transportation. We discussed going to the ED if lightheadedness worsens. She expressed understanding. She tried to go to work today but just felt too weak. Eating, drinking & sleeping ok. Denies problem w/ BM. No other concerns voiced at this time. CTN # given to ptSilverio Mojica RN Care Transitions [phone]        Non-face-to-face services provided:  Scheduled appointment with PCP-message sent to PCP    Care Transitions 24 Hour Call    Schedule Follow Up Appointment with PCP: Completed  Do you have a copy of your discharge instructions?: Yes  Do you have all of your prescriptions and are they filled?: Yes  Have you been contacted by a [address] [employer] Pharmacist?: No  Have you scheduled your follow up appointment?: No  Do you feel like you have everything you need to keep you well at home?: Yes  Care Transitions Interventions     Transportation Support: Declined        Transitions of Care Initial Call        Challenges to be reviewed by the provider   Additional needs identified to be addressed with provider: Yes  Needs HFU tomorrow 22             Method of communication with provider : chart routing    Advance Care Planning:   Does patient have an Advance Directive: not on file. Care Transition Nurse contacted the patient by telephone to perform post hospital discharge assessment. Verified name and  with patient as identifiers. Provided introduction to self, and explanation of the CTN role. CTN reviewed discharge instructions, medical action plan and red flags with patient who verbalized understanding. Patient given an opportunity to ask questions and does not have any further questions or concerns at this time. Were discharge instructions available to patient? Yes. Reviewed appropriate site of care based on symptoms and resources available to patient including: PCP  When to call 911. The patient agrees to contact the PCP office for questions related to their healthcare. Medication reconciliation was performed with patient, who verbalizes understanding of administration of home medications. Advised obtaining a 90-day supply of all daily and as-needed medications. Was patient discharged with a pulse oximeter? no    CTN provided contact information. Plan for follow-up call in 5-7 days based on severity of symptoms and risk factors. Plan for next call: symptom management-fatigue, lightheadedness, R flank pain, problems urinating, n/v/d, fever  follow up appointment-PCP?        Follow Up  Future Appointments   Date Time Provider Jim Song   2022  4:00 PM Birtha Tatiana, PT STRZ PT [place] [address]   2022 10:20 AM [facility] CENTER DEXA RM STRZ WOMEN [facility] Radiolog   2023 11:00 AM Alberto Senior, APRN - CNP N SRPX Rheum TEEP - [place] AM [place]   2023 11:00 AM Chris Krabbe, MD N CARLOSX Heart TEEP - Hellen Montemayor RN

## 2022-07-25 NOTE — TELEPHONE ENCOUNTER
From: Jocelyne Garnica  To: Eda Zhang  Sent: 7/25/2022 11:00 AM EDT  Subject: appointment    Good morning Dicky Hamman, I tried to call the office but it keeps hanging up. I would like to know if you have an appointment open today. I tried to go to work but I am lightheaded, dizzy, weak and getting the chills. You can call me at [phone]. I would appreciate it very much. Thank you and look forward to hearing from you.     Jamey Fenton

## 2022-07-26 ENCOUNTER — OFFICE VISIT (OUTPATIENT)
Dept: FAMILY MEDICINE CLINIC | Age: 57
End: 2022-07-26
Payer: COMMERCIAL

## 2022-07-26 VITALS
DIASTOLIC BLOOD PRESSURE: 60 MMHG | RESPIRATION RATE: 16 BRPM | WEIGHT: 126 LBS | SYSTOLIC BLOOD PRESSURE: 90 MMHG | BODY MASS INDEX: 24.61 KG/M2 | HEART RATE: 84 BPM

## 2022-07-26 DIAGNOSIS — N12 PYELONEPHRITIS: ICD-10-CM

## 2022-07-26 DIAGNOSIS — G89.29 CHRONIC RIGHT SHOULDER PAIN: ICD-10-CM

## 2022-07-26 DIAGNOSIS — M25.511 CHRONIC RIGHT SHOULDER PAIN: ICD-10-CM

## 2022-07-26 DIAGNOSIS — Z09 HOSPITAL DISCHARGE FOLLOW-UP: Primary | ICD-10-CM

## 2022-07-26 DIAGNOSIS — G89.29 CHRONIC NECK PAIN: ICD-10-CM

## 2022-07-26 DIAGNOSIS — M54.2 CHRONIC NECK PAIN: ICD-10-CM

## 2022-07-26 PROCEDURE — 99495 TRANSJ CARE MGMT MOD F2F 14D: CPT | Performed by: NURSE PRACTITIONER

## 2022-07-26 RX ORDER — CYCLOBENZAPRINE HCL 10 MG
10 TABLET ORAL 3 TIMES DAILY PRN
Qty: 90 TABLET | Refills: 1 | Status: SHIPPED | OUTPATIENT
Start: 2022-07-26 | End: 2022-09-21 | Stop reason: SDUPTHER

## 2022-07-26 RX ORDER — CYCLOBENZAPRINE HCL 10 MG
TABLET ORAL
COMMUNITY
Start: 2022-07-06 | End: 2022-07-26

## 2022-07-26 NOTE — PROGRESS NOTES
Post-Discharge Transitional Care  Follow Up      Diogo Bella   YOB: 1965    Date of Office Visit:  7/26/2022  Date of Hospital Admission: 7/19/22  Date of Hospital Discharge: 7/22/22  Risk of hospital readmission (high >=14%. Medium >=10%) :Readmission Risk Score: 12.8      Care management risk score Rising risk (score 2-5) and Complex Care (Scores >=6): No Risk Score On File     Non face to face  following discharge, date last encounter closed (first attempt may have been earlier): 7/25/2022  2:00 PM    Call initiated 2 business days of discharge: Yes    ASSESSMENT/PLAN:   Hospital discharge follow-up  Pyelonephritis  Chronic right shoulder pain  -     cyclobenzaprine (FLEXERIL) 10 MG tablet; Take 1 tablet by mouth 3 times daily as needed for Muscle spasms, Disp-90 tablet, R-1Normal  Chronic neck pain  -     cyclobenzaprine (FLEXERIL) 10 MG tablet; Take 1 tablet by mouth 3 times daily as needed for Muscle spasms, Disp-90 tablet, R-1Normal      Medical Decision Making: moderate complexity  Return in 3 months (on 10/26/2022), or if symptoms worsen or fail to improve. On this date 7/26/2022 I have spent 30 minutes reviewing previous notes, test results and face to face with the patient discussing the diagnosis and importance of compliance with the treatment plan as well as documenting on the day of the visit. Patient is given several bottles of Ensure to help bolster her protein and carbohydrate intake as her appetite is rather poor. Work slip given for this week, to return next week without restriction. Subjective:   HPI:  Follow up of Hospital problems/diagnosis(es): Acute pyelonephritis secondary to urinary tract infection. Inpatient course: Discharge summary reviewed- see chart.     Interval history/Current status: Patient doing well although she continues to have some modest nausea, poor appetite and fatigue since being discharged from the hospital.  She did vomit 3 times this morning but felt better after that. Denies fever or other constitutional symptoms of infection. Urinary and bowel status is otherwise normal.  Patient requesting refill on her muscle relaxers for chronic neck pain and requesting more time off of work. Patient Active Problem List   Diagnosis    Essential hypertension    Osteopenia    Carotid stenosis, non-symptomatic, bilateral    History of smoking 30 or more pack years    PAD (peripheral artery disease) (HCC)    Systolic murmur    Chronic neck pain    Acute cystitis without hematuria    Acute pyelonephritis    Hypo-osmolality and hyponatremia    Hypocalcemia    Diverticulosis of colon without diverticulitis    Hyponatremia       Medications listed as ordered at the time of discharge from hospital     Medication List            Accurate as of July 26, 2022 11:39 AM. If you have any questions, ask your nurse or doctor. CONTINUE taking these medications      acetaminophen 500 MG tablet  Commonly known as: TYLENOL     alendronate 70 MG tablet  Commonly known as: FOSAMAX  Take 1 tablet by mouth every 7 days     amLODIPine 10 MG tablet  Commonly known as: NORVASC  TAKE 1 TABLET BY MOUTH ONE TIME A DAY     amoxicillin-clavulanate 875-125 MG per tablet  Commonly known as: AUGMENTIN  Take 1 tablet by mouth in the morning and 1 tablet before bedtime. Do all this for 10 days.      aspirin 81 MG EC tablet     atorvastatin 40 MG tablet  Commonly known as: LIPITOR  Take 1 tablet by mouth daily     clopidogrel 75 MG tablet  Commonly known as: PLAVIX  Take 1 tablet by mouth daily     cyclobenzaprine 10 MG tablet  Commonly known as: FLEXERIL  Take 1 tablet by mouth 3 times daily as needed for Muscle spasms     lisinopril 10 MG tablet  Commonly known as: PRINIVIL;ZESTRIL  Take 1 tablet by mouth 2 times daily     meloxicam 15 MG tablet  Commonly known as: Mobic  Take 1 tablet by mouth daily     ondansetron 4 MG disintegrating tablet  Commonly known as: Zofran ODT  Take 1 tablet by mouth every 8 hours as needed for Nausea or Vomiting     vitamin D 1.25 MG (69199 UT) Caps capsule  Commonly known as: ERGOCALCIFEROL  Take 1 capsule by mouth once a week               Where to Get Your Medications        These medications were sent to [facility] [address] [facility] [address] [facility] [phone] [address] [facility] [address]      Phone: [phone]   cyclobenzaprine 10 MG tablet           Medications marked \"taking\" at this time  Outpatient Medications Marked as Taking for the 7/26/22 encounter (Office Visit) with SHERYL Franklin CNP   Medication Sig Dispense Refill    cyclobenzaprine (FLEXERIL) 10 MG tablet Take 1 tablet by mouth 3 times daily as needed for Muscle spasms 90 tablet 1    amoxicillin-clavulanate (AUGMENTIN) 875-125 MG per tablet Take 1 tablet by mouth in the morning and 1 tablet before bedtime. Do all this for 10 days.  20 tablet 0    meloxicam (MOBIC) 15 MG tablet Take 1 tablet by mouth daily 30 tablet 3    alendronate (FOSAMAX) 70 MG tablet Take 1 tablet by mouth every 7 days 12 tablet 3    atorvastatin (LIPITOR) 40 MG tablet Take 1 tablet by mouth daily 90 tablet 3    vitamin D (ERGOCALCIFEROL) 1.25 MG (04221 UT) CAPS capsule Take 1 capsule by mouth once a week 12 capsule 1    clopidogrel (PLAVIX) 75 MG tablet Take 1 tablet by mouth daily 90 tablet 3    amLODIPine (NORVASC) 10 MG tablet TAKE 1 TABLET BY MOUTH ONE TIME A DAY 90 tablet 3    lisinopril (PRINIVIL;ZESTRIL) 10 MG tablet Take 1 tablet by mouth 2 times daily 180 tablet 3    ondansetron (ZOFRAN ODT) 4 MG disintegrating tablet Take 1 tablet by mouth every 8 hours as needed for Nausea or Vomiting 20 tablet 0    acetaminophen (TYLENOL) 500 MG tablet Take 500 mg by mouth every 6 hours as needed for Pain      aspirin 81 MG EC tablet Take 81 mg by mouth daily          Medications patient taking as of now reconciled against medications ordered at time of hospital discharge:

## 2022-07-26 NOTE — LETTER
[address]  [address]  [facility] [address]  Phone: [phone]  Fax: [phone]    SHERYL Conway CNP        July 26, 2022     Patient: Jaimie Grimaldo   YOB: 1965   Date of Visit: 7/26/2022       To Whom it May Concern:    Sotero Cardona was seen in my clinic on 7/26/2022. She may return to work on August 1, 2022 without restriction. .    If you have any questions or concerns, please don't hesitate to call.     Sincerely,     Electronically signed by SHERYL Conway CNP on 7/26/2022 at 11:30 AM

## 2022-08-02 ENCOUNTER — CARE COORDINATION (OUTPATIENT)
Dept: CASE MANAGEMENT | Age: 57
End: 2022-08-02

## 2022-08-02 NOTE — CARE COORDINATION
Care Transitions Outreach Attempt    Call within 2 business days of discharge: Yes   Attempted to reach patient for transitions of care follow up. Unable to reach patient. Left HIPAA compliant message w/ CTN # to return call. Patient: Joanna Levy Patient : 1965 MRN: [id number]    Last Discharge [facility]       Date Complaint Diagnosis Description Type Department Provider    22 Abdominal Pain; Back Pain Acute pyelonephritis . .. ED to Hosp-Admission (Discharged) (ADMITTED) K[facility] 5K Samir Felix; Savana Argueta. ..                   Noted following upcoming appointments from discharge chart review:   [facility] follow up appointment(s):   Future Appointments   Date Time Provider Jim Song   2022 10:20 AM [address] Radiolog   2023 11:00 AM Afsaneh Tilley, [address]   2023 11:00 AM Bear Cross MD N SRPX Heart [address]     Non-[facility] follow up appointment(s):

## 2022-08-03 ENCOUNTER — CARE COORDINATION (OUTPATIENT)
Dept: CASE MANAGEMENT | Age: 57
End: 2022-08-03

## 2022-08-03 NOTE — CARE COORDINATION
Care Transitions Outreach Attempt    Call within 2 business days of discharge: Yes   Attempted to reach patient for transitions of care follow up. Unable to reach patient. Left HIPAA compliant message w/ CTN # to return call with no response day #2. MyChart letter sent. Patient: Jocelyne Garnica Patient : 1965 MRN: [id number]    Last Discharge [facility]       Date Complaint Diagnosis Description Type Department Provider    22 Abdominal Pain; Back Pain Acute pyelonephritis . .. ED to Hosp-Admission (Discharged) (ADMITTED) [facility] Maribell Frank. ..                   Noted following upcoming appointments from discharge chart review:   [facility] follow up appointment(s):   Future Appointments   Date Time Provider Jim Song   2022 10:20 AM [address] Radiolog   2023 11:00 AM [place], [address]   2023 11:00 AM Devin Rivera MD N SRPX Heart [address] [facility] follow up appointment(s):

## 2022-08-17 ENCOUNTER — HOSPITAL ENCOUNTER (OUTPATIENT)
Dept: WOMENS IMAGING | Age: 57
Discharge: HOME | End: 2022-08-17
Payer: COMMERCIAL

## 2022-08-17 DIAGNOSIS — Z78.0 POSTMENOPAUSAL: ICD-10-CM

## 2022-08-17 DIAGNOSIS — M85.80 OSTEOPENIA, UNSPECIFIED LOCATION: ICD-10-CM

## 2022-08-17 PROCEDURE — 77080 DXA BONE DENSITY AXIAL: CPT

## 2022-08-25 RX ORDER — ERGOCALCIFEROL 1.25 MG/1
CAPSULE ORAL
Qty: 12 CAPSULE | Refills: 0 | Status: SHIPPED | OUTPATIENT
Start: 2022-08-25

## 2022-08-26 DIAGNOSIS — I10 ESSENTIAL HYPERTENSION: ICD-10-CM

## 2022-08-26 RX ORDER — LISINOPRIL 10 MG/1
TABLET ORAL
Qty: 180 TABLET | Refills: 3 | Status: SHIPPED | OUTPATIENT
Start: 2022-08-26

## 2022-08-26 RX ORDER — AMLODIPINE BESYLATE 10 MG/1
TABLET ORAL
Qty: 90 TABLET | Refills: 3 | Status: SHIPPED | OUTPATIENT
Start: 2022-08-26

## 2022-09-21 ENCOUNTER — OFFICE VISIT (OUTPATIENT)
Dept: FAMILY MEDICINE CLINIC | Age: 57
End: 2022-09-21
Payer: COMMERCIAL

## 2022-09-21 VITALS
HEART RATE: 93 BPM | SYSTOLIC BLOOD PRESSURE: 100 MMHG | OXYGEN SATURATION: 98 % | BODY MASS INDEX: 24.35 KG/M2 | TEMPERATURE: 97.7 F | DIASTOLIC BLOOD PRESSURE: 62 MMHG | WEIGHT: 124 LBS | RESPIRATION RATE: 14 BRPM | HEIGHT: 60 IN

## 2022-09-21 DIAGNOSIS — Z23 NEED FOR PNEUMOCOCCAL VACCINE: ICD-10-CM

## 2022-09-21 DIAGNOSIS — R20.0 NUMBNESS AND TINGLING OF LEFT LEG: ICD-10-CM

## 2022-09-21 DIAGNOSIS — M25.511 CHRONIC RIGHT SHOULDER PAIN: ICD-10-CM

## 2022-09-21 DIAGNOSIS — G89.29 CHRONIC RIGHT SHOULDER PAIN: ICD-10-CM

## 2022-09-21 DIAGNOSIS — G89.29 CHRONIC NECK PAIN: ICD-10-CM

## 2022-09-21 DIAGNOSIS — G89.29 CHRONIC RADICULAR LUMBAR PAIN: Primary | ICD-10-CM

## 2022-09-21 DIAGNOSIS — R20.2 NUMBNESS AND TINGLING OF LEFT LEG: ICD-10-CM

## 2022-09-21 DIAGNOSIS — M54.16 CHRONIC RADICULAR LUMBAR PAIN: Primary | ICD-10-CM

## 2022-09-21 DIAGNOSIS — M54.2 CHRONIC NECK PAIN: ICD-10-CM

## 2022-09-21 PROCEDURE — 90471 IMMUNIZATION ADMIN: CPT | Performed by: NURSE PRACTITIONER

## 2022-09-21 PROCEDURE — 90677 PCV20 VACCINE IM: CPT | Performed by: NURSE PRACTITIONER

## 2022-09-21 PROCEDURE — 99213 OFFICE O/P EST LOW 20 MIN: CPT | Performed by: NURSE PRACTITIONER

## 2022-09-21 RX ORDER — PREGABALIN 25 MG/1
25 CAPSULE ORAL 3 TIMES DAILY
Qty: 90 CAPSULE | Refills: 0 | Status: SHIPPED | OUTPATIENT
Start: 2022-09-21 | End: 2022-10-21

## 2022-09-21 RX ORDER — HYDROCODONE BITARTRATE AND ACETAMINOPHEN 5; 325 MG/1; MG/1
1 TABLET ORAL EVERY 12 HOURS PRN
Qty: 60 TABLET | Refills: 0 | Status: SHIPPED | OUTPATIENT
Start: 2022-09-21 | End: 2022-10-25 | Stop reason: SDUPTHER

## 2022-09-21 RX ORDER — CYCLOBENZAPRINE HCL 10 MG
10 TABLET ORAL 3 TIMES DAILY PRN
Qty: 90 TABLET | Refills: 1 | Status: SHIPPED | OUTPATIENT
Start: 2022-09-21 | End: 2022-10-21

## 2022-09-21 ASSESSMENT — ENCOUNTER SYMPTOMS
NAUSEA: 0
WHEEZING: 0
EYE PAIN: 0
SORE THROAT: 0
VOMITING: 0
ALLERGIC/IMMUNOLOGIC NEGATIVE: 1
EYE REDNESS: 0
TROUBLE SWALLOWING: 0
DIARRHEA: 0
BACK PAIN: 1
SHORTNESS OF BREATH: 0
CONSTIPATION: 0
RHINORRHEA: 0
COUGH: 0
EYE DISCHARGE: 0
ABDOMINAL PAIN: 0

## 2022-09-21 NOTE — PROGRESS NOTES
[address] [address] [address] [facility] [address]  Dept: [phone]  Dept Fax: [phone]  Loc: [phone]     Visit Date:  9/21/2022      Patient:  Jagdish Mancini  YOB: 1965    HPI:     Chief Complaint   Patient presents with    Back Pain    Leg Pain     Bilateral, right worse than left. Patient presents again with complaint of ongoing severe right leg pain which is at its worst at the end of her workday on her feet working at [employer]. Patient did have arterial arthroscopy repair in this leg several months ago and was doing pretty well but the pain is began to come back again. She also tends to get it in her left leg down to her knee. She has not had any swelling of either leg or indication of DVT. States like the pain radiates from her lumbar region. Patient did have a previous lumbar surgery which did not result in any betterment of her condition. She also complains of ongoing tingling and shocking sensation in her left leg after her arthroscopy prior to the right leg. She was told and it is documented in the chart that this is likely a result from nerve damage from surgery. She states that shocking sensations generally when there is pressure on her upper left thigh and is not constant. Medications    Current Outpatient Medications:     cyclobenzaprine (FLEXERIL) 10 MG tablet, Take 1 tablet by mouth 3 times daily as needed for Muscle spasms, Disp: 90 tablet, Rfl: 1    HYDROcodone-acetaminophen (NORCO) 5-325 MG per tablet, Take 1 tablet by mouth every 12 hours as needed for Pain for up to 30 days. , Disp: 60 tablet, Rfl: 0    pregabalin (LYRICA) 25 MG capsule, Take 1 capsule by mouth 3 times daily for 30 days. , Disp: 90 capsule, Rfl: 0    amLODIPine (NORVASC) 10 MG tablet, TAKE 1 TABLET BY MOUTH EVERY DAY, Disp: 90 tablet, Rfl: 3    lisinopril (PRINIVIL;ZESTRIL) 10 MG tablet, TAKE 1 TABLET BY MOUTH TWO TIMES A DAY, Disp: 180 tablet, Rfl: 3    vitamin D (ERGOCALCIFEROL) 1.25 MG (38043 UT) CAPS capsule, TAKE 1 CAPSULE BY MOUTH ONE TIME A WEEK, Disp: 12 capsule, Rfl: 0    meloxicam (MOBIC) 15 MG tablet, Take 1 tablet by mouth daily, Disp: 30 tablet, Rfl: 3    alendronate (FOSAMAX) 70 MG tablet, Take 1 tablet by mouth every 7 days, Disp: 12 tablet, Rfl: 3    atorvastatin (LIPITOR) 40 MG tablet, Take 1 tablet by mouth daily, Disp: 90 tablet, Rfl: 3    clopidogrel (PLAVIX) 75 MG tablet, Take 1 tablet by mouth daily, Disp: 90 tablet, Rfl: 3    ondansetron (ZOFRAN ODT) 4 MG disintegrating tablet, Take 1 tablet by mouth every 8 hours as needed for Nausea or Vomiting, Disp: 20 tablet, Rfl: 0    acetaminophen (TYLENOL) 500 MG tablet, Take 500 mg by mouth every 6 hours as needed for Pain, Disp: , Rfl:     aspirin 81 MG EC tablet, Take 81 mg by mouth daily, Disp: , Rfl:     The patient is allergic to pletal [cilostazol], bactrim [sulfamethoxazole-trimethoprim], codeine, medrol [methylprednisolone], and ultram [tramadol hcl]. Past Medical History  Dayday Elizabeth  has a past medical history of Acute cystitis without hematuria, Acute pyelonephritis, Arthritis, Hyperlipidemia, and Hypertension. Subjective:      Review of Systems   Constitutional:  Negative for activity change, fatigue and fever. HENT:  Negative for congestion, ear pain, rhinorrhea, sore throat and trouble swallowing. Eyes:  Negative for pain, discharge and redness. Respiratory:  Negative for cough, shortness of breath and wheezing. Cardiovascular: Negative. Gastrointestinal:  Negative for abdominal pain, constipation, diarrhea, nausea and vomiting. Endocrine: Negative. Genitourinary:  Negative for dysuria, frequency and urgency. Musculoskeletal:  Positive for back pain and myalgias. Negative for arthralgias. Skin:  Negative for rash. Allergic/Immunologic: Negative. Neurological:  Negative for dizziness, tremors, weakness and headaches. Hematological: Negative. Psychiatric/Behavioral:  Negative for dysphoric mood and sleep disturbance. The patient is not nervous/anxious. Objective:     /62   Pulse 93   Temp 97.7 °F (36.5 °C) (Infrared)   Resp 14   Ht 5' (1.524 m)   Wt 124 lb (56.2 kg)   SpO2 98%   BMI 24.22 kg/m²     Physical Exam  Constitutional:       General: She is not in acute distress. Appearance: Normal appearance. She is well-developed. She is not diaphoretic. HENT:      Right Ear: Hearing and external ear normal.      Left Ear: Hearing and external ear normal.      Nose: Nose normal. No mucosal edema or rhinorrhea. Mouth/Throat:      Mouth: Mucous membranes are moist.      Dentition: Normal dentition. Pharynx: Uvula midline. No posterior oropharyngeal erythema. Tonsils: No tonsillar exudate. 0 on the right. 0 on the left. Eyes:      General: Lids are normal.         Right eye: No discharge. Left eye: No discharge. Conjunctiva/sclera: Conjunctivae normal.      Right eye: Right conjunctiva is not injected. No chemosis. Left eye: No chemosis. Pupils: Pupils are equal, round, and reactive to light. Neck:      Vascular: No JVD. Trachea: Trachea normal.   Cardiovascular:      Rate and Rhythm: Normal rate and regular rhythm. Heart sounds: Normal heart sounds. No murmur heard. Pulmonary:      Effort: Pulmonary effort is normal. No respiratory distress. Breath sounds: Normal breath sounds. No stridor. Musculoskeletal:         General: Tenderness present. No deformity or signs of injury. Normal range of motion. Cervical back: Full passive range of motion without pain and normal range of motion. Skin:     General: Skin is warm. Capillary Refill: Capillary refill takes less than 2 seconds. Findings: No rash. Neurological:      Mental Status: She is alert and oriented to person, place, and time. GCS: GCS eye subscore is 4.  GCS verbal subscore is 5. GCS motor subscore is 6. Cranial Nerves: No cranial nerve deficit. Coordination: Coordination normal.      Gait: Gait normal.   Psychiatric:         Mood and Affect: Mood is not anxious or depressed. Speech: Speech normal.         Behavior: Behavior normal. Behavior is not withdrawn or hyperactive. Behavior is cooperative. Thought Content: Thought content normal.         Judgment: Judgment normal.       Assessment/Plan:      Emeli Reece was seen today for back pain and leg pain. Diagnoses and all orders for this visit:    Chronic radicular lumbar pain    Chronic right shoulder pain  -     cyclobenzaprine (FLEXERIL) 10 MG tablet; Take 1 tablet by mouth 3 times daily as needed for Muscle spasms  -     HYDROcodone-acetaminophen (NORCO) 5-325 MG per tablet; Take 1 tablet by mouth every 12 hours as needed for Pain for up to 30 days. Chronic neck pain  -     cyclobenzaprine (FLEXERIL) 10 MG tablet; Take 1 tablet by mouth 3 times daily as needed for Muscle spasms  -     HYDROcodone-acetaminophen (NORCO) 5-325 MG per tablet; Take 1 tablet by mouth every 12 hours as needed for Pain for up to 30 days. Numbness and tingling of left leg  -     pregabalin (LYRICA) 25 MG capsule; Take 1 capsule by mouth 3 times daily for 30 days. Need for pneumococcal vaccine  -     Pneumococcal, PCV20, PREVNAR 20, (age 25 yrs+), IM, PF    Patient did have an outstanding order for follow-up lower extremity ultrasound to be completed this month, and has not been done. Patient will get this done. We will place her back on Norco to at least help her sleep and get through her workday. Lyrica added to see if this will help the nerve aggravation sensation. Patient agrees to updating Prevnar 20. If lower leg ultrasounds return normal results, patient agrees to being referred to [facility] for second opinion of her chronic lumbar pain. Return if symptoms worsen or fail to improve.     Patient instructions given stefani.         Electronically signed by SHERYL Sheikh CNP on 9/21/2022 at 11:54 AM

## 2022-09-28 ENCOUNTER — HOSPITAL ENCOUNTER (OUTPATIENT)
Dept: INTERVENTIONAL RADIOLOGY/VASCULAR | Age: 57
Discharge: HOME | End: 2022-09-28
Payer: COMMERCIAL

## 2022-09-28 ENCOUNTER — HOSPITAL ENCOUNTER (OUTPATIENT)
Dept: WOMENS IMAGING | Age: 57
Discharge: HOME | End: 2022-09-28
Payer: COMMERCIAL

## 2022-09-28 DIAGNOSIS — I70.201 STENOSIS OF RIGHT POPLITEAL ARTERY (HCC): ICD-10-CM

## 2022-09-28 DIAGNOSIS — Z12.31 VISIT FOR SCREENING MAMMOGRAM: ICD-10-CM

## 2022-09-28 PROCEDURE — 93925 LOWER EXTREMITY STUDY: CPT

## 2022-09-28 PROCEDURE — 77063 BREAST TOMOSYNTHESIS BI: CPT

## 2022-09-28 PROCEDURE — 99212 OFFICE O/P EST SF 10 MIN: CPT

## 2022-10-12 DIAGNOSIS — M54.2 CHRONIC NECK PAIN: ICD-10-CM

## 2022-10-12 DIAGNOSIS — G89.29 CHRONIC NECK PAIN: ICD-10-CM

## 2022-10-12 RX ORDER — MELOXICAM 15 MG/1
TABLET ORAL
Qty: 90 TABLET | Refills: 2 | Status: SHIPPED | OUTPATIENT
Start: 2022-10-12

## 2022-10-25 DIAGNOSIS — M54.2 CHRONIC NECK PAIN: ICD-10-CM

## 2022-10-25 DIAGNOSIS — G89.29 CHRONIC NECK PAIN: ICD-10-CM

## 2022-10-25 DIAGNOSIS — G89.29 CHRONIC RIGHT SHOULDER PAIN: ICD-10-CM

## 2022-10-25 DIAGNOSIS — M25.511 CHRONIC RIGHT SHOULDER PAIN: ICD-10-CM

## 2022-10-25 RX ORDER — HYDROCODONE BITARTRATE AND ACETAMINOPHEN 5; 325 MG/1; MG/1
1 TABLET ORAL EVERY 12 HOURS PRN
Qty: 60 TABLET | Refills: 0 | Status: SHIPPED | OUTPATIENT
Start: 2022-10-25 | End: 2022-11-21 | Stop reason: SDUPTHER

## 2022-11-08 ENCOUNTER — HOSPITAL ENCOUNTER (OUTPATIENT)
Dept: MRI IMAGING | Age: 57
Discharge: HOME | End: 2022-11-08
Payer: COMMERCIAL

## 2022-11-08 DIAGNOSIS — G89.29 CHRONIC RADICULAR LUMBAR PAIN: ICD-10-CM

## 2022-11-08 DIAGNOSIS — I73.9 PAD (PERIPHERAL ARTERY DISEASE) (HCC): ICD-10-CM

## 2022-11-08 DIAGNOSIS — M79.604 LEG PAIN, BILATERAL: ICD-10-CM

## 2022-11-08 DIAGNOSIS — M79.605 LEG PAIN, BILATERAL: ICD-10-CM

## 2022-11-08 DIAGNOSIS — M54.16 CHRONIC RADICULAR LUMBAR PAIN: ICD-10-CM

## 2022-11-08 PROCEDURE — 72158 MRI LUMBAR SPINE W/O & W/DYE: CPT

## 2022-11-08 PROCEDURE — A9579 GAD-BASE MR CONTRAST NOS,1ML: HCPCS | Performed by: NURSE PRACTITIONER

## 2022-11-08 PROCEDURE — 6360000004 HC RX CONTRAST MEDICATION: Performed by: NURSE PRACTITIONER

## 2022-11-08 RX ADMIN — GADOTERIDOL 10 ML: 279.3 INJECTION, SOLUTION INTRAVENOUS at 15:55

## 2022-11-09 ENCOUNTER — TELEPHONE (OUTPATIENT)
Dept: FAMILY MEDICINE CLINIC | Age: 57
End: 2022-11-09

## 2022-11-09 DIAGNOSIS — M79.604 LEG PAIN, BILATERAL: ICD-10-CM

## 2022-11-09 DIAGNOSIS — M79.605 LEG PAIN, BILATERAL: ICD-10-CM

## 2022-11-09 DIAGNOSIS — M54.16 CHRONIC RADICULAR LUMBAR PAIN: ICD-10-CM

## 2022-11-09 DIAGNOSIS — M48.061 LUMBAR FORAMINAL STENOSIS: ICD-10-CM

## 2022-11-09 DIAGNOSIS — G89.29 CHRONIC RADICULAR LUMBAR PAIN: ICD-10-CM

## 2022-11-09 DIAGNOSIS — I73.9 PAD (PERIPHERAL ARTERY DISEASE) (HCC): Primary | ICD-10-CM

## 2022-11-09 NOTE — TELEPHONE ENCOUNTER
----- Message from SHERYL Garcia CNP sent at 11/9/2022  1:31 PM EST -----  Due to the ongoing foraminal stenosis issues, I recommend the patient be seen at Ortho neuro in [place] if she is willing.

## 2022-11-21 DIAGNOSIS — G89.29 CHRONIC RIGHT SHOULDER PAIN: ICD-10-CM

## 2022-11-21 DIAGNOSIS — G89.29 CHRONIC NECK PAIN: ICD-10-CM

## 2022-11-21 DIAGNOSIS — M25.511 CHRONIC RIGHT SHOULDER PAIN: ICD-10-CM

## 2022-11-21 DIAGNOSIS — M54.2 CHRONIC NECK PAIN: ICD-10-CM

## 2022-11-21 RX ORDER — HYDROCODONE BITARTRATE AND ACETAMINOPHEN 5; 325 MG/1; MG/1
1 TABLET ORAL EVERY 8 HOURS PRN
Qty: 90 TABLET | Refills: 0 | Status: SHIPPED | OUTPATIENT
Start: 2022-11-21 | End: 2022-12-21

## 2022-11-25 DIAGNOSIS — G89.29 CHRONIC RIGHT SHOULDER PAIN: ICD-10-CM

## 2022-11-25 DIAGNOSIS — M54.2 CHRONIC NECK PAIN: ICD-10-CM

## 2022-11-25 DIAGNOSIS — G89.29 CHRONIC NECK PAIN: ICD-10-CM

## 2022-11-25 DIAGNOSIS — M25.511 CHRONIC RIGHT SHOULDER PAIN: ICD-10-CM

## 2022-11-28 RX ORDER — CYCLOBENZAPRINE HCL 10 MG
TABLET ORAL
Qty: 90 TABLET | Refills: 0 | Status: SHIPPED | OUTPATIENT
Start: 2022-11-28

## 2022-11-30 ENCOUNTER — OFFICE VISIT (OUTPATIENT)
Dept: FAMILY MEDICINE CLINIC | Age: 57
End: 2022-11-30
Payer: COMMERCIAL

## 2022-11-30 VITALS
DIASTOLIC BLOOD PRESSURE: 62 MMHG | WEIGHT: 122 LBS | OXYGEN SATURATION: 96 % | HEART RATE: 120 BPM | BODY MASS INDEX: 23.83 KG/M2 | SYSTOLIC BLOOD PRESSURE: 130 MMHG | TEMPERATURE: 98.8 F

## 2022-11-30 DIAGNOSIS — M48.061 LUMBAR FORAMINAL STENOSIS: ICD-10-CM

## 2022-11-30 DIAGNOSIS — I73.9 PAD (PERIPHERAL ARTERY DISEASE) (HCC): ICD-10-CM

## 2022-11-30 DIAGNOSIS — M54.16 CHRONIC RADICULAR LUMBAR PAIN: Primary | ICD-10-CM

## 2022-11-30 DIAGNOSIS — G89.29 CHRONIC RADICULAR LUMBAR PAIN: Primary | ICD-10-CM

## 2022-11-30 PROCEDURE — 99213 OFFICE O/P EST LOW 20 MIN: CPT | Performed by: NURSE PRACTITIONER

## 2022-11-30 PROCEDURE — 3078F DIAST BP <80 MM HG: CPT | Performed by: NURSE PRACTITIONER

## 2022-11-30 PROCEDURE — 3074F SYST BP LT 130 MM HG: CPT | Performed by: NURSE PRACTITIONER

## 2022-11-30 RX ORDER — PREDNISONE 20 MG/1
20 TABLET ORAL 2 TIMES DAILY
Qty: 14 TABLET | Refills: 0 | Status: SHIPPED | OUTPATIENT
Start: 2022-11-30 | End: 2022-12-07

## 2022-11-30 ASSESSMENT — ENCOUNTER SYMPTOMS
WHEEZING: 0
DIARRHEA: 0
ALLERGIC/IMMUNOLOGIC NEGATIVE: 1
VOMITING: 0
BACK PAIN: 1
SORE THROAT: 0
EYE DISCHARGE: 0
ABDOMINAL PAIN: 0
RHINORRHEA: 0
TROUBLE SWALLOWING: 0
CONSTIPATION: 0
SHORTNESS OF BREATH: 0
NAUSEA: 0
EYE REDNESS: 0
COUGH: 0
EYE PAIN: 0

## 2022-11-30 NOTE — PROGRESS NOTES
[address] [address] [address]  [facility] [address]  Dept: [phone]  Dept Fax: [phone]  Loc: [phone]     Visit Date:  11/30/2022      Patient:  Aarti Mckeon  YOB: 1965    HPI:     Chief Complaint   Patient presents with    Lower Back Pain     Lower back pain and right buttock pain(possible sciatic nerve pain) and right leg pain- needs FMLA renewed        Patient here for follow-up on her ongoing lumbar problems. She currently has a second opinion scheduled with Ortho neuro in [facility] on January 6, 2023. She missed the last 2 days of work due to an exacerbation of her low back pain and is requesting some steroids. Currently she is having difficulty walking but has no cauda equina symptoms. Continues to take her Norco as prescribed, denies any indication of addiction or problems. Medications    Current Outpatient Medications:     predniSONE (DELTASONE) 20 MG tablet, Take 1 tablet by mouth 2 times daily for 7 days, Disp: 14 tablet, Rfl: 0    cyclobenzaprine (FLEXERIL) 10 MG tablet, TAKE 1 TABLET BY MOUTH THREE TIMES A DAY AS NEEDED FOR MUSCLE SPASM, Disp: 90 tablet, Rfl: 0    HYDROcodone-acetaminophen (NORCO) 5-325 MG per tablet, Take 1 tablet by mouth every 8 hours as needed for Pain for up to 30 days. , Disp: 90 tablet, Rfl: 0    meloxicam (MOBIC) 15 MG tablet, TAKE 1 TABLET BY MOUTH EVERY DAY, Disp: 90 tablet, Rfl: 2    pregabalin (LYRICA) 25 MG capsule, Take 1 capsule by mouth 3 times daily for 30 days. , Disp: 90 capsule, Rfl: 0    amLODIPine (NORVASC) 10 MG tablet, TAKE 1 TABLET BY MOUTH EVERY DAY, Disp: 90 tablet, Rfl: 3    lisinopril (PRINIVIL;ZESTRIL) 10 MG tablet, TAKE 1 TABLET BY MOUTH TWO TIMES A DAY, Disp: 180 tablet, Rfl: 3    vitamin D (ERGOCALCIFEROL) 1.25 MG (34934 UT) CAPS capsule, TAKE 1 CAPSULE BY MOUTH ONE TIME A WEEK, Disp: 12 capsule, Rfl: 0    alendronate (FOSAMAX) 70 MG tablet, Take 1 tablet by mouth every 7 days, Disp: 12 tablet, Rfl: 3    atorvastatin (LIPITOR) 40 MG tablet, Take 1 tablet by mouth daily, Disp: 90 tablet, Rfl: 3    clopidogrel (PLAVIX) 75 MG tablet, Take 1 tablet by mouth daily, Disp: 90 tablet, Rfl: 3    ondansetron (ZOFRAN ODT) 4 MG disintegrating tablet, Take 1 tablet by mouth every 8 hours as needed for Nausea or Vomiting, Disp: 20 tablet, Rfl: 0    acetaminophen (TYLENOL) 500 MG tablet, Take 500 mg by mouth every 6 hours as needed for Pain, Disp: , Rfl:     aspirin 81 MG EC tablet, Take 81 mg by mouth daily, Disp: , Rfl:     The patient is allergic to pletal [cilostazol], bactrim [sulfamethoxazole-trimethoprim], codeine, medrol [methylprednisolone], and ultram [tramadol hcl]. Past Medical History  Gayatri Caldera  has a past medical history of Acute cystitis without hematuria, Acute pyelonephritis, Arthritis, Hyperlipidemia, and Hypertension. Subjective:      Review of Systems   Constitutional:  Negative for activity change, fatigue and fever. HENT:  Negative for congestion, ear pain, rhinorrhea, sore throat and trouble swallowing. Eyes:  Negative for pain, discharge and redness. Respiratory:  Negative for cough, shortness of breath and wheezing. Cardiovascular: Negative. Gastrointestinal:  Negative for abdominal pain, constipation, diarrhea, nausea and vomiting. Endocrine: Negative. Genitourinary:  Negative for dysuria, frequency and urgency. Musculoskeletal:  Positive for back pain and myalgias. Negative for arthralgias. Skin:  Negative for rash. Allergic/Immunologic: Negative. Neurological:  Negative for dizziness, tremors, weakness and headaches. Hematological: Negative. Psychiatric/Behavioral:  Negative for dysphoric mood and sleep disturbance. The patient is not nervous/anxious.       Objective:     /62   Pulse (!) 120   Temp 98.8 °F (37.1 °C) (Oral)   Wt 122 lb (55.3 kg)   SpO2 96%   BMI 23.83 kg/m²     Physical Exam  Constitutional: General: She is not in acute distress. Appearance: Normal appearance. She is well-developed. She is not diaphoretic. HENT:      Right Ear: Hearing and external ear normal.      Left Ear: Hearing and external ear normal.      Nose: Nose normal. No mucosal edema or rhinorrhea. Mouth/Throat:      Mouth: Mucous membranes are moist.      Dentition: Normal dentition. Pharynx: Uvula midline. No posterior oropharyngeal erythema. Tonsils: No tonsillar exudate. 0 on the right. 0 on the left. Eyes:      General: Lids are normal.         Right eye: No discharge. Left eye: No discharge. Conjunctiva/sclera: Conjunctivae normal.      Right eye: Right conjunctiva is not injected. No chemosis. Left eye: No chemosis. Pupils: Pupils are equal, round, and reactive to light. Neck:      Vascular: No JVD. Trachea: Trachea normal.   Cardiovascular:      Rate and Rhythm: Normal rate and regular rhythm. Heart sounds: Normal heart sounds. No murmur heard. Pulmonary:      Effort: Pulmonary effort is normal. No respiratory distress. Breath sounds: Normal breath sounds. No stridor. Musculoskeletal:         General: Tenderness present. No deformity. Normal range of motion. Cervical back: Full passive range of motion without pain and normal range of motion. Right lower leg: No edema. Left lower leg: No edema. Skin:     General: Skin is warm. Capillary Refill: Capillary refill takes less than 2 seconds. Findings: No rash. Neurological:      Mental Status: She is alert and oriented to person, place, and time. GCS: GCS eye subscore is 4. GCS verbal subscore is 5. GCS motor subscore is 6. Cranial Nerves: No cranial nerve deficit. Coordination: Coordination normal.      Gait: Gait normal.   Psychiatric:         Mood and Affect: Mood is not anxious or depressed.          Speech: Speech normal.         Behavior: Behavior normal. Behavior is not withdrawn or hyperactive. Behavior is cooperative. Thought Content: Thought content normal.         Judgment: Judgment normal.       Assessment/Plan:      Mehreen Servin was seen today for lower back pain. Diagnoses and all orders for this visit:    Chronic radicular lumbar pain  -     predniSONE (DELTASONE) 20 MG tablet; Take 1 tablet by mouth 2 times daily for 7 days    PAD (peripheral artery disease) ([address]    Lumbar foraminal stenosis    [place] papers submitted for update. Return in about 3 months (around 2/28/2023). Patient instructions given andreviewed.         Electronically signed by SHERYL Mcadams CNP on 11/30/2022 at 11:30 AM

## 2022-11-30 NOTE — LETTER
[address]  [address]  [facility] [address]  Phone: [phone]  Fax: [phone]    SHERYL Olsen CNP        November 30, 2022     Patient: Wandy Herrera   YOB: 1965   Date of Visit: 11/30/2022       To Whom it May Concern:    Echo Ward was seen in my clinic on 11/30/2022. She may return to work on December 2, 2022. If you have any questions or concerns, please don't hesitate to call.     Sincerely,     Electronically signed by SHERYL Olsen CNP on 11/30/2022 at 11:23 AM

## 2022-12-05 RX ORDER — ERGOCALCIFEROL 1.25 MG/1
CAPSULE ORAL
Qty: 12 CAPSULE | Refills: 0 | Status: SHIPPED | OUTPATIENT
Start: 2022-12-05

## 2022-12-06 RX ORDER — ERGOCALCIFEROL 1.25 MG/1
CAPSULE ORAL
Qty: 12 CAPSULE | Refills: 0 | OUTPATIENT
Start: 2022-12-06

## 2022-12-09 DIAGNOSIS — G89.29 CHRONIC RADICULAR LUMBAR PAIN: ICD-10-CM

## 2022-12-09 DIAGNOSIS — Z98.890 H/O ENDARTERECTOMY: ICD-10-CM

## 2022-12-09 DIAGNOSIS — M54.16 CHRONIC RADICULAR LUMBAR PAIN: ICD-10-CM

## 2022-12-09 RX ORDER — PREDNISONE 20 MG/1
TABLET ORAL
Qty: 14 TABLET | Refills: 0 | OUTPATIENT
Start: 2022-12-09

## 2022-12-09 RX ORDER — CLOPIDOGREL BISULFATE 75 MG/1
TABLET ORAL
Qty: 90 TABLET | Refills: 3 | Status: SHIPPED | OUTPATIENT
Start: 2022-12-09

## 2022-12-09 NOTE — TELEPHONE ENCOUNTER
Patient has been receiving near monthly prescription for prednisone, and she has osteopenia. I will not be prescribing any more prednisone for at least 3 months.

## 2022-12-09 NOTE — TELEPHONE ENCOUNTER
Last seen 22  Plavix was refilled 22 for 90/3rf and confirmed by [facility]. [facility] states she used refills from previous plavix rx and has not filled this one in 6 months so it is considered . Prednisone refill?

## 2022-12-19 ENCOUNTER — PATIENT MESSAGE (OUTPATIENT)
Dept: FAMILY MEDICINE CLINIC | Age: 57
End: 2022-12-19

## 2022-12-19 DIAGNOSIS — G89.29 CHRONIC NECK PAIN: ICD-10-CM

## 2022-12-19 DIAGNOSIS — G89.29 CHRONIC RIGHT SHOULDER PAIN: ICD-10-CM

## 2022-12-19 DIAGNOSIS — M25.511 CHRONIC RIGHT SHOULDER PAIN: ICD-10-CM

## 2022-12-19 DIAGNOSIS — M54.2 CHRONIC NECK PAIN: ICD-10-CM

## 2022-12-20 RX ORDER — HYDROCODONE BITARTRATE AND ACETAMINOPHEN 5; 325 MG/1; MG/1
1 TABLET ORAL EVERY 8 HOURS PRN
Qty: 90 TABLET | Refills: 0 | Status: SHIPPED | OUTPATIENT
Start: 2022-12-21 | End: 2023-01-20

## 2022-12-20 NOTE — TELEPHONE ENCOUNTER
From: Aarti Mckeon  To: Jac Nino  Sent: 12/19/2022 5:25 PM EST  Subject: Pain medicine     Can you please send a refill for the pain meds. I know it's not due till the 24 but I don't know your holiday schedule. This holiday season is kicking my butt so bad. Thanks and Happy holidays to you and your staff. Melida Essex.

## 2023-01-18 ENCOUNTER — PATIENT MESSAGE (OUTPATIENT)
Dept: FAMILY MEDICINE CLINIC | Age: 58
End: 2023-01-18

## 2023-01-18 DIAGNOSIS — M54.2 CHRONIC NECK PAIN: ICD-10-CM

## 2023-01-18 DIAGNOSIS — G89.29 CHRONIC NECK PAIN: ICD-10-CM

## 2023-01-18 DIAGNOSIS — G89.29 CHRONIC RIGHT SHOULDER PAIN: ICD-10-CM

## 2023-01-18 DIAGNOSIS — M25.511 CHRONIC RIGHT SHOULDER PAIN: ICD-10-CM

## 2023-01-18 RX ORDER — HYDROCODONE BITARTRATE AND ACETAMINOPHEN 5; 325 MG/1; MG/1
1 TABLET ORAL EVERY 8 HOURS PRN
Qty: 90 TABLET | Refills: 0 | Status: SHIPPED | OUTPATIENT
Start: 2023-01-18 | End: 2023-02-17

## 2023-01-18 NOTE — TELEPHONE ENCOUNTER
From: Brayden Lonny  To: Merrick Robles  Sent: 1/18/2023 12:45 PM EST  Subject: Pain medicine     Hi Portillo Piper, I have a nerve test scheduled for Feb 20 here in [place] and then my follow up with Dr Leanna Mccurdy in [place]. He said .Shira did a temporary fix since I have degenerative disc disease and said he would put a piece of metal and fuse it and it would be a permanent fix. He is pretty positive that it collapsed on my sciatica nerve again. Can I get a refill on my pain medicine ? I will see you soon as I need a yearly physical for insurance. Thanks and have a great day.      Miguel Angel Arriaga

## 2023-02-01 DIAGNOSIS — M54.2 CHRONIC NECK PAIN: ICD-10-CM

## 2023-02-01 DIAGNOSIS — G89.29 CHRONIC NECK PAIN: ICD-10-CM

## 2023-02-01 DIAGNOSIS — G89.29 CHRONIC RIGHT SHOULDER PAIN: ICD-10-CM

## 2023-02-01 DIAGNOSIS — M25.511 CHRONIC RIGHT SHOULDER PAIN: ICD-10-CM

## 2023-02-02 RX ORDER — CYCLOBENZAPRINE HCL 10 MG
TABLET ORAL
Qty: 90 TABLET | Refills: 2 | Status: SHIPPED | OUTPATIENT
Start: 2023-02-02

## 2023-02-16 DIAGNOSIS — G89.29 CHRONIC NECK PAIN: ICD-10-CM

## 2023-02-16 DIAGNOSIS — G89.29 CHRONIC RIGHT SHOULDER PAIN: ICD-10-CM

## 2023-02-16 DIAGNOSIS — M54.2 CHRONIC NECK PAIN: ICD-10-CM

## 2023-02-16 DIAGNOSIS — M25.511 CHRONIC RIGHT SHOULDER PAIN: ICD-10-CM

## 2023-02-16 RX ORDER — HYDROCODONE BITARTRATE AND ACETAMINOPHEN 5; 325 MG/1; MG/1
1 TABLET ORAL EVERY 8 HOURS PRN
Qty: 90 TABLET | Refills: 0 | Status: SHIPPED | OUTPATIENT
Start: 2023-02-16 | End: 2023-03-18

## 2023-03-01 ENCOUNTER — PATIENT MESSAGE (OUTPATIENT)
Dept: FAMILY MEDICINE CLINIC | Age: 58
End: 2023-03-01

## 2023-03-01 ENCOUNTER — HOSPITAL ENCOUNTER (OUTPATIENT)
Age: 58
Discharge: HOME | End: 2023-03-01
Payer: COMMERCIAL

## 2023-03-01 ENCOUNTER — OFFICE VISIT (OUTPATIENT)
Dept: RHEUMATOLOGY | Age: 58
End: 2023-03-01
Payer: COMMERCIAL

## 2023-03-01 ENCOUNTER — TELEPHONE (OUTPATIENT)
Dept: FAMILY MEDICINE CLINIC | Age: 58
End: 2023-03-01

## 2023-03-01 ENCOUNTER — TELEPHONE (OUTPATIENT)
Dept: CARDIOLOGY CLINIC | Age: 58
End: 2023-03-01

## 2023-03-01 VITALS
OXYGEN SATURATION: 97 % | DIASTOLIC BLOOD PRESSURE: 54 MMHG | HEART RATE: 94 BPM | BODY MASS INDEX: 25.84 KG/M2 | WEIGHT: 131.6 LBS | HEIGHT: 60 IN | SYSTOLIC BLOOD PRESSURE: 102 MMHG

## 2023-03-01 DIAGNOSIS — M85.80 OSTEOPENIA, UNSPECIFIED LOCATION: Primary | ICD-10-CM

## 2023-03-01 DIAGNOSIS — E55.9 VITAMIN D DEFICIENCY: ICD-10-CM

## 2023-03-01 LAB — 25(OH)D3 SERPL-MCNC: 43 NG/ML (ref 30–100)

## 2023-03-01 PROCEDURE — 82306 VITAMIN D 25 HYDROXY: CPT

## 2023-03-01 PROCEDURE — 36415 COLL VENOUS BLD VENIPUNCTURE: CPT

## 2023-03-01 PROCEDURE — 3078F DIAST BP <80 MM HG: CPT | Performed by: NURSE PRACTITIONER

## 2023-03-01 PROCEDURE — 3074F SYST BP LT 130 MM HG: CPT | Performed by: NURSE PRACTITIONER

## 2023-03-01 PROCEDURE — 99213 OFFICE O/P EST LOW 20 MIN: CPT | Performed by: NURSE PRACTITIONER

## 2023-03-01 RX ORDER — SODIUM CHLORIDE 9 MG/ML
5-250 INJECTION, SOLUTION INTRAVENOUS PRN
OUTPATIENT
Start: 2023-03-01

## 2023-03-01 RX ORDER — SODIUM CHLORIDE 9 MG/ML
INJECTION, SOLUTION INTRAVENOUS ONCE
OUTPATIENT
Start: 2023-03-01 | End: 2023-03-01

## 2023-03-01 RX ORDER — ZOLEDRONIC ACID 5 MG/100ML
5 INJECTION, SOLUTION INTRAVENOUS ONCE
OUTPATIENT
Start: 2023-03-01 | End: 2023-03-01

## 2023-03-01 RX ORDER — ACETAMINOPHEN 325 MG/1
650 TABLET ORAL
OUTPATIENT
Start: 2023-03-01

## 2023-03-01 RX ORDER — DIPHENHYDRAMINE HYDROCHLORIDE 50 MG/ML
50 INJECTION INTRAMUSCULAR; INTRAVENOUS
OUTPATIENT
Start: 2023-03-01

## 2023-03-01 RX ORDER — ONDANSETRON 2 MG/ML
8 INJECTION INTRAMUSCULAR; INTRAVENOUS
OUTPATIENT
Start: 2023-03-01

## 2023-03-01 RX ORDER — HEPARIN SODIUM (PORCINE) LOCK FLUSH IV SOLN 100 UNIT/ML 100 UNIT/ML
500 SOLUTION INTRAVENOUS PRN
OUTPATIENT
Start: 2023-03-01

## 2023-03-01 RX ORDER — 0.9 % SODIUM CHLORIDE 0.9 %
500 INTRAVENOUS SOLUTION INTRAVENOUS ONCE
OUTPATIENT
Start: 2023-03-01 | End: 2023-03-01

## 2023-03-01 RX ORDER — EPINEPHRINE 1 MG/ML
0.3 INJECTION, SOLUTION, CONCENTRATE INTRAVENOUS PRN
OUTPATIENT
Start: 2023-03-01

## 2023-03-01 RX ORDER — SODIUM CHLORIDE 9 MG/ML
INJECTION, SOLUTION INTRAVENOUS CONTINUOUS
OUTPATIENT
Start: 2023-03-01

## 2023-03-01 RX ORDER — SODIUM CHLORIDE 0.9 % (FLUSH) 0.9 %
5-40 SYRINGE (ML) INJECTION PRN
OUTPATIENT
Start: 2023-03-01

## 2023-03-01 RX ORDER — FAMOTIDINE 10 MG/ML
20 INJECTION, SOLUTION INTRAVENOUS
OUTPATIENT
Start: 2023-03-01

## 2023-03-01 RX ORDER — ALBUTEROL SULFATE 90 UG/1
4 AEROSOL, METERED RESPIRATORY (INHALATION) PRN
OUTPATIENT
Start: 2023-03-01

## 2023-03-01 ASSESSMENT — ENCOUNTER SYMPTOMS
ABDOMINAL PAIN: 0
BACK PAIN: 1
CONSTIPATION: 0
EYE PAIN: 0
NAUSEA: 0
EYE ITCHING: 0
TROUBLE SWALLOWING: 0
DIARRHEA: 0
SHORTNESS OF BREATH: 0
COUGH: 0

## 2023-03-01 NOTE — TELEPHONE ENCOUNTER
----- Message from [facility] sent at 3/1/2023  9:07 AM EST -----  Subject: Message to Provider    QUESTIONS  Information for Provider? patient needs to be scheduled for pre op /   surgery date is March 17th, per dr. Augustin Kruger office . she the patient is   scheduled for march 8th and they were wondering if she could do the pre op   testing on that day. Please call Pardeep Bearland back asap. [phone].   ---------------------------------------------------------------------------  --------------  Niurka Winn INFO  [phone]; OK to leave message on voicemail  ---------------------------------------------------------------------------  --------------  SCRIPT ANSWERS  Relationship to Patient? Third Party  Third Party Type? Physician Office?   Representative Name? Delfina Becker

## 2023-03-01 NOTE — TELEPHONE ENCOUNTER
Pre op Risk Assessment    Procedure Back Surgery  Physician Dr. Nilson Pool  Date of surgery/procedure 3-17-23    Last OV 6-9-22  Last Stress 3-18-19  Last Echo 3-18-19  Last Cath 3-8-21 (Periph Angio)  Last Stent Femoral Endarterectomy 3-16-21  Is patient on blood thinners Plavix & ASA  Hold Meds/how many days ?     Fax: [phone]

## 2023-03-01 NOTE — TELEPHONE ENCOUNTER
Left a message on Malone [address] Dr. Lauretta Dell voicemail to fax Pre-op clearance form and orders requested and to our office and to [facility] . 3/8/23 appointment with TM was changed to a Pre-op.  Pt was also informed

## 2023-03-01 NOTE — PROGRESS NOTES
[facility]  Bone Fragility Follow up     Visit Date: 3/1/2023  MRN: [id number]  Cc:   Chief Complaint   Patient presents with    Follow-up     My chart f/u, Osteopenia, unspecified location    Pt wants to discuss Vitamin D & Bone scan results. HPI:   Derick Hairston  is a(n)58 y.o. female here today for follow up of Osteopenia. No falls or fractures. Taking calcium and vitamin D. Vitamin D level was drawn today- currently in process. ROS:  Review of Systems   Constitutional:  Negative for fatigue, fever and unexpected weight change. HENT:  Negative for congestion and trouble swallowing. Eyes:  Negative for pain and itching. Respiratory:  Negative for cough and shortness of breath. Cardiovascular:  Negative for chest pain and leg swelling. Gastrointestinal:  Negative for abdominal pain, constipation, diarrhea and nausea. Endocrine: Negative for cold intolerance and heat intolerance. Genitourinary:  Negative for difficulty urinating, frequency and urgency. Musculoskeletal:  Positive for back pain and neck pain. Negative for arthralgias and joint swelling. Skin:  Negative for rash. Neurological:  Negative for dizziness, weakness, numbness and headaches. Psychiatric/Behavioral:  The patient is not nervous/anxious.         PAST MEDICAL HISTORY  Past Medical History:   Diagnosis Date    Acute cystitis without hematuria 7/20/2022    Acute pyelonephritis 7/20/2022    Arthritis     Hyperlipidemia     Hypertension        SOCIAL HISTORY  Social History     Socioeconomic History    Marital status:      Spouse name: Emery Hansen    Number of children: 4    Years of education: None    Highest education level: None   Tobacco Use    Smoking status: Every Day     Packs/day: 0.50     Years: 25.00     Pack years: 12.50     Types: Cigarettes    Smokeless tobacco: Never    Tobacco comments:     Down 1/4PPD since 2020   Vaping Use    Vaping Use: Former   Substance and Sexual Activity    Alcohol use:  Yes     Alcohol/week: 8.0 standard drinks     Types: 8 Cans of beer per week     Comment: 8 can beer per week    Drug use: Yes     Frequency: 2.0 times per week     Types: Marijuana Elena Rosas)     Comment: Twice week    Sexual activity: Yes     Partners: Male     Social Determinants of Health     Financial Resource Strain: Low Risk     Difficulty of Paying Living Expenses: Not hard at all   Food Insecurity: No Food Insecurity    Worried About Running Out of Food in the Last Year: Never true    Ran Out of Food in the Last Year: Never true       FAMILY HISTORY  Family History   Problem Relation Age of Onset    Heart Disease Mother     Diabetes Mother     Alcohol Abuse Father     Heart Disease Brother     Heart Disease Brother     Coronary Art Dis Brother     Heart Disease Brother     Diabetes Maternal Aunt     Breast Cancer Maternal Aunt 58    Parkinsonism Maternal Cousin        SURGICAL HISTORY  Past Surgical History:   Procedure Laterality Date    BACK SURGERY       SECTION      x 4    COLONOSCOPY      DILATION AND CURETTAGE OF UTERUS      ENDOSCOPY, COLON, DIAGNOSTIC      FEMORAL ENDARTERECTOMY Left 2021    LEFT  FEMORAL ARTERY ENDARTERECTOMY performed by Sriram Redding [facility] at [facility]      bone spurs removed both feet    KNEE SURGERY Left     reconstruction of knee    LUMBAR DISCECTOMY  2020    Dr. Sandrita Matthews  Allergies   Allergen Reactions    Pletal [Cilostazol] Other (See Comments)     Pt states numbness in tongue, heart palpitations, itchy, nausea    Bactrim [Sulfamethoxazole-Trimethoprim] Rash    Codeine Nausea And Vomiting    Medrol [Methylprednisolone] Palpitations    Ultram [Tramadol Hcl] Other (See Comments)     Pt states causes shaking and feels bad       CURRENTMEDICATIONS  Current Outpatient Medications   Medication Sig Dispense Refill    HYDROcodone-acetaminophen (NORCO) 5-325 MG per tablet Take 1 tablet by mouth every 8 hours as needed for Pain for up to 30 days. 90 tablet 0    cyclobenzaprine (FLEXERIL) 10 MG tablet TAKE 1 TABLET BY MOUTH THREE TIMES A DAY AS NEEDED FOR MUSCLE SPASM 90 tablet 2    clopidogrel (PLAVIX) 75 MG tablet TAKE 1 TABLET BY MOUTH EVERY DAY 90 tablet 3    vitamin D (ERGOCALCIFEROL) 1.25 MG (79464 UT) CAPS capsule TAKE 1 CAPSULE BY MOUTH ONE TIME A WEEK 12 capsule 0    meloxicam (MOBIC) 15 MG tablet TAKE 1 TABLET BY MOUTH EVERY DAY 90 tablet 2    amLODIPine (NORVASC) 10 MG tablet TAKE 1 TABLET BY MOUTH EVERY DAY 90 tablet 3    lisinopril (PRINIVIL;ZESTRIL) 10 MG tablet TAKE 1 TABLET BY MOUTH TWO TIMES A  tablet 3    alendronate (FOSAMAX) 70 MG tablet Take 1 tablet by mouth every 7 days 12 tablet 3    atorvastatin (LIPITOR) 40 MG tablet Take 1 tablet by mouth daily 90 tablet 3    acetaminophen (TYLENOL) 500 MG tablet Take 500 mg by mouth every 6 hours as needed for Pain      aspirin 81 MG EC tablet Take 81 mg by mouth daily      pregabalin (LYRICA) 25 MG capsule Take 1 capsule by mouth 3 times daily for 30 days. 90 capsule 0    ondansetron (ZOFRAN ODT) 4 MG disintegrating tablet Take 1 tablet by mouth every 8 hours as needed for Nausea or Vomiting (Patient not taking: Reported on 3/1/2023) 20 tablet 0     No current facility-administered medications for this visit. Objective: There were no vitals taken for this visit. General: No distress. Alert. Resp: No accessory muscle use. Lung sounds clear. CV: Regular rate. Regularrhythm. No mumur or rub. No edema. M/S:   Upper extremities:  Muscle strength 5/5    Lower Extremities:   Muscle strength 5/5    Neuro: Oriented to person, place, time. Psych:  No anxiety or agitation. Skin: Warm and dry. No rash on exposed extremities.          Labs:  CBC  Lab Results   Component Value Date/Time    WBC 11.0 07/22/2022 09:28 AM    RBC 3.08 07/22/2022 09:28 AM    HGB 9.9 07/22/2022 09:28 AM    HCT 30.0 07/22/2022 09:28 AM    MCV 97.4 07/22/2022 09:28 AM    MCH 32.1 07/22/2022 09:28 AM    MCHC 33.0 07/22/2022 09:28 AM     07/22/2022 09:28 AM       CMP  Lab Results   Component Value Date/Time    CALCIUM 8.8 07/22/2022 09:28 AM    LABALBU 3.5 07/19/2022 09:54 PM    PROT 7.3 07/19/2022 09:54 PM     07/22/2022 09:28 AM    K 3.6 07/22/2022 09:28 AM    CO2 23 07/22/2022 09:28 AM     07/22/2022 09:28 AM    BUN 4 07/22/2022 09:28 AM    CREATININE 0.5 07/22/2022 09:28 AM    ALKPHOS 104 07/19/2022 09:54 PM    ALT 24 07/19/2022 09:54 PM    AST 20 07/19/2022 09:54 PM       HgBA1c: No components found for: HGBA1C    Lab Results   Component Value Date    TSH 1.740 07/21/2022     Lab Results   Component Value Date/Time    VITD25 18 07/19/2022 09:54 PM       Lab Results   Component Value Date    SEDRATE 7 02/15/2021     Lab Results   Component Value Date    CRP < 0.30 02/15/2021       Lab Results   Component Value Date    VITD25 18 (L) 07/19/2022    CALCIUM 8.8 07/22/2022    MG 2.5 (H) 07/19/2022     Lab Results   Component Value Date     07/22/2022    K 3.6 07/22/2022     07/22/2022    CO2 23 07/22/2022    BUN 4 (L) 07/22/2022    CREATININE 0.5 07/22/2022    GLUCOSE 112 (H) 07/22/2022    CALCIUM 8.8 07/22/2022    PROT 7.3 07/19/2022    LABALBU 3.5 07/19/2022    BILITOT 0.3 07/19/2022    ALKPHOS 104 07/19/2022    AST 20 07/19/2022    ALT 24 07/19/2022         RADIOLOGY:     DEXA 8/17/2022  Left hip: -1.8  Right hip: -1.9  Lumbar spine: 0.2     DEXA 8/13/2020 (different scanner)  Left hip: - 1.70, 0.658  Right hip: -1.40, 0.689  Lumbar spine: -0.60, 0.976  FRAX: 11% major, 1.6% hip     DEXA 6/20/2018  Left hip: -1.30, 0.860  Right hip: -1.30. 0.842         Assessment and plan:  Osteopenia  - A 54year old postmenopausal female with history of osteopenia dating back at least 2 years. Recent DEXA with T score -1.70 at left femoral neck.  Patient with history of a couple childhood fractures which were traumatic, as well as a shoulder fracture from fall from standing height onto steps. Smoking 1 PPD for 40 years, drinking 2-3 alcoholic beverages 3-4 times per week. +fmhx of OP in maternal grandmother.    - stop alendronate 70 mg PO weekly due to worsening of right hip on recent DEXA scan   - start reclast 5 mg IV yearly. Side effects include infusion reaction, hypocalcemia, flu-like symptoms, osteonecrosis of jaw, atypical femur fracture.    - calcium & vit D supplements    Vit D def   - repeat level ordered- currently in process      No follow-ups on file.      Electronically signed by SHERYL Ruff - CNP on 3/1/2023 at 1:45 PM      Thank you for allowing me to participate in the care of this patient. Please call if there are any questions.

## 2023-03-01 NOTE — TELEPHONE ENCOUNTER
From: Tamara Fulton  To: Les Danny  Sent: 3/1/2023 9:25 AM EST  Subject: Follow up appointment     Good morning Mara Judge, my follow up appointment needs switched to a pre surgery check up. Terry from Dr Moises Butt office called and left you a message. She needs to fax you the orders. I'm off today and will be at the hospital for my bone Dr kong. So if I can get anything done while there that would be great. Do you have a fax number I can give her please and they want a clearance from Dr Kumar Witt since I see him for my legs. Thanks so much for everything and look forward to seeing you next Wednesday.      Serrano Mean

## 2023-03-03 ENCOUNTER — HOSPITAL ENCOUNTER (OUTPATIENT)
Age: 58
Discharge: HOME | End: 2023-03-03
Payer: COMMERCIAL

## 2023-03-03 ENCOUNTER — TELEPHONE (OUTPATIENT)
Dept: CARDIOLOGY CLINIC | Age: 58
End: 2023-03-03

## 2023-03-03 ENCOUNTER — HOSPITAL ENCOUNTER (OUTPATIENT)
Dept: GENERAL RADIOLOGY | Age: 58
Discharge: HOME | End: 2023-03-03
Payer: COMMERCIAL

## 2023-03-03 DIAGNOSIS — Z01.818 PREOP TESTING: ICD-10-CM

## 2023-03-03 DIAGNOSIS — M48.062 LUMBAR STENOSIS WITH NEUROGENIC CLAUDICATION: ICD-10-CM

## 2023-03-03 LAB
ANION GAP SERPL CALC-SCNC: 9 MEQ/L (ref 8–16)
APTT PPP: 31.1 SECONDS (ref 22–38)
B-HCG SERPL QL: NEGATIVE
BASOPHILS ABSOLUTE: 0.1 THOU/MM3 (ref 0–0.1)
BASOPHILS NFR BLD AUTO: 0.8 %
BUN SERPL-MCNC: 16 MG/DL (ref 7–22)
CALCIUM SERPL-MCNC: 9.3 MG/DL (ref 8.5–10.5)
CHLORIDE SERPL-SCNC: 103 MEQ/L (ref 98–111)
CO2 SERPL-SCNC: 26 MEQ/L (ref 23–33)
CREAT SERPL-MCNC: 0.7 MG/DL (ref 0.4–1.2)
DEPRECATED RDW RBC AUTO: 46.8 FL (ref 35–45)
EOSINOPHIL NFR BLD AUTO: 3.2 %
EOSINOPHILS ABSOLUTE: 0.3 THOU/MM3 (ref 0–0.4)
ERYTHROCYTE [DISTWIDTH] IN BLOOD BY AUTOMATED COUNT: 12.5 % (ref 11.5–14.5)
GFR SERPL CREATININE-BSD FRML MDRD: > 60 ML/MIN/1.73M2
GLUCOSE SERPL-MCNC: 116 MG/DL (ref 70–108)
HCT VFR BLD AUTO: 37.8 % (ref 37–47)
HGB BLD-MCNC: 11.8 GM/DL (ref 12–16)
IMM GRANULOCYTES # BLD AUTO: 0.05 THOU/MM3 (ref 0–0.07)
IMM GRANULOCYTES NFR BLD AUTO: 0.5 %
INR PPP: 0.93 (ref 0.85–1.13)
LYMPHOCYTES ABSOLUTE: 2.8 THOU/MM3 (ref 1–4.8)
LYMPHOCYTES NFR BLD AUTO: 30.3 %
MCH RBC QN AUTO: 31.9 PG (ref 26–33)
MCHC RBC AUTO-ENTMCNC: 31.2 GM/DL (ref 32.2–35.5)
MCV RBC AUTO: 102.2 FL (ref 81–99)
MONOCYTES ABSOLUTE: 0.8 THOU/MM3 (ref 0.4–1.3)
MONOCYTES NFR BLD AUTO: 8.2 %
NEUTROPHILS NFR BLD AUTO: 57 %
NRBC BLD AUTO-RTO: 0 /100 WBC
PLATELET # BLD AUTO: 460 THOU/MM3 (ref 130–400)
PMV BLD AUTO: 9.5 FL (ref 9.4–12.4)
POTASSIUM SERPL-SCNC: 4.5 MEQ/L (ref 3.5–5.2)
RBC # BLD AUTO: 3.7 MILL/MM3 (ref 4.2–5.4)
SEGMENTED NEUTROPHILS ABSOLUTE COUNT: 5.2 THOU/MM3 (ref 1.8–7.7)
SODIUM SERPL-SCNC: 138 MEQ/L (ref 135–145)
WBC # BLD AUTO: 9.2 THOU/MM3 (ref 4.8–10.8)

## 2023-03-03 PROCEDURE — 93005 ELECTROCARDIOGRAM TRACING: CPT | Performed by: ORTHOPAEDIC SURGERY

## 2023-03-03 PROCEDURE — 71046 X-RAY EXAM CHEST 2 VIEWS: CPT

## 2023-03-03 PROCEDURE — 85730 THROMBOPLASTIN TIME PARTIAL: CPT

## 2023-03-03 PROCEDURE — 36415 COLL VENOUS BLD VENIPUNCTURE: CPT

## 2023-03-03 PROCEDURE — 85610 PROTHROMBIN TIME: CPT

## 2023-03-03 PROCEDURE — 85025 COMPLETE CBC W/AUTO DIFF WBC: CPT

## 2023-03-03 PROCEDURE — 84703 CHORIONIC GONADOTROPIN ASSAY: CPT

## 2023-03-03 PROCEDURE — 80048 BASIC METABOLIC PNL TOTAL CA: CPT

## 2023-03-03 NOTE — TELEPHONE ENCOUNTER
Pre op Risk Assessment    Procedure Posterior Lumbar Inter body fusion  Physician Dr Gilliland  Date of surgery/procedure 3/17/23    Last OV 6/9/22  Last Stress 3/18/19  Last Echo 3/18/19  Last Cath 3/8/21  Last Stent none in epic  Is patient on blood thinners Plavix & ASA  Hold Meds/how many days ?    Fax: [phone]

## 2023-03-04 LAB
EKG ATRIAL RATE: 89 BPM
EKG P AXIS: 68 DEGREES
EKG P-R INTERVAL: 150 MS
EKG Q-T INTERVAL: 354 MS
EKG QRS DURATION: 82 MS
EKG QTC CALCULATION (BAZETT): 430 MS
EKG R AXIS: 55 DEGREES
EKG T AXIS: 54 DEGREES
EKG VENTRICULAR RATE: 89 BPM

## 2023-03-06 RX ORDER — ERGOCALCIFEROL 1.25 MG/1
CAPSULE ORAL
Qty: 12 CAPSULE | Refills: 0 | OUTPATIENT
Start: 2023-03-06

## 2023-03-08 ENCOUNTER — OFFICE VISIT (OUTPATIENT)
Dept: FAMILY MEDICINE CLINIC | Age: 58
End: 2023-03-08
Payer: COMMERCIAL

## 2023-03-08 VITALS
BODY MASS INDEX: 26.17 KG/M2 | DIASTOLIC BLOOD PRESSURE: 60 MMHG | RESPIRATION RATE: 16 BRPM | HEART RATE: 90 BPM | OXYGEN SATURATION: 98 % | SYSTOLIC BLOOD PRESSURE: 100 MMHG | WEIGHT: 134 LBS | TEMPERATURE: 97.7 F

## 2023-03-08 DIAGNOSIS — M48.061 FORAMINAL STENOSIS OF LUMBAR REGION: ICD-10-CM

## 2023-03-08 DIAGNOSIS — R20.2 NUMBNESS AND TINGLING OF LEFT LEG: ICD-10-CM

## 2023-03-08 DIAGNOSIS — Z01.818 PREOP EXAMINATION: Primary | ICD-10-CM

## 2023-03-08 DIAGNOSIS — I73.9 PAD (PERIPHERAL ARTERY DISEASE) (HCC): ICD-10-CM

## 2023-03-08 DIAGNOSIS — R20.0 NUMBNESS AND TINGLING OF LEFT LEG: ICD-10-CM

## 2023-03-08 PROBLEM — M48.062 SPINAL STENOSIS, LUMBAR REGION WITH NEUROGENIC CLAUDICATION: Status: ACTIVE | Noted: 2023-03-02

## 2023-03-08 PROBLEM — M47.817 SPONDYLOSIS WITHOUT MYELOPATHY OR RADICULOPATHY, LUMBOSACRAL REGION: Status: ACTIVE | Noted: 2023-03-02

## 2023-03-08 PROCEDURE — 3078F DIAST BP <80 MM HG: CPT | Performed by: NURSE PRACTITIONER

## 2023-03-08 PROCEDURE — 3074F SYST BP LT 130 MM HG: CPT | Performed by: NURSE PRACTITIONER

## 2023-03-08 PROCEDURE — 99213 OFFICE O/P EST LOW 20 MIN: CPT | Performed by: NURSE PRACTITIONER

## 2023-03-08 RX ORDER — PREGABALIN 25 MG/1
25 CAPSULE ORAL 3 TIMES DAILY
Qty: 90 CAPSULE | Refills: 3 | Status: SHIPPED | OUTPATIENT
Start: 2023-03-08 | End: 2023-04-07

## 2023-03-08 SDOH — ECONOMIC STABILITY: FOOD INSECURITY: WITHIN THE PAST 12 MONTHS, YOU WORRIED THAT YOUR FOOD WOULD RUN OUT BEFORE YOU GOT MONEY TO BUY MORE.: NEVER TRUE

## 2023-03-08 SDOH — ECONOMIC STABILITY: HOUSING INSECURITY
IN THE LAST 12 MONTHS, WAS THERE A TIME WHEN YOU DID NOT HAVE A STEADY PLACE TO SLEEP OR SLEPT IN A SHELTER (INCLUDING NOW)?: NO

## 2023-03-08 SDOH — ECONOMIC STABILITY: FOOD INSECURITY: WITHIN THE PAST 12 MONTHS, THE FOOD YOU BOUGHT JUST DIDN'T LAST AND YOU DIDN'T HAVE MONEY TO GET MORE.: NEVER TRUE

## 2023-03-08 SDOH — ECONOMIC STABILITY: INCOME INSECURITY: HOW HARD IS IT FOR YOU TO PAY FOR THE VERY BASICS LIKE FOOD, HOUSING, MEDICAL CARE, AND HEATING?: NOT VERY HARD

## 2023-03-08 ASSESSMENT — ENCOUNTER SYMPTOMS
BACK PAIN: 1
VOMITING: 0
EYE REDNESS: 0
ABDOMINAL PAIN: 0
EYE PAIN: 0
NAUSEA: 0
EYE DISCHARGE: 0
RHINORRHEA: 0
DIARRHEA: 0
ALLERGIC/IMMUNOLOGIC NEGATIVE: 1
WHEEZING: 0
COUGH: 0
SORE THROAT: 0
SHORTNESS OF BREATH: 0
TROUBLE SWALLOWING: 0
CONSTIPATION: 0

## 2023-03-08 ASSESSMENT — PATIENT HEALTH QUESTIONNAIRE - PHQ9
SUM OF ALL RESPONSES TO PHQ QUESTIONS 1-9: 0
SUM OF ALL RESPONSES TO PHQ QUESTIONS 1-9: 0
1. LITTLE INTEREST OR PLEASURE IN DOING THINGS: 0
SUM OF ALL RESPONSES TO PHQ QUESTIONS 1-9: 0
SUM OF ALL RESPONSES TO PHQ9 QUESTIONS 1 & 2: 0
2. FEELING DOWN, DEPRESSED OR HOPELESS: 0
SUM OF ALL RESPONSES TO PHQ QUESTIONS 1-9: 0

## 2023-03-08 NOTE — TELEPHONE ENCOUNTER
Spoke with pt. Pt states she was already clear for procedure. Looked at chart - pt was clear 3-1-23 by Dr. Rodrigue Maciel. Spoke with [place]. [place] talked to Dr. Rodrigue Maciel - need to make sure pt is not having any cardiac symptoms. Spoke with pt. Pt denies all symptoms. Pt still cleared. Appt cancelled for tomorrow.

## 2023-03-08 NOTE — PROGRESS NOTES
[address]   [address] [facility]  [address]  Dept: [phone]  Dept Fax: [phone]    SUBJECTIVE         Pt presents for PRE-OP physical exam. Surgery is scheduled for lumbar decompression with Dr. Jem Heart on 2023. Uli Gaona General anesthesia is planned. Current symptoms include chronic and severe lumbar pain and disability. .      CURRENT EXERCISE REGIMEN: none    PREVIOUS ANESTHESIA PROBLEMS?  none    Current medical problems include   Patient Active Problem List   Diagnosis    Essential hypertension    Osteopenia    Carotid stenosis, non-symptomatic, bilateral    History of smoking 30 or more pack years    PAD (peripheral artery disease) ([facility])    Systolic murmur    Chronic neck pain    Diverticulosis of colon without diverticulitis    Spondylosis without myelopathy or radiculopathy, lumbosacral region    Spinal stenosis, lumbar region with neurogenic claudication       Past Medical History:   Diagnosis Date    Acute cystitis without hematuria 2022    Acute pyelonephritis 2022    Arthritis     Hyperlipidemia     Hypertension        Past Surgical History:   Procedure Laterality Date    BACK SURGERY       SECTION      x 4    COLONOSCOPY      DILATION AND CURETTAGE OF UTERUS      ENDOSCOPY, COLON, DIAGNOSTIC      FEMORAL ENDARTERECTOMY Left 2021    LEFT  FEMORAL ARTERY ENDARTERECTOMY performed by Dayana Garcia MD at [facility] [address]      bone spurs removed both feet    KNEE SURGERY Left     reconstruction of knee    LUMBAR DISCECTOMY  2020    Dr. Ericka Ames   Allergen Reactions    Pletal [Cilostazol] Other (See Comments)     Pt states numbness in tongue, heart palpitations, itchy, nausea    Bactrim [Sulfamethoxazole-Trimethoprim] Rash    Codeine Nausea And Vomiting    Medrol [Methylprednisolone] Palpitations    Ultram [Tramadol Hcl] Other (See Comments)     Pt states causes shaking and feels bad         Current Outpatient Medications:     pregabalin (LYRICA) 25 MG capsule, Take 1 capsule by mouth 3 times daily for 30 days. , Disp: 90 capsule, Rfl: 3    HYDROcodone-acetaminophen (NORCO) 5-325 MG per tablet, Take 1 tablet by mouth every 8 hours as needed for Pain for up to 30 days. , Disp: 90 tablet, Rfl: 0    cyclobenzaprine (FLEXERIL) 10 MG tablet, TAKE 1 TABLET BY MOUTH THREE TIMES A DAY AS NEEDED FOR MUSCLE SPASM, Disp: 90 tablet, Rfl: 2    clopidogrel (PLAVIX) 75 MG tablet, TAKE 1 TABLET BY MOUTH EVERY DAY, Disp: 90 tablet, Rfl: 3    meloxicam (MOBIC) 15 MG tablet, TAKE 1 TABLET BY MOUTH EVERY DAY, Disp: 90 tablet, Rfl: 2    amLODIPine (NORVASC) 10 MG tablet, TAKE 1 TABLET BY MOUTH EVERY DAY, Disp: 90 tablet, Rfl: 3    lisinopril (PRINIVIL;ZESTRIL) 10 MG tablet, TAKE 1 TABLET BY MOUTH TWO TIMES A DAY, Disp: 180 tablet, Rfl: 3    atorvastatin (LIPITOR) 40 MG tablet, Take 1 tablet by mouth daily, Disp: 90 tablet, Rfl: 3    acetaminophen (TYLENOL) 500 MG tablet, Take 500 mg by mouth every 6 hours as needed for Pain, Disp: , Rfl:     aspirin 81 MG EC tablet, Take 81 mg by mouth daily, Disp: , Rfl:         Family History   Problem Relation Age of Onset    Heart Disease Mother     Diabetes Mother     Alcohol Abuse Father     Heart Disease Brother     Heart Disease Brother     Coronary Art Dis Brother     Heart Disease Brother     Diabetes Maternal Aunt     Breast Cancer Maternal Aunt 58    Parkinsonism Maternal Cousin        Social History     Tobacco Use    Smoking status: Every Day     Packs/day: 0.50     Years: 25.00     Pack years: 12.50     Types: Cigarettes    Smokeless tobacco: Never    Tobacco comments:     Down 1/4PPD since 2020   Vaping Use    Vaping Use: Former   Substance Use Topics    Alcohol use:  Yes     Alcohol/week: 8.0 standard drinks     Types: 8 Cans of beer per week     Comment: 8 can beer per week    Drug use: Yes     Frequency: 2.0 times per week     Types: Marijuana Rashawn Kate)     Comment: Twice week       Review of Systems   Constitutional:  Negative for activity change, fatigue and fever. HENT:  Negative for congestion, ear pain, rhinorrhea, sore throat and trouble swallowing. Eyes:  Negative for pain, discharge and redness. Respiratory:  Negative for cough, shortness of breath and wheezing. Cardiovascular: Negative. Gastrointestinal:  Negative for abdominal pain, constipation, diarrhea, nausea and vomiting. Endocrine: Negative. Genitourinary:  Negative for dysuria, frequency and urgency. Musculoskeletal:  Positive for back pain. Negative for arthralgias and myalgias. Skin:  Negative for rash. Allergic/Immunologic: Negative. Neurological:  Negative for dizziness, tremors, weakness and headaches. Hematological: Negative. Psychiatric/Behavioral:  Negative for dysphoric mood and sleep disturbance. The patient is not nervous/anxious. OBJECTIVE     /60   Pulse 90   Temp 97.7 °F (36.5 °C)   Resp 16   Wt 134 lb (60.8 kg)   SpO2 98%   BMI 26.17 kg/m²     Wt Readings from Last 3 Encounters:   03/08/23 134 lb (60.8 kg)   03/01/23 131 lb 9.6 oz (59.7 kg)   11/30/22 122 lb (55.3 kg)       Physical Exam  Vitals reviewed. Constitutional:       General: She is not in acute distress. Appearance: Normal appearance. She is well-developed. She is not toxic-appearing or diaphoretic. HENT:      Head: Normocephalic. No right periorbital erythema or left periorbital erythema. Jaw: No trismus. Right Ear: Hearing and external ear normal.      Left Ear: Hearing and external ear normal.      Nose: Nose normal. No mucosal edema or rhinorrhea. Mouth/Throat:      Mouth: Mucous membranes are moist.      Dentition: Normal dentition. Pharynx: Uvula midline. No posterior oropharyngeal erythema. Tonsils: No tonsillar exudate. 0 on the right. 0 on the left. Eyes:      General: Lids are normal.         Right eye: No discharge. Left eye: No discharge. Conjunctiva/sclera: Conjunctivae normal.      Right eye: Right conjunctiva is not injected. No chemosis. Left eye: No chemosis. Pupils: Pupils are equal, round, and reactive to light. Neck:      Vascular: No JVD. Trachea: Trachea normal.   Cardiovascular:      Rate and Rhythm: Normal rate and regular rhythm. Heart sounds: Normal heart sounds. No murmur heard. Pulmonary:      Effort: Pulmonary effort is normal. No respiratory distress. Breath sounds: Normal breath sounds. No stridor. No wheezing. Abdominal:      General: Bowel sounds are normal. There is no distension. Palpations: Abdomen is soft. Tenderness: There is no abdominal tenderness. Musculoskeletal:         General: No tenderness or signs of injury. Normal range of motion. Cervical back: Full passive range of motion without pain and normal range of motion. Lymphadenopathy:      Cervical: No cervical adenopathy. Skin:     General: Skin is warm and dry. Capillary Refill: Capillary refill takes less than 2 seconds. Findings: No rash. Neurological:      Mental Status: She is alert and oriented to person, place, and time. GCS: GCS eye subscore is 4. GCS verbal subscore is 5. GCS motor subscore is 6. Cranial Nerves: No cranial nerve deficit. Coordination: Coordination normal.      Gait: Gait normal.   Psychiatric:         Mood and Affect: Mood is not anxious or depressed. Speech: Speech normal.         Behavior: Behavior normal. Behavior is not withdrawn or hyperactive. Behavior is cooperative. Thought Content:  Thought content normal.         Judgment: Judgment normal.         Immunization History   Administered Date(s) Administered    Influenza, FLUARIX, FLULAVAL, FLUZONE (age 10 mo+) AND AFLURIA, (age 1 y+), PF, 0.5mL 10/05/2020    Pneumococcal Conjugate 13-valent ([id number]) 07/21/2020    Pneumococcal conjugate PCV20, PF (Prevnar 20) 09/21/2022    Tdap (Boostrix, Adacel) 07/21/2020, 05/05/2022    Zoster Recombinant (Shingrix) 01/12/2021, 03/11/2021           ASSESSMENT       Diagnosis Orders   1. Preop examination        2. Foraminal stenosis of lumbar region  DME Order for Walker as OP      3. PAD (peripheral artery disease) ([facility] [address]        4. Numbness and tingling of left leg  pregabalin (LYRICA) 25 MG capsule          STOP-Bang Questionnaire  * Do you currently see a pulmonologist?               If yes STOP, do not complete. 1.  Do you snore loudly (able to be heard in the next room)? No     2. Do you often feel tired or sleepy during the daytime? No          3. Has anyone ever told you that you stop breathing during your sleep? No         4. Do you have or are you being treated for high blood pressure? No         5. BMI more than 35? BMI (Calculated):      No     6. Age over 48 years? Yes    7. Neck Circumference greater than 17 inches for male or 16 inches for female? No     8. Gender Male? Now                TOTAL SCORE: 1   ZOILA - Low Risk : Yes to 0 - 2 questions  ZOILA - Intermediate Risk : Yes to 3 - 4 questions  ZOILA - High Risk : Yes to 5 - 8 questions     Risk profile-    Infection- minimal  Pain- normal  DVT- minimal  Bleeding- normal    PLAN      EKG, chest x-ray, labs are reviewed to find all within normal limits. Patient is an acceptable risk for surgery. No known previous anesthesia issues or postop complications. Patient is expected to be unable to freely ambulate postoperatively for at least 2 weeks, and up to 4 weeks without the use of a walker due to the lengthy healing process of the lumbar laminectomy and decompression. Her surgeon is requesting patient be provided a simple walker for assistance with ambulating, being able to get back and forth to the bathroom, standing and completing personal grooming and dressing herself.   Patient will be unable to accomplish this with a simple cane and this in fact would be an unstable platform for her postoperatively.     Electronically signed by SHERYL Luna CNP on 3/10/2023 at 7:59 AM

## 2023-03-22 DIAGNOSIS — E78.00 HIGH CHOLESTEROL: ICD-10-CM

## 2023-03-22 RX ORDER — ATORVASTATIN CALCIUM 40 MG/1
40 TABLET, FILM COATED ORAL DAILY
Qty: 90 TABLET | Refills: 3 | Status: SHIPPED | OUTPATIENT
Start: 2023-03-22 | End: 2023-06-20

## 2023-03-23 DIAGNOSIS — E78.00 HIGH CHOLESTEROL: ICD-10-CM

## 2023-03-23 RX ORDER — ATORVASTATIN CALCIUM 40 MG/1
40 TABLET, FILM COATED ORAL DAILY
Qty: 90 TABLET | Refills: 3 | OUTPATIENT
Start: 2023-03-23 | End: 2023-06-21

## 2023-03-27 ENCOUNTER — PATIENT MESSAGE (OUTPATIENT)
Dept: FAMILY MEDICINE CLINIC | Age: 58
End: 2023-03-27

## 2023-03-27 DIAGNOSIS — M47.817 SPONDYLOSIS WITHOUT MYELOPATHY OR RADICULOPATHY, LUMBOSACRAL REGION: Primary | ICD-10-CM

## 2023-03-28 NOTE — TELEPHONE ENCOUNTER
From: Adolph Ramos  Sent: 3/27/2023 6:27 PM EDT  To: Brittaney Benton Clinical Staff  Subject: Physical therapy     [facility] please, thanks

## 2023-04-03 ENCOUNTER — OFFICE VISIT (OUTPATIENT)
Dept: FAMILY MEDICINE CLINIC | Age: 58
End: 2023-04-03
Payer: COMMERCIAL

## 2023-04-03 VITALS
DIASTOLIC BLOOD PRESSURE: 62 MMHG | SYSTOLIC BLOOD PRESSURE: 118 MMHG | WEIGHT: 138.4 LBS | OXYGEN SATURATION: 98 % | HEART RATE: 95 BPM | RESPIRATION RATE: 16 BRPM | BODY MASS INDEX: 27.17 KG/M2 | HEIGHT: 60 IN

## 2023-04-03 DIAGNOSIS — G47.01 INSOMNIA DUE TO MEDICAL CONDITION: ICD-10-CM

## 2023-04-03 DIAGNOSIS — Z98.890 S/P LAMINECTOMY: Primary | ICD-10-CM

## 2023-04-03 PROBLEM — M96.1 POSTLAMINECTOMY SYNDROME, LUMBAR: Status: ACTIVE | Noted: 2023-03-17

## 2023-04-03 PROCEDURE — 99213 OFFICE O/P EST LOW 20 MIN: CPT | Performed by: NURSE PRACTITIONER

## 2023-04-03 PROCEDURE — 3074F SYST BP LT 130 MM HG: CPT | Performed by: NURSE PRACTITIONER

## 2023-04-03 PROCEDURE — 3078F DIAST BP <80 MM HG: CPT | Performed by: NURSE PRACTITIONER

## 2023-04-03 RX ORDER — OXYCODONE HYDROCHLORIDE AND ACETAMINOPHEN 5; 325 MG/1; MG/1
1 TABLET ORAL EVERY 4 HOURS PRN
COMMUNITY
Start: 2023-03-26

## 2023-04-03 RX ORDER — HYDROXYZINE HYDROCHLORIDE 25 MG/1
25 TABLET, FILM COATED ORAL NIGHTLY
Qty: 30 TABLET | Refills: 0 | Status: SHIPPED | OUTPATIENT
Start: 2023-04-03 | End: 2023-05-03

## 2023-04-03 ASSESSMENT — ENCOUNTER SYMPTOMS
VOMITING: 0
ABDOMINAL PAIN: 0
NAUSEA: 0
WHEEZING: 0
SORE THROAT: 0
CONSTIPATION: 0
BACK PAIN: 0
RHINORRHEA: 0
EYE DISCHARGE: 0
ALLERGIC/IMMUNOLOGIC NEGATIVE: 1
DIARRHEA: 0
TROUBLE SWALLOWING: 0
EYE REDNESS: 0
EYE PAIN: 0
SHORTNESS OF BREATH: 0
COUGH: 0

## 2023-04-03 NOTE — PROGRESS NOTES
edema or rhinorrhea. Mouth/Throat:      Mouth: Mucous membranes are moist.      Dentition: Normal dentition. Pharynx: Uvula midline. No posterior oropharyngeal erythema. Tonsils: No tonsillar exudate. 0 on the right. 0 on the left. Eyes:      General: Lids are normal.         Right eye: No discharge. Left eye: No discharge. Conjunctiva/sclera: Conjunctivae normal.      Right eye: Right conjunctiva is not injected. No chemosis. Left eye: No chemosis. Pupils: Pupils are equal, round, and reactive to light. Neck:      Vascular: No JVD. Trachea: Trachea normal.   Cardiovascular:      Rate and Rhythm: Normal rate and regular rhythm. Heart sounds: Normal heart sounds. No murmur heard. Pulmonary:      Effort: Pulmonary effort is normal. No respiratory distress. Breath sounds: Normal breath sounds. No stridor. Musculoskeletal:         General: No tenderness or deformity. Normal range of motion. Cervical back: Full passive range of motion without pain and normal range of motion. Skin:     General: Skin is warm. Capillary Refill: Capillary refill takes less than 2 seconds. Findings: No rash. Neurological:      Mental Status: She is alert and oriented to person, place, and time. GCS: GCS eye subscore is 4. GCS verbal subscore is 5. GCS motor subscore is 6. Cranial Nerves: No cranial nerve deficit. Coordination: Coordination normal.      Gait: Gait normal.   Psychiatric:         Mood and Affect: Mood is not anxious or depressed. Speech: Speech normal.         Behavior: Behavior normal. Behavior is not withdrawn or hyperactive. Behavior is cooperative. Thought Content: Thought content normal.         Judgment: Judgment normal.       Assessment/Plan:      Bruno Patel was seen today for referral - general and follow-up.     Diagnoses and all orders for this visit:    S/P laminectomy    Insomnia due to medical condition  -

## 2023-04-04 ENCOUNTER — HOSPITAL ENCOUNTER (OUTPATIENT)
Dept: PHYSICAL THERAPY | Age: 58
Setting detail: THERAPIES SERIES
Discharge: HOME | End: 2023-04-04
Payer: COMMERCIAL

## 2023-04-04 PROCEDURE — 97530 THERAPEUTIC ACTIVITIES: CPT

## 2023-04-04 PROCEDURE — 97161 PT EVAL LOW COMPLEX 20 MIN: CPT

## 2023-04-04 NOTE — PROGRESS NOTES
** PLEASE SIGN, DATE AND TIME CERTIFICATION BELOW AND RETURN TO [facility] OUTPATIENT REHABILITATION (FAX #: [phone]). ATTEST/CO-SIGN IF ACCESSING VIA [employer]ferenceEdge. THANK YOU.**    I certify that I have examined the patient below and determined that Physical Medicine and Rehabilitation service is necessary and that I approve the established plan of care for up to 90 days or as specifically noted. Attestation, signature or co-signature of physician indicates approval of certification requirements.    ________________________ ____________ __________  Physician Signature   Date   Time      [address] [facility]  PHYSICAL THERAPY  [x] EVALUATION  [] DAILY NOTE (LAND) [] DAILY NOTE (AQUATIC ) [] PROGRESS NOTE [] DISCHARGE NOTE    [x] [address] [facility]   [] [place] [address]    [] [facility]   [] [facility]    Date: 2023  Patient Name:  Tayo Del Rio  : 1965  MRN: [id number]  CSN: [id number]    Referring Practitioner SHERYL Plaza -*   Diagnosis Spondylosis without myelopathy or radiculopathy, lumbosacral region [M47.817]    Treatment Diagnosis Low back pain s/p lumbar fusion surgery   Date of Evaluation 23    Additional Pertinent History HTN. Femoral endarterectomy (left). HTN. Functional Outcome Measure Used Modified ELISEO.      Functional Outcome Score 36=72 (23)       Insurance: Primary: Payor: Edin Ozuna /  /  / ,   Secondary:    Authorization Information: PRE CERTIFICATION REQUIRED: NO  INSURANCE THERAPY BENEFIT:  UNLIMITED VISITS BASED ON MEDICAL NECESSITY   AQUATIC THERAPY COVERED: YES  MODALITIES COVERED:  YES  TELEHEALTH COVERED:   REFERENCE NUMBER: [id number]   Visit # 1, 1/10 for progress note   Visits Allowed: Unlimited per medical necessity   Recertification Date: [id number]   Physician Follow-Up: Per patient   Physician Orders:    History of Present Illness: Per medical record, patient had the following surgery

## 2023-04-06 ENCOUNTER — PATIENT MESSAGE (OUTPATIENT)
Dept: RHEUMATOLOGY | Age: 58
End: 2023-04-06

## 2023-04-06 NOTE — TELEPHONE ENCOUNTER
From: Wandy Herrera  To: Jakob Solis  Sent: 4/6/2023 10:03 AM EDT  Subject: Shot     Hi, I have not heard anything about the shot you wanted me to get. Please let me know if we are going to do this. I've met all my copays and deductible so everything is 100 percent paid and I'm on STD so I have the time to do it whenever. Thanks and have a great day.     Joey Jung

## 2023-04-07 ENCOUNTER — HOSPITAL ENCOUNTER (OUTPATIENT)
Dept: PHYSICAL THERAPY | Age: 58
Setting detail: THERAPIES SERIES
Discharge: HOME | End: 2023-04-07
Payer: COMMERCIAL

## 2023-04-07 PROCEDURE — 97110 THERAPEUTIC EXERCISES: CPT

## 2023-04-14 ENCOUNTER — HOSPITAL ENCOUNTER (OUTPATIENT)
Dept: NURSING | Age: 58
Discharge: HOME | End: 2023-04-14
Payer: COMMERCIAL

## 2023-04-14 VITALS
BODY MASS INDEX: 26.76 KG/M2 | SYSTOLIC BLOOD PRESSURE: 108 MMHG | HEART RATE: 96 BPM | RESPIRATION RATE: 18 BRPM | TEMPERATURE: 97.7 F | DIASTOLIC BLOOD PRESSURE: 54 MMHG | WEIGHT: 137 LBS | OXYGEN SATURATION: 98 %

## 2023-04-14 DIAGNOSIS — M85.80 OSTEOPENIA, UNSPECIFIED LOCATION: Primary | ICD-10-CM

## 2023-04-14 PROCEDURE — 6360000002 HC RX W HCPCS: Performed by: NURSE PRACTITIONER

## 2023-04-14 PROCEDURE — 96365 THER/PROPH/DIAG IV INF INIT: CPT

## 2023-04-14 PROCEDURE — 2580000003 HC RX 258: Performed by: NURSE PRACTITIONER

## 2023-04-14 RX ORDER — ZOLEDRONIC ACID 5 MG/100ML
5 INJECTION, SOLUTION INTRAVENOUS ONCE
Status: CANCELLED | OUTPATIENT
Start: 2023-04-14 | End: 2023-04-14

## 2023-04-14 RX ORDER — SODIUM CHLORIDE 9 MG/ML
5-250 INJECTION, SOLUTION INTRAVENOUS PRN
OUTPATIENT
Start: 2023-04-14

## 2023-04-14 RX ORDER — SODIUM CHLORIDE 9 MG/ML
INJECTION, SOLUTION INTRAVENOUS ONCE
Status: CANCELLED | OUTPATIENT
Start: 2023-04-14 | End: 2023-04-14

## 2023-04-14 RX ORDER — 0.9 % SODIUM CHLORIDE 0.9 %
500 INTRAVENOUS SOLUTION INTRAVENOUS ONCE
Status: COMPLETED | OUTPATIENT
Start: 2023-04-14 | End: 2023-04-14

## 2023-04-14 RX ORDER — ONDANSETRON 2 MG/ML
8 INJECTION INTRAMUSCULAR; INTRAVENOUS
OUTPATIENT
Start: 2023-04-14

## 2023-04-14 RX ORDER — ZOLEDRONIC ACID 5 MG/100ML
5 INJECTION, SOLUTION INTRAVENOUS ONCE
Status: COMPLETED | OUTPATIENT
Start: 2023-04-14 | End: 2023-04-14

## 2023-04-14 RX ORDER — ACETAMINOPHEN 325 MG/1
650 TABLET ORAL
OUTPATIENT
Start: 2023-04-14

## 2023-04-14 RX ORDER — SODIUM CHLORIDE 9 MG/ML
INJECTION, SOLUTION INTRAVENOUS ONCE
Status: COMPLETED | OUTPATIENT
Start: 2023-04-14 | End: 2023-04-14

## 2023-04-14 RX ORDER — 0.9 % SODIUM CHLORIDE 0.9 %
500 INTRAVENOUS SOLUTION INTRAVENOUS ONCE
Status: CANCELLED | OUTPATIENT
Start: 2023-04-14 | End: 2023-04-14

## 2023-04-14 RX ORDER — HEPARIN SODIUM (PORCINE) LOCK FLUSH IV SOLN 100 UNIT/ML 100 UNIT/ML
500 SOLUTION INTRAVENOUS PRN
OUTPATIENT
Start: 2023-04-14

## 2023-04-14 RX ORDER — SODIUM CHLORIDE 9 MG/ML
INJECTION, SOLUTION INTRAVENOUS CONTINUOUS
OUTPATIENT
Start: 2023-04-14

## 2023-04-14 RX ORDER — ALBUTEROL SULFATE 90 UG/1
4 AEROSOL, METERED RESPIRATORY (INHALATION) PRN
OUTPATIENT
Start: 2023-04-14

## 2023-04-14 RX ORDER — SODIUM CHLORIDE 0.9 % (FLUSH) 0.9 %
5-40 SYRINGE (ML) INJECTION PRN
OUTPATIENT
Start: 2023-04-14

## 2023-04-14 RX ORDER — EPINEPHRINE 1 MG/ML
0.3 INJECTION, SOLUTION INTRAMUSCULAR; SUBCUTANEOUS PRN
OUTPATIENT
Start: 2023-04-14

## 2023-04-14 RX ORDER — DIPHENHYDRAMINE HYDROCHLORIDE 50 MG/ML
50 INJECTION INTRAMUSCULAR; INTRAVENOUS
OUTPATIENT
Start: 2023-04-14

## 2023-04-14 RX ORDER — SODIUM CHLORIDE 0.9 % (FLUSH) 0.9 %
5-40 SYRINGE (ML) INJECTION PRN
Status: DISCONTINUED | OUTPATIENT
Start: 2023-04-14 | End: 2023-04-15 | Stop reason: HOSPADM

## 2023-04-14 RX ADMIN — SODIUM CHLORIDE 500 ML: 9 INJECTION, SOLUTION INTRAVENOUS at 13:17

## 2023-04-14 RX ADMIN — SODIUM CHLORIDE: 9 INJECTION, SOLUTION INTRAVENOUS at 13:01

## 2023-04-14 RX ADMIN — ZOLEDRONIC ACID 5 MG: 5 INJECTION, SOLUTION INTRAVENOUS at 13:02

## 2023-04-14 RX ADMIN — SODIUM CHLORIDE, PRESERVATIVE FREE 10 ML: 5 INJECTION INTRAVENOUS at 13:03

## 2023-04-14 ASSESSMENT — PAIN - FUNCTIONAL ASSESSMENT: PAIN_FUNCTIONAL_ASSESSMENT: 0-10

## 2023-04-14 NOTE — PROGRESS NOTES
__M__ Safety:       (Environmental)  [place] to environment  Ensure ID band is correct and in place/ allergy band as needed  Assess for fall risk  Initiate fall precautions as applicable (fall band, side rails, etc.)  Call light within reach  Bed in low position/ wheels locked    ___M_ Pain:       Assess pain level and characteristics  Administer analgesics as ordered  Assess effectiveness of pain management and report to MD as needed    _M___ Knowledge Deficit:  Assess baseline knowledge  Provide teaching at level of understanding  Provide teaching via preferred learning method  Evaluate teaching effectiveness    _M___ Hemodynamic/Respiratory Status:       (Pre and Post Procedure Monitoring)  Assess/Monitor vital signs and LOC  Assess Baseline SpO2 prior to any sedation  Obtain weight/height  Assess vital signs/ LOC until patient meets discharge criteria  Monitor procedure site and notify MD of any issues    _

## 2023-04-19 ENCOUNTER — HOSPITAL ENCOUNTER (OUTPATIENT)
Dept: PHYSICAL THERAPY | Age: 58
Setting detail: THERAPIES SERIES
End: 2023-04-19
Payer: COMMERCIAL

## 2023-04-19 ENCOUNTER — PATIENT MESSAGE (OUTPATIENT)
Dept: FAMILY MEDICINE CLINIC | Age: 58
End: 2023-04-19

## 2023-04-19 DIAGNOSIS — M62.838 MUSCLE SPASMS OF BOTH LOWER EXTREMITIES: ICD-10-CM

## 2023-04-19 DIAGNOSIS — Z98.890 S/P LAMINECTOMY: Primary | ICD-10-CM

## 2023-04-19 RX ORDER — TIZANIDINE 2 MG/1
2 TABLET ORAL EVERY 8 HOURS PRN
Qty: 90 TABLET | Refills: 0 | Status: SHIPPED | OUTPATIENT
Start: 2023-04-19 | End: 2023-05-19

## 2023-04-19 RX ORDER — PREDNISONE 20 MG/1
20 TABLET ORAL 2 TIMES DAILY
Qty: 10 TABLET | Refills: 0 | Status: SHIPPED | OUTPATIENT
Start: 2023-04-19 | End: 2023-04-24

## 2023-04-19 NOTE — TELEPHONE ENCOUNTER
From: Shalini Kemar  To: Darrius Braros  Sent: 4/19/2023 8:53 AM EDT  Subject: Muscle relaxers     Good morning Andrew, I took the last muscle relaxer this morning. I still would like to know if there's something else you can give me stronger than the Flexeril. I was taking two at a time like you said and still didn't feel like it was helping. Appreciate anything you can do to help in my recovery from the back surgery. Thanks and have a great day.     Calixto Guthrie

## 2023-04-21 ENCOUNTER — HOSPITAL ENCOUNTER (OUTPATIENT)
Dept: PHYSICAL THERAPY | Age: 58
Setting detail: THERAPIES SERIES
Discharge: HOME | End: 2023-04-21
Payer: COMMERCIAL

## 2023-04-21 PROCEDURE — 97113 AQUATIC THERAPY/EXERCISES: CPT

## 2023-04-24 ENCOUNTER — HOSPITAL ENCOUNTER (OUTPATIENT)
Dept: PHYSICAL THERAPY | Age: 58
Setting detail: THERAPIES SERIES
Discharge: HOME | End: 2023-04-24
Payer: COMMERCIAL

## 2023-04-24 PROCEDURE — 97110 THERAPEUTIC EXERCISES: CPT

## 2023-04-24 NOTE — PROGRESS NOTES
Modify plan of care as follows:    []  Hold pending physician visit  []  Discharge    Time In 1003   Time Out 1043   Timed Code Minutes: 40   Total Treatment Time: 40     Electronically Signed by: Sahara Dong PTA

## 2023-04-27 ENCOUNTER — APPOINTMENT (OUTPATIENT)
Dept: PHYSICAL THERAPY | Age: 58
End: 2023-04-27
Payer: COMMERCIAL

## 2023-05-02 ENCOUNTER — APPOINTMENT (OUTPATIENT)
Dept: PHYSICAL THERAPY | Age: 58
End: 2023-05-02
Payer: COMMERCIAL

## 2023-05-05 ENCOUNTER — APPOINTMENT (OUTPATIENT)
Dept: PHYSICAL THERAPY | Age: 58
End: 2023-05-05
Payer: COMMERCIAL

## 2023-05-08 ENCOUNTER — HOSPITAL ENCOUNTER (OUTPATIENT)
Dept: PHYSICAL THERAPY | Age: 58
Setting detail: THERAPIES SERIES
Discharge: HOME | End: 2023-05-08
Payer: COMMERCIAL

## 2023-05-08 PROCEDURE — 97110 THERAPEUTIC EXERCISES: CPT

## 2023-05-08 PROCEDURE — 97530 THERAPEUTIC ACTIVITIES: CPT

## 2023-05-08 NOTE — PROGRESS NOTES
Training, Self-Care Education and Training, Positioning, Integrative Dry Needling, and Aquatics     []  Plan of care initiated. Plan to see patient 2 times per week for 12 weeks to address the treatment planned outlined above.   [x]  Continue with current plan of care  []  Modify plan of care as follows:    []  Hold pending physician visit  []  Discharge    Time In 1016   Time Out 1100   Timed Code Minutes: 44 min   Total Treatment Time: 44 min     Electronically Signed by: Justice Foote PT

## 2023-05-12 ENCOUNTER — HOSPITAL ENCOUNTER (OUTPATIENT)
Dept: PHYSICAL THERAPY | Age: 58
Setting detail: THERAPIES SERIES
Discharge: HOME | End: 2023-05-12
Payer: COMMERCIAL

## 2023-05-12 PROCEDURE — 97113 AQUATIC THERAPY/EXERCISES: CPT

## 2023-05-12 NOTE — PROGRESS NOTES
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 Now on short term disability. NOTE: Pool therapy forms have been signed and submitted for scanning into Epic. UPDATES:     - Cookie Elias has been issued handouts per pool at [place] [place], [place] and [employer] for future independent exercise. - Cookie Elias states she will return to work full time on June 29, 2023. SUBJECTIVE: States she has some leg pain today, but otherwise is doing well and ready to return to work on June 29, 2023. AQUATICS TREATMENT   Precautions: Metal hardware in spine per recent lumbar fusion surgery.    Pain: 5/10 pain      X in shaded column indicates activity completed today   Exercise/Intervention Sets/Sec   Notes   Walk Forward 3* laps   x     Walk Backward 3* laps   x     Walk Sideways 3* laps   x                 Lower Extremity Exercises:           Heel/Toe Raises 12*   x     Marches 12*   x     Squats 10x2*   x     3 Way Hip 15x2*   x     Hamstring Curls 10x2*   x     Lunges 10x2   x     Step-Ups 10x2 per 6\"   x                 Lower Extremity Stretches:  Calf stretch off edge of step  3x20 sec                    Seated Exercises:                       Upper Extremity Exercises:           Shoulder Flexion 15         Shoulder ABD/ADD 15         Shoulder Horizontal ABD/ADD 15         Shoulder IR/ER           Shoulder Circles           Shoulder Shrugs           Rows 15        Bicep Curls                       Upper Extremity Stretches:                       Balance:                       Dynamic Gait:                       [place]:           Hang 4 min   x     Bicycle 2 min   x     Hip ABD/ADD 2 min   x     Hip Flex/Ext 2 min   x       Specific Interventions Next Treatment: Light exercise on mat per sidelying or supine (if tolerated)     Activity/Treatment Tolerance:  [x]  Patient tolerated treatment well  []  Patient limited by fatigue  []  Patient limited

## 2023-05-17 ENCOUNTER — HOSPITAL ENCOUNTER (OUTPATIENT)
Dept: PHYSICAL THERAPY | Age: 58
Setting detail: THERAPIES SERIES
Discharge: HOME | End: 2023-05-17
Payer: COMMERCIAL

## 2023-05-17 PROCEDURE — 97110 THERAPEUTIC EXERCISES: CPT

## 2023-05-17 NOTE — PROGRESS NOTES
Time Out 1456   Timed Code Minutes: 42 min   Total Treatment Time: 42 min     Electronically Signed by: Michela Dee PTA

## 2023-05-19 ENCOUNTER — HOSPITAL ENCOUNTER (OUTPATIENT)
Dept: PHYSICAL THERAPY | Age: 58
Setting detail: THERAPIES SERIES
Discharge: HOME | End: 2023-05-19
Payer: COMMERCIAL

## 2023-05-19 PROCEDURE — 97113 AQUATIC THERAPY/EXERCISES: CPT

## 2023-05-19 NOTE — PROGRESS NOTES
[address] [facility]  PHYSICAL THERAPY  [] EVALUATION  [] DAILY NOTE (LAND) [x] DAILY NOTE (AQUATIC ) [] PROGRESS NOTE (RE-CERT) [] DISCHARGE NOTE    [x] OUTPATIENT REHABILITATION [facility]   [] [place] [address]    [] [facility]   [] [place]    Date: 2023  Patient Name:  Ning Brian  : 1965  MRN: [id number]  CSN: [id number]    Referring Practitioner SHERYL Cazares -*   Diagnosis Spondylosis without myelopathy or radiculopathy, lumbosacral region [M47.817]    Treatment Diagnosis Low back pain s/p lumbar fusion surgery   Date of Evaluation 23    Additional Pertinent History HTN. Femoral endarterectomy (left). HTN. Functional Outcome Measure Used Modified ELISEO. Functional Outcome Score 36=72 (23);  21=42 (23)      Insurance: Primary: Payor: Ana Laura Ceballos 150 /  /  / ,   Secondary:    Authorization Information: PRE CERTIFICATION REQUIRED: NO  INSURANCE THERAPY BENEFIT:  UNLIMITED VISITS BASED ON MEDICAL NECESSITY   AQUATIC THERAPY COVERED: YES  MODALITIES COVERED:  YES  TELEHEALTH COVERED:   REFERENCE NUMBER: [id number]   Visit # 9, 3/10 for progress note   Visits Allowed: Unlimited per medical necessity   Recertification Date: 6326   Physician Follow-Up: Per patient   Physician Orders:    History of Present Illness: Per medical record, patient had the following surgery on 3/17/2023: POSTERIOR LUMBAR INTERBODY FUSION LUMBAR FIVE (L-5) THRU SACRAL ONE (S-1) LAMINECTOMY DECOMPRESSION WITH INSTRUMENTATION. Presents today using RW, and c/o LBP /10 and no numbness or tingling. No falls since surgery. States she does not use her walker at home because she is feeling better. States medical history includes re-construction of her left knee. States her son has been supportive, and he is present today. Did not use AD prior to surgery. Typical day: caring for her son and self; hobbies include jennifer.       Works at [employer]

## 2023-05-22 ENCOUNTER — OFFICE VISIT (OUTPATIENT)
Dept: FAMILY MEDICINE CLINIC | Age: 58
End: 2023-05-22
Payer: COMMERCIAL

## 2023-05-22 ENCOUNTER — HOSPITAL ENCOUNTER (OUTPATIENT)
Dept: PHYSICAL THERAPY | Age: 58
Setting detail: THERAPIES SERIES
Discharge: HOME | End: 2023-05-22
Payer: COMMERCIAL

## 2023-05-22 VITALS
TEMPERATURE: 98 F | BODY MASS INDEX: 26.87 KG/M2 | DIASTOLIC BLOOD PRESSURE: 68 MMHG | WEIGHT: 137.6 LBS | HEART RATE: 96 BPM | RESPIRATION RATE: 16 BRPM | SYSTOLIC BLOOD PRESSURE: 136 MMHG | OXYGEN SATURATION: 97 %

## 2023-05-22 DIAGNOSIS — M54.2 CHRONIC NECK PAIN: ICD-10-CM

## 2023-05-22 DIAGNOSIS — R25.2 MUSCLE CRAMPS: ICD-10-CM

## 2023-05-22 DIAGNOSIS — G89.29 CHRONIC NECK PAIN: ICD-10-CM

## 2023-05-22 DIAGNOSIS — M25.511 CHRONIC RIGHT SHOULDER PAIN: ICD-10-CM

## 2023-05-22 DIAGNOSIS — Z13.220 SCREENING FOR HYPERLIPIDEMIA: ICD-10-CM

## 2023-05-22 DIAGNOSIS — B35.1 ONYCHOMYCOSIS: ICD-10-CM

## 2023-05-22 DIAGNOSIS — G89.29 CHRONIC RIGHT SHOULDER PAIN: ICD-10-CM

## 2023-05-22 DIAGNOSIS — E78.00 PURE HYPERCHOLESTEROLEMIA: ICD-10-CM

## 2023-05-22 DIAGNOSIS — Z00.00 ENCOUNTER FOR WELL ADULT EXAM WITHOUT ABNORMAL FINDINGS: Primary | ICD-10-CM

## 2023-05-22 DIAGNOSIS — F51.01 PRIMARY INSOMNIA: ICD-10-CM

## 2023-05-22 DIAGNOSIS — M85.80 OSTEOPENIA, UNSPECIFIED LOCATION: ICD-10-CM

## 2023-05-22 PROBLEM — M79.18 CHRONIC MYOFASCIAL PAIN: Status: ACTIVE | Noted: 2023-05-22

## 2023-05-22 PROCEDURE — 99396 PREV VISIT EST AGE 40-64: CPT | Performed by: NURSE PRACTITIONER

## 2023-05-22 PROCEDURE — 3075F SYST BP GE 130 - 139MM HG: CPT | Performed by: NURSE PRACTITIONER

## 2023-05-22 PROCEDURE — 97110 THERAPEUTIC EXERCISES: CPT

## 2023-05-22 PROCEDURE — 3078F DIAST BP <80 MM HG: CPT | Performed by: NURSE PRACTITIONER

## 2023-05-22 RX ORDER — TERBINAFINE HYDROCHLORIDE 250 MG/1
250 TABLET ORAL DAILY
Qty: 84 TABLET | Refills: 0 | Status: SHIPPED | OUTPATIENT
Start: 2023-05-22 | End: 2023-08-14

## 2023-05-22 RX ORDER — CYCLOBENZAPRINE HCL 10 MG
TABLET ORAL
Qty: 90 TABLET | Refills: 2 | Status: SHIPPED | OUTPATIENT
Start: 2023-05-22 | End: 2023-05-22

## 2023-05-22 RX ORDER — HYDROCODONE BITARTRATE AND ACETAMINOPHEN 5; 325 MG/1; MG/1
TABLET ORAL
COMMUNITY
Start: 2023-05-20

## 2023-05-22 RX ORDER — CYCLOBENZAPRINE HCL 10 MG
10 TABLET ORAL DAILY PRN
Qty: 30 TABLET | Refills: 0 | Status: SHIPPED | OUTPATIENT
Start: 2023-05-22 | End: 2023-06-21

## 2023-05-22 RX ORDER — CARISOPRODOL 350 MG/1
350 TABLET ORAL DAILY
Qty: 30 TABLET | Refills: 0 | Status: SHIPPED | OUTPATIENT
Start: 2023-05-22 | End: 2023-06-21

## 2023-05-22 ASSESSMENT — ENCOUNTER SYMPTOMS
WHEEZING: 0
RHINORRHEA: 0
EYE REDNESS: 0
ALLERGIC/IMMUNOLOGIC NEGATIVE: 1
VOMITING: 0
SORE THROAT: 0
NAUSEA: 0
EYE DISCHARGE: 0
BACK PAIN: 1
CONSTIPATION: 0
DIARRHEA: 0
SHORTNESS OF BREATH: 0
TROUBLE SWALLOWING: 0
ABDOMINAL PAIN: 0
EYE PAIN: 0
COUGH: 0

## 2023-05-22 NOTE — PATIENT INSTRUCTIONS
wait to have sex with a new partner (or partners) until you've each been tested for STIs. It also helps if you use condoms (male or female condoms) and if you limit your sex partners to one person who only has sex with you. Vaccines are available for some STIs. If you think you may have a problem with alcohol or drug use, talk to your doctor. This includes prescription medicines (such as amphetamines and opioids) and illegal drugs (such as cocaine and methamphetamine). Your doctor can help you figure out what type of treatment is best for you. Protect your skin from too much sun. When you're outdoors from 10 a.m. to 4 p.m., stay in the shade or cover up with clothing and a hat with a wide brim. Wear sunglasses that block UV rays. Even when it's cloudy, put broad-spectrum sunscreen (SPF 30 or higher) on any exposed skin. See a dentist one or two times a year for checkups and to have your teeth cleaned. Wear a seat belt in the car. When should you call for help? Watch closely for changes in your health, and be sure to contact your doctor if you have any problems or symptoms that concern you. Where can you learn more? Go to [email or web address]://[facility]atelyeb[facility]. org and sign in to your [employer] account. Enter U660 in the [facility] box to learn more about \"Well Visit, Women 50 to 72: Care Instructions. \"     If you do not have an account, please click on the \"Sign Up Now\" link. Current as of: June 6, 2022               Content Version: 13.4  © 2006-2022 Healthwise, Incorporated. Care instructions adapted under license by [facility] ([facility]). If you have questions about a medical condition or this instruction, always ask your healthcare professional. [place] [address] any warranty or liability for your use of this information.

## 2023-05-22 NOTE — PROGRESS NOTES
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 Now on short term disability. NOTE: Pool therapy forms have been signed and submitted for scanning into Epic. UPDATES:     - Tamara Acosta has been issued handouts per pool at [employer], [place], [place] and [employer] for future independent exercise. - Tamara Acosta states she will return to work full time on June 29, 2023. SUBJECTIVE:  Patient states she saw her family doctor today and he prescribed her a muscle relaxer which she started taking just before she came to therapy. Reports she isn't really having pain today but feels like it is a lot of muscle tightness.         TREATMENT   Precautions:   Metal hardware in spine per recent lumbar fusion surgery   Pain: 4/10 \"tightness\" lower back     \"X in shaded column indicates activity completed today     *\" next to exercise/intervention indicates progression   Modalities Parameters/  Location   Notes                                 Manual Therapy Time/Technique   Notes                                 Exercise/Intervention     Notes   Dynamic Gait Index Items: sustained horizontal and vertical gaze; stepping over 4\" objects; slalom around obstacles; change in gait speed; stop and pivot                       NuStep 6 min Level 3* X     SKTC, hamstring stretch, piriformis stretch 3x 20 sec  X     TA activation 10x 2 sec      + Marches  x10         + SLR 2x10   X     + BKFO: unilateral, bilateral x20   X     Sidelying Clamshells + reverse clamshells, hip abduction 10x   X                 Sit to stand transfers  x5                     Standing           3-way hip 5x5   X     Knee bends 15*x2   X     Toe raises 15*x1   X     4 inch step up forward R/L 10x   X    Side step  3 laps in // bars   X                             Education: (1) hooklying log rolling; (2) supine to sidelying to seated motion; (3) pain control and sleep posture per sidelying with 3

## 2023-05-26 ENCOUNTER — HOSPITAL ENCOUNTER (OUTPATIENT)
Dept: PHYSICAL THERAPY | Age: 58
Setting detail: THERAPIES SERIES
Discharge: HOME | End: 2023-05-26
Payer: COMMERCIAL

## 2023-05-26 PROCEDURE — 97110 THERAPEUTIC EXERCISES: CPT

## 2023-05-26 NOTE — PROGRESS NOTES
[facility] [facility]  PHYSICAL THERAPY  [] EVALUATION  [] DAILY NOTE (LAND) [] DAILY NOTE (AQUATIC ) [x] PROGRESS NOTE [] DISCHARGE NOTE    [x] OUTPATIENT REHABILITATION CENTER [facility]   [] [place] [address]    [] [facility]   [] Leander Goldberg    Date: 2023  Patient Name:  Jordi Trevino  : 1965  MRN: [id number]  CSN: [id number]    Referring Practitioner SHERYL Diaz -*   Diagnosis Spondylosis without myelopathy or radiculopathy, lumbosacral region [M47.817]    Treatment Diagnosis Low back pain s/p lumbar fusion surgery   Date of Evaluation 23    Additional Pertinent History HTN. Femoral endarterectomy (left). HTN. Functional Outcome Measure Used Modified ELISEO. Functional Outcome Score 36=72 (23);  21=42 (23)      Insurance: Primary: Payor: Mary Capps /  /  / ,   Secondary:    Authorization Information: PRE CERTIFICATION REQUIRED: NO  INSURANCE THERAPY BENEFIT:  UNLIMITED VISITS BASED ON MEDICAL NECESSITY   AQUATIC THERAPY COVERED: YES  MODALITIES COVERED:  YES  TELEHEALTH COVERED:   REFERENCE NUMBER: [id number]   Visit # 6, 0/10 for progress note   Visits Allowed: Unlimited per medical necessity   Recertification Date: 6190   Physician Follow-Up: Per patient   Physician Orders:    History of Present Illness: Per medical record, patient had the following surgery on 3/17/2023: POSTERIOR LUMBAR INTERBODY FUSION LUMBAR FIVE (L-5) THRU SACRAL ONE (S-1) LAMINECTOMY DECOMPRESSION WITH INSTRUMENTATION. Presents today using RW, and c/o LBP 6/10 and no numbness or tingling. No falls since surgery. States she does not use her walker at home because she is feeling better. States medical history includes re-construction of her left knee. States her son has been supportive, and he is present today. Did not use AD prior to surgery. Typical day: caring for her son and self; hobbies include jennifer.       Works at [employer] (in charge of

## 2023-05-30 ENCOUNTER — TELEPHONE (OUTPATIENT)
Dept: PHYSICAL THERAPY | Age: 58
End: 2023-05-30

## 2023-06-07 ENCOUNTER — HOSPITAL ENCOUNTER (OUTPATIENT)
Dept: PHYSICAL THERAPY | Age: 58
Setting detail: THERAPIES SERIES
Discharge: HOME | End: 2023-06-07
Payer: COMMERCIAL

## 2023-06-07 PROCEDURE — 97113 AQUATIC THERAPY/EXERCISES: CPT

## 2023-06-07 NOTE — PROGRESS NOTES
Gait Training, Stair Training, Neuromuscular Re-education, Manual Therapy - Soft Tissue Mobilization, Pain Management, Home Exercise Program, Patient Education, Safety Education and Training, Self-Care Education and Training, Positioning, Integrative Dry Needling, and Aquatics     []  Plan of care initiated. Plan to see patient 2 times per week for 12 weeks to address the treatment planned outlined above.   [x]  Continue with current plan of care  []  Modify plan of care as follows:    []  Hold pending physician visit  []  Discharge    Time In 905   Time Out 951   Timed Code Minutes: 46 min   Total Treatment Time: 46 min     Electronically Signed by: Alexa Rosado, PTA

## 2023-06-08 ENCOUNTER — HOSPITAL ENCOUNTER (OUTPATIENT)
Age: 58
Discharge: HOME | End: 2023-06-08
Payer: COMMERCIAL

## 2023-06-08 ENCOUNTER — OFFICE VISIT (OUTPATIENT)
Dept: CARDIOLOGY CLINIC | Age: 58
End: 2023-06-08
Payer: COMMERCIAL

## 2023-06-08 ENCOUNTER — HOSPITAL ENCOUNTER (OUTPATIENT)
Dept: INTERVENTIONAL RADIOLOGY/VASCULAR | Age: 58
Discharge: HOME | End: 2023-06-08
Attending: RADIOLOGY
Payer: COMMERCIAL

## 2023-06-08 ENCOUNTER — HOSPITAL ENCOUNTER (OUTPATIENT)
Dept: GENERAL RADIOLOGY | Age: 58
Discharge: HOME | End: 2023-06-08
Payer: COMMERCIAL

## 2023-06-08 VITALS
HEIGHT: 60 IN | BODY MASS INDEX: 26.97 KG/M2 | WEIGHT: 137.38 LBS | HEART RATE: 68 BPM | SYSTOLIC BLOOD PRESSURE: 132 MMHG | DIASTOLIC BLOOD PRESSURE: 60 MMHG

## 2023-06-08 DIAGNOSIS — I73.9 PAD (PERIPHERAL ARTERY DISEASE) (HCC): ICD-10-CM

## 2023-06-08 DIAGNOSIS — G89.29 CHRONIC RIGHT SHOULDER PAIN: ICD-10-CM

## 2023-06-08 DIAGNOSIS — M25.511 CHRONIC RIGHT SHOULDER PAIN: ICD-10-CM

## 2023-06-08 DIAGNOSIS — I73.9 PAD (PERIPHERAL ARTERY DISEASE) (HCC): Primary | ICD-10-CM

## 2023-06-08 PROCEDURE — 3075F SYST BP GE 130 - 139MM HG: CPT | Performed by: INTERNAL MEDICINE

## 2023-06-08 PROCEDURE — 99214 OFFICE O/P EST MOD 30 MIN: CPT | Performed by: INTERNAL MEDICINE

## 2023-06-08 PROCEDURE — 73030 X-RAY EXAM OF SHOULDER: CPT

## 2023-06-08 PROCEDURE — 93925 LOWER EXTREMITY STUDY: CPT

## 2023-06-08 PROCEDURE — 3078F DIAST BP <80 MM HG: CPT | Performed by: INTERNAL MEDICINE

## 2023-06-08 NOTE — PROGRESS NOTES
Pt here for 1 yr     Pt continues with back pain ,
was   estimated at 55-60%. Right Atrium   Right atrial size was normal.      Right Ventricle   The right ventricular size was normal with normal systolic function and   wall thickness. Pericardial Effusion   The pericardium was normal in appearance with no evidence of a pericardial   effusion. Pleural Effusion   No evidence of pleural effusion. Aorta / Great Vessels   -Aortic root dimension within normal limits. -IVC size is within normal limits with normal respiratory phasic changes.      M-Mode/2D Measurements & Calculations      LV Diastolic   LV Systolic Dimension: 3  AV Cusp Separation: 1.7 cmLA   Dimension: 4.4 cm                        Dimension: 3.5 cmAO Root   cm             LV Volume Diastolic: 48.0 Dimension: 1.8 cmLA Area: 20.7   LV FS:31.8 %   ml                        cm^2   LV PW          LV Volume Systolic: 35 ml   Diastolic: 1   LV EDV/LV EDV Index: 87.7   cm             ml/58 m^2LV ESV/LV ESV   Septum         Index: 35 ml/23 m^2       RV Diastolic Dimension: 2.3 cm   Diastolic: 1.1 EF Calculated: 60.1 %   cm                                       LA/Aorta: 1.94                                               LA volume/Index: 60.2 ml /40m^2     Doppler Measurements & Calculations      MV Peak E-Wave: 106 cm/s   AV Peak Velocity: 197  LVOT Peak Velocity: 146   MV Peak A-Wave: 101 cm/s   cm/s                   cm/s   MV E/A Ratio: 1.05         AV Peak Gradient:      LVOT Peak Gradient: 9   MV Peak Gradient: 4.49     15.52 mmHg             mmHg   mmHg                                                     TV Peak E-Wave: 34.5   MV Deceleration Time: 201                         cm/s   msec                                              TV Peak A-Wave: 46.7   MV P1/2t: 59 msec          AV P1/2t: 330 msec     cm/s   MVA by PHT:3.73 cm^2                                                     TV Peak Gradient: 0.48   MV E' Septal Velocity: 9                          mmHg   cm/s

## 2023-06-09 ENCOUNTER — HOSPITAL ENCOUNTER (OUTPATIENT)
Age: 58
Discharge: HOME | End: 2023-06-09
Payer: COMMERCIAL

## 2023-06-09 ENCOUNTER — HOSPITAL ENCOUNTER (OUTPATIENT)
Dept: PHYSICAL THERAPY | Age: 58
Setting detail: THERAPIES SERIES
Discharge: HOME | End: 2023-06-09
Payer: COMMERCIAL

## 2023-06-09 DIAGNOSIS — R25.2 MUSCLE CRAMPS: ICD-10-CM

## 2023-06-09 DIAGNOSIS — E78.00 PURE HYPERCHOLESTEROLEMIA: ICD-10-CM

## 2023-06-09 DIAGNOSIS — M85.80 OSTEOPENIA, UNSPECIFIED LOCATION: ICD-10-CM

## 2023-06-09 LAB
CALCIUM SERPL-MCNC: 10.1 MG/DL (ref 8.5–10.5)
CHOLEST SERPL-MCNC: 196 MG/DL (ref 100–199)
HDLC SERPL-MCNC: 70 MG/DL
LDLC SERPL CALC-MCNC: 90 MG/DL
MAGNESIUM SERPL-MCNC: 1.8 MG/DL (ref 1.6–2.4)
TRIGL SERPL-MCNC: 179 MG/DL (ref 0–199)

## 2023-06-09 PROCEDURE — 82310 ASSAY OF CALCIUM: CPT

## 2023-06-09 PROCEDURE — 36415 COLL VENOUS BLD VENIPUNCTURE: CPT

## 2023-06-09 PROCEDURE — 80061 LIPID PANEL: CPT

## 2023-06-09 PROCEDURE — 83735 ASSAY OF MAGNESIUM: CPT

## 2023-06-09 PROCEDURE — 97110 THERAPEUTIC EXERCISES: CPT

## 2023-06-09 NOTE — PROGRESS NOTES
PLAN:  Treatment Recommendations: Strengthening, Range of Motion, Balance Training, Functional Mobility Training, Transfer Training, Endurance Training, Gait Training, Stair Training, Neuromuscular Re-education, Manual Therapy - Soft Tissue Mobilization, Pain Management, Home Exercise Program, Patient Education, Safety Education and Training, Self-Care Education and Training, Positioning, Integrative Dry Needling, and Aquatics     []  Plan of care initiated. Plan to see patient 2 times per week for 12 weeks to address the treatment planned outlined above.   [x]  Continue with current plan of care  []  Modify plan of care as follows:    []  Hold pending physician visit  []  Discharge    Time In 1300   Time Out 1345   Timed Code Minutes: 45 min   Total Treatment Time: 45 min     Electronically Signed by: Carolina Yang PTA

## 2023-06-14 ENCOUNTER — APPOINTMENT (OUTPATIENT)
Dept: PHYSICAL THERAPY | Age: 58
End: 2023-06-14
Payer: COMMERCIAL

## 2023-06-19 ENCOUNTER — APPOINTMENT (OUTPATIENT)
Dept: PHYSICAL THERAPY | Age: 58
End: 2023-06-19
Payer: COMMERCIAL

## 2023-06-20 ENCOUNTER — TELEPHONE (OUTPATIENT)
Dept: FAMILY MEDICINE CLINIC | Age: 58
End: 2023-06-20

## 2023-06-20 ENCOUNTER — PATIENT MESSAGE (OUTPATIENT)
Dept: FAMILY MEDICINE CLINIC | Age: 58
End: 2023-06-20

## 2023-06-20 ENCOUNTER — APPOINTMENT (OUTPATIENT)
Dept: PHYSICAL THERAPY | Age: 58
End: 2023-06-20
Payer: COMMERCIAL

## 2023-06-20 DIAGNOSIS — I73.9 PAD (PERIPHERAL ARTERY DISEASE) (HCC): ICD-10-CM

## 2023-06-20 DIAGNOSIS — I70.201 STENOSIS OF RIGHT POPLITEAL ARTERY (HCC): Primary | ICD-10-CM

## 2023-06-20 NOTE — TELEPHONE ENCOUNTER
Christiano Burrell from [facility] called this morning. The order was placed for R Angiogram per LEOBARDO Linn CNP. STR will pull the order from [facility].

## 2023-06-20 NOTE — TELEPHONE ENCOUNTER
From: Meghann Lopez  To: Taylor Alvarado  Sent: 6/20/2023 9:45 AM EDT  Subject: Dr Myra Zuleta    Hi again, I'm sorry to bother you but my right leg is worse than its ever been and the radiology department told me they were waiting on an order from a Dr Myra Zuleta. I told them I did not know who that was and they said he works with you. I would like to get this procedure done asap as it's hendering my physical therapy. Thank you very much for looking into this for me. My new return to work date has changed to August 17.     Sincerely,  Heidi Hollins

## 2023-06-20 NOTE — TELEPHONE ENCOUNTER
Pedro Cabrera from [facility] states pt had a consult and needs an order for right angiogram with intervention by Dr. Dee Leader. [id number] [address] per LEOBARDO Larson CNP. Order placed in [facility]. [facility] IR will pull order from [facility]. If you are a smoker, it is important for your health to stop smoking. Please be aware that second hand smoke is also harmful. no

## 2023-06-21 ENCOUNTER — HOSPITAL ENCOUNTER (OUTPATIENT)
Dept: PHYSICAL THERAPY | Age: 58
Setting detail: THERAPIES SERIES
End: 2023-06-21
Payer: COMMERCIAL

## 2023-06-22 ENCOUNTER — HOSPITAL ENCOUNTER (OUTPATIENT)
Dept: INTERVENTIONAL RADIOLOGY/VASCULAR | Age: 58
Discharge: HOME | End: 2023-06-22
Attending: RADIOLOGY | Admitting: RADIOLOGY
Payer: COMMERCIAL

## 2023-06-22 VITALS
HEART RATE: 79 BPM | SYSTOLIC BLOOD PRESSURE: 133 MMHG | HEIGHT: 60 IN | DIASTOLIC BLOOD PRESSURE: 51 MMHG | WEIGHT: 136.8 LBS | TEMPERATURE: 97.8 F | OXYGEN SATURATION: 93 % | BODY MASS INDEX: 26.86 KG/M2 | RESPIRATION RATE: 22 BRPM

## 2023-06-22 DIAGNOSIS — I70.201 STENOSIS OF RIGHT POPLITEAL ARTERY (HCC): ICD-10-CM

## 2023-06-22 DIAGNOSIS — I73.9 PAD (PERIPHERAL ARTERY DISEASE) (HCC): ICD-10-CM

## 2023-06-22 LAB
APTT PPP: 33.1 SECONDS (ref 22–38)
CREAT SERPL-MCNC: 0.7 MG/DL (ref 0.4–1.2)
DEPRECATED RDW RBC AUTO: 46.1 FL (ref 35–45)
ERYTHROCYTE [DISTWIDTH] IN BLOOD BY AUTOMATED COUNT: 13.4 % (ref 11.5–14.5)
GFR SERPL CREATININE-BSD FRML MDRD: > 60 ML/MIN/1.73M2
HCT VFR BLD AUTO: 36.6 % (ref 37–47)
HGB BLD-MCNC: 11.5 GM/DL (ref 12–16)
INR PPP: 0.89 (ref 0.85–1.13)
MCH RBC QN AUTO: 29.5 PG (ref 26–33)
MCHC RBC AUTO-ENTMCNC: 31.4 GM/DL (ref 32.2–35.5)
MCV RBC AUTO: 93.8 FL (ref 81–99)
PLATELET # BLD AUTO: 332 THOU/MM3 (ref 130–400)
PMV BLD AUTO: 9.8 FL (ref 9.4–12.4)
RBC # BLD AUTO: 3.9 MILL/MM3 (ref 4.2–5.4)
WBC # BLD AUTO: 11 THOU/MM3 (ref 4.8–10.8)

## 2023-06-22 PROCEDURE — 6370000000 HC RX 637 (ALT 250 FOR IP): Performed by: RADIOLOGY

## 2023-06-22 PROCEDURE — 6360000002 HC RX W HCPCS: Performed by: RADIOLOGY

## 2023-06-22 PROCEDURE — 6360000004 HC RX CONTRAST MEDICATION: Performed by: RADIOLOGY

## 2023-06-22 PROCEDURE — 85610 PROTHROMBIN TIME: CPT

## 2023-06-22 PROCEDURE — 85027 COMPLETE CBC AUTOMATED: CPT

## 2023-06-22 PROCEDURE — 37225 HC ATHERECTOMY FEM/POP: CPT

## 2023-06-22 PROCEDURE — 2709999900 IR ANGIOGRAM EXTREMITY RIGHT

## 2023-06-22 PROCEDURE — 85730 THROMBOPLASTIN TIME PARTIAL: CPT

## 2023-06-22 PROCEDURE — 82565 ASSAY OF CREATININE: CPT

## 2023-06-22 PROCEDURE — 36415 COLL VENOUS BLD VENIPUNCTURE: CPT

## 2023-06-22 PROCEDURE — 37221 HC PLASTY ILIAC W STENT: CPT

## 2023-06-22 PROCEDURE — 2580000003 HC RX 258: Performed by: RADIOLOGY

## 2023-06-22 PROCEDURE — C1876 STENT, NON-COA/NON-COV W/DEL: HCPCS | Performed by: RADIOLOGY

## 2023-06-22 RX ORDER — MIDAZOLAM HYDROCHLORIDE 1 MG/ML
INJECTION INTRAMUSCULAR; INTRAVENOUS PRN
Status: COMPLETED | OUTPATIENT
Start: 2023-06-22 | End: 2023-06-22

## 2023-06-22 RX ORDER — SODIUM CHLORIDE 450 MG/100ML
INJECTION, SOLUTION INTRAVENOUS CONTINUOUS
Status: DISCONTINUED | OUTPATIENT
Start: 2023-06-22 | End: 2023-06-22 | Stop reason: HOSPADM

## 2023-06-22 RX ORDER — IBUPROFEN 200 MG
TABLET ORAL PRN
Status: COMPLETED | OUTPATIENT
Start: 2023-06-22 | End: 2023-06-22

## 2023-06-22 RX ORDER — FENTANYL CITRATE 50 UG/ML
50 INJECTION, SOLUTION INTRAMUSCULAR; INTRAVENOUS ONCE
Status: DISCONTINUED | OUTPATIENT
Start: 2023-06-22 | End: 2023-06-22 | Stop reason: HOSPADM

## 2023-06-22 RX ORDER — MIDAZOLAM HYDROCHLORIDE 1 MG/ML
1 INJECTION INTRAMUSCULAR; INTRAVENOUS ONCE
Status: DISCONTINUED | OUTPATIENT
Start: 2023-06-22 | End: 2023-06-22 | Stop reason: HOSPADM

## 2023-06-22 RX ORDER — HEPARIN SODIUM 1000 [USP'U]/ML
INJECTION, SOLUTION INTRAVENOUS; SUBCUTANEOUS PRN
Status: COMPLETED | OUTPATIENT
Start: 2023-06-22 | End: 2023-06-22

## 2023-06-22 RX ORDER — FENTANYL CITRATE 50 UG/ML
INJECTION, SOLUTION INTRAMUSCULAR; INTRAVENOUS PRN
Status: COMPLETED | OUTPATIENT
Start: 2023-06-22 | End: 2023-06-22

## 2023-06-22 RX ADMIN — MIDAZOLAM 1 MG: 1 INJECTION INTRAMUSCULAR; INTRAVENOUS at 10:51

## 2023-06-22 RX ADMIN — FENTANYL CITRATE 50 MCG: 50 INJECTION, SOLUTION INTRAMUSCULAR; INTRAVENOUS at 09:31

## 2023-06-22 RX ADMIN — MIDAZOLAM 1 MG: 1 INJECTION INTRAMUSCULAR; INTRAVENOUS at 09:37

## 2023-06-22 RX ADMIN — FENTANYL CITRATE 50 MCG: 50 INJECTION, SOLUTION INTRAMUSCULAR; INTRAVENOUS at 11:22

## 2023-06-22 RX ADMIN — FENTANYL CITRATE 50 MCG: 50 INJECTION, SOLUTION INTRAMUSCULAR; INTRAVENOUS at 09:37

## 2023-06-22 RX ADMIN — SODIUM CHLORIDE: 4.5 INJECTION, SOLUTION INTRAVENOUS at 08:00

## 2023-06-22 RX ADMIN — MIDAZOLAM 1 MG: 1 INJECTION INTRAMUSCULAR; INTRAVENOUS at 09:31

## 2023-06-22 RX ADMIN — Medication 2000 MG: at 12:23

## 2023-06-22 RX ADMIN — IOPAMIDOL 180 ML: 612 INJECTION, SOLUTION INTRAVENOUS at 11:57

## 2023-06-22 RX ADMIN — HEPARIN SODIUM 1000 UNITS: 1000 INJECTION INTRAVENOUS; SUBCUTANEOUS at 10:17

## 2023-06-22 RX ADMIN — BACITRACIN ZINC, NEOMYCIN SULFATE, AND POLYMYXIN B SULFATE 1 G: 400; 3.5; 5 OINTMENT TOPICAL at 11:51

## 2023-06-22 RX ADMIN — FENTANYL CITRATE 50 MCG: 50 INJECTION, SOLUTION INTRAMUSCULAR; INTRAVENOUS at 10:51

## 2023-06-22 RX ADMIN — HEPARIN SODIUM 3000 UNITS: 1000 INJECTION INTRAVENOUS; SUBCUTANEOUS at 10:01

## 2023-06-22 NOTE — H&P
Formulation and discussion of sedation / procedure plans, risks, benefits, side effects and alternatives with patient and/or responsible adult completed. History and Physical reviewed and unchanged.     Electronically signed by Denisha Weinstein MD on 6/22/23 at 8:00 AM EDT
Paul Fry MD    iopamidol (ISOVUE-300) 61 % injection 100 mL, 100 mL, IntraVENous, Once, Abrahan Zhang MD    midazolam (VERSED) injection 1 mg, 1 mg, IntraVENous, Once, Abrahan Zhang MD  Prior to Admission medications    Medication Sig Start Date End Date Taking? Authorizing Provider   cyclobenzaprine (FLEXERIL) 10 MG tablet Take 1 tablet by mouth 3 times daily as needed for Muscle spasms 6/20/23 9/18/23  SHERYL Bell CNP   HYDROcodone-acetaminophen (NORCO) 5-325 MG per tablet TAKE 1 TABLET BY MOUTH 2 TIMES A DAY AS NEEDED FOR PAIN 5/20/23   Historical Provider, MD   terbinafine (LAMISIL) 250 MG tablet Take 1 tablet by mouth daily 5/22/23 8/14/23  SHERYL Bell CNP   VITAMIN D PO Take by mouth daily Vitamin d with calcium    Historical Provider, MD   atorvastatin (LIPITOR) 40 MG tablet Take 1 tablet by mouth daily 3/22/23 6/22/23  SHERYL Bell CNP   pregabalin (LYRICA) 25 MG capsule Take 1 capsule by mouth 3 times daily for 30 days. Patient taking differently: Take 1 capsule by mouth 2 times daily.  3/8/23 6/8/23  SHERYL Bell CNP   clopidogrel (PLAVIX) 75 MG tablet TAKE 1 TABLET BY MOUTH EVERY DAY 12/9/22   SHERYL Bell CNP   meloxicam (MOBIC) 15 MG tablet TAKE 1 TABLET BY MOUTH EVERY DAY 10/12/22   SHERYL Bell CNP   amLODIPine (NORVASC) 10 MG tablet TAKE 1 TABLET BY MOUTH EVERY DAY 8/26/22   SHERYL Bell CNP   lisinopril (PRINIVIL;ZESTRIL) 10 MG tablet TAKE 1 TABLET BY MOUTH TWO TIMES A DAY 8/26/22   SHERYL Bell CNP   acetaminophen (TYLENOL) 500 MG tablet Take 1 tablet by mouth every 6 hours as needed for Pain    Historical Provider, MD   aspirin 81 MG EC tablet Take 1 tablet by mouth daily    Historical Provider, MD     Additional information:       VITAL SIGNS   Vitals:    06/22/23 0710   BP: (!) 114/57   Pulse: 87   Resp: 18   Temp: 97.8 °F (36.6 °C)   SpO2: 96%       PHYSICAL:   Heart:  [x]Regular rate and rhythm

## 2023-06-22 NOTE — DISCHARGE INSTRUCTIONS
Reviewed with the patient post arteriogram instructions. Discussed care of puncture site:  remove dressing in 24 hours, gently clean site with soap and water, pat dry, and apply bandaid daily for 5 days. Keep site clean and dry. Do not apply any creams, lotions, or powders to puncture site. Do not submerse site in water (no tub baths, swimming, or whirlpool) for 5 days. Take it easy for the next 3-5 days. No driving for 3 days. Avoid heaving lifting, pushing, pulling or straining. Monitor site for bleeding, drainage, or swelling. Monitor for signs of infection which include:  redness, drainage, soreness, or temp greater than 101 F. Notify doctor of any abnormal findings as listed. If bleeding occurs, hold firm pressure at site and call 911.

## 2023-06-22 NOTE — OP NOTE
Department of Radiology  Post Procedure Progress Note      Pre-Procedure Diagnosis:  peripheral vascular disease     Procedure Performed:  run-off study, right leg atherectomy ,  angioplasty, and stenting     Anesthesia: local / versed and fentanyl    Findings: successful    Immediate Complications:  None    Estimated Blood Loss: minimal    SEE DICTATED PROCEDURE NOTE FOR COMPLETE DETAILS.     Nickolas Sher MD   6/22/2023 11:54 AM

## 2023-06-22 NOTE — PROGRESS NOTES
Patient admitted to 2E02  Ambulatory for arteriogram.  Patient NPO. Patient accompanied by spouse. Vital signs obtained. Assessment and data collection intiated. Oriented to room. Policies and procedures for 2E explained. All questions answered with no further questions at this time. Fall prevention and safety precautions discussed with patient.

## 2023-06-22 NOTE — PROGRESS NOTES
Slight ooze noted to left groin dressing , dressing removed, Quick-clot applied. Hemostasis maintained, no bleeding, swelling, or edema noted.

## 2023-06-22 NOTE — PROGRESS NOTES
Returned to 2E02. Monitor attached showing SR. Dressing to left  groin dry and intact. Hemostasis maintained, no bleeding, swelling, or edema noted. 0.9NSS infusing with approx 500 ml infusing.

## 2023-06-22 NOTE — PROGRESS NOTES
3708 Patient received in IR for procedure with family taken to main radiology. 9475 Dr. Sanchez Stone in; spoke to patient and assessment obtained. 1684 This procedure has been fully reviewed with the patient and written informed consent has been obtained. 0138 Patient assisted to procedure table and monitor applied; comfort ensured. 1238 Patient's left groin prepped for procedure. 0040 Procedure started with Dr. Sanchez Stone. 2728 Access obtained in left femoral with use of sonosite. [address] atherectomy device being used in right leg per Dr Sanchez Stone. 1040 Angioplasty of SFA/popliteal using IN. PACT 018 4 x 150 balloon. 1047 Angioplasty of SFA using IN. PACT Admiral 5 x 250 balloon. 1056 Angioplasty of SFA/popliteal using IN. PACT Admiral 5 x 250 balloon. 1107  Supera 4.5mm x 80mm stent deployed to distal SFA/popliteal per Dr Sanchez Stone. 1110 Post stent dilation using Soldier 18 5 x 200 balloon. 1112 Angioplasty of SFA using Soldier 18 5 x 200 balloon. 1121 Angioplasty of iliac using Soldier 35 6 x 40 balloon. 1135 Visi-Pro 8mm x 27mm stent deployed to iliac per Dr Sanchez Stone. 1146 Family called to consult room. 1147 Procedure completed; patient tolerated well. Angio seal device deployed to left femoral artery. 1151 Bacitracin oint, gauze and op site to left femoral site; area soft to touch with no bleeding noted. 1156 Patient on bed; comfort ensured. Left femoral dressing remains dry and intact with area soft. 1159 Report called to [place] on 2E.  1201 Patient taken to 2E via bed with family at bedside. Left femoral dressing remains dry and intact with area soft.

## 2023-06-26 ENCOUNTER — APPOINTMENT (OUTPATIENT)
Dept: PHYSICAL THERAPY | Age: 58
End: 2023-06-26
Payer: COMMERCIAL

## 2023-06-29 ENCOUNTER — HOSPITAL ENCOUNTER (OUTPATIENT)
Dept: INTERVENTIONAL RADIOLOGY/VASCULAR | Age: 58
Discharge: HOME | End: 2023-06-29

## 2023-06-29 DIAGNOSIS — I73.9 PAD (PERIPHERAL ARTERY DISEASE) (HCC): ICD-10-CM

## 2023-06-29 DIAGNOSIS — I70.201 STENOSIS OF RIGHT POPLITEAL ARTERY (HCC): ICD-10-CM

## 2023-07-03 ENCOUNTER — HOSPITAL ENCOUNTER (OUTPATIENT)
Dept: INTERVENTIONAL RADIOLOGY/VASCULAR | Age: 58
Discharge: HOME | End: 2023-07-03
Payer: COMMERCIAL

## 2023-07-03 DIAGNOSIS — I73.9 PVD (PERIPHERAL VASCULAR DISEASE) (HCC): ICD-10-CM

## 2023-07-03 PROCEDURE — 99211 OFF/OP EST MAY X REQ PHY/QHP: CPT

## 2023-07-12 ENCOUNTER — HOSPITAL ENCOUNTER (OUTPATIENT)
Dept: MRI IMAGING | Age: 58
Discharge: HOME | End: 2023-07-12
Payer: COMMERCIAL

## 2023-07-12 DIAGNOSIS — G89.29 CHRONIC RIGHT SHOULDER PAIN: ICD-10-CM

## 2023-07-12 DIAGNOSIS — M25.511 CHRONIC RIGHT SHOULDER PAIN: ICD-10-CM

## 2023-07-12 PROCEDURE — 73221 MRI JOINT UPR EXTREM W/O DYE: CPT

## 2023-07-13 ENCOUNTER — OFFICE VISIT (OUTPATIENT)
Dept: FAMILY MEDICINE CLINIC | Age: 58
End: 2023-07-13
Payer: COMMERCIAL

## 2023-07-13 VITALS
BODY MASS INDEX: 27.22 KG/M2 | WEIGHT: 139.4 LBS | SYSTOLIC BLOOD PRESSURE: 104 MMHG | OXYGEN SATURATION: 96 % | TEMPERATURE: 98.7 F | HEART RATE: 81 BPM | DIASTOLIC BLOOD PRESSURE: 66 MMHG

## 2023-07-13 DIAGNOSIS — I73.9 PAD (PERIPHERAL ARTERY DISEASE) (HCC): ICD-10-CM

## 2023-07-13 DIAGNOSIS — T81.49XA POSTOPERATIVE WOUND INFECTION: Primary | ICD-10-CM

## 2023-07-13 PROCEDURE — 3074F SYST BP LT 130 MM HG: CPT | Performed by: NURSE PRACTITIONER

## 2023-07-13 PROCEDURE — 3078F DIAST BP <80 MM HG: CPT | Performed by: NURSE PRACTITIONER

## 2023-07-13 PROCEDURE — 99214 OFFICE O/P EST MOD 30 MIN: CPT | Performed by: NURSE PRACTITIONER

## 2023-07-13 RX ORDER — ERGOCALCIFEROL 1.25 MG/1
CAPSULE ORAL
COMMUNITY

## 2023-07-13 RX ORDER — CLINDAMYCIN HYDROCHLORIDE 300 MG/1
300 CAPSULE ORAL 2 TIMES DAILY
Qty: 20 CAPSULE | Refills: 0 | Status: SHIPPED | OUTPATIENT
Start: 2023-07-13 | End: 2023-07-23

## 2023-07-13 ASSESSMENT — ENCOUNTER SYMPTOMS
BACK PAIN: 0
EYE PAIN: 0
WHEEZING: 0
EYE REDNESS: 0
EYE DISCHARGE: 0
COUGH: 0
SORE THROAT: 0
ALLERGIC/IMMUNOLOGIC NEGATIVE: 1
NAUSEA: 0
RHINORRHEA: 0
DIARRHEA: 0
VOMITING: 0
ABDOMINAL PAIN: 0
SHORTNESS OF BREATH: 0
TROUBLE SWALLOWING: 0
CONSTIPATION: 0

## 2023-07-13 NOTE — PROGRESS NOTES
[address]  [address] [address]  Dept: [phone]  Dept Fax: [phone]  Loc: [phone]     Visit Date:  7/13/2023      Patient:  Erica Peralta  YOB: 1965    HPI:     Chief Complaint   Patient presents with    Urinary Catheter     Infection in catheter site, patient had procedure on 6/22. Patient went back on 7/3 for follow up, seemed to be healed but it is having puss in site everyday no matter how clean she keeps it. Patient presents with concern about possible infection left inguinal puncture site for vascular surgery on June 22. States she has pussy drainage intermittently, but has no fever, chills or constitutional symptoms of infection. Denies excessive pain.       Medications    Current Outpatient Medications:     ergocalciferol (ERGOCALCIFEROL) 1.25 MG (50432 UT) capsule, TAKE 1 CAPSULE BY MOUTH ONE TIME A WEEK, Disp: , Rfl:     clindamycin (CLEOCIN) 300 MG capsule, Take 1 capsule by mouth in the morning and at bedtime for 10 days, Disp: 20 capsule, Rfl: 0    cyclobenzaprine (FLEXERIL) 10 MG tablet, Take 1 tablet by mouth 3 times daily as needed for Muscle spasms, Disp: 90 tablet, Rfl: 1    HYDROcodone-acetaminophen (NORCO) 5-325 MG per tablet, TAKE 1 TABLET BY MOUTH 2 TIMES A DAY AS NEEDED FOR PAIN, Disp: , Rfl:     terbinafine (LAMISIL) 250 MG tablet, Take 1 tablet by mouth daily, Disp: 84 tablet, Rfl: 0    clopidogrel (PLAVIX) 75 MG tablet, TAKE 1 TABLET BY MOUTH EVERY DAY, Disp: 90 tablet, Rfl: 3    meloxicam (MOBIC) 15 MG tablet, TAKE 1 TABLET BY MOUTH EVERY DAY, Disp: 90 tablet, Rfl: 2    amLODIPine (NORVASC) 10 MG tablet, TAKE 1 TABLET BY MOUTH EVERY DAY, Disp: 90 tablet, Rfl: 3    lisinopril (PRINIVIL;ZESTRIL) 10 MG tablet, TAKE 1 TABLET BY MOUTH TWO TIMES A DAY, Disp: 180 tablet, Rfl: 3    acetaminophen (TYLENOL) 500 MG tablet, Take 1 tablet by mouth every 6 hours as needed for Pain,

## 2023-07-14 LAB
BACTERIA SPEC AEROBE CULT: ABNORMAL
BACTERIA SPEC AEROBE CULT: ABNORMAL
GRAM STN SPEC: ABNORMAL
ORGANISM: ABNORMAL

## 2023-07-27 ENCOUNTER — OFFICE VISIT (OUTPATIENT)
Dept: FAMILY MEDICINE CLINIC | Age: 58
End: 2023-07-27
Payer: COMMERCIAL

## 2023-07-27 VITALS
WEIGHT: 139 LBS | RESPIRATION RATE: 14 BRPM | OXYGEN SATURATION: 96 % | SYSTOLIC BLOOD PRESSURE: 88 MMHG | HEART RATE: 100 BPM | HEIGHT: 61 IN | DIASTOLIC BLOOD PRESSURE: 56 MMHG | BODY MASS INDEX: 26.24 KG/M2

## 2023-07-27 DIAGNOSIS — G89.29 CHRONIC RIGHT SHOULDER PAIN: ICD-10-CM

## 2023-07-27 DIAGNOSIS — M25.511 CHRONIC RIGHT SHOULDER PAIN: ICD-10-CM

## 2023-07-27 DIAGNOSIS — I10 ESSENTIAL HYPERTENSION: ICD-10-CM

## 2023-07-27 DIAGNOSIS — Z12.4 ENCOUNTER FOR PAPANICOLAOU SMEAR OF CERVIX: Primary | ICD-10-CM

## 2023-07-27 PROCEDURE — 99214 OFFICE O/P EST MOD 30 MIN: CPT | Performed by: NURSE PRACTITIONER

## 2023-07-27 PROCEDURE — 3074F SYST BP LT 130 MM HG: CPT | Performed by: NURSE PRACTITIONER

## 2023-07-27 PROCEDURE — 3078F DIAST BP <80 MM HG: CPT | Performed by: NURSE PRACTITIONER

## 2023-07-27 ASSESSMENT — ENCOUNTER SYMPTOMS
SHORTNESS OF BREATH: 0
NAUSEA: 0
RHINORRHEA: 0
WHEEZING: 0
SORE THROAT: 0
CONSTIPATION: 0
EYE PAIN: 0
EYE REDNESS: 0
BACK PAIN: 0
COUGH: 0
TROUBLE SWALLOWING: 0
ABDOMINAL PAIN: 0
DIARRHEA: 0
VOMITING: 0
EYE DISCHARGE: 0
ALLERGIC/IMMUNOLOGIC NEGATIVE: 1

## 2023-07-27 NOTE — PROGRESS NOTES
[address]  [address] [place] [id number]  Dept: [phone]  Dept Fax: [phone]  Loc: [phone]     Visit Date:  7/27/2023      Patient:  Meera Tripp  YOB: 1965    HPI:     Chief Complaint   Patient presents with    Gynecologic Exam     G-6, P-2 and 2 miscarriages. She has not had a hysterectomy. Patient here for routine Pap smear and pelvic exam.    Gynecologic Exam  Pertinent negatives include no abdominal pain, back pain, constipation, diarrhea, dysuria, fever, frequency, headaches, nausea, rash, sore throat, urgency or vomiting. Medications    Current Outpatient Medications:     cyclobenzaprine (FLEXERIL) 10 MG tablet, Take 1 tablet by mouth 3 times daily as needed for Muscle spasms, Disp: 90 tablet, Rfl: 1    HYDROcodone-acetaminophen (NORCO) 5-325 MG per tablet, TAKE 1 TABLET BY MOUTH 2 TIMES A DAY AS NEEDED FOR PAIN, Disp: , Rfl:     terbinafine (LAMISIL) 250 MG tablet, Take 1 tablet by mouth daily, Disp: 84 tablet, Rfl: 0    VITAMIN D PO, Take by mouth daily Vitamin d with calcium, Disp: , Rfl:     atorvastatin (LIPITOR) 40 MG tablet, Take 1 tablet by mouth daily, Disp: 90 tablet, Rfl: 3    pregabalin (LYRICA) 25 MG capsule, Take 1 capsule by mouth 3 times daily for 30 days.  (Patient taking differently: Take 1 capsule by mouth 2 times daily.), Disp: 90 capsule, Rfl: 3    clopidogrel (PLAVIX) 75 MG tablet, TAKE 1 TABLET BY MOUTH EVERY DAY, Disp: 90 tablet, Rfl: 3    meloxicam (MOBIC) 15 MG tablet, TAKE 1 TABLET BY MOUTH EVERY DAY, Disp: 90 tablet, Rfl: 2    amLODIPine (NORVASC) 10 MG tablet, TAKE 1 TABLET BY MOUTH EVERY DAY, Disp: 90 tablet, Rfl: 3    lisinopril (PRINIVIL;ZESTRIL) 10 MG tablet, TAKE 1 TABLET BY MOUTH TWO TIMES A DAY, Disp: 180 tablet, Rfl: 3    acetaminophen (TYLENOL) 500 MG tablet, Take 1 tablet by mouth every 6 hours as needed for Pain, Disp: , Rfl:     aspirin 81 MG EC tablet,

## 2023-07-31 DIAGNOSIS — G89.29 CHRONIC NECK PAIN: ICD-10-CM

## 2023-07-31 DIAGNOSIS — M54.2 CHRONIC NECK PAIN: ICD-10-CM

## 2023-08-01 RX ORDER — MELOXICAM 15 MG/1
15 TABLET ORAL DAILY
Qty: 90 TABLET | Refills: 3 | Status: SHIPPED | OUTPATIENT
Start: 2023-08-01

## 2023-08-04 LAB — CYTOLOGY THIN PREP PAP: NORMAL

## 2023-08-10 DIAGNOSIS — M85.80 OSTEOPENIA, UNSPECIFIED LOCATION: Primary | ICD-10-CM

## 2023-08-10 RX ORDER — DEXAMETHASONE 1 MG
1 TABLET ORAL ONCE
Qty: 1 TABLET | Refills: 0 | Status: SHIPPED | OUTPATIENT
Start: 2023-08-10 | End: 2023-08-10

## 2023-08-11 ENCOUNTER — TELEPHONE (OUTPATIENT)
Dept: FAMILY MEDICINE CLINIC | Age: 58
End: 2023-08-11

## 2023-08-11 ENCOUNTER — HOSPITAL ENCOUNTER (OUTPATIENT)
Age: 58
Discharge: HOME | End: 2023-08-11
Payer: COMMERCIAL

## 2023-08-11 DIAGNOSIS — R79.89 ELEVATED MORNING SERUM CORTISOL LEVEL: Primary | ICD-10-CM

## 2023-08-11 DIAGNOSIS — M85.80 OSTEOPENIA, UNSPECIFIED LOCATION: ICD-10-CM

## 2023-08-11 LAB
CORTIS SERPL-MCNC: 2.36 UG/DL
CORTISOL COLLECTION INFO: NORMAL

## 2023-08-11 PROCEDURE — 36415 COLL VENOUS BLD VENIPUNCTURE: CPT

## 2023-08-11 PROCEDURE — 82533 TOTAL CORTISOL: CPT

## 2023-08-11 NOTE — TELEPHONE ENCOUNTER
----- Message from SHERYL Bullard CNP sent at 8/11/2023 10:56 AM EDT -----  Patient's cortisol level result is abnormally high after the test and I need to do a couple of more investigative labs and we will decide to do what from there. These labs are entered this morning, please call the patient and have her do them over the weekend, they do not require fasting.

## 2023-08-14 ENCOUNTER — HOSPITAL ENCOUNTER (OUTPATIENT)
Age: 58
Discharge: HOME | End: 2023-08-14
Payer: COMMERCIAL

## 2023-08-14 DIAGNOSIS — R79.89 ELEVATED MORNING SERUM CORTISOL LEVEL: ICD-10-CM

## 2023-08-14 PROCEDURE — 36415 COLL VENOUS BLD VENIPUNCTURE: CPT

## 2023-08-14 PROCEDURE — 82626 DEHYDROEPIANDROSTERONE: CPT

## 2023-08-14 PROCEDURE — 82024 ASSAY OF ACTH: CPT

## 2023-08-15 ENCOUNTER — OFFICE VISIT (OUTPATIENT)
Dept: FAMILY MEDICINE CLINIC | Age: 58
End: 2023-08-15
Payer: COMMERCIAL

## 2023-08-15 VITALS
DIASTOLIC BLOOD PRESSURE: 66 MMHG | OXYGEN SATURATION: 98 % | HEART RATE: 88 BPM | TEMPERATURE: 97.5 F | RESPIRATION RATE: 16 BRPM | SYSTOLIC BLOOD PRESSURE: 128 MMHG | WEIGHT: 141 LBS | BODY MASS INDEX: 26.64 KG/M2

## 2023-08-15 DIAGNOSIS — G89.29 CHRONIC MYOFASCIAL PAIN: ICD-10-CM

## 2023-08-15 DIAGNOSIS — M25.511 CHRONIC RIGHT SHOULDER PAIN: ICD-10-CM

## 2023-08-15 DIAGNOSIS — M79.18 CHRONIC MYOFASCIAL PAIN: ICD-10-CM

## 2023-08-15 DIAGNOSIS — G89.29 CHRONIC RIGHT SHOULDER PAIN: ICD-10-CM

## 2023-08-15 DIAGNOSIS — E24.9 HYPERCORTISOLISM (HCC): Primary | ICD-10-CM

## 2023-08-15 LAB — ACTH PLAS-MCNC: 5.9 PG/ML (ref 7.2–63.3)

## 2023-08-15 PROCEDURE — 3078F DIAST BP <80 MM HG: CPT | Performed by: NURSE PRACTITIONER

## 2023-08-15 PROCEDURE — 99213 OFFICE O/P EST LOW 20 MIN: CPT | Performed by: NURSE PRACTITIONER

## 2023-08-15 PROCEDURE — 3074F SYST BP LT 130 MM HG: CPT | Performed by: NURSE PRACTITIONER

## 2023-08-15 RX ORDER — HYDROCODONE BITARTRATE AND ACETAMINOPHEN 7.5; 325 MG/1; MG/1
1 TABLET ORAL 2 TIMES DAILY
Qty: 60 TABLET | Refills: 0 | Status: SHIPPED | OUTPATIENT
Start: 2023-08-15 | End: 2023-09-14

## 2023-08-15 RX ORDER — HYDROCODONE BITARTRATE AND ACETAMINOPHEN 5; 325 MG/1; MG/1
TABLET ORAL
Status: CANCELLED | OUTPATIENT
Start: 2023-08-15

## 2023-08-15 ASSESSMENT — ENCOUNTER SYMPTOMS
SORE THROAT: 0
SHORTNESS OF BREATH: 0
BACK PAIN: 0
WHEEZING: 0
CONSTIPATION: 0
VOMITING: 0
DIARRHEA: 0
ABDOMINAL PAIN: 0
EYE PAIN: 0
NAUSEA: 0
RHINORRHEA: 0
TROUBLE SWALLOWING: 0
ALLERGIC/IMMUNOLOGIC NEGATIVE: 1
EYE REDNESS: 0
COUGH: 0
EYE DISCHARGE: 0

## 2023-08-16 ENCOUNTER — TELEPHONE (OUTPATIENT)
Dept: FAMILY MEDICINE CLINIC | Age: 58
End: 2023-08-16

## 2023-08-16 DIAGNOSIS — E24.9 HYPERCORTISOLISM (HCC): Primary | ICD-10-CM

## 2023-08-16 NOTE — TELEPHONE ENCOUNTER
----- Message from SHERYL Galvin CNP sent at 8/16/2023  8:10 AM EDT -----  ACTH is low and even without DHEA being resulted I have gone ahead and ordered a CT of the abdomen to check for an adrenal mass. Please notify patient of this.

## 2023-08-16 NOTE — TELEPHONE ENCOUNTER
Tacho Helm was notified of CT abdomen order and was given number to [facility] central scheduling. O-L Flap Text: The defect edges were debeveled with a #15 scalpel blade.  Given the location of the defect, shape of the defect and the proximity to free margins an O-L flap was deemed most appropriate.  Using a sterile surgical marker, an appropriate advancement flap was drawn incorporating the defect and placing the expected incisions within the relaxed skin tension lines where possible.    The area thus outlined was incised deep to adipose tissue with a #15 scalpel blade.  The skin margins were undermined to an appropriate distance in all directions utilizing iris scissors.

## 2023-08-17 LAB — DHEA SERPL-MCNC: 0.64 NG/ML (ref 0.63–4.7)

## 2023-08-20 DIAGNOSIS — R20.2 NUMBNESS AND TINGLING OF LEFT LEG: ICD-10-CM

## 2023-08-20 DIAGNOSIS — R20.0 NUMBNESS AND TINGLING OF LEFT LEG: ICD-10-CM

## 2023-08-21 RX ORDER — PREGABALIN 25 MG/1
25 CAPSULE ORAL 3 TIMES DAILY
Qty: 90 CAPSULE | Refills: 3 | Status: SHIPPED | OUTPATIENT
Start: 2023-08-21 | End: 2023-09-20

## 2023-08-31 DIAGNOSIS — R25.2 MUSCLE CRAMPS: ICD-10-CM

## 2023-09-01 RX ORDER — CYCLOBENZAPRINE HCL 10 MG
10 TABLET ORAL 3 TIMES DAILY PRN
Qty: 90 TABLET | Refills: 1 | Status: SHIPPED | OUTPATIENT
Start: 2023-09-01 | End: 2023-11-30

## 2023-09-07 ENCOUNTER — HOSPITAL ENCOUNTER (OUTPATIENT)
Dept: CT IMAGING | Age: 58
Discharge: HOME | End: 2023-09-07
Payer: COMMERCIAL

## 2023-09-07 DIAGNOSIS — E24.9 HYPERCORTISOLISM (HCC): ICD-10-CM

## 2023-09-07 PROCEDURE — 6360000004 HC RX CONTRAST MEDICATION: Performed by: NURSE PRACTITIONER

## 2023-09-07 PROCEDURE — 74170 CT ABD WO CNTRST FLWD CNTRST: CPT

## 2023-09-07 RX ADMIN — IOPAMIDOL 80 ML: 755 INJECTION, SOLUTION INTRAVENOUS at 08:15

## 2023-09-12 DIAGNOSIS — G89.29 CHRONIC RIGHT SHOULDER PAIN: ICD-10-CM

## 2023-09-12 DIAGNOSIS — M25.511 CHRONIC RIGHT SHOULDER PAIN: ICD-10-CM

## 2023-09-12 RX ORDER — HYDROCODONE BITARTRATE AND ACETAMINOPHEN 7.5; 325 MG/1; MG/1
1 TABLET ORAL 2 TIMES DAILY
Qty: 60 TABLET | Refills: 0 | Status: SHIPPED | OUTPATIENT
Start: 2023-09-12 | End: 2023-10-12

## 2023-09-14 DIAGNOSIS — I10 ESSENTIAL HYPERTENSION: ICD-10-CM

## 2023-09-15 RX ORDER — AMLODIPINE BESYLATE 10 MG/1
TABLET ORAL
Qty: 90 TABLET | Refills: 2 | Status: SHIPPED | OUTPATIENT
Start: 2023-09-15

## 2023-09-21 ENCOUNTER — TELEPHONE (OUTPATIENT)
Dept: FAMILY MEDICINE CLINIC | Age: 58
End: 2023-09-21

## 2023-09-21 ENCOUNTER — HOSPITAL ENCOUNTER (OUTPATIENT)
Dept: INTERVENTIONAL RADIOLOGY/VASCULAR | Age: 58
Discharge: HOME | End: 2023-09-21
Payer: COMMERCIAL

## 2023-09-21 DIAGNOSIS — I73.9 PAD (PERIPHERAL ARTERY DISEASE) (HCC): ICD-10-CM

## 2023-09-21 DIAGNOSIS — I73.9 PAD (PERIPHERAL ARTERY DISEASE) (HCC): Primary | ICD-10-CM

## 2023-09-21 DIAGNOSIS — I70.201 STENOSIS OF RIGHT POPLITEAL ARTERY (HCC): ICD-10-CM

## 2023-09-21 PROCEDURE — 99212 OFFICE O/P EST SF 10 MIN: CPT

## 2023-09-21 PROCEDURE — 93925 LOWER EXTREMITY STUDY: CPT

## 2023-09-21 NOTE — TELEPHONE ENCOUNTER
[facility] Called asking for an order for an Angiogram- they want to schedule her for Monday. Dx- Stenosis of right popliteal artery and PAD.      Fax order to [phone]

## 2023-09-25 ENCOUNTER — HOSPITAL ENCOUNTER (OUTPATIENT)
Dept: INTERVENTIONAL RADIOLOGY/VASCULAR | Age: 58
Discharge: HOME | End: 2023-09-25
Attending: RADIOLOGY | Admitting: RADIOLOGY
Payer: COMMERCIAL

## 2023-09-25 VITALS
HEART RATE: 83 BPM | WEIGHT: 139.2 LBS | RESPIRATION RATE: 23 BRPM | DIASTOLIC BLOOD PRESSURE: 48 MMHG | SYSTOLIC BLOOD PRESSURE: 122 MMHG | HEIGHT: 60 IN | OXYGEN SATURATION: 94 % | TEMPERATURE: 97.8 F | BODY MASS INDEX: 27.33 KG/M2

## 2023-09-25 DIAGNOSIS — I10 ESSENTIAL HYPERTENSION: Primary | ICD-10-CM

## 2023-09-25 DIAGNOSIS — Z87.891 HISTORY OF SMOKING 30 OR MORE PACK YEARS: ICD-10-CM

## 2023-09-25 DIAGNOSIS — I73.9 PAD (PERIPHERAL ARTERY DISEASE) (HCC): ICD-10-CM

## 2023-09-25 LAB
APTT PPP: 33.1 SECONDS (ref 22–38)
CREAT SERPL-MCNC: 1 MG/DL (ref 0.4–1.2)
DEPRECATED RDW RBC AUTO: 52.6 FL (ref 35–45)
ERYTHROCYTE [DISTWIDTH] IN BLOOD BY AUTOMATED COUNT: 14.6 % (ref 11.5–14.5)
GFR SERPL CREATININE-BSD FRML MDRD: > 60 ML/MIN/1.73M2
HCT VFR BLD AUTO: 35.7 % (ref 37–47)
HGB BLD-MCNC: 11.4 GM/DL (ref 12–16)
INR PPP: 0.86 (ref 0.85–1.13)
MCH RBC QN AUTO: 31.1 PG (ref 26–33)
MCHC RBC AUTO-ENTMCNC: 31.9 GM/DL (ref 32.2–35.5)
MCV RBC AUTO: 97.5 FL (ref 81–99)
PLATELET # BLD AUTO: 340 THOU/MM3 (ref 130–400)
PMV BLD AUTO: 9.7 FL (ref 9.4–12.4)
RBC # BLD AUTO: 3.66 MILL/MM3 (ref 4.2–5.4)
WBC # BLD AUTO: 10.1 THOU/MM3 (ref 4.8–10.8)

## 2023-09-25 PROCEDURE — 2580000003 HC RX 258: Performed by: RADIOLOGY

## 2023-09-25 PROCEDURE — 75716 ARTERY X-RAYS ARMS/LEGS: CPT

## 2023-09-25 PROCEDURE — 6360000002 HC RX W HCPCS: Performed by: RADIOLOGY

## 2023-09-25 PROCEDURE — 75774 ARTERY X-RAY EACH VESSEL: CPT

## 2023-09-25 PROCEDURE — 82565 ASSAY OF CREATININE: CPT

## 2023-09-25 PROCEDURE — 75625 CONTRAST EXAM ABDOMINL AORTA: CPT

## 2023-09-25 PROCEDURE — 85610 PROTHROMBIN TIME: CPT

## 2023-09-25 PROCEDURE — 36415 COLL VENOUS BLD VENIPUNCTURE: CPT

## 2023-09-25 PROCEDURE — 2709999900 IR ANGIOGRAM EXTREMITY LEFT

## 2023-09-25 PROCEDURE — 37221 HC PLASTY ILIAC W STENT: CPT

## 2023-09-25 PROCEDURE — 37226 HC FEMPOP PLASTY & STENT: CPT

## 2023-09-25 PROCEDURE — 6360000004 HC RX CONTRAST MEDICATION: Performed by: RADIOLOGY

## 2023-09-25 PROCEDURE — C1876 STENT, NON-COA/NON-COV W/DEL: HCPCS | Performed by: RADIOLOGY

## 2023-09-25 PROCEDURE — 85027 COMPLETE CBC AUTOMATED: CPT

## 2023-09-25 PROCEDURE — 85730 THROMBOPLASTIN TIME PARTIAL: CPT

## 2023-09-25 RX ORDER — HEPARIN SODIUM 1000 [USP'U]/ML
INJECTION, SOLUTION INTRAVENOUS; SUBCUTANEOUS PRN
Status: DISCONTINUED | OUTPATIENT
Start: 2023-09-25 | End: 2023-09-25 | Stop reason: HOSPADM

## 2023-09-25 RX ORDER — MIDAZOLAM HYDROCHLORIDE 1 MG/ML
1 INJECTION INTRAMUSCULAR; INTRAVENOUS ONCE
Status: COMPLETED | OUTPATIENT
Start: 2023-09-25 | End: 2023-09-25

## 2023-09-25 RX ORDER — FENTANYL CITRATE 50 UG/ML
50 INJECTION, SOLUTION INTRAMUSCULAR; INTRAVENOUS ONCE
Status: COMPLETED | OUTPATIENT
Start: 2023-09-25 | End: 2023-09-25

## 2023-09-25 RX ORDER — FENTANYL CITRATE 50 UG/ML
INJECTION, SOLUTION INTRAMUSCULAR; INTRAVENOUS PRN
Status: DISCONTINUED | OUTPATIENT
Start: 2023-09-25 | End: 2023-09-25 | Stop reason: HOSPADM

## 2023-09-25 RX ORDER — MIDAZOLAM HYDROCHLORIDE 1 MG/ML
INJECTION INTRAMUSCULAR; INTRAVENOUS PRN
Status: DISCONTINUED | OUTPATIENT
Start: 2023-09-25 | End: 2023-09-25 | Stop reason: HOSPADM

## 2023-09-25 RX ORDER — SODIUM CHLORIDE 450 MG/100ML
INJECTION, SOLUTION INTRAVENOUS CONTINUOUS
Status: DISCONTINUED | OUTPATIENT
Start: 2023-09-25 | End: 2023-09-25 | Stop reason: HOSPADM

## 2023-09-25 RX ADMIN — HEPARIN SODIUM 2000 UNITS: 1000 INJECTION INTRAVENOUS; SUBCUTANEOUS at 11:22

## 2023-09-25 RX ADMIN — FENTANYL CITRATE 25 MCG: 50 INJECTION, SOLUTION INTRAMUSCULAR; INTRAVENOUS at 11:16

## 2023-09-25 RX ADMIN — MIDAZOLAM 0.5 MG: 1 INJECTION INTRAMUSCULAR; INTRAVENOUS at 11:16

## 2023-09-25 RX ADMIN — SODIUM CHLORIDE: 4.5 INJECTION, SOLUTION INTRAVENOUS at 14:17

## 2023-09-25 RX ADMIN — FENTANYL CITRATE 25 MCG: 50 INJECTION, SOLUTION INTRAMUSCULAR; INTRAVENOUS at 11:38

## 2023-09-25 RX ADMIN — MIDAZOLAM 1 MG: 1 INJECTION INTRAMUSCULAR; INTRAVENOUS at 11:07

## 2023-09-25 RX ADMIN — HEPARIN SODIUM 1000 UNITS: 1000 INJECTION INTRAVENOUS; SUBCUTANEOUS at 11:57

## 2023-09-25 RX ADMIN — FENTANYL CITRATE 50 MCG: 50 INJECTION, SOLUTION INTRAMUSCULAR; INTRAVENOUS at 11:07

## 2023-09-25 RX ADMIN — IOPAMIDOL 175 ML: 612 INJECTION, SOLUTION INTRAVENOUS at 12:09

## 2023-09-25 RX ADMIN — MIDAZOLAM 0.5 MG: 1 INJECTION INTRAMUSCULAR; INTRAVENOUS at 11:38

## 2023-09-25 ASSESSMENT — PAIN SCALES - GENERAL: PAINLEVEL_OUTOF10: 0

## 2023-09-25 NOTE — PROGRESS NOTES
Patient admitted to 2E16  Ambulatory for arteriogram.  Patient NPO. Patient unaccompanied . Vital signs obtained. Assessment and data collection intiated. Oriented to room. Policies and procedures for 2E explained. All questions answered with no further questions at this time. Fall prevention and safety precautions discussed with patient.

## 2023-09-25 NOTE — OP NOTE
Department of Radiology  Post Procedure Progress Note      Pre-Procedure Diagnosis:  claudication, pvd     Procedure Performed:  left leg angio with stenting and angioplasty     Anesthesia: local / versed and fentanyl    Findings: successful    Immediate Complications:  None    Estimated Blood Loss: minimal    SEE DICTATED PROCEDURE NOTE FOR COMPLETE DETAILS.     Adriana Hanna MD   9/25/2023 12:08 PM

## 2023-09-25 NOTE — H&P
Formulation and discussion of sedation / procedure plans, risks, benefits, side effects and alternatives with patient and/or responsible adult completed. History and Physical reviewed and unchanged.     Electronically signed by Cesilia Beltran MD on 9/25/23 at 10:44 AM EDT
PROCEDURE   []Biospy [x]Arteriogram              []Drainage   []Mediport Insertion  []Fistulogram []IV access       []Vertebroplasty / Augmentation  []IVC filter []Dialysis catheter []Biliary drainage  []Other: []CAPD Catheter []Nephrostomy Tube / Stent  SEDATION  Planned agent:[]Midazolam []Meperidine [x]Sublimaze []Dilaudid []Morphine     []Diazepam  []Other:     ASA Classification:  []1 [x]2 []3 []4 []5  Class 1: A normal healthy patient  Class 2: Pt with mild to moderate systemic disease  Class 3: Severe systemic disease or disturbance  Class 4: Severe systemic disorders that are already life threatening. Class 5: Moribund pt with little chances of survival, for more than 24 hours. Mallampati I Airway Classification:   []1 [x]2 []3 []4    [x]Pre-procedure diagnostic studies complete and results available. Comment:    [x]Previous sedation/anesthesia experiences assessed. Comment:    [x]The patient is an appropriate candidate to undergo the planned procedure sedation and anesthesia. (Refer to nursing sedation/analgesia documentation record)  [x]Formulation and discussion of sedation/procedure plan, risks, and expectations with patient and/or responsible adult completed. [x]Patient examined immediately prior to the procedure.  (Refer to nursing sedation/analgesia documentation record)    Shawn Zazueta MD, MD  Electronically signed 9/25/2023 at 10:44 AM

## 2023-09-25 NOTE — PROGRESS NOTES
0 Pt in specials radiology for left leg angiogram with possible intervention. Discussed procedure with pt and pt verbalizes understanding. [address] to table and attached to monitor. 1102 #22 angio started in right wrist x 2 attempts. [facility] Dr Chava Gutierrez to speak to pt.  1106 Right groin prepped and draped. 12[facility] for intervention. 1136 Angioplasty of left Mid SFA with 5 x 40 Gilford 18 balloon. 1140 4.5 x 80 mm Supera stent deployed in left mid SFA. 1142 Stent post dilated. 1145 Angioplasty of left external iliac artery with 5 x 40 Gilford 18 balloon. 1147 Angioplasty of left external iliac artery with 6 x 40 Gilford 18 balloon. 1155 8 mm x 40 mm EverFlex stent deployed in left external iliac artery. 1158 Stent post dilated with 8 x 40 Gilford 35 balloon. 1205 Angiogram right femoral artery. 1210 Angio-seal right femoral artery complete. Site without redness, swelling or hematoma. Triple antibiotic ointment applied to site with 4 x 4 and op-site dressing. 1214 Report called to [facility]. Pt positioned on bed for comfort. 1219 Pt transferred to 2E per bed.

## 2023-09-25 NOTE — PROGRESS NOTES
Returned to 2E16. Monitor attached showing SR. Dressing to right groin dry and intact. Hemostasis maintained, no bleeding, swelling, or edema noted. 0.9NSS infusing with approx 600 ml remaining.

## 2023-09-28 DIAGNOSIS — E24.9 HYPERCORTISOLISM (HCC): Primary | ICD-10-CM

## 2023-10-02 ENCOUNTER — HOSPITAL ENCOUNTER (OUTPATIENT)
Dept: INTERVENTIONAL RADIOLOGY/VASCULAR | Age: 58
Discharge: HOME | End: 2023-10-02
Payer: COMMERCIAL

## 2023-10-02 DIAGNOSIS — I73.9 PAD (PERIPHERAL ARTERY DISEASE) (HCC): ICD-10-CM

## 2023-10-02 DIAGNOSIS — I10 ESSENTIAL HYPERTENSION: ICD-10-CM

## 2023-10-02 DIAGNOSIS — Z87.891 HISTORY OF SMOKING 30 OR MORE PACK YEARS: ICD-10-CM

## 2023-10-02 PROCEDURE — 99212 OFFICE O/P EST SF 10 MIN: CPT

## 2023-10-02 ASSESSMENT — PAIN SCALES - GENERAL: PAINLEVEL_OUTOF10: 4

## 2023-10-02 ASSESSMENT — PAIN DESCRIPTION - DESCRIPTORS: DESCRIPTORS: DISCOMFORT;ACHING

## 2023-10-02 ASSESSMENT — PAIN DESCRIPTION - LOCATION: LOCATION: GENERALIZED

## 2023-10-02 NOTE — PROGRESS NOTES
1429 Pt arrived to special procedure room 2 for 1 week martin check  1431 Patient assisted to table in supine position. Comfort ensured. 36 Dr Eduardo Smith into assess patient. Exam begins. 1436 Exam complete. Patient assisted to seated position. Comfort ensured. 3mo follow up with dopplers scheduled for December 22nd at 1130.  1438 Patient ambulated to discharge lobby in stable condition.

## 2023-10-11 ENCOUNTER — PATIENT MESSAGE (OUTPATIENT)
Dept: FAMILY MEDICINE CLINIC | Age: 58
End: 2023-10-11

## 2023-10-11 DIAGNOSIS — M25.511 CHRONIC RIGHT SHOULDER PAIN: ICD-10-CM

## 2023-10-11 DIAGNOSIS — G89.29 CHRONIC RIGHT SHOULDER PAIN: ICD-10-CM

## 2023-10-11 NOTE — TELEPHONE ENCOUNTER
Group Topic: BH RADHA Process Group    Date: 5/3/2022  Start Time:  1:45 PM  End Time:  2:30 PM  Facilitators: Osmel Kinney V    Focus: Process group is a powerful tool used to promote growth and change. Pt.'s will share their struggles and concerns with the unique opportunity to receive multiple perspectives, support, encouragement and feedback from other individuals in safe and confidential environment.     Number in attendance: 6      Group Description: Process/Check-Out group is a safe and confidential therapeutic environment where pt.'s can share their concerns and struggles, as well as receive assistance on processing and reflecting on themself, their thoughts, their behaviors, and their actions that have occurred during the day while receiving feedback, multiple perspectives, support and encouragement from other individuals.       Goals: To discuss information learned today and how it will apply to their addiction.   Handouts: Habits to Avoid in Conversation.  Attendance: Present  Mood/Affect: Appropriate  Behavior/Socialization: Appropriate to group  Participation: Active  Overall Patient Response to Group: Appropriate to topic  Individual Response to Group: \" I learned that I must be mindful of spending money and learning to save.\"  Ability to Apply Content to Group: 5     A D Alvarez CANO [facility]    I put an order in a few weeks ago for referral to endocrinology. Please check on that. Patient may need to call that office to schedule an appointment.

## 2023-10-11 NOTE — TELEPHONE ENCOUNTER
Upon review that office has called her a few times and left VM for her to call back. Informed patient to give their office a call to schedule this. Please advise on handicap placard and pain medication refill.     LOV 08/15/2023    Last ordered norco 09/12/2023 disp 60/0

## 2023-10-11 NOTE — TELEPHONE ENCOUNTER
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 Stressing out about it. Walking is better after the procedure on my left leg. I got a stent behind the knee and another one in the other side of my stomach. Will always have some pain but like I said much better. I have an appointment next month for my shoulder which is getting worse. My pain medication needs a refill Fri. I would also like to discuss a handicap sticker for me. I appreciate you and look forward to discussing what comes next.   Thanks,  Rhianna Barrett

## 2023-10-12 RX ORDER — HYDROCODONE BITARTRATE AND ACETAMINOPHEN 7.5; 325 MG/1; MG/1
1 TABLET ORAL 2 TIMES DAILY
Qty: 60 TABLET | Refills: 0 | Status: SHIPPED | OUTPATIENT
Start: 2023-10-12 | End: 2023-11-11

## 2023-10-25 RX ORDER — LISINOPRIL 10 MG/1
TABLET ORAL
Qty: 180 TABLET | Refills: 2 | Status: SHIPPED | OUTPATIENT
Start: 2023-10-25

## 2023-11-01 DIAGNOSIS — R25.2 MUSCLE CRAMPS: ICD-10-CM

## 2023-11-01 RX ORDER — CYCLOBENZAPRINE HCL 10 MG
TABLET ORAL
Qty: 90 TABLET | Refills: 0 | Status: SHIPPED | OUTPATIENT
Start: 2023-11-01

## 2023-11-02 NOTE — PROGRESS NOTES
Hospital Medicine Progress Note      Date of Admission: 10/28/2023  Hospital Day: 6    Chief Admission Complaint:    Chest pain with hypoxia sent from [facility] by squad to ED       Subjective:      Awake , conversational , forgetful , comfortable . Had BF per RN  Presenting Admission History: This is a 80 y.o. male who presented to OSH, [facility] via squad from [facility] for reported left sided chest pain and SOB. The patient has severe dementia and can not give me information on reason for admission or for going to [facility]. All the following information is from ED note and notes from [place]. PMHx significant for Anemia, AFIB, BPH, Dementia, Dysphagia, GERD, Depression, Hitial hernia, Hypercholesterolemia, HTN, pacemaker, CAD, CHF, and CKD IIIa. Assessment/Plan:      Current Principal Problem:  COPD (chronic obstructive pulmonary disease) (HCC)    Chest pain  Acute on chronic systolic heart failure - LVEF down from ~55% (normal) in 1/2023  now LVEF 35% earlier this week per ECHO at OSH; high BNP, pulmonary edema on CXR. - Patient was recently seen at [address] [place] [address] ED for same complaint on 10/23/23. He was evaluated and discharged home the same evening.  - Troponin flat <0.012 since admission  - EKG from OSH revealed possible elevation in leads I and aVL but when compared to previous EKGs this was seen before. - CXR at OSH revealed borderline cardiomegaly with possible cardiogenic overload. Brought in on 2L per NC sating at 98%. Weaned off and sating at 96% on RA.  - Consulted Cardiology in setting of possible CHF exacerbation. Started on Lasix 40 mg IV Q12 by Cardiology with Lasix holiday  currently . - Continue beta blocker. Unable to Acei/arb/arni/MRA/SGLT2 2/2 NATANAEL.    - Began  low dose Hydralazine for afterload reduction per Cardiology  - Cardiology feels he is not an appropriate candidate for cath lab given severity of dementia, and family also stated that Hospitalist Progress Note      Patient:  Felipe Allen    Unit/Bed:5K-01/001-A  YOB: 1965  MRN: [id number]   Acct: [de-identified]   PCP: SHERYL Jasmine CNP  Date of Admission: 7/19/2022    Assessment/Plan:    Acute pyelonephritis: UA suspicious. Urine culture growing gram negative bacilli. CT A/P showed \"There are changes of severe bilateral pyelonephritis. \" WBC trending down. Pt is afebrile. Follow cultures, continue IV ABX. Electrolyte Abnormalities:   Hyponatremia: Sodium 125 on arrival. Today, 134. IVF. BMP in the AM.   Hypocalcemia: 0.91 on arrival. iCal 0.98. Continue to monitor. Metabolic Acidosis, NAG: CO2 17. BMP in the AM.   Anemia, chronic: Hgb 10.3 on arrival, likely dehydration. Today, Hgb 9.1. CBC in the AM.   Essential HTN: BP stable. Continue home medications. HLD: Statin. CAD, PAD: ASA & Plavix. Pt has had angioplasty in the past and left common femoral artery endarterectomy. Chief Complaint: Back Pain     Initial H and P:-    Initial H&P \"62year-old female patient presented to the ED today with pain 4-day history of right back pain radiating  to lower abdomen more than 10/10 intensity. She has no history of renal calculi. Pain is not associated with nausea ,vomiting ,fever or chills. Denies frequency of menstruation or dysuria. UA is positive for UTI. Patient has chronic back pain at baseline with prior surgery. Initial vital signs in the ED temperature 36.4 °C, respirate of 16, heart rate 108 beats minute, blood pressure 1 3471, oxygen saturation 99% on room air. Initial labs in the ED-serum sodium 125, potassium 4.4, chloride 94, CO2 16, BUN 27, creatinine 1.1, anion gap 15.0, blood sugar 119, calcium 7.9, osmolality 257.8, total protein 7.3, troponin first set less than 0.010, albumin 3.1, Alkaline Phosphatase 104, ALT 24, AST 20, T Bili 0.3.      WBC 17.3, hemoglobin 10.3, MCV 98.4, midline. Respiratory:  Normal respiratory effort. Clear to auscultation, bilaterally without Rales/Wheezes/Rhonchi. Cardiovascular: Regular rate and rhythm with normal S1/S2 without murmurs, rubs or gallops. Abdomen: Soft, non-tender, non-distended with normal bowel sounds. Musculoskeletal: passive and active ROM x 4 extremities. CVA tenderness. Skin: Skin color, texture, turgor normal.  No rashes or lesions. Neurologic:  Neurovascularly intact without any focal sensory/motor deficits. Cranial nerves: II-XII intact, grossly non-focal.  Psychiatric: Alert and oriented, thought content appropriate, normal insight  Capillary Refill: Brisk,< 3 seconds   Peripheral Pulses: +2 palpable, equal bilaterally     Labs:   Recent Labs     07/19/22 2154 07/21/22  0520   WBC 17.3* 11.0*   HGB 10.3* 9.1*   HCT 31.1* 29.0*    255     Recent Labs     07/19/22 2154 07/20/22  1032 07/21/22  0520   * 131* 134*   K 4.4 3.7 4.3   CL 94* 101 105   CO2 16* 18* 17*   BUN 27* 17 9   CREATININE 1.1 0.8 0.5   CALCIUM 7.9* 7.4* 7.5*   PHOS 3.3  --   --      Recent Labs     07/19/22 2154   AST 20   ALT 24   BILITOT 0.3   ALKPHOS 104     No results for input(s): INR in the last 72 hours. No results for input(s): Ardelle Chalk in the last 72 hours. Microbiology:    Blood culture #1:   Lab Results   Component Value Date/Time    BC No growth 24 hours. 07/20/2022 12:44 AM       Blood culture #2:No results found for: BLOODCULT2    Organism:  Lab Results   Component Value Date/Time    ORG gram negative bacilli 07/19/2022 10:10 PM         Lab Results   Component Value Date/Time    LABGRAM  03/30/2021 09:56 AM     Rare segmented neutrophils observed. Rare epithelial cells observed. Few gram positive cocci in pairs. MRSA culture only:No results found for: [facility]    Urine culture:   Lab Results   Component Value Date/Time    LABURIN No growth-preliminary No growth  03/16/2021 02:25 PM       Respiratory culture:  No results found for: CULTRESP    Aerobic and Anaerobic :  Lab Results   Component Value Date/Time    LABAERO  03/30/2021 09:56 AM     moderate growth In the treatment of gram positive infections, GENTAMICIN should be CONSIDERED a SYNERGYSTIC agent ONLY. Ciprofloxacin and Levofloxacin, regardless of in vitrosensitivity, should not be used for staphylococcal infectionsother than uncomplicated lower UTIs. Moxifloxacin, regardless of in vitro sensitivity, shouldnot be used for staphylococcal infections. LABAERO  03/30/2021 09:56 AM     light growth Enterobacter species which may be initially susceptible to third generation cephalosporins may develop resistance within three to four days of initiation of this antimicrobialtherapy. No results found for: LABANAE    Urinalysis:      Lab Results   Component Value Date/Time    NITRU NEGATIVE 07/19/2022 10:10 PM    WBCUA 50-75 07/19/2022 10:10 PM    BACTERIA FEW 07/19/2022 10:10 PM    RBCUA 5-10 07/19/2022 10:10 PM    BLOODU MODERATE 07/19/2022 10:10 PM    SPECGRAV 1.005 03/12/2021 01:20 PM    GLUCOSEU NEGATIVE 07/19/2022 10:10 PM       Radiology:  US GALLBLADDER RUQ   Final Result       1. Possible cholelithiasis. There are tiny nonshadowing mobile foci. These could also represent sludge balls. 2. Dilated common bile duct measuring 8 mm. **This report has been created using voice recognition software. It may contain minor errors which are inherent in voice recognition technology. **      Final report electronically signed by Dr. Moira Presley on 7/20/2022 7:27 AM      CT ABDOMEN PELVIS W IV CONTRAST Additional Contrast? None   Final Result   1. There are changes of severe bilateral pyelonephritis. Likely associated    cystitis. 2. Mild colonic diverticulosis without diverticulitis.       This document has been electronically signed by: Jane Henderson MD on    07/19/2022 11:29 PM      All CTs at this facility use dose modulation techniques and iterative CALCIUM 9.6 9.2 9.5       Recent Labs     11/01/23  0718   PROBNP 4,150*       No results for input(s): \"LABA1C\" in the last 72 hours. No results for input(s): \"AST\", \"ALT\", \"BILIDIR\", \"BILITOT\", \"ALKPHOS\" in the last 72 hours. No results for input(s): \"INR\", \"LACTA\", \"TSH\" in the last 72 hours. Urine Cultures:   Lab Results   Component Value Date/Time    LABURIN Final report 01/31/2023 11:44 PM     Blood Cultures:   Lab Results   Component Value Date/Time    BC No Growth after 4 days of incubation. 01/19/2023 10:17 AM     Lab Results   Component Value Date/Time    BLOODCULT2 No Growth after 4 days of incubation.  01/19/2023 10:45 AM     Organism:   Lab Results   Component Value Date/Time    ORG Escherichia coli 04/21/2022 02:19 PM         Felix Olea MD reconstructions, and/or weight-based dosing   when appropriate to reduce radiation to a low as reasonably achievable.         Electronically signed by Elinor Fothergill, PA on 7/21/2022 at 2:08 PM

## 2023-11-13 DIAGNOSIS — G89.29 CHRONIC RIGHT SHOULDER PAIN: ICD-10-CM

## 2023-11-13 DIAGNOSIS — M25.511 CHRONIC RIGHT SHOULDER PAIN: ICD-10-CM

## 2023-11-13 RX ORDER — HYDROCODONE BITARTRATE AND ACETAMINOPHEN 7.5; 325 MG/1; MG/1
1 TABLET ORAL 2 TIMES DAILY
Qty: 28 TABLET | Refills: 0 | Status: SHIPPED | OUTPATIENT
Start: 2023-11-13 | End: 2023-11-27

## 2023-11-26 ENCOUNTER — PATIENT MESSAGE (OUTPATIENT)
Dept: FAMILY MEDICINE CLINIC | Age: 58
End: 2023-11-26

## 2023-11-26 DIAGNOSIS — G89.29 CHRONIC RIGHT SHOULDER PAIN: ICD-10-CM

## 2023-11-26 DIAGNOSIS — M25.511 CHRONIC RIGHT SHOULDER PAIN: ICD-10-CM

## 2023-11-27 NOTE — TELEPHONE ENCOUNTER
From: Peg Inch  To: Kayla Koenig  Sent: 11/26/2023 6:19 PM EST  Subject: Pain medication     Hi Andrew,  The last refill was for 14 days. Can I get a refill please. I have to go back to shoulder  in a month to see if any change in the size of the dying bone. He asked if I could do physical therapy which I can't while I'm working. He did give me a steroid shot and not much difference and I didn't have a reaction to it. I go to the Dr about the hormone and the tumor on my adrenal gland. Thanks so much and will see you soon.   Brianna Tran

## 2023-11-28 ENCOUNTER — OFFICE VISIT (OUTPATIENT)
Dept: FAMILY MEDICINE CLINIC | Age: 58
End: 2023-11-28
Payer: COMMERCIAL

## 2023-11-28 VITALS
TEMPERATURE: 97.9 F | HEART RATE: 92 BPM | RESPIRATION RATE: 16 BRPM | BODY MASS INDEX: 26.95 KG/M2 | SYSTOLIC BLOOD PRESSURE: 124 MMHG | WEIGHT: 138 LBS | OXYGEN SATURATION: 98 % | DIASTOLIC BLOOD PRESSURE: 70 MMHG

## 2023-11-28 DIAGNOSIS — E24.9 HYPERCORTISOLISM (HCC): ICD-10-CM

## 2023-11-28 DIAGNOSIS — M25.511 CHRONIC RIGHT SHOULDER PAIN: Primary | ICD-10-CM

## 2023-11-28 DIAGNOSIS — R10.13 EPIGASTRIC ABDOMINAL PAIN: ICD-10-CM

## 2023-11-28 DIAGNOSIS — G89.29 CHRONIC RIGHT SHOULDER PAIN: Primary | ICD-10-CM

## 2023-11-28 PROCEDURE — 3078F DIAST BP <80 MM HG: CPT | Performed by: NURSE PRACTITIONER

## 2023-11-28 PROCEDURE — 99214 OFFICE O/P EST MOD 30 MIN: CPT | Performed by: NURSE PRACTITIONER

## 2023-11-28 PROCEDURE — 93000 ELECTROCARDIOGRAM COMPLETE: CPT | Performed by: NURSE PRACTITIONER

## 2023-11-28 PROCEDURE — 3074F SYST BP LT 130 MM HG: CPT | Performed by: NURSE PRACTITIONER

## 2023-11-28 RX ORDER — HYDROCODONE BITARTRATE AND ACETAMINOPHEN 7.5; 325 MG/1; MG/1
1 TABLET ORAL 2 TIMES DAILY
Qty: 28 TABLET | Refills: 0 | Status: SHIPPED | OUTPATIENT
Start: 2023-11-28 | End: 2023-12-12

## 2023-11-28 RX ORDER — OMEPRAZOLE 20 MG/1
20 CAPSULE, DELAYED RELEASE ORAL
Qty: 30 CAPSULE | Refills: 0 | Status: SHIPPED | OUTPATIENT
Start: 2023-11-28 | End: 2023-12-28

## 2023-11-28 ASSESSMENT — ENCOUNTER SYMPTOMS
RHINORRHEA: 0
CONSTIPATION: 0
EYE DISCHARGE: 0
SHORTNESS OF BREATH: 0
NAUSEA: 0
BACK PAIN: 1
SORE THROAT: 0
DIARRHEA: 0
ABDOMINAL PAIN: 1
COUGH: 0
EYE REDNESS: 0
EYE PAIN: 0
VOMITING: 0
ALLERGIC/IMMUNOLOGIC NEGATIVE: 1
TROUBLE SWALLOWING: 0
WHEEZING: 0

## 2023-11-28 NOTE — PROGRESS NOTES
Intended supply: 30 days Max Daily Amount: 2 tablets, Disp: 28 tablet, Rfl: 0    omeprazole (PRILOSEC) 20 MG delayed release capsule, Take 1 capsule by mouth every morning (before breakfast), Disp: 30 capsule, Rfl: 0    cyclobenzaprine (FLEXERIL) 10 MG tablet, TAKE 1 TABLET BY MOUTH 3 TIMES A DAY AS NEEDED FOR MUSCLE SPASMS, Disp: 90 tablet, Rfl: 0    lisinopril (PRINIVIL;ZESTRIL) 10 MG tablet, TAKE 1 TABLET BY MOUTH TWO TIMES A DAY, Disp: 180 tablet, Rfl: 2    amLODIPine (NORVASC) 10 MG tablet, TAKE 1 TABLET BY MOUTH EVERY DAY, Disp: 90 tablet, Rfl: 2    meloxicam (MOBIC) 15 MG tablet, Take 1 tablet by mouth daily, Disp: 90 tablet, Rfl: 3    VITAMIN D PO, Take by mouth daily Vitamin d with calcium, Disp: , Rfl:     clopidogrel (PLAVIX) 75 MG tablet, TAKE 1 TABLET BY MOUTH EVERY DAY, Disp: 90 tablet, Rfl: 3    acetaminophen (TYLENOL) 500 MG tablet, Take 1 tablet by mouth every 6 hours as needed for Pain, Disp: , Rfl:     aspirin 81 MG EC tablet, Take 1 tablet by mouth daily, Disp: , Rfl:     pregabalin (LYRICA) 25 MG capsule, Take 1 capsule by mouth 3 times daily for 30 days. , Disp: 90 capsule, Rfl: 3    atorvastatin (LIPITOR) 40 MG tablet, Take 1 tablet by mouth daily, Disp: 90 tablet, Rfl: 3    The patient is allergic to pletal [cilostazol], bactrim [sulfamethoxazole-trimethoprim], codeine, medrol [methylprednisolone], sulfa antibiotics, tramadol, and ultram [tramadol hcl]. Past Medical History  Mame Gacria  has a past medical history of Arthritis, Cushing's disease ([address]), Hyperlipidemia, and Hypertension. Subjective:      Review of Systems   Constitutional:  Negative for activity change, fatigue and fever. HENT:  Negative for congestion, ear pain, rhinorrhea, sore throat and trouble swallowing. Eyes:  Negative for pain, discharge and redness. Respiratory:  Negative for cough, shortness of breath and wheezing. Cardiovascular: Negative. Gastrointestinal:  Positive for abdominal pain.  Negative for

## 2023-11-29 ENCOUNTER — HOSPITAL ENCOUNTER (OUTPATIENT)
Dept: ULTRASOUND IMAGING | Age: 58
Discharge: HOME | End: 2023-11-29
Payer: COMMERCIAL

## 2023-11-29 DIAGNOSIS — R10.13 EPIGASTRIC ABDOMINAL PAIN: ICD-10-CM

## 2023-11-29 PROCEDURE — 76705 ECHO EXAM OF ABDOMEN: CPT

## 2023-12-01 DIAGNOSIS — R25.2 MUSCLE CRAMPS: ICD-10-CM

## 2023-12-04 DIAGNOSIS — R25.2 MUSCLE CRAMPS: ICD-10-CM

## 2023-12-04 RX ORDER — CYCLOBENZAPRINE HCL 10 MG
10 TABLET ORAL 3 TIMES DAILY PRN
Qty: 90 TABLET | Refills: 1 | Status: SHIPPED | OUTPATIENT
Start: 2023-12-04

## 2023-12-04 RX ORDER — CYCLOBENZAPRINE HCL 10 MG
TABLET ORAL
Qty: 90 TABLET | Refills: 0 | OUTPATIENT
Start: 2023-12-04

## 2023-12-11 ENCOUNTER — PATIENT MESSAGE (OUTPATIENT)
Dept: FAMILY MEDICINE CLINIC | Age: 58
End: 2023-12-11

## 2023-12-11 DIAGNOSIS — G89.29 CHRONIC RIGHT SHOULDER PAIN: ICD-10-CM

## 2023-12-11 DIAGNOSIS — M25.511 CHRONIC RIGHT SHOULDER PAIN: ICD-10-CM

## 2023-12-11 RX ORDER — HYDROCODONE BITARTRATE AND ACETAMINOPHEN 7.5; 325 MG/1; MG/1
1 TABLET ORAL 2 TIMES DAILY
Qty: 28 TABLET | Refills: 0 | OUTPATIENT
Start: 2023-12-11 | End: 2023-12-25

## 2023-12-11 RX ORDER — HYDROCODONE BITARTRATE AND ACETAMINOPHEN 7.5; 325 MG/1; MG/1
1 TABLET ORAL 2 TIMES DAILY
Qty: 60 TABLET | Refills: 0 | Status: SHIPPED | OUTPATIENT
Start: 2023-12-11 | End: 2024-01-10

## 2023-12-11 NOTE — TELEPHONE ENCOUNTER
From: Trevor Spring  To: Susi Ludwig  Sent: 12/11/2023 8:15 AM EST  Subject: Refill     Morning Andrew,  I put in for a refill on my pain medication and you said you would make it a month supply this time. Thanks and have a great day.   Elisa Alonzo

## 2023-12-14 ENCOUNTER — OFFICE VISIT (OUTPATIENT)
Age: 58
End: 2023-12-14
Payer: COMMERCIAL

## 2023-12-14 VITALS
BODY MASS INDEX: 27.05 KG/M2 | RESPIRATION RATE: 16 BRPM | SYSTOLIC BLOOD PRESSURE: 132 MMHG | HEART RATE: 83 BPM | DIASTOLIC BLOOD PRESSURE: 62 MMHG | WEIGHT: 137.8 LBS | HEIGHT: 60 IN

## 2023-12-14 DIAGNOSIS — D49.7 ADRENAL TUMOR: Primary | ICD-10-CM

## 2023-12-14 DIAGNOSIS — I10 HYPERTENSION, UNSPECIFIED TYPE: ICD-10-CM

## 2023-12-14 PROCEDURE — 3078F DIAST BP <80 MM HG: CPT | Performed by: INTERNAL MEDICINE

## 2023-12-14 PROCEDURE — 99204 OFFICE O/P NEW MOD 45 MIN: CPT | Performed by: INTERNAL MEDICINE

## 2023-12-14 PROCEDURE — 3075F SYST BP GE 130 - 139MM HG: CPT | Performed by: INTERNAL MEDICINE

## 2023-12-14 NOTE — PROGRESS NOTES
[facility] ENDOCRINOLOGY  [address] [address]  Dept: [phone]  Loc: [phone]     Visit Date: 2023    Verónica Mott is a 62 y.o. female who presents today for:  Chief Complaint   Patient presents with    Hypercortisolism            Subjective:    Gained 30 lbs 1 year, all in the stomach. CT showed tumor. No headaches. Has anxiety, no tremors. Sweats at night. HPI     Veróinca Mott is a 62 y.o. , female who comes for Initial visit for adrenal tumor. SHERYL Sheehan CNP  Verónica Mott was diagnosed with adrenal tumor recently. The CT scan was ordered because the patient noticed that she has gained 30 pounds in 1 year without changing her lifestyle. All of the weight has accumulated in her stomach and she is uncomfortable with it. So she requested a CAT scan which showed a 1.4 by 1 cm left adrenal nodule on 2023. She tells me that her PCP then did labs and found cortisol elevated but I cannot see that lab. The patient reports easy bruising of the skin but no stretch marks. She has anxiety but denies headaches and has not really had any tremors. However she is sweaty at night. Patient does have blood pressure problems treated with 2 medications.   There is no report of hypokalemia  Past Medical History:   Diagnosis Date    Arthritis     Cushing's disease ([address])     Hyperlipidemia     Hypertension       Past Surgical History:   Procedure Laterality Date    BACK SURGERY       SECTION      x 4    COLONOSCOPY      DILATION AND CURETTAGE OF UTERUS      ENDOSCOPY, COLON, DIAGNOSTIC      FEMORAL ENDARTERECTOMY Left 2021    LEFT  FEMORAL ARTERY ENDARTERECTOMY performed by Nacho Anglin MD at [facility]      bone spurs removed both feet    KNEE SURGERY Left     reconstruction of knee    LUMBAR DISCECTOMY  2020    Dr. Felisha Bruce History   Problem

## 2023-12-27 DIAGNOSIS — R10.13 EPIGASTRIC ABDOMINAL PAIN: ICD-10-CM

## 2023-12-28 RX ORDER — OMEPRAZOLE 20 MG/1
20 CAPSULE, DELAYED RELEASE ORAL DAILY
Qty: 90 CAPSULE | Refills: 3 | Status: SHIPPED | OUTPATIENT
Start: 2023-12-28 | End: 2024-12-22

## 2023-12-29 ENCOUNTER — PATIENT MESSAGE (OUTPATIENT)
Dept: FAMILY MEDICINE CLINIC | Age: 58
End: 2023-12-29

## 2024-01-02 DIAGNOSIS — R20.2 NUMBNESS AND TINGLING OF LEFT LEG: Primary | ICD-10-CM

## 2024-01-02 DIAGNOSIS — M54.16 CHRONIC RADICULAR LUMBAR PAIN: ICD-10-CM

## 2024-01-02 DIAGNOSIS — R20.0 NUMBNESS AND TINGLING OF LEFT LEG: Primary | ICD-10-CM

## 2024-01-02 DIAGNOSIS — G89.29 CHRONIC RADICULAR LUMBAR PAIN: ICD-10-CM

## 2024-01-02 RX ORDER — PREGABALIN 50 MG/1
50 CAPSULE ORAL 3 TIMES DAILY
Qty: 90 CAPSULE | Refills: 2 | Status: SHIPPED | OUTPATIENT
Start: 2024-01-02 | End: 2024-04-01

## 2024-01-02 NOTE — TELEPHONE ENCOUNTER
Is asked the patient why she is asking for an increase in this medication.  Also, she has outstanding labs for endocrinology that she really needs to complete so we can continue figuring out what to do about her hypercortisolism.

## 2024-01-02 NOTE — TELEPHONE ENCOUNTER
From: Jacki Moura  To: Kyle Smith  Sent: 12/29/2023 9:31 PM EST  Subject: Pregablin    The pharmacy has been on backorder for over a week now and I thought this would be a good time to ask for an increase in the dosage. I would appreciate it. Thanks Andrew and Happy New Year  Jacki

## 2024-01-08 ENCOUNTER — APPOINTMENT (OUTPATIENT)
Age: 59
End: 2024-01-08
Payer: COMMERCIAL

## 2024-01-08 ENCOUNTER — HOSPITAL ENCOUNTER (OUTPATIENT)
Age: 59
Setting detail: OBSERVATION
Discharge: HOME | End: 2024-01-10
Payer: COMMERCIAL

## 2024-01-08 ENCOUNTER — APPOINTMENT (OUTPATIENT)
Dept: GENERAL RADIOLOGY | Age: 59
End: 2024-01-08
Payer: COMMERCIAL

## 2024-01-08 DIAGNOSIS — R53.1 GENERAL WEAKNESS: ICD-10-CM

## 2024-01-08 DIAGNOSIS — R00.0 TACHYCARDIA: ICD-10-CM

## 2024-01-08 DIAGNOSIS — M79.622 LEFT UPPER ARM PAIN: ICD-10-CM

## 2024-01-08 DIAGNOSIS — R07.89 ATYPICAL CHEST PAIN: ICD-10-CM

## 2024-01-08 DIAGNOSIS — R11.0 NAUSEA: ICD-10-CM

## 2024-01-08 DIAGNOSIS — R00.2 PALPITATIONS: Primary | ICD-10-CM

## 2024-01-08 DIAGNOSIS — I35.1 AORTIC VALVE INSUFFICIENCY, ETIOLOGY OF CARDIAC VALVE DISEASE UNSPECIFIED: ICD-10-CM

## 2024-01-08 PROBLEM — D72.829 LEUKOCYTOSIS: Status: ACTIVE | Noted: 2024-01-08

## 2024-01-08 PROBLEM — D49.7 ADRENAL TUMOR: Status: ACTIVE | Noted: 2024-01-08

## 2024-01-08 PROBLEM — Z72.0 TOBACCO ABUSE: Status: ACTIVE | Noted: 2024-01-08

## 2024-01-08 LAB
ALBUMIN SERPL BCG-MCNC: 4.8 G/DL (ref 3.5–5.1)
ALP SERPL-CCNC: 89 U/L (ref 38–126)
ALT SERPL W/O P-5'-P-CCNC: 22 U/L (ref 11–66)
AMPHETAMINES UR QL SCN: NEGATIVE
ANION GAP SERPL CALC-SCNC: 16 MEQ/L (ref 8–16)
AST SERPL-CCNC: 24 U/L (ref 5–40)
BARBITURATES UR QL SCN: NEGATIVE
BASOPHILS ABSOLUTE: 0.1 THOU/MM3 (ref 0–0.1)
BASOPHILS NFR BLD AUTO: 0.8 %
BENZODIAZ UR QL SCN: NEGATIVE
BILIRUB CONJ SERPL-MCNC: < 0.2 MG/DL (ref 0–0.3)
BILIRUB SERPL-MCNC: < 0.2 MG/DL (ref 0.3–1.2)
BILIRUB UR QL STRIP.AUTO: NEGATIVE
BUN SERPL-MCNC: 10 MG/DL (ref 7–22)
BZE UR QL SCN: NEGATIVE
CALCIUM SERPL-MCNC: 9.4 MG/DL (ref 8.5–10.5)
CANNABINOIDS UR QL SCN: POSITIVE
CHARACTER UR: CLEAR
CHLORIDE SERPL-SCNC: 104 MEQ/L (ref 98–111)
CO2 SERPL-SCNC: 19 MEQ/L (ref 23–33)
COLOR: YELLOW
CREAT SERPL-MCNC: 0.6 MG/DL (ref 0.4–1.2)
DEPRECATED RDW RBC AUTO: 47.6 FL (ref 35–45)
ECHO AO ASC DIAM: 2.6 CM
ECHO AO ASCENDING AORTA INDEX: 1.64 CM/M2
ECHO AR MAX VEL PISA: 4.6 M/S
ECHO AV CUSP MM: 1.9 CM
ECHO AV PEAK GRADIENT: 16 MMHG
ECHO AV PEAK VELOCITY: 2 M/S
ECHO AV REGURGITANT PHT: 280 MS
ECHO AV VELOCITY RATIO: 0.65
ECHO IVC PROX: 1.2 CM
ECHO LA AREA 2C: 15.2 CM2
ECHO LA AREA 4C: 11.9 CM2
ECHO LA DIAMETER INDEX: 1.76 CM/M2
ECHO LA DIAMETER: 2.8 CM
ECHO LA MAJOR AXIS: 3.9 CM
ECHO LA MINOR AXIS: 4.6 CM
ECHO LA VOL BP: 37 ML (ref 22–52)
ECHO LA VOL MOD A2C: 41 ML (ref 22–52)
ECHO LA VOL MOD A4C: 29 ML (ref 22–52)
ECHO LA VOL/BSA BIPLANE: 23 ML/M2 (ref 16–34)
ECHO LA VOLUME INDEX MOD A2C: 26 ML/M2 (ref 16–34)
ECHO LA VOLUME INDEX MOD A4C: 18 ML/M2 (ref 16–34)
ECHO LV E' LATERAL VELOCITY: 11 CM/S
ECHO LV E' SEPTAL VELOCITY: 7 CM/S
ECHO LV FRACTIONAL SHORTENING: 35 % (ref 28–44)
ECHO LV INTERNAL DIMENSION DIASTOLE INDEX: 3.02 CM/M2
ECHO LV INTERNAL DIMENSION DIASTOLIC: 4.8 CM (ref 3.9–5.3)
ECHO LV INTERNAL DIMENSION SYSTOLIC INDEX: 1.95 CM/M2
ECHO LV INTERNAL DIMENSION SYSTOLIC: 3.1 CM
ECHO LV ISOVOLUMETRIC RELAXATION TIME (IVRT): 67 MS
ECHO LV IVSD: 0.9 CM (ref 0.6–0.9)
ECHO LV MASS 2D: 147.8 G (ref 67–162)
ECHO LV MASS INDEX 2D: 92.9 G/M2 (ref 43–95)
ECHO LV POSTERIOR WALL DIASTOLIC: 0.9 CM (ref 0.6–0.9)
ECHO LV RELATIVE WALL THICKNESS RATIO: 0.38
ECHO LVOT PEAK GRADIENT: 7 MMHG
ECHO LVOT PEAK VELOCITY: 1.3 M/S
ECHO MV A VELOCITY: 1.02 M/S
ECHO MV E DECELERATION TIME (DT): 187 MS
ECHO MV E VELOCITY: 0.79 M/S
ECHO MV E/A RATIO: 0.77
ECHO MV E/E' LATERAL: 7.18
ECHO MV E/E' RATIO (AVERAGED): 9.23
ECHO MV REGURGITANT PEAK GRADIENT: 64 MMHG
ECHO MV REGURGITANT PEAK VELOCITY: 4 M/S
ECHO PV MAX VELOCITY: 0.6 M/S
ECHO PV PEAK GRADIENT: 2 MMHG
ECHO RV INTERNAL DIMENSION: 2.6 CM
ECHO RV TAPSE: 2.2 CM (ref 1.7–?)
ECHO TV E WAVE: 0.5 M/S
ECHO TV REGURGITANT MAX VELOCITY: 2.33 M/S
ECHO TV REGURGITANT PEAK GRADIENT: 22 MMHG
EOSINOPHIL NFR BLD AUTO: 0.9 %
EOSINOPHILS ABSOLUTE: 0.1 THOU/MM3 (ref 0–0.4)
ERYTHROCYTE [DISTWIDTH] IN BLOOD BY AUTOMATED COUNT: 13.3 % (ref 11.5–14.5)
FENTANYL: NEGATIVE
FLUAV RNA RESP QL NAA+PROBE: NOT DETECTED
FLUBV RNA RESP QL NAA+PROBE: NOT DETECTED
GFR SERPL CREATININE-BSD FRML MDRD: > 60 ML/MIN/1.73M2
GLUCOSE SERPL-MCNC: 107 MG/DL (ref 70–108)
GLUCOSE UR QL STRIP.AUTO: NEGATIVE MG/DL
HCT VFR BLD AUTO: 40 % (ref 37–47)
HGB BLD-MCNC: 12.9 GM/DL (ref 12–16)
HGB UR QL STRIP.AUTO: NEGATIVE
IMM GRANULOCYTES # BLD AUTO: 0.08 THOU/MM3 (ref 0–0.07)
IMM GRANULOCYTES NFR BLD AUTO: 0.6 %
KETONES UR QL STRIP.AUTO: NEGATIVE
LYMPHOCYTES ABSOLUTE: 1.6 THOU/MM3 (ref 1–4.8)
LYMPHOCYTES NFR BLD AUTO: 12.4 %
MCH RBC QN AUTO: 31.2 PG (ref 26–33)
MCHC RBC AUTO-ENTMCNC: 32.3 GM/DL (ref 32.2–35.5)
MCV RBC AUTO: 96.6 FL (ref 81–99)
MONOCYTES ABSOLUTE: 1 THOU/MM3 (ref 0.4–1.3)
MONOCYTES NFR BLD AUTO: 7.4 %
NEUTROPHILS NFR BLD AUTO: 77.9 %
NITRITE UR QL STRIP: NEGATIVE
NRBC BLD AUTO-RTO: 0 /100 WBC
NT-PROBNP SERPL IA-MCNC: 140.1 PG/ML (ref 0–124)
OPIATES UR QL SCN: NEGATIVE
OSMOLALITY SERPL CALC.SUM OF ELEC: 277.1 MOSMOL/KG (ref 275–300)
OXYCODONE: POSITIVE
PCP UR QL SCN: NEGATIVE
PH UR STRIP.AUTO: 6.5 [PH] (ref 5–9)
PLATELET # BLD AUTO: 458 THOU/MM3 (ref 130–400)
PMV BLD AUTO: 9.6 FL (ref 9.4–12.4)
POTASSIUM SERPL-SCNC: 4.4 MEQ/L (ref 3.5–5.2)
PROT SERPL-MCNC: 8.3 G/DL (ref 6.1–8)
PROT UR STRIP.AUTO-MCNC: NEGATIVE MG/DL
RBC # BLD AUTO: 4.14 MILL/MM3 (ref 4.2–5.4)
SARS-COV-2 RNA RESP QL NAA+PROBE: NOT DETECTED
SEGMENTED NEUTROPHILS ABSOLUTE COUNT: 10 THOU/MM3 (ref 1.8–7.7)
SODIUM SERPL-SCNC: 139 MEQ/L (ref 135–145)
SP GR UR REFRACT.AUTO: < 1.005 (ref 1–1.03)
T4 FREE SERPL-MCNC: 1.02 NG/DL (ref 0.93–1.76)
TROPONIN, HIGH SENSITIVITY: 8 NG/L (ref 0–12)
TROPONIN, HIGH SENSITIVITY: 8 NG/L (ref 0–12)
TSH SERPL DL<=0.005 MIU/L-ACNC: 0.33 UIU/ML (ref 0.4–4.2)
UROBILINOGEN, URINE: 0.2 EU/DL (ref 0–1)
WBC # BLD AUTO: 12.9 THOU/MM3 (ref 4.8–10.8)
WBC #/AREA URNS HPF: NEGATIVE /[HPF]

## 2024-01-08 PROCEDURE — 93306 TTE W/DOPPLER COMPLETE: CPT | Performed by: INTERNAL MEDICINE

## 2024-01-08 PROCEDURE — 84484 ASSAY OF TROPONIN QUANT: CPT

## 2024-01-08 PROCEDURE — G0378 HOSPITAL OBSERVATION PER HR: HCPCS

## 2024-01-08 PROCEDURE — 83880 ASSAY OF NATRIURETIC PEPTIDE: CPT

## 2024-01-08 PROCEDURE — 87636 SARSCOV2 & INF A&B AMP PRB: CPT

## 2024-01-08 PROCEDURE — 84439 ASSAY OF FREE THYROXINE: CPT

## 2024-01-08 PROCEDURE — 6370000000 HC RX 637 (ALT 250 FOR IP)

## 2024-01-08 PROCEDURE — 93005 ELECTROCARDIOGRAM TRACING: CPT

## 2024-01-08 PROCEDURE — 6360000002 HC RX W HCPCS

## 2024-01-08 PROCEDURE — 96372 THER/PROPH/DIAG INJ SC/IM: CPT

## 2024-01-08 PROCEDURE — 80076 HEPATIC FUNCTION PANEL: CPT

## 2024-01-08 PROCEDURE — 99285 EMERGENCY DEPT VISIT HI MDM: CPT

## 2024-01-08 PROCEDURE — 80307 DRUG TEST PRSMV CHEM ANLYZR: CPT

## 2024-01-08 PROCEDURE — 81003 URINALYSIS AUTO W/O SCOPE: CPT

## 2024-01-08 PROCEDURE — 71046 X-RAY EXAM CHEST 2 VIEWS: CPT

## 2024-01-08 PROCEDURE — 2580000003 HC RX 258

## 2024-01-08 PROCEDURE — 93306 TTE W/DOPPLER COMPLETE: CPT

## 2024-01-08 PROCEDURE — 84443 ASSAY THYROID STIM HORMONE: CPT

## 2024-01-08 PROCEDURE — 99223 1ST HOSP IP/OBS HIGH 75: CPT

## 2024-01-08 PROCEDURE — 85025 COMPLETE CBC W/AUTO DIFF WBC: CPT

## 2024-01-08 PROCEDURE — 80048 BASIC METABOLIC PNL TOTAL CA: CPT

## 2024-01-08 PROCEDURE — 36415 COLL VENOUS BLD VENIPUNCTURE: CPT

## 2024-01-08 PROCEDURE — 99223 1ST HOSP IP/OBS HIGH 75: CPT | Performed by: INTERNAL MEDICINE

## 2024-01-08 RX ORDER — AMLODIPINE BESYLATE 10 MG/1
10 TABLET ORAL DAILY
Status: DISCONTINUED | OUTPATIENT
Start: 2024-01-09 | End: 2024-01-10 | Stop reason: HOSPADM

## 2024-01-08 RX ORDER — ATORVASTATIN CALCIUM 40 MG/1
40 TABLET, FILM COATED ORAL DAILY
Status: DISCONTINUED | OUTPATIENT
Start: 2024-01-09 | End: 2024-01-10 | Stop reason: HOSPADM

## 2024-01-08 RX ORDER — ENOXAPARIN SODIUM 100 MG/ML
40 INJECTION SUBCUTANEOUS DAILY
Status: DISCONTINUED | OUTPATIENT
Start: 2024-01-08 | End: 2024-01-10 | Stop reason: HOSPADM

## 2024-01-08 RX ORDER — MELOXICAM 7.5 MG/1
15 TABLET ORAL DAILY
Status: DISCONTINUED | OUTPATIENT
Start: 2024-01-09 | End: 2024-01-10 | Stop reason: HOSPADM

## 2024-01-08 RX ORDER — ONDANSETRON 4 MG/1
4 TABLET, ORALLY DISINTEGRATING ORAL EVERY 8 HOURS PRN
Status: DISCONTINUED | OUTPATIENT
Start: 2024-01-08 | End: 2024-01-10 | Stop reason: HOSPADM

## 2024-01-08 RX ORDER — MAGNESIUM SULFATE IN WATER 40 MG/ML
2000 INJECTION, SOLUTION INTRAVENOUS PRN
Status: DISCONTINUED | OUTPATIENT
Start: 2024-01-08 | End: 2024-01-10 | Stop reason: HOSPADM

## 2024-01-08 RX ORDER — POTASSIUM CHLORIDE 7.45 MG/ML
10 INJECTION INTRAVENOUS PRN
Status: DISCONTINUED | OUTPATIENT
Start: 2024-01-08 | End: 2024-01-10 | Stop reason: HOSPADM

## 2024-01-08 RX ORDER — SODIUM CHLORIDE 9 MG/ML
INJECTION, SOLUTION INTRAVENOUS PRN
Status: DISCONTINUED | OUTPATIENT
Start: 2024-01-08 | End: 2024-01-10 | Stop reason: HOSPADM

## 2024-01-08 RX ORDER — ACETAMINOPHEN 650 MG/1
650 SUPPOSITORY RECTAL EVERY 6 HOURS PRN
Status: DISCONTINUED | OUTPATIENT
Start: 2024-01-08 | End: 2024-01-10 | Stop reason: HOSPADM

## 2024-01-08 RX ORDER — ACETAMINOPHEN 325 MG/1
650 TABLET ORAL EVERY 6 HOURS PRN
Status: DISCONTINUED | OUTPATIENT
Start: 2024-01-08 | End: 2024-01-10 | Stop reason: HOSPADM

## 2024-01-08 RX ORDER — PANTOPRAZOLE SODIUM 40 MG/1
40 TABLET, DELAYED RELEASE ORAL
Status: DISCONTINUED | OUTPATIENT
Start: 2024-01-09 | End: 2024-01-10 | Stop reason: HOSPADM

## 2024-01-08 RX ORDER — POLYETHYLENE GLYCOL 3350 17 G/17G
17 POWDER, FOR SOLUTION ORAL DAILY PRN
Status: DISCONTINUED | OUTPATIENT
Start: 2024-01-08 | End: 2024-01-10 | Stop reason: HOSPADM

## 2024-01-08 RX ORDER — POTASSIUM CHLORIDE 20 MEQ/1
40 TABLET, EXTENDED RELEASE ORAL PRN
Status: DISCONTINUED | OUTPATIENT
Start: 2024-01-08 | End: 2024-01-10 | Stop reason: HOSPADM

## 2024-01-08 RX ORDER — CLOPIDOGREL BISULFATE 75 MG/1
75 TABLET ORAL DAILY
Status: DISCONTINUED | OUTPATIENT
Start: 2024-01-09 | End: 2024-01-10 | Stop reason: HOSPADM

## 2024-01-08 RX ORDER — PREGABALIN 50 MG/1
50 CAPSULE ORAL 3 TIMES DAILY
Status: DISCONTINUED | OUTPATIENT
Start: 2024-01-08 | End: 2024-01-10 | Stop reason: HOSPADM

## 2024-01-08 RX ORDER — ASPIRIN 81 MG/1
81 TABLET ORAL DAILY
Status: DISCONTINUED | OUTPATIENT
Start: 2024-01-09 | End: 2024-01-10 | Stop reason: HOSPADM

## 2024-01-08 RX ORDER — SODIUM CHLORIDE 0.9 % (FLUSH) 0.9 %
5-40 SYRINGE (ML) INJECTION PRN
Status: DISCONTINUED | OUTPATIENT
Start: 2024-01-08 | End: 2024-01-10 | Stop reason: HOSPADM

## 2024-01-08 RX ORDER — SODIUM CHLORIDE 0.9 % (FLUSH) 0.9 %
5-40 SYRINGE (ML) INJECTION EVERY 12 HOURS SCHEDULED
Status: DISCONTINUED | OUTPATIENT
Start: 2024-01-08 | End: 2024-01-10 | Stop reason: HOSPADM

## 2024-01-08 RX ORDER — SODIUM CHLORIDE 9 MG/ML
INJECTION, SOLUTION INTRAVENOUS CONTINUOUS
Status: ACTIVE | OUTPATIENT
Start: 2024-01-08 | End: 2024-01-09

## 2024-01-08 RX ORDER — LISINOPRIL 10 MG/1
10 TABLET ORAL 2 TIMES DAILY
Status: DISCONTINUED | OUTPATIENT
Start: 2024-01-08 | End: 2024-01-10

## 2024-01-08 RX ORDER — NICOTINE 21 MG/24HR
1 PATCH, TRANSDERMAL 24 HOURS TRANSDERMAL DAILY
Status: DISCONTINUED | OUTPATIENT
Start: 2024-01-08 | End: 2024-01-10 | Stop reason: HOSPADM

## 2024-01-08 RX ORDER — ONDANSETRON 2 MG/ML
4 INJECTION INTRAMUSCULAR; INTRAVENOUS EVERY 6 HOURS PRN
Status: DISCONTINUED | OUTPATIENT
Start: 2024-01-08 | End: 2024-01-10 | Stop reason: HOSPADM

## 2024-01-08 RX ORDER — CYCLOBENZAPRINE HCL 10 MG
10 TABLET ORAL 3 TIMES DAILY PRN
Status: DISCONTINUED | OUTPATIENT
Start: 2024-01-08 | End: 2024-01-10 | Stop reason: HOSPADM

## 2024-01-08 RX ORDER — HYDROCODONE BITARTRATE AND ACETAMINOPHEN 7.5; 325 MG/1; MG/1
1 TABLET ORAL 2 TIMES DAILY
Status: DISCONTINUED | OUTPATIENT
Start: 2024-01-08 | End: 2024-01-10 | Stop reason: HOSPADM

## 2024-01-08 RX ADMIN — HYDROCODONE BITARTRATE AND ACETAMINOPHEN 1 TABLET: 7.5; 325 TABLET ORAL at 15:58

## 2024-01-08 RX ADMIN — PREGABALIN 50 MG: 50 CAPSULE ORAL at 21:03

## 2024-01-08 RX ADMIN — ENOXAPARIN SODIUM 40 MG: 100 INJECTION SUBCUTANEOUS at 21:03

## 2024-01-08 RX ADMIN — SODIUM CHLORIDE: 9 INJECTION, SOLUTION INTRAVENOUS at 17:59

## 2024-01-08 RX ADMIN — HYDROCODONE BITARTRATE AND ACETAMINOPHEN 1 TABLET: 7.5; 325 TABLET ORAL at 21:03

## 2024-01-08 RX ADMIN — ACETAMINOPHEN 650 MG: 325 TABLET ORAL at 18:00

## 2024-01-08 RX ADMIN — CYCLOBENZAPRINE 10 MG: 10 TABLET, FILM COATED ORAL at 15:58

## 2024-01-08 RX ADMIN — PREGABALIN 50 MG: 50 CAPSULE ORAL at 15:59

## 2024-01-08 RX ADMIN — CYCLOBENZAPRINE 10 MG: 10 TABLET, FILM COATED ORAL at 22:24

## 2024-01-08 ASSESSMENT — PAIN DESCRIPTION - LOCATION
LOCATION: BACK;NECK
LOCATION: ARM
LOCATION: BACK;NECK
LOCATION: ARM

## 2024-01-08 ASSESSMENT — PAIN SCALES - GENERAL
PAINLEVEL_OUTOF10: 10
PAINLEVEL_OUTOF10: 5
PAINLEVEL_OUTOF10: 2
PAINLEVEL_OUTOF10: 7
PAINLEVEL_OUTOF10: 10

## 2024-01-08 ASSESSMENT — PAIN DESCRIPTION - PAIN TYPE
TYPE: ACUTE PAIN

## 2024-01-08 ASSESSMENT — ENCOUNTER SYMPTOMS
COUGH: 0
RHINORRHEA: 0
ABDOMINAL PAIN: 0
DIARRHEA: 0
PHOTOPHOBIA: 0
SORE THROAT: 0
SHORTNESS OF BREATH: 0
NAUSEA: 1
VOMITING: 0

## 2024-01-08 ASSESSMENT — PAIN DESCRIPTION - DESCRIPTORS
DESCRIPTORS: SHARP
DESCRIPTORS: ACHING;DISCOMFORT

## 2024-01-08 ASSESSMENT — PAIN DESCRIPTION - FREQUENCY
FREQUENCY: INTERMITTENT
FREQUENCY: CONTINUOUS
FREQUENCY: INTERMITTENT

## 2024-01-08 ASSESSMENT — HEART SCORE: ECG: 0

## 2024-01-08 ASSESSMENT — PAIN - FUNCTIONAL ASSESSMENT
PAIN_FUNCTIONAL_ASSESSMENT: ACTIVITIES ARE NOT PREVENTED
PAIN_FUNCTIONAL_ASSESSMENT: 0-10
PAIN_FUNCTIONAL_ASSESSMENT: PREVENTS OR INTERFERES SOME ACTIVE ACTIVITIES AND ADLS
PAIN_FUNCTIONAL_ASSESSMENT: 0-10

## 2024-01-08 ASSESSMENT — PAIN DESCRIPTION - ORIENTATION
ORIENTATION: MID
ORIENTATION: MID
ORIENTATION: LEFT
ORIENTATION: LEFT

## 2024-01-08 ASSESSMENT — LIFESTYLE VARIABLES
HOW OFTEN DO YOU HAVE A DRINK CONTAINING ALCOHOL: 2-3 TIMES A WEEK
HOW MANY STANDARD DRINKS CONTAINING ALCOHOL DO YOU HAVE ON A TYPICAL DAY: 3 OR 4

## 2024-01-08 NOTE — CARE COORDINATION
Spoke with patient and spouse who advised does not have ACP documents at home. Would like to complete this admission and have attached to chart. Denied concerns or needs.

## 2024-01-08 NOTE — ED NOTES
In for hourly rounding. Pt resting on cot in position of comfort. Pt remains A&Ox4, resps easy and unlabored. IV shows no s/s of infection or infiltration. Pt pain remains unchanged at this time. Monitor remains in place. Updated pt on POC. Will monitor.

## 2024-01-08 NOTE — ED NOTES
Pt presents ambulatory to ED via triage for c/o tachycardia and L arm pain x2 weeks. Pt reports her Fitbit has reported heart rate as high as 160. Pt reports contacting PCP who advised ED assessment. Upon initial assessment, pt is A&Ox4, resps easy and unlabored. EKG completed. IV established with blood drawn and sent to lab. Monitor applied and VS as noted. Pt swabbed for covid/flu and sent to lab. Pt reports having minimally productive cough x2 weeks. Pt reports intermittent cramping/martha horse type pain in L arm, 10/10 when it happens. No other concerns noted at this time. Protocol orders placed. Awaiting provider assessment and further orders. Will monitor.

## 2024-01-08 NOTE — CONSULTS
Unable to Pay for Housing in the Last Year: Not on file     Number of Places Lived in the Last Year: Not on file     Unstable Housing in the Last Year: No       ROS:   Constitutional: Endorses weight gain over past year, sweats and chills, anxiety  Eyes:  Denies any blurring or double vision, no glaucoma  Ears/Nose/Mouth/Throat:  Denies any chronic sinus/rhinitis, bleeding gums  Cardiovascular:  As described above.    Respiratory:  Denies any frequent cough, wheezing or coughing up blood  Genitourinary:  Denies difficulty with urination and kidney stones  Gastrointestinal: Endorses recent constipation, diarrhea  Musculoskeletal:  Denies any joint pain, back pain, or difficulty walking  Integumentary:  Denies any rash  Neurological: Endorses neuropathy left leg   Hematologic/Lymphatic:  Denies any hemorrhage or lymphatic drainage problems.    Labs:  CBC:   Recent Labs     01/08/24 0928   WBC 12.9*   HGB 12.9   HCT 40.0   MCV 96.6   *     BMP:   Recent Labs     01/08/24 0928      K 4.4      CO2 19*   BUN 10   CREATININE 0.6     Accucheck Glucoses: No results for input(s): \"POCGLU\" in the last 72 hours.  Cardiac Enzymes:   Recent Labs     01/08/24 0928 01/08/24  1242   TROPHS 8 8     PT/INR: No results for input(s): \"PROTIME\", \"INR\" in the last 72 hours.  APTT: No results for input(s): \"APTT\" in the last 72 hours.  Liver Profile:  Lab Results   Component Value Date/Time    AST 24 01/08/2024 09:28 AM    ALT 22 01/08/2024 09:28 AM    BILIDIR <0.2 01/08/2024 09:28 AM    BILITOT <0.2 01/08/2024 09:28 AM    ALKPHOS 89 01/08/2024 09:28 AM     Lab Results   Component Value Date/Time    CHOL 196 06/09/2023 02:06 PM    HDL 70 06/09/2023 02:06 PM    TRIG 179 06/09/2023 02:06 PM     TSH:   Lab Results   Component Value Date/Time    TSH 0.326 01/08/2024 09:28 AM     UA:   Lab Results   Component Value Date/Time    COLORU YELLOW 01/08/2024 10:30 AM    PHUR 6.5 01/08/2024 10:30 AM    WBCUA 50-75 07/19/2022

## 2024-01-08 NOTE — CARE COORDINATION
01/08/24 1437   Service Assessment   Patient Orientation Alert and Oriented;Person;Place;Situation;Self   Cognition Alert   History Provided By Patient   Primary Caregiver Self   Accompanied By/Relationship Spouse   Support Systems Spouse/Significant Other;Family Members;Children;Friends/Neighbors   Patient's Healthcare Decision Maker is: Legal Next of Kin   PCP Verified by CM Yes   Last Visit to PCP Within last 3 months   Prior Functional Level Independent in ADLs/IADLs;Bathing;Dressing;Toileting;Feeding;Cooking;Housework;Shopping;Mobility   Current Functional Level Independent in ADLs/IADLs;Bathing;Dressing;Toileting;Feeding;Cooking;Housework;Shopping;Mobility   Can patient return to prior living arrangement Yes   Ability to make needs known: Good   Family able to assist with home care needs: Yes   Would you like for me to discuss the discharge plan with any other family members/significant others, and if so, who? Yes  (Spouse)   Financial Resources None   Community Resources None   CM/SW Referral Other (see comment)   Social/Functional History   Lives With Spouse   Type of Home House   Home Layout Two level;Laundry in basement;Work area in basement;Able to Live on Main level with bedroom/bathroom   Home Access Stairs to enter with rails   Entrance Stairs - Number of Steps 3-4   Entrance Stairs - Rails Both   Bathroom Shower/Tub Tub/Shower unit;Curtain   Bathroom Toilet Handicap height   Bathroom Equipment None   Bathroom Accessibility Not accessible   Home Equipment None   Receives Help From Family   ADL Assistance Independent   Homemaking Assistance Independent   Homemaking Responsibilities Yes   Meal Prep Responsibility Primary   Laundry Responsibility Primary   Cleaning Responsibility Primary   Bill Paying/Finance Responsibility Secondary   Shopping Responsibility Secondary   Health Care Management Primary   Ambulation Assistance Independent   Transfer Assistance Independent   Active  Yes   Mode of

## 2024-01-08 NOTE — H&P
that at this time she was having some heart palpitations and states her heart rate was \"high\". She has been wearing a smart watch and states at one point her heart rate went up to the 160's. States that her heart rate increases with activity. Reports feeling anxious and shaky. States she also hasn't had much of an appetite and has had some nausea with episodes of vomiting on Thursday. States she was also diaphoretic at this time. States she has also been feeling lightheaded and her legs feel wobbly at times when standing. Complains of pain, but chronic to neck, back and right shoulder. Denies fever or chills. Denies recent illness. Denies chest pain, SOB or cough. Denies abdominal pain. Denies hematuria or dysuria.          Review of Systems: Pertinent positives as noted in the HPI. All other systems reviewed and negative.    Past Medical History:        Diagnosis Date    Arthritis     Cushing's disease (HCC)     Hyperlipidemia     Hypertension        Past Surgical History:        Procedure Laterality Date    BACK SURGERY       SECTION      x 4    COLONOSCOPY      DILATION AND CURETTAGE OF UTERUS      ENDOSCOPY, COLON, DIAGNOSTIC      FEMORAL ENDARTERECTOMY Left 2021    LEFT  FEMORAL ARTERY ENDARTERECTOMY performed by Zhao Gilliland MD at [facility] OR    FOOT SURGERY      bone spurs removed both feet    KNEE SURGERY Left     reconstruction of knee    LUMBAR DISCECTOMY  2020    Dr. Gamino    SKIN BIOPSY         Home Medications:   No current facility-administered medications on file prior to encounter.     Current Outpatient Medications on File Prior to Encounter   Medication Sig Dispense Refill    pregabalin (LYRICA) 50 MG capsule Take 1 capsule by mouth 3 times daily for 90 days. Max Daily Amount: 150 mg 90 capsule 2    omeprazole (PRILOSEC) 20 MG delayed release capsule Take 1 capsule by mouth Daily 90 capsule 3    HYDROcodone-acetaminophen (NORCO) 7.5-325 MG per tablet Take 1 tablet by mouth 2  negative

## 2024-01-08 NOTE — ED NOTES
Ambulatory pulse ox obtained. Pt walked around entirety of [facility] nurses station without difficulty. Pt returns to room states \"I'm glad to be back. My head feels weird. It doesn't hurt. But it feels weird.\"     Pt remains A&Ox4, resps easy and unlabored. IV shows no s/s of infection or infiltration. Pt reports having less \"spasm\" episodes since being here. Monitor in place. VS as noted. Will monitor.

## 2024-01-08 NOTE — ED PROVIDER NOTES
[facility] EMERGENCY DEPT      Pt Name: Jacki Moura  MRN: [id number]  Birthdate 1965  Date of evaluation: 2024  Provider: Kaylen Godoy PA-C    CHIEF COMPLAINT       Chief Complaint   Patient presents with    Tachycardia       Nurses Notes reviewed and I agree except as noted in the HPI.      HISTORY OF PRESENT ILLNESS    Jacki Moura is a 58 y.o. female who presents from home for palpitations.  Patient reports feeling unwell for the past 2 weeks consisting of diaphoresis, chills, occasional nausea, and decreased appetite.  Now for the past 1 week she has been having palpitations described as heart racing and beating hard.  This occurs intermittently, worse with exertion.  Her Fitbit has told her that her heart rate has gone as high as 168.  5 days ago she had 1 day of nausea, vomiting, and diarrhea.  Additionally she reports left arm pain, diaphoresis, and shakiness.  Patient further explains that she just does not feel right with any type of activity.  Patient denies chest pain, dyspnea, cough, bloody stools, headache, dizziness, change in appetite, or other complaints.  Patient has not had the symptoms previously.  Patient has had 2 brothers age 58 and 60 who  suddenly from MIs.  Patient currently is on Plavix and aspirin for peripheral arterial disease.  The patient is a smoker and social alcohol drinker.    REVIEW OF SYSTEMS     Review of Systems   Constitutional:  Positive for appetite change, chills, diaphoresis and fatigue. Negative for fever.   HENT:  Negative for congestion, rhinorrhea and sore throat.    Eyes:  Negative for photophobia.   Respiratory:  Negative for cough and shortness of breath.    Cardiovascular:  Positive for palpitations. Negative for chest pain.   Gastrointestinal:  Positive for nausea. Negative for abdominal pain, diarrhea and vomiting.   Genitourinary:  Positive for frequency. Negative for decreased urine volume and difficulty urinating.

## 2024-01-08 NOTE — ED NOTES
ED to inpatient nurses report      Chief Complaint:  Chief Complaint   Patient presents with    Tachycardia     Present to ED from: Home    MOA:     LOC: alert and orientated to name, place, date  Mobility: Independent  Oxygen Baseline: Room Air    Current needs required: Room Air     Code Status:   Full Code    What abnormal results were found and what did you give/do to treat them?   Any procedures or intervention occur?     Mental Status:  Level of Consciousness: Alert (0)    Psych Assessment:        Vitals:  Patient Vitals for the past 24 hrs:   BP Temp Temp src Pulse Resp SpO2 Weight   01/08/24 1217 (!) 164/64 -- -- 93 18 97 % --   01/08/24 1119 -- -- -- (!) 107 20 97 % --   01/08/24 1034 -- -- -- -- 18 99 % --   01/08/24 1015 -- -- -- 97 -- -- --   01/08/24 0919 (!) 153/78 98 °F (36.7 °C) Oral (!) 111 18 98 % 62.6 kg (138 lb)        LDAs:   Peripheral IV 01/08/24 Proximal;Right Forearm (Active)   Site Assessment Clean, dry & intact 01/08/24 0933   Line Status Blood return noted;Flushed;Normal saline locked;Specimen collected 01/08/24 0933   Phlebitis Assessment No symptoms 01/08/24 0933   Infiltration Assessment 0 01/08/24 0933   Dressing Status Clean, dry & intact 01/08/24 0933   Dressing Type Transparent 01/08/24 0933   Dressing Intervention New 01/08/24 0933       Ambulatory Status:  Presents to emergency department  because of falls (Syncope, seizure, or loss of consciousness): No, Age > 70: No, Altered Mental Status, Intoxication with alcohol or substance confusion (Disorientation, impaired judgment, poor safety awaremess, or inability to follow instructions): No, Impaired Mobility: Ambulates or transfers with assistive devices or assistance; Unable to ambulate or transer.: No, Nursing Judgement: No    Diagnosis:  DISPOSITION Admitted 01/08/2024 12:21:41 PM   Final diagnoses:   Palpitations   Left upper arm pain   Nausea   General weakness        Consults:  IP CONSULT TO CARDIOLOGY  IP CONSULT TO CASE

## 2024-01-09 PROBLEM — I35.1 SEVERE AORTIC REGURGITATION BY PRIOR ECHOCARDIOGRAM: Status: ACTIVE | Noted: 2024-01-09

## 2024-01-09 PROBLEM — M79.622 LEFT UPPER ARM PAIN: Status: ACTIVE | Noted: 2024-01-09

## 2024-01-09 PROBLEM — R07.89 ATYPICAL CHEST PAIN: Status: ACTIVE | Noted: 2024-01-09

## 2024-01-09 LAB
ANION GAP SERPL CALC-SCNC: 12 MEQ/L (ref 8–16)
BASOPHILS ABSOLUTE: 0.1 THOU/MM3 (ref 0–0.1)
BASOPHILS NFR BLD AUTO: 0.7 %
BUN SERPL-MCNC: 8 MG/DL (ref 7–22)
CALCIUM SERPL-MCNC: 8.8 MG/DL (ref 8.5–10.5)
CHLORIDE SERPL-SCNC: 104 MEQ/L (ref 98–111)
CHOLEST SERPL-MCNC: 153 MG/DL (ref 100–199)
CO2 SERPL-SCNC: 23 MEQ/L (ref 23–33)
CREAT SERPL-MCNC: 0.7 MG/DL (ref 0.4–1.2)
DEPRECATED RDW RBC AUTO: 47.7 FL (ref 35–45)
EKG ATRIAL RATE: 115 BPM
EKG ATRIAL RATE: 81 BPM
EKG P AXIS: -7 DEGREES
EKG P AXIS: 65 DEGREES
EKG P-R INTERVAL: 152 MS
EKG P-R INTERVAL: 176 MS
EKG Q-T INTERVAL: 300 MS
EKG Q-T INTERVAL: 332 MS
EKG QRS DURATION: 76 MS
EKG QRS DURATION: 78 MS
EKG QTC CALCULATION (BAZETT): 385 MS
EKG QTC CALCULATION (BAZETT): 415 MS
EKG R AXIS: 27 DEGREES
EKG R AXIS: 56 DEGREES
EKG T AXIS: 37 DEGREES
EKG T AXIS: 56 DEGREES
EKG VENTRICULAR RATE: 115 BPM
EKG VENTRICULAR RATE: 81 BPM
EOSINOPHIL NFR BLD AUTO: 2.5 %
EOSINOPHILS ABSOLUTE: 0.2 THOU/MM3 (ref 0–0.4)
ERYTHROCYTE [DISTWIDTH] IN BLOOD BY AUTOMATED COUNT: 13.3 % (ref 11.5–14.5)
GFR SERPL CREATININE-BSD FRML MDRD: > 60 ML/MIN/1.73M2
GLUCOSE SERPL-MCNC: 191 MG/DL (ref 70–108)
HCT VFR BLD AUTO: 34.2 % (ref 37–47)
HDLC SERPL-MCNC: 62 MG/DL
HGB BLD-MCNC: 10.8 GM/DL (ref 12–16)
IMM GRANULOCYTES # BLD AUTO: 0.07 THOU/MM3 (ref 0–0.07)
IMM GRANULOCYTES NFR BLD AUTO: 0.8 %
LDLC SERPL CALC-MCNC: 62 MG/DL
LYMPHOCYTES ABSOLUTE: 2 THOU/MM3 (ref 1–4.8)
LYMPHOCYTES NFR BLD AUTO: 23.6 %
MCH RBC QN AUTO: 30.7 PG (ref 26–33)
MCHC RBC AUTO-ENTMCNC: 31.6 GM/DL (ref 32.2–35.5)
MCV RBC AUTO: 97.2 FL (ref 81–99)
MONOCYTES ABSOLUTE: 0.8 THOU/MM3 (ref 0.4–1.3)
MONOCYTES NFR BLD AUTO: 9.3 %
NEUTROPHILS NFR BLD AUTO: 63.1 %
NRBC BLD AUTO-RTO: 0 /100 WBC
PLATELET # BLD AUTO: 378 THOU/MM3 (ref 130–400)
PMV BLD AUTO: 9.5 FL (ref 9.4–12.4)
POTASSIUM SERPL-SCNC: 4.6 MEQ/L (ref 3.5–5.2)
RBC # BLD AUTO: 3.52 MILL/MM3 (ref 4.2–5.4)
REASON FOR REJECTION: NORMAL
REJECTED TEST: NORMAL
SEGMENTED NEUTROPHILS ABSOLUTE COUNT: 5.2 THOU/MM3 (ref 1.8–7.7)
SODIUM SERPL-SCNC: 139 MEQ/L (ref 135–145)
TRIGL SERPL-MCNC: 143 MG/DL (ref 0–199)
WBC # BLD AUTO: 8.3 THOU/MM3 (ref 4.8–10.8)

## 2024-01-09 PROCEDURE — 6360000002 HC RX W HCPCS

## 2024-01-09 PROCEDURE — 36415 COLL VENOUS BLD VENIPUNCTURE: CPT

## 2024-01-09 PROCEDURE — G0378 HOSPITAL OBSERVATION PER HR: HCPCS

## 2024-01-09 PROCEDURE — 85025 COMPLETE CBC W/AUTO DIFF WBC: CPT

## 2024-01-09 PROCEDURE — 99233 SBSQ HOSP IP/OBS HIGH 50: CPT | Performed by: PHYSICIAN ASSISTANT

## 2024-01-09 PROCEDURE — 93010 ELECTROCARDIOGRAM REPORT: CPT | Performed by: INTERNAL MEDICINE

## 2024-01-09 PROCEDURE — 2580000003 HC RX 258

## 2024-01-09 PROCEDURE — 6370000000 HC RX 637 (ALT 250 FOR IP)

## 2024-01-09 PROCEDURE — 99232 SBSQ HOSP IP/OBS MODERATE 35: CPT | Performed by: NURSE PRACTITIONER

## 2024-01-09 PROCEDURE — 80061 LIPID PANEL: CPT

## 2024-01-09 PROCEDURE — 80048 BASIC METABOLIC PNL TOTAL CA: CPT

## 2024-01-09 PROCEDURE — 96372 THER/PROPH/DIAG INJ SC/IM: CPT

## 2024-01-09 RX ORDER — SODIUM CHLORIDE 0.9 % (FLUSH) 0.9 %
5-40 SYRINGE (ML) INJECTION PRN
OUTPATIENT
Start: 2024-01-09

## 2024-01-09 RX ORDER — SODIUM CHLORIDE 9 MG/ML
INJECTION, SOLUTION INTRAVENOUS PRN
OUTPATIENT
Start: 2024-01-09

## 2024-01-09 RX ORDER — SODIUM CHLORIDE 0.9 % (FLUSH) 0.9 %
5-40 SYRINGE (ML) INJECTION EVERY 12 HOURS SCHEDULED
OUTPATIENT
Start: 2024-01-09

## 2024-01-09 RX ORDER — SODIUM CHLORIDE 9 MG/ML
INJECTION, SOLUTION INTRAVENOUS CONTINUOUS
OUTPATIENT
Start: 2024-01-10

## 2024-01-09 RX ADMIN — LISINOPRIL 10 MG: 10 TABLET ORAL at 08:25

## 2024-01-09 RX ADMIN — CYCLOBENZAPRINE 10 MG: 10 TABLET, FILM COATED ORAL at 13:44

## 2024-01-09 RX ADMIN — MELOXICAM 15 MG: 7.5 TABLET ORAL at 08:25

## 2024-01-09 RX ADMIN — CLOPIDOGREL BISULFATE 75 MG: 75 TABLET ORAL at 08:25

## 2024-01-09 RX ADMIN — AMLODIPINE BESYLATE 10 MG: 10 TABLET ORAL at 08:25

## 2024-01-09 RX ADMIN — PREGABALIN 50 MG: 50 CAPSULE ORAL at 20:11

## 2024-01-09 RX ADMIN — PREGABALIN 50 MG: 50 CAPSULE ORAL at 08:25

## 2024-01-09 RX ADMIN — CYCLOBENZAPRINE 10 MG: 10 TABLET, FILM COATED ORAL at 05:31

## 2024-01-09 RX ADMIN — ACETAMINOPHEN 650 MG: 325 TABLET ORAL at 05:31

## 2024-01-09 RX ADMIN — ACETAMINOPHEN 650 MG: 325 TABLET ORAL at 00:02

## 2024-01-09 RX ADMIN — ACETAMINOPHEN 650 MG: 325 TABLET ORAL at 12:50

## 2024-01-09 RX ADMIN — ATORVASTATIN CALCIUM 40 MG: 40 TABLET, FILM COATED ORAL at 08:25

## 2024-01-09 RX ADMIN — PANTOPRAZOLE SODIUM 40 MG: 40 TABLET, DELAYED RELEASE ORAL at 05:28

## 2024-01-09 RX ADMIN — SODIUM CHLORIDE, PRESERVATIVE FREE 10 ML: 5 INJECTION INTRAVENOUS at 20:14

## 2024-01-09 RX ADMIN — HYDROCODONE BITARTRATE AND ACETAMINOPHEN 1 TABLET: 7.5; 325 TABLET ORAL at 20:11

## 2024-01-09 RX ADMIN — ASPIRIN 81 MG: 81 TABLET, COATED ORAL at 08:25

## 2024-01-09 RX ADMIN — ENOXAPARIN SODIUM 40 MG: 100 INJECTION SUBCUTANEOUS at 20:11

## 2024-01-09 RX ADMIN — LISINOPRIL 10 MG: 10 TABLET ORAL at 20:11

## 2024-01-09 RX ADMIN — HYDROCODONE BITARTRATE AND ACETAMINOPHEN 1 TABLET: 7.5; 325 TABLET ORAL at 08:26

## 2024-01-09 RX ADMIN — ACETAMINOPHEN 650 MG: 325 TABLET ORAL at 18:56

## 2024-01-09 RX ADMIN — PREGABALIN 50 MG: 50 CAPSULE ORAL at 13:45

## 2024-01-09 ASSESSMENT — PAIN - FUNCTIONAL ASSESSMENT
PAIN_FUNCTIONAL_ASSESSMENT: ACTIVITIES ARE NOT PREVENTED

## 2024-01-09 ASSESSMENT — PAIN DESCRIPTION - ORIENTATION
ORIENTATION: MID
ORIENTATION: RIGHT;LEFT
ORIENTATION: RIGHT;MID
ORIENTATION: MID
ORIENTATION: RIGHT;MID

## 2024-01-09 ASSESSMENT — PAIN SCALES - GENERAL
PAINLEVEL_OUTOF10: 4
PAINLEVEL_OUTOF10: 0
PAINLEVEL_OUTOF10: 3
PAINLEVEL_OUTOF10: 0
PAINLEVEL_OUTOF10: 7
PAINLEVEL_OUTOF10: 3
PAINLEVEL_OUTOF10: 0

## 2024-01-09 ASSESSMENT — PAIN DESCRIPTION - LOCATION
LOCATION: NECK
LOCATION: BACK;NECK;SHOULDER
LOCATION: SHOULDER;NECK
LOCATION: NECK
LOCATION: HEAD

## 2024-01-09 ASSESSMENT — PAIN SCALES - WONG BAKER

## 2024-01-09 ASSESSMENT — PAIN DESCRIPTION - DESCRIPTORS
DESCRIPTORS: ACHING;DISCOMFORT
DESCRIPTORS: SPASM
DESCRIPTORS: ACHING
DESCRIPTORS: ACHING;DISCOMFORT

## 2024-01-09 ASSESSMENT — PAIN DESCRIPTION - FREQUENCY: FREQUENCY: CONTINUOUS

## 2024-01-09 ASSESSMENT — PAIN DESCRIPTION - PAIN TYPE: TYPE: CHRONIC PAIN

## 2024-01-09 NOTE — CARE COORDINATION
Case Management Assessment  Initial Evaluation    Date/Time of Evaluation: 1/9/2024 8:11 AM  Assessment Completed by: Horace Arredondo RN    If patient is discharged prior to next notation, then this note serves as note for discharge by case management.    Patient Name: Jacki Moura                   YOB: 1965  Diagnosis: Palpitations [R00.2]  Nausea [R11.0]  Tachycardia [R00.0]  Atypical chest pain [R07.89]  General weakness [R53.1]  Left upper arm pain [M79.622]                   Date / Time: 1/8/2024  9:14 AM  Location: [address] [place] [address]     Patient Admission Status: Observation   Readmission Risk Low 0-14, Mod 15-19), High > 20: No data recorded  Current PCP: Kyle Smith APRN - CNP  PCP verified by CM? Yes    Chart Reviewed: Yes      History Provided by: Patient  Patient Orientation: Alert and Oriented, Person, Place, Situation, Self    Patient Cognition: Alert    Hospitalization in the last 30 days (Readmission):  No    If yes, Readmission Assessment in CM Navigator will be completed.    Advance Directives:      Code Status: Full Code   Patient's Primary Decision Maker is: Legal Next of Kin      Discharge Planning:    Patient lives with: Spouse/Significant Other Type of Home: House  Primary Care Giver: Self  Patient Support Systems include: Spouse/Significant Other, Children, Family Members   Current Financial resources: commercial  Current community resources: None  Current services prior to admission: None            Current DME:              Type of Home Care services:  None    ADLS  Prior functional level: Independent in ADLs/IADLs, Bathing, Dressing, Toileting, Feeding, Cooking, Housework, Shopping, Mobility  Current functional level: Independent in ADLs/IADLs, Bathing, Dressing, Toileting, Feeding, Cooking, Housework, Shopping, Mobility    Family can provide assistance at DC: Yes  Would you like Case Management to discuss the discharge plan with any other family members/significant

## 2024-01-09 NOTE — PLAN OF CARE
Problem: Discharge Planning  Goal: Discharge to home or other facility with appropriate resources  Outcome: Progressing  Flowsheets (Taken 1/8/2024 2355)  Discharge to home or other facility with appropriate resources: Arrange for needed discharge resources and transportation as appropriate     Problem: Pain  Goal: Verbalizes/displays adequate comfort level or baseline comfort level  Outcome: Progressing  Flowsheets (Taken 1/8/2024 2355)  Verbalizes/displays adequate comfort level or baseline comfort level: Assess pain using appropriate pain scale

## 2024-01-10 ENCOUNTER — APPOINTMENT (OUTPATIENT)
Age: 59
End: 2024-01-10
Payer: COMMERCIAL

## 2024-01-10 ENCOUNTER — CLINICAL DOCUMENTATION (OUTPATIENT)
Age: 59
End: 2024-01-10

## 2024-01-10 VITALS
WEIGHT: 138 LBS | RESPIRATION RATE: 18 BRPM | TEMPERATURE: 97.7 F | BODY MASS INDEX: 27.09 KG/M2 | DIASTOLIC BLOOD PRESSURE: 64 MMHG | OXYGEN SATURATION: 98 % | HEART RATE: 98 BPM | HEIGHT: 60 IN | SYSTOLIC BLOOD PRESSURE: 127 MMHG

## 2024-01-10 LAB — ECHO BSA: 1.63 M2

## 2024-01-10 PROCEDURE — G0378 HOSPITAL OBSERVATION PER HR: HCPCS

## 2024-01-10 PROCEDURE — 6370000000 HC RX 637 (ALT 250 FOR IP): Performed by: REGISTERED NURSE

## 2024-01-10 PROCEDURE — 6370000000 HC RX 637 (ALT 250 FOR IP)

## 2024-01-10 PROCEDURE — 99232 SBSQ HOSP IP/OBS MODERATE 35: CPT | Performed by: REGISTERED NURSE

## 2024-01-10 PROCEDURE — 93312 ECHO TRANSESOPHAGEAL: CPT

## 2024-01-10 PROCEDURE — 7100000010 HC PHASE II RECOVERY - FIRST 15 MIN: Performed by: INTERNAL MEDICINE

## 2024-01-10 PROCEDURE — 6360000002 HC RX W HCPCS: Performed by: INTERNAL MEDICINE

## 2024-01-10 PROCEDURE — 2580000003 HC RX 258

## 2024-01-10 PROCEDURE — 93312 ECHO TRANSESOPHAGEAL: CPT | Performed by: INTERNAL MEDICINE

## 2024-01-10 PROCEDURE — 99152 MOD SED SAME PHYS/QHP 5/>YRS: CPT | Performed by: INTERNAL MEDICINE

## 2024-01-10 PROCEDURE — 99239 HOSP IP/OBS DSCHRG MGMT >30: CPT | Performed by: PHYSICIAN ASSISTANT

## 2024-01-10 PROCEDURE — 7100000011 HC PHASE II RECOVERY - ADDTL 15 MIN: Performed by: INTERNAL MEDICINE

## 2024-01-10 PROCEDURE — 2709999900 HC NON-CHARGEABLE SUPPLY: Performed by: INTERNAL MEDICINE

## 2024-01-10 RX ORDER — FENTANYL CITRATE 50 UG/ML
INJECTION, SOLUTION INTRAMUSCULAR; INTRAVENOUS PRN
Status: DISCONTINUED | OUTPATIENT
Start: 2024-01-10 | End: 2024-01-10 | Stop reason: ALTCHOICE

## 2024-01-10 RX ORDER — LISINOPRIL 20 MG/1
20 TABLET ORAL 2 TIMES DAILY
Status: DISCONTINUED | OUTPATIENT
Start: 2024-01-10 | End: 2024-01-10 | Stop reason: HOSPADM

## 2024-01-10 RX ORDER — LISINOPRIL 20 MG/1
20 TABLET ORAL 2 TIMES DAILY
Qty: 60 TABLET | Refills: 1 | Status: SHIPPED | OUTPATIENT
Start: 2024-01-10

## 2024-01-10 RX ORDER — MIDAZOLAM HYDROCHLORIDE 1 MG/ML
INJECTION INTRAMUSCULAR; INTRAVENOUS PRN
Status: DISCONTINUED | OUTPATIENT
Start: 2024-01-10 | End: 2024-01-10 | Stop reason: ALTCHOICE

## 2024-01-10 RX ORDER — HYDRALAZINE HYDROCHLORIDE 20 MG/ML
10 INJECTION INTRAMUSCULAR; INTRAVENOUS ONCE
Status: COMPLETED | OUTPATIENT
Start: 2024-01-10 | End: 2024-01-10

## 2024-01-10 RX ORDER — ALPRAZOLAM 0.25 MG/1
0.25 TABLET ORAL ONCE
Status: COMPLETED | OUTPATIENT
Start: 2024-01-10 | End: 2024-01-10

## 2024-01-10 RX ADMIN — AMLODIPINE BESYLATE 10 MG: 10 TABLET ORAL at 08:09

## 2024-01-10 RX ADMIN — ASPIRIN 81 MG: 81 TABLET, COATED ORAL at 08:09

## 2024-01-10 RX ADMIN — MELOXICAM 15 MG: 7.5 TABLET ORAL at 08:09

## 2024-01-10 RX ADMIN — PANTOPRAZOLE SODIUM 40 MG: 40 TABLET, DELAYED RELEASE ORAL at 06:42

## 2024-01-10 RX ADMIN — ALPRAZOLAM 0.25 MG: 0.25 TABLET ORAL at 14:49

## 2024-01-10 RX ADMIN — CYCLOBENZAPRINE 10 MG: 10 TABLET, FILM COATED ORAL at 06:42

## 2024-01-10 RX ADMIN — PREGABALIN 50 MG: 50 CAPSULE ORAL at 14:49

## 2024-01-10 RX ADMIN — SODIUM CHLORIDE, PRESERVATIVE FREE 10 ML: 5 INJECTION INTRAVENOUS at 08:10

## 2024-01-10 RX ADMIN — ACETAMINOPHEN 650 MG: 325 TABLET ORAL at 01:36

## 2024-01-10 RX ADMIN — LISINOPRIL 10 MG: 10 TABLET ORAL at 08:09

## 2024-01-10 RX ADMIN — CLOPIDOGREL BISULFATE 75 MG: 75 TABLET ORAL at 08:09

## 2024-01-10 RX ADMIN — HYDROCODONE BITARTRATE AND ACETAMINOPHEN 1 TABLET: 7.5; 325 TABLET ORAL at 08:09

## 2024-01-10 RX ADMIN — ATORVASTATIN CALCIUM 40 MG: 40 TABLET, FILM COATED ORAL at 08:09

## 2024-01-10 RX ADMIN — CYCLOBENZAPRINE 10 MG: 10 TABLET, FILM COATED ORAL at 14:49

## 2024-01-10 RX ADMIN — PREGABALIN 50 MG: 50 CAPSULE ORAL at 08:10

## 2024-01-10 ASSESSMENT — PAIN SCALES - GENERAL: PAINLEVEL_OUTOF10: 5

## 2024-01-10 ASSESSMENT — PAIN DESCRIPTION - PAIN TYPE: TYPE: CHRONIC PAIN

## 2024-01-10 ASSESSMENT — PAIN DESCRIPTION - DESCRIPTORS
DESCRIPTORS: ACHING
DESCRIPTORS: ACHING

## 2024-01-10 ASSESSMENT — PAIN - FUNCTIONAL ASSESSMENT
PAIN_FUNCTIONAL_ASSESSMENT: NONE - DENIES PAIN
PAIN_FUNCTIONAL_ASSESSMENT: 0-10
PAIN_FUNCTIONAL_ASSESSMENT: NONE - DENIES PAIN
PAIN_FUNCTIONAL_ASSESSMENT: PREVENTS OR INTERFERES SOME ACTIVE ACTIVITIES AND ADLS

## 2024-01-10 ASSESSMENT — PAIN DESCRIPTION - LOCATION: LOCATION: NECK

## 2024-01-10 ASSESSMENT — PAIN DESCRIPTION - ORIENTATION: ORIENTATION: MID

## 2024-01-10 NOTE — PROGRESS NOTES
Hospitalist Progress Note    Patient:  Jacki Moura      Unit/Bed:6K-12/012-A    YOB: 1965    MRN: [id number]       Acct: [id number]     PCP: Kyle Smith APRN - CNP    Date of Admission: 1/8/2024    Assessment/Plan:    Acute tachycardia:   Cardiology consulted, recommend endocrinology consultation  Dr. Noble with endocrinology consulted for adrenal tumor possibly contributing to tachycardia  Continue telemetry  Cardiology planning ESTUARDO tomorrow     Acute leukocytosis: CXR without acute findings. UA negative. COVID/Influenza negative. Afebrile.  Trend CBC as needed     Essential HTN:   Continue amlodipine, lisinopril with holding parameters.      PAD: S/p angiogram.   Continue ASA, atorvastatin, clopidogrel.      Adrenal tumor, left (POA): Follows with Dr. Noble as OP.      Chronic pain:   Continue Norco, Flexeril, Mobic, Lyrica.       Tobacco abuse: 20 pack year history.  Nicotine patch as requested.      ETOH abuse: PAWSS 1.   Monitor for withdrawal symptoms.      Cannabis abuse: Admits to smoking frequently for years. UDS pending.        Chief Complaint: tachycardia, left arm pain x2 weeks       Subjective: 58 y.o. female admitted to the hospitalist service for acute tachycardia.  Cardiology consulted.  Cardiology recommending endocrinology consultation.  This was placed, Dr. Hernandez to see.kosta patient denies chest pain.  She denies nausea or vomiting.  Cardiology planning ESTUARDO tomorrow.    Medications:    Infusion Medications    sodium chloride       Scheduled Medications    sodium chloride flush  5-40 mL IntraVENous 2 times per day    enoxaparin  40 mg SubCUTAneous Daily    amLODIPine  10 mg Oral Daily    aspirin  81 mg Oral Daily    atorvastatin  40 mg Oral Daily    clopidogrel  75 mg Oral Daily    HYDROcodone-acetaminophen  1 tablet Oral BID    lisinopril  10 mg Oral BID    meloxicam  15 mg Oral Daily    pantoprazole  40 mg Oral QAM AC    pregabalin  50 mg Oral TID    nicotine  1 patch 
Advance Care Planning     Advance Care Planning Inpatient Note  [facility] Department    Today's Date: 1/8/2024  Unit: [facility] RENAL TELEMETRY 6K    Received request from admission screening.  Upon review of chart and communication with care team, patient's decision making abilities are not in question.. Patient was/were present in the room during visit.    Goals of ACP Conversation:  Discuss advance care planning documents  Facilitate a discussion related to patient's goals of care as they align with the patient's values and beliefs.    Health Care Decision Makers:      Summary:  No Decision Maker named by patient at this time    Advance Care Planning Documents (Patient Wishes):  None     Assessment:  This is a spiritual health encounter in response to ACP conversation with patient. Patient, Jacki, was sitting in bed at the time of this visit. Patient declined filling out documents at this time, but received education on the HCPOA and living will documents by this . Patient given blank copies and is aware of how to contact  services for further assistance or spiritual/emotional support.     Interventions:  Provided education on documents for clarity and greater understanding  Encouraged ongoing ACP conversation with future decision makers and loved ones      Outcomes/Plan:  ACP Discussion: Postponed    Electronically signed by Chaplain Manish on 1/8/2024 at 5:18 PM            
Cardiology Progress Note      Patient:  Jacki Moura  YOB: 1965  MRN: [id number]   Acct: [id number]  Admit Date:  1/8/2024  Primary Cardiologist: Teja Hayward MD  Seen by  Dr. Hayward    Per prior cardiology consult note-  Consulted for: Atypical chest pain, tachycardia        HPI: This is a pleasant 58 y.o. female presenting with general malaise.  Patient reports development of left arm pain last Tuesday.  When she arose, felt sudden sweat.  Has been feeling unwell since, somewhat nonspecific.  Patient Dors is constipation then diarrhea.  Endorses sweats then chills Thursday, associated with nausea and then vomiting.  Patient is denying chest pain.  She feels like her \"heart beating out of my chest\".  Patient endorses anxiety, and reports diaphoresis, chills, nausea, loss of appetite. She complains of worsening palpitations past week.  Occurs intermittently, exacerbated by exertion.  Nausea, vomiting, diarrhea 5 days ago.  Endorsing left arm pain, diaphoresis, shakiness.  Patient \"does not feel right\". Smoker quarter pack per day for 25 years.  UDS positive for oxycodone, THC.  Patient chronic pain.  Patient with recent discovery of 1.4 x 1 cm left adrenal gland nodule, suspected adrenal adenoma.  Patient had office visit with Dr. Noble, endocrinologist 12/2023.  At that visit, orders placed included VMA, renal activity and aldosterone, metanephrines, cortisol, catecholamines, ACTH.     Subjective (Events in last 24 hours):     Underwent ESTUARDO today; feeling well post-procedure  Does report she is always feeling anxious anymore. Continues with tachycardia. Has notable tremor.     ESTUARDO reviewed with Dr. Hayward- no indication for SAVR at this time. D/w with patient/ plan- will repeat imaging in 6months. Educated on s/s of HF that would prompt earlier evaluation and they verbalize understanding.       Objective:   /64   Pulse 98   Temp 97.7 °F (36.5 °C) (Oral)   Resp 18   Ht 1.524 m 
Comprehensive Nutrition Assessment    Type and Reason for Visit:  Initial, Positive Nutrition Screen (weight loss, poor intake)    Nutrition Recommendations/Plan:   Consider MVI  Discussed appropriate home use of oral nutrition supplements when not eating well      Malnutrition Assessment:  Malnutrition Status:  At risk for malnutrition (Comment) (01/10/24 2205)    Context:  Acute Illness     Findings of the 6 clinical characteristics of malnutrition:  Energy Intake:  75% or less of estimated energy requirements for 7 or more days  Weight Loss:  No significant weight loss     Body Fat Loss:  No significant body fat loss     Muscle Mass Loss:  No significant muscle mass loss    Fluid Accumulation:  Unable to assess     Strength:  Not Performed    Nutrition Assessment:     Pt. nutritionally compromised AEB decreased oral intake for one week due to altered GI status, reports appetite improving as feeling better.  At risk for further nutrition compromise r/t admit with tachycardia and underlying medical condition (adrenal tumor, HTN, HLD).       Nutrition Related Findings:    Pt. Report/Treatments/Miscellaneous: Patient reports plan for discharge since had ESTUARDO completed today, reports had been feeling sick with diarrhea, nausea, vomiting for the past week, reports those symptoms have resolved and appetite is improving, good acceptance of Ensure Enlive   GI Status: BM 1/9  Pertinent Labs: glucose 191  Pertinent Meds: mobic, protonix     Wound Type: None       Current Nutrition Intake & Therapies:    Average Meal Intake: 51-75%  Average Supplements Intake: %  ADULT DIET; Regular; No Caffeine  ADULT ORAL NUTRITION SUPPLEMENT; Breakfast, Lunch, Dinner; Standard High Calorie/High Protein Oral Supplement    Anthropometric Measures:  Height: 152.4 cm (5')  Ideal Body Weight (IBW): 100 lbs (45 kg)    Admission Body Weight: 62.6 kg (138 lb) (1/8; no edema)  Current Body Weight: 62.6 kg (138 lb),   IBW.    Current BMI 
Discharge teaching and instructions for diagnosis/procedure of tachycardia completed with patient using teachback method. AVS reviewed. Printed prescriptions given to patient. Patient voiced understanding regarding prescriptions, follow up appointments, and care of self at home. Discharged in a wheelchair to  home with support per family.    
ESTUARDO completed. Patient tolerated well.  
Pt admitted to  6K12 ambulatory.   Complaints: Chest pain / discomfort  Diaphoretic  palpitations.    IV site free of s/s of infection or infiltration. Vital signs obtained. Assessment and data collection initiated. Two nurse skin assessment performed by Willie ENAMORADO and Kandice ENAMORADO. Oriented to room. Policies and procedures for 6 explained. Willie ENAMORADO discussed hourly rounding with patient addressing 5 P's. Fall prevention and safety brochure discussed with patient.  Bed alarm on. Call light in reach.  The best day to schedule a follow up Dr appointment is:  Wednesday p.m.  Explained patients right to have family, representative or physician notified of their admission.  Patient has Declined for physician to be notified.  Patient has Declined for family/representative to be notified. All questions answered with no further questions at this time.       Which family member is the designated  Sergei number [phone]  Do you want them contacted on admission and updated every shift no        
Recovery mode. Patient denies discomfort. Passing gas, taking fluids. Dr. Hayward discussed findings with patient. Report called to Barbi ENAMORADO on 6K. Hospital transport staff escorted patient back to room.  Discharge instructions provided and understanding verbalized.  
Trileaflet valve. Appears to have severe aortic regurgitation.  Consider ESTUARDO for further evaluation. No regurgitation. AV PHT is 280.0 ms. No stenosis.   Mitral Valve Valve structure is normal. No regurgitation. No stenosis noted.   Tricuspid Valve Valve structure is normal. No regurgitation. No stenosis noted.   Pulmonic Valve Valve structure is normal. No regurgitation. No stenosis noted.   Aorta Normal sized aortic root and ascending aorta.   IVC/Hepatic Veins IVC diameter is less than or equal to 21 mm and decreases greater than 50% during inspiration; therefore the estimated right atrial pressure is normal (~3 mmHg). IVC size is normal.   Pericardium No pericardial effusion.       Stress:   Summary   Lexiscan EKG stress test is not suggestive for ischemia.   Calculated gated LVEF 61 %.   The T.I.D. ratio was 1.22 .   There was a small sized, mild in intensity, fixed myocardial perfusion   defect of the apical wall.   This study was negative for ischemia.      Recommendation   Clinical correlation is recommended.   Medical management.   Aggressive risk factor management.      Signatures      ----------------------------------------------------------------   Electronically signed by Teja Hayward MD (Interpreting   Cardiologist) on 03/18/2019    Left Heart Cath:     Lab Data:    Cardiac Enzymes:  No results for input(s): \"CKTOTAL\", \"CKMB\", \"CKMBINDEX\", \"TROPONINI\" in the last 72 hours.    CBC:   Lab Results   Component Value Date/Time    WBC 8.3 01/09/2024 06:46 AM    RBC 3.52 01/09/2024 06:46 AM    HGB 10.8 01/09/2024 06:46 AM    HCT 34.2 01/09/2024 06:46 AM     01/09/2024 06:46 AM       CMP:    Lab Results   Component Value Date/Time     01/09/2024 08:21 AM    K 4.6 01/09/2024 08:21 AM     01/09/2024 08:21 AM    CO2 23 01/09/2024 08:21 AM    BUN 8 01/09/2024 08:21 AM    CREATININE 0.7 01/09/2024 08:21 AM    LABGLOM >60 01/09/2024 08:21 AM    GLUCOSE 191 01/09/2024 08:21 AM    CALCIUM 8.8

## 2024-01-10 NOTE — PROGRESS NOTES
Patient discharged before I rounded today.  I notified her to complete the labs that were ordered in clinic so we can determine need for additional testing.  Keep existing appointment.

## 2024-01-10 NOTE — CARE COORDINATION
1/10/24, 1:51 PM EST    DISCHARGE ON GOING EVALUATION    Jacki [facility] day: 0  Location: 6K-12/012-A Reason for admit: Palpitations [R00.2]  Nausea [R11.0]  Tachycardia [R00.0]  Atypical chest pain [R07.89]  General weakness [R53.1]  Left upper arm pain [M79.622]   Procedure:   1/8 CXR: Normal chest. No acute findings.   1/8 Echo: EF of 60 - 65% Appears to have severe aortic regurgitation.  Consider ESTUARDO for further evaluation.   1/10 ESTUARDO with Dr. Hayward     Barriers to Discharge: Hospitalist and Cardiology following. Endocrinology to see. Orthostatics (-). ESTUARDO this AM-plan for discharge after ESTUARDO recovery. HR 90s.     PCP: Kyle Smith APRN - CNP   %  Patient Goals/Plan/Treatment Preferences: Jacki plans to return home w/ .     1/10/24, 1:51 PM EST  Plan for evening discharge after ESTUARDO recovery  Patient goals/plan/ treatment preferences discussed by  and .  Patient goals/plan/ treatment preferences reviewed with patient/ family.  Patient/ family verbalize understanding of discharge plan and are in agreement with goal/plan/treatment preferences.  Understanding was demonstrated using the teach back method.  AVS provided by RN at time of discharge, which includes all necessary medical information pertaining to the patients current course of illness, treatment, post-discharge goals of care, and treatment preferences.     Services At/After Discharge: None

## 2024-01-10 NOTE — H&P
[]4          MEDICAL HISTORY   has a past medical history of Arthritis, Cushing's disease (HCC), Hyperlipidemia, and Hypertension.  []Additional information:          SURGICAL HISTORY   has a past surgical history that includes  section; Foot surgery; Dilation and curettage of uterus (); knee surgery (Left); lumbar discectomy (2020); back surgery; skin biopsy; Colonoscopy; Endoscopy, colon, diagnostic; and Femoral Endarterectomy (Left, 2021).  Additional information:       ALLERGIES   Allergies as of 2024 - Fully Reviewed 2024   Allergen Reaction Noted    Pletal [cilostazol] Other (See Comments) 2021    Bactrim [sulfamethoxazole-trimethoprim] Rash 2019    Codeine Nausea And Vomiting 2019    Medrol [methylprednisolone] Palpitations 10/05/2020    Sulfa antibiotics Rash 2023    Tramadol  2023    Ultram [tramadol hcl] Other (See Comments) 2019     Additional information:       MEDICATIONS     Current Facility-Administered Medications:     fentaNYL (SUBLIMAZE) injection, , , PRN, Teja Hayward MD, 50 mcg at 01/10/24 1151    midazolam (VERSED) injection, , , PRN, Teja Hayward MD, 2 mg at 01/10/24 1152    sodium chloride flush 0.9 % injection 5-40 mL, 5-40 mL, IntraVENous, 2 times per day, Mirna Yan APRN - CNP, 10 mL at 01/10/24 0810    sodium chloride flush 0.9 % injection 5-40 mL, 5-40 mL, IntraVENous, PRN, Mirna Yan APRN - CNP    0.9 % sodium chloride infusion, , IntraVENous, PRN, Mirna Yan, APRN - CNP    potassium chloride (KLOR-CON M) extended release tablet 40 mEq, 40 mEq, Oral, PRN **OR** potassium bicarb-citric acid (EFFER-K) effervescent tablet 40 mEq, 40 mEq, Oral, PRN **OR** potassium chloride 10 mEq/100 mL IVPB (Peripheral Line), 10 mEq, IntraVENous, PRN, Mirna Yan APRN - CNP    magnesium sulfate 2000 mg in 50 mL IVPB premix, 2,000 mg, IntraVENous, PRN, Mirna Yan, APRN - CNP

## 2024-01-10 NOTE — DISCHARGE INSTRUCTIONS
Check your pulse at home twice daily. If over 120, please call the cardiology office for further instructions.     Please complete the labs ordered by Dr Real HUNTER and follow up with his office.     Please discuss your results of your SVITLANA-7 being 20-  You have been started on zoloft

## 2024-01-10 NOTE — DISCHARGE SUMMARY
Hospital Medicine Discharge Summary      Patient Identification:   Jacki Moura   : 1965  MRN: [id number]   Account: [id number]      Patient's PCP: Kyle Smith APRN - CNP    Admit Date: 2024     Discharge Date:   1/10/2024    Admitting Physician: No admitting provider for patient encounter.     Discharging Physician Assistant: Morena Lyles PA-C     Discharge Diagnoses with Assessment/Plan:    Acute sinus tachycardia, fluctuating with Aortic Insufficiency  Cardiology consulted and signed off   ESTUARDO this AM with Severe AI   Medical management at this time- aggressive management of blood pressure. Lisinopril dose increased.  Avoid bradycardia with severe AI  Pulse checks at home, if sustainted > 120, contact cardiology office  Follow up outpatient with cardiology in 2 weeks   Pursue endocrinology workup outpatient     Adrenal tumor, left, POA  Possible contributor to #1  Previously seen Dr Noble and was ordered extensive lab workup which she has not completed.   Encouraged patient to complete this week and schedule follow up with Dr Noble.    Anxiety  Reports long standing history of being \"anxious and hyperactive\"  Never on meds in the past.    SVITLANA 7 with score of 20  Discussed medication management and biofeedback techniques.  She is agreeable to start zoloft.  RBA discussed in detail. Follow up with PCP  Avoid marijuana as this can exacerbate anxiety        Acute leukocytosis, resolved: CXR without acute findings. UA negative. COVID/Influenza negative. Afebrile.    HTN:   Continue amlodipine, lisinopril with holding parameters. Lisinopril increased on DC      PAD: S/p angiogram.   Continue ASA, atorvastatin, clopidogrel.     Chronic pain:   Continue Norco, Flexeril, Mobic, Lyrica.       Tobacco abuse: 20 pack year history.  Nicotine patch as requested.     ETOH abuse: PAWSS 1.   Monitor for withdrawal symptoms.   7-10 drinks per week. Encouraged cessation      Cannabis abuse: Admits to

## 2024-01-11 ENCOUNTER — CARE COORDINATION (OUTPATIENT)
Dept: CASE MANAGEMENT | Age: 59
End: 2024-01-11

## 2024-01-11 ENCOUNTER — HOSPITAL ENCOUNTER (OUTPATIENT)
Age: 59
Discharge: HOME | End: 2024-01-11
Payer: COMMERCIAL

## 2024-01-11 DIAGNOSIS — D49.7 ADRENAL TUMOR: ICD-10-CM

## 2024-01-11 DIAGNOSIS — G89.29 CHRONIC MYOFASCIAL PAIN: Primary | ICD-10-CM

## 2024-01-11 DIAGNOSIS — R00.2 PALPITATIONS: Primary | ICD-10-CM

## 2024-01-11 DIAGNOSIS — R00.0 TACHYCARDIA: ICD-10-CM

## 2024-01-11 DIAGNOSIS — M79.18 CHRONIC MYOFASCIAL PAIN: Primary | ICD-10-CM

## 2024-01-11 LAB
ANION GAP SERPL CALC-SCNC: 10 MEQ/L (ref 8–16)
BUN SERPL-MCNC: 20 MG/DL (ref 7–22)
CALCIUM SERPL-MCNC: 10 MG/DL (ref 8.5–10.5)
CHLORIDE SERPL-SCNC: 100 MEQ/L (ref 98–111)
CO2 SERPL-SCNC: 26 MEQ/L (ref 23–33)
CREAT SERPL-MCNC: 0.9 MG/DL (ref 0.4–1.2)
CREAT UR-MCNC: 122.9 MG/DL
GFR SERPL CREATININE-BSD FRML MDRD: > 60 ML/MIN/1.73M2
GLUCOSE SERPL-MCNC: 115 MG/DL (ref 70–108)
POTASSIUM SERPL-SCNC: 5 MEQ/L (ref 3.5–5.2)
SODIUM SERPL-SCNC: 136 MEQ/L (ref 135–145)

## 2024-01-11 PROCEDURE — 83835 ASSAY OF METANEPHRINES: CPT

## 2024-01-11 PROCEDURE — 84244 ASSAY OF RENIN: CPT

## 2024-01-11 PROCEDURE — 82088 ASSAY OF ALDOSTERONE: CPT

## 2024-01-11 PROCEDURE — 82024 ASSAY OF ACTH: CPT

## 2024-01-11 PROCEDURE — 82570 ASSAY OF URINE CREATININE: CPT

## 2024-01-11 PROCEDURE — 36415 COLL VENOUS BLD VENIPUNCTURE: CPT

## 2024-01-11 PROCEDURE — 80048 BASIC METABOLIC PNL TOTAL CA: CPT

## 2024-01-11 RX ORDER — HYDROCODONE BITARTRATE AND ACETAMINOPHEN 5; 325 MG/1; MG/1
1 TABLET ORAL EVERY 12 HOURS PRN
Qty: 28 TABLET | Refills: 0 | Status: SHIPPED | OUTPATIENT
Start: 2024-01-11 | End: 2024-01-25

## 2024-01-11 NOTE — CARE COORDINATION
Care Transitions Initial Follow Up Call    Call within 2 business days of discharge: Yes    Patient Current Location:  Home: [facility] [place]  [place] [facility] [address]    Care Transition Nurse contacted the patient by telephone to perform post hospital discharge assessment. Verified name and  with patient as identifiers. Provided introduction to self, and explanation of the Care Transition Nurse role.     Patient: Jacki Moura Patient : 1965   MRN: [id number]  Reason for Admission: palpitations, tachycardia   Discharge Date: 1/10/24 RARS: No data recorded    Last Discharge Facility       Date Complaint Diagnosis Description Type Department Provider    24 Tachycardia Palpitations ... ED to Hosp-Admission (Discharged) (ADMITTED) [facility] Morena Lyles PA-C; Yuriy, ...            Challenges to be reviewed by the provider   Additional needs identified to be addressed with provider: No  none               Method of communication with provider: none.    Spoke with Jacki, said she is doing ok, getting ready to get lab work done.  She has a fit bit and average HR 87.  Goes up when she talks or does any activity, is at 105 while talking, 112 when getting ready.  Is to call cardio if above 120.  Denies L arm pain, chest pain, dyspnea.  Reviewed medications/changes, lisinopril increased and started Zoloft.  Said she is a high strung person normally.  Doesn't drink any caffeine, drinks mostly water.  Is going to stop eating chocolate.  Appetite and fluid intake is good.  B&B normal, BM this morning.  Will see PCP next week.  Will see cardio next month.  No other issues to report.  Denies any other needs.  No other questions or concerns at this time.  Will continue to follow.    Care Transition Nurse reviewed discharge instructions, medical action plan, and red flags with patient who verbalized understanding. The patient was given an opportunity to ask questions and does not have any further questions or concerns at

## 2024-01-13 ENCOUNTER — HOSPITAL ENCOUNTER (OUTPATIENT)
Age: 59
Setting detail: SPECIMEN
Discharge: HOME | End: 2024-01-13
Payer: COMMERCIAL

## 2024-01-13 LAB
ACTH PLAS-MCNC: 5.6 PG/ML (ref 7.2–63.3)
ALDOST SERPL-MCNC: 8.1 NG/DL
CREAT 24H UR-MRATE: 0.8 GM/24HR (ref 0.7–1.6)
CREAT UR-MCNC: 50.8 MG/DL
HOURS COLLECTED: 24 HRS
URINE VOLUME, 24 HOUR: 1610 ML

## 2024-01-13 PROCEDURE — 83835 ASSAY OF METANEPHRINES: CPT

## 2024-01-13 PROCEDURE — 84585 ASSAY OF URINE VMA: CPT

## 2024-01-13 PROCEDURE — 82384 ASSAY THREE CATECHOLAMINES: CPT

## 2024-01-13 PROCEDURE — 82530 CORTISOL FREE: CPT

## 2024-01-13 PROCEDURE — 82570 ASSAY OF URINE CREATININE: CPT

## 2024-01-16 ENCOUNTER — OFFICE VISIT (OUTPATIENT)
Dept: FAMILY MEDICINE CLINIC | Age: 59
End: 2024-01-16

## 2024-01-16 VITALS
SYSTOLIC BLOOD PRESSURE: 132 MMHG | HEART RATE: 89 BPM | OXYGEN SATURATION: 99 % | BODY MASS INDEX: 26.29 KG/M2 | DIASTOLIC BLOOD PRESSURE: 74 MMHG | WEIGHT: 134.6 LBS | TEMPERATURE: 97.4 F

## 2024-01-16 DIAGNOSIS — F41.9 ANXIETY: ICD-10-CM

## 2024-01-16 DIAGNOSIS — Z09 HOSPITAL DISCHARGE FOLLOW-UP: Primary | ICD-10-CM

## 2024-01-16 DIAGNOSIS — R00.0 TACHYCARDIA: ICD-10-CM

## 2024-01-16 DIAGNOSIS — E24.9 HYPERCORTISOLISM (HCC): ICD-10-CM

## 2024-01-16 LAB
METANEPHRINES TOTAL URINE: NORMAL
RENIN PLAS-CCNC: 71 NG/ML/HR

## 2024-01-16 RX ORDER — ALPRAZOLAM 0.5 MG/1
0.5 TABLET ORAL 3 TIMES DAILY PRN
Qty: 45 TABLET | Refills: 0 | Status: SHIPPED | OUTPATIENT
Start: 2024-01-16 | End: 2024-01-31

## 2024-01-16 ASSESSMENT — PATIENT HEALTH QUESTIONNAIRE - PHQ9
SUM OF ALL RESPONSES TO PHQ QUESTIONS 1-9: 0
1. LITTLE INTEREST OR PLEASURE IN DOING THINGS: 0
2. FEELING DOWN, DEPRESSED OR HOPELESS: 0
SUM OF ALL RESPONSES TO PHQ9 QUESTIONS 1 & 2: 0

## 2024-01-16 NOTE — PROGRESS NOTES
Post-Discharge Transitional Care  Follow Up      Jacki Moura   YOB: 1965    Date of Office Visit:  1/16/2024  Date of Hospital Admission: 1/8/24  Date of Hospital Discharge: 1/10/24  Risk of hospital readmission (high >=14%. Medium >=10%) :No data recorded    Care management risk score Rising risk (score 2-5) and Complex Care (Scores >=6): No Risk Score On File     Non face to face  following discharge, date last encounter closed (first attempt may have been earlier): 01/11/2024    Call initiated 2 business days of discharge: Yes    ASSESSMENT/PLAN:   Hospital discharge follow-up  -     [facility] DISCHARGE MEDS RECONCILED W/ CURRENT OUTPATIENT MED LIST  Hypercortisolism (HCC)  Assessment & Plan:   Uncontrolled, continue current medications  Anxiety  -     ALPRAZolam (XANAX) 0.5 MG tablet; Take 1 tablet by mouth 3 times daily as needed for Sleep or Anxiety for up to 15 days. Max Daily Amount: 1.5 mg, Disp-45 tablet, R-0Normal  Tachycardia    Patient is placed on Xanax today to help her be able to relax somewhat in the context of tachycardia.    Medical Decision Making: moderate complexity  Return if symptoms worsen or fail to improve.           Subjective:   HPI:  Follow up of Hospital problems/diagnosis(es): Tachycardia, hypercortisolism, anxiety    Inpatient course: Discharge summary reviewed- see chart.    Interval history/Current status: Patient is fairly stable, although she is still having tachycardia over 100 anytime she exerts herself to any degree.  Patient is quite anxious and has pressured speech and is frustrated at her slowly deteriorating condition.  Having trouble sleeping.  Is not able to work in needs short-term disability.    Patient Active Problem List   Diagnosis    Primary hypertension    Osteopenia    Carotid stenosis, non-symptomatic, bilateral    History of smoking 30 or more pack years    PAD (peripheral artery disease) ([facility])    Systolic murmur    Chronic neck pain

## 2024-01-18 ENCOUNTER — CARE COORDINATION (OUTPATIENT)
Dept: CASE MANAGEMENT | Age: 59
End: 2024-01-18

## 2024-01-18 NOTE — CARE COORDINATION
Care Transitions Follow Up Call    Patient Current Location:  Home: [place] [address]    Care Transition Nurse contacted the patient by telephone to follow up after admission on 24.  Verified name and  with patient as identifiers.    Patient: Jacki Moura  Patient : 1965   MRN: [id number]  Reason for Admission: palpitations, tachycardia    Discharge Date: 1/10/24 RARS: No data recorded    Needs to be reviewed by the provider   Additional needs identified to be addressed with provider: No  none             Method of communication with provider: none.    Spoke with Jacki, said she is doing ok.  HR still goes up with minimal activity.  When it gets to 110, will stop what she is doing, rest, do breathing exercises until goes down.  HR goes up when she is driving, will pull over for a few minutes.  Said it has gone above 120 when taking a shower, will just stop washing and let the water run over her.  She is supposed to call cardio when above 120 but as long as she can get it down, she hasn't.  Saw PCP couple days ago, prescribed Xanax prn.  He feels that her issues are d/t her adrenal glands that Dr Noble is addressing.  Had some lab work done last week for him, some results still pending.  PCP would like him to just remove them soon.  He has sent a letter to him and is sending another one.  Has a f/u but not until March.  She filled out short term disability forms with PCP since she can't work right now.  Denies any L arm pain, chest pain, dyspnea.  No other issues to report.  Denies any other needs.  No other questions or concerns at this time.  Will continue to follow.    Addressed changes since last contact:  medications-Xanax  Discussed follow-up appointments. If no appointment was previously scheduled, appointment scheduling offered: Yes.   Is follow up appointment scheduled within 7 days of discharge? Yes.    Follow Up  Future Appointments   Date Time Provider Department Center

## 2024-01-19 ENCOUNTER — TELEPHONE (OUTPATIENT)
Dept: FAMILY MEDICINE CLINIC | Age: 59
End: 2024-01-19

## 2024-01-19 LAB
CATECHOLAMINES TOTAL 24 HOUR URINE: NORMAL
CORTIS SAL-MCNC: 0.03 UG/DL
CORTISOL (UR), FREE: NORMAL
METANEPHRINES TOTAL URINE: NORMAL
VMA INTERP: NORMAL

## 2024-01-19 NOTE — TELEPHONE ENCOUNTER
I have received short-term disability form on patient to be off work starting 1/8/24. There's no letter or documentation regarding when she is returning to work. She has no f/u appt here. Please advise on return to work status.

## 2024-01-19 NOTE — TELEPHONE ENCOUNTER
Return to date would be February 5.  I usually go 1 month at a time with the patient returning prior to the return to work date for possible further certification.

## 2024-01-20 ENCOUNTER — CLINICAL DOCUMENTATION (OUTPATIENT)
Age: 59
End: 2024-01-20

## 2024-01-20 NOTE — PROGRESS NOTES
There is a slight elevation of urinary VMA.  Rest of the labs were normal.  Keep existing appointment.

## 2024-01-24 ENCOUNTER — PATIENT MESSAGE (OUTPATIENT)
Dept: FAMILY MEDICINE CLINIC | Age: 59
End: 2024-01-24

## 2024-01-24 DIAGNOSIS — G89.29 CHRONIC MYOFASCIAL PAIN: ICD-10-CM

## 2024-01-24 DIAGNOSIS — M79.18 CHRONIC MYOFASCIAL PAIN: ICD-10-CM

## 2024-01-24 RX ORDER — HYDROCODONE BITARTRATE AND ACETAMINOPHEN 5; 325 MG/1; MG/1
1 TABLET ORAL EVERY 12 HOURS PRN
Qty: 28 TABLET | Refills: 0 | Status: SHIPPED | OUTPATIENT
Start: 2024-01-24 | End: 2024-02-07

## 2024-01-24 NOTE — TELEPHONE ENCOUNTER
From: Jacki Moura  To: Kyle Smith  Sent: 1/24/2024 10:44 AM EST  Subject: Pain medication     Hi Andrew, my pain medication is due for a refill tomorrow please. Zanex is helping with my anxiety but I still can't do anything much without rising my heart rate. I see the heart Dr Feb 8, so maybe he can get Dr Vernon to schedule surgery to remove the tumor/ gland. Thanks as always,  Jacki

## 2024-01-25 ENCOUNTER — CARE COORDINATION (OUTPATIENT)
Dept: CASE MANAGEMENT | Age: 59
End: 2024-01-25

## 2024-01-25 NOTE — CARE COORDINATION
Care Transitions Follow Up Call    Patient Current Location:  Home: [facility] [place] [address]    Care Transition Nurse contacted the patient by telephone to follow up after admission on 24.  Verified name and  with patient as identifiers.    Patient: Jacki Moura  Patient : 1965   MRN: [id number]  Reason for Admission: palpitations, tachycardia   Discharge Date: 1/10/24 RARS: No data recorded    Needs to be reviewed by the provider   Additional needs identified to be addressed with provider: No  none             Method of communication with provider: none.    Spoke with Jacki, said she is ok but HR still elevates with minimal activity.  Can't take a shower without it going up.  Continues to stop and rest.  Labs for Dr Noble came back normal.  She is off work until she sees him.  She will see cardio in 2 wks and will see if he can push Dr Noble to remover her adrenal glands.  Eating, drinking, sleeping ok.  No other issues to report.  Denies any other needs.  No other questions or concerns at this time.  Will continue to follow.    Addressed changes since last contact:  none  Discussed follow-up appointments. If no appointment was previously scheduled, appointment scheduling offered: Yes.   Is follow up appointment scheduled within 7 days of discharge? Yes.    Follow Up  Future Appointments   Date Time Provider Department Center   2024 12:30 PM Teja Hayward MD N [facility] Heart [facility] [place]   3/6/2024  1:40 PM Joycelyn Mcdermott, APRN - CNP N SRPX Rheum [facility] [place]   3/20/2024  9:45 AM Raymond Noble MD [facility]     External follow up appointment(s): kecia    Care Transition Nurse reviewed medical action plan and red flags with patient and discussed any barriers to care and/or understanding of plan of care after discharge. Discussed appropriate site of care based on symptoms and resources available to patient including: PCP  Specialist  Urgent care clinics  When to call 911  Jan

## 2024-01-29 DIAGNOSIS — F41.9 ANXIETY: ICD-10-CM

## 2024-01-29 RX ORDER — ALPRAZOLAM 0.5 MG/1
0.5 TABLET ORAL 3 TIMES DAILY PRN
Qty: 45 TABLET | Refills: 0 | OUTPATIENT
Start: 2024-01-29 | End: 2024-02-13

## 2024-01-30 ENCOUNTER — PATIENT MESSAGE (OUTPATIENT)
Dept: FAMILY MEDICINE CLINIC | Age: 59
End: 2024-01-30

## 2024-01-30 DIAGNOSIS — F41.9 ANXIETY: ICD-10-CM

## 2024-01-30 RX ORDER — ALPRAZOLAM 0.5 MG/1
0.5 TABLET ORAL 3 TIMES DAILY PRN
Qty: 45 TABLET | Refills: 0 | Status: SHIPPED | OUTPATIENT
Start: 2024-01-30 | End: 2024-02-14

## 2024-01-30 NOTE — TELEPHONE ENCOUNTER
From: Jacki Moura  To: Kyle Smith  Sent: 1/30/2024 10:10 AM EST  Subject: Medication refill     Hi Andrew,  I called the pharmacy and they do not have a refill for the Xanax. Can you check on this please?   Thanks,  Jacki

## 2024-01-31 DIAGNOSIS — F41.9 ANXIETY: ICD-10-CM

## 2024-01-31 RX ORDER — ALPRAZOLAM 0.5 MG/1
0.5 TABLET ORAL 3 TIMES DAILY PRN
Qty: 45 TABLET | Refills: 0 | OUTPATIENT
Start: 2024-01-31 | End: 2024-02-15

## 2024-02-01 ENCOUNTER — CARE COORDINATION (OUTPATIENT)
Dept: CASE MANAGEMENT | Age: 59
End: 2024-02-01

## 2024-02-01 NOTE — CARE COORDINATION
Care Transitions Follow Up Call    Patient: Jacki Moura  Patient : 1965   MRN: <[id number]>  Reason for Admission: palpitations, tachycardia   Discharge Date: 1/10/24 RARS: No data recorded    Attempted to contact patient for follow up transition call. Left voicemail message to return call with an update on condition since discharge. Contact information provided. Will continue to follow up.      Follow Up  Future Appointments   Date Time Provider Department Center   2024 12:30 PM Teja Hayward MD N [facility] Heart [facility] [place]   3/6/2024  1:40 PM Joycelyn Mcdermott, APRN - CNP N [facility] Rheum [facility] [place]   3/20/2024  9:45 AM Raymond Noble MD ENDO [facility] [place]        Care Transitions Subsequent and Final Call    Subsequent and Final Calls  Care Transitions Interventions     Transportation Support: Declined   Other Interventions:                 Kavitha Villareal LPN

## 2024-02-02 ENCOUNTER — CARE COORDINATION (OUTPATIENT)
Dept: CASE MANAGEMENT | Age: 59
End: 2024-02-02

## 2024-02-02 NOTE — CARE COORDINATION
Care Transitions Outreach Attempt    Call within 2 business days of discharge: Yes   Attempted to reach patient for transitions of care follow up. Unable to reach patient.  Left HIPAA compliant messages w/ CTN # to return call w/ no response day #1.  Jan manley sent.    Patient: Jacki Moura Patient : 1965 MRN: [id number]    Last Discharge Facility       Date Complaint Diagnosis Description Type Department Provider    24 Tachycardia Palpitations ... ED to Hosp-Admission (Discharged) (ADMITTED) Morena Leal PA-C; Yuriy, ...              Was this an external facility discharge? No Discharge Facility Name:     Noted following upcoming appointments from discharge chart review:    follow up appointment(s):   Future Appointments   Date Time Provider Department Center   2024 12:30 PM Teja Hayward MD N [facility] Heart [facility] [place]   3/6/2024  1:40 PM Joycelyn Mcdermott, APRN - CNP N [facility] Rheum [facility] [place]   3/20/2024  9:45 AM Raymond Noble MD ENDO [facility] [place]      follow up appointment(s):

## 2024-02-05 DIAGNOSIS — R25.2 MUSCLE CRAMPS: ICD-10-CM

## 2024-02-05 RX ORDER — CYCLOBENZAPRINE HCL 10 MG
10 TABLET ORAL 3 TIMES DAILY PRN
Qty: 90 TABLET | Refills: 1 | Status: SHIPPED | OUTPATIENT
Start: 2024-02-05

## 2024-02-07 DIAGNOSIS — G89.29 CHRONIC MYOFASCIAL PAIN: ICD-10-CM

## 2024-02-07 DIAGNOSIS — M79.18 CHRONIC MYOFASCIAL PAIN: ICD-10-CM

## 2024-02-07 RX ORDER — HYDROCODONE BITARTRATE AND ACETAMINOPHEN 5; 325 MG/1; MG/1
1 TABLET ORAL EVERY 12 HOURS PRN
Qty: 28 TABLET | Refills: 0 | Status: SHIPPED | OUTPATIENT
Start: 2024-02-07 | End: 2024-02-21

## 2024-02-07 NOTE — PATIENT INSTRUCTIONS
Your  Nurses  Today:  Radha    Your Provider for Today: Dr. Hayward         You may receive a survey regarding the care you received during your visit.  Your input is valuable to us.  We encourage you to complete and return your survey.  We hope you will choose us in the future for your healthcare needs.

## 2024-02-07 NOTE — TELEPHONE ENCOUNTER
Please let patient know I will fill her Lupton. A care coordinator has been trying to connect with her by phone. I would like her to allow the coordinator to be involved as another arm of mine since she is going through a lot.

## 2024-02-08 ENCOUNTER — OFFICE VISIT (OUTPATIENT)
Dept: CARDIOLOGY CLINIC | Age: 59
End: 2024-02-08
Payer: COMMERCIAL

## 2024-02-08 VITALS
HEART RATE: 88 BPM | BODY MASS INDEX: 26.5 KG/M2 | DIASTOLIC BLOOD PRESSURE: 58 MMHG | SYSTOLIC BLOOD PRESSURE: 122 MMHG | WEIGHT: 135 LBS | HEIGHT: 60 IN

## 2024-02-08 DIAGNOSIS — R00.0 SINUS TACHYCARDIA: Primary | ICD-10-CM

## 2024-02-08 DIAGNOSIS — R06.02 SOB (SHORTNESS OF BREATH): ICD-10-CM

## 2024-02-08 PROCEDURE — 3078F DIAST BP <80 MM HG: CPT | Performed by: INTERNAL MEDICINE

## 2024-02-08 PROCEDURE — 3074F SYST BP LT 130 MM HG: CPT | Performed by: INTERNAL MEDICINE

## 2024-02-08 PROCEDURE — 99214 OFFICE O/P EST MOD 30 MIN: CPT | Performed by: INTERNAL MEDICINE

## 2024-02-08 NOTE — PROGRESS NOTES
separation.   DOPPLER: There was no evidence of tricuspid stenosis.   Mild tricuspid regurgitation.      Pulmonic Valve   The pulmonic valve was not well visualized .      Left Atrium   Left atrial size was normal.      Left Ventricle   Normal left ventricle size and systolic function. Ejection fraction was   estimated at 55-60%.      Right Atrium   Right atrial size was normal.      Right Ventricle   The right ventricular size was normal with normal systolic function and   wall thickness.      Pericardial Effusion   The pericardium was normal in appearance with no evidence of a pericardial   effusion.      Pleural Effusion   No evidence of pleural effusion.      Aorta / Great Vessels   -Aortic root dimension within normal limits.   -IVC size is within normal limits with normal respiratory phasic changes.     M-Mode/2D Measurements & Calculations      LV Diastolic   LV Systolic Dimension: 3  AV Cusp Separation: 1.7 cmLA   Dimension: 4.4 cm                        Dimension: 3.5 cmAO Root   cm             LV Volume Diastolic: 87.7 Dimension: 1.8 cmLA Area: 20.7   LV FS:31.8 %   ml                        cm^2   LV PW          LV Volume Systolic: 35 ml   Diastolic: 1   LV EDV/LV EDV Index: 87.7   cm             ml/58 m^2LV ESV/LV ESV   Septum         Index: 35 ml/23 m^2       RV Diastolic Dimension: 2.3 cm   Diastolic: 1.1 EF Calculated: 60.1 %   cm                                       LA/Aorta: 1.94                                               LA volume/Index: 60.2 ml /40m^2     Doppler Measurements & Calculations      MV Peak E-Wave: 106 cm/s   AV Peak Velocity: 197  LVOT Peak Velocity: 146   MV Peak A-Wave: 101 cm/s   cm/s                   cm/s   MV E/A Ratio: 1.05         AV Peak Gradient:      LVOT Peak Gradient: 9   MV Peak Gradient: 4.49     15.52 mmHg             mmHg   mmHg                                                     TV Peak E-Wave: 34.5   MV Deceleration Time: 201                         cm/s   msec

## 2024-02-09 ENCOUNTER — CARE COORDINATION (OUTPATIENT)
Dept: CASE MANAGEMENT | Age: 59
End: 2024-02-09

## 2024-02-09 NOTE — CARE COORDINATION
Care Transitions Follow Up Call-Final    Patient Current Location:  Home: [facility] [place] [address]    Care Transition Nurse contacted the patient by telephone to follow up after admission on 24.  Verified name and  with patient as identifiers.    Patient: Jacki Moura  Patient : 1965   MRN: [id number]  Reason for Admission:  palpitations, tachycardia    Discharge Date: 1/10/24 RARS: No data recorded    Needs to be reviewed by the provider   Additional needs identified to be addressed with provider: No  none             Method of communication with provider: none.    Spoke with Jacki, said she is doing pretty good.  Saw cardio yesterday, started on Lopressor.  HR still fluctuates but hoping new med will help.  Denies chest pain, dyspnea.  She will get a cardiac event monitor placed 3/7 and PFT's.  A pulmonary referral was placed yesterday, should hear from office next week.  Eating, drinking, sleeping good.  No other issues to report.  Denies any other needs.  No other questions or concerns at this time.  Final call, appreciative of call.  Agreeable for ACM, PCP would like care coordinator to follow.    Addressed changes since last contact:  medications-Lopressor  Discussed follow-up appointments. If no appointment was previously scheduled, appointment scheduling offered: Yes.   Is follow up appointment scheduled within 7 days of discharge? Yes.    Follow Up  Future Appointments   Date Time Provider Department Center   3/6/2024  1:40 PM Joycelyn Mcdermott N, APRN - CNP N SR[facility] Rheum [facility] [place]   3/7/2024  8:00 AM STR PULMONARY FUNCTION ROOM 1 [facility] PFT [place] [facility]   3/7/2024  9:00 AM STR CARDIAC MONITOR [facility] STR Rad/Card   3/20/2024  9:45 AM Raymond Noble MD ENDO [facility] [place]   2024  2:45 PM Teja Hayward MD N SRPX Heart [facility] [place]     External follow up appointment(s): kecia    Care Transition Nurse reviewed discharge instructions, medical action plan, and red flags with patient and

## 2024-02-13 DIAGNOSIS — F41.9 ANXIETY: ICD-10-CM

## 2024-02-13 RX ORDER — ALPRAZOLAM 0.5 MG/1
0.5 TABLET ORAL 3 TIMES DAILY PRN
Qty: 45 TABLET | Refills: 0 | Status: SHIPPED | OUTPATIENT
Start: 2024-02-13 | End: 2024-02-28

## 2024-02-20 DIAGNOSIS — G89.29 CHRONIC MYOFASCIAL PAIN: ICD-10-CM

## 2024-02-20 DIAGNOSIS — M79.18 CHRONIC MYOFASCIAL PAIN: ICD-10-CM

## 2024-02-20 RX ORDER — HYDROCODONE BITARTRATE AND ACETAMINOPHEN 5; 325 MG/1; MG/1
1 TABLET ORAL EVERY 12 HOURS PRN
Qty: 28 TABLET | Refills: 0 | Status: SHIPPED | OUTPATIENT
Start: 2024-02-20 | End: 2024-03-05

## 2024-02-27 DIAGNOSIS — Z98.890 H/O ENDARTERECTOMY: ICD-10-CM

## 2024-02-27 DIAGNOSIS — F41.9 ANXIETY: ICD-10-CM

## 2024-02-27 RX ORDER — ALPRAZOLAM 0.5 MG/1
0.5 TABLET ORAL 3 TIMES DAILY PRN
Qty: 45 TABLET | Refills: 0 | OUTPATIENT
Start: 2024-02-27 | End: 2024-03-13

## 2024-02-27 RX ORDER — CLOPIDOGREL BISULFATE 75 MG/1
75 TABLET ORAL DAILY
Qty: 90 TABLET | Refills: 3 | Status: SHIPPED | OUTPATIENT
Start: 2024-02-27

## 2024-02-29 ENCOUNTER — CARE COORDINATION (OUTPATIENT)
Dept: CARE COORDINATION | Age: 59
End: 2024-02-29

## 2024-02-29 NOTE — CARE COORDINATION
are they filled?: Yes  Barriers to medication adherence: None  Are you able to afford your medications?: Yes  How often do you have trouble taking your medications the way you have been told to take them?: I always take them as prescribed.     Do you have Home O2 Therapy?: No      Ability to seek help/take action for Emergent Urgent situations i.e. fire, crime, inclement weather or health crisis.: Independent  Ability to ambulate to restroom: Independent  Ability handle personal hygeine needs (bathing/dressing/grooming): Independent  Ability to manage Medications: Independent  Ability to prepare Food Preparation: Independent  Ability to maintain home (clean home, laundry): Independent  Ability to drive and/or has transportation: Independent  Ability to do shopping: Independent  Ability to manage finances: Independent  Is patient able to live independently?: Yes     Current Housing: Private Residence  Who do you live with?: Partner/Spouse/SO  Are you an active caregiver in your home?: No     Do you have any DME?: No     Per the Fall Risk Screening, did the patient have 2 or more falls or 1 fall with injury in the past year?: No     Frequent urination at night?: No  Do you use rails/bars?: Yes  Do you have a non-slip tub mat?: Yes     Are you experiencing loss of meaning?: No  Are you experiencing loss of hope and peace?: No     Thinking about your patient's physical health needs, are there any symptoms or problems (risk indicators) you are unsure about that require further investigation?: Mild vague physical symptoms or problems; but do not impact on daily life or are not of concern to patient   Are the patient’s physical health problems impacting on their mental well-being?: Mild impact on mental well-being e.g. \"\"feeling fed-up\"\", \"\"reduced enjoyment\"\"   Are there any problems with your patient’s lifestyle behaviors (alcohol, drugs, diet, exercise) that are impacting on physical or mental well-being?: No identified

## 2024-03-05 DIAGNOSIS — M79.18 CHRONIC MYOFASCIAL PAIN: ICD-10-CM

## 2024-03-05 DIAGNOSIS — G89.29 CHRONIC MYOFASCIAL PAIN: ICD-10-CM

## 2024-03-05 RX ORDER — HYDROCODONE BITARTRATE AND ACETAMINOPHEN 5; 325 MG/1; MG/1
1 TABLET ORAL EVERY 12 HOURS PRN
Qty: 28 TABLET | Refills: 0 | Status: SHIPPED | OUTPATIENT
Start: 2024-03-05 | End: 2024-04-04

## 2024-03-07 ENCOUNTER — CARE COORDINATION (OUTPATIENT)
Dept: CARE COORDINATION | Age: 59
End: 2024-03-07

## 2024-03-11 DIAGNOSIS — E78.00 HIGH CHOLESTEROL: ICD-10-CM

## 2024-03-11 RX ORDER — ATORVASTATIN CALCIUM 40 MG/1
40 TABLET, FILM COATED ORAL DAILY
Qty: 90 TABLET | Refills: 1 | Status: SHIPPED | OUTPATIENT
Start: 2024-03-11

## 2024-03-13 ENCOUNTER — OFFICE VISIT (OUTPATIENT)
Dept: RHEUMATOLOGY | Age: 59
End: 2024-03-13
Payer: COMMERCIAL

## 2024-03-13 VITALS
WEIGHT: 134.2 LBS | OXYGEN SATURATION: 96 % | DIASTOLIC BLOOD PRESSURE: 60 MMHG | BODY MASS INDEX: 26.35 KG/M2 | SYSTOLIC BLOOD PRESSURE: 100 MMHG | HEIGHT: 60 IN | HEART RATE: 68 BPM

## 2024-03-13 DIAGNOSIS — E55.9 VITAMIN D DEFICIENCY: ICD-10-CM

## 2024-03-13 DIAGNOSIS — M85.80 OSTEOPENIA, UNSPECIFIED LOCATION: Primary | ICD-10-CM

## 2024-03-13 DIAGNOSIS — Z72.0 TOBACCO ABUSE: ICD-10-CM

## 2024-03-13 PROCEDURE — 3074F SYST BP LT 130 MM HG: CPT | Performed by: NURSE PRACTITIONER

## 2024-03-13 PROCEDURE — 3078F DIAST BP <80 MM HG: CPT | Performed by: NURSE PRACTITIONER

## 2024-03-13 PROCEDURE — 99213 OFFICE O/P EST LOW 20 MIN: CPT | Performed by: NURSE PRACTITIONER

## 2024-03-13 RX ORDER — ALBUTEROL SULFATE 90 UG/1
4 AEROSOL, METERED RESPIRATORY (INHALATION) PRN
OUTPATIENT
Start: 2024-04-24

## 2024-03-13 RX ORDER — FAMOTIDINE 10 MG/ML
20 INJECTION, SOLUTION INTRAVENOUS
OUTPATIENT
Start: 2024-04-24

## 2024-03-13 RX ORDER — ONDANSETRON 2 MG/ML
8 INJECTION INTRAMUSCULAR; INTRAVENOUS
OUTPATIENT
Start: 2024-04-24

## 2024-03-13 RX ORDER — DIPHENHYDRAMINE HYDROCHLORIDE 50 MG/ML
50 INJECTION INTRAMUSCULAR; INTRAVENOUS
OUTPATIENT
Start: 2024-04-24

## 2024-03-13 RX ORDER — HEPARIN SODIUM (PORCINE) LOCK FLUSH IV SOLN 100 UNIT/ML 100 UNIT/ML
500 SOLUTION INTRAVENOUS PRN
OUTPATIENT
Start: 2024-04-24

## 2024-03-13 RX ORDER — ACETAMINOPHEN 325 MG/1
650 TABLET ORAL
OUTPATIENT
Start: 2024-04-24

## 2024-03-13 RX ORDER — ZOLEDRONIC ACID 5 MG/100ML
5 INJECTION, SOLUTION INTRAVENOUS ONCE
OUTPATIENT
Start: 2024-04-24 | End: 2024-04-24

## 2024-03-13 RX ORDER — SODIUM CHLORIDE 9 MG/ML
5-250 INJECTION, SOLUTION INTRAVENOUS PRN
OUTPATIENT
Start: 2024-04-24

## 2024-03-13 RX ORDER — EPINEPHRINE 1 MG/ML
0.3 INJECTION, SOLUTION, CONCENTRATE INTRAVENOUS PRN
OUTPATIENT
Start: 2024-04-24

## 2024-03-13 RX ORDER — SODIUM CHLORIDE 9 MG/ML
INJECTION, SOLUTION INTRAVENOUS CONTINUOUS
OUTPATIENT
Start: 2024-04-24

## 2024-03-13 RX ORDER — SODIUM CHLORIDE 0.9 % (FLUSH) 0.9 %
5-40 SYRINGE (ML) INJECTION PRN
OUTPATIENT
Start: 2024-04-24

## 2024-03-13 ASSESSMENT — ENCOUNTER SYMPTOMS
EYE PAIN: 0
SHORTNESS OF BREATH: 1
COUGH: 0
TROUBLE SWALLOWING: 0
BACK PAIN: 1
DIARRHEA: 0
NAUSEA: 0
ABDOMINAL PAIN: 0
EYE ITCHING: 0
CONSTIPATION: 0

## 2024-03-13 NOTE — PROGRESS NOTES
[facility]  Bone Fragility Follow up     Visit Date: 3/13/2024  MRN: [id number]  Cc:   Chief Complaint   Patient presents with    Follow-up     1 year follow up osteopenia        HPI:   Jacki Moura  is a(n)59 y.o. female here today for follow up of Osteopenia. No falls or fractures. Taking calcium and vitamin D. Hospitalized 1/8-1/10 for tachycardia. Diagnosed with severe aortic insufficiency, following with cardiology- discussing valve replacement. Also has left adrenal tumor for which she is following with Dr. Noble.      ROS:  Review of Systems   Constitutional:  Negative for fatigue, fever and unexpected weight change.   HENT:  Negative for congestion and trouble swallowing.    Eyes:  Negative for pain and itching.   Respiratory:  Positive for shortness of breath (w/ exertion). Negative for cough.    Cardiovascular:  Positive for palpitations. Negative for chest pain and leg swelling.   Gastrointestinal:  Negative for abdominal pain, constipation, diarrhea and nausea.   Endocrine: Negative for cold intolerance and heat intolerance.   Genitourinary:  Negative for difficulty urinating, frequency and urgency.   Musculoskeletal:  Positive for back pain and neck pain. Negative for arthralgias and joint swelling.   Skin:  Negative for rash.   Neurological:  Negative for dizziness, weakness, numbness and headaches.   Psychiatric/Behavioral:  The patient is not nervous/anxious.          PAST MEDICAL HISTORY  Past Medical History:   Diagnosis Date    Arthritis     Cushing's disease (HCC)     Hyperlipidemia     Hypertension        SOCIAL HISTORY  Social History     Socioeconomic History    Marital status:      Spouse name: Sergei    Number of children: 4    Years of education: None    Highest education level: None   Tobacco Use    Smoking status: Every Day     Current packs/day: 0.25     Average packs/day: 0.3 packs/day for 25.0 years (6.3 ttl pk-yrs)     Types: Cigarettes    Smokeless

## 2024-03-15 ENCOUNTER — CARE COORDINATION (OUTPATIENT)
Dept: CARE COORDINATION | Age: 59
End: 2024-03-15

## 2024-03-15 NOTE — CARE COORDINATION
Ambulatory Care Coordination Note  3/15/2024    Patient Current Location:  Home: [place] [address]     ACM contacted the patient by telephone. Verified name and  with patient as identifiers. Provided introduction to self, and explanation of the ACM role.     Challenges to be reviewed by the provider   Additional needs identified to be addressed with provider: No  none               Method of communication with provider: none.    ACM: Radha Curiel RN    Going back to work next week.  Rheumatologist - dexa bone scan  PFT and cardaic monitor rescheduled. Will then follow up with cardiology and pulmonology  Endo appt next week for adrenal mass. Was told will need removed. Not referred yet to surgeon.    Plan -   3/20 endo  3/27 pulmonology  Report fast HR or symptoms ASAP  Continue cutting down on cigarettes by 1 every couple days then set a quit date  Early symptom recognition and reporting to prevent ED and admissions  Use same or next day appts at PCP office for non-emergency problems    Offered patient enrollment in the Remote Patient Monitoring (RPM) program for in-home monitoring: Yes, but did not enroll at this time.    Lab Results       None            Care Coordination Interventions    Referral from Primary Care Provider: No  Suggested Interventions and Community Resources  Medi Set or Pill Pack: Declined  Occupational Therapy: Declined  Physical Therapy: Declined  Transportation Support: Declined          Goals Addressed                   This Visit's Progress     Conditions and Symptoms   On track     I will schedule office visits, as directed by my provider.  I will keep my appointment or reschedule if I have to cancel.  I will notify my provider of any barriers to my plan of care.  I will notify my provider of any symptoms that indicate a worsening of my condition.    Barriers: overwhelmed by complexity of regimen  Plan for overcoming my barriers: care coordination  Confidence:

## 2024-03-16 DIAGNOSIS — G89.29 CHRONIC MYOFASCIAL PAIN: ICD-10-CM

## 2024-03-16 DIAGNOSIS — M79.18 CHRONIC MYOFASCIAL PAIN: ICD-10-CM

## 2024-03-18 RX ORDER — HYDROCODONE BITARTRATE AND ACETAMINOPHEN 5; 325 MG/1; MG/1
1 TABLET ORAL EVERY 12 HOURS PRN
Qty: 28 TABLET | Refills: 0 | Status: SHIPPED | OUTPATIENT
Start: 2024-03-18 | End: 2024-04-01

## 2024-03-20 ENCOUNTER — OFFICE VISIT (OUTPATIENT)
Age: 59
End: 2024-03-20
Payer: COMMERCIAL

## 2024-03-20 VITALS
SYSTOLIC BLOOD PRESSURE: 116 MMHG | HEIGHT: 60 IN | DIASTOLIC BLOOD PRESSURE: 68 MMHG | HEART RATE: 72 BPM | BODY MASS INDEX: 26.5 KG/M2 | WEIGHT: 135 LBS | RESPIRATION RATE: 16 BRPM

## 2024-03-20 DIAGNOSIS — I10 HYPERTENSION, UNSPECIFIED TYPE: ICD-10-CM

## 2024-03-20 DIAGNOSIS — D49.7 ADRENAL TUMOR: Primary | ICD-10-CM

## 2024-03-20 PROCEDURE — 99214 OFFICE O/P EST MOD 30 MIN: CPT | Performed by: INTERNAL MEDICINE

## 2024-03-20 PROCEDURE — 3074F SYST BP LT 130 MM HG: CPT | Performed by: INTERNAL MEDICINE

## 2024-03-20 PROCEDURE — 3078F DIAST BP <80 MM HG: CPT | Performed by: INTERNAL MEDICINE

## 2024-03-20 RX ORDER — ALPRAZOLAM 0.5 MG/1
0.5 TABLET ORAL NIGHTLY PRN
COMMUNITY

## 2024-03-20 NOTE — PROGRESS NOTES
[facility] PHYSICIANS [place] SPECIALTY  [facility] ENDOCRINOLOGY  [address]  [place] [address]  Dept: [phone]  Loc: [phone]     Visit Date: 3/20/2024    Jacki Moura is a 59 y.o. female who presents today for:  Chief Complaint   Patient presents with    Follow-up     Adrenal tumor            Subjective:  HPI     Jacki Moura is a 59 y.o. , female who comes for f/u for adrenal tumor.  Kyle Smith, SHERYL - LANDY  Jacki Moura was diagnosed with adrenal tumor recently.  The CT scan was ordered because the patient noticed that she has gained 30 pounds in 1 year without changing her lifestyle.  All of the weight has accumulated in her stomach and she is uncomfortable with it.  So she requested a CAT scan which showed a 1.4 by 1 cm left adrenal nodule on 9/7/2023.  She tells me that her PCP then did labs and found cortisol elevated but I cannot see that lab.  The patient reports easy bruising of the skin but no stretch marks.  She has anxiety but denies headaches and has not really had any tremors.  However she is sweaty at night.  Patient does have blood pressure problems treated with 2 medications.  There is no report of hypokalemia  We did multiple lab tests following the last visit.  Renin and aldosterone levels were normal.  Urinary free cortisol level was normal.  Midnight salivary cortisol was also normal.  Catecholamine studies showed normal metanephrines and catecholamines.  There was a slight elevation of VMA but the creatinine was elevated.  Adjusted for creatinine it came back normal.  The patient is back at work and she is stressed at work.  Of note, she was recently hospitalized with tachycardia and found to have valvular heart disease for which she follows with cardiology.  I have reviewed cardiology note from 2/8/2024.  Past Medical History:   Diagnosis Date    Arthritis     Cushing's disease (HCC)     Hyperlipidemia     Hypertension       Past Surgical

## 2024-03-22 ENCOUNTER — CARE COORDINATION (OUTPATIENT)
Dept: CARE COORDINATION | Age: 59
End: 2024-03-22

## 2024-03-22 NOTE — CARE COORDINATION
Attempted to reach patient for continued Care Coordination follow up and education.  Patient was unavailable at the time of my call, and a generic voicemail message was left asking patient to return my call at [phone].

## 2024-03-25 ENCOUNTER — HOSPITAL ENCOUNTER (OUTPATIENT)
Age: 59
Discharge: HOME | End: 2024-03-25
Payer: COMMERCIAL

## 2024-03-25 DIAGNOSIS — M85.80 OSTEOPENIA, UNSPECIFIED LOCATION: ICD-10-CM

## 2024-03-25 DIAGNOSIS — F41.9 ANXIETY: ICD-10-CM

## 2024-03-25 DIAGNOSIS — D49.7 ADRENAL TUMOR: ICD-10-CM

## 2024-03-25 LAB
ALBUMIN SERPL BCG-MCNC: 4.3 G/DL (ref 3.5–5.1)
ALP SERPL-CCNC: 80 U/L (ref 38–126)
ALT SERPL W/O P-5'-P-CCNC: 14 U/L (ref 11–66)
ANION GAP SERPL CALC-SCNC: 15 MEQ/L (ref 8–16)
AST SERPL-CCNC: 14 U/L (ref 5–40)
CHLORIDE SERPL-SCNC: 103 MEQ/L (ref 98–111)
CREAT SERPL-MCNC: 0.9 MG/DL (ref 0.4–1.2)
GFR SERPL CREATININE-BSD FRML MDRD: 74 ML/MIN/1.73M2
GLUCOSE SERPL-MCNC: 87 MG/DL (ref 70–108)
POTASSIUM SERPL-SCNC: 5.5 MEQ/L (ref 3.5–5.2)
PROT SERPL-MCNC: 7.2 G/DL (ref 6.1–8)
SODIUM SERPL-SCNC: 136 MEQ/L (ref 135–145)

## 2024-03-25 PROCEDURE — 82024 ASSAY OF ACTH: CPT

## 2024-03-25 PROCEDURE — 36415 COLL VENOUS BLD VENIPUNCTURE: CPT

## 2024-03-25 PROCEDURE — 80053 COMPREHEN METABOLIC PANEL: CPT

## 2024-03-25 RX ORDER — ALPRAZOLAM 0.5 MG/1
0.5 TABLET ORAL 3 TIMES DAILY PRN
Qty: 45 TABLET | Refills: 0 | Status: SHIPPED | OUTPATIENT
Start: 2024-03-25 | End: 2024-04-09

## 2024-03-27 LAB — ACTH PLAS-MCNC: 2.4 PG/ML (ref 7.2–63.3)

## 2024-03-28 ENCOUNTER — PATIENT MESSAGE (OUTPATIENT)
Dept: FAMILY MEDICINE CLINIC | Age: 59
End: 2024-03-28

## 2024-03-28 DIAGNOSIS — G89.29 CHRONIC MYOFASCIAL PAIN: ICD-10-CM

## 2024-03-28 DIAGNOSIS — M79.18 CHRONIC MYOFASCIAL PAIN: ICD-10-CM

## 2024-03-29 ENCOUNTER — CARE COORDINATION (OUTPATIENT)
Dept: CARE COORDINATION | Age: 59
End: 2024-03-29

## 2024-03-29 DIAGNOSIS — I10 ESSENTIAL HYPERTENSION: ICD-10-CM

## 2024-03-29 RX ORDER — HYDROCODONE BITARTRATE AND ACETAMINOPHEN 5; 325 MG/1; MG/1
1 TABLET ORAL EVERY 12 HOURS PRN
Qty: 60 TABLET | Refills: 0 | Status: SHIPPED | OUTPATIENT
Start: 2024-03-29 | End: 2024-04-28

## 2024-03-29 RX ORDER — AMLODIPINE BESYLATE 10 MG/1
TABLET ORAL
Qty: 90 TABLET | Refills: 3 | Status: SHIPPED | OUTPATIENT
Start: 2024-03-29

## 2024-03-29 NOTE — TELEPHONE ENCOUNTER
From: Jacki Moura  To: Kyle Smith  Sent: 3/28/2024 8:18 PM EDT  Subject: Heart rate     Hi Andrew  My heart rate went over the 120 that I thought I'm not suppose to go over 49 times. At what point am I suppose to go to the ER?

## 2024-03-29 NOTE — CARE COORDINATION
Ambulatory Care Coordination Note  3/29/2024    Patient Current Location:  Home: [place] [address]     ACM contacted the patient by telephone. Verified name and  with patient as identifiers. Provided introduction to self, and explanation of the ACM role.     Challenges to be reviewed by the provider   Additional needs identified to be addressed with provider: No  none               Method of communication with provider: none.    ACM: Radha Curiel RN    Went back to work this week. But called off today due to heart rate fluctuations. PCP advised taking additional metoprolol 25 mg in am before she leaves for work and only 1 in pm. Scheduled to work tomorrow. Also prescribed xanax. HR 77 currently.   Heart monitor   Adrenal tumor - endo ordered labs including  saliva test for Cushing's     Plan -   4/10 lower extremity JOAN   holter monitor   PFT   pulm  Report fast HR or symptoms ASAP  Continue cutting down on cigarettes by 1 every couple days then set a quit date  Early symptom recognition and reporting to prevent ED and admissions  Use same or next day appts at PCP office for non-emergency problems    Offered patient enrollment in the Remote Patient Monitoring (RPM) program for in-home monitoring: Yes, but did not enroll at this time.    Lab Results       None            Care Coordination Interventions    Referral from Primary Care Provider: No  Suggested Interventions and Community Resources  Medi Set or Pill Pack: Declined  Occupational Therapy: Declined  Physical Therapy: Declined  Transportation Support: Declined          Goals Addressed                   This Visit's Progress     Conditions and Symptoms   On track     I will schedule office visits, as directed by my provider.  I will keep my appointment or reschedule if I have to cancel.  I will notify my provider of any barriers to my plan of care.  I will notify my provider of any symptoms that indicate a worsening of my

## 2024-03-29 NOTE — TELEPHONE ENCOUNTER
I called patient on phone and discussed her tachycardia.  I recommended she take an additional Lopressor 25 mg in the morning before she goes to work but continue taking only 1 in the evening.

## 2024-04-05 ENCOUNTER — CARE COORDINATION (OUTPATIENT)
Dept: CARE COORDINATION | Age: 59
End: 2024-04-05

## 2024-04-05 NOTE — CARE COORDINATION
Attempted to reach patient for continued Care Coordination follow up and education.  Patient was unavailable at the time of my call, and a generic voicemail message was left asking patient to return my call at [phone].

## 2024-04-08 DIAGNOSIS — F41.9 ANXIETY: ICD-10-CM

## 2024-04-09 RX ORDER — ALPRAZOLAM 0.5 MG/1
0.5 TABLET ORAL 3 TIMES DAILY PRN
Qty: 45 TABLET | Refills: 0 | Status: SHIPPED | OUTPATIENT
Start: 2024-04-09 | End: 2024-04-24

## 2024-04-10 DIAGNOSIS — R25.2 MUSCLE CRAMPS: ICD-10-CM

## 2024-04-10 RX ORDER — CYCLOBENZAPRINE HCL 10 MG
10 TABLET ORAL 3 TIMES DAILY PRN
Qty: 90 TABLET | Refills: 1 | Status: SHIPPED | OUTPATIENT
Start: 2024-04-10

## 2024-04-11 ENCOUNTER — HOSPITAL ENCOUNTER (OUTPATIENT)
Dept: PULMONOLOGY | Age: 59
Discharge: HOME | End: 2024-04-11
Attending: INTERNAL MEDICINE
Payer: COMMERCIAL

## 2024-04-11 ENCOUNTER — HOSPITAL ENCOUNTER (OUTPATIENT)
Age: 59
Discharge: HOME | End: 2024-04-13
Attending: INTERNAL MEDICINE
Payer: COMMERCIAL

## 2024-04-11 DIAGNOSIS — R06.02 SOB (SHORTNESS OF BREATH): ICD-10-CM

## 2024-04-11 DIAGNOSIS — R20.0 NUMBNESS AND TINGLING OF LEFT LEG: ICD-10-CM

## 2024-04-11 DIAGNOSIS — G89.29 CHRONIC RADICULAR LUMBAR PAIN: ICD-10-CM

## 2024-04-11 DIAGNOSIS — M54.16 CHRONIC RADICULAR LUMBAR PAIN: ICD-10-CM

## 2024-04-11 DIAGNOSIS — R00.0 SINUS TACHYCARDIA: ICD-10-CM

## 2024-04-11 DIAGNOSIS — R20.2 NUMBNESS AND TINGLING OF LEFT LEG: ICD-10-CM

## 2024-04-11 PROCEDURE — 93225 XTRNL ECG REC<48 HRS REC: CPT

## 2024-04-11 PROCEDURE — 94726 PLETHYSMOGRAPHY LUNG VOLUMES: CPT

## 2024-04-11 PROCEDURE — 94729 DIFFUSING CAPACITY: CPT

## 2024-04-11 PROCEDURE — 94060 EVALUATION OF WHEEZING: CPT

## 2024-04-11 RX ORDER — PREGABALIN 50 MG/1
50 CAPSULE ORAL 3 TIMES DAILY
Qty: 90 CAPSULE | Refills: 1 | Status: SHIPPED | OUTPATIENT
Start: 2024-04-11 | End: 2024-06-10

## 2024-04-12 ENCOUNTER — CARE COORDINATION (OUTPATIENT)
Dept: CARE COORDINATION | Age: 59
End: 2024-04-12

## 2024-04-12 NOTE — CARE COORDINATION
Attempted care management follow up. Left message to return phone call to ambulatory care manager at [phone].  Plan -   4/11 holter monitor  4/11 PFT  4/17 pulm  4/18 JOAN lower extremmity/IR  Report fast HR or symptoms ASAP  Continue cutting down on cigarettes by 1 every couple days then set a quit date  Early symptom recognition and reporting to prevent ED and admissions  Use same or next day appts at PCP office for non-emergency problems

## 2024-04-14 LAB
ACQUISITION DURATION: NORMAL S
AVERAGE HEART RATE: 80 BPM
HOOKUP DATE: NORMAL
HOOKUP TIME: NORMAL
MAX HEART RATE TIME/DATE: NORMAL
MAX HEART RATE: 104 BPM
MIN HEART RATE TIME/DATE: NORMAL
MIN HEART RATE: 64 BPM
NUMBER OF QRS COMPLEXES: NORMAL
NUMBER OF SUPRAVENTRICULAR COUPLETS: 0
NUMBER OF SUPRAVENTRICULAR ECTOPICS: 6
NUMBER OF SUPRAVENTRICULAR ISOLATED BEATS: 6
NUMBER OF VENTRICULAR BIGEMINAL CYCLES: 0
NUMBER OF VENTRICULAR COUPLETS: 0
NUMBER OF VENTRICULAR ECTOPICS: 2

## 2024-04-17 ENCOUNTER — OFFICE VISIT (OUTPATIENT)
Dept: PULMONOLOGY | Age: 59
End: 2024-04-17
Payer: COMMERCIAL

## 2024-04-17 VITALS
HEIGHT: 59 IN | HEART RATE: 72 BPM | SYSTOLIC BLOOD PRESSURE: 124 MMHG | WEIGHT: 136.8 LBS | BODY MASS INDEX: 27.58 KG/M2 | OXYGEN SATURATION: 95 % | DIASTOLIC BLOOD PRESSURE: 58 MMHG | TEMPERATURE: 96.9 F

## 2024-04-17 DIAGNOSIS — R06.09 DOE (DYSPNEA ON EXERTION): ICD-10-CM

## 2024-04-17 DIAGNOSIS — J43.2 CENTRILOBULAR EMPHYSEMA (HCC): Primary | ICD-10-CM

## 2024-04-17 DIAGNOSIS — F17.210 CIGARETTE SMOKER MOTIVATED TO QUIT: ICD-10-CM

## 2024-04-17 DIAGNOSIS — J41.0 SMOKERS' COUGH (HCC): ICD-10-CM

## 2024-04-17 DIAGNOSIS — Z87.891 PERSONAL HISTORY OF TOBACCO USE: ICD-10-CM

## 2024-04-17 DIAGNOSIS — I35.1 NONRHEUMATIC AORTIC VALVE INSUFFICIENCY: ICD-10-CM

## 2024-04-17 DIAGNOSIS — J41.0 BRONCHITIS, CHRONIC, SIMPLE (HCC): ICD-10-CM

## 2024-04-17 PROCEDURE — 3078F DIAST BP <80 MM HG: CPT | Performed by: INTERNAL MEDICINE

## 2024-04-17 PROCEDURE — 99406 BEHAV CHNG SMOKING 3-10 MIN: CPT | Performed by: INTERNAL MEDICINE

## 2024-04-17 PROCEDURE — G0296 VISIT TO DETERM LDCT ELIG: HCPCS | Performed by: INTERNAL MEDICINE

## 2024-04-17 PROCEDURE — 3074F SYST BP LT 130 MM HG: CPT | Performed by: INTERNAL MEDICINE

## 2024-04-17 PROCEDURE — 99204 OFFICE O/P NEW MOD 45 MIN: CPT | Performed by: INTERNAL MEDICINE

## 2024-04-17 NOTE — PROGRESS NOTES
the past 15 years the grade B recommendation is to:  Screen for lung cancer with low-dose computed tomography (LDCT) every year.  Stop screening once a person has not smoked for 15 years or has a health problem that limits life expectancy or the ability to have lung surgery.    The patient  reports that she has been smoking cigarettes. She started smoking about 43 years ago. She has a 43.3 pack-year smoking history. She has never used smokeless tobacco.. Discussed with patient the risks and benefits of screening, including over-diagnosis, false positive rate, and total radiation exposure.  The patient currently exhibits no signs or symptoms suggestive of lung cancer.  Discussed with patient the importance of compliance with yearly annual lung cancer screenings and willingness to undergo diagnosis and treatment if screening scan is positive.  In addition, the patient was counseled regarding the importance of remaining smoke free and/or total smoking cessation.    Also reviewed the following if the patient has Medicare that as of February 10, 2022, Medicare only covers LDCT screening in patients aged 50-77 with at least a 20 pack-year smoking history who currently smoke or have quit in the last 15 years

## 2024-04-18 ENCOUNTER — HOSPITAL ENCOUNTER (OUTPATIENT)
Dept: INTERVENTIONAL RADIOLOGY/VASCULAR | Age: 59
Discharge: HOME | End: 2024-04-20
Attending: RADIOLOGY
Payer: COMMERCIAL

## 2024-04-18 ENCOUNTER — CARE COORDINATION (OUTPATIENT)
Dept: CARE COORDINATION | Age: 59
End: 2024-04-18

## 2024-04-18 ENCOUNTER — HOSPITAL ENCOUNTER (OUTPATIENT)
Dept: INTERVENTIONAL RADIOLOGY/VASCULAR | Age: 59
Discharge: HOME | End: 2024-04-18
Attending: RADIOLOGY
Payer: COMMERCIAL

## 2024-04-18 DIAGNOSIS — Z87.891 HISTORY OF SMOKING 30 OR MORE PACK YEARS: ICD-10-CM

## 2024-04-18 DIAGNOSIS — I10 ESSENTIAL HYPERTENSION: ICD-10-CM

## 2024-04-18 DIAGNOSIS — I73.9 PAD (PERIPHERAL ARTERY DISEASE) (HCC): ICD-10-CM

## 2024-04-18 PROCEDURE — 99212 OFFICE O/P EST SF 10 MIN: CPT

## 2024-04-18 PROCEDURE — 93925 LOWER EXTREMITY STUDY: CPT

## 2024-04-18 NOTE — CARE COORDINATION
Ambulatory Care Coordination Note     2024 11:44 AM     Patient Current Location:  Home: [place] [address]     ACM contacted the patient by telephone. Verified name and  with patient as identifiers.         ACM: Radha Curiel RN     Challenges to be reviewed by the provider   Additional needs identified to be addressed with provider No  none               Method of communication with provider: none.    Care Summary Note:     Emphysema - now est with pulmonology. Combivent Respimat ordered. Smoking cessation clinic referral. Cut down to 1/2 pack daily. Labs and CT lung screening ordered.  Requested follow up with PCP to discuss adrenal tumor after seeing endo. Scheduled next week.    Plan -    JOAN lower extremmity/IR   PCP  Smoking cessation  CT lung screening  Combivent respimat  Report fast HR or symptoms ASAP  Continue cutting down on cigarettes by 1 every couple days then set a quit date  Early symptom recognition and reporting to prevent ED and admissions  Use same or next day appts at PCP office for non-emergency problems    Offered patient enrollment in the Remote Patient Monitoring (RPM) program for in-home monitoring: Yes, but did not enroll at this time.     Assessments Completed:   Care Coordination Interventions    Referral from Primary Care Provider: No  Suggested Interventions and Community Resources  Medi Set or Pill Pack: Declined  Occupational Therapy: Declined  Physical Therapy: Declined  Transportation Support: Declined          Care Planning:    Goals Addressed                   This Visit's Progress     Conditions and Symptoms   On track     I will schedule office visits, as directed by my provider.  I will keep my appointment or reschedule if I have to cancel.  I will notify my provider of any barriers to my plan of care.  I will notify my provider of any symptoms that indicate a worsening of my condition.    Barriers: overwhelmed by complexity of

## 2024-04-22 DIAGNOSIS — F41.9 ANXIETY: ICD-10-CM

## 2024-04-22 RX ORDER — ALPRAZOLAM 0.5 MG/1
0.5 TABLET ORAL 3 TIMES DAILY PRN
Qty: 45 TABLET | Refills: 0 | OUTPATIENT
Start: 2024-04-22 | End: 2024-05-07

## 2024-04-22 SDOH — ECONOMIC STABILITY: FOOD INSECURITY: WITHIN THE PAST 12 MONTHS, YOU WORRIED THAT YOUR FOOD WOULD RUN OUT BEFORE YOU GOT MONEY TO BUY MORE.: SOMETIMES TRUE

## 2024-04-22 SDOH — ECONOMIC STABILITY: INCOME INSECURITY: HOW HARD IS IT FOR YOU TO PAY FOR THE VERY BASICS LIKE FOOD, HOUSING, MEDICAL CARE, AND HEATING?: SOMEWHAT HARD

## 2024-04-22 SDOH — ECONOMIC STABILITY: FOOD INSECURITY: WITHIN THE PAST 12 MONTHS, THE FOOD YOU BOUGHT JUST DIDN'T LAST AND YOU DIDN'T HAVE MONEY TO GET MORE.: SOMETIMES TRUE

## 2024-04-22 SDOH — ECONOMIC STABILITY: TRANSPORTATION INSECURITY
IN THE PAST 12 MONTHS, HAS LACK OF TRANSPORTATION KEPT YOU FROM MEETINGS, WORK, OR FROM GETTING THINGS NEEDED FOR DAILY LIVING?: NO

## 2024-04-24 ENCOUNTER — OFFICE VISIT (OUTPATIENT)
Dept: FAMILY MEDICINE CLINIC | Age: 59
End: 2024-04-24
Payer: COMMERCIAL

## 2024-04-24 ENCOUNTER — ENROLLMENT (OUTPATIENT)
Dept: PHARMACY | Age: 59
End: 2024-04-24

## 2024-04-24 ENCOUNTER — TELEPHONE (OUTPATIENT)
Dept: PHARMACY | Age: 59
End: 2024-04-24

## 2024-04-24 VITALS
HEART RATE: 72 BPM | DIASTOLIC BLOOD PRESSURE: 60 MMHG | TEMPERATURE: 98.6 F | BODY MASS INDEX: 27.47 KG/M2 | WEIGHT: 136 LBS | SYSTOLIC BLOOD PRESSURE: 90 MMHG | OXYGEN SATURATION: 97 % | RESPIRATION RATE: 16 BRPM

## 2024-04-24 DIAGNOSIS — E24.9 HYPERCORTISOLISM (HCC): Primary | ICD-10-CM

## 2024-04-24 DIAGNOSIS — J41.0 SIMPLE CHRONIC BRONCHITIS (HCC): ICD-10-CM

## 2024-04-24 DIAGNOSIS — D49.7 ADRENAL TUMOR: ICD-10-CM

## 2024-04-24 DIAGNOSIS — F41.9 ANXIETY: ICD-10-CM

## 2024-04-24 PROCEDURE — 99214 OFFICE O/P EST MOD 30 MIN: CPT | Performed by: NURSE PRACTITIONER

## 2024-04-24 PROCEDURE — 3078F DIAST BP <80 MM HG: CPT | Performed by: NURSE PRACTITIONER

## 2024-04-24 PROCEDURE — 3074F SYST BP LT 130 MM HG: CPT | Performed by: NURSE PRACTITIONER

## 2024-04-24 RX ORDER — OMEPRAZOLE 20 MG/1
20 CAPSULE, DELAYED RELEASE ORAL DAILY
COMMUNITY
Start: 2024-03-24

## 2024-04-24 RX ORDER — ALPRAZOLAM 0.5 MG/1
0.5 TABLET ORAL 3 TIMES DAILY PRN
Qty: 90 TABLET | Refills: 0 | Status: SHIPPED | OUTPATIENT
Start: 2024-04-24 | End: 2024-05-24

## 2024-04-24 RX ORDER — ALBUTEROL SULFATE AND BUDESONIDE 90; 80 UG/1; UG/1
2 AEROSOL, METERED RESPIRATORY (INHALATION) 2 TIMES DAILY PRN
Qty: 1 G | Refills: 1 | Status: SHIPPED | OUTPATIENT
Start: 2024-04-24

## 2024-04-24 ASSESSMENT — ENCOUNTER SYMPTOMS
EYE PAIN: 0
ABDOMINAL PAIN: 0
SORE THROAT: 0
EYE DISCHARGE: 0
DIARRHEA: 0
TROUBLE SWALLOWING: 0
BACK PAIN: 0
EYE REDNESS: 0
ALLERGIC/IMMUNOLOGIC NEGATIVE: 1
VOMITING: 0
WHEEZING: 0
NAUSEA: 0
CONSTIPATION: 0
COUGH: 0

## 2024-04-24 NOTE — PROGRESS NOTES
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       Patient here following up after seeing endocrinology for her hypercortisolism and right adrenal tumor.  Patient would like to go ahead and start Korlym treatment for this and is not ready for surgery at this point.  She also points out today that she has had such terrible bone loss and deterioration of her right upper tooth is beginning to come out, and the dentist told her would be at least $10,000 to repair.  Cardiology recently released her from any further evaluation and her palpitations and tachycardia seem adequately controlled with metoprolol at this time.    Patient is requesting a rescue inhaler to help control her occasional bouts of coughing, shortness of breath and wheezing from smoking.  She has come up with a plan to start smoking immediately.    Requesting refill of Xanax.        Medications    Current Outpatient Medications:     omeprazole (PRILOSEC) 20 MG delayed release capsule, Take 1 capsule by mouth Daily, Disp: , Rfl:     Albuterol-Budesonide (AIRSUPRA) 90-80 MCG/ACT AERO, Inhale 2 puffs into the lungs 2 times daily as needed (for coughing/wheezing), Disp: 1 g, Rfl: 1    ALPRAZolam (XANAX) 0.5 MG tablet, Take 1 tablet by mouth 3 times daily as needed for Sleep or Anxiety for up to 30 days. Max Daily Amount: 1.5 mg, Disp: 90 tablet, Rfl: 0    albuterol-ipratropium (COMBIVENT RESPIMAT)  MCG/ACT AERS inhaler, Inhale 1 puff into the lungs every 6 hours, Disp: 4 g, Rfl: 5    pregabalin (LYRICA) 50 MG capsule, Take 1 capsule by mouth 3 times daily for 60 days. Max

## 2024-04-24 NOTE — TELEPHONE ENCOUNTER
Referral to [facility] Medication Management Smoking Cessation [facility] received from Dr. Ames for Smoking Cessation Counseling and Medication Management per Consult Agreement.       Called patient, left message with instructions for patient to call the clinic back at [phone], option 2.

## 2024-04-29 ENCOUNTER — TELEPHONE (OUTPATIENT)
Dept: FAMILY MEDICINE CLINIC | Age: 59
End: 2024-04-29

## 2024-04-29 ENCOUNTER — PATIENT MESSAGE (OUTPATIENT)
Dept: FAMILY MEDICINE CLINIC | Age: 59
End: 2024-04-29

## 2024-04-29 DIAGNOSIS — G89.29 CHRONIC MYOFASCIAL PAIN: ICD-10-CM

## 2024-04-29 DIAGNOSIS — G89.29 CHRONIC RIGHT SHOULDER PAIN: Primary | ICD-10-CM

## 2024-04-29 DIAGNOSIS — M79.18 CHRONIC MYOFASCIAL PAIN: ICD-10-CM

## 2024-04-29 DIAGNOSIS — M25.511 CHRONIC RIGHT SHOULDER PAIN: Primary | ICD-10-CM

## 2024-04-29 RX ORDER — HYDROCODONE BITARTRATE AND ACETAMINOPHEN 5; 325 MG/1; MG/1
1 TABLET ORAL EVERY 12 HOURS PRN
Qty: 60 TABLET | Refills: 0 | Status: SHIPPED | OUTPATIENT
Start: 2024-04-29 | End: 2024-04-30 | Stop reason: ALTCHOICE

## 2024-04-29 NOTE — TELEPHONE ENCOUNTER
From: Jacki Moura  To: Kyle Smith  Sent: 4/29/2024 11:23 AM EDT  Subject: Pain medicine     Hi Andrew, the pharmacy said they did not get the refill for the pain meds. I already talked to the ac and should be receiving my package about everything anytime. Also my  wants me to fill out intermittent FMLA paperwork so I'll drop those off Wednesday. Between my heart valve and adrenal gland I shouldn't have any problems with that. Thanks, Jacki

## 2024-04-29 NOTE — TELEPHONE ENCOUNTER
[facility] Her private [facility]) This was also sent in to the Korlym Support program, should we just disregard this denial?

## 2024-04-30 ENCOUNTER — TELEPHONE (OUTPATIENT)
Dept: FAMILY MEDICINE CLINIC | Age: 59
End: 2024-04-30

## 2024-04-30 DIAGNOSIS — M54.2 CHRONIC NECK PAIN: ICD-10-CM

## 2024-04-30 DIAGNOSIS — I65.23 CAROTID STENOSIS, NON-SYMPTOMATIC, BILATERAL: ICD-10-CM

## 2024-04-30 DIAGNOSIS — M47.817 SPONDYLOSIS WITHOUT MYELOPATHY OR RADICULOPATHY, LUMBOSACRAL REGION: ICD-10-CM

## 2024-04-30 DIAGNOSIS — F41.9 ANXIETY: ICD-10-CM

## 2024-04-30 DIAGNOSIS — G89.29 CHRONIC NECK PAIN: ICD-10-CM

## 2024-04-30 DIAGNOSIS — G47.01 INSOMNIA DUE TO MEDICAL CONDITION: Primary | ICD-10-CM

## 2024-04-30 RX ORDER — HYDROCODONE BITARTRATE AND ACETAMINOPHEN 5; 325 MG/1; MG/1
1 TABLET ORAL EVERY 8 HOURS PRN
Qty: 90 TABLET | Refills: 0 | Status: SHIPPED | OUTPATIENT
Start: 2024-04-30 | End: 2024-05-30

## 2024-04-30 RX ORDER — MELOXICAM 7.5 MG/1
7.5 TABLET ORAL DAILY
Qty: 90 TABLET | Refills: 0 | Status: SHIPPED | OUTPATIENT
Start: 2024-04-30 | End: 2024-07-29

## 2024-04-30 RX ORDER — ATORVASTATIN CALCIUM 10 MG/1
10 TABLET, FILM COATED ORAL DAILY
Qty: 90 TABLET | Refills: 0 | Status: SHIPPED | OUTPATIENT
Start: 2024-04-30

## 2024-04-30 RX ORDER — LORAZEPAM 1 MG/1
1 TABLET ORAL 3 TIMES DAILY PRN
Qty: 90 TABLET | Refills: 0 | Status: SHIPPED | OUTPATIENT
Start: 2024-04-30 | End: 2024-05-30

## 2024-04-30 NOTE — TELEPHONE ENCOUNTER
I would have patient call the [facility] support people.  I think they have a method to deal with these denials and may give the patient the medication anyway.

## 2024-04-30 NOTE — TELEPHONE ENCOUNTER
Darshana the pharmacist states she will delete the Norco Rx from today because pt  the Norco Rx from yesterday. Yesterday's directions were q 12 hrs, but pt is to take three times a day as mentioned below.  Left a message on pt's machine with Andrew instructions. If she has any questions she is to let us know.

## 2024-04-30 NOTE — PROGRESS NOTES
Patient is beginning treatment soon with Korlym.  The care coordinator recommended the following changes-    Atorvastatin reduced from 40 mg daily to 10 mg daily  Mobic dose reduced from 50 mg daily to 7.5 mg daily.  Patient switched from Xanax to Ativan.  Patient will not use her Airsupra inhaler.  Patient will stop taking amlodipine as her blood pressure has been fairly low anyway.  Patient will continue normal dosing of her Norco and we will monitor closely.

## 2024-04-30 NOTE — TELEPHONE ENCOUNTER
She can use the Norco 3 times a day and let me know when she is a couple days away from running out and I will add a new prescription with a special note to the pharmacy.

## 2024-04-30 NOTE — TELEPHONE ENCOUNTER
You sent in Norco for BID #60 yesterday and Norco one every 8 hours #90 today. She picked up the one from yesterday already. What do you want to do? Do you want her to call when she needs the other 30? Or have her make the 60 last a month?

## 2024-05-01 ENCOUNTER — HOSPITAL ENCOUNTER (OUTPATIENT)
Age: 59
Discharge: HOME | End: 2024-05-01
Payer: COMMERCIAL

## 2024-05-01 DIAGNOSIS — E24.9 HYPERCORTISOLISM (HCC): ICD-10-CM

## 2024-05-01 LAB
ANION GAP SERPL CALC-SCNC: 14 MEQ/L (ref 8–16)
BUN SERPL-MCNC: 22 MG/DL (ref 7–22)
CALCIUM SERPL-MCNC: 8.9 MG/DL (ref 8.5–10.5)
CHLORIDE SERPL-SCNC: 104 MEQ/L (ref 98–111)
CO2 SERPL-SCNC: 20 MEQ/L (ref 23–33)
CREAT SERPL-MCNC: 0.7 MG/DL (ref 0.4–1.2)
GFR SERPL CREATININE-BSD FRML MDRD: > 90 ML/MIN/1.73M2
GLUCOSE SERPL-MCNC: 107 MG/DL (ref 70–108)
MAGNESIUM SERPL-MCNC: 2.5 MG/DL (ref 1.6–2.4)
POTASSIUM SERPL-SCNC: 5.1 MEQ/L (ref 3.5–5.2)
SODIUM SERPL-SCNC: 138 MEQ/L (ref 135–145)
TSH SERPL DL<=0.005 MIU/L-ACNC: 1.97 UIU/ML (ref 0.4–4.2)

## 2024-05-01 PROCEDURE — 80048 BASIC METABOLIC PNL TOTAL CA: CPT

## 2024-05-01 PROCEDURE — 36415 COLL VENOUS BLD VENIPUNCTURE: CPT

## 2024-05-01 PROCEDURE — 83735 ASSAY OF MAGNESIUM: CPT

## 2024-05-01 PROCEDURE — 82627 DEHYDROEPIANDROSTERONE: CPT

## 2024-05-01 PROCEDURE — 84443 ASSAY THYROID STIM HORMONE: CPT

## 2024-05-02 ENCOUNTER — CARE COORDINATION (OUTPATIENT)
Dept: CARE COORDINATION | Age: 59
End: 2024-05-02

## 2024-05-02 NOTE — CARE COORDINATION
Ambulatory Care Coordination Note     2024 3:58 PM     Patient Current Location:  Home: [place] [address]     ACM contacted the patient by telephone. Verified name and  with patient as identifiers.         ACM: Radha Curiel RN     Challenges to be reviewed by the provider   Additional needs identified to be addressed with provider No  none               Method of communication with provider: none.    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 Is there every any indication for medical necessity for dental issues secondarily related to medical conditions    Was told by [facility] about 3 years ago and would need all teeth extracted. Thinks this would be including dentures. She does not remember being told she would need surgery to repair bone loss. Advised to either follow up with [facility] or [facility]. Discussed she will likely need a referral to an periodontist or oral surgeon for bone loss surgery. 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 Fractured in  but pain is worse last couple years. MRI done 2023 and was advised PT. PCP referred to UNRULY Guerra.    Emphysema - now est with pulmonology. Combivent Respimat ordered. Smoking cessation clinic referral. Cut down to 1/2 pack daily. Advised to cut down every couple days.    Plan -    PCP  Follow up with [facility] or [facility]   May need referral to oral surgeon or periodontist   Smoking cessation  CT lung screening  Combivent respimat  Report fast HR or symptoms ASAP  Continue cutting down on cigarettes by 1 every couple days then set a quit date  Early symptom recognition and reporting to prevent ED and admissions  Use same or next day appts at PCP office for non-emergency problems    Offered

## 2024-05-03 LAB — DHEA-S SERPL-MCNC: 16 UG/DL (ref 18.9–205)

## 2024-05-09 NOTE — PROGRESS NOTES
I saw and evaluated the patient. I personally obtained the key and critical portions of the history and physical exam or was physically present for key and critical portions performed by the resident/fellow. I reviewed the resident/fellow's documentation and discussed the patient with the resident/fellow. I agree with the resident/fellow's medical decision making as documented in the note.  VS noted.  Continue to monitor BP.  Appears healthy.  Reviewed OARRS, which is appropriate.    Lelo Ortega MD       Well Adult Note  Name: Dennys Peng Date: 2023   MRN: [id number] Sex: Female   Age: 62 y.o. Ethnicity: Non- / Non    : 1965 Race: White (non-)      Prashanth De Oliveira is here for well adult exam.  History:  Patient here with multiple complaints including chronic pain still associated with her back and neck and muscle spasms and cramping. Complaining of onychomycosis on all of her toes and would like treatment. Complains of insomnia and hydroxyzine not being effective to help her. Patient also due for lipid panel. Complains of chronic right shoulder pain for the last couple of years, and has not been addressed due to her acute lumbar problems and need for surgery. States she has trouble lifting it above the level plane, it aches and hurts most of the time, states she is concerned her grasp strength is being reduced when it is in certain positions. Review of Systems   Constitutional:  Negative for activity change, fatigue and fever. HENT:  Negative for congestion, ear pain, rhinorrhea, sore throat and trouble swallowing. Eyes:  Negative for pain, discharge and redness. Respiratory:  Negative for cough, shortness of breath and wheezing. Cardiovascular: Negative. Gastrointestinal:  Negative for abdominal pain, constipation, diarrhea, nausea and vomiting. Endocrine: Negative. Genitourinary:  Negative for dysuria, frequency and urgency. Musculoskeletal:  Positive for arthralgias, back pain, myalgias, neck pain and neck stiffness. Skin:  Positive for rash. Allergic/Immunologic: Negative. Neurological:  Negative for dizziness, tremors, weakness and headaches. Hematological: Negative. Psychiatric/Behavioral:  Negative for dysphoric mood and sleep disturbance. The patient is not nervous/anxious.       Allergies   Allergen Reactions    Pletal [Cilostazol] Other (See Comments)     Pt states numbness in tongue, heart palpitations, itchy, nausea

## 2024-05-14 ENCOUNTER — TELEPHONE (OUTPATIENT)
Dept: PULMONOLOGY | Age: 59
End: 2024-05-14

## 2024-05-14 NOTE — TELEPHONE ENCOUNTER
called and lvm for patient to notify her of normal a1a swab and advised her to call back with any questions

## 2024-05-14 NOTE — TELEPHONE ENCOUNTER
----- Message from Arcenio Ames MD sent at 5/13/2024  3:42 PM EDT -----  Results are normal, please notify patient.

## 2024-05-14 NOTE — RESULT ENCOUNTER NOTE
Arcenio Ames MD  5/13/2024  3:42 PM EDT Back to Top    Results are normal, please notify patient.    ----called and lvm for patient to notify her of normal a1a swab and advised her to call back with any questions

## 2024-05-15 ENCOUNTER — PATIENT MESSAGE (OUTPATIENT)
Dept: FAMILY MEDICINE CLINIC | Age: 59
End: 2024-05-15

## 2024-05-15 DIAGNOSIS — G89.29 CHRONIC MYOFASCIAL PAIN: ICD-10-CM

## 2024-05-15 DIAGNOSIS — M79.18 CHRONIC MYOFASCIAL PAIN: ICD-10-CM
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
## 2024-05-15 NOTE — TELEPHONE ENCOUNTER
From: Jacki Moura  To: Kyle Smith  Sent: 5/15/2024 1:13 PM EDT  Subject: Pain medicine     Hi Andrew,  I need a refill on the pain medication for three times a day like we discussed please. Look forward to seeing you next week to go over a few things. As always, thanks and see you soon.  Jacki

## 2024-05-16 RX ORDER — HYDROCODONE BITARTRATE AND ACETAMINOPHEN 5; 325 MG/1; MG/1
1 TABLET ORAL EVERY 8 HOURS PRN
Qty: 90 TABLET | Refills: 0 | Status: SHIPPED | OUTPATIENT
Start: 2024-05-16 | End: 2024-06-15

## 2024-05-22 ENCOUNTER — OFFICE VISIT (OUTPATIENT)
Dept: FAMILY MEDICINE CLINIC | Age: 59
End: 2024-05-22
Payer: COMMERCIAL

## 2024-05-22 VITALS
DIASTOLIC BLOOD PRESSURE: 64 MMHG | TEMPERATURE: 97.6 F | WEIGHT: 137 LBS | SYSTOLIC BLOOD PRESSURE: 102 MMHG | HEART RATE: 54 BPM | BODY MASS INDEX: 27.67 KG/M2

## 2024-05-22 DIAGNOSIS — D49.7 ADRENAL TUMOR: Primary | ICD-10-CM

## 2024-05-22 DIAGNOSIS — F41.9 ANXIETY: ICD-10-CM

## 2024-05-22 PROCEDURE — 3074F SYST BP LT 130 MM HG: CPT | Performed by: NURSE PRACTITIONER

## 2024-05-22 PROCEDURE — 3078F DIAST BP <80 MM HG: CPT | Performed by: NURSE PRACTITIONER

## 2024-05-22 PROCEDURE — 99213 OFFICE O/P EST LOW 20 MIN: CPT | Performed by: NURSE PRACTITIONER

## 2024-05-22 RX ORDER — ALPRAZOLAM 0.5 MG/1
0.5 TABLET ORAL 3 TIMES DAILY PRN
Qty: 90 TABLET | Refills: 0 | Status: SHIPPED | OUTPATIENT
Start: 2024-05-22 | End: 2024-06-21

## 2024-05-22 NOTE — PROGRESS NOTES
[facility] PROFESSIONAL SERVS  [facility]  [address] [address]  Dept: [phone]  Dept Fax: [phone]  Loc: [phone]     Visit Date:  5/22/2024      Patient:  Jacki Moura  YOB: 1965    HPI:     Chief Complaint   Patient presents with    1 Month Follow-Up     [facility] going to be seen by [facility]-thru her work, they will eval and if surgery needed would be 100% covered if they do it,     Discuss Medications     Discuss anxiety meds, ativan one not working well, bad dreams and thoughts, previous xanax  dc due korlym-xanax will need refill if put back on it---lipitor: should she be on 10mg or 40mg, metoprolol has only been taking 1 tab BID       Chief Complaint  Patient presents with  · 1 Month Follow-Up    Did get approval for Marvin, going to be seen by [facility]-thru her work, they will eval and if surgery needed would be 100% covered if they do it,   · Discuss Medications    Discuss anxiety meds, ativan one not working well, bad dreams and thoughts, previous xanax  dc due korlym-xanax will need refill if put back on it          Medications    Current Outpatient Medications:     ALPRAZolam (XANAX) 0.5 MG tablet, Take 1 tablet by mouth 3 times daily as needed for Sleep or Anxiety for up to 30 days. Max Daily Amount: 1.5 mg, Disp: 90 tablet, Rfl: 0    HYDROcodone-acetaminophen (NORCO) 5-325 MG per tablet, Take 1 tablet by mouth every 8 hours as needed for Pain for up to 30 days. Intended supply: 30 days. Take lowest dose possible to manage pain Max Daily Amount: 3 tablets, Disp: 90 tablet, Rfl: 0    meloxicam (MOBIC) 7.5 MG tablet, Take 1 tablet by mouth daily, Disp: 90 tablet, Rfl: 0    atorvastatin (LIPITOR) 10 MG tablet, Take 1 tablet by mouth daily, Disp: 90 tablet, Rfl: 0    omeprazole (PRILOSEC) 20 MG delayed release capsule, Take 1 capsule by mouth Daily, Disp: , Rfl:     pregabalin (LYRICA) 50 MG

## 2024-05-23 ENCOUNTER — CARE COORDINATION (OUTPATIENT)
Dept: CARE COORDINATION | Age: 59
End: 2024-05-23

## 2024-05-23 ASSESSMENT — ENCOUNTER SYMPTOMS
WHEEZING: 0
CONSTIPATION: 0
TROUBLE SWALLOWING: 0
SORE THROAT: 0
NAUSEA: 0
VOMITING: 0
ABDOMINAL PAIN: 0
EYE PAIN: 0
COUGH: 0
EYE DISCHARGE: 0
SHORTNESS OF BREATH: 0
ALLERGIC/IMMUNOLOGIC NEGATIVE: 1
BACK PAIN: 0
DIARRHEA: 0
RHINORRHEA: 0
EYE REDNESS: 0

## 2024-05-23 NOTE — CARE COORDINATION
Ambulatory Care Coordination Note     5/23/2024 9:45 AM     Patient outreach attempt by this ACM today to perform care management follow up . [facility] was unable to reach the patient by telephone today; left voice message requesting a return phone call to this ACM.     ACM: Radha Curiel RN     Care Summary Note:     Plan -   Follow up with [facility] or [facility]   May need referral to oral surgeon or periodontist   Smoking cessation  CT lung screening  Combivent respimat  Report fast HR or symptoms ASAP  Continue cutting down on cigarettes by 1 every couple days then set a quit date  Early symptom recognition and reporting to prevent ED and admissions  Use same or next day appts at PCP office for non-emergency problems       PCP/Specialist follow up:   Future Appointments         Provider Specialty Dept Phone    7/17/2024 1:00 PM (Arrive by 12:30 PM) STR CT IMAGING 1  [facility] [phone]    7/24/2024 1:00 PM Arcenio Ames MD Pulmonology [phone]    8/21/2024 1:10 PM (Arrive by 1:00 PM) [facility] DEXA  2 Radiology [phone]    9/5/2024 2:45 PM Teja Hayward MD Cardiology [phone]    9/18/2024 9:45 AM Raymond Noble MD Endocrinology [phone]    3/12/2025 11:00 AM Joycelyn Mcdermott, APRN - CNP Rheumatology [phone]            Follow Up:   Plan for next AC outreach in approximately 1 week to complete:  - CC Protocol assessments  - disease specific assessments  - advance care planning  - goal progression  - education .

## 2024-05-30 ENCOUNTER — CARE COORDINATION (OUTPATIENT)
Dept: CARE COORDINATION | Age: 59
End: 2024-05-30

## 2024-05-30 NOTE — CARE COORDINATION
Ambulatory Care Coordination Note     5/30/2024 12:00 PM     patient outreach attempt by this ACM today to perform care management follow up . AC was unable to reach the patient by telephone today; left voice message requesting a return phone call to this ACM.     ACM: Radha Curiel RN     Care Summary Note:     Plan -   Follow up with [facility] or [facility]   May need referral to oral surgeon or periodontist   Smoking cessation  CT lung screening  Combivent respimat  Report fast HR or symptoms ASAP  Continue cutting down on cigarettes by 1 every couple days then set a quit date  Early symptom recognition and reporting to prevent ED and admissions  Use same or next day appts at PCP office for non-emergency problems    PCP/Specialist follow up:   Future Appointments         Provider Specialty Dept Phone    7/17/2024 1:00 PM (Arrive by 12:30 PM) STR CT IMAGING 1  [facility] Radiology [phone]    7/24/2024 1:00 PM Arcenio Ames MD Pulmonology [phone]    8/21/2024 1:10 PM (Arrive by 1:00 PM) [facility] DEXA  2 Radiology [phone]    9/5/2024 2:45 PM Teja Hayward MD Cardiology [phone]    9/18/2024 9:45 AM Raymond Noble MD Endocrinology [phone]    3/12/2025 11:00 AM Joycelyn Mcdermott, APRN - CNP Rheumatology [phone]            Follow Up:   Plan for next AC outreach in approximately 1 week to complete:  - CC Protocol assessments  - disease specific assessments  - goal progression  - education .

## 2024-06-06 ENCOUNTER — CARE COORDINATION (OUTPATIENT)
Dept: CARE COORDINATION | Age: 59
End: 2024-06-06

## 2024-06-06 NOTE — CARE COORDINATION
Ambulatory Care Coordination Note     2024 9:37 AM     Patient Current Location:  [place]     ACM contacted the patient by telephone. Verified name and  with patient as identifiers.         ACM: Radha Curiel RN     Challenges to be reviewed by the provider   Additional needs identified to be addressed with provider No  none               Method of communication with provider: none.    Care Summary Note:     PCP managing anxiety. Taking xanax.    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 Is there every any indication for medical necessity for dental issues secondarily related to medical conditions     Was told by [facility] about 3 years ago and would need all teeth extracted. Thinks this would be including dentures. She does not remember being told she would need surgery to repair bone loss. Advised to either follow up with [facility] or [facility]. Discussed she will likely need a referral to an periodontist or oral surgeon for bone loss surgery. 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 Fractured in  but pain is worse last couple years. MRI done 2023 and was advised PT. PCP referred to UNRULY Guerra.     Emphysema - now est with pulmonology. Combivent Respimat ordered. Smoking cessation clinic referral. Cut down to 1/2 pack daily. Advised to cut down every couple days.    Plan -   Follow up with [facility] or [facility]   May need referral to oral surgeon or periodontist   Smoking cessation  CT lung screening  Combivent respimat  Report fast HR or symptoms ASAP  Continue cutting down on cigarettes by 1 every couple days then set a quit date  Early symptom recognition and reporting to prevent ED and admissions  Use same or next day appts at PCP office for non-emergency problems       Offered

## 2024-06-12 ENCOUNTER — PATIENT MESSAGE (OUTPATIENT)
Dept: FAMILY MEDICINE CLINIC | Age: 59
End: 2024-06-12

## 2024-06-12 DIAGNOSIS — R25.2 MUSCLE CRAMPS: ICD-10-CM

## 2024-06-12 DIAGNOSIS — G89.29 CHRONIC MYOFASCIAL PAIN: ICD-10-CM

## 2024-06-12 DIAGNOSIS — M79.18 CHRONIC MYOFASCIAL PAIN: ICD-10-CM

## 2024-06-12 NOTE — TELEPHONE ENCOUNTER
Sw mailed out 2 copies of ACP documents to pt and spouse and will follow up to assist in completing.

## 2024-06-12 NOTE — TELEPHONE ENCOUNTER
LOV: 5/22/2024  [facility]   Last fill: 5/16/24; #90 0 refills    Sent [facility] message to schedule follow-up   HOLD UNTIL 6/14/24

## 2024-06-13 DIAGNOSIS — R20.0 NUMBNESS AND TINGLING OF LEFT LEG: ICD-10-CM

## 2024-06-13 DIAGNOSIS — M54.16 CHRONIC RADICULAR LUMBAR PAIN: ICD-10-CM

## 2024-06-13 DIAGNOSIS — R20.2 NUMBNESS AND TINGLING OF LEFT LEG: ICD-10-CM

## 2024-06-13 DIAGNOSIS — G89.29 CHRONIC RADICULAR LUMBAR PAIN: ICD-10-CM

## 2024-06-13 RX ORDER — CYCLOBENZAPRINE HCL 10 MG
10 TABLET ORAL 3 TIMES DAILY PRN
Qty: 90 TABLET | Refills: 1 | Status: SHIPPED | OUTPATIENT
Start: 2024-06-13

## 2024-06-13 RX ORDER — PREGABALIN 50 MG/1
50 CAPSULE ORAL 3 TIMES DAILY
Qty: 90 CAPSULE | Refills: 1 | Status: SHIPPED | OUTPATIENT
Start: 2024-06-13 | End: 2024-08-12

## 2024-06-14 RX ORDER — HYDROCODONE BITARTRATE AND ACETAMINOPHEN 5; 325 MG/1; MG/1
1 TABLET ORAL EVERY 8 HOURS PRN
Qty: 90 TABLET | Refills: 0 | Status: SHIPPED | OUTPATIENT
Start: 2024-06-14 | End: 2024-07-14

## 2024-06-18 ENCOUNTER — CARE COORDINATION (OUTPATIENT)
Dept: CARE COORDINATION | Age: 59
End: 2024-06-18

## 2024-06-19 ENCOUNTER — OFFICE VISIT (OUTPATIENT)
Dept: FAMILY MEDICINE CLINIC | Age: 59
End: 2024-06-19
Payer: COMMERCIAL

## 2024-06-19 VITALS
OXYGEN SATURATION: 97 % | DIASTOLIC BLOOD PRESSURE: 50 MMHG | BODY MASS INDEX: 27.06 KG/M2 | RESPIRATION RATE: 16 BRPM | SYSTOLIC BLOOD PRESSURE: 94 MMHG | WEIGHT: 134 LBS | TEMPERATURE: 97.5 F | HEART RATE: 71 BPM

## 2024-06-19 DIAGNOSIS — R73.03: ICD-10-CM

## 2024-06-19 DIAGNOSIS — M48.062 SPINAL STENOSIS, LUMBAR REGION WITH NEUROGENIC CLAUDICATION: ICD-10-CM

## 2024-06-19 DIAGNOSIS — E24.9 HYPERCORTISOLISM (HCC): ICD-10-CM

## 2024-06-19 DIAGNOSIS — F41.9 ANXIETY: Primary | ICD-10-CM

## 2024-06-19 DIAGNOSIS — D49.7 HYPERCORTISOLISM DUE TO ADRENAL NEOPLASM (HCC): ICD-10-CM

## 2024-06-19 DIAGNOSIS — E24.8 HYPERCORTISOLISM DUE TO ADRENAL NEOPLASM (HCC): ICD-10-CM

## 2024-06-19 PROBLEM — E10.A1: Status: ACTIVE | Noted: 2024-06-19

## 2024-06-19 PROCEDURE — 3074F SYST BP LT 130 MM HG: CPT | Performed by: NURSE PRACTITIONER

## 2024-06-19 PROCEDURE — 3078F DIAST BP <80 MM HG: CPT | Performed by: NURSE PRACTITIONER

## 2024-06-19 PROCEDURE — 99214 OFFICE O/P EST MOD 30 MIN: CPT | Performed by: NURSE PRACTITIONER

## 2024-06-19 RX ORDER — ALPRAZOLAM 0.5 MG/1
0.5 TABLET ORAL 3 TIMES DAILY PRN
Qty: 90 TABLET | Refills: 0 | Status: SHIPPED | OUTPATIENT
Start: 2024-06-19 | End: 2024-07-19

## 2024-06-19 ASSESSMENT — ENCOUNTER SYMPTOMS
TROUBLE SWALLOWING: 0
EYE PAIN: 0
RHINORRHEA: 0
VOMITING: 0
EYE REDNESS: 0
SORE THROAT: 0
COUGH: 0
BACK PAIN: 0
DIARRHEA: 0
ABDOMINAL PAIN: 0
ALLERGIC/IMMUNOLOGIC NEGATIVE: 1
EYE DISCHARGE: 0
SHORTNESS OF BREATH: 0
NAUSEA: 0
CONSTIPATION: 0
WHEEZING: 0

## 2024-06-19 NOTE — PROGRESS NOTES
[facility]  [facility]  [address] [id number]  Dept: [phone]  Dept Fax: [phone]  Loc: [phone]     Visit Date:  6/19/2024      Patient:  Jacki Moura  YOB: 1965    HPI:     Chief Complaint   Patient presents with    3 Month Follow-Up     Med refills, has talked with [employer] at her work-started with tumor before shoulder       Patient here for regular 3-month periodic follow-up on ADD/ADHD.  States the medication is having the intended effect with increased concentration, decreased anxiety and concern, with no indication of side effects such as tachycardia, shortness of breath, chest pain, insomnia, or addiction.     Patient is back to work with the allowance to take 3 days off a month due to her medical conditions.  She is in the process of working with [employer] to get her Korlym started for her hypercortisolism.    She is having low blood pressure problems ever since she was put on metoprolol in the hospital for tachycardia.        Medications    Current Outpatient Medications:     ALPRAZolam (XANAX) 0.5 MG tablet, Take 1 tablet by mouth 3 times daily as needed for Sleep or Anxiety for up to 30 days. Max Daily Amount: 1.5 mg, Disp: 90 tablet, Rfl: 0    HYDROcodone-acetaminophen (NORCO) 5-325 MG per tablet, Take 1 tablet by mouth every 8 hours as needed for Pain for up to 30 days. Intended supply: 30 days. Take lowest dose possible to manage pain Max Daily Amount: 3 tablets, Disp: 90 tablet, Rfl: 0    cyclobenzaprine (FLEXERIL) 10 MG tablet, Take 1 tablet by mouth 3 times daily as needed for Muscle spasms, Disp: 90 tablet, Rfl: 1    pregabalin (LYRICA) 50 MG capsule, Take 1 capsule by mouth 3 times daily for 60 days. Max Daily Amount: 150 mg, Disp: 90 capsule, Rfl: 1    meloxicam (MOBIC) 7.5 MG tablet, Take 1 tablet by mouth daily, Disp: 90 tablet, Rfl: 0    atorvastatin (LIPITOR) 10 MG tablet, Take 1 tablet by

## 2024-06-20 ENCOUNTER — TELEPHONE (OUTPATIENT)
Dept: RHEUMATOLOGY | Age: 59
End: 2024-06-20

## 2024-06-20 ENCOUNTER — CARE COORDINATION (OUTPATIENT)
Dept: CARE COORDINATION | Age: 59
End: 2024-06-20

## 2024-06-20 NOTE — CARE COORDINATION
TAMIE called pt to follow up with ACP documents that were mailed to her. Pt unavailable at the time of my call and vm was left requesting a call back to [phone].

## 2024-06-20 NOTE — TELEPHONE ENCOUNTER
PROVIDER FEEDBACK LOOP CALLED 3X     Patient:Jacki Moura  : 1965  Referring Provider: ANIYAH NGUYEN  Referral Type:  Treatment Plan and Therapy Plan     Procedures:   - TN ZOLEDRONIC ACID 1MG  Date Service Ordered 3/13/2024        We were unable to reach Jacki Moura to schedule the test ordered by your office after 3 outreach attempts via either text, email and/or phone call.  Please call/follow up with your patient to explain the significance of the ordered test and direct the patient to call Central Scheduling to schedule the test at their earliest convenience.     Please complete one of the following actions from Quick Actions buttons:     Route to Provider:  Route message to ordering provider to seek next steps in care plan.     Telephone Encounter:  Telephone encounter will open.  Call patient to explain significance of ordered test and direct patient to call Central Scheduling to schedule test then Document details of call.     Open Referral:  Review referral notes or details if needed.     Close Referral:  Referral will open.  Document in Notes section of referral why the referral is being closed.  Examples of referral closure:  Patient had test done outside of of an office in the [facility], Patient refuses test, Patient no longer having symptoms, Unable to reach patient.  Only close the referral if you are sure the test will not proceed.     Thank you     Pre-Access Scheduling Team        Attempted to reach. LVM informing pt of central schedulings number to schedule for infusion.

## 2024-06-25 ENCOUNTER — CARE COORDINATION (OUTPATIENT)
Dept: CARE COORDINATION | Age: 59
End: 2024-06-25

## 2024-06-25 NOTE — CARE COORDINATION
Ambulatory Care Coordination Note     6/25/2024 12:27 PM     Patient outreach attempt by this ACM today to perform care management follow up . ACM was unable to reach the patient by telephone today; left voice message requesting a return phone call to this ACM.     ACM: Radha Curiel RN     Care Summary Note:     Plan -   Follow up with [facility] or [facility]   May need referral to oral surgeon or periodontist   Smoking cessation  CT lung screening  Combivent respimat  Report fast HR or symptoms ASAP  Continue cutting down on cigarettes by 1 every couple days then set a quit date  Early symptom recognition and reporting to prevent ED and admissions  Use same or next day appts at PCP office for non-emergency problems    PCP/Specialist follow up:   Future Appointments         Provider Specialty Dept Phone    6/26/2024 1:40 PM (Arrive by 1:30 PM) STR MAMMOGRAPHY RM3 [facility] Radiology [phone]    7/17/2024 1:00 PM (Arrive by 12:30 PM) STR CT IMAGING RM1  [facility] [phone]    7/24/2024 1:00 PM Arcenio Ames MD Pulmonology [phone]    8/21/2024 1:10 PM (Arrive by 1:00 PM) [facility] DEXA RM 2 Radiology [phone]    9/5/2024 2:45 PM Teja Hayward MD Cardiology [phone]    3/12/2025 11:00 AM Joycelyn Mcdermott, APRN - CNP Rheumatology [phone]            Follow Up:   Plan for next AC outreach in approximately 1 week to complete:  - CC Protocol assessments  - disease specific assessments  - goal progression  - education .

## 2024-06-26 ENCOUNTER — HOSPITAL ENCOUNTER (OUTPATIENT)
Dept: WOMENS IMAGING | Age: 59
Discharge: HOME | End: 2024-06-26
Payer: COMMERCIAL

## 2024-06-26 DIAGNOSIS — Z12.31 ENCOUNTER FOR SCREENING MAMMOGRAM FOR MALIGNANT NEOPLASM OF BREAST: ICD-10-CM

## 2024-06-26 PROCEDURE — 77063 BREAST TOMOSYNTHESIS BI: CPT

## 2024-06-27 ENCOUNTER — CARE COORDINATION (OUTPATIENT)
Dept: CARE COORDINATION | Age: 59
End: 2024-06-27

## 2024-06-27 NOTE — CARE COORDINATION
SW called to follow up with ACP documents. Pt unavailable and 2nd  was left requesting a call back to [phone].

## 2024-06-30 DIAGNOSIS — I10 ESSENTIAL HYPERTENSION: ICD-10-CM

## 2024-07-01 RX ORDER — AMLODIPINE BESYLATE 10 MG/1
10 TABLET ORAL DAILY
Qty: 90 TABLET | Refills: 3 | Status: ON HOLD | OUTPATIENT
Start: 2024-07-01

## 2024-07-02 ENCOUNTER — CARE COORDINATION (OUTPATIENT)
Dept: CARE COORDINATION | Age: 59
End: 2024-07-02

## 2024-07-02 NOTE — CARE COORDINATION
Ambulatory Care Coordination Note     2024 10:47 AM     Patient Current Location:  Home: [place] [address]     ACM contacted the patient by telephone. Verified name and  with patient as identifiers.         ACM: Radha Curiel RN     Challenges to be reviewed by the provider   Additional needs identified to be addressed with provider No  none               Method of communication with provider: none.    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 Is there every any indication for medical necessity for dental issues secondarily related to medical conditions     Was told by [facility] about 3 years ago and would need all teeth extracted. Thinks this would be including dentures. She does not remember being told she would need surgery to repair bone loss. Advised to either follow up with [facility] or [facility]. Discussed she will likely need a referral to an periodontist or oral surgeon for bone loss surgery. Could possible be covered until medical since complication from medical problem. Has not called [facility] yet. Agreed to call today to schedule.      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 Fractured in  but pain is worse last couple years. MRI done 2023 and was advised PT. PCP referred to UNRULY Guerra. Balance needs paid before they will schedule her. She did see a surgeon in [place] in past, Orth[facility].      Emphysema - now est with pulmonology. Combivent Respimat ordered. Smoking cessation clinic referral. Cut down to 1/2 pack daily. Advised to cut down every couple days.    Wants adrenal gland removed. Working with employer to get this covered by [employer].     Reports flu like symptoms for 2 days. Denies N/V/D or SOB. Fever, chills, body aches. Declines PCP appt. Advised to report new or worsening ASAP    Plan -    CT lung

## 2024-07-05 ENCOUNTER — CARE COORDINATION (OUTPATIENT)
Dept: CARE COORDINATION | Age: 59
End: 2024-07-05

## 2024-07-05 ENCOUNTER — APPOINTMENT (OUTPATIENT)
Dept: GENERAL RADIOLOGY | Age: 59
End: 2024-07-05
Payer: COMMERCIAL

## 2024-07-05 ENCOUNTER — APPOINTMENT (OUTPATIENT)
Dept: CT IMAGING | Age: 59
End: 2024-07-05
Payer: COMMERCIAL

## 2024-07-05 ENCOUNTER — HOSPITAL ENCOUNTER (INPATIENT)
Age: 59
LOS: 6 days | Discharge: HOME | End: 2024-07-11
Attending: FAMILY MEDICINE
Payer: COMMERCIAL

## 2024-07-05 DIAGNOSIS — R79.89 ELEVATED TROPONIN: ICD-10-CM

## 2024-07-05 DIAGNOSIS — I21.4 NSTEMI, INITIAL EPISODE OF CARE (HCC): ICD-10-CM

## 2024-07-05 DIAGNOSIS — R07.9 CHEST PAIN: ICD-10-CM

## 2024-07-05 DIAGNOSIS — E87.5 HYPERKALEMIA: ICD-10-CM

## 2024-07-05 DIAGNOSIS — I21.4 NSTEMI (NON-ST ELEVATED MYOCARDIAL INFARCTION) (HCC): ICD-10-CM

## 2024-07-05 DIAGNOSIS — R00.0 TACHYCARDIA: ICD-10-CM

## 2024-07-05 DIAGNOSIS — R07.9 CHEST PAIN, UNSPECIFIED TYPE: Primary | ICD-10-CM

## 2024-07-05 DIAGNOSIS — I42.9 CARDIOMYOPATHY (HCC): ICD-10-CM

## 2024-07-05 LAB
ALBUMIN SERPL BCG-MCNC: 3.9 G/DL (ref 3.5–5.1)
ALP SERPL-CCNC: 118 U/L (ref 38–126)
ALT SERPL W/O P-5'-P-CCNC: 16 U/L (ref 11–66)
ANION GAP SERPL CALC-SCNC: 16 MEQ/L (ref 8–16)
AST SERPL-CCNC: 12 U/L (ref 5–40)
BASOPHILS ABSOLUTE: 0.1 THOU/MM3 (ref 0–0.1)
BASOPHILS NFR BLD AUTO: 0.6 %
BILIRUB SERPL-MCNC: 0.2 MG/DL (ref 0.3–1.2)
BUN SERPL-MCNC: 11 MG/DL (ref 7–22)
CALCIUM SERPL-MCNC: 9.1 MG/DL (ref 8.5–10.5)
CHLORIDE SERPL-SCNC: 103 MEQ/L (ref 98–111)
CO2 SERPL-SCNC: 21 MEQ/L (ref 23–33)
CREAT SERPL-MCNC: 0.8 MG/DL (ref 0.4–1.2)
D DIMER PPP IA.FEU-MCNC: 1605 NG/ML FEU (ref 0–500)
DEPRECATED RDW RBC AUTO: 46.2 FL (ref 35–45)
EKG ATRIAL RATE: 120 BPM
EKG P AXIS: 63 DEGREES
EKG P-R INTERVAL: 152 MS
EKG Q-T INTERVAL: 300 MS
EKG QRS DURATION: 76 MS
EKG QTC CALCULATION (BAZETT): 424 MS
EKG R AXIS: 28 DEGREES
EKG T AXIS: 63 DEGREES
EKG VENTRICULAR RATE: 120 BPM
EOSINOPHIL NFR BLD AUTO: 1.9 %
EOSINOPHILS ABSOLUTE: 0.2 THOU/MM3 (ref 0–0.4)
ERYTHROCYTE [DISTWIDTH] IN BLOOD BY AUTOMATED COUNT: 13.2 % (ref 11.5–14.5)
GFR SERPL CREATININE-BSD FRML MDRD: 85 ML/MIN/1.73M2
GLUCOSE SERPL-MCNC: 94 MG/DL (ref 70–108)
HCT VFR BLD AUTO: 32.3 % (ref 37–47)
HGB BLD-MCNC: 10.3 GM/DL (ref 12–16)
IMM GRANULOCYTES # BLD AUTO: 0.11 THOU/MM3 (ref 0–0.07)
IMM GRANULOCYTES NFR BLD AUTO: 1 %
LACTIC ACID, SEPSIS: 1.1 MMOL/L (ref 0.5–1.9)
LYMPHOCYTES ABSOLUTE: 1.8 THOU/MM3 (ref 1–4.8)
LYMPHOCYTES NFR BLD AUTO: 16.6 %
MCH RBC QN AUTO: 30.6 PG (ref 26–33)
MCHC RBC AUTO-ENTMCNC: 31.9 GM/DL (ref 32.2–35.5)
MCV RBC AUTO: 95.8 FL (ref 81–99)
MONOCYTES ABSOLUTE: 1 THOU/MM3 (ref 0.4–1.3)
MONOCYTES NFR BLD AUTO: 9.7 %
NEUTROPHILS ABSOLUTE: 7.6 THOU/MM3 (ref 1.8–7.7)
NEUTROPHILS NFR BLD AUTO: 70.2 %
NRBC BLD AUTO-RTO: 0 /100 WBC
NT-PROBNP SERPL IA-MCNC: 811.6 PG/ML (ref 0–124)
OSMOLALITY SERPL CALC.SUM OF ELEC: 278.6 MOSMOL/KG (ref 275–300)
PLATELET # BLD AUTO: 303 THOU/MM3 (ref 130–400)
PMV BLD AUTO: 10.5 FL (ref 9.4–12.4)
POTASSIUM SERPL-SCNC: 4.3 MEQ/L (ref 3.5–5.2)
PROT SERPL-MCNC: 7.1 G/DL (ref 6.1–8)
RBC # BLD AUTO: 3.37 MILL/MM3 (ref 4.2–5.4)
SODIUM SERPL-SCNC: 140 MEQ/L (ref 135–145)
TROPONIN, HIGH SENSITIVITY: < 6 NG/L (ref 0–12)
WBC # BLD AUTO: 10.8 THOU/MM3 (ref 4.8–10.8)

## 2024-07-05 PROCEDURE — 71045 X-RAY EXAM CHEST 1 VIEW: CPT

## 2024-07-05 PROCEDURE — 6370000000 HC RX 637 (ALT 250 FOR IP): Performed by: FAMILY MEDICINE

## 2024-07-05 PROCEDURE — 83605 ASSAY OF LACTIC ACID: CPT

## 2024-07-05 PROCEDURE — 85379 FIBRIN DEGRADATION QUANT: CPT

## 2024-07-05 PROCEDURE — 93005 ELECTROCARDIOGRAM TRACING: CPT | Performed by: FAMILY MEDICINE

## 2024-07-05 PROCEDURE — 93010 ELECTROCARDIOGRAM REPORT: CPT | Performed by: INTERNAL MEDICINE

## 2024-07-05 PROCEDURE — 99285 EMERGENCY DEPT VISIT HI MDM: CPT

## 2024-07-05 PROCEDURE — 80053 COMPREHEN METABOLIC PANEL: CPT

## 2024-07-05 PROCEDURE — 1200000003 HC TELEMETRY R&B

## 2024-07-05 PROCEDURE — 83880 ASSAY OF NATRIURETIC PEPTIDE: CPT

## 2024-07-05 PROCEDURE — 84484 ASSAY OF TROPONIN QUANT: CPT

## 2024-07-05 PROCEDURE — 36415 COLL VENOUS BLD VENIPUNCTURE: CPT

## 2024-07-05 PROCEDURE — 85025 COMPLETE CBC W/AUTO DIFF WBC: CPT

## 2024-07-05 PROCEDURE — 6360000004 HC RX CONTRAST MEDICATION: Performed by: FAMILY MEDICINE

## 2024-07-05 PROCEDURE — 71275 CT ANGIOGRAPHY CHEST: CPT

## 2024-07-05 RX ORDER — ASPIRIN 81 MG/1
243 TABLET, CHEWABLE ORAL ONCE
Status: COMPLETED | OUTPATIENT
Start: 2024-07-05 | End: 2024-07-05

## 2024-07-05 RX ORDER — NITROGLYCERIN 0.4 MG/1
0.4 TABLET SUBLINGUAL EVERY 5 MIN PRN
Status: DISCONTINUED | OUTPATIENT
Start: 2024-07-05 | End: 2024-07-11 | Stop reason: HOSPADM

## 2024-07-05 RX ADMIN — ASPIRIN 81 MG 243 MG: 81 TABLET ORAL at 18:51

## 2024-07-05 RX ADMIN — IOPAMIDOL 80 ML: 755 INJECTION, SOLUTION INTRAVENOUS at 20:12

## 2024-07-05 RX ADMIN — NITROGLYCERIN 0.4 MG: 0.4 TABLET, ORALLY DISINTEGRATING SUBLINGUAL at 20:08

## 2024-07-05 RX ADMIN — NITROGLYCERIN 0.4 MG: 0.4 TABLET, ORALLY DISINTEGRATING SUBLINGUAL at 18:52

## 2024-07-05 RX ADMIN — NITROGLYCERIN 0.4 MG: 0.4 TABLET, ORALLY DISINTEGRATING SUBLINGUAL at 19:41

## 2024-07-05 ASSESSMENT — PAIN - FUNCTIONAL ASSESSMENT
PAIN_FUNCTIONAL_ASSESSMENT: 0-10

## 2024-07-05 ASSESSMENT — PAIN DESCRIPTION - DESCRIPTORS
DESCRIPTORS: ACHING
DESCRIPTORS: ACHING

## 2024-07-05 ASSESSMENT — PAIN DESCRIPTION - LOCATION
LOCATION: CHEST;SHOULDER
LOCATION: CHEST
LOCATION: CHEST;SHOULDER
LOCATION: CHEST

## 2024-07-05 ASSESSMENT — PAIN DESCRIPTION - ORIENTATION
ORIENTATION: MID;LEFT
ORIENTATION: LEFT
ORIENTATION: LEFT;MID
ORIENTATION: LEFT;MID
ORIENTATION: LEFT
ORIENTATION: LEFT;MID

## 2024-07-05 ASSESSMENT — PAIN SCALES - GENERAL
PAINLEVEL_OUTOF10: 5
PAINLEVEL_OUTOF10: 2
PAINLEVEL_OUTOF10: 3
PAINLEVEL_OUTOF10: 5
PAINLEVEL_OUTOF10: 9
PAINLEVEL_OUTOF10: 1
PAINLEVEL_OUTOF10: 6
PAINLEVEL_OUTOF10: 2

## 2024-07-05 ASSESSMENT — HEART SCORE: ECG: NON-SPECIFC REPOLARIZATION DISTURBANCE/LBTB/PM

## 2024-07-05 NOTE — ED TRIAGE NOTES
Pt to the ED through triage with c/o left arm pain that started around 1730 today and radiates into her chest. Pt rates pain 9/10 at this time. Pt states she has valve regurgitation and follows with Dr. Mendoza. Pt states she has never felt pain like this before. EKG obtained upon arrival. Vitals stable with telemetry in place.

## 2024-07-05 NOTE — ED NOTES
ED to inpatient nurses report      Chief Complaint:  Chief Complaint   Patient presents with    Chest Pain     Present to ED from: HOME    MOA:     LOC: alert and orientated to name, place, date  Mobility: Independent  Oxygen Baseline: ROOM AIR    Current needs required: NONE     Code Status:   Prior    What abnormal results were found and what did you give/do to treat them? CHEST PAIN  Any procedures or intervention occur? ASA, NITRO    Mental Status:  Level of Consciousness: Alert (0)    Psych Assessment:        Vitals:  Patient Vitals for the past 24 hrs:   BP Temp Temp src Pulse Resp SpO2 Height Weight   24 1853 (!) 149/99 -- -- (!) 108 20 99 % -- --   24 1809 (!) 149/92 98.1 °F (36.7 °C) Oral (!) 115 20 96 % 1.499 m (4' 11\") 61.7 kg (136 lb)        LDAs:   Peripheral IV 24 Left Antecubital (Active)   Site Assessment Clean, dry & intact 24   Line Status Blood return noted;Flushed 24   Phlebitis Assessment No symptoms 24   Infiltration Assessment 0 24   Dressing Status New dressing applied 24   Dressing Type Transparent 24   Dressing Intervention New 24       Ambulatory Status:  No data recorded    Diagnosis:  DISPOSITION     Final diagnoses:   Chest pain, unspecified type        Consults:  None     Pain Score:  Pain Assessment  Pain Assessment: 0-10  Pain Level: 6  Patient's Stated Pain Goal: 7  Pain Location: Chest    C-SSRS:   Risk of Suicide: No Risk    Sepsis Screening:       Yuridia Fall Risk:       Swallow Screening        Preferred Language:   English      ALLERGIES     Pletal [cilostazol], Bactrim [sulfamethoxazole-trimethoprim], Codeine, Medrol [methylprednisolone], Sulfa antibiotics, Tramadol, and Ultram [tramadol hcl]    SURGICAL HISTORY       Past Surgical History:   Procedure Laterality Date    BACK SURGERY       SECTION      x 4    COLONOSCOPY      DILATION AND CURETTAGE OF UTERUS

## 2024-07-05 NOTE — ED PROVIDER NOTES
EMERGENCY DEPARTMENT ENCOUNTER     CHIEF COMPLAINT   Chief Complaint   Patient presents with    Chest Pain        HPI   Jacki Moura is a 59 y.o. female former 1.5-packs a day smoker (now down to 0.5 pack/day) who presents with chest pain with diaphoresis and radiation down her left arm, onset was 1 hour PTA. The duration has been constant but letting up significantly since its apex. Pt denies any antecedent exertional syncope or chest pain. Pt came to the ED for evaluation. She had a prior admission in January for chest pain, found to have moderate to severe aortic regurgitation. The trigger for the pain is none. Pt denies any recent illness, hemoptysis, or leg swelling.    REVIEW OF SYSTEMS   Cardiac: +Chest Pain, Denies syncope   Respiratory: Denies cough or hemoptysis   GI: Denies Vomiting or Diarrhea   General: Denies Fever   All other review of systems are reviewed and are otherwise negative.     PAST MEDICAL & SURGICAL HISTORY   Past Medical History:   Diagnosis Date    Arthritis     Cushing's disease (HCC)     Hyperlipidemia     Hypertension       Past Surgical History:   Procedure Laterality Date    BACK SURGERY       SECTION      x 4    COLONOSCOPY      DILATION AND CURETTAGE OF UTERUS      ENDOSCOPY, COLON, DIAGNOSTIC      FEMORAL ENDARTERECTOMY Left 2021    LEFT  FEMORAL ARTERY ENDARTERECTOMY performed by Zhao Gilliland MD at [facility] OR    FOOT SURGERY      bone spurs removed both feet    KNEE SURGERY Left     reconstruction of knee    LUMBAR DISCECTOMY  2020    Dr. Gamino    SKIN BIOPSY      TRANSESOPHAGEAL ECHOCARDIOGRAM N/A 1/10/2024    TRANSESOPHAGEAL ECHOCARDIOGRAM performed by Teja Hayward MD at [facility] ENDOSCOPY        CURRENT MEDICATIONS   Current Outpatient Rx   Medication Sig Dispense Refill    amLODIPine (NORVASC) 10 MG tablet Take 1 tablet by mouth daily 90 tablet 3    ALPRAZolam (XANAX) 0.5 MG tablet Take 1 tablet by mouth 3 times daily as needed for Sleep or Anxiety  Cigarette smoking  Risk Factors: 2  Troponin: 0  Heart Score Total: 5    Therefore, despite pt's negative CTPE study, based on pt's many co-morbidities, pt will be admitted for further evaluation. She was persistently tachycardic without any known etiology.    Pertinent Labs & Imaging studies reviewed and interpreted. (See chart for details)   See chart for details of medications given during the ED stay.   Vitals:    07/05/24 2220   BP: (!) 167/72   Pulse: (!) 121   Resp: 16   Temp:    SpO2: 97%        Final disposition: Admit to medicine service (discussed case with Juan J Branch).    Electronically signed by: Anthony Vincent MD, 7/5/2024 10:52 PM   (This note was completed with a voice recognition program)         Anthony Vincent MD  07/05/24 2134

## 2024-07-05 NOTE — CARE COORDINATION
TAMIE made 3rd attempt to contact pt to discuss ACP. TAMIE has left several vm requesting a call back to [phone] to assist in completing. No return calls were made. Will resolve at this time.

## 2024-07-05 NOTE — ED NOTES
Pt up to bathroom via wheelchair with this RN. Pt back in bed with family at bedside, no requests at this time. Pt is breathing regular and unlabored on room air. Pt has no signs of distress to note other than left mid chest pain that goes to left shoulder rated 5/10 at this time, pt is tachycardic as well. Pt call light within reach.

## 2024-07-06 ENCOUNTER — APPOINTMENT (OUTPATIENT)
Age: 59
End: 2024-07-06
Payer: COMMERCIAL

## 2024-07-06 LAB
ANION GAP SERPL CALC-SCNC: 13 MEQ/L (ref 8–16)
APTT PPP: 31.6 SECONDS (ref 22–38)
BUN SERPL-MCNC: 9 MG/DL (ref 7–22)
CALCIUM SERPL-MCNC: 9 MG/DL (ref 8.5–10.5)
CHLORIDE SERPL-SCNC: 102 MEQ/L (ref 98–111)
CHOLEST SERPL-MCNC: 107 MG/DL (ref 100–199)
CO2 SERPL-SCNC: 21 MEQ/L (ref 23–33)
CREAT SERPL-MCNC: 0.7 MG/DL (ref 0.4–1.2)
DEPRECATED RDW RBC AUTO: 47.1 FL (ref 35–45)
ECHO AR MAX VEL PISA: 3.8 M/S
ECHO AV PEAK GRADIENT: 15 MMHG
ECHO AV PEAK VELOCITY: 1.9 M/S
ECHO AV REGURGITANT PHT: 273 MS
ECHO AV VELOCITY RATIO: 0.74
ECHO BSA: 1.6 M2
ECHO LA DIAMETER INDEX: 1.66 CM/M2
ECHO LA DIAMETER: 2.6 CM
ECHO LV EDV A2C: 81 ML
ECHO LV EDV A4C: 103 ML
ECHO LV EDV INDEX A4C: 66 ML/M2
ECHO LV EDV NDEX A2C: 52 ML/M2
ECHO LV EJECTION FRACTION A2C: 54 %
ECHO LV EJECTION FRACTION A4C: 28 %
ECHO LV EJECTION FRACTION BIPLANE: 41 % (ref 55–100)
ECHO LV ESV A2C: 37 ML
ECHO LV ESV A4C: 74 ML
ECHO LV ESV INDEX A2C: 24 ML/M2
ECHO LV ESV INDEX A4C: 47 ML/M2
ECHO LV FRACTIONAL SHORTENING: 26 % (ref 28–44)
ECHO LV INTERNAL DIMENSION DIASTOLE INDEX: 2.93 CM/M2
ECHO LV INTERNAL DIMENSION DIASTOLIC: 4.6 CM (ref 3.9–5.3)
ECHO LV INTERNAL DIMENSION SYSTOLIC INDEX: 2.17 CM/M2
ECHO LV INTERNAL DIMENSION SYSTOLIC: 3.4 CM
ECHO LV IVSD: 0.6 CM (ref 0.6–0.9)
ECHO LV MASS 2D: 90.5 G (ref 67–162)
ECHO LV MASS INDEX 2D: 57.6 G/M2 (ref 43–95)
ECHO LV POSTERIOR WALL DIASTOLIC: 0.7 CM (ref 0.6–0.9)
ECHO LV RELATIVE WALL THICKNESS RATIO: 0.3
ECHO LVOT PEAK GRADIENT: 8 MMHG
ECHO LVOT PEAK VELOCITY: 1.4 M/S
ECHO RV INTERNAL DIMENSION: 2.2 CM
EKG ATRIAL RATE: 119 BPM
EKG ATRIAL RATE: 85 BPM
EKG P AXIS: 48 DEGREES
EKG P AXIS: 62 DEGREES
EKG P-R INTERVAL: 154 MS
EKG P-R INTERVAL: 178 MS
EKG Q-T INTERVAL: 308 MS
EKG Q-T INTERVAL: 374 MS
EKG QRS DURATION: 74 MS
EKG QRS DURATION: 74 MS
EKG QTC CALCULATION (BAZETT): 433 MS
EKG QTC CALCULATION (BAZETT): 445 MS
EKG R AXIS: 18 DEGREES
EKG R AXIS: 26 DEGREES
EKG T AXIS: 42 DEGREES
EKG T AXIS: 65 DEGREES
EKG VENTRICULAR RATE: 119 BPM
EKG VENTRICULAR RATE: 85 BPM
ERYTHROCYTE [DISTWIDTH] IN BLOOD BY AUTOMATED COUNT: 13.3 % (ref 11.5–14.5)
GFR SERPL CREATININE-BSD FRML MDRD: > 90 ML/MIN/1.73M2
GLUCOSE SERPL-MCNC: 124 MG/DL (ref 70–108)
HCT VFR BLD AUTO: 33.6 % (ref 37–47)
HDLC SERPL-MCNC: 35 MG/DL
HEPARIN UNFRACTIONATED: 0.08 U/ML (ref 0.3–0.7)
HEPARIN UNFRACTIONATED: 0.19 U/ML (ref 0.3–0.7)
HEPARIN UNFRACTIONATED: 0.23 U/ML (ref 0.3–0.7)
HEPARIN UNFRACTIONATED: 0.33 U/ML (ref 0.3–0.7)
HGB BLD-MCNC: 10.7 GM/DL (ref 12–16)
INR PPP: 1 (ref 0.85–1.13)
LDLC SERPL CALC-MCNC: 54 MG/DL
MAGNESIUM SERPL-MCNC: 1.8 MG/DL (ref 1.6–2.4)
MAGNESIUM SERPL-MCNC: 2 MG/DL (ref 1.6–2.4)
MCH RBC QN AUTO: 31 PG (ref 26–33)
MCHC RBC AUTO-ENTMCNC: 31.8 GM/DL (ref 32.2–35.5)
MCV RBC AUTO: 97.4 FL (ref 81–99)
PLATELET # BLD AUTO: 325 THOU/MM3 (ref 130–400)
PMV BLD AUTO: 10.6 FL (ref 9.4–12.4)
POTASSIUM SERPL-SCNC: 3.9 MEQ/L (ref 3.5–5.2)
RBC # BLD AUTO: 3.45 MILL/MM3 (ref 4.2–5.4)
SODIUM SERPL-SCNC: 136 MEQ/L (ref 135–145)
TRIGL SERPL-MCNC: 91 MG/DL (ref 0–199)
TROPONIN, HIGH SENSITIVITY: 11 NG/L (ref 0–12)
TROPONIN, HIGH SENSITIVITY: 24 NG/L (ref 0–12)
TROPONIN, HIGH SENSITIVITY: 29 NG/L (ref 0–12)
WBC # BLD AUTO: 13.3 THOU/MM3 (ref 4.8–10.8)

## 2024-07-06 PROCEDURE — 93308 TTE F-UP OR LMTD: CPT | Performed by: INTERNAL MEDICINE

## 2024-07-06 PROCEDURE — 85610 PROTHROMBIN TIME: CPT

## 2024-07-06 PROCEDURE — 80061 LIPID PANEL: CPT

## 2024-07-06 PROCEDURE — 36415 COLL VENOUS BLD VENIPUNCTURE: CPT

## 2024-07-06 PROCEDURE — 93010 ELECTROCARDIOGRAM REPORT: CPT | Performed by: INTERNAL MEDICINE

## 2024-07-06 PROCEDURE — 84484 ASSAY OF TROPONIN QUANT: CPT

## 2024-07-06 PROCEDURE — 99223 1ST HOSP IP/OBS HIGH 75: CPT | Performed by: INTERNAL MEDICINE

## 2024-07-06 PROCEDURE — 6360000002 HC RX W HCPCS

## 2024-07-06 PROCEDURE — 85730 THROMBOPLASTIN TIME PARTIAL: CPT

## 2024-07-06 PROCEDURE — 85520 HEPARIN ASSAY: CPT

## 2024-07-06 PROCEDURE — 93005 ELECTROCARDIOGRAM TRACING: CPT

## 2024-07-06 PROCEDURE — 93308 TTE F-UP OR LMTD: CPT

## 2024-07-06 PROCEDURE — 83735 ASSAY OF MAGNESIUM: CPT

## 2024-07-06 PROCEDURE — 85027 COMPLETE CBC AUTOMATED: CPT

## 2024-07-06 PROCEDURE — 80048 BASIC METABOLIC PNL TOTAL CA: CPT

## 2024-07-06 PROCEDURE — 99223 1ST HOSP IP/OBS HIGH 75: CPT | Performed by: FAMILY MEDICINE

## 2024-07-06 PROCEDURE — 93321 DOPPLER ECHO F-UP/LMTD STD: CPT | Performed by: INTERNAL MEDICINE

## 2024-07-06 PROCEDURE — 6370000000 HC RX 637 (ALT 250 FOR IP)

## 2024-07-06 PROCEDURE — 1200000003 HC TELEMETRY R&B

## 2024-07-06 PROCEDURE — 93325 DOPPLER ECHO COLOR FLOW MAPG: CPT | Performed by: INTERNAL MEDICINE

## 2024-07-06 RX ORDER — SODIUM CHLORIDE 0.9 % (FLUSH) 0.9 %
5-40 SYRINGE (ML) INJECTION PRN
Status: DISCONTINUED | OUTPATIENT
Start: 2024-07-06 | End: 2024-07-11 | Stop reason: HOSPADM

## 2024-07-06 RX ORDER — ATORVASTATIN CALCIUM 80 MG/1
80 TABLET, FILM COATED ORAL ONCE
Status: COMPLETED | OUTPATIENT
Start: 2024-07-06 | End: 2024-07-06

## 2024-07-06 RX ORDER — CYCLOBENZAPRINE HCL 10 MG
10 TABLET ORAL 3 TIMES DAILY PRN
Status: DISCONTINUED | OUTPATIENT
Start: 2024-07-06 | End: 2024-07-11 | Stop reason: HOSPADM

## 2024-07-06 RX ORDER — HEPARIN SODIUM 10000 [USP'U]/100ML
5-30 INJECTION, SOLUTION INTRAVENOUS CONTINUOUS
Status: DISCONTINUED | OUTPATIENT
Start: 2024-07-06 | End: 2024-07-08

## 2024-07-06 RX ORDER — HEPARIN SODIUM 1000 [USP'U]/ML
60 INJECTION, SOLUTION INTRAVENOUS; SUBCUTANEOUS ONCE
Status: COMPLETED | OUTPATIENT
Start: 2024-07-06 | End: 2024-07-06

## 2024-07-06 RX ORDER — ONDANSETRON 2 MG/ML
4 INJECTION INTRAMUSCULAR; INTRAVENOUS EVERY 6 HOURS PRN
Status: DISCONTINUED | OUTPATIENT
Start: 2024-07-06 | End: 2024-07-11 | Stop reason: HOSPADM

## 2024-07-06 RX ORDER — ONDANSETRON 4 MG/1
4 TABLET, ORALLY DISINTEGRATING ORAL EVERY 8 HOURS PRN
Status: DISCONTINUED | OUTPATIENT
Start: 2024-07-06 | End: 2024-07-11 | Stop reason: HOSPADM

## 2024-07-06 RX ORDER — POLYETHYLENE GLYCOL 3350 17 G/17G
17 POWDER, FOR SOLUTION ORAL DAILY PRN
Status: DISCONTINUED | OUTPATIENT
Start: 2024-07-06 | End: 2024-07-11 | Stop reason: HOSPADM

## 2024-07-06 RX ORDER — ACETAMINOPHEN 325 MG/1
650 TABLET ORAL EVERY 6 HOURS PRN
Status: DISCONTINUED | OUTPATIENT
Start: 2024-07-06 | End: 2024-07-11 | Stop reason: HOSPADM

## 2024-07-06 RX ORDER — ACETAMINOPHEN 650 MG/1
650 SUPPOSITORY RECTAL EVERY 6 HOURS PRN
Status: DISCONTINUED | OUTPATIENT
Start: 2024-07-06 | End: 2024-07-11 | Stop reason: HOSPADM

## 2024-07-06 RX ORDER — HEPARIN SODIUM 1000 [USP'U]/ML
30 INJECTION, SOLUTION INTRAVENOUS; SUBCUTANEOUS PRN
Status: DISCONTINUED | OUTPATIENT
Start: 2024-07-06 | End: 2024-07-08

## 2024-07-06 RX ORDER — METOPROLOL TARTRATE 50 MG/1
50 TABLET, FILM COATED ORAL 2 TIMES DAILY
Status: DISCONTINUED | OUTPATIENT
Start: 2024-07-06 | End: 2024-07-08

## 2024-07-06 RX ORDER — AMLODIPINE BESYLATE 10 MG/1
10 TABLET ORAL DAILY
Status: DISCONTINUED | OUTPATIENT
Start: 2024-07-06 | End: 2024-07-11 | Stop reason: HOSPADM

## 2024-07-06 RX ORDER — SODIUM CHLORIDE 9 MG/ML
INJECTION, SOLUTION INTRAVENOUS PRN
Status: DISCONTINUED | OUTPATIENT
Start: 2024-07-06 | End: 2024-07-11 | Stop reason: HOSPADM

## 2024-07-06 RX ORDER — ASPIRIN 81 MG/1
81 TABLET ORAL DAILY
Status: DISCONTINUED | OUTPATIENT
Start: 2024-07-06 | End: 2024-07-11 | Stop reason: HOSPADM

## 2024-07-06 RX ORDER — NITROGLYCERIN 20 MG/100ML
5-200 INJECTION INTRAVENOUS CONTINUOUS
Status: DISCONTINUED | OUTPATIENT
Start: 2024-07-06 | End: 2024-07-11 | Stop reason: HOSPADM

## 2024-07-06 RX ORDER — MAGNESIUM SULFATE IN WATER 40 MG/ML
2000 INJECTION, SOLUTION INTRAVENOUS ONCE
Status: COMPLETED | OUTPATIENT
Start: 2024-07-06 | End: 2024-07-06

## 2024-07-06 RX ORDER — SODIUM CHLORIDE 0.9 % (FLUSH) 0.9 %
5-40 SYRINGE (ML) INJECTION EVERY 12 HOURS SCHEDULED
Status: DISCONTINUED | OUTPATIENT
Start: 2024-07-06 | End: 2024-07-11 | Stop reason: HOSPADM

## 2024-07-06 RX ORDER — ALPRAZOLAM 0.5 MG/1
0.5 TABLET ORAL 3 TIMES DAILY PRN
Status: DISCONTINUED | OUTPATIENT
Start: 2024-07-06 | End: 2024-07-11 | Stop reason: HOSPADM

## 2024-07-06 RX ORDER — HEPARIN SODIUM 1000 [USP'U]/ML
60 INJECTION, SOLUTION INTRAVENOUS; SUBCUTANEOUS PRN
Status: DISCONTINUED | OUTPATIENT
Start: 2024-07-06 | End: 2024-07-08

## 2024-07-06 RX ORDER — CLOPIDOGREL BISULFATE 75 MG/1
75 TABLET ORAL DAILY
Status: DISCONTINUED | OUTPATIENT
Start: 2024-07-06 | End: 2024-07-11 | Stop reason: HOSPADM

## 2024-07-06 RX ORDER — ATORVASTATIN CALCIUM 40 MG/1
40 TABLET, FILM COATED ORAL DAILY
Status: DISCONTINUED | OUTPATIENT
Start: 2024-07-06 | End: 2024-07-06

## 2024-07-06 RX ORDER — LISINOPRIL 20 MG/1
20 TABLET ORAL 2 TIMES DAILY
Status: DISCONTINUED | OUTPATIENT
Start: 2024-07-06 | End: 2024-07-11 | Stop reason: HOSPADM

## 2024-07-06 RX ORDER — HYDROCODONE BITARTRATE AND ACETAMINOPHEN 5; 325 MG/1; MG/1
1 TABLET ORAL EVERY 8 HOURS PRN
Status: DISCONTINUED | OUTPATIENT
Start: 2024-07-06 | End: 2024-07-11 | Stop reason: HOSPADM

## 2024-07-06 RX ORDER — ATORVASTATIN CALCIUM 40 MG/1
40 TABLET, FILM COATED ORAL DAILY
Status: DISCONTINUED | OUTPATIENT
Start: 2024-07-07 | End: 2024-07-11 | Stop reason: HOSPADM

## 2024-07-06 RX ORDER — PREGABALIN 50 MG/1
50 CAPSULE ORAL 3 TIMES DAILY
Status: DISCONTINUED | OUTPATIENT
Start: 2024-07-06 | End: 2024-07-11 | Stop reason: HOSPADM

## 2024-07-06 RX ADMIN — NITROGLYCERIN 5 MCG/MIN: 20 INJECTION INTRAVENOUS at 11:07

## 2024-07-06 RX ADMIN — PREGABALIN 50 MG: 50 CAPSULE ORAL at 08:31

## 2024-07-06 RX ADMIN — METOPROLOL TARTRATE 50 MG: 50 TABLET, FILM COATED ORAL at 08:31

## 2024-07-06 RX ADMIN — ACETAMINOPHEN 650 MG: 325 TABLET ORAL at 14:56

## 2024-07-06 RX ADMIN — ALPRAZOLAM 0.5 MG: 0.5 TABLET ORAL at 01:18

## 2024-07-06 RX ADMIN — MAGNESIUM SULFATE HEPTAHYDRATE 2000 MG: 40 INJECTION, SOLUTION INTRAVENOUS at 03:48

## 2024-07-06 RX ADMIN — HYDROCODONE BITARTRATE AND ACETAMINOPHEN 1 TABLET: 5; 325 TABLET ORAL at 17:02

## 2024-07-06 RX ADMIN — ATORVASTATIN CALCIUM 80 MG: 80 TABLET, FILM COATED ORAL at 01:10

## 2024-07-06 RX ADMIN — ALPRAZOLAM 0.5 MG: 0.5 TABLET ORAL at 17:02

## 2024-07-06 RX ADMIN — HEPARIN SODIUM 3700 UNITS: 1000 INJECTION INTRAVENOUS; SUBCUTANEOUS at 01:20

## 2024-07-06 RX ADMIN — ASPIRIN 81 MG: 81 TABLET, COATED ORAL at 08:31

## 2024-07-06 RX ADMIN — PREGABALIN 50 MG: 50 CAPSULE ORAL at 13:51

## 2024-07-06 RX ADMIN — CYCLOBENZAPRINE 10 MG: 10 TABLET, FILM COATED ORAL at 14:56

## 2024-07-06 RX ADMIN — ACETAMINOPHEN 650 MG: 325 TABLET ORAL at 20:53

## 2024-07-06 RX ADMIN — HEPARIN SODIUM 1900 UNITS: 1000 INJECTION INTRAVENOUS; SUBCUTANEOUS at 20:43

## 2024-07-06 RX ADMIN — HEPARIN SODIUM 1900 UNITS: 1000 INJECTION INTRAVENOUS; SUBCUTANEOUS at 07:58

## 2024-07-06 RX ADMIN — HYDROCODONE BITARTRATE AND ACETAMINOPHEN 1 TABLET: 5; 325 TABLET ORAL at 01:18

## 2024-07-06 RX ADMIN — PREGABALIN 50 MG: 50 CAPSULE ORAL at 20:53

## 2024-07-06 RX ADMIN — ACETAMINOPHEN 650 MG: 325 TABLET ORAL at 00:23

## 2024-07-06 RX ADMIN — ALPRAZOLAM 0.5 MG: 0.5 TABLET ORAL at 08:35

## 2024-07-06 RX ADMIN — HYDROCODONE BITARTRATE AND ACETAMINOPHEN 1 TABLET: 5; 325 TABLET ORAL at 08:35

## 2024-07-06 RX ADMIN — HEPARIN SODIUM 12 UNITS/KG/HR: 10000 INJECTION, SOLUTION INTRAVENOUS at 01:24

## 2024-07-06 ASSESSMENT — PAIN DESCRIPTION - DESCRIPTORS
DESCRIPTORS: ACHING
DESCRIPTORS: ACHING;DISCOMFORT
DESCRIPTORS: ACHING;DISCOMFORT;DULL

## 2024-07-06 ASSESSMENT — PAIN DESCRIPTION - LOCATION
LOCATION: CHEST
LOCATION: HEAD
LOCATION: SHOULDER
LOCATION: BACK
LOCATION: BACK;NECK;SHOULDER
LOCATION: CHEST
LOCATION: NECK;SHOULDER;ARM
LOCATION: CHEST

## 2024-07-06 ASSESSMENT — PAIN SCALES - GENERAL
PAINLEVEL_OUTOF10: 1
PAINLEVEL_OUTOF10: 4
PAINLEVEL_OUTOF10: 3
PAINLEVEL_OUTOF10: 3
PAINLEVEL_OUTOF10: 5
PAINLEVEL_OUTOF10: 1
PAINLEVEL_OUTOF10: 1
PAINLEVEL_OUTOF10: 0

## 2024-07-06 ASSESSMENT — PAIN - FUNCTIONAL ASSESSMENT
PAIN_FUNCTIONAL_ASSESSMENT: ACTIVITIES ARE NOT PREVENTED

## 2024-07-06 ASSESSMENT — PAIN DESCRIPTION - ORIENTATION
ORIENTATION: LEFT
ORIENTATION: LEFT;MID
ORIENTATION: RIGHT;LEFT

## 2024-07-06 ASSESSMENT — PAIN DESCRIPTION - ONSET: ONSET: ON-GOING

## 2024-07-06 ASSESSMENT — PAIN DESCRIPTION - FREQUENCY: FREQUENCY: CONTINUOUS

## 2024-07-06 ASSESSMENT — PAIN DESCRIPTION - PAIN TYPE: TYPE: ACUTE PAIN

## 2024-07-06 NOTE — CONSULTS
NSTEMI  Chest pain  Hx of tachycardia  pad    Mod to severe AI,     plan    Start NTG drip  Cont heparin  Echo  Need cath    The risk and benefit of left heart cath has been explain in detail including but not limited to  Bleeding including retroperitoneal bleed 1%, infection, MI, CVA, ARJUN, Limb loss, dissection, allergic reaction,death  Each of them 1 in 2000 range.  The alternative managment has been explained. Patient expressed understanding of the risk and benefit and of the alternative managment well.  Patient wanted and agreed to proceed with left heart cath.  Hence we will schedule her for [facility]       [id number]    Pascale Marti MD

## 2024-07-06 NOTE — PROCEDURES
PROCEDURE NOTE  Date: 7/6/2024   Name: Jacki Moura  YOB: 1965    Procedures    12 lead EKG completed. Results handed to JONNATHAN Webb

## 2024-07-06 NOTE — ED NOTES
Pt activated call light due to external catheter leak. Linens changed. Pt resting in chair, waiting IP bed assignment. Ice water and warm blanket provided. No further needs voiced. Son at bedside.

## 2024-07-06 NOTE — PROGRESS NOTES
PROGRESS NOTE      Patient:  Jacki Moura  Unit/Bed:6K-09/009-A  YOB: 1965  MRN: [id number]   Acct: [id number]    PCP: Kyle Smith APRN - CNP    Date of Admission: 7/5/2024 LOS: 1    Date of Evaluation:  7/6/2024    Anticipated Discharge: pending clinical course     Assessment/Plan:    Acute coronary syndrome with NSTEMI, elevated troponin   - EKG at ED 7/5: sinus tachycardia with age undetermined septal infarct and possible left atrial enlargement.   - Troponin on admission 7/5 <6. As of 7/6 elevated 26 --> repeat troponin 11.    - Cardiology consulted, appreciate recommendations  - On heparin and NTG drip  - Pending 7/6 echo report  - Pt to proceed with cath lab, pending schedule    Moderate-severe aortic insufficiency  - Last echo from 1/2024 EF: 60-65% with moderate to severe AI noted  - Pending 7/6 echo report  - Follows with Dr. Hayward outpatient, last visit 2/8/24    Leukocytosis, likely reactive to NSTEMI  - On admission, 7/5, WBC 10.8 --> 13.3 as of 7/6.   - Believe this elevation in WBC is likely due to inflammation from NSTEMI    Peripheral artery disease s/p left mid SFA stent x1 and L external iliac artery stent x1 on 9/25/23 and L femoral endartectomy on 3/16/21  - Last lipid panel 7/6, wnl.   - Continue home aspirin  - Home lipitor increased to 40mg daily on 7/5  - Holding plavix, pending cath    Chronic normocytic anemia  - Hb on admission 7/5 10.3, as of 7/6 10.7. Baseline Hb appears 13-14.    - Will continue to follow H+H daily    Hypercortisolism with L adrenal tumor, POA  - Stable, follows with endocrinologist Dr. Noble outpatient    Anxiety   - Home Zoloft and Xanax prn    Primary HTN  - Continue home metoprolol and lisinopril   - Home amlodipine held    Chronic neck, right shoulder, and back pain  - Continue home Norco, Flexeril, Lyrica  - Follows with PCP for pain management     Tobacco abuse  - 20 pack year history, pt admits to 1/2 ppd currently.   - Will consider  1107)    heparin (PORCINE) Infusion 14 Units/kg/hr (07/06/24 1228)     Scheduled Medications    [Held by provider] amLODIPine  10 mg Oral Daily    aspirin  81 mg Oral Daily    [Held by provider] clopidogrel  75 mg Oral Daily    lisinopril  20 mg Oral BID    metoprolol tartrate  50 mg Oral BID    pregabalin  50 mg Oral TID    sodium chloride flush  5-40 mL IntraVENous 2 times per day    [START ON 7/7/2024] atorvastatin  40 mg Oral Daily     PRN Meds: ALPRAZolam, cyclobenzaprine, HYDROcodone-acetaminophen, sodium chloride flush, sodium chloride, ondansetron **OR** ondansetron, acetaminophen **OR** acetaminophen, polyethylene glycol, perflutren lipid microspheres, heparin (porcine), heparin (porcine), nitroGLYCERIN    No intake or output data in the 24 hours ending 07/06/24 0855    Exam:  /70   Pulse 72   Temp 97.6 °F (36.4 °C) (Oral)   Resp 18   Ht 1.499 m (4' 11\")   Wt 62.2 kg (137 lb 2 oz)   SpO2 96%   BMI 27.70 kg/m²     Physical Exam  Constitutional:       Appearance: Normal appearance.   HENT:      Head: Normocephalic and atraumatic.      Mouth/Throat:      Mouth: Mucous membranes are moist.      Pharynx: Oropharynx is clear.   Eyes:      Extraocular Movements: Extraocular movements intact.      Conjunctiva/sclera: Conjunctivae normal.      Pupils: Pupils are equal, round, and reactive to light.   Cardiovascular:      Rate and Rhythm: Regular rhythm. Tachycardia present.      Pulses: Normal pulses.      Heart sounds: Normal heart sounds. No murmur heard.     No gallop.   Pulmonary:      Effort: Pulmonary effort is normal. No respiratory distress.      Breath sounds: Normal breath sounds. No stridor. No wheezing, rhonchi or rales.   Abdominal:      General: Abdomen is flat. Bowel sounds are normal. There is no distension.      Palpations: Abdomen is soft.      Tenderness: There is no abdominal tenderness. There is no guarding or rebound.   Musculoskeletal:         General: No deformity. Normal range  MD Isaiah on    07/05/2024 07:14 PM          Diet: ADULT DIET; Regular; Low Sodium (2 gm)    Microbiology: no  Antibiotics: no    Steroids: no    Telemetry: [x]Yes / []No  Telemetry Review of past 24 hours:  sinus tachycardia    LDA: []CVC / []PICC / []Midline / []Anderson / []Drains / []Mediport / []PIV / [x]None    Labs (still needed?): [x]Yes / []No  IVF (still needed?): []Yes / [x]No    Level of care: [x]Step Down / []Med-Surg  Bed Status: [x]Inpatient / []Observation    DVT Prophylaxis: [] Lovenox / [x] Heparin / [] SCDs / [] Already on Systemic Anticoagulation / [] None     PT/OT: []Yes / [x]No    Disposition:    [x] Home       [] TCU       [] Rehab       [] Psych       [] SNF       [] Long Term Care Facility       [] Other-    Code Status: Full Code      An electronic signature was used to authenticate this note  - Martha Santamaria DO PGY-1 on 7/6/2024 at 2:55 PM

## 2024-07-06 NOTE — H&P
History & Physical  Internal Medicine Resident         Patient: Jacki Moura 59 y.o. female      : 1965  Date of Admission: 2024  Date of Service: Pt seen/examined on 24 and Admitted to Inpatient with expected LOS greater than two midnights due to medical therapy.       ASSESSMENT AND PLAN  Atypical chest pain: Presented with chest pain that radiated down the left arm.  Pain relieved with nitro and aspirin in ED.  EKG sinus tachycardia.  Troponin less than 6.  proBNP 811.6.  D-dimer 1605.  CTA chest negative for PE.  CXR negative for acute findings.  Satting well on room air. tachycardic, hypertensive.  Last echo (2024) EF 60-65%, moderate to severe aortic regurgitation.  Follows with Dr. Hayward outpatient (OV 24).  Significant family history of MIs.  Smokes half pack per day.  Nitro drip  Heparin gtt  Given Aspirin 324 in ED, continue.  Increased Lipitor to 40 mg daily, with one-time dose 80 mg given.  Continue home Lopressor, amlodipine, lisinopril  Limited echo ordered  Repeat troponin  lipid panels ordered  Telemetry  Consult Cardiology in AM  Chronic Conditions (reviewed and stable unless otherwise stated)   Moderate to severe aortic insufficiency: Follows with Dr. Hayward outpatient (OV 24).  Continue Lopressor. Noted on echo 2024 EF 60-65%, moderate to severe AI.  Adrenal tumor, left, POA: Follows Dr. Noble outpatient  Anxiety: Continue Xanax PRN, zoloft  Hypertension: Continue amlodipine, lisinopril with holding parameters.  PAD s/p endarterectomy: Continue ASA.  Home Lipitor increased to 40 mg a day.  Holding Plavix for potential CABG.  Chronic neck, R shoulder, back pain: Continue Norco, Flexeril, Lyrica.  Tobacco use: 20-pack-year history.  Down to 1/2 PPD.      Data reviewed (unless otherwise discussed in assessment/plan)  EKG:  I have reviewed the EKG with the following interpretation: Sinus tachycardia  Imaging: I have reviewed CTA chest, CXR, with the following

## 2024-07-06 NOTE — PROGRESS NOTES
Pt admitted to  [facility] via cart/stretcher.   Complaints: Chest pain / discomfort.    IV site free of s/s of infection or infiltration. No IV fluid running. Vital signs obtained. Assessment and data collection initiated. Two nurse skin assessment performed by Abbie ENAMORADO and Radha ENAMORADO. Oriented to room. Policies and procedures for  explained. Abbie ENAMORADO discussed hourly rounding with patient addressing 5 P's. Fall prevention and safety brochure discussed with patient.  Bed alarm on. Call light in reach.  Explained patients right to have family, representative or physician notified of their admission.  All questions answered with no further questions at this time.

## 2024-07-06 NOTE — ED NOTES
Pt is resting in bed laying on her right side with  at bedside, pt asks for warm blanket. Pt is breathing regular and unlabored on room air. Pt has pain in left mid chest and left shoulder rated 2/10 and pt is tachycardic, no other signs of distress to note at this time. Call light within reach.

## 2024-07-06 NOTE — ED NOTES
Pt is resting in bed laying on her right side with family at bedside, no requests at this time. Pt is breathing regular and unlabored on room air. Pt is tachycardic and has left mid chest pain that radiates to left shoulder rated 2/10, no other signs of distress to note at this time. Call light within reach.

## 2024-07-06 NOTE — ED NOTES
Inpatient provider at bedside at this time. Pt is resting in bed and breathing regular and unlabored on room air. Pt is tachycardic, no other signs of distress to note at this time. Call light within reach.

## 2024-07-06 NOTE — CONSULTS
[place] [id number]                              CONSULTATION      PATIENT NAME: HOOD GRIMALDO             : 1965  MED REC NO: [id number]                       ROOM: [facility]  ACCOUNT NO: [id number]                       ADMIT DATE: 2024  PROVIDER: Pascale Marti MD      CONSULT DATE: 2024    REASON FOR CONSULTATION:  Chest pain, elevated troponin.    PRIMARY CARDIOLOGIST:  Dr. Hayward.    HISTORY OF PRESENT ILLNESS:  This is a 59-year-old  female patient with past medical history of peripheral artery disease with extensive peripheral artery workup including bypass on the left lower limb, sinus tachycardia, moderate-to-severe AI, adrenal tumor, anxiety, was in usual state of health until yesterday morning.  Yesterday morning, the patient woke up with left arm pain following with chest pain, pressure, tightness, heaviness, achy, around 8-9/10 associated with shortness of breath and diaphoresis, lasted for 30 minutes, resolved, then went to work and came back in the evening.  Again after she came back from work, she started to have left arm pain following with chest pain, it was radiating and associated with diaphoresis, sweaty, and shortness of breath and that was persistent basically almost a couple of hours prior to arrival to the emergency room and she is brought to the emergency room and she described as achy and she was given sublingual nitroglycerin x3, with that the chest pain resolved, at that time chest pain was 8-9/10.  Now, the chest pain is like 1/10 and she is feeling much comfortable.  No chest pain overnight, except the mild chest pain of 1/10.  So, the patient started on heparin and nitro.  Troponin initially was negative.  Subsequent troponin was 29.  Never had this type of chest pain, has history of premature coronary artery disease.  The patient has history of peripheral artery disease with  peripheral artery workup and peripheral artery intervention with left peripheral bypass, and Cardiology evaluation was sought in view of the chest pain and troponin elevation.    REVIEW OF SYSTEMS:  Negative except the above-mentioned ones.    PAST MEDICAL HISTORY:  Moderate-to-severe aortic regurgitation; adrenal tumor; anxiety; hypertension; peripheral artery disease, status post peripheral interventions and peripheral vascular surgery; chronic back pain; tobacco abuse; hypertension; hyperlipidemia; arthritis; and Cushing disease.    PAST SURGICAL HISTORY:  Back surgery,  section, colonoscopy, dilatation and curettage, femoral endarterectomy, foot surgery, knee surgery, lumbar diskectomy, skin biopsy, and transesophageal echo.    FAMILY HISTORY:  Positive for heart disease.    SOCIAL HISTORY:  Current smoker and occasional alcohol consumption and uses marijuana.    ALLERGIES:  SHE IS ALLERGIC TO , SULFA, TRAMADOL, AND ULTRAM.      HOME MEDICATIONS:  Prior to admission:  Xanax, Norvasc, aspirin, Lipitor, Plavix, Flexeril, Norco, lisinopril, Mobic, Lopressor, and Lyrica.    PHYSICAL EXAMINATION:  GENERAL:  Looks sick in no form of distress.  VITAL SIGNS:  Blood pressure 104/68, pulse rate 84, respiratory rate 18, and temperature 97.6 HEENT:  Pink conjunctivae.  Anicteric sclerae.  Pupils are equal and reactive bilaterally to light.  NECK:  No lymphadenopathy.  No goiter.  No JVD.  CHEST:  Clear to auscultation and percussion.  CARDIOVASCULAR:  Arteries are felt; carotid, femoral, pedal.  No bruits.  S1 and S2 well heard.  No murmur or gallop rhythm.  ABDOMEN:  Soft, nontender, and nondistended.  Bowel sounds are positive.  GENITOURINARY:  No CVA, flank, or suprapubic tenderness.  LOCOMOTOR:  No cyanosis, clubbing, muscle or joint tenderness.  SKIN:  No rashes, ulcers, or nodules.  CNS:  Alert, awake, and oriented to time, person, and place.  Cranial nerves are intact.  Sensation is intact to light touch and  pain.  Motor:  Normal muscle strength and tone and appropriate mood and affect.    WORKUP:  EKG is sinus rhythm, basically anteroseptal infarction, new compared to the previous EKG prior to this admission, this old anteroseptal infarction has been noted.  On the current EKG, nonspecific ST abnormality.  Had an echocardiogram done today pending, but the previous echocardiogram actually showed an ejection fraction of 60% to 65%, moderate-to-severe aortic regurgitation.  The patient has peripheral intervention, last one in 2021.    Troponin on admission 6 and then 29.    Blood chemistry:  Sodium 136, potassium 3.9, BUN 9, creatinine 0.7, and magnesium is 2.  Hematology:  White blood cells 13, hemoglobin 10.7, and platelets 327.    Chest x-ray:  Unremarkable.    ASSESSMENT:    1. Acute coronary syndrome with non-ST-elevation myocardial infarction with marked elevation of troponin from baseline of less than 6 to 29.  Third troponin is pending.  2. Peripheral artery disease, status post peripheral artery intervention and vascular surgery  and history of endarterectomy.  3. Chronic neck pain.  4. Chronic shoulder pain.  5. Back pain.  6. Tobacco abuse.  7. History of adrenal tumor.  8. History of tachycardia and now is not noted.    PLAN/RECOMMENDATION:  At this level continue heparin.  Start nitroglycerin drip.  We will get an echocardiogram and basically needs cardiac catheterization.  Risks and benefits explained.  The patient verbalized understanding and agreed with the plan of care.  So based on the course, we will gauge further care.     Thank you for allowing me to participate in the care of this patient.  We will follow up with you.          JOSAFAT HERNANDEZ MD      D:  07/06/2024 10:57:05     T:  07/06/2024 11:54:02     PIERRE/REJI  Job #:  [id number]     Doc#:  [id number]

## 2024-07-07 PROBLEM — G89.29 CHRONIC RIGHT SHOULDER PAIN: Status: ACTIVE | Noted: 2024-07-07

## 2024-07-07 PROBLEM — D35.02: Status: ACTIVE | Noted: 2024-07-07

## 2024-07-07 PROBLEM — M25.511 CHRONIC RIGHT SHOULDER PAIN: Status: ACTIVE | Noted: 2024-07-07

## 2024-07-07 PROBLEM — E87.20 METABOLIC ACIDOSIS: Status: ACTIVE | Noted: 2024-07-07

## 2024-07-07 PROBLEM — D64.9 NORMOCYTIC ANEMIA: Status: ACTIVE | Noted: 2024-07-07

## 2024-07-07 LAB
ANION GAP SERPL CALC-SCNC: 16 MEQ/L (ref 8–16)
BUN SERPL-MCNC: 20 MG/DL (ref 7–22)
CALCIUM SERPL-MCNC: 8.7 MG/DL (ref 8.5–10.5)
CHLORIDE SERPL-SCNC: 101 MEQ/L (ref 98–111)
CO2 SERPL-SCNC: 21 MEQ/L (ref 23–33)
CREAT SERPL-MCNC: 0.9 MG/DL (ref 0.4–1.2)
DEPRECATED RDW RBC AUTO: 47.1 FL (ref 35–45)
EKG ATRIAL RATE: 106 BPM
EKG ATRIAL RATE: 98 BPM
EKG P AXIS: 59 DEGREES
EKG P AXIS: 71 DEGREES
EKG P-R INTERVAL: 152 MS
EKG P-R INTERVAL: 158 MS
EKG Q-T INTERVAL: 350 MS
EKG Q-T INTERVAL: 380 MS
EKG QRS DURATION: 72 MS
EKG QRS DURATION: 78 MS
EKG QTC CALCULATION (BAZETT): 464 MS
EKG QTC CALCULATION (BAZETT): 485 MS
EKG R AXIS: 33 DEGREES
EKG R AXIS: 51 DEGREES
EKG T AXIS: 61 DEGREES
EKG T AXIS: 63 DEGREES
EKG VENTRICULAR RATE: 106 BPM
EKG VENTRICULAR RATE: 98 BPM
ERYTHROCYTE [DISTWIDTH] IN BLOOD BY AUTOMATED COUNT: 13.2 % (ref 11.5–14.5)
GFR SERPL CREATININE-BSD FRML MDRD: 73 ML/MIN/1.73M2
GLUCOSE SERPL-MCNC: 117 MG/DL (ref 70–108)
HCT VFR BLD AUTO: 34.7 % (ref 37–47)
HEPARIN UNFRACTIONATED: 0.28 U/ML (ref 0.3–0.7)
HEPARIN UNFRACTIONATED: 0.29 U/ML (ref 0.3–0.7)
HEPARIN UNFRACTIONATED: 0.32 U/ML (ref 0.3–0.7)
HEPARIN UNFRACTIONATED: 0.39 U/ML (ref 0.3–0.7)
HGB BLD-MCNC: 11.2 GM/DL (ref 12–16)
MAGNESIUM SERPL-MCNC: 2.4 MG/DL (ref 1.6–2.4)
MCH RBC QN AUTO: 31.7 PG (ref 26–33)
MCHC RBC AUTO-ENTMCNC: 32.3 GM/DL (ref 32.2–35.5)
MCV RBC AUTO: 98.3 FL (ref 81–99)
PLATELET # BLD AUTO: 370 THOU/MM3 (ref 130–400)
PMV BLD AUTO: 10.6 FL (ref 9.4–12.4)
POTASSIUM SERPL-SCNC: 4 MEQ/L (ref 3.5–5.2)
RBC # BLD AUTO: 3.53 MILL/MM3 (ref 4.2–5.4)
SODIUM SERPL-SCNC: 138 MEQ/L (ref 135–145)
TROPONIN, HIGH SENSITIVITY: 8 NG/L (ref 0–12)
TROPONIN, HIGH SENSITIVITY: < 6 NG/L (ref 0–12)
WBC # BLD AUTO: 10.8 THOU/MM3 (ref 4.8–10.8)

## 2024-07-07 PROCEDURE — 99233 SBSQ HOSP IP/OBS HIGH 50: CPT | Performed by: FAMILY MEDICINE

## 2024-07-07 PROCEDURE — 6360000002 HC RX W HCPCS: Performed by: FAMILY MEDICINE

## 2024-07-07 PROCEDURE — 6370000000 HC RX 637 (ALT 250 FOR IP)

## 2024-07-07 PROCEDURE — 36415 COLL VENOUS BLD VENIPUNCTURE: CPT

## 2024-07-07 PROCEDURE — 84484 ASSAY OF TROPONIN QUANT: CPT

## 2024-07-07 PROCEDURE — 93010 ELECTROCARDIOGRAM REPORT: CPT | Performed by: INTERNAL MEDICINE

## 2024-07-07 PROCEDURE — 85027 COMPLETE CBC AUTOMATED: CPT

## 2024-07-07 PROCEDURE — 93005 ELECTROCARDIOGRAM TRACING: CPT

## 2024-07-07 PROCEDURE — 6360000002 HC RX W HCPCS

## 2024-07-07 PROCEDURE — 2580000003 HC RX 258

## 2024-07-07 PROCEDURE — 93005 ELECTROCARDIOGRAM TRACING: CPT | Performed by: FAMILY MEDICINE

## 2024-07-07 PROCEDURE — 83735 ASSAY OF MAGNESIUM: CPT

## 2024-07-07 PROCEDURE — 85520 HEPARIN ASSAY: CPT

## 2024-07-07 PROCEDURE — 1200000003 HC TELEMETRY R&B

## 2024-07-07 PROCEDURE — 80048 BASIC METABOLIC PNL TOTAL CA: CPT

## 2024-07-07 RX ORDER — MORPHINE SULFATE 2 MG/ML
2 INJECTION, SOLUTION INTRAMUSCULAR; INTRAVENOUS EVERY 4 HOURS PRN
Status: DISCONTINUED | OUTPATIENT
Start: 2024-07-07 | End: 2024-07-11 | Stop reason: HOSPADM

## 2024-07-07 RX ADMIN — ALPRAZOLAM 0.5 MG: 0.5 TABLET ORAL at 01:05

## 2024-07-07 RX ADMIN — ACETAMINOPHEN 650 MG: 325 TABLET ORAL at 05:27

## 2024-07-07 RX ADMIN — ASPIRIN 81 MG: 81 TABLET, COATED ORAL at 09:04

## 2024-07-07 RX ADMIN — PREGABALIN 50 MG: 50 CAPSULE ORAL at 14:03

## 2024-07-07 RX ADMIN — LISINOPRIL 20 MG: 20 TABLET ORAL at 09:04

## 2024-07-07 RX ADMIN — HYDROCODONE BITARTRATE AND ACETAMINOPHEN 1 TABLET: 5; 325 TABLET ORAL at 09:04

## 2024-07-07 RX ADMIN — SODIUM CHLORIDE, PRESERVATIVE FREE 10 ML: 5 INJECTION INTRAVENOUS at 09:04

## 2024-07-07 RX ADMIN — PREGABALIN 50 MG: 50 CAPSULE ORAL at 09:04

## 2024-07-07 RX ADMIN — ATORVASTATIN CALCIUM 40 MG: 40 TABLET, FILM COATED ORAL at 09:04

## 2024-07-07 RX ADMIN — MORPHINE SULFATE 2 MG: 2 INJECTION, SOLUTION INTRAMUSCULAR; INTRAVENOUS at 18:04

## 2024-07-07 RX ADMIN — HYDROCODONE BITARTRATE AND ACETAMINOPHEN 1 TABLET: 5; 325 TABLET ORAL at 01:05

## 2024-07-07 RX ADMIN — ACETAMINOPHEN 650 MG: 325 TABLET ORAL at 15:13

## 2024-07-07 RX ADMIN — ALPRAZOLAM 0.5 MG: 0.5 TABLET ORAL at 09:04

## 2024-07-07 RX ADMIN — CYCLOBENZAPRINE 10 MG: 10 TABLET, FILM COATED ORAL at 02:53

## 2024-07-07 RX ADMIN — HEPARIN SODIUM 18 UNITS/KG/HR: 10000 INJECTION, SOLUTION INTRAVENOUS at 05:37

## 2024-07-07 RX ADMIN — ALPRAZOLAM 0.5 MG: 0.5 TABLET ORAL at 16:46

## 2024-07-07 RX ADMIN — PREGABALIN 50 MG: 50 CAPSULE ORAL at 20:33

## 2024-07-07 RX ADMIN — HEPARIN SODIUM 1900 UNITS: 1000 INJECTION INTRAVENOUS; SUBCUTANEOUS at 02:50

## 2024-07-07 RX ADMIN — METOPROLOL TARTRATE 50 MG: 50 TABLET, FILM COATED ORAL at 09:04

## 2024-07-07 ASSESSMENT — PAIN DESCRIPTION - LOCATION
LOCATION: HEAD
LOCATION: ARM
LOCATION: NECK;BACK
LOCATION: ARM
LOCATION: CHEST
LOCATION: NECK;SHOULDER

## 2024-07-07 ASSESSMENT — PAIN DESCRIPTION - DESCRIPTORS
DESCRIPTORS: ACHING;DISCOMFORT
DESCRIPTORS: JABBING;POUNDING;THROBBING
DESCRIPTORS: ACHING

## 2024-07-07 ASSESSMENT — PAIN DESCRIPTION - ORIENTATION
ORIENTATION: LEFT
ORIENTATION: MID
ORIENTATION: LEFT
ORIENTATION: LOWER
ORIENTATION: LEFT

## 2024-07-07 ASSESSMENT — PAIN SCALES - GENERAL
PAINLEVEL_OUTOF10: 7
PAINLEVEL_OUTOF10: 0
PAINLEVEL_OUTOF10: 0
PAINLEVEL_OUTOF10: 5
PAINLEVEL_OUTOF10: 4
PAINLEVEL_OUTOF10: 5
PAINLEVEL_OUTOF10: 10
PAINLEVEL_OUTOF10: 7
PAINLEVEL_OUTOF10: 7

## 2024-07-07 ASSESSMENT — PAIN - FUNCTIONAL ASSESSMENT
PAIN_FUNCTIONAL_ASSESSMENT: ACTIVITIES ARE NOT PREVENTED

## 2024-07-07 NOTE — PROGRESS NOTES
Hospitalist Progress Note      Patient:  Jacki Moura      Unit/Bed:6K-09/009-A    YOB: 1965    MRN: [id number]       Acct: [id number]     PCP: Kyle Smith APRN - CNP    Date of Admission: 7/5/2024    Date/Time of Evaluation:  7/7/2024 at 8:04 AM    Assessment/Plan:    Left sided chest pain/arm pain - possible unstable angina/ACS -- trop up from >6 to 29 and flattened at 24 and back to < 6 on 7/7 am.  Concerning sx and Heart score = ~ 6 -- ASA, heparin gtt, ntg gtt, statin, BB -- ??why plavix held --> ?resume -- per cardio plan for [facility] 7/8 -- EKG 7/7 stable w/o new changes  -- Echo 7/6/24 = EF down to 35-40% from ESTUARDO 1/2024 EF 60-65%  -- EKG/Trop prn cp  -- ?cervical issue contrib to left arm pain  New cardiomyopathy -- epeat Echo 7/6/24 with EF down to 35-40%, with anterior, anteroseptal and septal hypokinesis, mod/severe AR, mild MR  which EF down from ESTUARDO 1/2024 with EF 60-65% -->  no signs of acute CHF - concern for ischemia with above -- per cardio plan [facility] 7/8  -- cont BB, lisinopril with parameters  -- ?aldactone, ?farxiga   Intermittent tachycardia -- noted sx per pt -- monitor tele - cont lopressor 50 mg bid and monitor with #1, 5  -- repeat Echo 7/6/24 with EF down to 35-40%, with anterior, anteroseptal and septal hypokinesis, mod/severe AR, mild MR  -- 48 hr holter 4/2024 with SR with avg HR 80 and no significant arrhythmias  -- ?anxiety component - on xanax prn  -- last TSH 5/2024 WNL  -- ?related to adrenal tumor - ? Need to r/o pheo but prior eval per note by Dr. Real caceres was normal  Slight NAG metabolic acidosis -- CO2 slightly low on BMPs at 21 -- no signs of infection as afeb and CTA chest (-) for PE or infiltrates, no urinary sx --   Leukocytosis -- up to 13.3 on 7/6 but was WNL on admission -> no signs of infection -no  or GI etiology/symptoms.  CTA chest with no pulmonary process  Mod/severe aortic insufficiency -- s/p ESTUARDO 1/2024 and repeat TTE 7/6/24 still with

## 2024-07-07 NOTE — PROCEDURES
PROCEDURE NOTE  Date: 7/7/2024   Name: Jacki Moura  YOB: 1965    Procedures  12 lead EKG completed. Results handed to Luz Crowe CET

## 2024-07-07 NOTE — PROCEDURES
PROCEDURE NOTE  Date: 7/7/2024   Name: Jacki Moura  YOB: 1965    Procedures  12 lead EKG completed. Results handed to Abbie ENAMORADO.

## 2024-07-07 NOTE — PROGRESS NOTES
Cardiology Progress Note      Patient:  Jacki Moura  YOB: 1965  MRN: [id number]   Acct: [id number]  Admit Date:  7/5/2024  Primary Cardiologist: Teja Hayward MD  Consulting cardiologist: Dr. Marti    Rationale for Cardiology consult: Chest pain, elevated troponin    HPI/PMH: Per Dr Marti consult note of 7/6/2024:  This is a 59-year-old  female patient with past medical history of peripheral artery disease with extensive peripheral artery workup including bypass on the left lower limb, sinus tachycardia, moderate-to-severe AI, adrenal tumor, anxiety, was in usual state of health until yesterday morning. Yesterday morning, the patient woke up with left arm pain following with chest pain, pressure, tightness, heaviness, achy, around 8-9/10 associated with shortness of breath and diaphoresis, lasted for 30 minutes, resolved, then went to work and came back in the evening. Again after she came back from work, she started to have left arm pain following with chest pain, it was radiating and associated with diaphoresis, sweaty, and shortness of breath and that was persistent basically almost a couple of hours prior to arrival to the emergency room and she is brought to the emergency room and she described as achy and she was given sublingual nitroglycerin x3, with that the chest pain resolved, at that time chest pain was 8-9/10. Now, the chest pain is like 1/10 and she is feeling much comfortable. No chest pain overnight, except the mild chest pain of 1/10. So, the patient started on heparin and nitro. Troponin initially was negative. Subsequent troponin was 29. Never had this type of chest pain, has history of premature coronary artery disease. The patient has history of peripheral artery disease with peripheral artery workup and peripheral artery intervention with left peripheral bypass, and Cardiology evaluation was sought in view of the chest pain and troponin elevati     Chief  Study Information  Prior Study    There is a prior study available for comparison. Prior study date: 1/8/2024.   ·  Left Ventricle: Normal left ventricular systolic function with a visually estimated EF of 60 - 65%. Left ventricle size is normal. Normal wall thickness. Normal wall motion.  ·  Aortic Valve: Trileaflet valve. Appears to have severe aortic regurgitation.  Consider ESTUARDO for further evaluation.  ·  Image quality is adequate.     Echo Findings    Left Ventricle Moderately reduced left ventricular systolic function with a visually estimated EF of 35 - 40%. Left ventricle is mildly dilated. Normal wall thickness. There are regional wall motion abnormalities with Anterior, Anterospetal and Sseptal hypokinesis. Indeterminate diastolic function.   Left Atrium Left atrium size is normal.   Right Ventricle Right ventricle size is normal. Normal systolic function.   Right Atrium Right atrium size is normal.   Aortic Valve Valve structure is normal. Moderate to severe regurgitation. No stenosis.   Mitral Valve Valve structure is normal. Mild regurgitation. No stenosis noted.   Tricuspid Valve Valve structure is normal. No regurgitation. No stenosis noted.   Pulmonic Valve Valve structure is normal. No regurgitation. No stenosis noted.   Aorta Normal sized aortic root and ascending aorta.   IVC/Hepatic Veins IVC diameter is less than or equal to 21 mm and decreases greater than 50% during inspiration; therefore the estimated right atrial pressure is normal (~3 mmHg). IVC size is normal.   Pericardium No pericardial effusion.       Stress: 3/2019   Summary   Lexiscan EKG stress test is not suggestive for ischemia.   Calculated gated LVEF 61 %.   The T.I.D. ratio was 1.22 .   There was a small sized, mild in intensity, fixed myocardial perfusion   defect of the apical wall.   This study was negative for ischemia.    Recommendation   Clinical correlation is recommended.   Medical management.   Aggressive risk factor  management.    Signatures    ----------------------------------------------------------------   Electronically signed by Teja Hayward MD (Interpreting   Cardiologist) on 03/18/2019 at 17:21    Left Heart Cath:     Lab Data:    Cardiac Enzymes:  No results for input(s): \"CKTOTAL\", \"CKMB\", \"CKMBINDEX\", \"TROPONINI\" in the last 72 hours.    CBC:   Lab Results   Component Value Date/Time    WBC 10.8 07/07/2024 03:41 AM    RBC 3.53 07/07/2024 03:41 AM    HGB 11.2 07/07/2024 03:41 AM    HCT 34.7 07/07/2024 03:41 AM     07/07/2024 03:41 AM       CMP:    Lab Results   Component Value Date/Time     07/07/2024 03:41 AM    K 4.0 07/07/2024 03:41 AM    K 4.6 01/09/2024 08:21 AM     07/07/2024 03:41 AM    CO2 21 07/07/2024 03:41 AM    BUN 20 07/07/2024 03:41 AM    CREATININE 0.9 07/07/2024 03:41 AM    LABGLOM 73 07/07/2024 03:41 AM    LABGLOM 74 03/25/2024 12:31 PM    GLUCOSE 117 07/07/2024 03:41 AM    CALCIUM 8.7 07/07/2024 03:41 AM       Hepatic Function Panel:    Lab Results   Component Value Date/Time    ALKPHOS 118 07/05/2024 06:27 PM    ALT 16 07/05/2024 06:27 PM    AST 12 07/05/2024 06:27 PM    BILITOT 0.2 07/05/2024 06:27 PM    BILIDIR <0.2 01/08/2024 09:28 AM       Magnesium:    Lab Results   Component Value Date/Time    MG 2.4 07/07/2024 03:41 AM       PT/INR:    Lab Results   Component Value Date/Time    INR 1.00 07/06/2024 01:03 AM       HgBA1c:    Lab Results   Component Value Date/Time    LABA1C 5.3 07/19/2022 09:54 PM       FLP:    Lab Results   Component Value Date/Time    TRIG 91 07/06/2024 03:43 AM    HDL 35 07/06/2024 03:43 AM       TSH:    Lab Results   Component Value Date/Time    TSH 1.970 05/01/2024 02:25 PM         Assessment:   ACS / NSTEMI  Troponin: < 6--->24--->11---> <6    Chest pain, pressure, tightness under L breast; some intermittent L  arm pain off and on for some time with associated diaphoresis  Stress will trigger the arm pain  New onset HFrEF of 35 - 40% 7/6 from 60 - 65%

## 2024-07-07 NOTE — PLAN OF CARE
Problem: Discharge Planning  Goal: Discharge to home or other facility with appropriate resources  Outcome: Progressing  Flowsheets (Taken 7/7/2024 7730)  Discharge to home or other facility with appropriate resources:   Identify barriers to discharge with patient and caregiver   Arrange for needed discharge resources and transportation as appropriate     Problem: Pain  Goal: Verbalizes/displays adequate comfort level or baseline comfort level  Outcome: Progressing     Problem: Safety - Adult  Goal: Free from fall injury  Outcome: Progressing

## 2024-07-08 PROBLEM — R79.89 ELEVATED TROPONIN: Status: ACTIVE | Noted: 2024-07-05

## 2024-07-08 PROBLEM — I42.9 CARDIOMYOPATHY (HCC): Status: ACTIVE | Noted: 2024-07-05

## 2024-07-08 PROBLEM — I21.4 NSTEMI, INITIAL EPISODE OF CARE (HCC): Status: ACTIVE | Noted: 2024-07-05

## 2024-07-08 LAB
ACTIVATED CLOTTING TIME: 269 SECONDS (ref 1–150)
ANION GAP SERPL CALC-SCNC: 14 MEQ/L (ref 8–16)
BUN SERPL-MCNC: 15 MG/DL (ref 7–22)
CALCIUM SERPL-MCNC: 8.8 MG/DL (ref 8.5–10.5)
CHLORIDE SERPL-SCNC: 107 MEQ/L (ref 98–111)
CO2 SERPL-SCNC: 19 MEQ/L (ref 23–33)
CREAT SERPL-MCNC: 0.7 MG/DL (ref 0.4–1.2)
DEPRECATED RDW RBC AUTO: 46.2 FL (ref 35–45)
ECHO BSA: 1.6 M2
ERYTHROCYTE [DISTWIDTH] IN BLOOD BY AUTOMATED COUNT: 13.1 % (ref 11.5–14.5)
GFR SERPL CREATININE-BSD FRML MDRD: > 90 ML/MIN/1.73M2
GLUCOSE SERPL-MCNC: 119 MG/DL (ref 70–108)
HCT VFR BLD AUTO: 32.4 % (ref 37–47)
HEPARIN UNFRACTIONATED: 0.28 U/ML (ref 0.3–0.7)
HEPARIN UNFRACTIONATED: 0.34 U/ML (ref 0.3–0.7)
HEPARIN UNFRACTIONATED: 1.18 U/ML (ref 0.3–0.7)
HGB BLD-MCNC: 10.1 GM/DL (ref 12–16)
MAGNESIUM SERPL-MCNC: 2.2 MG/DL (ref 1.6–2.4)
MCH RBC QN AUTO: 30.3 PG (ref 26–33)
MCHC RBC AUTO-ENTMCNC: 31.2 GM/DL (ref 32.2–35.5)
MCV RBC AUTO: 97.3 FL (ref 81–99)
PLATELET # BLD AUTO: 442 THOU/MM3 (ref 130–400)
PMV BLD AUTO: 10.3 FL (ref 9.4–12.4)
POTASSIUM SERPL-SCNC: 4.4 MEQ/L (ref 3.5–5.2)
RBC # BLD AUTO: 3.33 MILL/MM3 (ref 4.2–5.4)
SODIUM SERPL-SCNC: 140 MEQ/L (ref 135–145)
WBC # BLD AUTO: 12.7 THOU/MM3 (ref 4.8–10.8)

## 2024-07-08 PROCEDURE — 6360000002 HC RX W HCPCS: Performed by: INTERNAL MEDICINE

## 2024-07-08 PROCEDURE — 85027 COMPLETE CBC AUTOMATED: CPT

## 2024-07-08 PROCEDURE — 99233 SBSQ HOSP IP/OBS HIGH 50: CPT | Performed by: FAMILY MEDICINE

## 2024-07-08 PROCEDURE — 83735 ASSAY OF MAGNESIUM: CPT

## 2024-07-08 PROCEDURE — 99153 MOD SED SAME PHYS/QHP EA: CPT | Performed by: INTERNAL MEDICINE

## 2024-07-08 PROCEDURE — 93458 L HRT ARTERY/VENTRICLE ANGIO: CPT | Performed by: INTERNAL MEDICINE

## 2024-07-08 PROCEDURE — C1760 CLOSURE DEV, VASC: HCPCS | Performed by: INTERNAL MEDICINE

## 2024-07-08 PROCEDURE — C1887 CATHETER, GUIDING: HCPCS | Performed by: INTERNAL MEDICINE

## 2024-07-08 PROCEDURE — C1894 INTRO/SHEATH, NON-LASER: HCPCS | Performed by: INTERNAL MEDICINE

## 2024-07-08 PROCEDURE — 6370000000 HC RX 637 (ALT 250 FOR IP)

## 2024-07-08 PROCEDURE — 93571 IV DOP VEL&/PRESS C FLO 1ST: CPT | Performed by: INTERNAL MEDICINE

## 2024-07-08 PROCEDURE — 2500000003 HC RX 250 WO HCPCS: Performed by: INTERNAL MEDICINE

## 2024-07-08 PROCEDURE — 6360000002 HC RX W HCPCS

## 2024-07-08 PROCEDURE — 83835 ASSAY OF METANEPHRINES: CPT

## 2024-07-08 PROCEDURE — B2111ZZ FLUOROSCOPY OF MULTIPLE CORONARY ARTERIES USING LOW OSMOLAR CONTRAST: ICD-10-PCS | Performed by: INTERNAL MEDICINE

## 2024-07-08 PROCEDURE — 80048 BASIC METABOLIC PNL TOTAL CA: CPT

## 2024-07-08 PROCEDURE — 85520 HEPARIN ASSAY: CPT

## 2024-07-08 PROCEDURE — B2111ZZ FLUOROSCOPY OF MULTIPLE CORONARY ARTERIES USING LOW OSMOLAR CONTRAST: ICD-10-PCS

## 2024-07-08 PROCEDURE — 99152 MOD SED SAME PHYS/QHP 5/>YRS: CPT | Performed by: INTERNAL MEDICINE

## 2024-07-08 PROCEDURE — C1769 GUIDE WIRE: HCPCS | Performed by: INTERNAL MEDICINE

## 2024-07-08 PROCEDURE — 2709999900 HC NON-CHARGEABLE SUPPLY: Performed by: INTERNAL MEDICINE

## 2024-07-08 PROCEDURE — 4A033BC MEASUREMENT OF ARTERIAL PRESSURE, CORONARY, PERCUTANEOUS APPROACH: ICD-10-PCS | Performed by: INTERNAL MEDICINE

## 2024-07-08 PROCEDURE — 93567 NJX CAR CTH SPRVLV AORTGRPHY: CPT | Performed by: INTERNAL MEDICINE

## 2024-07-08 PROCEDURE — 99232 SBSQ HOSP IP/OBS MODERATE 35: CPT | Performed by: PHYSICIAN ASSISTANT

## 2024-07-08 PROCEDURE — 99232 SBSQ HOSP IP/OBS MODERATE 35: CPT | Performed by: NURSE PRACTITIONER

## 2024-07-08 PROCEDURE — 4A023N7 MEASUREMENT OF CARDIAC SAMPLING AND PRESSURE, LEFT HEART, PERCUTANEOUS APPROACH: ICD-10-PCS

## 2024-07-08 PROCEDURE — 6370000000 HC RX 637 (ALT 250 FOR IP): Performed by: PHYSICIAN ASSISTANT

## 2024-07-08 PROCEDURE — 1200000003 HC TELEMETRY R&B

## 2024-07-08 PROCEDURE — 6360000004 HC RX CONTRAST MEDICATION: Performed by: INTERNAL MEDICINE

## 2024-07-08 PROCEDURE — 36415 COLL VENOUS BLD VENIPUNCTURE: CPT

## 2024-07-08 PROCEDURE — 4A023N7 MEASUREMENT OF CARDIAC SAMPLING AND PRESSURE, LEFT HEART, PERCUTANEOUS APPROACH: ICD-10-PCS | Performed by: INTERNAL MEDICINE

## 2024-07-08 PROCEDURE — G0269 OCCLUSIVE DEVICE IN VEIN ART: HCPCS | Performed by: INTERNAL MEDICINE

## 2024-07-08 PROCEDURE — 2580000003 HC RX 258

## 2024-07-08 PROCEDURE — 85347 COAGULATION TIME ACTIVATED: CPT

## 2024-07-08 RX ORDER — SODIUM CHLORIDE 9 MG/ML
INJECTION, SOLUTION INTRAVENOUS CONTINUOUS
OUTPATIENT
Start: 2024-07-08 | End: 2024-07-08

## 2024-07-08 RX ORDER — SODIUM CHLORIDE 0.9 % (FLUSH) 0.9 %
5-40 SYRINGE (ML) INJECTION PRN
Status: CANCELLED | OUTPATIENT
Start: 2024-07-08

## 2024-07-08 RX ORDER — MIDAZOLAM HYDROCHLORIDE 1 MG/ML
INJECTION INTRAMUSCULAR; INTRAVENOUS PRN
Status: DISCONTINUED | OUTPATIENT
Start: 2024-07-08 | End: 2024-07-08 | Stop reason: HOSPADM

## 2024-07-08 RX ORDER — HEPARIN SODIUM 1000 [USP'U]/ML
INJECTION, SOLUTION INTRAVENOUS; SUBCUTANEOUS PRN
Status: DISCONTINUED | OUTPATIENT
Start: 2024-07-08 | End: 2024-07-08 | Stop reason: HOSPADM

## 2024-07-08 RX ORDER — ACETAMINOPHEN 325 MG/1
650 TABLET ORAL EVERY 4 HOURS PRN
OUTPATIENT
Start: 2024-07-08

## 2024-07-08 RX ORDER — ADENOSINE 3 MG/ML
INJECTION, SOLUTION INTRAVENOUS PRN
Status: DISCONTINUED | OUTPATIENT
Start: 2024-07-08 | End: 2024-07-08 | Stop reason: HOSPADM

## 2024-07-08 RX ORDER — SODIUM CHLORIDE 9 MG/ML
INJECTION, SOLUTION INTRAVENOUS PRN
Status: CANCELLED | OUTPATIENT
Start: 2024-07-08

## 2024-07-08 RX ORDER — NITROGLYCERIN 0.4 MG/1
0.4 TABLET SUBLINGUAL EVERY 5 MIN PRN
Status: CANCELLED | OUTPATIENT
Start: 2024-07-08

## 2024-07-08 RX ORDER — SODIUM CHLORIDE 9 MG/ML
INJECTION, SOLUTION INTRAVENOUS PRN
OUTPATIENT
Start: 2024-07-08

## 2024-07-08 RX ORDER — METOPROLOL SUCCINATE 100 MG/1
100 TABLET, EXTENDED RELEASE ORAL NIGHTLY
Status: DISCONTINUED | OUTPATIENT
Start: 2024-07-08 | End: 2024-07-11 | Stop reason: HOSPADM

## 2024-07-08 RX ORDER — ASPIRIN 325 MG
325 TABLET ORAL ONCE
Status: CANCELLED | OUTPATIENT
Start: 2024-07-08 | End: 2024-07-08

## 2024-07-08 RX ORDER — SODIUM CHLORIDE 9 MG/ML
INJECTION, SOLUTION INTRAVENOUS CONTINUOUS
Status: CANCELLED | OUTPATIENT
Start: 2024-07-08

## 2024-07-08 RX ORDER — LABETALOL HYDROCHLORIDE 5 MG/ML
INJECTION, SOLUTION INTRAVENOUS PRN
Status: DISCONTINUED | OUTPATIENT
Start: 2024-07-08 | End: 2024-07-08 | Stop reason: HOSPADM

## 2024-07-08 RX ORDER — FENTANYL CITRATE 50 UG/ML
INJECTION, SOLUTION INTRAMUSCULAR; INTRAVENOUS PRN
Status: DISCONTINUED | OUTPATIENT
Start: 2024-07-08 | End: 2024-07-08 | Stop reason: HOSPADM

## 2024-07-08 RX ORDER — SODIUM CHLORIDE 0.9 % (FLUSH) 0.9 %
5-40 SYRINGE (ML) INJECTION PRN
OUTPATIENT
Start: 2024-07-08

## 2024-07-08 RX ORDER — SODIUM CHLORIDE 0.9 % (FLUSH) 0.9 %
5-40 SYRINGE (ML) INJECTION EVERY 12 HOURS SCHEDULED
Status: CANCELLED | OUTPATIENT
Start: 2024-07-08

## 2024-07-08 RX ORDER — SODIUM CHLORIDE 0.9 % (FLUSH) 0.9 %
5-40 SYRINGE (ML) INJECTION EVERY 12 HOURS SCHEDULED
OUTPATIENT
Start: 2024-07-08

## 2024-07-08 RX ADMIN — ALPRAZOLAM 0.5 MG: 0.5 TABLET ORAL at 22:33

## 2024-07-08 RX ADMIN — CYCLOBENZAPRINE 10 MG: 10 TABLET, FILM COATED ORAL at 00:21

## 2024-07-08 RX ADMIN — METOPROLOL SUCCINATE 100 MG: 100 TABLET, EXTENDED RELEASE ORAL at 19:52

## 2024-07-08 RX ADMIN — ATORVASTATIN CALCIUM 40 MG: 40 TABLET, FILM COATED ORAL at 08:52

## 2024-07-08 RX ADMIN — ACETAMINOPHEN 650 MG: 325 TABLET ORAL at 00:21

## 2024-07-08 RX ADMIN — PREGABALIN 50 MG: 50 CAPSULE ORAL at 08:51

## 2024-07-08 RX ADMIN — PREGABALIN 50 MG: 50 CAPSULE ORAL at 13:59

## 2024-07-08 RX ADMIN — PREGABALIN 50 MG: 50 CAPSULE ORAL at 19:52

## 2024-07-08 RX ADMIN — SODIUM CHLORIDE, PRESERVATIVE FREE 10 ML: 5 INJECTION INTRAVENOUS at 08:54

## 2024-07-08 RX ADMIN — ALPRAZOLAM 0.5 MG: 0.5 TABLET ORAL at 01:05

## 2024-07-08 RX ADMIN — HYDROCODONE BITARTRATE AND ACETAMINOPHEN 1 TABLET: 5; 325 TABLET ORAL at 17:40

## 2024-07-08 RX ADMIN — ACETAMINOPHEN 650 MG: 325 TABLET ORAL at 22:33

## 2024-07-08 RX ADMIN — CYCLOBENZAPRINE 10 MG: 10 TABLET, FILM COATED ORAL at 22:33

## 2024-07-08 RX ADMIN — CYCLOBENZAPRINE 10 MG: 10 TABLET, FILM COATED ORAL at 08:51

## 2024-07-08 RX ADMIN — ALPRAZOLAM 0.5 MG: 0.5 TABLET ORAL at 13:11

## 2024-07-08 RX ADMIN — SODIUM CHLORIDE, PRESERVATIVE FREE 10 ML: 5 INJECTION INTRAVENOUS at 19:52

## 2024-07-08 RX ADMIN — ASPIRIN 81 MG: 81 TABLET, COATED ORAL at 08:51

## 2024-07-08 RX ADMIN — ACETAMINOPHEN 650 MG: 325 TABLET ORAL at 13:11

## 2024-07-08 RX ADMIN — LISINOPRIL 20 MG: 20 TABLET ORAL at 19:53

## 2024-07-08 RX ADMIN — HEPARIN SODIUM 18 UNITS/KG/HR: 10000 INJECTION, SOLUTION INTRAVENOUS at 04:11

## 2024-07-08 ASSESSMENT — PAIN SCALES - GENERAL
PAINLEVEL_OUTOF10: 0
PAINLEVEL_OUTOF10: 8
PAINLEVEL_OUTOF10: 3

## 2024-07-08 ASSESSMENT — PAIN DESCRIPTION - LOCATION: LOCATION: BACK;SHOULDER

## 2024-07-08 NOTE — BRIEF OP NOTE
[facility]  Sedation/Analgesia Post Sedation Record        Pt Name: Jacki Moura  MRN: [id number]  YOB: 1965  Procedure Performed By: Teja Hayward MD MD, FACELVIS, ERIC, SAM  Primary Care Physician: Kyle Smith APRN - CNP    POST-PROCEDURE    Sedation/Anesthesia:  Local Anesthesia and IV Conscious Sedation with continuous O2 monitoring    Estimated Blood Loss: 10 cc     Specimens Removed:  [x]None []Other:      Disposition of Specimen:  []Pathology []Other        Complications:   [x]None Immediate []Other:       Procedure performed: Left heart cath    Post Procedure Diagnosis/Findings:    Non-obstructive Coronary Artery Disease   FFR of RCA is 0.84 (non-obstructive)  EF 45-50%         Recommendations:    Medical treatment  EP evaluation for Sinus tachycardia  Routine post-cath care.               Teja Hayward MD MD, FACC, ERIC, SAM  Electronically signed 7/8/2024 at 12:16 PM

## 2024-07-08 NOTE — CARE COORDINATION
Case Management Assessment Initial Evaluation    Date/Time of Evaluation: 2024 7:28 AM  Assessment Completed by: Viviane Barroso RN    If patient is discharged prior to next notation, then this note serves as note for discharge by case management.    Patient Name: Jacki Moura                   YOB: 1965  Diagnosis: Chest pain [R07.9]  Tachycardia [R00.0]  Chest pain, unspecified type [R07.9]                   Date / Time: 2024  6:01 PM  Location: [facility]     Patient Admission Status: Inpatient   Readmission Risk Low 0-14, Mod 15-19), High > 20: Readmission Risk Score: 8.4    Current PCP: Kyle Smith APRN - CNP    Additional Case Management Notes: To ED w/ chest pain and let arm pain. Given sublingual nitro x3. Hospitalist and Cardiology following. Heparin gtt. Nitro gtt. Asa. Flexeril prn. Norco prn. Lisinopril. Lopressor. Lyrica TID. IV morphine prn. Plan for C today at 10:30 am.     Procedures:    ECHO: EF 35-40%.    Plan for C     Imagin/5 CXR: Negative   CTA Chest: Negative    Patient Goals/Plan/Treatment Preferences: Met w/ Jacki. Verified insurance and PCP. She is independent, actively drives and works FT at [employer]. Has bedside commode if needed. Plans to return home w/ . Denies needs for DME or HH services.    10:30AM: Call to Tootie in financial services regarding financial assistance. Jacki mentioned they are overwhelmed with hospital bills and will likely need assistance. Tootie states they will visit patient and her  this afternoon to discuss financial assistance options.      24 1020   Service Assessment   Patient Orientation Alert and Oriented   Cognition Alert   History Provided By Patient   Primary Caregiver Self   Accompanied By/Relationship  Sergei   Support Systems Spouse/Significant Other;Family Members   Patient's Healthcare Decision Maker is: Patient Declined (Legal Next of Kin Remains as Decision Maker)   PCP  Pt called in stating for the past week he had had a productive cough, nothing too bad but just not going away. No fever, no sore throat no bad sinus pressure. Pt would like a call to discuss if he needs to be seen or not   Verified by CM Yes   Last Visit to PCP Within last 3 months   Prior Functional Level Independent in ADLs/IADLs   Current Functional Level Independent in ADLs/IADLs   Can patient return to prior living arrangement Yes   Ability to make needs known: Good   Family able to assist with home care needs: Yes   Would you like for me to discuss the discharge plan with any other family members/significant others, and if so, who? Yes  ( at bedside)   Financial Resources Other (Comment)  (BCBS)   Community Resources None   CM/SW Referral ADLs/IADLs;DME   Social/Functional History   Lives With Spouse   Type of Home House   Home Layout Able to Live on Main level with bedroom/bathroom;Two level   Active  Yes   Occupation Full time employment   Type of Occupation [employer]   Discharge Planning   Type of Residence House   Living Arrangements Spouse/Significant Other   Current Services Prior To Admission Durable Medical Equipment   Current DME Prior to Arrival Bedside Commode   Potential Assistance Needed N/A   DME Ordered? No   Potential Assistance Purchasing Medications Yes   Type of Home Care Services None   Patient expects to be discharged to: House   Follow Up Appointment: Best Day/Time  Tuesday PM   One/Two Story Residence Two story, live on first floor   History of falls? 0   Services At/After Discharge   Confirm Follow Up Transport Family   Condition of Participation: Discharge Planning   The Plan for Transition of Care is related to the following treatment goals: \"Focus on my heart stuff and find out what is going on. Just get my heart rate normal\"

## 2024-07-08 NOTE — PLAN OF CARE
Problem: Discharge Planning  Goal: Discharge to home or other facility with appropriate resources  7/7/2024 2038 by Sheldon Pfeiffer RN  Outcome: Progressing  7/7/2024 1131 by Marge Flynn RN  Outcome: Progressing  Flowsheets (Taken 7/7/2024 0830)  Discharge to home or other facility with appropriate resources:   Identify barriers to discharge with patient and caregiver   Arrange for needed discharge resources and transportation as appropriate     Problem: Pain  Goal: Verbalizes/displays adequate comfort level or baseline comfort level  7/7/2024 2038 by Sheldon Pfeiffer RN  Outcome: Progressing  7/7/2024 1131 by Marge Flynn RN  Outcome: Progressing     Problem: Safety - Adult  Goal: Free from fall injury  7/7/2024 2038 by Sheldon Pfeiffer RN  Outcome: Progressing  7/7/2024 1131 by Marge Flynn RN  Outcome: Progressing     Problem: Skin/Tissue Integrity - Adult  Goal: Skin integrity remains intact  Outcome: Progressing     Problem: Metabolic/Fluid and Electrolytes - Adult  Goal: Electrolytes maintained within normal limits  Outcome: Progressing     Problem: Chronic Conditions and Co-morbidities  Goal: Patient's chronic conditions and co-morbidity symptoms are monitored and maintained or improved  Outcome: Progressing

## 2024-07-08 NOTE — PROGRESS NOTES
Cardiology Progress Note      Patient:  Jacki Moura  YOB: 1965  MRN: [id number]   Acct: [id number]  Admit Date:  7/5/2024  Primary Cardiologist: Teja Hayward MD    Note per dr dalton \"REASON FOR CONSULTATION:  Chest pain, elevated troponin.     PRIMARY CARDIOLOGIST:  Dr. Hayward.     HISTORY OF PRESENT ILLNESS:  This is a 59-year-old  female patient with past medical history of peripheral artery disease with extensive peripheral artery workup including bypass on the left lower limb, sinus tachycardia, moderate-to-severe AI, adrenal tumor, anxiety, was in usual state of health until yesterday morning.  Yesterday morning, the patient woke up with left arm pain following with chest pain, pressure, tightness, heaviness, achy, around 8-9/10 associated with shortness of breath and diaphoresis, lasted for 30 minutes, resolved, then went to work and came back in the evening.  Again after she came back from work, she started to have left arm pain following with chest pain, it was radiating and associated with diaphoresis, sweaty, and shortness of breath and that was persistent basically almost a couple of hours prior to arrival to the emergency room and she is brought to the emergency room and she described as achy and she was given sublingual nitroglycerin x3, with that the chest pain resolved, at that time chest pain was 8-9/10.  Now, the chest pain is like 1/10 and she is feeling much comfortable.  No chest pain overnight, except the mild chest pain of 1/10.  So, the patient started on heparin and nitro.  Troponin initially was negative.  Subsequent troponin was 29.  Never had this type of chest pain, has history of premature coronary artery disease.  The patient has history of peripheral artery disease with peripheral artery workup and peripheral artery intervention with left peripheral bypass, and Cardiology evaluation was sought in view of the chest pain and troponin  elevation.\"    Subjective (Events in last 24 hours): pt awake and alert.  NAD. No cp or sob. No edema or orthopnea  On RA  On nitro gtt at 45 mcg/min      Objective:   /73   Pulse 94   Temp 98.5 °F (36.9 °C) (Oral)   Resp 16   Ht 1.499 m (4' 11\")   Wt 62.2 kg (137 lb 2 oz)   SpO2 97%   BMI 27.70 kg/m²        TELEMETRY: nsr 95    Physical Exam:  General Appearance: alert and oriented to person, place and time, in no acute distress  Cardiovascular: normal rate, regular rhythm, normal S1 and S2, no murmurs, rubs, clicks, or gallops, distal pulses intact, no carotid bruits, no JVD  Pulmonary/Chest: clear to auscultation bilaterally- no wheezes, rales or rhonchi, normal air movement, no respiratory distress  Abdomen: soft, non-tender, non-distended, normal bowel sounds, no masses Extremities: no cyanosis, clubbing or edema, pulse   Skin: warm and dry, no rash or erythema  Head: normocephalic and atraumatic  Eyes: pupils equal, round, and reactive to light  Neck: supple and non-tender without mass, no thyromegaly   Musculoskeletal: normal range of motion, no joint swelling, deformity or tenderness  Neurological: alert, oriented, normal speech, no focal findings or movement disorder noted  Right groin - no hematoma, +DP, +PT    Medications:    [Held by provider] amLODIPine  10 mg Oral Daily    aspirin  81 mg Oral Daily    [Held by provider] clopidogrel  75 mg Oral Daily    lisinopril  20 mg Oral BID    metoprolol tartrate  50 mg Oral BID    pregabalin  50 mg Oral TID    sodium chloride flush  5-40 mL IntraVENous 2 times per day    atorvastatin  40 mg Oral Daily      sodium chloride      nitroGLYCERIN 45 mcg/min (07/08/24 1353)     morphine, 2 mg, Q4H PRN  ALPRAZolam, 0.5 mg, TID PRN  cyclobenzaprine, 10 mg, TID PRN  HYDROcodone-acetaminophen, 1 tablet, Q8H PRN  sodium chloride flush, 5-40 mL, PRN  sodium chloride, , PRN  ondansetron, 4 mg, Q8H PRN   Or  ondansetron, 4 mg, Q6H PRN  acetaminophen, 650 mg, Q6H  107 07/08/2024 07:12 AM    CO2 19 07/08/2024 07:12 AM    BUN 15 07/08/2024 07:12 AM    CREATININE 0.7 07/08/2024 07:12 AM    LABGLOM > 90 07/08/2024 07:12 AM    LABGLOM 74 03/25/2024 12:31 PM    GLUCOSE 119 07/08/2024 07:12 AM    CALCIUM 8.8 07/08/2024 07:12 AM       Hepatic Function Panel:    Lab Results   Component Value Date/Time    ALKPHOS 118 07/05/2024 06:27 PM    ALT 16 07/05/2024 06:27 PM    AST 12 07/05/2024 06:27 PM    BILITOT 0.2 07/05/2024 06:27 PM    BILIDIR <0.2 01/08/2024 09:28 AM       Magnesium:    Lab Results   Component Value Date/Time    MG 2.2 07/08/2024 07:12 AM       PT/INR:    Lab Results   Component Value Date/Time    INR 1.00 07/06/2024 01:03 AM       HgBA1c:    Lab Results   Component Value Date/Time    LABA1C 5.3 07/19/2022 09:54 PM       FLP:    Lab Results   Component Value Date/Time    TRIG 91 07/06/2024 03:43 AM    HDL 35 07/06/2024 03:43 AM       TSH:    Lab Results   Component Value Date/Time    TSH 1.970 05/01/2024 02:25 PM         Assessment:    Chest pain  Elevated troponins  S/p cath 7/8/24 -     Non-obstructive CAD    FFR of RCA is 0.84  New NICMP - ef 35-40 per TTE 7/6/24, ef 60-65 per TTE 1/2024  Mod to severe AI  Sinus tachycardia  PAD - hx L endarterectomy, hx lower ext intervention by IR  Hx chronic neck/shoulder pain  Tobacco abuse  Hx adrenal tumor - attending following      Plan:    Consult EP for evaluation of sinus tachycardia  Cont asa/statin/BB/ace  Change lopressor to toprol xl  Wean nitro gtt to off  Smoking cessation advised  Follow up dr tellez 2 weeks       Electronically signed by Chela Dawson PA-C on 7/8/2024 at 2:05 PM

## 2024-07-08 NOTE — PROGRESS NOTES
[facility]– [place][note 2]  Sedation/Analgesia History & Physical    Pt Name: Jacki Moura  Account number: [id number]  MRN: [id number]  YOB: 1965  Provider Performing Procedure: Teja Hayward MD MD [facility]  Primary Care Physician: Kyle Smith APRN - CNP  Date: 7/8/2024    PRE-PROCEDURE    Code Status: FULL CODE  Brief History/Pre-Procedure Diagnosis: LV dysfunction    Consent: : I have discussed with the patient risks, benefits, and alternatives (and relevant risks, benefits, and side effects related to alternatives or not receiving care), and likelihood of the success.   The patient and/or representative appear to understand and agree to proceed.  The discussion encompasses risks, benefits, and side effects related to the alternatives and the risks related to not receiving the proposed care, treatment, and services.       PLANNED PROCEDURE   [x]Cath  [x]PCI                []Pacemaker/AICD  []ESTUARDO             []Cardioversion []Peripheral angiography/PTA  []Other:      VITAL SIGNS   Vitals:    07/08/24 0015   BP: 104/64   Pulse: (!) 107   Resp: 18   Temp: 98.1 °F (36.7 °C)   SpO2: 96%       PHYSICAL:   General: No acute distress  HEENT:  Unremarkable for age  Neck: without increased JVD, carotid pulses 2+ bilaterally without bruits  Heart: RRR, S1 & S2 WNL   Lungs: Clear to auscultation    Abdomen: BS present, without HSM, masses, or tenderness    Extremities: without C,C,E.  Pulses 2+ bilaterally  Mental Status: Alert & Oriented    SEDATION  Planned agent:[x]Midazolam []Meperidine []Sublimaze []Morphine  []Diazepam  [x]Other: fentanyl      ASA Classification:  []1 []2 [x]3 []4 []5  Class 1: A normal healthy patient  Class 2: Pt with mild to moderate systemic disease  Class 3: Severe systemic disease or disturbance  Class 4: Severe systemic disorders that are already life threatening.  Class 5: Moribund pt with little chances of survival, for more than 24 hours.  Mallampati I Airway  tablet 40 mg, 40 mg, Oral, Daily, Newman-Waterhouse, Aundrea NDO, 40 mg at 07/07/24 0904    nitroGLYCERIN (NITROSTAT) SL tablet 0.4 mg, 0.4 mg, SubLINGual, Q5 Min PRN, Anthony Vincent MD, 0.4 mg at 07/05/24 2008  Prior to Admission medications    Medication Sig Start Date End Date Taking? Authorizing Provider   amLODIPine (NORVASC) 10 MG tablet Take 1 tablet by mouth daily 7/1/24   Kyle Smith APRN - CNP   ALPRAZolam (XANAX) 0.5 MG tablet Take 1 tablet by mouth 3 times daily as needed for Sleep or Anxiety for up to 30 days. Max Daily Amount: 1.5 mg 6/19/24 7/19/24  Kyle Smith APRN - CNP   HYDROcodone-acetaminophen (NORCO) 5-325 MG per tablet Take 1 tablet by mouth every 8 hours as needed for Pain for up to 30 days. Intended supply: 30 days. Take lowest dose possible to manage pain Max Daily Amount: 3 tablets 6/14/24 7/14/24  Kyle Smith APRN - CNP   cyclobenzaprine (FLEXERIL) 10 MG tablet Take 1 tablet by mouth 3 times daily as needed for Muscle spasms 6/13/24   Kyle Smith APRN - CNP   pregabalin (LYRICA) 50 MG capsule Take 1 capsule by mouth 3 times daily for 60 days. Max Daily Amount: 150 mg 6/13/24 8/12/24  Kyle Smith APRN - CNP   meloxicam (MOBIC) 7.5 MG tablet Take 1 tablet by mouth daily 4/30/24 7/29/24  Kyle Smith APRN - CNP   atorvastatin (LIPITOR) 10 MG tablet Take 1 tablet by mouth daily 4/30/24   Kyle Smith APRN - CNP   metoprolol tartrate (LOPRESSOR) 25 MG tablet Take 2 tablets by mouth in morning and 1 tablet in the evening  Patient taking differently: Take 1 tablet by mouth daily 3/29/24   Kyle Smith APRN - CNP   clopidogrel (PLAVIX) 75 MG tablet Take 1 tablet by mouth daily 2/27/24   Kyle Smith APRN - CNP   lisinopril (PRINIVIL;ZESTRIL) 20 MG tablet Take 1 tablet by mouth 2 times daily 1/10/24   Morena Lyles PA-C   VITAMIN D PO Take by mouth daily Vitamin d with calcium  Patient not taking: Reported on 7/6/2024    Provider, MD Sebastien    aspirin 81 MG EC tablet Take 1 tablet by mouth daily    Provider, MD Sebastien     Additional information:                 Teja Hayward MD MD [facility]  Electronically signed 7/8/2024 at 8:43 AM

## 2024-07-08 NOTE — DISCHARGE INSTRUCTIONS
F/up dr tellez 2 weeks    Normal Observation: You may or may not experience these.    Soreness or tenderness that may last a few weeks.    Possible bruising that could last a few weeks and up to one month.   Formation of a small lump (dime to quarter size) that should last only a few weeks.    Care of your incision  You may shower 24 hours after the procedure. Wet the dressing thoroughly and gently remove the bandage from the hospital during showering. It is easier to remove this way.             Gently clean your site daily using soap and water while standing in the shower. Dry thoroughly.   Do not apply powders or lotions to the site for 2 weeks.   Keep the site clean and dry to prevent infection.    Do not sit in a bathtub or a pool of water for 7 days.    Inspect the site daily.    Activity   You may resume normal activity in 2 days, including driving, letting pain be your     guide.   Limit lifting over 5 pounds (half gallon of milk) to one week or until site heals.  Limit vigorous activity (contact sports) to two weeks time.  You will be able to return to work in 1-3 days.    Call our office immediately if you experience any of the following…  Significant bleeding does not stop after 10 minutes of applying firm pressure directly over incision.   Increased swelling of groin or leg.   Unusual pain at groin or down that leg.   Signs of infection: redness, warmth to touch, drainage, poorly healing incision, fever, or chills.

## 2024-07-09 ENCOUNTER — APPOINTMENT (OUTPATIENT)
Dept: CT IMAGING | Age: 59
End: 2024-07-09
Payer: COMMERCIAL

## 2024-07-09 PROBLEM — I25.10 CORONARY ARTERY DISEASE INVOLVING NATIVE CORONARY ARTERY OF NATIVE HEART WITHOUT ANGINA PECTORIS: Status: ACTIVE | Noted: 2024-07-09

## 2024-07-09 LAB
ANION GAP SERPL CALC-SCNC: 13 MEQ/L (ref 8–16)
BASOPHILS ABSOLUTE: 0.1 THOU/MM3 (ref 0–0.1)
BASOPHILS NFR BLD AUTO: 0.7 %
BUN SERPL-MCNC: 13 MG/DL (ref 7–22)
CALCIUM SERPL-MCNC: 8.5 MG/DL (ref 8.5–10.5)
CHLORIDE SERPL-SCNC: 105 MEQ/L (ref 98–111)
CO2 SERPL-SCNC: 21 MEQ/L (ref 23–33)
CREAT SERPL-MCNC: 0.7 MG/DL (ref 0.4–1.2)
DEPRECATED RDW RBC AUTO: 47.1 FL (ref 35–45)
EOSINOPHIL NFR BLD AUTO: 2.5 %
EOSINOPHILS ABSOLUTE: 0.3 THOU/MM3 (ref 0–0.4)
ERYTHROCYTE [DISTWIDTH] IN BLOOD BY AUTOMATED COUNT: 13.2 % (ref 11.5–14.5)
GFR SERPL CREATININE-BSD FRML MDRD: > 90 ML/MIN/1.73M2
GLUCOSE SERPL-MCNC: 105 MG/DL (ref 70–108)
HCT VFR BLD AUTO: 30.5 % (ref 37–47)
HGB BLD-MCNC: 9.5 GM/DL (ref 12–16)
IMM GRANULOCYTES # BLD AUTO: 0.36 THOU/MM3 (ref 0–0.07)
IMM GRANULOCYTES NFR BLD AUTO: 3.3 %
LYMPHOCYTES ABSOLUTE: 1.9 THOU/MM3 (ref 1–4.8)
LYMPHOCYTES NFR BLD AUTO: 17.4 %
MCH RBC QN AUTO: 30.7 PG (ref 26–33)
MCHC RBC AUTO-ENTMCNC: 31.1 GM/DL (ref 32.2–35.5)
MCV RBC AUTO: 98.7 FL (ref 81–99)
MONOCYTES ABSOLUTE: 1.2 THOU/MM3 (ref 0.4–1.3)
MONOCYTES NFR BLD AUTO: 11 %
NEUTROPHILS ABSOLUTE: 7.2 THOU/MM3 (ref 1.8–7.7)
NEUTROPHILS NFR BLD AUTO: 65.1 %
NRBC BLD AUTO-RTO: 0 /100 WBC
PLATELET # BLD AUTO: 457 THOU/MM3 (ref 130–400)
PMV BLD AUTO: 10.3 FL (ref 9.4–12.4)
POTASSIUM SERPL-SCNC: 4.9 MEQ/L (ref 3.5–5.2)
RBC # BLD AUTO: 3.09 MILL/MM3 (ref 4.2–5.4)
SODIUM SERPL-SCNC: 139 MEQ/L (ref 135–145)
WBC # BLD AUTO: 11 THOU/MM3 (ref 4.8–10.8)

## 2024-07-09 PROCEDURE — 6360000004 HC RX CONTRAST MEDICATION: Performed by: INTERNAL MEDICINE

## 2024-07-09 PROCEDURE — 1200000003 HC TELEMETRY R&B

## 2024-07-09 PROCEDURE — 99232 SBSQ HOSP IP/OBS MODERATE 35: CPT | Performed by: FAMILY MEDICINE

## 2024-07-09 PROCEDURE — 2580000003 HC RX 258

## 2024-07-09 PROCEDURE — 36415 COLL VENOUS BLD VENIPUNCTURE: CPT

## 2024-07-09 PROCEDURE — 80048 BASIC METABOLIC PNL TOTAL CA: CPT

## 2024-07-09 PROCEDURE — 70498 CT ANGIOGRAPHY NECK: CPT

## 2024-07-09 PROCEDURE — 99232 SBSQ HOSP IP/OBS MODERATE 35: CPT | Performed by: PHYSICIAN ASSISTANT

## 2024-07-09 PROCEDURE — 6370000000 HC RX 637 (ALT 250 FOR IP)

## 2024-07-09 PROCEDURE — 85025 COMPLETE CBC W/AUTO DIFF WBC: CPT

## 2024-07-09 PROCEDURE — 6370000000 HC RX 637 (ALT 250 FOR IP): Performed by: PHYSICIAN ASSISTANT

## 2024-07-09 RX ADMIN — PREGABALIN 50 MG: 50 CAPSULE ORAL at 14:38

## 2024-07-09 RX ADMIN — ATORVASTATIN CALCIUM 40 MG: 40 TABLET, FILM COATED ORAL at 07:59

## 2024-07-09 RX ADMIN — PREGABALIN 50 MG: 50 CAPSULE ORAL at 21:40

## 2024-07-09 RX ADMIN — CYCLOBENZAPRINE 10 MG: 10 TABLET, FILM COATED ORAL at 21:47

## 2024-07-09 RX ADMIN — IOPAMIDOL 80 ML: 755 INJECTION, SOLUTION INTRAVENOUS at 14:04

## 2024-07-09 RX ADMIN — CYCLOBENZAPRINE 10 MG: 10 TABLET, FILM COATED ORAL at 16:40

## 2024-07-09 RX ADMIN — HYDROCODONE BITARTRATE AND ACETAMINOPHEN 1 TABLET: 5; 325 TABLET ORAL at 09:52

## 2024-07-09 RX ADMIN — ALPRAZOLAM 0.5 MG: 0.5 TABLET ORAL at 07:56

## 2024-07-09 RX ADMIN — HYDROCODONE BITARTRATE AND ACETAMINOPHEN 1 TABLET: 5; 325 TABLET ORAL at 18:15

## 2024-07-09 RX ADMIN — CYCLOBENZAPRINE 10 MG: 10 TABLET, FILM COATED ORAL at 07:56

## 2024-07-09 RX ADMIN — ALPRAZOLAM 0.5 MG: 0.5 TABLET ORAL at 16:40

## 2024-07-09 RX ADMIN — METOPROLOL SUCCINATE 100 MG: 100 TABLET, EXTENDED RELEASE ORAL at 21:41

## 2024-07-09 RX ADMIN — PREGABALIN 50 MG: 50 CAPSULE ORAL at 07:56

## 2024-07-09 RX ADMIN — SODIUM CHLORIDE, PRESERVATIVE FREE 10 ML: 5 INJECTION INTRAVENOUS at 21:40

## 2024-07-09 RX ADMIN — ASPIRIN 81 MG: 81 TABLET, COATED ORAL at 07:56

## 2024-07-09 RX ADMIN — LISINOPRIL 20 MG: 20 TABLET ORAL at 21:40

## 2024-07-09 RX ADMIN — HYDROCODONE BITARTRATE AND ACETAMINOPHEN 1 TABLET: 5; 325 TABLET ORAL at 01:35

## 2024-07-09 RX ADMIN — LISINOPRIL 20 MG: 20 TABLET ORAL at 07:58

## 2024-07-09 RX ADMIN — SODIUM CHLORIDE, PRESERVATIVE FREE 10 ML: 5 INJECTION INTRAVENOUS at 07:58

## 2024-07-09 ASSESSMENT — PAIN DESCRIPTION - ORIENTATION
ORIENTATION: MID
ORIENTATION: MID

## 2024-07-09 ASSESSMENT — PAIN - FUNCTIONAL ASSESSMENT
PAIN_FUNCTIONAL_ASSESSMENT: ACTIVITIES ARE NOT PREVENTED
PAIN_FUNCTIONAL_ASSESSMENT: ACTIVITIES ARE NOT PREVENTED

## 2024-07-09 ASSESSMENT — PAIN DESCRIPTION - PAIN TYPE
TYPE: CHRONIC PAIN
TYPE: CHRONIC PAIN

## 2024-07-09 ASSESSMENT — PAIN DESCRIPTION - LOCATION
LOCATION: BACK;SHOULDER
LOCATION: BACK

## 2024-07-09 ASSESSMENT — PAIN SCALES - GENERAL
PAINLEVEL_OUTOF10: 6
PAINLEVEL_OUTOF10: 1
PAINLEVEL_OUTOF10: 1
PAINLEVEL_OUTOF10: 0

## 2024-07-09 ASSESSMENT — PAIN DESCRIPTION - ONSET: ONSET: ON-GOING

## 2024-07-09 ASSESSMENT — PAIN DESCRIPTION - DESCRIPTORS
DESCRIPTORS: DISCOMFORT
DESCRIPTORS: ACHING

## 2024-07-09 ASSESSMENT — PAIN SCALES - WONG BAKER
WONGBAKER_NUMERICALRESPONSE: NO HURT
WONGBAKER_NUMERICALRESPONSE: NO HURT

## 2024-07-09 ASSESSMENT — PAIN DESCRIPTION - FREQUENCY: FREQUENCY: CONTINUOUS

## 2024-07-09 NOTE — PROGRESS NOTES
07/09/24 1228   Encounter Summary   Encounter Overview/Reason Spiritual/Emotional Needs   Service Provided For Patient   Referral/Consult From Rounding   Support System Spouse;Children   Last Encounter  07/09/24   Complexity of Encounter Moderate   Begin Time 1219   End Time  1228   Total Time Calculated 9 min   Spiritual/Emotional needs   Type Spiritual Support   Assessment/Intervention/Outcome   Assessment Coping   Intervention Nurtured Hope;Prayer (assurance of)/[place];Sustaining Presence/Ministry of presence   Outcome Comfort     Assessment:  In my encounter with the 59 yr old patient, while rounding  the unit 6k,  I provided spiritual care to patient through conversation, I also came to assess the patient's spiritual needs present. The pt was admitted due chest pain.     Interventions:  I provided prayer, emotional support and words of comfort.  provided a listening presence and encouraged pt to share their beliefs and how I can support them during their hospitalization.     Outcomes:  The patient was encouraged and didn't share any further spiritual needs at this time.     Plan:  Chaplains will follow-up at a later time for assessment of any spiritual care needs present.

## 2024-07-09 NOTE — PLAN OF CARE
Problem: Discharge Planning  Goal: Discharge to home or other facility with appropriate resources  7/9/2024 1055 by Bubba Blanco, RN  Outcome: Progressing  Flowsheets (Taken 7/9/2024 1055)  Discharge to home or other facility with appropriate resources: Identify barriers to discharge with patient and caregiver     Problem: Pain  Goal: Verbalizes/displays adequate comfort level or baseline comfort level  7/9/2024 1055 by Bubba Blanco, RN  Outcome: Progressing  Flowsheets (Taken 7/9/2024 1055)  Verbalizes/displays adequate comfort level or baseline comfort level: Encourage patient to monitor pain and request assistance      Yes...

## 2024-07-09 NOTE — ADT AUTH CERT
Comments  CommentPatient Demographics    Name Patient ID SSN Gender Identity Birth Date   Jacki Moura [id number]  Female [birth year] (59 yrs)     Address Phone Email Employer   [address] [place] [id number] [phone] (H)  [phone] (M) [email or web address]  [address] [place] [address]  [phone]     [facility] Race Occupation Emp Status    FLY White (non-) -- Full Time      Reg Status PCP Date Last Verified Next Review Date    Verified Kyle Smith APRJEREMIAH - CNP  [phone]      Admission Date Discharge Date Admitting Provider     24 -- --       Marital Status Jew       [employer]        Emergency Contact 1 Emergency Contact 2 Emergency Contact 3   Sergei Moura (2)  [place]  [phone] (H)  [phone] (M) [place] Moura (C[place]  [place]  [phone] (H)  [phone] (M) Kailey Moura  [phone] (H)  [phone] (M)     Subscriber Details  Hospital Account #[id number]  [employer] Subscriber Name/Sex/Relation Subscriber  Subscriber Address/Phone Subscriber Emp/Emp Phone   1. [employer]  [id number] JACKI MOURA - Female  (Self) 1965 [address] [place]  [address]  [phone](H) [place]  [phone]     Utilization Reviews           Last Updated by Tootie Almaguer, FEI on 2024 1051     Review Status Created By   In Primary Tootie Almaguer, RN       Review Type Associated Date   -- 2024      Criteria Review   DATE:    TYPE OF BED: Telemetry         RELEVANT BASELINES: (lab values, vitals, o2 amount/delivery, etc.)  RA. IPTA         PERTINENT UPDATES:  IM and Cardiology following   Hep gtt, Nitro gtt  Echo ordered  For Cardiac Cath         VITALS: 97.6 (36.4)    18        90        109/65  100% RA         ABNL/PERTINENT LABS/RADIOLOGY/DIAGNOSTIC STUDIES:  24 01:03  Magnesium: 1.8  Troponin, High Sensitivity: 29 (H)  Heparin Unfractionated: 0.08 (L)  INR: 1.00  aPTT: 31.6     24 03:43  CARBON DIOXIDE: 21  (L)  Creatinine: 0.7  Glucose: 124 (H)  Troponin, High Sensitivity: 24 (H)  Cholesterol, Total: 107  HDL Cholesterol: 35  LDL Cholesterol: 54  Triglycerides: 91  WBC: 13.3 (H)  RBC: 3.45 (L)  Hemoglobin Quant: 10.7 (L)  Hematocrit: 33.6 (L)  MCHC: 31.8 (L)  RDW-SD: 47.1 (H)  Platelet Count: 325     07/06/24 06:53  Heparin Unfractionated: 0.23 (L)     07/06/24 11:27  Troponin, High Sensitivity: 11  Heparin Unfractionated: 0.33     07/06/24 19:20  Heparin Unfractionated: 0.19 (L)     07/06/24 03:31  EKG 12-LEAD: Rpt  Normal sinus rhythm  Possible Left atrial enlargement  Anteroseptal infarct (cited on or before 05-JUL-2024)  Abnormal ECG  Atrial Rate: 85  P Axis: 48  P-R Interval: 178  Q-T Interval: 374  QRS Duration: 74  QTc Calculation (Bazett): 445  R Axis: 26  T Axis: 42  Ventricular Rate: 85     07/06/24 08:54  ECHO (TTE)     Left Ventricle: Moderately reduced left ventricular systolic function with a visually estimated EF of 35 - 40%. Left ventricle is mildly dilated. Normal wall thickness. There are regional wall motion abnormalities with Anterior, Anterospetal and Sseptal hypokinesis.    Aortic Valve: Moderate to severe regurgitation.    Mitral Valve: Mild regurgitation.    Image quality is suboptimal. Technically difficult study.        PHYSICAL EXAM:  Cardiovascular:      Rate and Rhythm: Regular rhythm. Tachycardia present.      Pulses: Normal pulses.      Heart sounds: Normal heart sounds. No murmur heard.     No gallop.   Pulmonary:      Effort: Pulmonary effort is normal. No respiratory distress.      Breath sounds: Normal breath sounds. No stridor. No wheezing, rhonchi or rales.   Abdominal:      General: Abdomen is flat. Bowel sounds are normal. There is no distension.      Palpations: Abdomen is soft.      Tenderness: There is no abdominal tenderness. There is no guarding or rebound.   Musculoskeletal:         General: No deformity. Normal range of motion.      Right lower leg: No edema.      Left

## 2024-07-09 NOTE — PLAN OF CARE
Problem: Chronic Conditions and Co-morbidities  Goal: Patient's chronic conditions and co-morbidity symptoms are monitored and maintained or improved  Outcome: Progressing  Flowsheets (Taken 7/9/2024 0131)  Care Plan - Patient's Chronic Conditions and Co-Morbidity Symptoms are Monitored and Maintained or Improved:   Monitor and assess patient's chronic conditions and comorbid symptoms for stability, deterioration, or improvement   Collaborate with multidisciplinary team to address chronic and comorbid conditions and prevent exacerbation or deterioration   Update acute care plan with appropriate goals if chronic or comorbid symptoms are exacerbated and prevent overall improvement and discharge       Problem: Skin/Tissue Integrity - Adult  Goal: Skin integrity remains intact  Outcome: Progressing  Flowsheets (Taken 7/9/2024 0131)  Skin Integrity Remains Intact: Monitor for areas of redness and/or skin breakdown  Note:   Patient repositions every 2 hours. Heels elevated . Skin kept clean and dry. Skin assessed with every head to toe. Kevin scale complete.         Problem: Safety - Adult  Goal: Free from fall injury  Outcome: Progressing  Flowsheets (Taken 7/9/2024 0131)  Free From Fall Injury: Instruct family/caregiver on patient safety  Note: Patient remains free from fall this shift. Bed alarm activated . Bed locked and lowest position.  2/4 side rails raised for safety. Patient has non-skid socks when ambulating. Pathway clear and possessions in reach. Call light within reach. Patient rounded on hourly.          Problem: Pain  Goal: Verbalizes/displays adequate comfort level or baseline comfort level  Outcome: Progressing  Flowsheets (Taken 7/9/2024 0131)  Verbalizes/displays adequate comfort level or baseline comfort level:   Encourage patient to monitor pain and request assistance   Assess pain using appropriate pain scale   Administer analgesics based on type and severity of pain and evaluate response    Implement non-pharmacological measures as appropriate and evaluate response         Problem: Discharge Planning  Goal: Discharge to home or other facility with appropriate resources  Outcome: Progressing  Flowsheets (Taken 7/9/2024 0131)  Discharge to home or other facility with appropriate resources: Identify barriers to discharge with patient and caregiver

## 2024-07-09 NOTE — PROGRESS NOTES
PROGRESS NOTE      Patient:  Jacki Moura  Unit/Bed:6K-09/009-A  YOB: 1965  MRN: [id number]   Acct: [id number]    PCP: Kyle Smith APRN - CNP    Date of Admission: 7/5/2024 LOS: 4    Date of Evaluation:  7/9/2024    Anticipated Discharge: 1-2 days    Assessment/Plan:    Nonobstructive CAD/unstable angina, s/p LHC 7/8 without intervention, elevated troponin   - EKG at ED 7/5: sinus tachycardia with age undetermined septal infarct and possible left atrial enlargement.   - Troponin on admission 7/5 <6. On 7/6: elevated 26 --> repeat troponin 11. As of 7/7, <6.   - Echo 7/6 shows: moderately reduced LVEF: 35-50%, moderate to severe AR, mild MR, anterior/anteroseptal and septal hypokinesis. Prior echo 1/8/24 with preserved EF of 60-65%.  - LHC from 7/8 showed: non-obstructive CAD. Fractional flow reserve of RCA is mildly decreased at 0.84.   - On heparin and NTG drip  - Required increase NTG and morphine night of 7/7 due to increased pain, which greatly improved her pain.   - Cardiology following, appreciate recommendations   - Plan to wean NTG drip as tolerated over 7/9.   - Smoking cessation advised  - Pt to f/u with Dr. Hayward in 2 weeks    New-onset cardiomyopathy  - Echo 7/6 shows: moderately reduced LVEF: 35-50%, prior echo 1/8/24 with preserved EF of 60-65%  - Cardiology following, appreciate recommendations.   - Pt's lopressor was switched to toprol xl, per cardio.   - Continue lisinopril.    - Pt fluid status has been stable without signs of volume overload, will continue to monitor status  - Low sodium diet  - 2L fluid restriction  - Strict I+Os  - Daily weights   - Cardiology following, appreciate recommendations.   - LHC from 7/8 showed: non-obstructive CAD. Fractional flow reserve of RCA is mildly decreased at 0.84.   - To f/u with CHF clinic at discharge, per cardio      Intermittent idiopathic tachycardia, possibly secondary to ?anxiety versus adrenal tumor   - 48h holter monitor  19* 21*   BUN 20 15 13   CREATININE 0.9 0.7 0.7   CALCIUM 8.7 8.8 8.5     No results for input(s): \"AST\", \"ALT\", \"BILIDIR\", \"BILITOT\", \"ALKPHOS\" in the last 72 hours.  No results for input(s): \"INR\" in the last 72 hours.  No results for input(s): \"CKTOTAL\", \"TROPONINT\" in the last 72 hours.    Urinalysis:      Lab Results   Component Value Date/Time    NITRU NEGATIVE 01/08/2024 10:30 AM    WBCUA 50-75 07/19/2022 10:10 PM    BACTERIA FEW 07/19/2022 10:10 PM    RBCUA 5-10 07/19/2022 10:10 PM    BLOODU NEGATIVE 01/08/2024 10:30 AM    GLUCOSEU NEGATIVE 01/08/2024 10:30 AM       All radiology images and reports reviewed and interpreted by me:  Radiology:  CTA NECK W WO CONTRAST   Final Result   1. Calcified plaque involving the right carotid bifurcation and origin of the   right internal carotid artery. There is approximately 70% stenosis at the origin   of the right internal carotid artery. There is no significant hemodynamic   stenosis in the right common carotid artery.   2. Calcified plaque involving the left carotid bifurcation and origin of the   left internal carotid artery. There is approximately 50% stenosis at the origin   of the left internal carotid artery. There is no significant hemodynamic   stenosis in the left common carotid artery.   3. Dominant right and hypoplastic left vertebral artery with antegrade flow   bilaterally. The left vertebral artery originates directly from the aortic arch.   There is calcified plaque at the origin of the left vertebral artery.   4. Atherosclerotic calcification in the aortic arch and at the origins of the   brachiocephalic, left common carotid, left vertebral and left subclavian   arteries from the aortic arch.         **This report has been created using voice recognition software. It may contain   minor errors which are inherent in voice recognition technology.**         Electronically signed by Dr. Keven Meza      CTA Chest W/WO Contrast Pulmonary Embolism  Eval

## 2024-07-09 NOTE — PROGRESS NOTES
Cardiology Progress Note      Patient:  Jacki Moura  YOB: 1965  MRN: [id number]   Acct: [id number]  Admit Date:  7/5/2024  Primary Cardiologist: Teja Hayward MD    Note per dr dalton \"REASON FOR CONSULTATION:  Chest pain, elevated troponin.     PRIMARY CARDIOLOGIST:  Dr. Hayward.     HISTORY OF PRESENT ILLNESS:  This is a 59-year-old  female patient with past medical history of peripheral artery disease with extensive peripheral artery workup including bypass on the left lower limb, sinus tachycardia, moderate-to-severe AI, adrenal tumor, anxiety, was in usual state of health until yesterday morning.  Yesterday morning, the patient woke up with left arm pain following with chest pain, pressure, tightness, heaviness, achy, around 8-9/10 associated with shortness of breath and diaphoresis, lasted for 30 minutes, resolved, then went to work and came back in the evening.  Again after she came back from work, she started to have left arm pain following with chest pain, it was radiating and associated with diaphoresis, sweaty, and shortness of breath and that was persistent basically almost a couple of hours prior to arrival to the emergency room and she is brought to the emergency room and she described as achy and she was given sublingual nitroglycerin x3, with that the chest pain resolved, at that time chest pain was 8-9/10.  Now, the chest pain is like 1/10 and she is feeling much comfortable.  No chest pain overnight, except the mild chest pain of 1/10.  So, the patient started on heparin and nitro.  Troponin initially was negative.  Subsequent troponin was 29.  Never had this type of chest pain, has history of premature coronary artery disease.  The patient has history of peripheral artery disease with peripheral artery workup and peripheral artery intervention with left peripheral bypass, and Cardiology evaluation was sought in view of the chest pain and troponin  CO2 21 07/09/2024 07:29 AM    BUN 13 07/09/2024 07:29 AM    CREATININE 0.7 07/09/2024 07:29 AM    LABGLOM > 90 07/09/2024 07:29 AM    GLUCOSE 105 07/09/2024 07:29 AM    CALCIUM 8.5 07/09/2024 07:29 AM       Hepatic Function Panel:    Lab Results   Component Value Date/Time    ALKPHOS 118 07/05/2024 06:27 PM    ALT 16 07/05/2024 06:27 PM    AST 12 07/05/2024 06:27 PM    BILITOT 0.2 07/05/2024 06:27 PM    BILIDIR <0.2 01/08/2024 09:28 AM       Magnesium:    Lab Results   Component Value Date/Time    MG 2.2 07/08/2024 07:12 AM       PT/INR:    Lab Results   Component Value Date/Time    INR 1.00 07/06/2024 01:03 AM       HgBA1c:    Lab Results   Component Value Date/Time    LABA1C 5.3 07/19/2022 09:54 PM       FLP:    Lab Results   Component Value Date/Time    TRIG 91 07/06/2024 03:43 AM    HDL 35 07/06/2024 03:43 AM       TSH:    Lab Results   Component Value Date/Time    TSH 1.970 05/01/2024 02:25 PM         Assessment:    Chest pain - resolved  Elevated troponins, not NSTEMI  S/p cath 7/8/24 -     Non-obstructive CAD    FFR of RCA is 0.84  New NICMP - ef 35-40 per TTE 7/6/24, ef 60-65 per TTE 1/2024  Mod to severe AI  Sinus tachycardia - resolved  PAD - hx L endarterectomy, hx lower ext intervention by IR  Hx chronic neck/shoulder pain  Tobacco abuse  Hx adrenal tumor - attending following      Plan:    Consult EP for evaluation of sinus tachycardia, concern for idiopathic S.T.- dr tellez discussed with dr yu - will follow as OP, nothing needed at this time, optimize CMP meds per dr yu  Consult to CTS for mod to severe AI  Cont asa/statin/BB/ace  Smoking cessation advised  Follow up dr tellez 2 weeks       Electronically signed by Chela Dawson PA-C on 7/9/2024 at 1:45 PM

## 2024-07-10 ENCOUNTER — TELEPHONE (OUTPATIENT)
Dept: CARDIOTHORACIC SURGERY | Age: 59
End: 2024-07-10

## 2024-07-10 LAB
ANION GAP SERPL CALC-SCNC: 13 MEQ/L (ref 8–16)
BASOPHILS ABSOLUTE: 0.1 THOU/MM3 (ref 0–0.1)
BASOPHILS NFR BLD AUTO: 0.9 %
BUN SERPL-MCNC: 12 MG/DL (ref 7–22)
CALCIUM SERPL-MCNC: 9.6 MG/DL (ref 8.5–10.5)
CHLORIDE SERPL-SCNC: 104 MEQ/L (ref 98–111)
CO2 SERPL-SCNC: 24 MEQ/L (ref 23–33)
CREAT SERPL-MCNC: 0.7 MG/DL (ref 0.4–1.2)
DEPRECATED RDW RBC AUTO: 47.5 FL (ref 35–45)
EOSINOPHIL NFR BLD AUTO: 2.9 %
EOSINOPHILS ABSOLUTE: 0.4 THOU/MM3 (ref 0–0.4)
ERYTHROCYTE [DISTWIDTH] IN BLOOD BY AUTOMATED COUNT: 13.1 % (ref 11.5–14.5)
GFR SERPL CREATININE-BSD FRML MDRD: > 90 ML/MIN/1.73M2
GLUCOSE SERPL-MCNC: 102 MG/DL (ref 70–108)
HCT VFR BLD AUTO: 36.1 % (ref 37–47)
HGB BLD-MCNC: 11.3 GM/DL (ref 12–16)
IMM GRANULOCYTES # BLD AUTO: 0.53 THOU/MM3 (ref 0–0.07)
IMM GRANULOCYTES NFR BLD AUTO: 4.2 %
LYMPHOCYTES ABSOLUTE: 3 THOU/MM3 (ref 1–4.8)
LYMPHOCYTES NFR BLD AUTO: 23.5 %
MCH RBC QN AUTO: 30.8 PG (ref 26–33)
MCHC RBC AUTO-ENTMCNC: 31.3 GM/DL (ref 32.2–35.5)
MCV RBC AUTO: 98.4 FL (ref 81–99)
MONOCYTES ABSOLUTE: 1.3 THOU/MM3 (ref 0.4–1.3)
MONOCYTES NFR BLD AUTO: 10.2 %
NEUTROPHILS ABSOLUTE: 7.3 THOU/MM3 (ref 1.8–7.7)
NEUTROPHILS NFR BLD AUTO: 58.3 %
NRBC BLD AUTO-RTO: 0 /100 WBC
PLATELET # BLD AUTO: 568 THOU/MM3 (ref 130–400)
PMV BLD AUTO: 10 FL (ref 9.4–12.4)
POTASSIUM SERPL-SCNC: 5.5 MEQ/L (ref 3.5–5.2)
POTASSIUM SERPL-SCNC: 5.6 MEQ/L (ref 3.5–5.2)
RBC # BLD AUTO: 3.67 MILL/MM3 (ref 4.2–5.4)
SCAN OF BLOOD SMEAR: NORMAL
SODIUM SERPL-SCNC: 141 MEQ/L (ref 135–145)
WBC # BLD AUTO: 12.6 THOU/MM3 (ref 4.8–10.8)

## 2024-07-10 PROCEDURE — 1200000003 HC TELEMETRY R&B

## 2024-07-10 PROCEDURE — 99232 SBSQ HOSP IP/OBS MODERATE 35: CPT | Performed by: PHYSICIAN ASSISTANT

## 2024-07-10 PROCEDURE — 2580000003 HC RX 258

## 2024-07-10 PROCEDURE — 99223 1ST HOSP IP/OBS HIGH 75: CPT | Performed by: THORACIC SURGERY (CARDIOTHORACIC VASCULAR SURGERY)

## 2024-07-10 PROCEDURE — 84132 ASSAY OF SERUM POTASSIUM: CPT

## 2024-07-10 PROCEDURE — 36415 COLL VENOUS BLD VENIPUNCTURE: CPT

## 2024-07-10 PROCEDURE — 6370000000 HC RX 637 (ALT 250 FOR IP): Performed by: FAMILY MEDICINE

## 2024-07-10 PROCEDURE — 99233 SBSQ HOSP IP/OBS HIGH 50: CPT | Performed by: INTERNAL MEDICINE

## 2024-07-10 PROCEDURE — 6370000000 HC RX 637 (ALT 250 FOR IP)

## 2024-07-10 PROCEDURE — 85025 COMPLETE CBC W/AUTO DIFF WBC: CPT

## 2024-07-10 PROCEDURE — 6370000000 HC RX 637 (ALT 250 FOR IP): Performed by: PHYSICIAN ASSISTANT

## 2024-07-10 PROCEDURE — 80048 BASIC METABOLIC PNL TOTAL CA: CPT

## 2024-07-10 RX ORDER — SODIUM POLYSTYRENE SULFONATE 15 G/60ML
30 SUSPENSION ORAL; RECTAL ONCE
Status: COMPLETED | OUTPATIENT
Start: 2024-07-10 | End: 2024-07-10

## 2024-07-10 RX ORDER — SODIUM POLYSTYRENE SULFONATE 4.1 MEQ/G
30 POWDER, FOR SUSPENSION ORAL; RECTAL ONCE
Status: DISCONTINUED | OUTPATIENT
Start: 2024-07-10 | End: 2024-07-10

## 2024-07-10 RX ADMIN — SODIUM CHLORIDE, PRESERVATIVE FREE 10 ML: 5 INJECTION INTRAVENOUS at 20:51

## 2024-07-10 RX ADMIN — LISINOPRIL 20 MG: 20 TABLET ORAL at 20:50

## 2024-07-10 RX ADMIN — PREGABALIN 50 MG: 50 CAPSULE ORAL at 15:36

## 2024-07-10 RX ADMIN — PREGABALIN 50 MG: 50 CAPSULE ORAL at 20:50

## 2024-07-10 RX ADMIN — CYCLOBENZAPRINE 10 MG: 10 TABLET, FILM COATED ORAL at 09:08

## 2024-07-10 RX ADMIN — SODIUM POLYSTYRENE SULFONATE 30 G: 15 SUSPENSION ORAL; RECTAL at 18:09

## 2024-07-10 RX ADMIN — NITROGLYCERIN 0.4 MG: 0.4 TABLET, ORALLY DISINTEGRATING SUBLINGUAL at 00:53

## 2024-07-10 RX ADMIN — HYDROCODONE BITARTRATE AND ACETAMINOPHEN 1 TABLET: 5; 325 TABLET ORAL at 10:47

## 2024-07-10 RX ADMIN — PREGABALIN 50 MG: 50 CAPSULE ORAL at 09:07

## 2024-07-10 RX ADMIN — HYDROCODONE BITARTRATE AND ACETAMINOPHEN 1 TABLET: 5; 325 TABLET ORAL at 19:33

## 2024-07-10 RX ADMIN — ACETAMINOPHEN 650 MG: 325 TABLET ORAL at 15:38

## 2024-07-10 RX ADMIN — ASPIRIN 81 MG: 81 TABLET, COATED ORAL at 09:08

## 2024-07-10 RX ADMIN — ATORVASTATIN CALCIUM 40 MG: 40 TABLET, FILM COATED ORAL at 09:08

## 2024-07-10 RX ADMIN — ALPRAZOLAM 0.5 MG: 0.5 TABLET ORAL at 18:08

## 2024-07-10 RX ADMIN — ALPRAZOLAM 0.5 MG: 0.5 TABLET ORAL at 09:08

## 2024-07-10 RX ADMIN — METOPROLOL SUCCINATE 100 MG: 100 TABLET, EXTENDED RELEASE ORAL at 20:50

## 2024-07-10 RX ADMIN — CYCLOBENZAPRINE 10 MG: 10 TABLET, FILM COATED ORAL at 18:08

## 2024-07-10 RX ADMIN — LISINOPRIL 20 MG: 20 TABLET ORAL at 09:08

## 2024-07-10 RX ADMIN — ALPRAZOLAM 0.5 MG: 0.5 TABLET ORAL at 01:15

## 2024-07-10 RX ADMIN — SODIUM CHLORIDE, PRESERVATIVE FREE 10 ML: 5 INJECTION INTRAVENOUS at 09:07

## 2024-07-10 RX ADMIN — HYDROCODONE BITARTRATE AND ACETAMINOPHEN 1 TABLET: 5; 325 TABLET ORAL at 02:27

## 2024-07-10 ASSESSMENT — PAIN DESCRIPTION - LOCATION
LOCATION: CHEST;SHOULDER
LOCATION: SHOULDER
LOCATION: CHEST;SHOULDER
LOCATION: GENERALIZED;ARM

## 2024-07-10 ASSESSMENT — PAIN SCALES - GENERAL
PAINLEVEL_OUTOF10: 2
PAINLEVEL_OUTOF10: 6
PAINLEVEL_OUTOF10: 2
PAINLEVEL_OUTOF10: 4
PAINLEVEL_OUTOF10: 8
PAINLEVEL_OUTOF10: 3
PAINLEVEL_OUTOF10: 6

## 2024-07-10 ASSESSMENT — PAIN SCALES - WONG BAKER

## 2024-07-10 ASSESSMENT — PAIN DESCRIPTION - ORIENTATION
ORIENTATION: LEFT
ORIENTATION: LEFT;MID
ORIENTATION: LEFT

## 2024-07-10 ASSESSMENT — ENCOUNTER SYMPTOMS
ABDOMINAL PAIN: 0
SHORTNESS OF BREATH: 1
COLOR CHANGE: 0
CHEST TIGHTNESS: 1
EYE DISCHARGE: 0

## 2024-07-10 ASSESSMENT — PAIN DESCRIPTION - DESCRIPTORS
DESCRIPTORS: ACHING;DISCOMFORT
DESCRIPTORS: DISCOMFORT;ACHING
DESCRIPTORS: ACHING;DISCOMFORT
DESCRIPTORS: DISCOMFORT
DESCRIPTORS: DISCOMFORT

## 2024-07-10 ASSESSMENT — PAIN - FUNCTIONAL ASSESSMENT: PAIN_FUNCTIONAL_ASSESSMENT: PREVENTS OR INTERFERES SOME ACTIVE ACTIVITIES AND ADLS

## 2024-07-10 NOTE — PLAN OF CARE
Problem: Discharge Planning  Goal: Discharge to home or other facility with appropriate resources  7/10/2024 1041 by Bubba Blanco, RN  Outcome: Progressing  Flowsheets (Taken 7/10/2024 1041)  Discharge to home or other facility with appropriate resources: Identify barriers to discharge with patient and caregiver     Problem: Pain  Goal: Verbalizes/displays adequate comfort level or baseline comfort level  7/10/2024 1041 by Bubba Blanco, RN  Outcome: Progressing  Flowsheets (Taken 7/10/2024 1041)  Verbalizes/displays adequate comfort level or baseline comfort level: Encourage patient to monitor pain and request assistance     Problem: Safety - Adult  Goal: Free from fall injury  7/10/2024 1041 by Bubba Blanco, RN  Outcome: Progressing  Flowsheets (Taken 7/10/2024 1041)  Free From Fall Injury: Instruct family/caregiver on patient safety

## 2024-07-10 NOTE — PROGRESS NOTES
Cardiology Progress Note      Patient:  Jacki Moura  YOB: 1965  MRN: [id number]   Acct: [id number]  Admit Date:  7/5/2024  Primary Cardiologist: Teja Hayward MD    Note per dr dalton \"REASON FOR CONSULTATION:  Chest pain, elevated troponin.     PRIMARY CARDIOLOGIST:  Dr. Hayward.     HISTORY OF PRESENT ILLNESS:  This is a 59-year-old  female patient with past medical history of peripheral artery disease with extensive peripheral artery workup including bypass on the left lower limb, sinus tachycardia, moderate-to-severe AI, adrenal tumor, anxiety, was in usual state of health until yesterday morning.  Yesterday morning, the patient woke up with left arm pain following with chest pain, pressure, tightness, heaviness, achy, around 8-9/10 associated with shortness of breath and diaphoresis, lasted for 30 minutes, resolved, then went to work and came back in the evening.  Again after she came back from work, she started to have left arm pain following with chest pain, it was radiating and associated with diaphoresis, sweaty, and shortness of breath and that was persistent basically almost a couple of hours prior to arrival to the emergency room and she is brought to the emergency room and she described as achy and she was given sublingual nitroglycerin x3, with that the chest pain resolved, at that time chest pain was 8-9/10.  Now, the chest pain is like 1/10 and she is feeling much comfortable.  No chest pain overnight, except the mild chest pain of 1/10.  So, the patient started on heparin and nitro.  Troponin initially was negative.  Subsequent troponin was 29.  Never had this type of chest pain, has history of premature coronary artery disease.  The patient has history of peripheral artery disease with peripheral artery workup and peripheral artery intervention with left peripheral bypass, and Cardiology evaluation was sought in view of the chest pain and troponin  CO2 24 07/10/2024 05:16 AM    BUN 12 07/10/2024 05:16 AM    CREATININE 0.7 07/10/2024 05:16 AM    LABGLOM > 90 07/10/2024 05:16 AM    LABGLOM 74 03/25/2024 12:31 PM    GLUCOSE 102 07/10/2024 05:16 AM    CALCIUM 9.6 07/10/2024 05:16 AM       Hepatic Function Panel:    Lab Results   Component Value Date/Time    ALKPHOS 118 07/05/2024 06:27 PM    ALT 16 07/05/2024 06:27 PM    AST 12 07/05/2024 06:27 PM    BILITOT 0.2 07/05/2024 06:27 PM    BILIDIR <0.2 01/08/2024 09:28 AM       Magnesium:    Lab Results   Component Value Date/Time    MG 2.2 07/08/2024 07:12 AM       PT/INR:    Lab Results   Component Value Date/Time    INR 1.00 07/06/2024 01:03 AM       HgBA1c:    Lab Results   Component Value Date/Time    LABA1C 5.3 07/19/2022 09:54 PM       FLP:    Lab Results   Component Value Date/Time    TRIG 91 07/06/2024 03:43 AM    HDL 35 07/06/2024 03:43 AM       TSH:    Lab Results   Component Value Date/Time    TSH 1.970 05/01/2024 02:25 PM         Assessment:    Chest pain - resolved  Elevated troponins, not NSTEMI  S/p cath 7/8/24 -     Non-obstructive CAD    FFR of RCA is 0.84  New NICMP - ef 35-40 per TTE 7/6/24, ef 60-65 per TTE 1/2024  Mod to severe AI - CTS consulted  Sinus tachycardia - resolved  PAD - hx L endarterectomy, hx lower ext intervention by IR  Hx chronic neck/shoulder pain  Tobacco abuse  Hx adrenal tumor - attending following  anxiety  70% stenosis at the origin of the right internal carotid artery, 50% stenosis at the origin of the left internal carotid artery - CTS following, asking vascular to review       Plan:    Consult EP for evaluation of sinus tachycardia, concern for idiopathic S.T.- dr tellez discussed with dr yu -  nothing needed at this time, optimize CMP meds per dr yu  Cont asa/statin/BB/ace  Smoking cessation     Consult to CTS for mod to severe AI - note per dr harris \"59 year old female with severe aortic insufficiency associated with new decline in LVEF. Patient will need AVR;

## 2024-07-10 NOTE — PROGRESS NOTES
PROGRESS NOTE      Patient:  Jacki Moura  Unit/Bed:6K-09/009-A  YOB: 1965  MRN: [id number]   Acct: [id number]    PCP: Kyle Smith APRN - CNP    Date of Admission: 7/5/2024 LOS: 5    Date of Evaluation:  7/10/2024    Anticipated Discharge: 1-2 days    Assessment/Plan:    Nonobstructive CAD/unstable angina, s/p LHC 7/8 without intervention, elevated troponin   - EKG at ED 7/5: sinus tachycardia with age undetermined septal infarct and possible left atrial enlargement.   - Troponin on admission 7/5 <6. On 7/6: elevated 26 --> repeat troponin 11. As of 7/7, <6.   - Echo 7/6 shows: moderately reduced LVEF: 35-50%, moderate to severe AR, mild MR, anterior/anteroseptal and septal hypokinesis. Prior echo 1/8/24 with preserved EF of 60-65%.  - Pt was kept on heparin and NTG drip pending LHC  - Required increase NTG and morphine night of 7/7 due to increased pain, which greatly improved her pain.   - Cardiology following, appreciate recommendations   - NTG weaned on 7/9.   - LHC from 7/8 showed: non-obstructive CAD. Fractional flow reserve of RCA is mildly decreased at 0.84.    - Smoking cessation advised  - Pt to f/u with Dr. Hayward in 2 weeks.    - Per cardiac and cardiothoracic notes, patient is cleared for discharge.  However as of 7/10, patient has developed hyperkalemia at 5.6 (repeat 5.5).  We will keep her overnight and recheck her BMP in the morning after Kayexalate treatment today.     New-onset cardiomyopathy  - Echo 7/6 shows: moderately reduced LVEF: 35-50%, prior echo 1/8/24 with preserved EF of 60-65%  - Cardiology following, appreciate recommendations.   - Pt's lopressor was switched to toprol xl, per cardio.   - Continue lisinopril.    - Pt fluid status has been stable without signs of volume overload, will continue to monitor status  - Low sodium diet  - 2L fluid restriction  - Strict I+Os  - Daily weights   - Cardiology following, appreciate recommendations.   - LHC from 7/8  for pain management      Tobacco abuse  - 20 pack year history, pt admits to 1/2 ppd currently.   - Cessation encouraged        Chief Complaint: chest pain, left arm pain    Hospital Course: Per HPI:   \"Jacki Moura is a 59 y.o. female with PMHx of sinus tachycardia, moderate to severe aortic insufficiency, adrenal tumor, anxiety, HTN, PAD s/p endarterectomy, chronic pain who presents to [facility] with chest pain.  Last night (7/4/2024) patient awoke to left arm pain that felt achy and lasted 30 minutes.  Patient proceeded to go to work without problem.  When she came home from work she began having left arm pain that spread up into her left chest (2 hours prior to ED).  The pain was described as achy and intense.  She also complained of diaphoresis and feeling her heart was beating out of her chest.  Denies any nausea, vomiting, shortness of breath.  She then presented to the ED where she was given nitro and aspirin, which relieved the pain.  Reports having similar episode 1/2024 that was less intense, and she was treated with medical management at that time.  Patient follows with Dr. Hayward outpatient, last office visit 2/8/2024.  Patient smokes half a pack of cigarettes a day.  Significant family history for fatal MIs in mom and 2 brothers.     ED course: VS: Temp 98.1, RR 20, , /92, SpO2 96 RA.  Labs: proBNP 11.6, troponin less than 6, WBC 10.8, D-dimer 1605.  EKG sinus tachycardia.  Given 3 doses nitro and 1 dose aspirin, with resolution of pain.    Subjective/HPI:   Jacki Moura reports feeling better today. She reports increased stress surrounding her conversation with cardiothoracic surgery about her upcoming open heart surgery. Pt denies any chest pain, shortness of breath, palpitations, increased leg swelling, orthopnea, dyspnea, fevers, chills, nausea, vomiting, constipation, or diarrhea.        PMH, SURGICAL HX, FH, SOCIAL HX reviewed and updated as  Martha Santamaria, DO PGY-1 on 7/10/2024 at 2:59 PM

## 2024-07-10 NOTE — CONSULTS
Cardiothoracic Surgery History & Physical       Patient:  Jacki Moura  YOB: 1965    MRN: [id number]     Acct: [id number]    PCP: Kyle Smith APRN - CNP    Date of Admission: 2024    Chief Complaint:    Chief Complaint   Patient presents with    Chest Pain       History Of Present Illness:  59 y.o. female, active smoker with history of peripheral vascular disease, s/p left femoral endarterectomy and bilateral iliofemoral stents, followed with known aortic insufficiency admitted with atypical chest pain beginning in left arm at rest and spreading to the chest, associated with dyspnea and diaphoresis. Pain largely resolved on arrival to ED. LHC showed no obstructive CAD. However, repeat TTE showed a drop in the LVEF to 40%, with moderate-severe AI, and mild MR. Aside from intermittent episodes of atypical pain in hospital, patient clinically stable, on RA.     Past Medical History:          Diagnosis Date    Arthritis     Coronary artery disease involving native coronary artery of native heart without angina pectoris 2024    Cushing's disease (HCC)     Hyperlipidemia     Hypertension        Past Surgical History:          Procedure Laterality Date    BACK SURGERY       SECTION      x 4    COLONOSCOPY      DILATION AND CURETTAGE OF UTERUS      ENDOSCOPY, COLON, DIAGNOSTIC      FEMORAL ENDARTERECTOMY Left 2021    LEFT  FEMORAL ARTERY ENDARTERECTOMY performed by Zhao Gilliland MD at [facility] OR    FOOT SURGERY      bone spurs removed both feet    KNEE SURGERY Left     reconstruction of knee    LUMBAR DISCECTOMY  2020    Dr. Gamino    SKIN BIOPSY      TRANSESOPHAGEAL ECHOCARDIOGRAM N/A 1/10/2024    TRANSESOPHAGEAL ECHOCARDIOGRAM performed by Teja Hayward MD at [facility] ENDOSCOPY       Medications Prior to Admission:      Prior to Admission medications    Medication Sig Start Date End Date Taking? Authorizing Provider   amLODIPine (NORVASC) 10 MG tablet Take 1 tablet  by mouth daily 7/1/24   Kyle Smith APRN - CNP   ALPRAZolam (XANAX) 0.5 MG tablet Take 1 tablet by mouth 3 times daily as needed for Sleep or Anxiety for up to 30 days. Max Daily Amount: 1.5 mg 6/19/24 7/19/24  Kyle Smith APRN - CNP   HYDROcodone-acetaminophen (NORCO) 5-325 MG per tablet Take 1 tablet by mouth every 8 hours as needed for Pain for up to 30 days. Intended supply: 30 days. Take lowest dose possible to manage pain Max Daily Amount: 3 tablets 6/14/24 7/14/24  Kyle Smith APRN - CNP   cyclobenzaprine (FLEXERIL) 10 MG tablet Take 1 tablet by mouth 3 times daily as needed for Muscle spasms 6/13/24   Kyle Smith APRN - CNP   pregabalin (LYRICA) 50 MG capsule Take 1 capsule by mouth 3 times daily for 60 days. Max Daily Amount: 150 mg 6/13/24 8/12/24  Kyle Smith APRN - CNP   meloxicam (MOBIC) 7.5 MG tablet Take 1 tablet by mouth daily 4/30/24 7/29/24  Kyle Smith APRN - CNP   atorvastatin (LIPITOR) 10 MG tablet Take 1 tablet by mouth daily 4/30/24   Kyle Smith APRN - CNP   metoprolol tartrate (LOPRESSOR) 25 MG tablet Take 2 tablets by mouth in morning and 1 tablet in the evening  Patient taking differently: Take 1 tablet by mouth daily 3/29/24   Kyle Smith APRN - CNP   clopidogrel (PLAVIX) 75 MG tablet Take 1 tablet by mouth daily 2/27/24   Kyle Smith APRN - CNP   lisinopril (PRINIVIL;ZESTRIL) 20 MG tablet Take 1 tablet by mouth 2 times daily 1/10/24   Morena Lyles PA-C   VITAMIN D PO Take by mouth daily Vitamin d with calcium  Patient not taking: Reported on 7/6/2024    Sebastien Hoskins MD   aspirin 81 MG EC tablet Take 1 tablet by mouth daily    Sebastien Hoskins MD       Allergies:  Pletal [cilostazol], Bactrim [sulfamethoxazole-trimethoprim], Codeine, Medrol [methylprednisolone], Sulfa antibiotics, Tramadol, and Ultram [tramadol hcl]    Social History:      TOBACCO:   reports that she has been smoking cigarettes. She started smoking about 43

## 2024-07-10 NOTE — TELEPHONE ENCOUNTER
Pt is scheduled for MECHANICAL AVR with DR SEYMOUR on 8/12/24 at 730AM. Pt agreed to date/time.     Surgery instructions are as follows:  - Arrive to [facility] at 530am  - NPO after midnight the night before surgery  - OK to continue ASA  - Take METOPROLOL the morning of surgery  - Hold PLAVIX 7 days prior to surgery (starting on 8/5)   - Hold LISINOPRIL 2 days prior to surgery (starting on 8/10)     PAT visit scheduled for 8/7/24 at 1030am. Confirmed with Andrew.     All instructions faxed to pt's nurse Bubba (pt admitted at time of scheduling) [phone], she verbalized understanding. Denied questions or concerns at this time.     Primary insurance is [employer]. Will obtain prior authorization as necessary.

## 2024-07-11 ENCOUNTER — TELEPHONE (OUTPATIENT)
Dept: CARDIOLOGY CLINIC | Age: 59
End: 2024-07-11

## 2024-07-11 VITALS
RESPIRATION RATE: 18 BRPM | SYSTOLIC BLOOD PRESSURE: 171 MMHG | DIASTOLIC BLOOD PRESSURE: 62 MMHG | TEMPERATURE: 97.7 F | BODY MASS INDEX: 28.61 KG/M2 | HEIGHT: 59 IN | WEIGHT: 141.93 LBS | HEART RATE: 84 BPM | OXYGEN SATURATION: 100 %

## 2024-07-11 DIAGNOSIS — M79.18 CHRONIC MYOFASCIAL PAIN: ICD-10-CM

## 2024-07-11 DIAGNOSIS — G89.29 CHRONIC MYOFASCIAL PAIN: ICD-10-CM

## 2024-07-11 LAB
ANION GAP SERPL CALC-SCNC: 13 MEQ/L (ref 8–16)
BASOPHILS ABSOLUTE: 0.1 THOU/MM3 (ref 0–0.1)
BASOPHILS NFR BLD AUTO: 0.9 %
BUN SERPL-MCNC: 13 MG/DL (ref 7–22)
CALCIUM SERPL-MCNC: 8.7 MG/DL (ref 8.5–10.5)
CHLORIDE SERPL-SCNC: 105 MEQ/L (ref 98–111)
CO2 SERPL-SCNC: 19 MEQ/L (ref 23–33)
CREAT SERPL-MCNC: 0.6 MG/DL (ref 0.4–1.2)
DEPRECATED RDW RBC AUTO: 47.5 FL (ref 35–45)
EOSINOPHIL NFR BLD AUTO: 1.7 %
EOSINOPHILS ABSOLUTE: 0.2 THOU/MM3 (ref 0–0.4)
ERYTHROCYTE [DISTWIDTH] IN BLOOD BY AUTOMATED COUNT: 12.9 % (ref 11.5–14.5)
GFR SERPL CREATININE-BSD FRML MDRD: > 90 ML/MIN/1.73M2
GLUCOSE SERPL-MCNC: 98 MG/DL (ref 70–108)
HCT VFR BLD AUTO: 34.3 % (ref 37–47)
HGB BLD-MCNC: 10.6 GM/DL (ref 12–16)
IMM GRANULOCYTES # BLD AUTO: 0.34 THOU/MM3 (ref 0–0.07)
IMM GRANULOCYTES NFR BLD AUTO: 2.5 %
LYMPHOCYTES ABSOLUTE: 1.9 THOU/MM3 (ref 1–4.8)
LYMPHOCYTES NFR BLD AUTO: 13.9 %
MCH RBC QN AUTO: 31.1 PG (ref 26–33)
MCHC RBC AUTO-ENTMCNC: 30.9 GM/DL (ref 32.2–35.5)
MCV RBC AUTO: 100.6 FL (ref 81–99)
MONOCYTES ABSOLUTE: 0.9 THOU/MM3 (ref 0.4–1.3)
MONOCYTES NFR BLD AUTO: 6.2 %
NEUTROPHILS ABSOLUTE: 10.3 THOU/MM3 (ref 1.8–7.7)
NEUTROPHILS NFR BLD AUTO: 74.8 %
NRBC BLD AUTO-RTO: 0 /100 WBC
PLATELET # BLD AUTO: 504 THOU/MM3 (ref 130–400)
PMV BLD AUTO: 10 FL (ref 9.4–12.4)
POTASSIUM SERPL-SCNC: 4.4 MEQ/L (ref 3.5–5.2)
RBC # BLD AUTO: 3.41 MILL/MM3 (ref 4.2–5.4)
SODIUM SERPL-SCNC: 137 MEQ/L (ref 135–145)
WBC # BLD AUTO: 13.8 THOU/MM3 (ref 4.8–10.8)

## 2024-07-11 PROCEDURE — 6370000000 HC RX 637 (ALT 250 FOR IP)

## 2024-07-11 PROCEDURE — 36415 COLL VENOUS BLD VENIPUNCTURE: CPT

## 2024-07-11 PROCEDURE — 99239 HOSP IP/OBS DSCHRG MGMT >30: CPT | Performed by: INTERNAL MEDICINE

## 2024-07-11 PROCEDURE — 2580000003 HC RX 258

## 2024-07-11 PROCEDURE — 85025 COMPLETE CBC W/AUTO DIFF WBC: CPT

## 2024-07-11 PROCEDURE — 80048 BASIC METABOLIC PNL TOTAL CA: CPT

## 2024-07-11 RX ORDER — ATORVASTATIN CALCIUM 40 MG/1
40 TABLET, FILM COATED ORAL DAILY
Qty: 30 TABLET | Refills: 3 | Status: SHIPPED | OUTPATIENT
Start: 2024-07-12

## 2024-07-11 RX ORDER — METOPROLOL SUCCINATE 100 MG/1
100 TABLET, EXTENDED RELEASE ORAL NIGHTLY
Qty: 30 TABLET | Refills: 3 | Status: SHIPPED | OUTPATIENT
Start: 2024-07-11

## 2024-07-11 RX ADMIN — CYCLOBENZAPRINE 10 MG: 10 TABLET, FILM COATED ORAL at 11:31

## 2024-07-11 RX ADMIN — ATORVASTATIN CALCIUM 40 MG: 40 TABLET, FILM COATED ORAL at 08:33

## 2024-07-11 RX ADMIN — CYCLOBENZAPRINE 10 MG: 10 TABLET, FILM COATED ORAL at 02:56

## 2024-07-11 RX ADMIN — PREGABALIN 50 MG: 50 CAPSULE ORAL at 08:32

## 2024-07-11 RX ADMIN — HYDROCODONE BITARTRATE AND ACETAMINOPHEN 1 TABLET: 5; 325 TABLET ORAL at 08:32

## 2024-07-11 RX ADMIN — ALPRAZOLAM 0.5 MG: 0.5 TABLET ORAL at 11:31

## 2024-07-11 RX ADMIN — SODIUM CHLORIDE, PRESERVATIVE FREE 10 ML: 5 INJECTION INTRAVENOUS at 08:33

## 2024-07-11 RX ADMIN — ASPIRIN 81 MG: 81 TABLET, COATED ORAL at 08:32

## 2024-07-11 RX ADMIN — ALPRAZOLAM 0.5 MG: 0.5 TABLET ORAL at 02:56

## 2024-07-11 ASSESSMENT — PAIN SCALES - GENERAL: PAINLEVEL_OUTOF10: 4

## 2024-07-11 ASSESSMENT — PAIN SCALES - WONG BAKER
WONGBAKER_NUMERICALRESPONSE: NO HURT

## 2024-07-11 NOTE — PLAN OF CARE
Problem: Discharge Planning  Goal: Discharge to home or other facility with appropriate resources  7/11/2024 1050 by Bubba Blanco, RN  Outcome: Progressing    Problem: Safety - Adult  Goal: Free from fall injury  7/11/2024 1050 by Bubba Blanco, RN  Outcome: Progressing  Flowsheets (Taken 7/10/2024 1041)  Free From Fall Injury: Instruct family/caregiver on patient safety  7/11/2024 0459 by Wojciech Chinchilla RN  Outcome: Adequate for Discharge      Problem: Pain  Goal: Verbalizes/displays adequate comfort level or baseline comfort level  7/11/2024 1050 by Bubba Blanco, RN  Outcome: Progressing

## 2024-07-11 NOTE — CARE COORDINATION
7/11/24, 8:31 AM EDT    DISCHARGE ON GOING EVALUATION    [facility] day: 6  Location: [facility]09/009-A Reason for admit: Chest pain [R07.9]  Tachycardia [R00.0]  Chest pain, unspecified type [R07.9]     Procedures: n/a     Imaging since last note: n/a     Barriers to Discharge: [facility] planning aortic valve replacement in August. Cardio has s/o. Anticipate discharge.     PCP: Kyle Smith APRN - CNP  Readmission Risk Score: 8.6    Patient Goals/Plan/Treatment Preferences: Home with . Denies needs.    7/11/24, 8:33 AM EDT    Patient goals/plan/ treatment preferences discussed by  and .  Patient goals/plan/ treatment preferences reviewed with patient/ family.  Patient/ family verbalize understanding of discharge plan and are in agreement with goal/plan/treatment preferences.  Understanding was demonstrated using the teach back method.  AVS provided by RN at time of discharge, which includes all necessary medical information pertaining to the patients current course of illness, treatment, post-discharge goals of care, and treatment preferences.     Services At/After Discharge: None

## 2024-07-11 NOTE — PROGRESS NOTES
Discharge teaching and instructions for diagnosis/procedure of chest pain completed with patient using teachback method. AVS reviewed. Printed prescriptions given to patient. Patient voiced understanding regarding prescriptions, follow up appointments, and care of self at home. Discharged in a wheelchair to  home with support per family

## 2024-07-11 NOTE — DISCHARGE SUMMARY
DISCHARGE SUMMARY      Patient Identification:   Jacki Moura   : 1965  MRN: [id number]   Account: [id number]      Patient's PCP: Kyle Smith APRN - CNP    Admit Date: 2024, 180     Discharge Date: 24     Admitting Physician: Diane Burns MD      Discharge Physician: DO Mikal Chavez DO (Attending)                   Discharge Diagnoses:    - Nonobstructive CAD/unstable angina, S/P left heart cath 2024 without intervention, elevated troponin  - New onset cardiomyopathy  - Intermittent idiopathic tachycardia, rate controlled with toprol XL   Hyperkalemia  - Slight normal anion gap metabolic acidosis, improving  - Leukocytosis, likely reactive to NSTEMI without signs of infection  - Moderate severe aortic insufficiency  - Chronic normocytic anemia  - Peripheral arterial disease status post left mid SFA stent x 1 and left external iliac artery stent x 1 on 2023 and left femoral endarterectomy on 3/16/2021  - Left adrenal tumor  - Anxiety  - Essential hypertension  - Chronic neck, right shoulder, back pain  - Tobacco abuse    The patient was seen and examined on day of discharge and this discharge summary is in conjunction with any daily progress note from day of discharge.    Hospital Course:   Jacki Moura is a 59 y.o. female with    admitted to [facility] on 2024        Hospital Course By Problem:   Nonobstructive CAD/unstable angina, s/p C  without intervention, elevated troponin   - EKG at ED : sinus tachycardia with age undetermined septal infarct and possible left atrial enlargement.   - Troponin on admission  <6. On : elevated 26 --> repeat troponin 11. As of , <6.   - Echo  shows: moderately reduced LVEF: 35-50%, moderate to severe AR, mild MR, anterior/anteroseptal and septal hypokinesis. Prior echo 24 with preserved EF of 60-65%.  - Pt was kept on heparin and NTG drip pending [facility]  - Required increase NTG  origin of the left vertebral artery.   4. Atherosclerotic calcification in the aortic arch and at the origins of the   brachiocephalic, left common carotid, left vertebral and left subclavian   arteries from the aortic arch.         **This report has been created using voice recognition software. It may contain   minor errors which are inherent in voice recognition technology.**         Electronically signed by Dr. Keven Meza      CTA Chest W/WO Contrast Pulmonary Embolism  Eval   Final Result   1. No pulmonary embolism or other acute finding in the chest.      This document has been electronically signed by: Jac Colon MD on    07/05/2024 09:10 PM      All CTs at this facility use dose modulation techniques and iterative    reconstructions, and/or weight-based dosing   when appropriate to reduce radiation to a low as reasonably achievable.      3D Post-processing was performed on this study.      XR CHEST PORTABLE   Final Result   1. No acute findings.      This document has been electronically signed by: Jac Colon MD on    07/05/2024 07:14 PM             Consults:     IP CONSULT TO CARDIOLOGY  IP CONSULT TO ELECTROPHYSIOLOGY  IP CONSULT TO CARDIOTHORACIC SURGERY    Disposition:    [x] Home       [] TCU       [] Rehab       [] Psych       [] SNF       [] Long Term Care Facility       [] Other-    Condition at Discharge: Stable    Code Status:  Full Code     Follow-up visits:   Kyle Smith, APRN - CNP  [address]  [facility] [address]  [phone]    Go on 7/15/2024  Hospital follow up appointment, Appt time: 10:00am    Teja Hayward MD  [address]  [facility] [address]  [phone]    Go on 7/24/2024  Hospital follow up appointment, Appt time: 9:00am         Discharge Medications:        Medication List        START taking these medications      metoprolol succinate 100 MG extended release tablet  Commonly known as: TOPROL XL  Take 1 tablet by mouth at bedtime            CHANGE how you take

## 2024-07-12 ENCOUNTER — CARE COORDINATION (OUTPATIENT)
Dept: CASE MANAGEMENT | Age: 59
End: 2024-07-12

## 2024-07-12 DIAGNOSIS — R07.9 CHEST PAIN, UNSPECIFIED TYPE: Primary | ICD-10-CM

## 2024-07-12 LAB — METANEPHRINES TOTAL URINE: NORMAL

## 2024-07-12 RX ORDER — HYDROCODONE BITARTRATE AND ACETAMINOPHEN 5; 325 MG/1; MG/1
1 TABLET ORAL EVERY 8 HOURS PRN
Qty: 90 TABLET | Refills: 0 | Status: SHIPPED | OUTPATIENT
Start: 2024-07-12 | End: 2024-08-11

## 2024-07-12 NOTE — CARE COORDINATION
Care Transitions Note    Initial Call - Call within 2 business days of discharge: Yes    Patient Current Location:  Home: [facility] [address]  [facility] [address]    Care Transition Nurse contacted the patient by telephone to perform post hospital discharge assessment, verified name and  as identifiers. Provided introduction to self, and explanation of the Care Transition Nurse role.     Patient: Jacki Moura    Patient : 1965   MRN: [id number]    Reason for Admission: chest pain  Discharge Date: 24  RURS: Readmission Risk Score: 9.8      Last Discharge Facility       Date Complaint Diagnosis Description Type Department Provider    24 Chest Pain Chest pain, unspecified type ... ED to Hosp-Admission (Discharged) (ADMITTED) Mikal Velazquez DO; Anthony Vincent ...            Was this an external facility discharge? No    Additional needs identified to be addressed with provider   Lipitor was changed from 10 mg to 40 mg but Jacki was already taking 40 mg (10 mg was incorrect upon admission).  Do you want to keep her on 40 mg or increase it?  Please call pt if increasing.  Thank you!             Method of communication with provider: chart routing.    Patients top risk factors for readmission: lack of knowledge about disease and medical condition-HTN, CAD    Interventions to address risk factors:   Review of patient management of conditions/medications:    Communication with providers: HFU scheduled  AVR scheduled     Care Summary Note:  Spoke with Jacki, said she is feeling pretty good.  Slept well.  Denies chest pain, dyspnea, fever, chills, dizziness.  Had cardiac cath which was negative.  Groin site looks good, can barely see the \"hole.\"  Reviewed medications/changes.  She monitors her BP and was to start new Toprol XL at bedtime but BP was 63/45, recheck 87/52 so she didn't take it.  This morning /62, will check again tonight and will take if not too low.  Appetite and fluid intake is

## 2024-07-12 NOTE — ADT AUTH CERT
Utilization Reviews       7/6    Last Updated by Tootie Almaguer RN on 7/8/2024 1051     Review Status Created By   In Primary Tootie Almaguer RN       Review Type Associated Date   -- 7/8/2024      Criteria Review   DATE: 7/6   TYPE OF BED: Telemetry         RELEVANT BASELINES: (lab values, vitals, o2 amount/delivery, etc.)  RA. IPTA         PERTINENT UPDATES:  IM and Cardiology following   Hep gtt, Nitro gtt  Echo ordered  For Cardiac Cath         VITALS: 97.6 (36.4)    18        90        109/65  100% RA         ABNL/PERTINENT LABS/RADIOLOGY/DIAGNOSTIC STUDIES:  07/06/24 01:03  Magnesium: 1.8  Troponin, High Sensitivity: 29 (H)  Heparin Unfractionated: 0.08 (L)  INR: 1.00  aPTT: 31.6     07/06/24 03:43  CARBON DIOXIDE: 21 (L)  Creatinine: 0.7  Glucose: 124 (H)  Troponin, High Sensitivity: 24 (H)  Cholesterol, Total: 107  HDL Cholesterol: 35  LDL Cholesterol: 54  Triglycerides: 91  WBC: 13.3 (H)  RBC: 3.45 (L)  Hemoglobin Quant: 10.7 (L)  Hematocrit: 33.6 (L)  MCHC: 31.8 (L)  RDW-SD: 47.1 (H)  Platelet Count: 325     07/06/24 06:53  Heparin Unfractionated: 0.23 (L)     07/06/24 11:27  Troponin, High Sensitivity: 11  Heparin Unfractionated: 0.33     07/06/24 19:20  Heparin Unfractionated: 0.19 (L)     07/06/24 03:31  EKG 12-LEAD: Rpt  Normal sinus rhythm  Possible Left atrial enlargement  Anteroseptal infarct (cited on or before 05-JUL-2024)  Abnormal ECG  Atrial Rate: 85  P Axis: 48  P-R Interval: 178  Q-T Interval: 374  QRS Duration: 74  QTc Calculation (Bazett): 445  R Axis: 26  T Axis: 42  Ventricular Rate: 85     07/06/24 08:54  ECHO (TTE)     Left Ventricle: Moderately reduced left ventricular systolic function with a visually estimated EF of 35 - 40%. Left ventricle is mildly dilated. Normal wall thickness. There are regional wall motion abnormalities with Anterior, Anterospetal and Sseptal hypokinesis.    Aortic Valve: Moderate to severe regurgitation.    Mitral Valve: Mild regurgitation.     lipid panel 7/6, wnl.   - Continue home aspirin  - Home lipitor increased to 40mg daily on 7/5  - Holding plavix, pending cath     Chronic normocytic anemia  - Hb on admission 7/5 10.3, as of 7/6 10.7. Baseline Hb appears 13-14.    - Will continue to follow H+H daily     Hypercortisolism with L adrenal tumor, POA  - Stable, follows with endocrinologist Dr. Noble outpatient     Anxiety   - Home Zoloft and Xanax prn     Primary HTN  - Continue home metoprolol and lisinopril   - Home amlodipine held     Chronic neck, right shoulder, and back pain  - Continue home Norco, Flexeril, Lyrica  - Follows with PCP for pain management      Tobacco abuse  - 20 pack year history, pt admits to 1/2 ppd currently.   - Will consider cessation education and consult after cath        Per Cardiology  ASSESSMENT:    1. Acute coronary syndrome with non-ST-elevation myocardial infarction with marked elevation of troponin from baseline of less than 6 to 29.  Third troponin is pending.  2. Peripheral artery disease, status post peripheral artery intervention and vascular surgery  and history of endarterectomy.  3. Chronic neck pain.  4. Chronic shoulder pain.  5. Back pain.  6. Tobacco abuse.  7. History of adrenal tumor.  8. History of tachycardia and now is not noted.     PLAN/RECOMMENDATION:  At this level continue heparin.  Start nitroglycerin drip.  We will get an echocardiogram and basically needs cardiac catheterization.  Risks and benefits explained.  The patient verbalized understanding and agreed with the plan of care.  So based on the course, we will gauge further care.         MEDICATIONS:  heparin (porcine) injection 3,700 Units x1   heparin 25,000 units in dextrose 5% 250 mL (premix) infusion- started @ 0124     nitroGLYCERIN 200 mcg/mL in dextrose 5%- started @ 1107     (LIPITOR) tablet 80 mg x1  magnesium sulfate 2000 mg in 50 mL IVPB premix x1      [Held by provider] amLODIPine, 10 mg, Oral, Daily  aspirin, 81 mg, Oral,  membranes   Neck: supple, no JVD   Respiratory: Lungs Clear, no retractions   Cardiovascular: non-reproducible anterior wall tenderness to palpation; Tachycardic rate, normal rhythm, moderate diastolic murmur on LUSB   Vascular: DP 2+ equal bilaterally LE without any signs of poor perfusion  MSK: no calf tenderness to palpatio  Integument: Skin warm and dry, no petechiae   Neurologic: Alert & oriented, normal speech   Psych: Pleasant affect, no hallucinations      MD CONSULTS/ASSESSMENTS & PLANS:  IM H&P note:  ASSESSMENT AND PLAN  ·           Atypical chest pain: Presented with chest pain that radiated down the left arm.  Pain relieved with nitro and aspirin in ED.  EKG sinus tachycardia.  Troponin less than 6.  proBNP 811.6.  D-dimer 1605.  CTA chest negative for PE.  CXR negative for acute findings.  Satting well on room air. tachycardic, hypertensive.  Last echo (1/8/2024) EF 60-65%, moderate to severe aortic regurgitation.  Follows with Dr. Hayward outpatient (OV 2/8/24).  Significant family history of MIs.  Smokes half pack per day.  ?          Nitro drip  ?          Heparin gtt  ?          Given Aspirin 324 in ED, continue.  ?          Increased Lipitor to 40 mg daily, with one-time dose 80 mg given.  ?          Continue home Lopressor, amlodipine, lisinopril  ?          Limited echo ordered  ?          Repeat troponin  ?          lipid panels ordered  ?          Telemetry  ?          Consult Cardiology in AM  ·           Chronic Conditions (reviewed and stable unless otherwise stated)   ?          Moderate to severe aortic insufficiency: Follows with Dr. Hayward outpatient (OV 2/8/24).  Continue Lopressor. Noted on echo 1/2024 EF 60-65%, moderate to severe AI.  ?          Adrenal tumor, left, POA: Follows Dr. Noble outpatient  ?          Anxiety: Continue Xanax PRN, zoloft  ?          Hypertension: Continue amlodipine, lisinopril with holding parameters.  ?          PAD s/p endarterectomy: Continue ASA.  Home

## 2024-07-13 NOTE — LETTER
Problem: Pain - Adult  Goal: Verbalizes/displays adequate comfort level or baseline comfort level  Outcome: Progressing     Problem: Safety - Adult  Goal: Free from fall injury  Outcome: Progressing     Problem: Discharge Planning  Goal: Discharge to home or other facility with appropriate resources  Outcome: Progressing     Problem: Chronic Conditions and Co-morbidities  Goal: Patient's chronic conditions and co-morbidity symptoms are monitored and maintained or improved  Outcome: Progressing   The patient's goals for the shift include      The clinical goals for the shift include pt will be hemodynamically stable throughout shift         [address]  [address]  [facility] [address]  Phone: [phone]  Fax: [phone]    SHERYL Caro CNP        March 31, 2022     Patient: Tabby Ybarra   YOB: 1965   Date of Visit: 3/31/2022       To Whom it May Concern:    Oracio Thomason was seen in my clinic on 3/31/2022. She should be excused from work on March 29, 31, and April 1 to return on April 4, 2022    If you have any questions or concerns, please don't hesitate to call.     Sincerely,     Electronically signed by SHERYL Caro CNP on 3/31/2022 at 2:23 PM

## 2024-07-15 ENCOUNTER — OFFICE VISIT (OUTPATIENT)
Dept: FAMILY MEDICINE CLINIC | Age: 59
End: 2024-07-15

## 2024-07-15 ENCOUNTER — HOSPITAL ENCOUNTER (OUTPATIENT)
Age: 59
Discharge: HOME | End: 2024-07-15
Payer: COMMERCIAL

## 2024-07-15 VITALS
HEART RATE: 80 BPM | WEIGHT: 140 LBS | DIASTOLIC BLOOD PRESSURE: 70 MMHG | BODY MASS INDEX: 28.28 KG/M2 | OXYGEN SATURATION: 96 % | RESPIRATION RATE: 16 BRPM | SYSTOLIC BLOOD PRESSURE: 98 MMHG | TEMPERATURE: 97.9 F

## 2024-07-15 DIAGNOSIS — F41.9 ANXIETY: ICD-10-CM

## 2024-07-15 DIAGNOSIS — E87.5 HYPERKALEMIA: ICD-10-CM

## 2024-07-15 DIAGNOSIS — Z09 HOSPITAL DISCHARGE FOLLOW-UP: Primary | ICD-10-CM

## 2024-07-15 DIAGNOSIS — I42.8 OTHER CARDIOMYOPATHY (HCC): ICD-10-CM

## 2024-07-15 DIAGNOSIS — R07.9 CHEST PAIN, UNSPECIFIED TYPE: ICD-10-CM

## 2024-07-15 LAB
ANION GAP SERPL CALC-SCNC: 10 MEQ/L (ref 8–16)
BUN SERPL-MCNC: 21 MG/DL (ref 7–22)
CALCIUM SERPL-MCNC: 8.6 MG/DL (ref 8.5–10.5)
CHLORIDE SERPL-SCNC: 101 MEQ/L (ref 98–111)
CO2 SERPL-SCNC: 25 MEQ/L (ref 23–33)
CREAT SERPL-MCNC: 1 MG/DL (ref 0.4–1.2)
GFR SERPL CREATININE-BSD FRML MDRD: 65 ML/MIN/1.73M2
GLUCOSE SERPL-MCNC: 90 MG/DL (ref 70–108)
POTASSIUM SERPL-SCNC: 5 MEQ/L (ref 3.5–5.2)
SODIUM SERPL-SCNC: 136 MEQ/L (ref 135–145)

## 2024-07-15 PROCEDURE — 36415 COLL VENOUS BLD VENIPUNCTURE: CPT

## 2024-07-15 PROCEDURE — 80048 BASIC METABOLIC PNL TOTAL CA: CPT

## 2024-07-15 NOTE — PROGRESS NOTES
time  Outpatient Medications Marked as Taking for the 7/15/24 encounter (Office Visit) with Kyle Smith APRN - CNP   Medication Sig Dispense Refill    HYDROcodone-acetaminophen (NORCO) 5-325 MG per tablet Take 1 tablet by mouth every 8 hours as needed for Pain for up to 30 days. Intended supply: 30 days. Take lowest dose possible to manage pain Max Daily Amount: 3 tablets 90 tablet 0    atorvastatin (LIPITOR) 40 MG tablet Take 1 tablet by mouth daily 30 tablet 3    metoprolol succinate (TOPROL XL) 100 MG extended release tablet Take 1 tablet by mouth at bedtime 30 tablet 3    ALPRAZolam (XANAX) 0.5 MG tablet Take 1 tablet by mouth 3 times daily as needed for Sleep or Anxiety for up to 30 days. Max Daily Amount: 1.5 mg 90 tablet 0    cyclobenzaprine (FLEXERIL) 10 MG tablet Take 1 tablet by mouth 3 times daily as needed for Muscle spasms 90 tablet 1    pregabalin (LYRICA) 50 MG capsule Take 1 capsule by mouth 3 times daily for 60 days. Max Daily Amount: 150 mg 90 capsule 1    meloxicam (MOBIC) 7.5 MG tablet Take 1 tablet by mouth daily 90 tablet 0    clopidogrel (PLAVIX) 75 MG tablet Take 1 tablet by mouth daily 90 tablet 3    aspirin 81 MG EC tablet Take 1 tablet by mouth daily          Medications patient taking as of now reconciled against medications ordered at time of hospital discharge: Yes    A comprehensive review of systems was negative except for what was noted in the HPI.    Objective:    BP 98/70   Pulse 80   Temp 97.9 °F (36.6 °C) (Oral)   Resp 16   Wt 63.5 kg (140 lb)   SpO2 96%   BMI 28.28 kg/m²   Skin: warm and dry, no rash or erythema  Pulmonary/Chest: clear to auscultation bilaterally- no wheezes, rales or rhonchi, normal air movement, no respiratory distress  Cardiovascular: normal rate, normal S1 and S2, and systolic murmur present- 3/6 at 2nd left intercostal space      An electronic signature was used to authenticate this note.  --SHERYL Gr CNP

## 2024-07-16 NOTE — TELEPHONE ENCOUNTER
PROCEDURE: mechanical avr      DATE OF SERVICE: 08/12/2024    SERVICE LOCATION: [facility]    CPT CODE: 76261    PHYSICIAN: DR SEYMOUR     DATE PRIOR AUTH SUBMITTED: 07/16/2024    STATUS: APPROVED.     AUTH NUMBER: [id number]    VALID:  08/12/2024-08/15/2024

## 2024-07-17 ENCOUNTER — HOSPITAL ENCOUNTER (OUTPATIENT)
Dept: NURSING | Age: 59
Discharge: HOME | End: 2024-07-17

## 2024-07-17 ENCOUNTER — TELEPHONE (OUTPATIENT)
Dept: RHEUMATOLOGY | Age: 59
End: 2024-07-17

## 2024-07-17 NOTE — ADT AUTH CERT
Comments  [facility]  [address] [facility] [address]  [id number]  [id number]      [id number] - (Sreekanth LYNN)    Auth number: N/A    Return Contact:  Name: Emy Valdez  Phone: [phone]  Fax: [phone]     Last edited by Emy Valdez on 24 at 3493     Patient Demographics    Name Patient ID SSN Gender Identity Birth Date   Jacki Moura [id number]  Female [birth year] (59 yrs)     Address Phone Email Employer   [address] [place] [place] [id number] [phone] (H)  [phone] (M) [email or web address]  [address]   [place] [address]  [phone]     [facility] Race Occupation Emp Status    FLY White (non-) -- Full Time      Reg Status PCP Date Last Verified Next Review Date    Verified Kyle Smith APRN - CNP  [phone] 24      Admission Date Discharge Date Admitting Provider     24 --       Marital Status Scientologist       [employer]        Emergency Contact 1 Emergency Contact 2 Emergency Contact 3   Sergei Moura (2)  [place]  [phone] (H)  [phone] (M) Mono Moura (C)  [place]  [phone] (H)  [phone] (M) Kailey Moura  [phone] (H)  [phone] (M)     Subscriber Details  Hospital Account #[id number]  [facility] Subscriber Name/Sex/Relation Subscriber  Subscriber Address/Phone Subscriber Emp/Emp Phone   1. SHANE  [id number] JACKI MOURA - Female  (Self) 1965 [address] [place]  [address]  [phone](H) [place]  [phone]     Utilization Reviews           Last Updated by Tootie Almaguer, RN on 2024 1051     Review Status Created By   In Primary Tootie Almaguer RN       Review Type Associated Date   -- 2024      Criteria Review   DATE:    TYPE OF BED: Telemetry         RELEVANT BASELINES: (lab values, vitals, o2 amount/delivery, etc.)  RA. IPTA         PERTINENT UPDATES:  IM and Cardiology following   Hep gtt, Nitro

## 2024-07-17 NOTE — TELEPHONE ENCOUNTER
PROVIDER FEEDBACK LOOP CALLED 3X     Patient:Jacki Moura  : 1965  Referring Provider: ANIYAH NGUYEN  Referral Type:  Treatment Plan and Therapy Plan     Procedures:   - TX ZOLEDRONIC ACID 1MG  Date Service Ordered 3/13/2024        We were unable to reach Jacki Moura to schedule the test ordered by your office after 3 outreach attempts via either text, email and/or phone call.  Please call/follow up with your patient to explain the significance of the ordered test and direct the patient to call Central Scheduling to schedule the test at their earliest convenience.     Please complete one of the following actions from Quick Actions buttons:     Route to Provider:  Route message to ordering provider to seek next steps in care plan.     Telephone Encounter:  Telephone encounter will open.  Call patient to explain significance of ordered test and direct patient to call Central Scheduling to schedule test then Document details of call.     Open Referral:  Review referral notes or details if needed.     Close Referral:  Referral will open.  Document in Notes section of referral why the referral is being closed.  Examples of referral closure:  Patient had test done outside of of an office in the [facility], Patient refuses test, Patient no longer having symptoms, Unable to reach patient.  Only close the referral if you are sure the test will not proceed.     Thank you     Pre-Access Scheduling Team      Pt is scheduled for reclast on 24.

## 2024-07-18 NOTE — ADT AUTH CERT
Comments  [facility]  [facility]K  [address] [address]  [phone]  [phone]      [id number] - (Sreekanth LYNN)    Auth number: N/A    Return Contact:  Name: Emy Valdez  Phone: [phone]  Fax: [phone]     Last edited by Emy Valdez on 24 at 7616     Patient Demographics    Name Patient ID SSN Gender Identity Birth Date   Jacki Moura [id number]  Female [birth year] (59 yrs)     Address Phone Email Employer   [address] [place] [id number] [phone] (H)  [phone] (M) [email or web address] [place]  [address]   [place] [address]  [phone]     [facility] Race Occupation Emp Status    FLY White (non-) -- Full Time      Reg Status PCP Date Last Verified Next Review Date    Verified Kyle Smith APRN - CNP  [phone] 24      Admission Date Discharge Date Admitting Provider     24 --       Marital Status Protestant       Scientologist        Emergency Contact 1 Emergency Contact 2 Emergency Contact 3   Sergei Moura (2)  [place]  [phone] (H)  [phone] (M) [place] Moura (C)  [place]  [phone] (H)  [phone] (M) Kailey Moura  [phone] (H)  [phone] (M)     Subscriber Details  Hospital Account #[id number]  [facility] Subscriber Name/Sex/Relation Subscriber  Subscriber Address/Phone Subscriber Emp/Emp Phone   1. SHANE  [id number] JACKI MOURA - Female  (Self) 1965 [address] [place]  [address]  [phone](H) [place]  [phone]     Utilization Reviews           Last Updated by Tootie Almaguer, RN on 2024 1051     Review Status Created By   In Primary Tootie Almaguer RN       Review Type Associated Date   -- 2024      Criteria Review   DATE:    TYPE OF BED: Telemetry         RELEVANT BASELINES: (lab values, vitals, o2 amount/delivery, etc.)  RA. IPTA         PERTINENT UPDATES:  IM and Cardiology following   Hep gtt, Nitro  7/6/2024 1003     Review Status Created By   Primary Completed Emilie Powell RN       Review Type   Admission      Criteria Review   Chest Pain Clinical Indications for Admission to Inpatient Care     Overall Determination: Indications Met     Criteria:  [×] Admission [A] is indicated for  1 or more  of the following  (1) (2):      [×] Hemodynamic instability          7/6/2024 10:03 AM              -- 7/6/2024 10:03 AM by Emilie Powell RN --                                    (X) Hemodynamic instability, as indicated by  1 or more  of the following  (1) (2) (3) (4) (5) (6):                  (X) Vital sign abnormality not readily corrected by appropriate treatment, as indicated by  1 or more  of the following  [A]:                  (X) Tachycardia that persists despite appropriate treatment (eg, volume repletion, treatment of pain, treatment of underlying cause)          7/6/2024 10:03 AM              -- 7/6/2024 10:03 AM by Emilie Powell RN --                  Tachycardia noted: 111, 107, 104, 108, 108, 102, 104                  Tachypnea noted: 22, 20, 27, 27, 20,20                  HTN noted: 149/92,  149/99      [×] Presenting signs or symptoms (eg, chest pain) persist despite observation care.          7/6/2024 10:03 AM              -- 7/6/2024 10:03 AM by Emilie Powell RN --                  chest pain                   IV heparin

## 2024-07-19 ENCOUNTER — CARE COORDINATION (OUTPATIENT)
Dept: CARE COORDINATION | Age: 59
End: 2024-07-19

## 2024-07-19 NOTE — CARE COORDINATION
PAT  8/12 AVR surgery  Follow up with [facility] or [facility]   May need referral to oral surgeon or periodontist   Smoking cessation  Report fast HR or symptoms ASAP  Continue cutting down on cigarettes by 1 every couple days then set a quit date  Early symptom recognition and reporting to prevent ED and admissions  Use same or next day appts at PCP office for non-emergency problems    Offered patient enrollment in the Remote Patient Monitoring (RPM) program for in-home monitoring: Yes, but did not enroll at this time: already monitoring with home equipment.     Assessments Completed:   Care Coordination Interventions    Referral from Primary Care Provider: No  Suggested Interventions and Community Resources  Medi Set or Pill Pack: Declined  Occupational Therapy: Declined  Physical Therapy: Declined  Transportation Support: Declined       and   Hypertension - Encounter Level    Symptom course: stable          Medications Reviewed:   Patient denies any changes with medications and reports taking all medications as prescribed.    Advance Care Planning:   Referral to internal ACP facilitator     Care Planning:    Goals Addressed                   This Visit's Progress     Conditions and Symptoms   On track     I will schedule office visits, as directed by my provider.  I will keep my appointment or reschedule if I have to cancel.  I will notify my provider of any barriers to my plan of care.  I will notify my provider of any symptoms that indicate a worsening of my condition.    Barriers: overwhelmed by complexity of regimen  Plan for overcoming my barriers: care coordination  Confidence: 9/10  Anticipated Goal Completion Date: 6/1/2024                 PCP/Specialist follow up:   Future Appointments         Provider Specialty Dept Phone    7/24/2024 9:00 AM Mortimer, Connor, PA-C Cardiology [phone]    7/24/2024 10:15 AM [facility] ROOM 7 IP Unit [phone]    7/24/2024 1:00 PM Arcenio Ames

## 2024-07-21 ENCOUNTER — APPOINTMENT (OUTPATIENT)
Dept: GENERAL RADIOLOGY | Age: 59
End: 2024-07-21
Payer: COMMERCIAL

## 2024-07-21 ENCOUNTER — HOSPITAL ENCOUNTER (EMERGENCY)
Age: 59
Discharge: HOME | End: 2024-07-21
Payer: COMMERCIAL

## 2024-07-21 VITALS
SYSTOLIC BLOOD PRESSURE: 131 MMHG | TEMPERATURE: 97.3 F | OXYGEN SATURATION: 96 % | BODY MASS INDEX: 30.3 KG/M2 | HEART RATE: 67 BPM | WEIGHT: 150 LBS | RESPIRATION RATE: 16 BRPM | DIASTOLIC BLOOD PRESSURE: 50 MMHG

## 2024-07-21 DIAGNOSIS — R07.9 CHEST PAIN, UNSPECIFIED TYPE: Primary | ICD-10-CM

## 2024-07-21 LAB
ANION GAP SERPL CALC-SCNC: 11 MEQ/L (ref 8–16)
BASOPHILS ABSOLUTE: 0.1 THOU/MM3 (ref 0–0.1)
BASOPHILS NFR BLD AUTO: 1.3 %
BUN SERPL-MCNC: 14 MG/DL (ref 7–22)
CALCIUM SERPL-MCNC: 8.3 MG/DL (ref 8.5–10.5)
CHLORIDE SERPL-SCNC: 105 MEQ/L (ref 98–111)
CO2 SERPL-SCNC: 23 MEQ/L (ref 23–33)
CREAT SERPL-MCNC: 0.8 MG/DL (ref 0.4–1.2)
DEPRECATED RDW RBC AUTO: 48 FL (ref 35–45)
EKG ATRIAL RATE: 77 BPM
EKG P AXIS: 61 DEGREES
EKG P-R INTERVAL: 200 MS
EKG Q-T INTERVAL: 372 MS
EKG QRS DURATION: 82 MS
EKG QTC CALCULATION (BAZETT): 420 MS
EKG R AXIS: 57 DEGREES
EKG T AXIS: 64 DEGREES
EKG VENTRICULAR RATE: 77 BPM
EOSINOPHIL NFR BLD AUTO: 2.9 %
EOSINOPHILS ABSOLUTE: 0.2 THOU/MM3 (ref 0–0.4)
ERYTHROCYTE [DISTWIDTH] IN BLOOD BY AUTOMATED COUNT: 13.4 % (ref 11.5–14.5)
GFR SERPL CREATININE-BSD FRML MDRD: 85 ML/MIN/1.73M2
GLUCOSE SERPL-MCNC: 89 MG/DL (ref 70–108)
HCT VFR BLD AUTO: 31.9 % (ref 37–47)
HGB BLD-MCNC: 9.7 GM/DL (ref 12–16)
IMM GRANULOCYTES # BLD AUTO: 0.13 THOU/MM3 (ref 0–0.07)
IMM GRANULOCYTES NFR BLD AUTO: 1.6 %
LYMPHOCYTES ABSOLUTE: 2.9 THOU/MM3 (ref 1–4.8)
LYMPHOCYTES NFR BLD AUTO: 36.4 %
MCH RBC QN AUTO: 30.4 PG (ref 26–33)
MCHC RBC AUTO-ENTMCNC: 30.4 GM/DL (ref 32.2–35.5)
MCV RBC AUTO: 100 FL (ref 81–99)
MONOCYTES ABSOLUTE: 0.7 THOU/MM3 (ref 0.4–1.3)
MONOCYTES NFR BLD AUTO: 9.2 %
NEUTROPHILS ABSOLUTE: 3.8 THOU/MM3 (ref 1.8–7.7)
NEUTROPHILS NFR BLD AUTO: 48.6 %
NRBC BLD AUTO-RTO: 0 /100 WBC
NT-PROBNP SERPL IA-MCNC: 539.3 PG/ML (ref 0–124)
OSMOLALITY SERPL CALC.SUM OF ELEC: 277.5 MOSMOL/KG (ref 275–300)
PLATELET # BLD AUTO: 560 THOU/MM3 (ref 130–400)
PMV BLD AUTO: 10.3 FL (ref 9.4–12.4)
POTASSIUM SERPL-SCNC: 4.9 MEQ/L (ref 3.5–5.2)
RBC # BLD AUTO: 3.19 MILL/MM3 (ref 4.2–5.4)
REASON FOR REJECTION: NORMAL
REJECTED TEST: NORMAL
SODIUM SERPL-SCNC: 139 MEQ/L (ref 135–145)
TROPONIN, HIGH SENSITIVITY: < 6 NG/L (ref 0–12)
TROPONIN, HIGH SENSITIVITY: < 6 NG/L (ref 0–12)
WBC # BLD AUTO: 7.9 THOU/MM3 (ref 4.8–10.8)

## 2024-07-21 PROCEDURE — 96374 THER/PROPH/DIAG INJ IV PUSH: CPT

## 2024-07-21 PROCEDURE — 80048 BASIC METABOLIC PNL TOTAL CA: CPT

## 2024-07-21 PROCEDURE — 84484 ASSAY OF TROPONIN QUANT: CPT

## 2024-07-21 PROCEDURE — 6370000000 HC RX 637 (ALT 250 FOR IP): Performed by: NURSE PRACTITIONER

## 2024-07-21 PROCEDURE — 2580000003 HC RX 258: Performed by: NURSE PRACTITIONER

## 2024-07-21 PROCEDURE — 36415 COLL VENOUS BLD VENIPUNCTURE: CPT

## 2024-07-21 PROCEDURE — 6360000002 HC RX W HCPCS: Performed by: NURSE PRACTITIONER

## 2024-07-21 PROCEDURE — 85025 COMPLETE CBC W/AUTO DIFF WBC: CPT

## 2024-07-21 PROCEDURE — 93010 ELECTROCARDIOGRAM REPORT: CPT | Performed by: INTERNAL MEDICINE

## 2024-07-21 PROCEDURE — 83880 ASSAY OF NATRIURETIC PEPTIDE: CPT

## 2024-07-21 PROCEDURE — 99285 EMERGENCY DEPT VISIT HI MDM: CPT

## 2024-07-21 PROCEDURE — 71046 X-RAY EXAM CHEST 2 VIEWS: CPT

## 2024-07-21 PROCEDURE — 93005 ELECTROCARDIOGRAM TRACING: CPT | Performed by: NURSE PRACTITIONER

## 2024-07-21 RX ORDER — 0.9 % SODIUM CHLORIDE 0.9 %
500 INTRAVENOUS SOLUTION INTRAVENOUS ONCE
Status: COMPLETED | OUTPATIENT
Start: 2024-07-21 | End: 2024-07-21

## 2024-07-21 RX ORDER — NITROGLYCERIN 0.4 MG/1
0.4 TABLET SUBLINGUAL EVERY 5 MIN PRN
Status: DISCONTINUED | OUTPATIENT
Start: 2024-07-21 | End: 2024-07-21 | Stop reason: HOSPADM

## 2024-07-21 RX ORDER — MORPHINE SULFATE 4 MG/ML
4 INJECTION, SOLUTION INTRAMUSCULAR; INTRAVENOUS ONCE
Status: COMPLETED | OUTPATIENT
Start: 2024-07-21 | End: 2024-07-21

## 2024-07-21 RX ADMIN — NITROGLYCERIN 0.4 MG: 0.4 TABLET, ORALLY DISINTEGRATING SUBLINGUAL at 03:53

## 2024-07-21 RX ADMIN — SODIUM CHLORIDE 500 ML: 9 INJECTION, SOLUTION INTRAVENOUS at 03:53

## 2024-07-21 RX ADMIN — MORPHINE SULFATE 4 MG: 4 INJECTION, SOLUTION INTRAMUSCULAR; INTRAVENOUS at 03:55

## 2024-07-21 ASSESSMENT — HEART SCORE: ECG: NORMAL

## 2024-07-21 ASSESSMENT — PAIN DESCRIPTION - DESCRIPTORS: DESCRIPTORS: ACHING

## 2024-07-21 ASSESSMENT — PAIN DESCRIPTION - ORIENTATION: ORIENTATION: MID;LEFT

## 2024-07-21 ASSESSMENT — PAIN DESCRIPTION - LOCATION
LOCATION: CHEST
LOCATION: CHEST

## 2024-07-21 ASSESSMENT — PAIN SCALES - GENERAL
PAINLEVEL_OUTOF10: 7
PAINLEVEL_OUTOF10: 2

## 2024-07-21 ASSESSMENT — PAIN - FUNCTIONAL ASSESSMENT
PAIN_FUNCTIONAL_ASSESSMENT: 0-10
PAIN_FUNCTIONAL_ASSESSMENT: 0-10

## 2024-07-21 NOTE — ED NOTES
Patient was signed out to me at 0600 by previous NP.  Patient presented with chest pain earlier this morning.  Had a cardiac workup.  Patient did have a cath few weeks ago with normal coronaries.  Patient is currently feeling pain-free very comfortable.  She had a 2 set troponin that was negative I did follow-up on the second troponin which is why she was signed out I did discuss this and evaluate the patient independently.  She is resting comfortably I discussed all points of discharge with her she does have an appointment with her cardiologist she has a history of aortic regurg and she is having a valve replacement in 2 weeks.  Patient is comfortable going home.  Heart score 2   EKG per Ernesto counts with no acute concerning changes.    Patient will be discharged home in stable condition      ICD-10-CM    1. Chest pain, unspecified type  R07.9                    Matty Cervantes PA-C  07/22/24 1729

## 2024-07-21 NOTE — ED TRIAGE NOTES
Patient presents to ED with chief complaint of chest pain. Patient states that she has been having this pain off and on for around 2 months. It awoke her from sleep today, and the pain level is rated at a 7/10. Patient states that she does have a heart surgery scheduled to repair a valve on August 12th. Patient resting in bed. Respirations easy and unlabored. No distress noted. Call light within reach.

## 2024-07-21 NOTE — ED NOTES
Upon first contact with patient this RN receives bedside shift report from FEI Sharma. Pt resting on cot with eyes closed and unlabored respirations. Pt denies any needs. Updated on POC.

## 2024-07-21 NOTE — ED NOTES
Upon first contact with patient this RN receives bedside shift report from FEI Dyer. Patient resting in bed with eyes closed. Respirations easy and unlabored. No distress noted. Call light within reach.

## 2024-07-21 NOTE — ED PROVIDER NOTES
her pulse is 98. Her respiration is 16 and oxygen saturation is 97%.    Physical Exam  Vitals and nursing note reviewed.   Constitutional:       General: She is in acute distress.      Appearance: Normal appearance. She is well-developed.   HENT:      Head: Normocephalic.      Mouth/Throat:      Pharynx: Uvula midline.   Eyes:      Conjunctiva/sclera: Conjunctivae normal.   Cardiovascular:      Rate and Rhythm: Normal rate and regular rhythm.      Pulses: Normal pulses.      Heart sounds: S1 normal and S2 normal. Murmur heard.   Pulmonary:      Effort: Pulmonary effort is normal. No respiratory distress.      Breath sounds: Normal breath sounds.   Chest:      Chest wall: No tenderness.   Abdominal:      General: Bowel sounds are normal. There is no distension.      Palpations: Abdomen is soft.      Tenderness: There is no abdominal tenderness.   Musculoskeletal:         General: Normal range of motion.      Cervical back: Normal range of motion and neck supple.   Lymphadenopathy:      Cervical: No cervical adenopathy.   Skin:     General: Skin is warm and dry.      Capillary Refill: Capillary refill takes less than 2 seconds.   Neurological:      General: No focal deficit present.      Mental Status: She is alert and oriented to person, place, and time.   Psychiatric:         Speech: Speech normal.         Behavior: Behavior normal.         Thought Content: Thought content normal.         DIFFERENTIAL DIAGNOSIS:   ACS, MI, arterial spasm, muscle strain, muscle spasm,  DIAGNOSTIC RESULTS     EKG: All EKG's are interpreted by the Emergency Department Physician who eithersigns or Co-signs this chart in the absence of a cardiologist.    EKG read and interpreted by myself with comparison to July 7, 2024 gives impression of normal sinus rhythm with heart rate of 77; interval 200; QRS 82;QTc 420; axis P-61, RS 57, T-64.     RADIOLOGY: non-plainfilm images(s) such as CT, Ultrasound and MRI are read by the radiologist.   follow-up provider specified.    DISCHARGE MEDICATIONS:  New Prescriptions    No medications on file       (Please note that portions of this note were completed with a voice recognition program.  Efforts were made to edit the dictations but occasionally words are mis-transcribed.)    Provider:  I personally performed the services described in the documentation,reviewed and edited the documentation which was dictated to the scribe in my presence, and it accurately records my words and actions.    Ernesto Hendrickson CNP 07/21/24 4:34 AM    SHERYL Rondon CNP, [place], APRN - CNP  07/21/24 2980       Ernesto Hendrickson APRN - CNP  07/29/24 0991

## 2024-07-22 ENCOUNTER — HOSPITAL ENCOUNTER (INPATIENT)
Age: 59
LOS: 2 days | Discharge: HOME | End: 2024-07-24
Attending: STUDENT IN AN ORGANIZED HEALTH CARE EDUCATION/TRAINING PROGRAM | Admitting: STUDENT IN AN ORGANIZED HEALTH CARE EDUCATION/TRAINING PROGRAM
Payer: COMMERCIAL

## 2024-07-22 ENCOUNTER — APPOINTMENT (OUTPATIENT)
Dept: CT IMAGING | Age: 59
End: 2024-07-22
Payer: COMMERCIAL

## 2024-07-22 ENCOUNTER — APPOINTMENT (OUTPATIENT)
Dept: GENERAL RADIOLOGY | Age: 59
End: 2024-07-22
Payer: COMMERCIAL

## 2024-07-22 DIAGNOSIS — R06.02 SHORTNESS OF BREATH: ICD-10-CM

## 2024-07-22 DIAGNOSIS — J96.02 ACUTE RESPIRATORY FAILURE WITH HYPERCAPNIA (HCC): ICD-10-CM

## 2024-07-22 DIAGNOSIS — J81.0 FLASH PULMONARY EDEMA (HCC): Primary | ICD-10-CM

## 2024-07-22 DIAGNOSIS — I10 ESSENTIAL HYPERTENSION: ICD-10-CM

## 2024-07-22 DIAGNOSIS — N17.9 AKI (ACUTE KIDNEY INJURY) (HCC): ICD-10-CM

## 2024-07-22 DIAGNOSIS — I35.1 AORTIC VALVE INSUFFICIENCY, ETIOLOGY OF CARDIAC VALVE DISEASE UNSPECIFIED: ICD-10-CM

## 2024-07-22 PROBLEM — J81.1 PULMONARY EDEMA: Status: ACTIVE | Noted: 2024-07-22

## 2024-07-22 PROBLEM — I50.9 ACUTE DECOMPENSATED HEART FAILURE (HCC): Status: ACTIVE | Noted: 2024-07-22

## 2024-07-22 LAB
ALBUMIN SERPL BCG-MCNC: 4.3 G/DL (ref 3.5–5.1)
ALP SERPL-CCNC: 110 U/L (ref 38–126)
ALT SERPL W/O P-5'-P-CCNC: 25 U/L (ref 11–66)
ANION GAP SERPL CALC-SCNC: 15 MEQ/L (ref 8–16)
ARTERIAL PATENCY WRIST A: ABNORMAL
ARTERIAL PATENCY WRIST A: POSITIVE
AST SERPL-CCNC: 20 U/L (ref 5–40)
BASE EXCESS BLDA CALC-SCNC: -1 MMOL/L (ref -2.5–2.5)
BASE EXCESS BLDA CALC-SCNC: -4.4 MMOL/L (ref -2.5–2.5)
BASOPHILS ABSOLUTE: 0.2 THOU/MM3 (ref 0–0.1)
BASOPHILS NFR BLD AUTO: 1.2 %
BDY SITE: ABNORMAL
BDY SITE: ABNORMAL
BILIRUB CONJ SERPL-MCNC: < 0.1 MG/DL (ref 0.1–13.8)
BILIRUB SERPL-MCNC: < 0.2 MG/DL (ref 0.3–1.2)
BUN SERPL-MCNC: 17 MG/DL (ref 7–22)
CALCIUM SERPL-MCNC: 8.8 MG/DL (ref 8.5–10.5)
CHLORIDE SERPL-SCNC: 107 MEQ/L (ref 98–111)
CO2 SERPL-SCNC: 20 MEQ/L (ref 23–33)
COLLECTED BY:: ABNORMAL
COLLECTED BY:: ABNORMAL
CREAT SERPL-MCNC: 1 MG/DL (ref 0.4–1.2)
DEPRECATED RDW RBC AUTO: 50.3 FL (ref 35–45)
DEVICE: ABNORMAL
DEVICE: ABNORMAL
EOSINOPHIL NFR BLD AUTO: 3.2 %
EOSINOPHILS ABSOLUTE: 0.5 THOU/MM3 (ref 0–0.4)
ERYTHROCYTE [DISTWIDTH] IN BLOOD BY AUTOMATED COUNT: 13.7 % (ref 11.5–14.5)
FIO2 ON VENT O2 ANALYZER: 50 %
FIO2 ON VENT O2 ANALYZER: 70 %
FLUAV RNA RESP QL NAA+PROBE: NOT DETECTED
FLUBV RNA RESP QL NAA+PROBE: NOT DETECTED
FOLATE SERPL-MCNC: 11.6 NG/ML (ref 4.8–24.2)
GFR SERPL CREATININE-BSD FRML MDRD: 65 ML/MIN/1.73M2
GLUCOSE SERPL-MCNC: 190 MG/DL (ref 70–108)
HCO3 BLDA-SCNC: 24 MMOL/L (ref 23–28)
HCO3 BLDA-SCNC: 26 MMOL/L (ref 23–28)
HCT VFR BLD AUTO: 35.7 % (ref 37–47)
HGB BLD-MCNC: 10.8 GM/DL (ref 12–16)
IMM GRANULOCYTES # BLD AUTO: 0.3 THOU/MM3 (ref 0–0.07)
IMM GRANULOCYTES NFR BLD AUTO: 2.1 %
LACTATE SERPL-SCNC: 2 MMOL/L (ref 0.5–2)
LYMPHOCYTES ABSOLUTE: 6.1 THOU/MM3 (ref 1–4.8)
LYMPHOCYTES NFR BLD AUTO: 43.2 %
MAGNESIUM SERPL-MCNC: 2.3 MG/DL (ref 1.6–2.4)
MCH RBC QN AUTO: 30.6 PG (ref 26–33)
MCHC RBC AUTO-ENTMCNC: 30.3 GM/DL (ref 32.2–35.5)
MCV RBC AUTO: 101.1 FL (ref 81–99)
MONOCYTES ABSOLUTE: 1.2 THOU/MM3 (ref 0.4–1.3)
MONOCYTES NFR BLD AUTO: 8.8 %
NEUTROPHILS ABSOLUTE: 5.9 THOU/MM3 (ref 1.8–7.7)
NEUTROPHILS NFR BLD AUTO: 41.5 %
NRBC BLD AUTO-RTO: 0 /100 WBC
NT-PROBNP SERPL IA-MCNC: 1213 PG/ML (ref 0–124)
OSMOLALITY SERPL CALC.SUM OF ELEC: 289.7 MOSMOL/KG (ref 275–300)
PCO2 BLDA: 50 MMHG (ref 35–45)
PCO2 BLDA: 57 MMHG (ref 35–45)
PEEP SETTING VENT: 6 MMHG
PEEP SETTING VENT: 6 MMHG
PH BLDA: 7.23 [PH] (ref 7.35–7.45)
PH BLDA: 7.31 [PH] (ref 7.35–7.45)
PIP: 14 CMH2O
PIP: 14 CMH2O
PLATELET # BLD AUTO: 659 THOU/MM3 (ref 130–400)
PLATELET BLD QL SMEAR: ABNORMAL
PMV BLD AUTO: 10.3 FL (ref 9.4–12.4)
PO2 BLDA: 198 MMHG (ref 71–104)
PO2 BLDA: 211 MMHG (ref 71–104)
POTASSIUM SERPL-SCNC: 4.1 MEQ/L (ref 3.5–5.2)
PRESSURE SUPPORT SETTING VENT: 8 CMH2O
PROCALCITONIN SERPL IA-MCNC: 0.05 NG/ML (ref 0.01–0.09)
PROT SERPL-MCNC: 7.1 G/DL (ref 6.1–8)
RBC # BLD AUTO: 3.53 MILL/MM3 (ref 4.2–5.4)
SAO2 % BLDA: 100 %
SAO2 % BLDA: 100 %
SARS-COV-2 RNA RESP QL NAA+PROBE: NOT DETECTED
SCAN OF BLOOD SMEAR: NORMAL
SODIUM SERPL-SCNC: 142 MEQ/L (ref 135–145)
TROPONIN, HIGH SENSITIVITY: < 6 NG/L (ref 0–12)
TSH SERPL DL<=0.005 MIU/L-ACNC: 1.57 UIU/ML (ref 0.4–4.2)
VENTILATION MODE VENT: ABNORMAL
VENTILATION MODE VENT: ABNORMAL
VIT B12 SERPL-MCNC: 1065 PG/ML (ref 211–911)
WBC # BLD AUTO: 14.2 THOU/MM3 (ref 4.8–10.8)

## 2024-07-22 PROCEDURE — 6370000000 HC RX 637 (ALT 250 FOR IP)

## 2024-07-22 PROCEDURE — 94761 N-INVAS EAR/PLS OXIMETRY MLT: CPT

## 2024-07-22 PROCEDURE — 87636 SARSCOV2 & INF A&B AMP PRB: CPT

## 2024-07-22 PROCEDURE — 71275 CT ANGIOGRAPHY CHEST: CPT

## 2024-07-22 PROCEDURE — 82803 BLOOD GASES ANY COMBINATION: CPT

## 2024-07-22 PROCEDURE — 71045 X-RAY EXAM CHEST 1 VIEW: CPT

## 2024-07-22 PROCEDURE — 2700000000 HC OXYGEN THERAPY PER DAY

## 2024-07-22 PROCEDURE — 83735 ASSAY OF MAGNESIUM: CPT

## 2024-07-22 PROCEDURE — 96365 THER/PROPH/DIAG IV INF INIT: CPT

## 2024-07-22 PROCEDURE — 36415 COLL VENOUS BLD VENIPUNCTURE: CPT

## 2024-07-22 PROCEDURE — 84145 PROCALCITONIN (PCT): CPT

## 2024-07-22 PROCEDURE — 83605 ASSAY OF LACTIC ACID: CPT

## 2024-07-22 PROCEDURE — 99223 1ST HOSP IP/OBS HIGH 75: CPT | Performed by: STUDENT IN AN ORGANIZED HEALTH CARE EDUCATION/TRAINING PROGRAM

## 2024-07-22 PROCEDURE — 93005 ELECTROCARDIOGRAM TRACING: CPT | Performed by: STUDENT IN AN ORGANIZED HEALTH CARE EDUCATION/TRAINING PROGRAM

## 2024-07-22 PROCEDURE — 80053 COMPREHEN METABOLIC PANEL: CPT

## 2024-07-22 PROCEDURE — 2580000003 HC RX 258

## 2024-07-22 PROCEDURE — 99285 EMERGENCY DEPT VISIT HI MDM: CPT

## 2024-07-22 PROCEDURE — 6360000002 HC RX W HCPCS: Performed by: NURSE PRACTITIONER

## 2024-07-22 PROCEDURE — 82248 BILIRUBIN DIRECT: CPT

## 2024-07-22 PROCEDURE — 83880 ASSAY OF NATRIURETIC PEPTIDE: CPT

## 2024-07-22 PROCEDURE — 2140000000 HC CCU INTERMEDIATE R&B

## 2024-07-22 PROCEDURE — 6360000002 HC RX W HCPCS

## 2024-07-22 PROCEDURE — 85025 COMPLETE CBC W/AUTO DIFF WBC: CPT

## 2024-07-22 PROCEDURE — 5A09357 ASSISTANCE WITH RESPIRATORY VENTILATION, LESS THAN 24 CONSECUTIVE HOURS, CONTINUOUS POSITIVE AIRWAY PRESSURE: ICD-10-PCS

## 2024-07-22 PROCEDURE — 6360000004 HC RX CONTRAST MEDICATION: Performed by: STUDENT IN AN ORGANIZED HEALTH CARE EDUCATION/TRAINING PROGRAM

## 2024-07-22 PROCEDURE — 36600 WITHDRAWAL OF ARTERIAL BLOOD: CPT

## 2024-07-22 PROCEDURE — 96374 THER/PROPH/DIAG INJ IV PUSH: CPT

## 2024-07-22 PROCEDURE — 82746 ASSAY OF FOLIC ACID SERUM: CPT

## 2024-07-22 PROCEDURE — 94660 CPAP INITIATION&MGMT: CPT

## 2024-07-22 PROCEDURE — 84484 ASSAY OF TROPONIN QUANT: CPT

## 2024-07-22 PROCEDURE — 82607 VITAMIN B-12: CPT

## 2024-07-22 PROCEDURE — 84443 ASSAY THYROID STIM HORMONE: CPT

## 2024-07-22 PROCEDURE — 2580000003 HC RX 258: Performed by: NURSE PRACTITIONER

## 2024-07-22 RX ORDER — ENOXAPARIN SODIUM 100 MG/ML
40 INJECTION SUBCUTANEOUS DAILY
Status: DISCONTINUED | OUTPATIENT
Start: 2024-07-22 | End: 2024-07-24 | Stop reason: HOSPADM

## 2024-07-22 RX ORDER — ACETAMINOPHEN 650 MG/1
650 SUPPOSITORY RECTAL EVERY 6 HOURS PRN
Status: DISCONTINUED | OUTPATIENT
Start: 2024-07-22 | End: 2024-07-24 | Stop reason: HOSPADM

## 2024-07-22 RX ORDER — METOPROLOL SUCCINATE 100 MG/1
100 TABLET, EXTENDED RELEASE ORAL NIGHTLY
Status: DISCONTINUED | OUTPATIENT
Start: 2024-07-22 | End: 2024-07-24 | Stop reason: HOSPADM

## 2024-07-22 RX ORDER — CYCLOBENZAPRINE HCL 10 MG
10 TABLET ORAL 3 TIMES DAILY PRN
Status: DISCONTINUED | OUTPATIENT
Start: 2024-07-22 | End: 2024-07-24 | Stop reason: HOSPADM

## 2024-07-22 RX ORDER — ATORVASTATIN CALCIUM 40 MG/1
40 TABLET, FILM COATED ORAL DAILY
Status: DISCONTINUED | OUTPATIENT
Start: 2024-07-22 | End: 2024-07-24 | Stop reason: HOSPADM

## 2024-07-22 RX ORDER — 0.9 % SODIUM CHLORIDE 0.9 %
250 INTRAVENOUS SOLUTION INTRAVENOUS ONCE
Status: DISCONTINUED | OUTPATIENT
Start: 2024-07-22 | End: 2024-07-22

## 2024-07-22 RX ORDER — ACETAMINOPHEN 325 MG/1
650 TABLET ORAL EVERY 6 HOURS PRN
Status: DISCONTINUED | OUTPATIENT
Start: 2024-07-22 | End: 2024-07-24 | Stop reason: HOSPADM

## 2024-07-22 RX ORDER — ALPRAZOLAM 0.5 MG/1
0.5 TABLET ORAL 3 TIMES DAILY PRN
Status: DISCONTINUED | OUTPATIENT
Start: 2024-07-22 | End: 2024-07-24 | Stop reason: HOSPADM

## 2024-07-22 RX ORDER — ONDANSETRON 2 MG/ML
4 INJECTION INTRAMUSCULAR; INTRAVENOUS EVERY 6 HOURS PRN
Status: DISCONTINUED | OUTPATIENT
Start: 2024-07-22 | End: 2024-07-24 | Stop reason: HOSPADM

## 2024-07-22 RX ORDER — MAGNESIUM SULFATE IN WATER 40 MG/ML
2000 INJECTION, SOLUTION INTRAVENOUS PRN
Status: DISCONTINUED | OUTPATIENT
Start: 2024-07-22 | End: 2024-07-24 | Stop reason: HOSPADM

## 2024-07-22 RX ORDER — POTASSIUM CHLORIDE 20 MEQ/1
40 TABLET, EXTENDED RELEASE ORAL PRN
Status: DISCONTINUED | OUTPATIENT
Start: 2024-07-22 | End: 2024-07-24 | Stop reason: HOSPADM

## 2024-07-22 RX ORDER — SODIUM CHLORIDE 0.9 % (FLUSH) 0.9 %
5-40 SYRINGE (ML) INJECTION EVERY 12 HOURS SCHEDULED
Status: DISCONTINUED | OUTPATIENT
Start: 2024-07-22 | End: 2024-07-24 | Stop reason: HOSPADM

## 2024-07-22 RX ORDER — NITROGLYCERIN 20 MG/100ML
INJECTION INTRAVENOUS
Status: COMPLETED
Start: 2024-07-22 | End: 2024-07-22

## 2024-07-22 RX ORDER — CLOPIDOGREL BISULFATE 75 MG/1
75 TABLET ORAL DAILY
Status: DISCONTINUED | OUTPATIENT
Start: 2024-07-22 | End: 2024-07-24 | Stop reason: HOSPADM

## 2024-07-22 RX ORDER — POLYETHYLENE GLYCOL 3350 17 G/17G
17 POWDER, FOR SOLUTION ORAL DAILY PRN
Status: DISCONTINUED | OUTPATIENT
Start: 2024-07-22 | End: 2024-07-24 | Stop reason: HOSPADM

## 2024-07-22 RX ORDER — SODIUM CHLORIDE 0.9 % (FLUSH) 0.9 %
5-40 SYRINGE (ML) INJECTION PRN
Status: DISCONTINUED | OUTPATIENT
Start: 2024-07-22 | End: 2024-07-24 | Stop reason: HOSPADM

## 2024-07-22 RX ORDER — BUMETANIDE 0.25 MG/ML
1 INJECTION INTRAMUSCULAR; INTRAVENOUS ONCE
Status: COMPLETED | OUTPATIENT
Start: 2024-07-22 | End: 2024-07-22

## 2024-07-22 RX ORDER — DEXTROSE MONOHYDRATE 100 MG/ML
INJECTION, SOLUTION INTRAVENOUS CONTINUOUS PRN
Status: DISCONTINUED | OUTPATIENT
Start: 2024-07-22 | End: 2024-07-24 | Stop reason: HOSPADM

## 2024-07-22 RX ORDER — ASPIRIN 81 MG/1
81 TABLET ORAL DAILY
Status: DISCONTINUED | OUTPATIENT
Start: 2024-07-22 | End: 2024-07-24 | Stop reason: HOSPADM

## 2024-07-22 RX ORDER — ONDANSETRON 4 MG/1
4 TABLET, ORALLY DISINTEGRATING ORAL EVERY 8 HOURS PRN
Status: DISCONTINUED | OUTPATIENT
Start: 2024-07-22 | End: 2024-07-24 | Stop reason: HOSPADM

## 2024-07-22 RX ORDER — HYDROCODONE BITARTRATE AND ACETAMINOPHEN 5; 325 MG/1; MG/1
1 TABLET ORAL EVERY 8 HOURS PRN
Status: DISCONTINUED | OUTPATIENT
Start: 2024-07-22 | End: 2024-07-24 | Stop reason: HOSPADM

## 2024-07-22 RX ORDER — NITROGLYCERIN 20 MG/100ML
5-200 INJECTION INTRAVENOUS CONTINUOUS
Status: DISCONTINUED | OUTPATIENT
Start: 2024-07-22 | End: 2024-07-22

## 2024-07-22 RX ORDER — POTASSIUM CHLORIDE 7.45 MG/ML
10 INJECTION INTRAVENOUS PRN
Status: DISCONTINUED | OUTPATIENT
Start: 2024-07-22 | End: 2024-07-24 | Stop reason: HOSPADM

## 2024-07-22 RX ORDER — 0.9 % SODIUM CHLORIDE 0.9 %
250 INTRAVENOUS SOLUTION INTRAVENOUS ONCE
Status: COMPLETED | OUTPATIENT
Start: 2024-07-22 | End: 2024-07-22

## 2024-07-22 RX ORDER — GLUCAGON 1 MG/ML
1 KIT INJECTION PRN
Status: DISCONTINUED | OUTPATIENT
Start: 2024-07-22 | End: 2024-07-24 | Stop reason: HOSPADM

## 2024-07-22 RX ORDER — BUMETANIDE 0.25 MG/ML
2 INJECTION INTRAMUSCULAR; INTRAVENOUS 2 TIMES DAILY
Status: DISCONTINUED | OUTPATIENT
Start: 2024-07-22 | End: 2024-07-23

## 2024-07-22 RX ORDER — ALPRAZOLAM 0.5 MG/1
0.5 TABLET ORAL 3 TIMES DAILY PRN
COMMUNITY
End: 2024-07-24

## 2024-07-22 RX ORDER — SODIUM CHLORIDE 9 MG/ML
INJECTION, SOLUTION INTRAVENOUS PRN
Status: DISCONTINUED | OUTPATIENT
Start: 2024-07-22 | End: 2024-07-24 | Stop reason: HOSPADM

## 2024-07-22 RX ORDER — PREGABALIN 50 MG/1
50 CAPSULE ORAL 3 TIMES DAILY
Status: DISCONTINUED | OUTPATIENT
Start: 2024-07-22 | End: 2024-07-24 | Stop reason: HOSPADM

## 2024-07-22 RX ADMIN — NITROGLYCERIN 10 MCG/MIN: 20 INJECTION INTRAVENOUS at 03:54

## 2024-07-22 RX ADMIN — SODIUM CHLORIDE, PRESERVATIVE FREE 10 ML: 5 INJECTION INTRAVENOUS at 20:43

## 2024-07-22 RX ADMIN — IOPAMIDOL 80 ML: 755 INJECTION, SOLUTION INTRAVENOUS at 11:30

## 2024-07-22 RX ADMIN — ATORVASTATIN CALCIUM 40 MG: 40 TABLET, FILM COATED ORAL at 10:09

## 2024-07-22 RX ADMIN — PREGABALIN 50 MG: 50 CAPSULE ORAL at 08:53

## 2024-07-22 RX ADMIN — HYDROCODONE BITARTRATE AND ACETAMINOPHEN 1 TABLET: 5; 325 TABLET ORAL at 10:09

## 2024-07-22 RX ADMIN — ENOXAPARIN SODIUM 40 MG: 100 INJECTION SUBCUTANEOUS at 08:53

## 2024-07-22 RX ADMIN — ASPIRIN 81 MG: 81 TABLET, COATED ORAL at 08:53

## 2024-07-22 RX ADMIN — SODIUM CHLORIDE 250 ML: 9 INJECTION, SOLUTION INTRAVENOUS at 05:12

## 2024-07-22 RX ADMIN — PREGABALIN 50 MG: 50 CAPSULE ORAL at 20:42

## 2024-07-22 RX ADMIN — HYDROCODONE BITARTRATE AND ACETAMINOPHEN 1 TABLET: 5; 325 TABLET ORAL at 18:14

## 2024-07-22 RX ADMIN — BUMETANIDE 2 MG: 0.25 INJECTION INTRAMUSCULAR; INTRAVENOUS at 08:56

## 2024-07-22 RX ADMIN — BUMETANIDE 1 MG: 0.25 INJECTION INTRAMUSCULAR; INTRAVENOUS at 04:05

## 2024-07-22 RX ADMIN — BUMETANIDE 2 MG: 0.25 INJECTION INTRAMUSCULAR; INTRAVENOUS at 18:14

## 2024-07-22 RX ADMIN — PREGABALIN 50 MG: 50 CAPSULE ORAL at 13:19

## 2024-07-22 RX ADMIN — METOPROLOL SUCCINATE 100 MG: 100 TABLET, EXTENDED RELEASE ORAL at 20:42

## 2024-07-22 RX ADMIN — CYCLOBENZAPRINE 10 MG: 10 TABLET, FILM COATED ORAL at 15:01

## 2024-07-22 RX ADMIN — ALPRAZOLAM 0.5 MG: 0.5 TABLET ORAL at 20:42

## 2024-07-22 RX ADMIN — SODIUM CHLORIDE, PRESERVATIVE FREE 10 ML: 5 INJECTION INTRAVENOUS at 09:00

## 2024-07-22 RX ADMIN — CYCLOBENZAPRINE 10 MG: 10 TABLET, FILM COATED ORAL at 20:42

## 2024-07-22 RX ADMIN — CYCLOBENZAPRINE 10 MG: 10 TABLET, FILM COATED ORAL at 08:55

## 2024-07-22 RX ADMIN — CLOPIDOGREL BISULFATE 75 MG: 75 TABLET ORAL at 08:53

## 2024-07-22 ASSESSMENT — PAIN DESCRIPTION - LOCATION
LOCATION: BACK

## 2024-07-22 ASSESSMENT — PAIN SCALES - GENERAL
PAINLEVEL_OUTOF10: 6
PAINLEVEL_OUTOF10: 3
PAINLEVEL_OUTOF10: 0
PAINLEVEL_OUTOF10: 3
PAINLEVEL_OUTOF10: 5
PAINLEVEL_OUTOF10: 8

## 2024-07-22 ASSESSMENT — PAIN SCALES - WONG BAKER

## 2024-07-22 ASSESSMENT — PAIN DESCRIPTION - DESCRIPTORS
DESCRIPTORS: ACHING

## 2024-07-22 ASSESSMENT — PAIN DESCRIPTION - ORIENTATION
ORIENTATION: LOWER

## 2024-07-22 ASSESSMENT — PAIN - FUNCTIONAL ASSESSMENT
PAIN_FUNCTIONAL_ASSESSMENT: PREVENTS OR INTERFERES SOME ACTIVE ACTIVITIES AND ADLS
PAIN_FUNCTIONAL_ASSESSMENT: PREVENTS OR INTERFERES SOME ACTIVE ACTIVITIES AND ADLS
PAIN_FUNCTIONAL_ASSESSMENT: ACTIVITIES ARE NOT PREVENTED
PAIN_FUNCTIONAL_ASSESSMENT: PREVENTS OR INTERFERES SOME ACTIVE ACTIVITIES AND ADLS

## 2024-07-22 ASSESSMENT — LIFESTYLE VARIABLES
HOW OFTEN DO YOU HAVE A DRINK CONTAINING ALCOHOL: MONTHLY OR LESS
HOW MANY STANDARD DRINKS CONTAINING ALCOHOL DO YOU HAVE ON A TYPICAL DAY: 1 OR 2

## 2024-07-22 NOTE — PROGRESS NOTES
Patient was instructed on NIV. Education  that was discussed was purpose and advantage of NIV, information on level of care, vital sign frequency, and monitoring requirements, agreement to be NPO for duration of NIV and  to not routinely disrupt NIV except for routine oral care. Patient was also told to advise nursing of any nausea, chest discomfort, sudden increase in shortness of breath or severe headache.

## 2024-07-22 NOTE — ED TRIAGE NOTES
Pt arrives to ED via EMS from home for c/o shortness of breath and chest pain. Per EMS, pt was found sitting on the floor of her residence stating that she couldn't breathe. EMS reports pt was 89% on 6L nasal cannula. Pt arrives on CPAP. EMS reports giving an albuterol treatment en route. Ernesto NP and respiratory at bedside

## 2024-07-22 NOTE — ED NOTES
RR regular and unlabored at this time, pt tolerating BiPAP. Pt resting comfortably on cot with family at bedside.    Bexarotene Counseling:  I discussed with the patient the risks of bexarotene including but not limited to hair loss, dry lips/skin/eyes, liver abnormalities, hyperlipidemia, pancreatitis, depression/suicidal ideation, photosensitivity, drug rash/allergic reactions, hypothyroidism, anemia, leukopenia, infection, cataracts, and teratogenicity.  Patient understands that they will need regular blood tests to check lipid profile, liver function tests, white blood cell count, thyroid function tests and pregnancy test if applicable. Solaraze Counseling:  I discussed with the patient the risks of Solaraze including but not limited to erythema, scaling, itching, weeping, crusting, and pain. Cimzia Pregnancy And Lactation Text: This medication crosses the placenta but can be considered safe in certain situations. Cimzia may be excreted in breast milk. Cimetidine Counseling:  I discussed with the patient the risks of Cimetidine including but not limited to gynecomastia, headache, diarrhea, nausea, drowsiness, arrhythmias, pancreatitis, skin rashes, psychosis, bone marrow suppression and kidney toxicity. Picato Pregnancy And Lactation Text: This medication is Pregnancy Category C. It is unknown if this medication is excreted in breast milk. Skyrizi Pregnancy And Lactation Text: The risk during pregnancy and breastfeeding is uncertain with this medication. Taltz Counseling: I discussed with the patient the risks of ixekizumab including but not limited to immunosuppression, serious infections, worsening of inflammatory bowel disease and drug reactions.  The patient understands that monitoring is required including a PPD at baseline and must alert us or the primary physician if symptoms of infection or other concerning signs are noted. Itraconazole Pregnancy And Lactation Text: This medication is Pregnancy Category C and it isn't know if it is safe during pregnancy. It is also excreted in breast milk. Dapsone Pregnancy And Lactation Text: This medication is Pregnancy Category C and is not considered safe during pregnancy or breast feeding. Azathioprine Pregnancy And Lactation Text: This medication is Pregnancy Category D and isn't considered safe during pregnancy. It is unknown if this medication is excreted in breast milk. Valtrex Counseling: I discussed with the patient the risks of valacyclovir including but not limited to kidney damage, nausea, vomiting and severe allergy.  The patient understands that if the infection seems to be worsening or is not improving, they are to call. Sski Pregnancy And Lactation Text: This medication is Pregnancy Category D and isn't considered safe during pregnancy. It is excreted in breast milk. Isotretinoin Pregnancy And Lactation Text: This medication is Pregnancy Category X and is considered extremely dangerous during pregnancy. It is unknown if it is excreted in breast milk. Tetracycline Counseling: Patient counseled regarding possible photosensitivity and increased risk for sunburn.  Patient instructed to avoid sunlight, if possible.  When exposed to sunlight, patients should wear protective clothing, sunglasses, and sunscreen.  The patient was instructed to call the office immediately if the following severe adverse effects occur:  hearing changes, easy bruising/bleeding, severe headache, or vision changes.  The patient verbalized understanding of the proper use and possible adverse effects of tetracycline.  All of the patient's questions and concerns were addressed. Patient understands to avoid pregnancy while on therapy due to potential birth defects. Dapsone Counseling: I discussed with the patient the risks of dapsone including but not limited to hemolytic anemia, agranulocytosis, rashes, methemoglobinemia, kidney failure, peripheral neuropathy, headaches, GI upset, and liver toxicity.  Patients who start dapsone require monitoring including baseline LFTs and weekly CBCs for the first month, then every month thereafter.  The patient verbalized understanding of the proper use and possible adverse effects of dapsone.  All of the patient's questions and concerns were addressed. Tremfya Counseling: I discussed with the patient the risks of guselkumab including but not limited to immunosuppression, serious infections, worsening of inflammatory bowel disease and drug reactions.  The patient understands that monitoring is required including a PPD at baseline and must alert us or the primary physician if symptoms of infection or other concerning signs are noted. Xolair Counseling:  Patient informed of potential adverse effects including but not limited to fever, muscle aches, rash and allergic reactions.  The patient verbalized understanding of the proper use and possible adverse effects of Xolair.  All of the patient's questions and concerns were addressed. Zyclara Counseling:  I discussed with the patient the risks of imiquimod including but not limited to erythema, scaling, itching, weeping, crusting, and pain.  Patient understands that the inflammatory response to imiquimod is variable from person to person and was educated regarded proper titration schedule.  If flu-like symptoms develop, patient knows to discontinue the medication and contact us. Carac Counseling:  I discussed with the patient the risks of Carac including but not limited to erythema, scaling, itching, weeping, crusting, and pain. Terbinafine Pregnancy And Lactation Text: This medication is Pregnancy Category B and is considered safe during pregnancy. It is also excreted in breast milk and breast feeding isn't recommended. Thalidomide Pregnancy And Lactation Text: This medication is Pregnancy Category X and is absolutely contraindicated during pregnancy. It is unknown if it is excreted in breast milk. Otezla Pregnancy And Lactation Text: This medication is Pregnancy Category C and it isn't known if it is safe during pregnancy. It is unknown if it is excreted in breast milk. Cimzia Counseling:  I discussed with the patient the risks of Cimzia including but not limited to immunosuppression, allergic reactions and infections.  The patient understands that monitoring is required including a PPD at baseline and must alert us or the primary physician if symptoms of infection or other concerning signs are noted. Itraconazole Counseling:  I discussed with the patient the risks of itraconazole including but not limited to liver damage, nausea/vomiting, neuropathy, and severe allergy.  The patient understands that this medication is best absorbed when taken with acidic beverages such as non-diet cola or ginger ale.  The patient understands that monitoring is required including baseline LFTs and repeat LFTs at intervals.  The patient understands that they are to contact us or the primary physician if concerning signs are noted. Griseofulvin Counseling:  I discussed with the patient the risks of griseofulvin including but not limited to photosensitivity, cytopenia, liver damage, nausea/vomiting and severe allergy.  The patient understands that this medication is best absorbed when taken with a fatty meal (e.g., ice cream or french fries). Bexarotene Pregnancy And Lactation Text: This medication is Pregnancy Category X and should not be given to women who are pregnant or may become pregnant. This medication should not be used if you are breast feeding. Tazorac Counseling:  Patient advised that medication is irritating and drying.  Patient may need to apply sparingly and wash off after an hour before eventually leaving it on overnight.  The patient verbalized understanding of the proper use and possible adverse effects of tazorac.  All of the patient's questions and concerns were addressed. Ketoconazole Pregnancy And Lactation Text: This medication is Pregnancy Category C and it isn't know if it is safe during pregnancy. It is also excreted in breast milk and breast feeding isn't recommended. Clofazimine Counseling:  I discussed with the patient the risks of clofazimine including but not limited to skin and eye pigmentation, liver damage, nausea/vomiting, gastrointestinal bleeding and allergy. Erivedge Counseling- I discussed with the patient the risks of Erivedge including but not limited to nausea, vomiting, diarrhea, constipation, weight loss, changes in the sense of taste, decreased appetite, muscle spasms, and hair loss.  The patient verbalized understanding of the proper use and possible adverse effects of Erivedge.  All of the patient's questions and concerns were addressed. Cellcept Counseling:  I discussed with the patient the risks of mycophenolate mofetil including but not limited to infection/immunosuppression, GI upset, hypokalemia, hypercholesterolemia, bone marrow suppression, lymphoproliferative disorders, malignancy, GI ulceration/bleed/perforation, colitis, interstitial lung disease, kidney failure, progressive multifocal leukoencephalopathy, and birth defects.  The patient understands that monitoring is required including a baseline creatinine and regular CBC testing. In addition, patient must alert us immediately if symptoms of infection or other concerning signs are noted. Bactrim Pregnancy And Lactation Text: This medication is Pregnancy Category D and is known to cause fetal risk.  It is also excreted in breast milk. Prednisone Counseling:  I discussed with the patient the risks of prolonged use of prednisone including but not limited to weight gain, insomnia, osteoporosis, mood changes, diabetes, susceptibility to infection, glaucoma and high blood pressure.  In cases where prednisone use is prolonged, patients should be monitored with blood pressure checks, serum glucose levels and an eye exam.  Additionally, the patient may need to be placed on GI prophylaxis, PCP prophylaxis, and calcium and vitamin D supplementation and/or a bisphosphonate.  The patient verbalized understanding of the proper use and the possible adverse effects of prednisone.  All of the patient's questions and concerns were addressed. Stelara Pregnancy And Lactation Text: This medication is Pregnancy Category B and is considered safe during pregnancy. It is unknown if this medication is excreted in breast milk. Hydroxyzine Counseling: Patient advised that the medication is sedating and not to drive a car after taking this medication.  Patient informed of potential adverse effects including but not limited to dry mouth, urinary retention, and blurry vision.  The patient verbalized understanding of the proper use and possible adverse effects of hydroxyzine.  All of the patient's questions and concerns were addressed. Sarecycline Pregnancy And Lactation Text: This medication is Pregnancy Category D and not consider safe during pregnancy. It is also excreted in breast milk. Colchicine Pregnancy And Lactation Text: This medication is Pregnancy Category C and isn't considered safe during pregnancy. It is excreted in breast milk. 5-Fu Pregnancy And Lactation Text: This medication is Pregnancy Category X and contraindicated in pregnancy and in women who may become pregnant. It is unknown if this medication is excreted in breast milk. Nsaids Counseling: NSAID Counseling: I discussed with the patient that NSAIDs should be taken with food. Prolonged use of NSAIDs can result in the development of stomach ulcers.  Patient advised to stop taking NSAIDs if abdominal pain occurs.  The patient verbalized understanding of the proper use and possible adverse effects of NSAIDs.  All of the patient's questions and concerns were addressed. Birth Control Pills Counseling: Birth Control Pill Counseling: I discussed with the patient the potential side effects of OCPs including but not limited to increased risk of stroke, heart attack, thrombophlebitis, deep venous thrombosis, hepatic adenomas, breast changes, GI upset, headaches, and depression.  The patient verbalized understanding of the proper use and possible adverse effects of OCPs. All of the patient's questions and concerns were addressed. Solaraze Pregnancy And Lactation Text: This medication is Pregnancy Category B and is considered safe. There is some data to suggest avoiding during the third trimester. It is unknown if this medication is excreted in breast milk. 5-Fu Counseling: 5-Fluorouracil Counseling:  I discussed with the patient the risks of 5-fluorouracil including but not limited to erythema, scaling, itching, weeping, crusting, and pain. Sarecycline Counseling: Patient advised regarding possible photosensitivity and discoloration of the teeth, skin, lips, tongue and gums.  Patient instructed to avoid sunlight, if possible.  When exposed to sunlight, patients should wear protective clothing, sunglasses, and sunscreen.  The patient was instructed to call the office immediately if the following severe adverse effects occur:  hearing changes, easy bruising/bleeding, severe headache, or vision changes.  The patient verbalized understanding of the proper use and possible adverse effects of sarecycline.  All of the patient's questions and concerns were addressed. Erythromycin Counseling:  I discussed with the patient the risks of erythromycin including but not limited to GI upset, allergic reaction, drug rash, diarrhea, increase in liver enzymes, and yeast infections. Valtrex Pregnancy And Lactation Text: this medication is Pregnancy Category B and is considered safe during pregnancy. This medication is not directly found in breast milk but it's metabolite acyclovir is present. High Dose Vitamin A Pregnancy And Lactation Text: High dose vitamin A therapy is contraindicated during pregnancy and breast feeding. Hydroxychloroquine Counseling:  I discussed with the patient that a baseline ophthalmologic exam is needed at the start of therapy and every year thereafter while on therapy. A CBC may also be warranted for monitoring.  The side effects of this medication were discussed with the patient, including but not limited to agranulocytosis, aplastic anemia, seizures, rashes, retinopathy, and liver toxicity. Patient instructed to call the office should any adverse effect occur.  The patient verbalized understanding of the proper use and possible adverse effects of Plaquenil.  All the patient's questions and concerns were addressed. Arava Counseling:  Patient counseled regarding adverse effects of Arava including but not limited to nausea, vomiting, abnormalities in liver function tests. Patients may develop mouth sores, rash, diarrhea, and abnormalities in blood counts. The patient understands that monitoring is required including LFTs and blood counts.  There is a rare possibility of scarring of the liver and lung problems that can occur when taking methotrexate. Persistent nausea, loss of appetite, pale stools, dark urine, cough, and shortness of breath should be reported immediately. Patient advised to discontinue Arava treatment and consult with a physician prior to attempting conception. The patient will have to undergo a treatment to eliminate Arava from the body prior to conception. Acitretin Counseling:  I discussed with the patient the risks of acitretin including but not limited to hair loss, dry lips/skin/eyes, liver damage, hyperlipidemia, depression/suicidal ideation, photosensitivity.  Serious rare side effects can include but are not limited to pancreatitis, pseudotumor cerebri, bony changes, clot formation/stroke/heart attack.  Patient understands that alcohol is contraindicated since it can result in liver toxicity and significantly prolong the elimination of the drug by many years. Topical Sulfur Applications Counseling: Topical Sulfur Counseling: Patient counseled that this medication may cause skin irritation or allergic reactions.  In the event of skin irritation, the patient was advised to reduce the amount of the drug applied or use it less frequently.   The patient verbalized understanding of the proper use and possible adverse effects of topical sulfur application.  All of the patient's questions and concerns were addressed. Hydroquinone Counseling:  Patient advised that medication may result in skin irritation, lightening (hypopigmentation), dryness, and burning.  In the event of skin irritation, the patient was advised to reduce the amount of the drug applied or use it less frequently.  Rarely, spots that are treated with hydroquinone can become darker (pseudoochronosis).  Should this occur, patient instructed to stop medication and call the office. The patient verbalized understanding of the proper use and possible adverse effects of hydroquinone.  All of the patient's questions and concerns were addressed. Birth Control Pills Pregnancy And Lactation Text: This medication should be avoided if pregnant and for the first 30 days post-partum. Xeljanz Counseling: I discussed with the patient the risks of Xeljanz therapy including increased risk of infection, liver issues, headache, diarrhea, or cold symptoms. Live vaccines should be avoided. They were instructed to call if they have any problems. Cyclophosphamide Counseling:  I discussed with the patient the risks of cyclophosphamide including but not limited to hair loss, hormonal abnormalities, decreased fertility, abdominal pain, diarrhea, nausea and vomiting, bone marrow suppression and infection. The patient understands that monitoring is required while taking this medication. Doxepin Pregnancy And Lactation Text: This medication is Pregnancy Category C and it isn't known if it is safe during pregnancy. It is also excreted in breast milk and breast feeding isn't recommended. Metronidazole Pregnancy And Lactation Text: This medication is Pregnancy Category B and considered safe during pregnancy.  It is also excreted in breast milk. Include Pregnancy/Lactation Warning?: No Minoxidil Pregnancy And Lactation Text: This medication has not been assigned a Pregnancy Risk Category but animal studies failed to show danger with the topical medication. It is unknown if the medication is excreted in breast milk. Fluconazole Counseling:  Patient counseled regarding adverse effects of fluconazole including but not limited to headache, diarrhea, nausea, upset stomach, liver function test abnormalities, taste disturbance, and stomach pain.  There is a rare possibility of liver failure that can occur when taking fluconazole.  The patient understands that monitoring of LFTs and kidney function test may be required, especially at baseline. The patient verbalized understanding of the proper use and possible adverse effects of fluconazole.  All of the patient's questions and concerns were addressed. Topical Sulfur Applications Pregnancy And Lactation Text: This medication is Pregnancy Category C and has an unknown safety profile during pregnancy. It is unknown if this topical medication is excreted in breast milk. Topical Clindamycin Pregnancy And Lactation Text: This medication is Pregnancy Category B and is considered safe during pregnancy. It is unknown if it is excreted in breast milk. Detail Level: Zone Erythromycin Pregnancy And Lactation Text: This medication is Pregnancy Category B and is considered safe during pregnancy. It is also excreted in breast milk. Methotrexate Counseling:  Patient counseled regarding adverse effects of methotrexate including but not limited to nausea, vomiting, abnormalities in liver function tests. Patients may develop mouth sores, rash, diarrhea, and abnormalities in blood counts. The patient understands that monitoring is required including LFT's and blood counts.  There is a rare possibility of scarring of the liver and lung problems that can occur when taking methotrexate. Persistent nausea, loss of appetite, pale stools, dark urine, cough, and shortness of breath should be reported immediately. Patient advised to discontinue methotrexate treatment at least three months before attempting to become pregnant.  I discussed the need for folate supplements while taking methotrexate.  These supplements can decrease side effects during methotrexate treatment. The patient verbalized understanding of the proper use and possible adverse effects of methotrexate.  All of the patient's questions and concerns were addressed. Acitretin Pregnancy And Lactation Text: This medication is Pregnancy Category X and should not be given to women who are pregnant or may become pregnant in the future. This medication is excreted in breast milk. Azithromycin Pregnancy And Lactation Text: This medication is considered safe during pregnancy and is also secreted in breast milk. Glycopyrrolate Pregnancy And Lactation Text: This medication is Pregnancy Category B and is considered safe during pregnancy. It is unknown if it is excreted breast milk. Hydroxyzine Pregnancy And Lactation Text: This medication is not safe during pregnancy and should not be taken. It is also excreted in breast milk and breast feeding isn't recommended. SSKI Counseling:  I discussed with the patient the risks of SSKI including but not limited to thyroid abnormalities, metallic taste, GI upset, fever, headache, acne, arthralgias, paraesthesias, lymphadenopathy, easy bleeding, arrhythmias, and allergic reaction. Rituxan Pregnancy And Lactation Text: This medication is Pregnancy Category C and it isn't know if it is safe during pregnancy. It is unknown if this medication is excreted in breast milk but similar antibodies are known to be excreted. Xolair Pregnancy And Lactation Text: This medication is Pregnancy Category B and is considered safe during pregnancy. This medication is excreted in breast milk. Eucrisa Counseling: Patient may experience a mild burning sensation during topical application. Eucrisa is not approved in children less than 2 years of age. Gabapentin Counseling: I discussed with the patient the risks of gabapentin including but not limited to dizziness, somnolence, fatigue and ataxia. Topical Clindamycin Counseling: Patient counseled that this medication may cause skin irritation or allergic reactions.  In the event of skin irritation, the patient was advised to reduce the amount of the drug applied or use it less frequently.   The patient verbalized understanding of the proper use and possible adverse effects of clindamycin.  All of the patient's questions and concerns were addressed. Odomzo Counseling- I discussed with the patient the risks of Odomzo including but not limited to nausea, vomiting, diarrhea, constipation, weight loss, changes in the sense of taste, decreased appetite, muscle spasms, and hair loss.  The patient verbalized understanding of the proper use and possible adverse effects of Odomzo.  All of the patient's questions and concerns were addressed. Cyclosporine Pregnancy And Lactation Text: This medication is Pregnancy Category C and it isn't know if it is safe during pregnancy. This medication is excreted in breast milk. Terbinafine Counseling: Patient counseling regarding adverse effects of terbinafine including but not limited to headache, diarrhea, rash, upset stomach, liver function test abnormalities, itching, taste/smell disturbance, nausea, abdominal pain, and flatulence.  There is a rare possibility of liver failure that can occur when taking terbinafine.  The patient understands that a baseline LFT and kidney function test may be required. The patient verbalized understanding of the proper use and possible adverse effects of terbinafine.  All of the patient's questions and concerns were addressed. Albendazole Counseling:  I discussed with the patient the risks of albendazole including but not limited to cytopenia, kidney damage, nausea/vomiting and severe allergy.  The patient understands that this medication is being used in an off-label manner. Rifampin Pregnancy And Lactation Text: This medication is Pregnancy Category C and it isn't know if it is safe during pregnancy. It is also excreted in breast milk and should not be used if you are breast feeding. Cyclosporine Counseling:  I discussed with the patient the risks of cyclosporine including but not limited to hypertension, gingival hyperplasia,myelosuppression, immunosuppression, liver damage, kidney damage, neurotoxicity, lymphoma, and serious infections. The patient understands that monitoring is required including baseline blood pressure, CBC, CMP, lipid panel and uric acid, and then 1-2 times monthly CMP and blood pressure. Imiquimod Counseling:  I discussed with the patient the risks of imiquimod including but not limited to erythema, scaling, itching, weeping, crusting, and pain.  Patient understands that the inflammatory response to imiquimod is variable from person to person and was educated regarded proper titration schedule.  If flu-like symptoms develop, patient knows to discontinue the medication and contact us. Siliq Counseling:  I discussed with the patient the risks of Siliq including but not limited to new or worsening depression, suicidal thoughts and behavior, immunosuppression, malignancy, posterior leukoencephalopathy syndrome, and serious infections.  The patient understands that monitoring is required including a PPD at baseline and must alert us or the primary physician if symptoms of infection or other concerning signs are noted. There is also a special program designed to monitor depression which is required with Siliq. Minocycline Counseling: Patient advised regarding possible photosensitivity and discoloration of the teeth, skin, lips, tongue and gums.  Patient instructed to avoid sunlight, if possible.  When exposed to sunlight, patients should wear protective clothing, sunglasses, and sunscreen.  The patient was instructed to call the office immediately if the following severe adverse effects occur:  hearing changes, easy bruising/bleeding, severe headache, or vision changes.  The patient verbalized understanding of the proper use and possible adverse effects of minocycline.  All of the patient's questions and concerns were addressed. Ketoconazole Counseling:   Patient counseled regarding improving absorption with orange juice.  Adverse effects include but are not limited to breast enlargement, headache, diarrhea, nausea, upset stomach, liver function test abnormalities, taste disturbance, and stomach pain.  There is a rare possibility of liver failure that can occur when taking ketoconazole. The patient understands that monitoring of LFTs may be required, especially at baseline. The patient verbalized understanding of the proper use and possible adverse effects of ketoconazole.  All of the patient's questions and concerns were addressed. Rifampin Counseling: I discussed with the patient the risks of rifampin including but not limited to liver damage, kidney damage, red-orange body fluids, nausea/vomiting and severe allergy. Infliximab Counseling:  I discussed with the patient the risks of infliximab including but not limited to myelosuppression, immunosuppression, autoimmune hepatitis, demyelinating diseases, lymphoma, and serious infections.  The patient understands that monitoring is required including a PPD at baseline and must alert us or the primary physician if symptoms of infection or other concerning signs are noted. Bactrim Counseling:  I discussed with the patient the risks of sulfa antibiotics including but not limited to GI upset, allergic reaction, drug rash, diarrhea, dizziness, photosensitivity, and yeast infections.  Rarely, more serious reactions can occur including but not limited to aplastic anemia, agranulocytosis, methemoglobinemia, blood dyscrasias, liver or kidney failure, lung infiltrates or desquamative/blistering drug rashes. Enbrel Counseling:  I discussed with the patient the risks of etanercept including but not limited to myelosuppression, immunosuppression, autoimmune hepatitis, demyelinating diseases, lymphoma, and infections.  The patient understands that monitoring is required including a PPD at baseline and must alert us or the primary physician if symptoms of infection or other concerning signs are noted. Hydroxychloroquine Pregnancy And Lactation Text: This medication has been shown to cause fetal harm but it isn't assigned a Pregnancy Risk Category. There are small amounts excreted in breast milk. Metronidazole Counseling:  I discussed with the patient the risks of metronidazole including but not limited to seizures, nausea/vomiting, a metallic taste in the mouth, nausea/vomiting and severe allergy. Methotrexate Pregnancy And Lactation Text: This medication is Pregnancy Category X and is known to cause fetal harm. This medication is excreted in breast milk. Minoxidil Counseling: Minoxidil is a topical medication which can increase blood flow where it is applied. It is uncertain how this medication increases hair growth. Side effects are uncommon and include stinging and allergic reactions. Ivermectin Pregnancy And Lactation Text: This medication is Pregnancy Category C and it isn't known if it is safe during pregnancy. It is also excreted in breast milk. High Dose Vitamin A Counseling: Side effects reviewed, pt to contact office should one occur. Quinolones Counseling:  I discussed with the patient the risks of fluoroquinolones including but not limited to GI upset, allergic reaction, drug rash, diarrhea, dizziness, photosensitivity, yeast infections, liver function test abnormalities, tendonitis/tendon rupture. Ilumya Counseling: I discussed with the patient the risks of tildrakizumab including but not limited to immunosuppression, malignancy, posterior leukoencephalopathy syndrome, and serious infections.  The patient understands that monitoring is required including a PPD at baseline and must alert us or the primary physician if symptoms of infection or other concerning signs are noted. Cosentyx Counseling:  I discussed with the patient the risks of Cosentyx including but not limited to worsening of Crohn's disease, immunosuppression, allergic reactions and infections.  The patient understands that monitoring is required including a PPD at baseline and must alert us or the primary physician if symptoms of infection or other concerning signs are noted. Xelgracielaz Pregnancy And Lactation Text: This medication is Pregnancy Category D and is not considered safe during pregnancy.  The risk during breast feeding is also uncertain. Azathioprine Counseling:  I discussed with the patient the risks of azathioprine including but not limited to myelosuppression, immunosuppression, hepatotoxicity, lymphoma, and infections.  The patient understands that monitoring is required including baseline LFTs, Creatinine, possible TPMP genotyping and weekly CBCs for the first month and then every 2 weeks thereafter.  The patient verbalized understanding of the proper use and possible adverse effects of azathioprine.  All of the patient's questions and concerns were addressed. Isotretinoin Counseling: Patient should get monthly blood tests, not donate blood, not drive at night if vision affected, not share medication, and not undergo elective surgery for 6 months after tx completed. Side effects reviewed, pt to contact office should one occur. Rituxan Counseling:  I discussed with the patient the risks of Rituxan infusions. Side effects can include infusion reactions, severe drug rashes including mucocutaneous reactions, reactivation of latent hepatitis and other infections and rarely progressive multifocal leukoencephalopathy.  All of the patient's questions and concerns were addressed. Skyrizi Counseling: I discussed with the patient the risks of risankizumab-rzaa including but not limited to immunosuppression, and serious infections.  The patient understands that monitoring is required including a PPD at baseline and must alert us or the primary physician if symptoms of infection or other concerning signs are noted. Cephalexin Pregnancy And Lactation Text: This medication is Pregnancy Category B and considered safe during pregnancy.  It is also excreted in breast milk but can be used safely for shorter doses. Clindamycin Pregnancy And Lactation Text: This medication can be used in pregnancy if certain situations. Clindamycin is also present in breast milk. Spironolactone Pregnancy And Lactation Text: This medication can cause feminization of the male fetus and should be avoided during pregnancy. The active metabolite is also found in breast milk. Otezla Counseling: The side effects of Otezla were discussed with the patient, including but not limited to worsening or new depression, weight loss, diarrhea, nausea, upper respiratory tract infection, and headache. Patient instructed to call the office should any adverse effect occur.  The patient verbalized understanding of the proper use and possible adverse effects of Otezla.  All the patient's questions and concerns were addressed. Glycopyrrolate Counseling:  I discussed with the patient the risks of glycopyrrolate including but not limited to skin rash, drowsiness, dry mouth, difficulty urinating, and blurred vision. Oxybutynin Counseling:  I discussed with the patient the risks of oxybutynin including but not limited to skin rash, drowsiness, dry mouth, difficulty urinating, and blurred vision. Doxycycline Counseling:  Patient counseled regarding possible photosensitivity and increased risk for sunburn.  Patient instructed to avoid sunlight, if possible.  When exposed to sunlight, patients should wear protective clothing, sunglasses, and sunscreen.  The patient was instructed to call the office immediately if the following severe adverse effects occur:  hearing changes, easy bruising/bleeding, severe headache, or vision changes.  The patient verbalized understanding of the proper use and possible adverse effects of doxycycline.  All of the patient's questions and concerns were addressed. Topical Retinoid counseling:  Patient advised to apply a pea-sized amount only at bedtime and wait 30 minutes after washing their face before applying.  If too drying, patient may add a non-comedogenic moisturizer. The patient verbalized understanding of the proper use and possible adverse effects of retinoids.  All of the patient's questions and concerns were addressed. Cephalexin Counseling: I counseled the patient regarding use of cephalexin as an antibiotic for prophylactic and/or therapeutic purposes. Cephalexin (commonly prescribed under brand name Keflex) is a cephalosporin antibiotic which is active against numerous classes of bacteria, including most skin bacteria. Side effects may include nausea, diarrhea, gastrointestinal upset, rash, hives, yeast infections, and in rare cases, hepatitis, kidney disease, seizures, fever, confusion, neurologic symptoms, and others. Patients with severe allergies to penicillin medications are cautioned that there is about a 10% incidence of cross-reactivity with cephalosporins. When possible, patients with penicillin allergies should use alternatives to cephalosporins for antibiotic therapy. Dupixent Pregnancy And Lactation Text: This medication likely crosses the placenta but the risk for the fetus is uncertain. This medication is excreted in breast milk. Clindamycin Counseling: I counseled the patient regarding use of clindamycin as an antibiotic for prophylactic and/or therapeutic purposes. Clindamycin is active against numerous classes of bacteria, including skin bacteria. Side effects may include nausea, diarrhea, gastrointestinal upset, rash, hives, yeast infections, and in rare cases, colitis. Colchicine Counseling:  Patient counseled regarding adverse effects including but not limited to stomach upset (nausea, vomiting, stomach pain, or diarrhea).  Patient instructed to limit alcohol consumption while taking this medication.  Colchicine may reduce blood counts especially with prolonged use.  The patient understands that monitoring of kidney function and blood counts may be required, especially at baseline. The patient verbalized understanding of the proper use and possible adverse effects of colchicine.  All of the patient's questions and concerns were addressed. Stelara Counseling:  I discussed with the patient the risks of ustekinumab including but not limited to immunosuppression, malignancy, posterior leukoencephalopathy syndrome, and serious infections.  The patient understands that monitoring is required including a PPD at baseline and must alert us or the primary physician if symptoms of infection or other concerning signs are noted. Protopic Pregnancy And Lactation Text: This medication is Pregnancy Category C. It is unknown if this medication is excreted in breast milk when applied topically. Picato Counseling:  I discussed with the patient the risks of Picato including but not limited to erythema, scaling, itching, weeping, crusting, and pain. Benzoyl Peroxide Pregnancy And Lactation Text: This medication is Pregnancy Category C. It is unknown if benzoyl peroxide is excreted in breast milk. Elidel Counseling: Patient may experience a mild burning sensation during topical application. Elidel is not approved in children less than 2 years of age. There have been case reports of hematologic and skin malignancies in patients using topical calcineurin inhibitors although causality is questionable. Drysol Pregnancy And Lactation Text: This medication is considered safe during pregnancy and breast feeding. Cyclophosphamide Pregnancy And Lactation Text: This medication is Pregnancy Category D and it isn't considered safe during pregnancy. This medication is excreted in breast milk. Griseofulvin Pregnancy And Lactation Text: This medication is Pregnancy Category X and is known to cause serious birth defects. It is unknown if this medication is excreted in breast milk but breast feeding should be avoided. Simponi Counseling:  I discussed with the patient the risks of golimumab including but not limited to myelosuppression, immunosuppression, autoimmune hepatitis, demyelinating diseases, lymphoma, and serious infections.  The patient understands that monitoring is required including a PPD at baseline and must alert us or the primary physician if symptoms of infection or other concerning signs are noted. Azithromycin Counseling:  I discussed with the patient the risks of azithromycin including but not limited to GI upset, allergic reaction, drug rash, diarrhea, and yeast infections. Doxycycline Pregnancy And Lactation Text: This medication is Pregnancy Category D and not consider safe during pregnancy. It is also excreted in breast milk but is considered safe for shorter treatment courses. Spironolactone Counseling: Patient advised regarding risks of diarrhea, abdominal pain, hyperkalemia, birth defects (for female patients), liver toxicity and renal toxicity. The patient may need blood work to monitor liver and kidney function and potassium levels while on therapy. The patient verbalized understanding of the proper use and possible adverse effects of spironolactone.  All of the patient's questions and concerns were addressed. Ivermectin Counseling:  Patient instructed to take medication on an empty stomach with a full glass of water.  Patient informed of potential adverse effects including but not limited to nausea, diarrhea, dizziness, itching, and swelling of the extremities or lymph nodes.  The patient verbalized understanding of the proper use and possible adverse effects of ivermectin.  All of the patient's questions and concerns were addressed. Thalidomide Counseling: I discussed with the patient the risks of thalidomide including but not limited to birth defects, anxiety, weakness, chest pain, dizziness, cough and severe allergy. Protopic Counseling: Patient may experience a mild burning sensation during topical application. Protopic is not approved in children less than 2 years of age. There have been case reports of hematologic and skin malignancies in patients using topical calcineurin inhibitors although causality is questionable. Drysol Counseling:  I discussed with the patient the risks of drysol/aluminum chloride including but not limited to skin rash, itching, irritation, burning. Humira Counseling:  I discussed with the patient the risks of adalimumab including but not limited to myelosuppression, immunosuppression, autoimmune hepatitis, demyelinating diseases, lymphoma, and serious infections.  The patient understands that monitoring is required including a PPD at baseline and must alert us or the primary physician if symptoms of infection or other concerning signs are noted. Doxepin Counseling:  Patient advised that the medication is sedating and not to drive a car after taking this medication. Patient informed of potential adverse effects including but not limited to dry mouth, urinary retention, and blurry vision.  The patient verbalized understanding of the proper use and possible adverse effects of doxepin.  All of the patient's questions and concerns were addressed. Dupixent Counseling: I discussed with the patient the risks of dupilumab including but not limited to eye infection and irritation, cold sores, injection site reactions, worsening of asthma, allergic reactions and increased risk of parasitic infection.  Live vaccines should be avoided while taking dupilumab. Dupilumab will also interact with certain medications such as warfarin and cyclosporine. The patient understands that monitoring is required and they must alert us or the primary physician if symptoms of infection or other concerning signs are noted. Benzoyl Peroxide Counseling: Patient counseled that medicine may cause skin irritation and bleach clothing.  In the event of skin irritation, the patient was advised to reduce the amount of the drug applied or use it less frequently.   The patient verbalized understanding of the proper use and possible adverse effects of benzoyl peroxide.  All of the patient's questions and concerns were addressed. Tazorac Pregnancy And Lactation Text: This medication is not safe during pregnancy. It is unknown if this medication is excreted in breast milk. Nsaids Pregnancy And Lactation Text: These medications are considered safe up to 30 weeks gestation. It is excreted in breast milk.

## 2024-07-22 NOTE — ED NOTES
Spoke to Ellen on 3B who approved patient transfer to 3B-29. Patient transported upstairs with respiratory in stable condition.

## 2024-07-22 NOTE — CARE COORDINATION
Case Management Assessment Initial Evaluation    Date/Time of Evaluation: 2024 7:42 AM  Assessment Completed by: Jade Em RN    If patient is discharged prior to next notation, then this note serves as note for discharge by case management.    Patient Name: Jacki Moura                   YOB: 1965  Diagnosis: Shortness of breath [R06.02]  Flash pulmonary edema (HCC) [J81.0]  Acute respiratory failure with hypercapnia (HCC) [J96.02]  Acute decompensated heart failure (HCC) [I50.9]  Aortic valve insufficiency, etiology of cardiac valve disease unspecified [I35.1]                   Date / Time: 2024  3:46 AM  [facility]     Patient Admission Status: Inpatient   Readmission Risk Low 0-14, Mod 15-19), High > 20: Readmission Risk Score: 9.8    Current PCP: Kyle Smith APRN - CNP    Additional Case Management Notes: Admitted through ED with SOB and chest pain that woke her from sleep. Troponins negative. BNP 1213.0. WBC 14.2. Continuous BiPAP. Pt was tachycardic upon arrival, NSR today. Had been on NTG gtt, now off. Bumex 2mg iv bid.     Procedures:    Echo: ordered.     Imagin/21 CXR: New slight interstitial prominence and bronchial wall thickening are   nonspecific. This can be seen with viral illness.   PCXR: Increased diffuse bilateral interstitial opacities most likely   interstitial edema.    Patient Goals/Plan/Treatment Preferences: Spoke with pt. She lives at home with her  and her son, son is home from college. She has paperwork at home for ACP's. States she is scheduled for [facility] on 24 with Dr. Guillory. If Home O2 needed, prefers [facility].        24 1033   Service Assessment   Patient Orientation Alert and Oriented   Cognition Alert   History Provided By Patient   Primary Caregiver Self   Accompanied By/Relationship n/a   Support Systems Spouse/Significant Other;Children;Family Members   Patient's Healthcare Decision Maker is: Patient

## 2024-07-22 NOTE — ED NOTES
ED to inpatient nurses report      Chief Complaint:  Chief Complaint   Patient presents with    Chest Pain    Shortness of Breath     Present to ED from: Home    MOA:     LOC: alert and orientated to name, place, date  Mobility: Requires assistance * 1  Oxygen Baseline: Room Air    Current needs required: BiPAP     Code Status:   Prior    What abnormal results were found and what did you give/do to treat them? SOB, Elevated BNP  Any procedures or intervention occur? See MAR    Mental Status:  Level of Consciousness: Alert (0)    Psych Assessment:        Vitals:  Patient Vitals for the past 24 hrs:   BP Temp Temp src Pulse Resp SpO2   07/22/24 0515 102/60 -- -- 77 20 99 %   07/22/24 0504 (!) 82/59 -- -- 81 23 98 %   07/22/24 0448 90/66 -- -- 86 21 99 %   07/22/24 0420 118/74 -- -- 94 29 98 %   07/22/24 0418 (!) 140/81 -- -- 93 23 97 %   07/22/24 0405 (!) 162/81 -- -- -- -- --   07/22/24 0357 (!) 164/97 -- -- -- -- --   07/22/24 0354 -- -- -- (!) 121 (!) 33 98 %   07/22/24 0349 (!) 167/111 (!) 96.3 °F (35.7 °C) Oral (!) 130 (!) 36 91 %        LDAs:   Peripheral IV 07/22/24 Right Hand (Active)   Site Assessment Clean, dry & intact 07/22/24 0356   Line Status Blood return noted;Flushed;Normal saline locked 07/22/24 0356   Phlebitis Assessment No symptoms 07/22/24 0356   Infiltration Assessment 0 07/22/24 0356   Dressing Status New dressing applied;Clean, dry & intact 07/22/24 0356   Dressing Type Transparent 07/22/24 0356   Dressing Intervention New 07/22/24 0356       Peripheral IV 07/22/24 Distal;Left Forearm (Active)       Ambulatory Status:  Presents to emergency department  because of falls (Syncope, seizure, or loss of consciousness): No, Age > 70: No, Altered Mental Status, Intoxication with alcohol or substance confusion (Disorientation, impaired judgment, poor safety awaremess, or inability to follow instructions): No, Impaired Mobility: Ambulates or transfers with assistive devices or assistance; Unable to

## 2024-07-22 NOTE — CARE COORDINATION
07/22/24 1039   Readmission Assessment   Number of Days since last admission? 8-30 days   Previous Disposition Home with Family   Who is being Interviewed Patient   What was the patient's/caregiver's perception as to why they think they needed to return back to the hospital? Other (Comment)  (SOB)   Did you visit your Primary Care Physician after you left the hospital, before you returned this time? Yes   Did you see a specialist, such as Cardiac, Pulmonary, Orthopedic Physician, etc. after you left the hospital? No   Who advised the patient to return to the hospital? Self-referral   Does the patient report anything that got in the way of taking their medications? No   In our efforts to provide the best possible care to you and others like you, can you think of anything that we could have done to help you after you left the hospital the first time, so that you might not have needed to return so soon? Other (Comment)  (\"no, I just think my valve is getting worse\")

## 2024-07-22 NOTE — ED PROVIDER NOTES
[facility] Emergency Department    CHIEF COMPLAINT       Chief Complaint   Patient presents with    Chest Pain    Shortness of Breath       Nurses Notes reviewed and I agree except as noted in the HPI.    HISTORY OF PRESENT ILLNESS   Jacki Moura is a 59 y.o. female who presents to the ED for evaluation of chest pain, shortness of breath.  Patient presents to the ER in acute respiratory distress, was on CPAP upon presentation, difficult obtaining history due to this.  Fortunately I saw the patient yesterday while they were here in the ER, patient has a history of chest pain, was recently admitted to the hospital, had left heart cath, that was negative for any obstructive coronary artery disease.  Was noted to have significant aortic insufficiency, is scheduled to have aortic valve replacement in August.  She was subsequently discharged yesterday due to negative troponins, and improvement in symptoms.  Patient notes this evening she developed sudden shortness of breath and chest pain.  She denies any recent illness.  She denies any significant swelling in her extremities.  She denies fevers.  She denies nausea or vomiting.          HPI was provided by the patient.      PAST MEDICAL HISTORY     Past Medical History:   Diagnosis Date    Arthritis     Coronary artery disease involving native coronary artery of native heart without angina pectoris 2024    Cushing's disease (HCC)     Hyperlipidemia     Hypertension        SURGICALHISTORY      has a past surgical history that includes  section; Foot surgery; Dilation and curettage of uterus (); knee surgery (Left); lumbar discectomy (2020); back surgery; skin biopsy; Colonoscopy; Endoscopy, colon, diagnostic; Femoral Endarterectomy (Left, 2021); transesophageal echocardiogram (N/A, 1/10/2024); Cardiac procedure (N/A, 2024); Cardiac procedure (N/A, 2024); and Cardiac procedure (N/A, 2024).    CURRENT MEDICATIONS       Previous

## 2024-07-22 NOTE — PLAN OF CARE
Patient seen and examined at bedside today.  Was on BiPAP this morning.  Reports she woke up in the middle of the night due to shortness of breath.  Reports this has never happened before.  Patient also had pain that started in her left elbow and traveled up the arm, across the chest, and down the right arm. patient was recently admitted on 7/5/2024 for atypical chest pain.  Echo revealed severe aortic insufficiency, patient scheduled for AVR in August.  proBNP elevated to 1213 today.  Trop flat at 6.  ABG initially showed pCO2 of 57, pO2 198, pH of 7.23.  After initiating BiPAP, repeat ABG showed pCO2 of 50, pO2 211, pH 7.31.     1.Acute hypoxic and hypercapnic respiratory failure: Likely secondary to CHF versus severe aortic insufficiency.  Patient does not require O2 at baseline.  -Weaned off BiPAP during the day  -Chest pain-resolved after nitro drip  -CTA chest showed no evidence of aortic dissection    2.  Acute on chronic systolic HFrEF: Last echo showed EF of 35 to 40%, moderate to severe aortic regurgitation.  BNP on arrival was 1213.  Chest x-ray showed increased diffuse bilateral interstitial opacities most likely interstitial edema.   -Currently on 2 L nasal cannula  -Continue Bumex 2 mg twice daily  -Limited echo pending  -Continue Toprol-XL  -Strict SHIVAM's, daily weights  -2 L fluid restriction, 2 g sodium restriction

## 2024-07-22 NOTE — ED NOTES
Pt alert and resting comfortably on cot. RR regular and unlabored, pt continues to tolerate BiPAP. No concerns voiced at this time. Call light in reach

## 2024-07-22 NOTE — H&P
History & Physical  Internal Medicine Resident         Patient: Jacki Moura 59 y.o. female      : 1965  Date of Admission: 2024  Date of Service: Pt seen/examined on 24 and Admitted to Inpatient with expected LOS greater than two midnights due to medical therapy.       ASSESSMENT AND PLAN  Acute hypoxic & hypercapnic respiratory failure: 2/2 to pulmonary edema, possibly d/t CHF vs severe aortic insufficiency. Currently on BiPAP.  Baseline O2 requirement RA. ABG after initiation of BiPAP - 7.23 / 57 / 198 / 24. ABG improved to 7.31 / 50 / 211 / 26. BNP 1213.   Continue BiPAP  Nitro gtt stopped d/t hypotension and resolution of CP  Continue to wean O2 as tolerated to maintain saturation >90%.   Acute? On chronic Systolic HFrEF, NICMP: w/ EF of 35-40%, LV mildly dilated, moderate to severe AR, mild MR on Echo 24. BNP on arrival 1213 (baseline ~200). CXR findings diffuse bilateral interstitial opacities, most likely interstitial edema.   Continue BiPAP  Limited ECHO ordered  Admission weight f/b daily standing weights + Strict I/Os  2g Na and 2L water restriction once pt not NPO for BiPAP  Diuresis w/ IV Bumex 2mg BID   GDMT: Toprol XL, continue  Follow up with CHF clinic   Non-obstructive CAD: Last Cardiac Stress test on 3/18/19: negative for ischemia. Last LHC on 24: prox RCA 65% stenosed, FFR 0.84. On ASA 81 mg. Follows with Dr. Hayward, cardiology.  Continue ASA 81 mg   Moderate to severe aortic insufficiency: Noted on Echo  with new EF drop. Has planned mechanical AVR scheduled for 24 with Dr. Guillory.   Limited ECHO ordered  Leukocytosis: WBC OA 14.2. Likely reactive. Pt is afebrile (was hypothermic on arrival), tachycardic. No s/sx of infection. Recent steroid use - no.   Continue to monitor w/ daily CBC  Defer from abx at this time   Chronic Macrocytic Anemia: h/o ALCIRA. Baseline Hgb 10.7, Hgb OA 10.8. No s/sx overt bleeding. Prior iron studies on 22 - Ferritin 426,  CARDIAC CATH LAB    CARDIAC PROCEDURE N/A 2024    Fractional flow reserve (FFR) performed by Teja Hayward MD at [facility] CARDIAC CATH LAB    CARDIAC PROCEDURE N/A 2024    Aortic root aortogram performed by Teja Hayward MD at [facility] CARDIAC CATH LAB     SECTION      x 4    COLONOSCOPY      DILATION AND CURETTAGE OF UTERUS      ENDOSCOPY, COLON, DIAGNOSTIC      FEMORAL ENDARTERECTOMY Left 2021    LEFT  FEMORAL ARTERY ENDARTERECTOMY performed by Zhao Gilliland MD at [facility] OR    FOOT SURGERY      bone spurs removed both feet    KNEE SURGERY Left     reconstruction of knee    LUMBAR DISCECTOMY  2020    Dr. Gamino    SKIN BIOPSY      TRANSESOPHAGEAL ECHOCARDIOGRAM N/A 1/10/2024    TRANSESOPHAGEAL ECHOCARDIOGRAM performed by Teja Hayward MD at [facility] ENDOSCOPY         Problem Relation Age of Onset    Heart Disease Mother     Diabetes Mother     Alcohol Abuse Father     Heart Disease Brother     Heart Disease Brother     Coronary Art Dis Brother     Heart Disease Brother     Diabetes Maternal Aunt     Breast Cancer Maternal Aunt 62    Parkinsonism Maternal Cousin      Social History     Socioeconomic History    Marital status:      Spouse name: Sergei    Number of children: 4    Years of education: None    Highest education level: None   Tobacco Use    Smoking status: Former     Current packs/day: 0.00     Average packs/day: 1 pack/day for 43.5 years (42.8 ttl pk-yrs)     Types: Cigarettes     Start date:      Quit date: 2024     Years since quittin.0    Smokeless tobacco: Never    Tobacco comments:     Down 4PPD since    Vaping Use    Vaping Use: Former   Substance and Sexual Activity    Alcohol use: Yes     Alcohol/week: 8.0 standard drinks of alcohol     Types: 8 Cans of beer per week     Comment: 8 can beer per week    Drug use: Yes     Frequency: 2.0 times per week     Types: Marijuana (Weed)     Comment: edibles & smoking    Sexual activity: Yes     Partners: Male

## 2024-07-23 ENCOUNTER — APPOINTMENT (OUTPATIENT)
Age: 59
End: 2024-07-23
Payer: COMMERCIAL

## 2024-07-23 LAB
ANION GAP SERPL CALC-SCNC: 12 MEQ/L (ref 8–16)
BUN SERPL-MCNC: 25 MG/DL (ref 7–22)
CALCIUM SERPL-MCNC: 8.6 MG/DL (ref 8.5–10.5)
CHLORIDE SERPL-SCNC: 103 MEQ/L (ref 98–111)
CO2 SERPL-SCNC: 25 MEQ/L (ref 23–33)
CREAT SERPL-MCNC: 1.2 MG/DL (ref 0.4–1.2)
DEPRECATED RDW RBC AUTO: 48.6 FL (ref 35–45)
ECHO AO ASC DIAM: 2.5 CM
ECHO AO ASCENDING AORTA INDEX: 1.58 CM/M2
ECHO AR MAX VEL PISA: 3.8 M/S
ECHO AV AREA PEAK VELOCITY: 1.4 CM2
ECHO AV AREA VTI: 1.6 CM2
ECHO AV AREA/BSA PEAK VELOCITY: 0.9 CM2/M2
ECHO AV AREA/BSA VTI: 1 CM2/M2
ECHO AV CUSP MM: 1.4 CM
ECHO AV MEAN GRADIENT: 7 MMHG
ECHO AV MEAN VELOCITY: 1.2 M/S
ECHO AV PEAK GRADIENT: 15 MMHG
ECHO AV PEAK VELOCITY: 1.9 M/S
ECHO AV REGURGITANT PHT: 476 MS
ECHO AV VELOCITY RATIO: 0.58
ECHO AV VTI: 38.8 CM
ECHO BSA: 1.65 M2
ECHO LA AREA 2C: 14.1 CM2
ECHO LA AREA 4C: 15.3 CM2
ECHO LA DIAMETER INDEX: 1.65 CM/M2
ECHO LA DIAMETER: 2.6 CM
ECHO LA MAJOR AXIS: 4.8 CM
ECHO LA MINOR AXIS: 4.3 CM
ECHO LA VOL BP: 39 ML (ref 22–52)
ECHO LA VOL MOD A2C: 37 ML (ref 22–52)
ECHO LA VOL MOD A4C: 36 ML (ref 22–52)
ECHO LA VOL/BSA BIPLANE: 25 ML/M2 (ref 16–34)
ECHO LA VOLUME INDEX MOD A2C: 23 ML/M2 (ref 16–34)
ECHO LA VOLUME INDEX MOD A4C: 23 ML/M2 (ref 16–34)
ECHO LV FRACTIONAL SHORTENING: 33 % (ref 28–44)
ECHO LV INTERNAL DIMENSION DIASTOLE INDEX: 3.48 CM/M2
ECHO LV INTERNAL DIMENSION DIASTOLIC: 5.5 CM (ref 3.9–5.3)
ECHO LV INTERNAL DIMENSION SYSTOLIC INDEX: 2.34 CM/M2
ECHO LV INTERNAL DIMENSION SYSTOLIC: 3.7 CM
ECHO LV IVSD: 0.8 CM (ref 0.6–0.9)
ECHO LV MASS 2D: 199.3 G (ref 67–162)
ECHO LV MASS INDEX 2D: 126.2 G/M2 (ref 43–95)
ECHO LV POSTERIOR WALL DIASTOLIC: 1.1 CM (ref 0.6–0.9)
ECHO LV RELATIVE WALL THICKNESS RATIO: 0.4
ECHO LVOT AREA: 2.5 CM2
ECHO LVOT AV VTI INDEX: 0.63
ECHO LVOT DIAM: 1.8 CM
ECHO LVOT MEAN GRADIENT: 2 MMHG
ECHO LVOT PEAK GRADIENT: 4 MMHG
ECHO LVOT PEAK VELOCITY: 1.1 M/S
ECHO LVOT STROKE VOLUME INDEX: 39.4 ML/M2
ECHO LVOT SV: 62.3 ML
ECHO LVOT VTI: 24.5 CM
ECHO RV INTERNAL DIMENSION: 1.9 CM
ERYTHROCYTE [DISTWIDTH] IN BLOOD BY AUTOMATED COUNT: 13.5 % (ref 11.5–14.5)
GFR SERPL CREATININE-BSD FRML MDRD: 52 ML/MIN/1.73M2
GLUCOSE BLD STRIP.AUTO-MCNC: 102 MG/DL (ref 70–108)
GLUCOSE BLD STRIP.AUTO-MCNC: 112 MG/DL (ref 70–108)
GLUCOSE BLD STRIP.AUTO-MCNC: 167 MG/DL (ref 70–108)
GLUCOSE BLD STRIP.AUTO-MCNC: 96 MG/DL (ref 70–108)
GLUCOSE SERPL-MCNC: 95 MG/DL (ref 70–108)
HCT VFR BLD AUTO: 33.3 % (ref 37–47)
HGB BLD-MCNC: 10.2 GM/DL (ref 12–16)
LEFT VENTRICULAR EJECTION FRACTION MODE: NORMAL
LV EF: 50 %
MAGNESIUM SERPL-MCNC: 2.2 MG/DL (ref 1.6–2.4)
MCH RBC QN AUTO: 30.6 PG (ref 26–33)
MCHC RBC AUTO-ENTMCNC: 30.6 GM/DL (ref 32.2–35.5)
MCV RBC AUTO: 100 FL (ref 81–99)
PLATELET # BLD AUTO: 482 THOU/MM3 (ref 130–400)
PMV BLD AUTO: 10.1 FL (ref 9.4–12.4)
POTASSIUM SERPL-SCNC: 3.9 MEQ/L (ref 3.5–5.2)
RBC # BLD AUTO: 3.33 MILL/MM3 (ref 4.2–5.4)
SODIUM SERPL-SCNC: 140 MEQ/L (ref 135–145)
WBC # BLD AUTO: 10.7 THOU/MM3 (ref 4.8–10.8)

## 2024-07-23 PROCEDURE — 6370000000 HC RX 637 (ALT 250 FOR IP): Performed by: STUDENT IN AN ORGANIZED HEALTH CARE EDUCATION/TRAINING PROGRAM

## 2024-07-23 PROCEDURE — 80048 BASIC METABOLIC PNL TOTAL CA: CPT

## 2024-07-23 PROCEDURE — 83735 ASSAY OF MAGNESIUM: CPT

## 2024-07-23 PROCEDURE — 6370000000 HC RX 637 (ALT 250 FOR IP)

## 2024-07-23 PROCEDURE — 36415 COLL VENOUS BLD VENIPUNCTURE: CPT

## 2024-07-23 PROCEDURE — 82948 REAGENT STRIP/BLOOD GLUCOSE: CPT

## 2024-07-23 PROCEDURE — 85027 COMPLETE CBC AUTOMATED: CPT

## 2024-07-23 PROCEDURE — 99233 SBSQ HOSP IP/OBS HIGH 50: CPT | Performed by: STUDENT IN AN ORGANIZED HEALTH CARE EDUCATION/TRAINING PROGRAM

## 2024-07-23 PROCEDURE — 2580000003 HC RX 258

## 2024-07-23 PROCEDURE — 93307 TTE W/O DOPPLER COMPLETE: CPT | Performed by: INTERNAL MEDICINE

## 2024-07-23 PROCEDURE — 93307 TTE W/O DOPPLER COMPLETE: CPT

## 2024-07-23 PROCEDURE — 6360000002 HC RX W HCPCS

## 2024-07-23 PROCEDURE — 2140000000 HC CCU INTERMEDIATE R&B

## 2024-07-23 RX ORDER — BUMETANIDE 1 MG/1
1 TABLET ORAL DAILY
Status: DISCONTINUED | OUTPATIENT
Start: 2024-07-23 | End: 2024-07-24 | Stop reason: HOSPADM

## 2024-07-23 RX ADMIN — SODIUM CHLORIDE, PRESERVATIVE FREE 10 ML: 5 INJECTION INTRAVENOUS at 10:02

## 2024-07-23 RX ADMIN — CYCLOBENZAPRINE 10 MG: 10 TABLET, FILM COATED ORAL at 10:17

## 2024-07-23 RX ADMIN — ACETAMINOPHEN 650 MG: 325 TABLET ORAL at 14:26

## 2024-07-23 RX ADMIN — ENOXAPARIN SODIUM 40 MG: 100 INJECTION SUBCUTANEOUS at 10:02

## 2024-07-23 RX ADMIN — SODIUM CHLORIDE, PRESERVATIVE FREE 10 ML: 5 INJECTION INTRAVENOUS at 10:03

## 2024-07-23 RX ADMIN — METOPROLOL SUCCINATE 100 MG: 100 TABLET, EXTENDED RELEASE ORAL at 20:50

## 2024-07-23 RX ADMIN — ALPRAZOLAM 0.5 MG: 0.5 TABLET ORAL at 10:16

## 2024-07-23 RX ADMIN — HYDROCODONE BITARTRATE AND ACETAMINOPHEN 1 TABLET: 5; 325 TABLET ORAL at 10:16

## 2024-07-23 RX ADMIN — ATORVASTATIN CALCIUM 40 MG: 40 TABLET, FILM COATED ORAL at 10:03

## 2024-07-23 RX ADMIN — PREGABALIN 50 MG: 50 CAPSULE ORAL at 20:50

## 2024-07-23 RX ADMIN — HYDROCODONE BITARTRATE AND ACETAMINOPHEN 1 TABLET: 5; 325 TABLET ORAL at 18:49

## 2024-07-23 RX ADMIN — ALPRAZOLAM 0.5 MG: 0.5 TABLET ORAL at 02:40

## 2024-07-23 RX ADMIN — BUMETANIDE 1 MG: 1 TABLET ORAL at 10:03

## 2024-07-23 RX ADMIN — SODIUM CHLORIDE, PRESERVATIVE FREE 10 ML: 5 INJECTION INTRAVENOUS at 20:51

## 2024-07-23 RX ADMIN — HYDROCODONE BITARTRATE AND ACETAMINOPHEN 1 TABLET: 5; 325 TABLET ORAL at 02:40

## 2024-07-23 RX ADMIN — ALPRAZOLAM 0.5 MG: 0.5 TABLET ORAL at 22:18

## 2024-07-23 RX ADMIN — CYCLOBENZAPRINE 10 MG: 10 TABLET, FILM COATED ORAL at 20:50

## 2024-07-23 RX ADMIN — PREGABALIN 50 MG: 50 CAPSULE ORAL at 14:26

## 2024-07-23 RX ADMIN — PREGABALIN 50 MG: 50 CAPSULE ORAL at 10:03

## 2024-07-23 RX ADMIN — ASPIRIN 81 MG: 81 TABLET, COATED ORAL at 10:03

## 2024-07-23 RX ADMIN — CLOPIDOGREL BISULFATE 75 MG: 75 TABLET ORAL at 10:03

## 2024-07-23 ASSESSMENT — PAIN DESCRIPTION - LOCATION
LOCATION: NECK
LOCATION: SHOULDER;BACK
LOCATION: SHOULDER;NECK;BACK

## 2024-07-23 ASSESSMENT — PAIN SCALES - WONG BAKER
WONGBAKER_NUMERICALRESPONSE: NO HURT

## 2024-07-23 ASSESSMENT — PAIN DESCRIPTION - ORIENTATION
ORIENTATION: MID
ORIENTATION: MID;RIGHT
ORIENTATION: RIGHT;LOWER

## 2024-07-23 ASSESSMENT — PAIN DESCRIPTION - DESCRIPTORS
DESCRIPTORS: THROBBING;ACHING;TIGHTNESS
DESCRIPTORS: ACHING
DESCRIPTORS: DISCOMFORT

## 2024-07-23 ASSESSMENT — PAIN SCALES - GENERAL
PAINLEVEL_OUTOF10: 2
PAINLEVEL_OUTOF10: 3
PAINLEVEL_OUTOF10: 9
PAINLEVEL_OUTOF10: 8
PAINLEVEL_OUTOF10: 8
PAINLEVEL_OUTOF10: 3

## 2024-07-23 ASSESSMENT — PAIN - FUNCTIONAL ASSESSMENT
PAIN_FUNCTIONAL_ASSESSMENT: PREVENTS OR INTERFERES SOME ACTIVE ACTIVITIES AND ADLS
PAIN_FUNCTIONAL_ASSESSMENT: ACTIVITIES ARE NOT PREVENTED

## 2024-07-23 NOTE — PLAN OF CARE
Problem: Discharge Planning  Goal: Discharge to home or other facility with appropriate resources  Outcome: Progressing  Flowsheets (Taken 7/22/2024 0655 by Ellen Vera, RN)  Discharge to home or other facility with appropriate resources:   Identify barriers to discharge with patient and caregiver   Identify discharge learning needs (meds, wound care, etc)     Problem: Safety - Adult  Goal: Free from fall injury  Outcome: Progressing  Flowsheets (Taken 7/23/2024 0615)  Free From Fall Injury: Instruct family/caregiver on patient safety     Problem: ABCDS Injury Assessment  Goal: Absence of physical injury  Outcome: Progressing  Flowsheets (Taken 7/23/2024 0615)  Absence of Physical Injury: Implement safety measures based on patient assessment     Problem: Pain  Goal: Verbalizes/displays adequate comfort level or baseline comfort level  Outcome: Progressing  Flowsheets (Taken 7/23/2024 0615)  Verbalizes/displays adequate comfort level or baseline comfort level:   Encourage patient to monitor pain and request assistance   Administer analgesics based on type and severity of pain and evaluate response   Assess pain using appropriate pain scale   Implement non-pharmacological measures as appropriate and evaluate response

## 2024-07-23 NOTE — PROGRESS NOTES
Hospitalist Progress Note  Internal Medicine Resident      Patient: Jacki Moura 59 y.o. female      Unit/Bed: 3B-29/029-A    Admit Date: 7/22/2024      ASSESSMENT AND PLAN  Active Problems  Acute hypoxic and hypercapnic respiratory failure: Likely secondary to CHF versus severe aortic insufficiency.  Patient does not require O2 at baseline.  Patient was on BiPAP, weaned off to nasal cannula.  ABG on admission was 7.23/ 57/ 198/ 24.  ABG after initiation of BiPAP was 7.31 /50 /211 /26.  proBNP 1213  Weaned off to room air today   Chest pain-resolved after nitro drip  CTA chest showed no evidence of aortic dissection  Acute on chronic systolic HFrEF: Last echo showed EF of 35 to 40%, moderate to severe aortic regurgitation.  BNP on arrival was 1213.  Chest x-ray showed increased diffuse bilateral interstitial opacities most likely interstitial edema.   Decrease Bumex 1 mg  daily  Echo pending  Continue Toprol-XL  Strict SHIVAM's, daily weights  2 L fluid restriction, 2 g sodium restriction   ARJUN: Likely due to over diuresis.  Patient is diuretic naive. creatinine on admission 0.8.  Creatinine today 1.2.   Decreased Bumex to 1 mg daily  Will continue to monitor  Moderate to severe aortic insufficiency: Aortic insufficiency noted on echo done on 7/6.  Patient has a mechanical AVR scheduled on 8/12/2024 with Dr. Guillory  Pending echo read  Nonobstructive CAD: Last heart cath on 7/8/2024 showed proximal RCA 65% stenotic.  Patient placed on ASA 81 mg.   Continue ASA 81 mg  Chronic Conditions (reviewed and stable unless otherwise stated)  Hypertension, stable: Patient on Toprol at home  Continue Toprol 100 Mg nightly  Hyperlipidemia: Patient on Lipitor  Continue Lipitor 40 mg daily    LDA: []CVC / []PICC / []Midline / []Anderson / []Drains / []Mediport / [x]None  Antibiotics: None  Steroids: None  Labs (still needed?): [x]Yes / []No  IVF (still needed?): [x]Yes / []No    Level of care: [x]Step Down / []Med-Surg  Bed  7/23/2024 at 5:22 PM  Case was discussed with Attending, Dr. Cantu.

## 2024-07-23 NOTE — CARE COORDINATION
7/23/24, 1:38 PM EDT    DISCHARGE ON GOING EVALUATION    [facility] day: 1  Location: [facility]29/029-A Reason for admit: Shortness of breath [R06.02]  Flash pulmonary edema (HCC) [J81.0]  Acute respiratory failure with hypercapnia (HCC) [J96.02]  Acute decompensated heart failure (HCC) [I50.9]  Aortic valve insufficiency, etiology of cardiac valve disease unspecified [I35.1]     Procedures:   7/23 Echo: ordered 7/22.    Imaging since last note:   7/22 CTA chest:   1. No evidence of aortic dissection or mediastinal hematoma.  2. No evidence of pulmonary embolism.  3. Small bilateral pleural effusions. Atelectasis at both lung bases.  4. Small left adrenal nodule, unchanged.    Barriers to Discharge: Hospitalist following. Bumex changed to po. Planning discharge later today or tomorrow. Room air.     PCP: Kyle Smith APRN - CNP  Readmission Risk Score: 17.5    Patient Goals/Plan/Treatment Preferences: Plans home with  and son. She is scheduled for OHS on 8/12/24 with Dr. Guillory.

## 2024-07-24 ENCOUNTER — HOSPITAL ENCOUNTER (OUTPATIENT)
Dept: NURSING | Age: 59
Discharge: HOME | End: 2024-07-24

## 2024-07-24 ENCOUNTER — PATIENT MESSAGE (OUTPATIENT)
Dept: FAMILY MEDICINE CLINIC | Age: 59
End: 2024-07-24

## 2024-07-24 VITALS
TEMPERATURE: 97.8 F | RESPIRATION RATE: 16 BRPM | SYSTOLIC BLOOD PRESSURE: 104 MMHG | BODY MASS INDEX: 26.41 KG/M2 | DIASTOLIC BLOOD PRESSURE: 72 MMHG | WEIGHT: 134.5 LBS | HEART RATE: 68 BPM | OXYGEN SATURATION: 97 % | HEIGHT: 60 IN

## 2024-07-24 DIAGNOSIS — F41.9 ANXIETY: ICD-10-CM

## 2024-07-24 PROBLEM — N17.9 AKI (ACUTE KIDNEY INJURY) (HCC): Status: ACTIVE | Noted: 2024-07-24

## 2024-07-24 LAB
ANION GAP SERPL CALC-SCNC: 10 MEQ/L (ref 8–16)
BUN SERPL-MCNC: 25 MG/DL (ref 7–22)
CALCIUM SERPL-MCNC: 8.8 MG/DL (ref 8.5–10.5)
CHLORIDE SERPL-SCNC: 103 MEQ/L (ref 98–111)
CO2 SERPL-SCNC: 25 MEQ/L (ref 23–33)
CREAT SERPL-MCNC: 0.8 MG/DL (ref 0.4–1.2)
DEPRECATED RDW RBC AUTO: 47.2 FL (ref 35–45)
EKG ATRIAL RATE: 119 BPM
EKG P AXIS: 70 DEGREES
EKG P-R INTERVAL: 170 MS
EKG Q-T INTERVAL: 320 MS
EKG QRS DURATION: 80 MS
EKG QTC CALCULATION (BAZETT): 450 MS
EKG R AXIS: 64 DEGREES
EKG T AXIS: 77 DEGREES
EKG VENTRICULAR RATE: 119 BPM
ERYTHROCYTE [DISTWIDTH] IN BLOOD BY AUTOMATED COUNT: 13.4 % (ref 11.5–14.5)
GFR SERPL CREATININE-BSD FRML MDRD: 85 ML/MIN/1.73M2
GLUCOSE BLD STRIP.AUTO-MCNC: 109 MG/DL (ref 70–108)
GLUCOSE SERPL-MCNC: 98 MG/DL (ref 70–108)
HCT VFR BLD AUTO: 34.7 % (ref 37–47)
HGB BLD-MCNC: 10.9 GM/DL (ref 12–16)
MAGNESIUM SERPL-MCNC: 2.3 MG/DL (ref 1.6–2.4)
MCH RBC QN AUTO: 31 PG (ref 26–33)
MCHC RBC AUTO-ENTMCNC: 31.4 GM/DL (ref 32.2–35.5)
MCV RBC AUTO: 98.6 FL (ref 81–99)
PLATELET # BLD AUTO: 471 THOU/MM3 (ref 130–400)
PMV BLD AUTO: 10 FL (ref 9.4–12.4)
POTASSIUM SERPL-SCNC: 4.9 MEQ/L (ref 3.5–5.2)
RBC # BLD AUTO: 3.52 MILL/MM3 (ref 4.2–5.4)
SODIUM SERPL-SCNC: 138 MEQ/L (ref 135–145)
WBC # BLD AUTO: 8.7 THOU/MM3 (ref 4.8–10.8)

## 2024-07-24 PROCEDURE — 85027 COMPLETE CBC AUTOMATED: CPT

## 2024-07-24 PROCEDURE — 6370000000 HC RX 637 (ALT 250 FOR IP)

## 2024-07-24 PROCEDURE — 93010 ELECTROCARDIOGRAM REPORT: CPT | Performed by: INTERNAL MEDICINE

## 2024-07-24 PROCEDURE — 6370000000 HC RX 637 (ALT 250 FOR IP): Performed by: STUDENT IN AN ORGANIZED HEALTH CARE EDUCATION/TRAINING PROGRAM

## 2024-07-24 PROCEDURE — 2580000003 HC RX 258

## 2024-07-24 PROCEDURE — 80048 BASIC METABOLIC PNL TOTAL CA: CPT

## 2024-07-24 PROCEDURE — 36415 COLL VENOUS BLD VENIPUNCTURE: CPT

## 2024-07-24 PROCEDURE — 83735 ASSAY OF MAGNESIUM: CPT

## 2024-07-24 PROCEDURE — 82948 REAGENT STRIP/BLOOD GLUCOSE: CPT

## 2024-07-24 PROCEDURE — 6360000002 HC RX W HCPCS

## 2024-07-24 PROCEDURE — 99239 HOSP IP/OBS DSCHRG MGMT >30: CPT | Performed by: STUDENT IN AN ORGANIZED HEALTH CARE EDUCATION/TRAINING PROGRAM

## 2024-07-24 RX ORDER — BUMETANIDE 1 MG/1
1 TABLET ORAL DAILY
Qty: 30 TABLET | Refills: 3 | Status: SHIPPED | OUTPATIENT
Start: 2024-07-25

## 2024-07-24 RX ORDER — ALPRAZOLAM 0.5 MG/1
0.5 TABLET ORAL 3 TIMES DAILY PRN
Qty: 90 TABLET | Refills: 0 | Status: SHIPPED | OUTPATIENT
Start: 2024-07-24 | End: 2024-08-23

## 2024-07-24 RX ADMIN — CLOPIDOGREL BISULFATE 75 MG: 75 TABLET ORAL at 09:29

## 2024-07-24 RX ADMIN — SODIUM CHLORIDE, PRESERVATIVE FREE 10 ML: 5 INJECTION INTRAVENOUS at 09:30

## 2024-07-24 RX ADMIN — ASPIRIN 81 MG: 81 TABLET, COATED ORAL at 09:29

## 2024-07-24 RX ADMIN — HYDROCODONE BITARTRATE AND ACETAMINOPHEN 1 TABLET: 5; 325 TABLET ORAL at 04:41

## 2024-07-24 RX ADMIN — PREGABALIN 50 MG: 50 CAPSULE ORAL at 09:29

## 2024-07-24 RX ADMIN — ENOXAPARIN SODIUM 40 MG: 100 INJECTION SUBCUTANEOUS at 09:29

## 2024-07-24 RX ADMIN — ATORVASTATIN CALCIUM 40 MG: 40 TABLET, FILM COATED ORAL at 09:29

## 2024-07-24 RX ADMIN — ALPRAZOLAM 0.5 MG: 0.5 TABLET ORAL at 06:26

## 2024-07-24 RX ADMIN — CYCLOBENZAPRINE 10 MG: 10 TABLET, FILM COATED ORAL at 09:29

## 2024-07-24 RX ADMIN — BUMETANIDE 1 MG: 1 TABLET ORAL at 09:29

## 2024-07-24 ASSESSMENT — PAIN DESCRIPTION - FREQUENCY
FREQUENCY: INTERMITTENT
FREQUENCY: INTERMITTENT

## 2024-07-24 ASSESSMENT — PAIN DESCRIPTION - ONSET
ONSET: ON-GOING
ONSET: AWAKENED FROM SLEEP

## 2024-07-24 ASSESSMENT — PAIN DESCRIPTION - PAIN TYPE
TYPE: CHRONIC PAIN
TYPE: CHRONIC PAIN

## 2024-07-24 ASSESSMENT — PAIN DESCRIPTION - LOCATION
LOCATION: BACK
LOCATION: BACK

## 2024-07-24 ASSESSMENT — PAIN DESCRIPTION - ORIENTATION
ORIENTATION: LOWER
ORIENTATION: LOWER

## 2024-07-24 ASSESSMENT — PAIN SCALES - GENERAL
PAINLEVEL_OUTOF10: 4
PAINLEVEL_OUTOF10: 6

## 2024-07-24 ASSESSMENT — PAIN DESCRIPTION - DESCRIPTORS
DESCRIPTORS: ACHING
DESCRIPTORS: ACHING

## 2024-07-24 ASSESSMENT — PAIN - FUNCTIONAL ASSESSMENT: PAIN_FUNCTIONAL_ASSESSMENT: ACTIVITIES ARE NOT PREVENTED

## 2024-07-24 NOTE — PROGRESS NOTES
Discharge teaching and instructions for diagnosis/procedure of CHF completed with patient using teachback method. AVS reviewed. Prescription sent to [facility] pharmacy, transferred prescription by [facility] pharmacy. Patient voiced understanding regarding prescription, medications, follow up appointments, and care of self at home. Discharged in a wheelchair to  home with support per family. Patient stable at discharge.

## 2024-07-24 NOTE — PLAN OF CARE
Problem: Discharge Planning  Goal: Discharge to home or other facility with appropriate resources  Outcome: Progressing  Flowsheets (Taken 7/24/2024 0237)  Discharge to home or other facility with appropriate resources:   Identify barriers to discharge with patient and caregiver   Identify discharge learning needs (meds, wound care, etc)     Problem: Safety - Adult  Goal: Free from fall injury  Outcome: Progressing  Note: Bed locked & in low position, call light in reach, side-rails up x2, bed/chair alarm utilized, non-slip socks on when ambulating, reminded patient to use call light to call for assistance.      Problem: ABCDS Injury Assessment  Goal: Absence of physical injury  Outcome: Progressing  Flowsheets (Taken 7/24/2024 0237)  Absence of Physical Injury: Implement safety measures based on patient assessment     Problem: Pain  Goal: Verbalizes/displays adequate comfort level or baseline comfort level  Outcome: Progressing  Flowsheets (Taken 7/24/2024 0237)  Verbalizes/displays adequate comfort level or baseline comfort level:   Encourage patient to monitor pain and request assistance   Assess pain using appropriate pain scale   Administer analgesics based on type and severity of pain and evaluate response   Implement non-pharmacological measures as appropriate and evaluate response   Care plan reviewed with patient.  Patient verbalizes understanding of the care plan and contributed to goal setting.

## 2024-07-24 NOTE — DISCHARGE SUMMARY
Resident Discharge Summary (Hospitalist)      Patient: Jacki Moura 59 y.o. female  : 1965  MRN: [id number]   Account: [id number]   Patient's PCP: Kyle Smith APRN - CNP    Admit Date: 2024   Discharge Date:   24    Admitting Physician: Simeon Cantu MD  Discharge Physician: Fredy Barahona DO       Discharge Diagnoses:  Acute hypoxic and hypercapnic respiratory failure: Likely secondary to CHF versus severe aortic insufficiency.  Patient does not require O2 at baseline.  Patient was on BiPAP, weaned off to nasal cannula.  ABG on admission was 7.23/ 57/ 198/ 24.  ABG after initiation of BiPAP was 7.31 /50 /211 /26.  proBNP 1213.  Patient weaned down from BiPAP to 2 L nasal cannula.  Eventually weaned down to room air.  Patient presented with pain that started on her left arm traveled up and across the chest to the right arm, was started on nitro drip and pain resolved. CTA chest showed no evidence of aortic dissection   Acute on chronic systolic HFrEF: Last echo on 2024 showed EF of 35 to 40%, moderate to severe aortic regurgitation.  BNP on arrival was 1213.  Chest x-ray showed increased diffuse bilateral interstitial opacities most likely interstitial edema.  Echo during this admission showed improvement in EF to 50%.  Showed moderate to severe aortic valve regurgitation.  Patient was initially started on Bumex 2 mg.  Creatinine increased from 0.8 on admission to 1.2 within 48 hours.  Bumex dose decreased to 1 mg daily.  Creatinine stabilized 0.8 on day of discharge.  Patient will be discharged on Bumex 1 mg daily.  Patient will be discharged on Toprol 100 mg daily.   ARJUN, improved: Likely due to over diuresis.  Patient is diuretic naive. creatinine on admission 0.8.  Creatinine increased to 1.2 after starting Bumex 2 mg daily.  Bumex was decreased to 1 mg daily, creatinine stable at 0.8.  Will be discharged on Bumex 1 mg daily.  BMP in 1 week and follow-up PCP.   Moderate to  1 tablet by mouth every 8 hours as needed for Pain for up to 30 days. Intended supply: 30 days. Take lowest dose possible to manage pain Max Daily Amount: 3 tablets     lisinopril 20 MG tablet  Commonly known as: PRINIVIL;ZESTRIL  Take 1 tablet by mouth 2 times daily     meloxicam 7.5 MG tablet  Commonly known as: Mobic  Take 1 tablet by mouth daily     metoprolol succinate 100 MG extended release tablet  Commonly known as: TOPROL XL  Take 1 tablet by mouth at bedtime     pregabalin 50 MG capsule  Commonly known as: LYRICA  Take 1 capsule by mouth 3 times daily for 60 days. Max Daily Amount: 150 mg            STOP taking these medications      VITAMIN D PO               Where to Get Your Medications        These medications were sent to [facility] [address] - P [phone] - F [phone]  [address] [address]      Phone: [phone]   bumetanide 1 MG tablet                  Time Spent on discharge is 60 minutes in the examination, evaluation, counseling and review of medications and discharge plan.    Thank you Kyle Smith, SHERYL - LANDY for the opportunity to be involved in this patient's care.      Signed:    Electronically signed by Fredy Barahona DO on 7/24/24 at 10:05 AM EDT     Case was discussed with Attending, Dr. Cantu

## 2024-07-24 NOTE — DISCHARGE INSTRUCTIONS
Follow up with cardiologist in one week   Discuss improvement of EF on ECHO.  Start taking Bumex 1mg Daily           Heart Failure: Care Instructions  Overview     Heart failure occurs when your heart does not pump as much blood as the body needs. Failure does not mean that the heart has stopped pumping but rather that it is not pumping as well as it should. Over time, this causes fluid buildup in your lungs and other parts of your body. Fluid buildup can cause shortness of breath, fatigue, swollen ankles, and other problems. Heart failure is treated with medicines, a heart-healthy lifestyle, and the steps you take to check your symptoms. Treatment can slow the disease, help you feel better, and help keep you out of the hospital. Treatment may also help you live longer.  Follow-up care is a key part of your treatment and safety. Be sure to make and go to all appointments, and call your doctor if you are having problems. It's also a good idea to know your test results and keep a list of the medicines you take.  How can you care for yourself at home?  Medicines    Be safe with medicines. Take your medicines exactly as prescribed. Call your doctor if you think you are having a problem with your medicine.     You will get more details on the specific medicines your doctor prescribes. Medicines can help your heart work better, help you feel better, and help keep you out of the hospital. Medicines may also help you live longer.     Talk with your doctor or pharmacist before you take a new prescription or over-the-counter medicine. Ask if the medicine is safe for you to take. Some medicines can affect your heart and make heart failure worse. Others may keep your heart failure medicines from working right. Over-the-counter medicines that you may need to avoid include herbal supplements, vitamins, pain relievers called NSAIDs, antacids, laxatives, and cough, cold, flu, or sinus medicine.   Diet    Eat heart-healthy foods.

## 2024-07-24 NOTE — TELEPHONE ENCOUNTER
From: Jacki Moura  To: Kyle Smith  Sent: 7/24/2024 1:19 PM EDT  Subject: Medication refill    Robin Morales, I was wondering if you could refill the Xanax please. I'm sure you saw I was back in the hospital and have another follow up with you on the 29th. I look forward to seeing you and talking about what happened.  Thanks,  Jacki BALDERAS

## 2024-07-24 NOTE — PROGRESS NOTES
CLINICAL PHARMACY: DISCHARGE MED RECONCILIATION/REVIEW    [facility] Select Patient?: Yes  Total # of Interventions Recommended: 0     Total # Interventions Accepted: 0  Intervention Severity:   - Level 1 Intervention Present?: No   - Level 2 #: 0   - Level 3 #: 0   Time Spent (min): Eric Vyas, PharmD  7/24/2024 at 10:48 AM

## 2024-07-24 NOTE — ADT AUTH CERT
Comments  [facility]  [address] [facility] [address]  [phone]  [id number]    **PLEASE START IP AUTH    RETURN CONTACT INFORMATION    NAME: SHUN APPIAH  PHONE: [phone]  FAX: [phone]    Auth number: N/A  MEMBER ID: [id number] - ([place] [employer])   Last edited by Azul Arce on 24 at 1139     Jacki Moura #[id number] (CSN:[id number]) (:1965 59 y.o. F) (Adm: 24)  [facility] 1Y-5Q--A  PCP    YURI KELLER  Demographics  CommentAddress   [address] [facility] [address]    Home Phone   [phone]    Work Phone       Mobile Phone   [phone]             Social Security Number       Insurance Information   BCBS    Marital Status       Presybeterian   Catholic (STR NONE)               Admission Information    Arrival Date/Time: 2024 0346 Admit Date/Time: 2024 0346 IP Adm. Date/Time: 2024 0540   Admission Type: Emergency Point of Origin: Non-health Care Facility/self Admit Category:    Means of Arrival: Ambulance Primary Service: Medical Secondary Service: N/A   Transfer Source:  Service Area: [facility] Unit: [facility] CCU-STEPDOWN 3B   Admit Provider: Simeon Cantu MD Attending Provider: Simeon Cantu MD Referring Provider:      Patient Diagnoses    Reasons for Admission Coded Admission Diagnoses    *Shortness of breath [R06.02]    Flash pulmonary edema (HCC) [J81.0]    Acute respiratory failure with hypercapnia (HCC) [J96.02]    Acute decompensated heart failure (HCC) [I50.9]    Aortic valve insufficiency, etiology of cardiac valve disease unspecified [I35.1]      Status   No [002]     Insurance Payors as of 2024    BCBS    Plan: BCBS OUT OF STATE Group: [id number] Member: [id number]   Effective from: 2020 Subscriber: JACKI MOURA Subscriber ID: [id number]   Guarantor: JACKI MOURA     Documents Filed to Patient    Power of

## 2024-07-25 ENCOUNTER — CARE COORDINATION (OUTPATIENT)
Dept: CASE MANAGEMENT | Age: 59
End: 2024-07-25

## 2024-07-25 ENCOUNTER — PREP FOR PROCEDURE (OUTPATIENT)
Dept: CARDIOTHORACIC SURGERY | Age: 59
End: 2024-07-25

## 2024-07-25 DIAGNOSIS — J81.0 FLASH PULMONARY EDEMA (HCC): Primary | ICD-10-CM

## 2024-07-25 DIAGNOSIS — I35.1 AORTIC VALVE INSUFFICIENCY, ETIOLOGY OF CARDIAC VALVE DISEASE UNSPECIFIED: Primary | ICD-10-CM

## 2024-07-25 RX ORDER — SODIUM CHLORIDE 9 MG/ML
INJECTION, SOLUTION INTRAVENOUS PRN
Status: CANCELLED | OUTPATIENT
Start: 2024-07-25

## 2024-07-25 RX ORDER — SODIUM CHLORIDE 9 MG/ML
INJECTION, SOLUTION INTRAVENOUS CONTINUOUS
Status: CANCELLED | OUTPATIENT
Start: 2024-07-25

## 2024-07-25 RX ORDER — ASPIRIN 81 MG/1
81 TABLET ORAL DAILY
Status: CANCELLED | OUTPATIENT
Start: 2024-07-25

## 2024-07-25 RX ORDER — METOPROLOL TARTRATE 25 MG/1
12.5 TABLET, FILM COATED ORAL ONCE
Status: CANCELLED | OUTPATIENT
Start: 2024-07-25 | End: 2024-07-25

## 2024-07-25 RX ORDER — SODIUM CHLORIDE 0.9 % (FLUSH) 0.9 %
5-40 SYRINGE (ML) INJECTION PRN
Status: CANCELLED | OUTPATIENT
Start: 2024-07-25

## 2024-07-25 RX ORDER — SODIUM CHLORIDE 0.9 % (FLUSH) 0.9 %
5-40 SYRINGE (ML) INJECTION EVERY 12 HOURS SCHEDULED
Status: CANCELLED | OUTPATIENT
Start: 2024-07-25

## 2024-07-25 NOTE — CARE COORDINATION
Care Transitions Note    Initial Call - Call within 2 business days of discharge: Yes    Patient Current Location:  Home: [address] [place], [place] [address]  [facility] [address]    Care Transition Nurse contacted the patient by telephone to perform post hospital discharge assessment, verified name and  as identifiers. Provided introduction to self, and explanation of the Care Transition Nurse role.     Patient: Jacki Moura    Patient : 1965   MRN: [id number]    Reason for Admission: flash pulmonary edema  Discharge Date: 24  RURS: Readmission Risk Score: 16.6      Last Discharge Facility       Date Complaint Diagnosis Description Type Department Provider    24 Chest Pain; Shortness of Breath Flash pulmonary edema (HCC) ... ED to Hosp-Admission (Discharged) (ADMITTED) Simeon Gutierrez MD            Was this an external facility discharge? No    Additional needs identified to be addressed with provider   No needs identified             Method of communication with provider: none.    Patients top risk factors for readmission: lack of knowledge about disease, medical condition-CHF, CAD, HTN, and polypharmacy    Interventions to address risk factors:   Education: CHF  Review of patient management of conditions/medications:    Communication with providers: HFU scheduled    Care Summary Note:  Spoke with Jacki, said she is feeling pretty good.  Has been up, ate a little bit, not very hungry.  Staying away from salt, not adding to food.  Aware of 2 gm/day.  Offered RD, declined, feels she has a handle on it.  She is aware of fluid restriction of 2L/day.  Denies chest pain, dyspnea, dizziness, edema.  She is monitoring her wt, BP, HR.  Instructed on the importance of weighing daily, in the morning after urinating, same amount of clothing and that if she has weight gain of 2-3 lbs or more overnight OR 5 lbs or more in 3 days to call her physician immediately and report.  Also monitoring for increased swelling of LE's

## 2024-07-25 NOTE — CARE COORDINATION
7/25/24, 7:35 AM EDT    Patient goals/plan/ treatment preferences discussed by  and .  Patient goals/plan/ treatment preferences reviewed with patient/ family.  Patient/ family verbalize understanding of discharge plan and are in agreement with goal/plan/treatment preferences.  Understanding was demonstrated using the teach back method.  AVS provided by RN at time of discharge, which includes all necessary medical information pertaining to the patients current course of illness, treatment, post-discharge goals of care, and treatment preferences.     Services At/After Discharge: None       Home with  and son. Scheduled for [facility] on 8/12/24 with Dr. Guillory.     Electronically signed by Jade Em RN on 7/25/2024 at 7:36 AM

## 2024-07-26 NOTE — PROGRESS NOTES
Physician Progress Note      PATIENT:               HOOD GRIMALDO  [facility] #:                  [id number]  :                       1965  ADMIT DATE:       2024 6:01 PM  DISCH DATE:        2024 12:36 PM  RESPONDING  PROVIDER #:        CHELLY CAMPBELL PA-C          QUERY TEXT:    Patient admitted with chest pain.  cardiology consult notes, \"Elevated   troponins, not NSTEMI\" and discharge summary notes, \"Leukocytosis, likely   reactive to NSTEMI.\" If possible, please document in the progress notes and   discharge summary if you are evaluating and/or treating any of the following:    The medical record reflects the following:  Risk Factors: Chest pain, CAD, elevated troponins    Clinical Indicators:  Cardiology note reports, \"Chest pain - resolved.   Elevated troponins, not NSTEMI.\"  Discharge summary notes, \"Leukocytosis, likely reactive to NSTEMI without   signs of infection.\"   LHC reports, \"non-obstructive CAD. Fractional flow reserve of RCA is   mildly decreased at 0.84.\"   admission trop under 6 and peaked at 29 on .    Treatment: [facility] without intervention, EKG, Echo, nitro drip    Thank you,  Sylvain Iraheta RN CDI  [email or web address]  Options provided:  -- NSTEMI ruled out. Patient with unstable angina  -- NSTEMI confirmed  -- Other - I will add my own diagnosis  -- Disagree - Not applicable / Not valid  -- Disagree - Clinically unable to determine / Unknown  -- Refer to Clinical Documentation Reviewer    PROVIDER RESPONSE TEXT:    Provider disagreed with this query.  not NSTEMI    Query created by: Sylvain Iraheta on 2024 9:17 AM      Electronically signed by:  CHELLY CAMPBELL PA-C 2024 9:56 AM

## 2024-07-29 ENCOUNTER — HOSPITAL ENCOUNTER (OUTPATIENT)
Age: 59
Discharge: HOME | End: 2024-07-29
Payer: COMMERCIAL

## 2024-07-29 DIAGNOSIS — I10 ESSENTIAL HYPERTENSION: ICD-10-CM

## 2024-07-29 DIAGNOSIS — N17.9 AKI (ACUTE KIDNEY INJURY) (HCC): ICD-10-CM

## 2024-07-29 LAB
ANION GAP SERPL CALC-SCNC: 15 MEQ/L (ref 8–16)
BUN SERPL-MCNC: 47 MG/DL (ref 7–22)
CALCIUM SERPL-MCNC: 8.9 MG/DL (ref 8.5–10.5)
CHLORIDE SERPL-SCNC: 97 MEQ/L (ref 98–111)
CO2 SERPL-SCNC: 20 MEQ/L (ref 23–33)
CREAT SERPL-MCNC: 1.4 MG/DL (ref 0.4–1.2)
GFR SERPL CREATININE-BSD FRML MDRD: 43 ML/MIN/1.73M2
GLUCOSE SERPL-MCNC: 102 MG/DL (ref 70–108)
POTASSIUM SERPL-SCNC: 5 MEQ/L (ref 3.5–5.2)
SODIUM SERPL-SCNC: 132 MEQ/L (ref 135–145)

## 2024-07-29 PROCEDURE — 80048 BASIC METABOLIC PNL TOTAL CA: CPT

## 2024-07-29 PROCEDURE — 36415 COLL VENOUS BLD VENIPUNCTURE: CPT

## 2024-07-29 NOTE — PROGRESS NOTES
Heart Disease Brother     Diabetes Maternal Aunt     Breast Cancer Maternal Aunt 62    Parkinsonism Maternal Cousin      Social History     Tobacco Use    Smoking status: Every Day     Current packs/day: 0.00     Average packs/day: 1 pack/day for 43.5 years (42.8 ttl pk-yrs)     Types: Cigarettes     Start date:      Last attempt to quit: 2024     Years since quittin.0    Smokeless tobacco: Never    Tobacco comments:     2 cigarettes a day   Substance Use Topics    Alcohol use: Yes     Alcohol/week: 8.0 standard drinks of alcohol     Types: 8 Cans of beer per week     Comment: 8 can beer per week      Current Outpatient Medications   Medication Sig Dispense Refill    bumetanide (BUMEX) 1 MG tablet Take 1 tablet by mouth daily 30 tablet 3    ALPRAZolam (XANAX) 0.5 MG tablet Take 1 tablet by mouth 3 times daily as needed for Sleep or Anxiety for up to 30 days. Max Daily Amount: 1.5 mg 90 tablet 0    HYDROcodone-acetaminophen (NORCO) 5-325 MG per tablet Take 1 tablet by mouth every 8 hours as needed for Pain for up to 30 days. Intended supply: 30 days. Take lowest dose possible to manage pain Max Daily Amount: 3 tablets 90 tablet 0    atorvastatin (LIPITOR) 40 MG tablet Take 1 tablet by mouth daily 30 tablet 3    metoprolol succinate (TOPROL XL) 100 MG extended release tablet Take 1 tablet by mouth at bedtime 30 tablet 3    cyclobenzaprine (FLEXERIL) 10 MG tablet Take 1 tablet by mouth 3 times daily as needed for Muscle spasms 90 tablet 1    pregabalin (LYRICA) 50 MG capsule Take 1 capsule by mouth 3 times daily for 60 days. Max Daily Amount: 150 mg 90 capsule 1    meloxicam (MOBIC) 7.5 MG tablet Take 1 tablet by mouth daily 90 tablet 0    clopidogrel (PLAVIX) 75 MG tablet Take 1 tablet by mouth daily 90 tablet 3    lisinopril (PRINIVIL;ZESTRIL) 20 MG tablet Take 1 tablet by mouth 2 times daily 60 tablet 1    aspirin 81 MG EC tablet Take 1 tablet by mouth daily       No current facility-administered

## 2024-07-30 ENCOUNTER — OFFICE VISIT (OUTPATIENT)
Dept: FAMILY MEDICINE CLINIC | Age: 59
End: 2024-07-30

## 2024-07-30 VITALS — HEART RATE: 68 BPM | SYSTOLIC BLOOD PRESSURE: 96 MMHG | DIASTOLIC BLOOD PRESSURE: 60 MMHG

## 2024-07-30 DIAGNOSIS — Z09 HOSPITAL DISCHARGE FOLLOW-UP: ICD-10-CM

## 2024-07-30 DIAGNOSIS — Z01.818 PRE-OP EXAMINATION: ICD-10-CM

## 2024-07-30 DIAGNOSIS — E24.9 HYPERCORTISOLISM (HCC): ICD-10-CM

## 2024-07-30 DIAGNOSIS — I50.22 CHRONIC SYSTOLIC (CONGESTIVE) HEART FAILURE (HCC): ICD-10-CM

## 2024-07-30 DIAGNOSIS — J81.0 FLASH PULMONARY EDEMA (HCC): Primary | ICD-10-CM

## 2024-07-30 NOTE — PROGRESS NOTES
Post-Discharge Transitional Care  Follow Up      Jacki Moura   YOB: 1965    Date of Office Visit:  7/30/2024  Date of Hospital Admission: 7/22/24  Date of Hospital Discharge: 7/24/24  Risk of hospital readmission (high >=14%. Medium >=10%) :Readmission Risk Score: 16.6      Care management risk score Rising risk (score 2-5) and Complex Care (Scores >=6): No Risk Score On File     Non face to face  following discharge, date last encounter closed (first attempt may have been earlier): 07/25/2024    Call initiated 2 business days of discharge: Yes    ASSESSMENT/PLAN:   Flash pulmonary edema (HCC)  Hospital discharge follow-up  -     CT DISCHARGE MEDS RECONCILED W/ CURRENT OUTPATIENT MED LIST  Pre-op examination  -     Basic Metabolic Panel; Future  -     Hepatic Function Panel; Future  -     CBC with Auto Differential; Future  Chronic systolic (congestive) heart failure  Hypercortisolism (HCC)      Medical Decision Making: high complexity  Return if symptoms worsen or fail to improve.           Subjective:   HPI:  Follow up of Hospital problems/diagnosis(es): Flexible Marcio edema, hypercortisolism, chronic systolic congestive heart failure.    Inpatient course: Discharge summary reviewed- see chart.    Interval history/Current status: Patient stable with no further chest pain, shortness of breath.  Labs were completed yesterday that do show low sodium and patient need to work on this in the next few days.  She has surgery coming up on August 12.  Labs will be repeated in 1 week to be sure she is stable for preoperative clearance which can be based on today's visit.    Patient Active Problem List   Diagnosis    Essential hypertension    Osteopenia    Carotid stenosis, non-symptomatic, bilateral    History of smoking 30 or more pack years    PAD (peripheral artery disease) (HCC)    Systolic murmur    Chronic neck pain    Diverticulosis of colon without diverticulitis    Spondylosis without myelopathy

## 2024-07-31 ENCOUNTER — OFFICE VISIT (OUTPATIENT)
Dept: CARDIOLOGY CLINIC | Age: 59
End: 2024-07-31
Payer: COMMERCIAL

## 2024-07-31 VITALS
HEIGHT: 60 IN | BODY MASS INDEX: 25.91 KG/M2 | WEIGHT: 132 LBS | DIASTOLIC BLOOD PRESSURE: 74 MMHG | HEART RATE: 70 BPM | SYSTOLIC BLOOD PRESSURE: 110 MMHG

## 2024-07-31 DIAGNOSIS — I35.1 SEVERE AORTIC INSUFFICIENCY: ICD-10-CM

## 2024-07-31 DIAGNOSIS — I25.10 CORONARY ARTERY DISEASE INVOLVING NATIVE CORONARY ARTERY OF NATIVE HEART WITHOUT ANGINA PECTORIS: ICD-10-CM

## 2024-07-31 DIAGNOSIS — I10 ESSENTIAL HYPERTENSION: ICD-10-CM

## 2024-07-31 DIAGNOSIS — I73.9 PAD (PERIPHERAL ARTERY DISEASE) (HCC): Primary | ICD-10-CM

## 2024-07-31 PROCEDURE — 3078F DIAST BP <80 MM HG: CPT | Performed by: STUDENT IN AN ORGANIZED HEALTH CARE EDUCATION/TRAINING PROGRAM

## 2024-07-31 PROCEDURE — 3074F SYST BP LT 130 MM HG: CPT | Performed by: STUDENT IN AN ORGANIZED HEALTH CARE EDUCATION/TRAINING PROGRAM

## 2024-07-31 PROCEDURE — 99214 OFFICE O/P EST MOD 30 MIN: CPT | Performed by: STUDENT IN AN ORGANIZED HEALTH CARE EDUCATION/TRAINING PROGRAM

## 2024-07-31 NOTE — PATIENT INSTRUCTIONS
Hold bumex/bumetanide. Cut lisinopril as discussed with Andrew. Check on labs again tomorrow for further adjustments.

## 2024-08-01 ENCOUNTER — OFFICE VISIT (OUTPATIENT)
Dept: PULMONOLOGY | Age: 59
End: 2024-08-01

## 2024-08-01 ENCOUNTER — CARE COORDINATION (OUTPATIENT)
Dept: CARE COORDINATION | Age: 59
End: 2024-08-01

## 2024-08-01 VITALS
TEMPERATURE: 98.1 F | SYSTOLIC BLOOD PRESSURE: 102 MMHG | HEART RATE: 90 BPM | HEIGHT: 60 IN | WEIGHT: 140.6 LBS | BODY MASS INDEX: 27.61 KG/M2 | OXYGEN SATURATION: 96 % | DIASTOLIC BLOOD PRESSURE: 42 MMHG

## 2024-08-01 DIAGNOSIS — I35.1 SEVERE AORTIC INSUFFICIENCY: ICD-10-CM

## 2024-08-01 DIAGNOSIS — Z87.891 PERSONAL HISTORY OF TOBACCO USE: ICD-10-CM

## 2024-08-01 DIAGNOSIS — R06.02 SOB (SHORTNESS OF BREATH) ON EXERTION: ICD-10-CM

## 2024-08-01 DIAGNOSIS — Z01.818 PRE-OPERATIVE CLEARANCE: ICD-10-CM

## 2024-08-01 DIAGNOSIS — J43.2 CENTRILOBULAR EMPHYSEMA (HCC): Primary | ICD-10-CM

## 2024-08-01 RX ORDER — ALBUTEROL SULFATE 90 UG/1
2 AEROSOL, METERED RESPIRATORY (INHALATION) EVERY 4 HOURS PRN
Qty: 54 G | Refills: 2 | Status: SHIPPED | OUTPATIENT
Start: 2024-08-01 | End: 2024-08-01

## 2024-08-01 ASSESSMENT — ENCOUNTER SYMPTOMS
WHEEZING: 0
ALLERGIC/IMMUNOLOGIC NEGATIVE: 1
COUGH: 1
EYES NEGATIVE: 1
SHORTNESS OF BREATH: 1
GASTROINTESTINAL NEGATIVE: 1

## 2024-08-01 NOTE — PROGRESS NOTES
[facility]    Patient: HOOD GRIMALDO, 59 y.o.   : 1965  2024    Pt of Dr. Ames     Subjective     Chief Complaint   Patient presents with    Follow-up     SOB 3 month f/u CTA chest 24. Per pt cx CT lung screen d/t having CTA chest done.         MELITA Echeverria is here for pre-operative clearance for upcoming aortic valve replacement surgery to be done per Dr. Guillory on 2024  Patient with centrilobular emphysema; PFT testing done earlier this year with no obstruction seen.   Current everyday smoker   BMI 25  Currently no inhaler use seen - patient denies any need for use  CTA done recently with small bilateral effusions seen and atelectasis- (-) PE  No home oxygen use  A1A swab last office visit - normal MM  SOB more with exertion - patient feels SOB is more cardiac related   Denies any previous issues with anesthesia   LHC showed no obstructive CAD. However, repeat TTE showed a drop in the LVEF to 40%, with moderate-severe AI, and mild MR     Progress History:   Since last visit any new medical issues? Yes need for open heart surgery   New ER or hospital visits? Yes 24-24 admitted for pulmonary edema   Any new or changes in medicines? No  Using inhalers? No  Are they helpful? No  Immunization History   Administered Date(s) Administered    Influenza, FLUARIX, FLULAVAL, FLUZONE (age 6 mo+) AND AFLURIA, (age 3 y+), PF, 0.5mL 10/05/2020    Pneumococcal, PCV-13, PREVNAR 13, (age 6w+), IM, 0.5mL 2020    Pneumococcal, PCV20, PREVNAR 20, (age 6w+), IM, 0.5mL 2022    TDaP, ADACEL (age 10y-64y), BOOSTRIX (age 10y+), IM, 0.5mL 2020, 2022    Zoster Recombinant (Shingrix) 2021, 2021       Tobacco Use      Smoking status: Every Day        Packs/day: 0.00        Years: 1 pack/day for 43.5 years (42.8 ttl pk-yrs)        Types: Cigarettes        Start date:         Last attempt to quit: 2024        Years since quitting:

## 2024-08-01 NOTE — CARE COORDINATION
Ambulatory Care Coordination Note     8/1/2024 12:26 PM     Patient outreach attempt by this ACM today to perform care management follow up . ACM was unable to reach the patient by telephone today; left voice message requesting a return phone call to this ACM.     ACM: Radha Curiel RN     Care Summary Note:   Recent admission for acute pulmonary edema. Bumex 1 mg daily started   Cardiology follow up - Hold bumex/bumetanide. Cut lisinopril as discussed with Andrew. Check on labs again tomorrow for further adjustments   Pulmonology follow up today    Plan -   8/7 PAT  8/12 AVR surgery  CHF zones  Follow up with [facility] or [facility]   May need referral to oral surgeon or periodontist   Smoking cessation  Report fast HR or symptoms ASAP  Continue cutting down on cigarettes by 1 every couple days then set a quit date  Early symptom recognition and reporting to prevent ED and admissions  Use same or next day appts at PCP office for non-emergency problems    PCP/Specialist follow up:   Future Appointments         Provider Specialty Dept Phone    8/7/2024 10:30 AM [facility] Pre-Admission Testing [phone]    8/28/2024 1:00 PM Loi Dawson MD Cardiology [phone]    9/5/2024 2:45 PM Teja Hayward MD Cardiology [phone]    9/10/2024 10:00 AM Maxwell Mcnulty PA-C Cardiothoracic Surgery [phone]    3/12/2025 11:00 AM Joycelyn Mcdermott, APRN - CNP Rheumatology [phone]            Follow Up:   Plan for next AC outreach in approximately 1 week to complete:  - CC Protocol assessments  - disease specific assessments  - goal progression  - education .

## 2024-08-02 ENCOUNTER — TELEPHONE (OUTPATIENT)
Dept: CARDIOTHORACIC SURGERY | Age: 59
End: 2024-08-02

## 2024-08-02 NOTE — PROGRESS NOTES
PAT VISIT  Appointment reminder call given  Date:8/7/24  Arrival time:   1000  and location; [facility]st [facility]   Bring Drivers license and insurance  Bring Medications in original bottles  Please be ready to give Urine Sample  If possible bring caregiver for appointment  Take am medications with water unless you are holding any for surgery  Appointment may last 2 hours

## 2024-08-05 ENCOUNTER — PATIENT MESSAGE (OUTPATIENT)
Dept: FAMILY MEDICINE CLINIC | Age: 59
End: 2024-08-05

## 2024-08-05 ENCOUNTER — PATIENT MESSAGE (OUTPATIENT)
Dept: CARE COORDINATION | Age: 59
End: 2024-08-05

## 2024-08-05 DIAGNOSIS — M47.817 SPONDYLOSIS WITHOUT MYELOPATHY OR RADICULOPATHY, LUMBOSACRAL REGION: ICD-10-CM

## 2024-08-05 DIAGNOSIS — M54.2 CHRONIC NECK PAIN: ICD-10-CM

## 2024-08-05 DIAGNOSIS — G89.29 CHRONIC NECK PAIN: ICD-10-CM

## 2024-08-05 NOTE — TELEPHONE ENCOUNTER
From: Jacki Moura  To: Kyle Smith  Sent: 8/5/2024 5:20 PM EDT  Subject: Refill     Hi Andrew,  I need a refill on my Lisinopril please and thanks.  Jacki

## 2024-08-06 ENCOUNTER — CARE COORDINATION (OUTPATIENT)
Dept: CARE COORDINATION | Age: 59
End: 2024-08-06

## 2024-08-06 RX ORDER — LISINOPRIL 20 MG/1
20 TABLET ORAL 2 TIMES DAILY
Qty: 180 TABLET | Refills: 3 | Status: SHIPPED | OUTPATIENT
Start: 2024-08-06

## 2024-08-06 RX ORDER — MELOXICAM 7.5 MG/1
7.5 TABLET ORAL DAILY
Qty: 90 TABLET | Refills: 1 | Status: SHIPPED | OUTPATIENT
Start: 2024-08-06 | End: 2025-02-02

## 2024-08-06 NOTE — TELEPHONE ENCOUNTER
Contacted patient by phone to discuss dental clearance. She has an appt with [facility] 8/9 and [facility] 9/6.

## 2024-08-06 NOTE — CARE COORDINATION
Ambulatory Care Coordination Note     2024 9:57 AM     Patient Current Location:  [place] [address] [place] [address]     Patient contacted the patient by telephone. Verified name and  with patient as identifiers.         ACM: Radha Curiel RN     Challenges to be reviewed by the provider   Additional needs identified to be addressed with provider No  none               Method of communication with provider: none.    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 Fractured in  but pain is worse last couple years. MRI done 2023 and was advised PT. PCP referred to [facility] Dr. Guerra. Balance needs paid before they will schedule her. She did see a surgeon in [place] in past, [facility].   Emphysema - now est with pulmonology. Combivent Respimat ordered. Smoking cessation clinic referral. Cut down to 1/2 pack daily. Advised to cut down every couple days.  Wants adrenal gland removed. Working with employer to get this covered by [employer]? Following with endo Dr. Noble     Recent admission for chest pain. Cath done - non-obstructive CAD, EF 35-40%, mod to severe AI, ST. EP and CT surgery consulted. Scheduled for mechanical AVR with Dr. Guillory .   Recent admission for acute pulmonary edema. Bumex 1 mg daily started but now on hold per cardiology  Needs dental clearance for mechanical AVR surgery. Has appt this Friday. She has several teeth that need pulled. She is in contact with CV office.   Daily weights 1-2 fluctuations. Advised to report if gains 3 lbs in 1 day or 5 in 1 week, leg swelling, SOB. Denies symptoms at this time.     Plan -    PAT   [facility]   AVR surgery   [facility]    cardiology   [facility]  CHF zones  [facility] or [facility]   May need referral to oral surgeon or periodontist   Smoking cessation  Report fast HR or symptoms ASAP  Continue cutting down on cigarettes by 1 every couple days then set a

## 2024-08-07 ENCOUNTER — HOSPITAL ENCOUNTER (OUTPATIENT)
Dept: PREADMISSION TESTING | Age: 59
Discharge: HOME | End: 2024-08-07

## 2024-08-07 ENCOUNTER — TELEPHONE (OUTPATIENT)
Dept: CARDIOTHORACIC SURGERY | Age: 59
End: 2024-08-07

## 2024-08-07 DIAGNOSIS — I65.21 STENOSIS OF RIGHT CAROTID ARTERY: Primary | ICD-10-CM

## 2024-08-07 PROBLEM — R79.89 ELEVATED TROPONIN: Status: RESOLVED | Noted: 2024-07-05 | Resolved: 2024-08-07

## 2024-08-07 NOTE — PROGRESS NOTES
Maria Teresa called said pt is unable to get into dentist until Friday  So will need to reschedule surgery date at a later date due they don't know if pt will need extractions.

## 2024-08-07 NOTE — TELEPHONE ENCOUNTER
Received a verbal order from Dr. Guillory to order a carotid duplex to evaluate right carotid stenosis per Dr. Cabrera.     Imaging scheduled for 08/08/2024

## 2024-08-08 ENCOUNTER — HOSPITAL ENCOUNTER (OUTPATIENT)
Dept: INTERVENTIONAL RADIOLOGY/VASCULAR | Age: 59
Discharge: HOME | End: 2024-08-10
Attending: THORACIC SURGERY (CARDIOTHORACIC VASCULAR SURGERY)
Payer: COMMERCIAL

## 2024-08-08 DIAGNOSIS — I65.21 STENOSIS OF RIGHT CAROTID ARTERY: ICD-10-CM

## 2024-08-08 PROCEDURE — 93880 EXTRACRANIAL BILAT STUDY: CPT

## 2024-08-10 DIAGNOSIS — G89.29 CHRONIC MYOFASCIAL PAIN: ICD-10-CM

## 2024-08-10 DIAGNOSIS — M79.18 CHRONIC MYOFASCIAL PAIN: ICD-10-CM

## 2024-08-12 RX ORDER — HYDROCODONE BITARTRATE AND ACETAMINOPHEN 5; 325 MG/1; MG/1
1 TABLET ORAL EVERY 8 HOURS PRN
Qty: 90 TABLET | Refills: 0 | Status: SHIPPED | OUTPATIENT
Start: 2024-08-12 | End: 2024-09-11

## 2024-08-19 ENCOUNTER — OFFICE VISIT (OUTPATIENT)
Dept: FAMILY MEDICINE CLINIC | Age: 59
End: 2024-08-19
Payer: COMMERCIAL

## 2024-08-19 VITALS
TEMPERATURE: 99 F | OXYGEN SATURATION: 98 % | DIASTOLIC BLOOD PRESSURE: 74 MMHG | WEIGHT: 139.8 LBS | SYSTOLIC BLOOD PRESSURE: 126 MMHG | BODY MASS INDEX: 27.3 KG/M2 | HEART RATE: 72 BPM

## 2024-08-19 DIAGNOSIS — R20.0 NUMBNESS AND TINGLING OF LEFT LEG: ICD-10-CM

## 2024-08-19 DIAGNOSIS — R20.2 NUMBNESS AND TINGLING OF LEFT LEG: ICD-10-CM

## 2024-08-19 DIAGNOSIS — M54.16 CHRONIC RADICULAR LUMBAR PAIN: ICD-10-CM

## 2024-08-19 DIAGNOSIS — R73.03: Primary | ICD-10-CM

## 2024-08-19 DIAGNOSIS — G89.29 CHRONIC RADICULAR LUMBAR PAIN: ICD-10-CM

## 2024-08-19 DIAGNOSIS — R25.2 MUSCLE CRAMPS: ICD-10-CM

## 2024-08-19 PROCEDURE — 3074F SYST BP LT 130 MM HG: CPT | Performed by: NURSE PRACTITIONER

## 2024-08-19 PROCEDURE — 99213 OFFICE O/P EST LOW 20 MIN: CPT | Performed by: NURSE PRACTITIONER

## 2024-08-19 PROCEDURE — 3078F DIAST BP <80 MM HG: CPT | Performed by: NURSE PRACTITIONER

## 2024-08-19 RX ORDER — PREGABALIN 50 MG/1
50 CAPSULE ORAL 3 TIMES DAILY
Qty: 90 CAPSULE | Refills: 1 | Status: SHIPPED | OUTPATIENT
Start: 2024-08-19 | End: 2024-10-18

## 2024-08-19 RX ORDER — CYCLOBENZAPRINE HCL 10 MG
10 TABLET ORAL 3 TIMES DAILY PRN
Qty: 90 TABLET | Refills: 1 | Status: SHIPPED | OUTPATIENT
Start: 2024-08-19

## 2024-08-19 ASSESSMENT — ENCOUNTER SYMPTOMS
COUGH: 0
DIARRHEA: 0
CONSTIPATION: 0
RHINORRHEA: 0
EYE DISCHARGE: 0
SORE THROAT: 0
ALLERGIC/IMMUNOLOGIC NEGATIVE: 1
ABDOMINAL PAIN: 0
NAUSEA: 0
SHORTNESS OF BREATH: 0
WHEEZING: 0
BACK PAIN: 0
TROUBLE SWALLOWING: 0
EYE REDNESS: 0
EYE PAIN: 0
VOMITING: 0

## 2024-08-19 NOTE — PROGRESS NOTES
[facility] PROFESSIONAL SERVS  [facility]  [address] [id number]  Dept: [phone]  Dept Fax: [phone]  Loc: [phone]     Visit Date:  8/19/2024      Patient:  Jacki Moura  YOB: 1965    HPI:     Chief Complaint   Patient presents with    Follow-up     Patient has new surgery date of September 9th, they are doing her carotid artery at the same time; needs any lab draws TM wants done, does state she did get dental clearance        Patient here to follow-up on current status and change of date to her TAVR surgery is now on September 9.  Patient also needs medication refills.  Is doing somewhat better overall but still having intermittent chest pain for which she has to take up to 3 doses of aspirin.  Pain is managed with her narcotic pain medication.        Medications    Current Outpatient Medications:     pregabalin (LYRICA) 50 MG capsule, Take 1 capsule by mouth 3 times daily for 60 days. Max Daily Amount: 150 mg, Disp: 90 capsule, Rfl: 1    cyclobenzaprine (FLEXERIL) 10 MG tablet, Take 1 tablet by mouth 3 times daily as needed for Muscle spasms, Disp: 90 tablet, Rfl: 1    HYDROcodone-acetaminophen (NORCO) 5-325 MG per tablet, Take 1 tablet by mouth every 8 hours as needed for Pain for up to 30 days. Intended supply: 30 days. Take lowest dose possible to manage pain Max Daily Amount: 3 tablets, Disp: 90 tablet, Rfl: 0    meloxicam (MOBIC) 7.5 MG tablet, Take 1 tablet by mouth daily, Disp: 90 tablet, Rfl: 1    lisinopril (PRINIVIL;ZESTRIL) 20 MG tablet, Take 1 tablet by mouth 2 times daily, Disp: 180 tablet, Rfl: 3    ALPRAZolam (XANAX) 0.5 MG tablet, Take 1 tablet by mouth 3 times daily as needed for Sleep or Anxiety for up to 30 days. Max Daily Amount: 1.5 mg, Disp: 90 tablet, Rfl: 0    atorvastatin (LIPITOR) 40 MG tablet, Take 1 tablet by mouth daily, Disp: 30 tablet, Rfl: 3    clopidogrel (PLAVIX) 75 MG tablet, Take 1 tablet by mouth

## 2024-08-21 ENCOUNTER — CARE COORDINATION (OUTPATIENT)
Dept: CARE COORDINATION | Age: 59
End: 2024-08-21

## 2024-08-21 NOTE — CARE COORDINATION
problems      Offered patient enrollment in the Remote Patient Monitoring (RPM) program for in-home monitoring: Yes, but did not enroll at this time: already monitoring with home equipment.     Assessments Completed:   Care Coordination Interventions    Referral from Primary Care Provider: No  Suggested Interventions and Community Resources  Medi Set or Pill Pack: Declined  Occupational Therapy: Declined  Physical Therapy: Declined  Transportation Support: Declined       and   Hypertension - Encounter Level              Medications Reviewed:   Patient denies any changes with medications and reports taking all medications as prescribed.    Advance Care Planning:   Referral to internal ACP facilitator     Care Planning:    Goals Addressed    None          PCP/Specialist follow up:   Future Appointments         Provider Specialty Dept Phone    9/5/2024 2:45 PM Teja Hayward MD Cardiology [phone]    3/12/2025 11:00 AM Joycelyn Mcdermott, APRN - [facility] Rheumatology [phone]            Follow Up:   Plan for next ACM outreach in approximately 3 weeks to complete:  - CC Protocol assessments  - disease specific assessments  - goal progression  - education .   Patient  is agreeable to this plan.

## 2024-08-22 DIAGNOSIS — F41.9 ANXIETY: ICD-10-CM

## 2024-08-22 RX ORDER — ALPRAZOLAM 0.5 MG/1
0.5 TABLET ORAL 3 TIMES DAILY PRN
Qty: 90 TABLET | Refills: 0 | Status: SHIPPED | OUTPATIENT
Start: 2024-08-22 | End: 2024-09-21

## 2024-08-28 ENCOUNTER — TELEPHONE (OUTPATIENT)
Dept: CARDIOTHORACIC SURGERY | Age: 59
End: 2024-08-28

## 2024-08-28 ENCOUNTER — TELEPHONE (OUTPATIENT)
Age: 59
End: 2024-08-28

## 2024-08-28 NOTE — TELEPHONE ENCOUNTER
Pt is scheduled for MECHANICAL AVR with DR SEYMOUR, and Right CEA with Dr. Cabrera on 10/21/2024.     Surgery instructions are as follows:    - [facility] at 530am for a 730 start time.    - NPO after midnight the night before surgery    - OK to continue ASA    - Take METOPROLOL the morning of surgery with a small sip of water.    - Hold PLAVIX 7 days prior to surgery.    - Hold LISINOPRIL 2 days prior to surgery.    -Hold all OTC vitamins, herbal supplements, including CBD Oil.     -must have a  the day of surgery, this is an inpatient procedure.    Dr. Cabrera to start first, and Dr. Seymour to follow.     PAT visit scheduled for 10/16/2024 10:30 am    Post op with Thoracic surgery  on 11/19/2024 at 10:15 am with Maxwell Mcnulty PA-C

## 2024-08-28 NOTE — TELEPHONE ENCOUNTER
Surgery instructions are as follows:     - Arrive to [facility] at [facility] at 530am for a 730 start time.     - NPO after midnight the night before surgery     - OK to continue ASA     - Take METOPROLOL the morning of surgery with a small sip of water.     - Hold PLAVIX 7 days prior to surgery.     - Hold LISINOPRIL 2 days prior to surgery.     -Hold all OTC vitamins, herbal supplements, including CBD Oil.      -must have a  the day of surgery, this is an inpatient procedure.     Dr. Cabrera to start first, and Dr. Guillory to follow.      PAT visit scheduled for 10/16/2024 10:30 am      Post op with Thoracic surgery  on 11/19/2024 at 10:15 am with Maxwell Mcnulty PA-C

## 2024-08-28 NOTE — TELEPHONE ENCOUNTER
Surgery Scheduling Form   [facility] [address]  [place], [place] [address]    Phone * [phone]   Surgical Scheduling Direct line Phone * [phone]  Fax * [phone]      Jacki G Zeny      1965    female    [address] [place] [address]   Marital Status:    M     Home Phone: [phone]   Cell Phone:   Telephone Information:   Mobile [phone]              Surgeon: Dr. Cabrera   Surgery Date:10/21/2024 Time: 7:30 am     Procedure: Right Carotid Edarterectomy ( combined surgery with Dr. Pastora agosto AVR) Dr. Cabrera to work first.             Inpatient      Diagnosis: Right Carotid Stenosis  I65.21    CPT Codes: 16393    Latex Allergy:   no Cardiac Device:  no    Anesthesia Type: General    Case Location:  OR     Preadmission Testing: Phone Call      Need Preop Cardiac Clearance:   no    Does patient have Cardiologist/physician? Yes, Dr. Hayward      Surgery Conformation #:  ______________________________________________    : _Maria Teresa [facility]___Date:_8/28/2024__________________

## 2024-08-30 ENCOUNTER — APPOINTMENT (OUTPATIENT)
Dept: GENERAL RADIOLOGY | Age: 59
DRG: 291 | End: 2024-08-30
Payer: COMMERCIAL

## 2024-08-30 ENCOUNTER — HOSPITAL ENCOUNTER (INPATIENT)
Age: 59
LOS: 6 days | Discharge: HOME | DRG: 291 | End: 2024-09-05
Attending: EMERGENCY MEDICINE | Admitting: STUDENT IN AN ORGANIZED HEALTH CARE EDUCATION/TRAINING PROGRAM
Payer: COMMERCIAL

## 2024-08-30 ENCOUNTER — APPOINTMENT (OUTPATIENT)
Dept: CT IMAGING | Age: 59
DRG: 291 | End: 2024-08-30
Payer: COMMERCIAL

## 2024-08-30 DIAGNOSIS — R79.89 ELEVATED LFTS: ICD-10-CM

## 2024-08-30 DIAGNOSIS — D64.9 NORMOCYTIC ANEMIA: ICD-10-CM

## 2024-08-30 DIAGNOSIS — N17.9 AKI (ACUTE KIDNEY INJURY) (HCC): ICD-10-CM

## 2024-08-30 DIAGNOSIS — I35.1 SEVERE AORTIC REGURGITATION: ICD-10-CM

## 2024-08-30 DIAGNOSIS — J81.0 ACUTE PULMONARY EDEMA (HCC): Primary | ICD-10-CM

## 2024-08-30 PROBLEM — J96.01 ACUTE HYPOXIC RESPIRATORY FAILURE (HCC): Status: ACTIVE | Noted: 2024-08-30

## 2024-08-30 LAB
ALBUMIN SERPL BCG-MCNC: 3.3 G/DL (ref 3.5–5.1)
ALBUMIN SERPL BCG-MCNC: 4.2 G/DL (ref 3.5–5.1)
ALP SERPL-CCNC: 257 U/L (ref 38–126)
ALP SERPL-CCNC: 286 U/L (ref 38–126)
ALT SERPL W/O P-5'-P-CCNC: 18 U/L (ref 11–66)
ALT SERPL W/O P-5'-P-CCNC: 25 U/L (ref 11–66)
ANION GAP SERPL CALC-SCNC: 11 MEQ/L (ref 8–16)
ANION GAP SERPL CALC-SCNC: 17 MEQ/L (ref 8–16)
ANION GAP SERPL CALC-SCNC: 21 MEQ/L (ref 8–16)
ARTERIAL PATENCY WRIST A: POSITIVE
AST SERPL-CCNC: 14 U/L (ref 5–40)
AST SERPL-CCNC: 23 U/L (ref 5–40)
B-OH-BUTYR SERPL-MSCNC: 2.61 MG/DL (ref 0.2–2.81)
BASE EXCESS BLDA CALC-SCNC: -4.4 MMOL/L (ref -2.5–2.5)
BASOPHILS ABSOLUTE: 0 THOU/MM3 (ref 0–0.1)
BASOPHILS ABSOLUTE: 0.1 THOU/MM3 (ref 0–0.1)
BASOPHILS NFR BLD AUTO: 0.4 %
BASOPHILS NFR BLD AUTO: 0.7 %
BDY SITE: ABNORMAL
BILIRUB SERPL-MCNC: 0.2 MG/DL (ref 0.3–1.2)
BILIRUB SERPL-MCNC: 0.2 MG/DL (ref 0.3–1.2)
BUN SERPL-MCNC: 19 MG/DL (ref 7–22)
BUN SERPL-MCNC: 19 MG/DL (ref 7–22)
BUN SERPL-MCNC: 20 MG/DL (ref 7–22)
CA-I BLD ISE-SCNC: 1.07 MMOL/L (ref 1.12–1.32)
CALCIUM SERPL-MCNC: 7.9 MG/DL (ref 8.5–10.5)
CALCIUM SERPL-MCNC: 8.9 MG/DL (ref 8.5–10.5)
CALCIUM SERPL-MCNC: 9.3 MG/DL (ref 8.5–10.5)
CHLORIDE SERPL-SCNC: 100 MEQ/L (ref 98–111)
CHLORIDE SERPL-SCNC: 106 MEQ/L (ref 98–111)
CHLORIDE SERPL-SCNC: 99 MEQ/L (ref 98–111)
CO2 SERPL-SCNC: 18 MEQ/L (ref 23–33)
CO2 SERPL-SCNC: 20 MEQ/L (ref 23–33)
CO2 SERPL-SCNC: 23 MEQ/L (ref 23–33)
COLLECTED BY:: ABNORMAL
CREAT SERPL-MCNC: 0.8 MG/DL (ref 0.4–1.2)
CREAT SERPL-MCNC: 0.9 MG/DL (ref 0.4–1.2)
CREAT SERPL-MCNC: 1.1 MG/DL (ref 0.4–1.2)
D DIMER PPP IA.FEU-MCNC: 2675 NG/ML FEU (ref 0–500)
DEPRECATED RDW RBC AUTO: 47.6 FL (ref 35–45)
DEPRECATED RDW RBC AUTO: 47.7 FL (ref 35–45)
DEVICE: ABNORMAL
EKG ATRIAL RATE: 138 BPM
EKG P AXIS: 60 DEGREES
EKG P-R INTERVAL: 140 MS
EKG Q-T INTERVAL: 276 MS
EKG QRS DURATION: 78 MS
EKG QTC CALCULATION (BAZETT): 418 MS
EKG R AXIS: 40 DEGREES
EKG T AXIS: 70 DEGREES
EKG VENTRICULAR RATE: 138 BPM
EOSINOPHIL NFR BLD AUTO: 0.5 %
EOSINOPHIL NFR BLD AUTO: 4 %
EOSINOPHILS ABSOLUTE: 0.1 THOU/MM3 (ref 0–0.4)
EOSINOPHILS ABSOLUTE: 0.2 THOU/MM3 (ref 0–0.4)
ERYTHROCYTE [DISTWIDTH] IN BLOOD BY AUTOMATED COUNT: 13.2 % (ref 11.5–14.5)
ERYTHROCYTE [DISTWIDTH] IN BLOOD BY AUTOMATED COUNT: 13.5 % (ref 11.5–14.5)
FIO2 ON VENT O2 ANALYZER: 6 %
GFR SERPL CREATININE-BSD FRML MDRD: 58 ML/MIN/1.73M2
GFR SERPL CREATININE-BSD FRML MDRD: 73 ML/MIN/1.73M2
GFR SERPL CREATININE-BSD FRML MDRD: 85 ML/MIN/1.73M2
GLUCOSE SERPL-MCNC: 106 MG/DL (ref 70–108)
GLUCOSE SERPL-MCNC: 124 MG/DL (ref 70–108)
GLUCOSE SERPL-MCNC: 148 MG/DL (ref 70–108)
HCO3 BLDA-SCNC: 20 MMOL/L (ref 23–28)
HCT VFR BLD AUTO: 25 % (ref 37–47)
HCT VFR BLD AUTO: 33 % (ref 37–47)
HGB BLD-MCNC: 10 GM/DL (ref 12–16)
HGB BLD-MCNC: 7.9 GM/DL (ref 12–16)
IMM GRANULOCYTES # BLD AUTO: 0.04 THOU/MM3 (ref 0–0.07)
IMM GRANULOCYTES # BLD AUTO: 0.09 THOU/MM3 (ref 0–0.07)
IMM GRANULOCYTES NFR BLD AUTO: 0.7 %
IMM GRANULOCYTES NFR BLD AUTO: 0.7 %
INR PPP: 0.91 (ref 0.85–1.13)
LACTATE SERPL-SCNC: 0.7 MMOL/L (ref 0.5–2)
LACTIC ACID, SEPSIS: 0.9 MMOL/L (ref 0.5–1.9)
LACTIC ACID, SEPSIS: 3.3 MMOL/L (ref 0.5–1.9)
LYMPHOCYTES ABSOLUTE: 0.5 THOU/MM3 (ref 1–4.8)
LYMPHOCYTES ABSOLUTE: 0.9 THOU/MM3 (ref 1–4.8)
LYMPHOCYTES NFR BLD AUTO: 16.3 %
LYMPHOCYTES NFR BLD AUTO: 3.3 %
MAGNESIUM SERPL-MCNC: 2.2 MG/DL (ref 1.6–2.4)
MAGNESIUM SERPL-MCNC: 2.6 MG/DL (ref 1.6–2.4)
MCH RBC QN AUTO: 29.6 PG (ref 26–33)
MCH RBC QN AUTO: 30.4 PG (ref 26–33)
MCHC RBC AUTO-ENTMCNC: 30.3 GM/DL (ref 32.2–35.5)
MCHC RBC AUTO-ENTMCNC: 31.6 GM/DL (ref 32.2–35.5)
MCV RBC AUTO: 96.2 FL (ref 81–99)
MCV RBC AUTO: 97.6 FL (ref 81–99)
MONOCYTES ABSOLUTE: 0.1 THOU/MM3 (ref 0.4–1.3)
MONOCYTES ABSOLUTE: 0.9 THOU/MM3 (ref 0.4–1.3)
MONOCYTES NFR BLD AUTO: 2.3 %
MONOCYTES NFR BLD AUTO: 6.7 %
MRSA DNA SPEC QL NAA+PROBE: NEGATIVE
NEUTROPHILS ABSOLUTE: 12.2 THOU/MM3 (ref 1.8–7.7)
NEUTROPHILS ABSOLUTE: 4.3 THOU/MM3 (ref 1.8–7.7)
NEUTROPHILS NFR BLD AUTO: 76 %
NEUTROPHILS NFR BLD AUTO: 88.4 %
NRBC BLD AUTO-RTO: 0 /100 WBC
NRBC BLD AUTO-RTO: 0 /100 WBC
NT-PROBNP SERPL IA-MCNC: 1663 PG/ML (ref 0–124)
OSMOLALITY SERPL CALC.SUM OF ELEC: 278.7 MOSMOL/KG (ref 275–300)
PCO2 BLDA: 36 MMHG (ref 35–45)
PH BLDA: 7.37 [PH] (ref 7.35–7.45)
PHOSPHATE SERPL-MCNC: 4 MG/DL (ref 2.4–4.7)
PLATELET # BLD AUTO: 210 THOU/MM3 (ref 130–400)
PLATELET # BLD AUTO: 263 THOU/MM3 (ref 130–400)
PMV BLD AUTO: 10.7 FL (ref 9.4–12.4)
PMV BLD AUTO: 10.8 FL (ref 9.4–12.4)
PO2 BLDA: 67 MMHG (ref 71–104)
POTASSIUM SERPL-SCNC: 3.7 MEQ/L (ref 3.5–5.2)
POTASSIUM SERPL-SCNC: 3.7 MEQ/L (ref 3.5–5.2)
POTASSIUM SERPL-SCNC: 4 MEQ/L (ref 3.5–5.2)
PROT SERPL-MCNC: 6.3 G/DL (ref 6.1–8)
PROT SERPL-MCNC: 8 G/DL (ref 6.1–8)
RBC # BLD AUTO: 2.6 MILL/MM3 (ref 4.2–5.4)
RBC # BLD AUTO: 3.38 MILL/MM3 (ref 4.2–5.4)
SALICYLATES SERPL-MCNC: 0.4 MG/DL (ref 2–10)
SAO2 % BLDA: 93 %
SODIUM SERPL-SCNC: 137 MEQ/L (ref 135–145)
SODIUM SERPL-SCNC: 138 MEQ/L (ref 135–145)
SODIUM SERPL-SCNC: 140 MEQ/L (ref 135–145)
T4 FREE SERPL-MCNC: 1.07 NG/DL (ref 0.93–1.68)
TROPONIN, HIGH SENSITIVITY: 40 NG/L (ref 0–12)
TROPONIN, HIGH SENSITIVITY: 40 NG/L (ref 0–12)
TROPONIN, HIGH SENSITIVITY: < 6 NG/L (ref 0–12)
TSH SERPL DL<=0.005 MIU/L-ACNC: 0.73 UIU/ML (ref 0.4–4.2)
VENTILATION MODE VENT: ABNORMAL
WBC # BLD AUTO: 13.8 THOU/MM3 (ref 4.8–10.8)
WBC # BLD AUTO: 5.7 THOU/MM3 (ref 4.8–10.8)

## 2024-08-30 PROCEDURE — 84484 ASSAY OF TROPONIN QUANT: CPT

## 2024-08-30 PROCEDURE — 2060000000 HC ICU INTERMEDIATE R&B

## 2024-08-30 PROCEDURE — 6360000002 HC RX W HCPCS

## 2024-08-30 PROCEDURE — 83605 ASSAY OF LACTIC ACID: CPT

## 2024-08-30 PROCEDURE — 6370000000 HC RX 637 (ALT 250 FOR IP): Performed by: EMERGENCY MEDICINE

## 2024-08-30 PROCEDURE — 2580000003 HC RX 258

## 2024-08-30 PROCEDURE — 85379 FIBRIN DEGRADATION QUANT: CPT

## 2024-08-30 PROCEDURE — 99231 SBSQ HOSP IP/OBS SF/LOW 25: CPT | Performed by: INTERNAL MEDICINE

## 2024-08-30 PROCEDURE — 80053 COMPREHEN METABOLIC PANEL: CPT

## 2024-08-30 PROCEDURE — 82010 KETONE BODYS QUAN: CPT

## 2024-08-30 PROCEDURE — 96375 TX/PRO/DX INJ NEW DRUG ADDON: CPT

## 2024-08-30 PROCEDURE — 6370000000 HC RX 637 (ALT 250 FOR IP)

## 2024-08-30 PROCEDURE — 93005 ELECTROCARDIOGRAM TRACING: CPT | Performed by: EMERGENCY MEDICINE

## 2024-08-30 PROCEDURE — 71045 X-RAY EXAM CHEST 1 VIEW: CPT

## 2024-08-30 PROCEDURE — 94660 CPAP INITIATION&MGMT: CPT

## 2024-08-30 PROCEDURE — 2700000000 HC OXYGEN THERAPY PER DAY

## 2024-08-30 PROCEDURE — 83735 ASSAY OF MAGNESIUM: CPT

## 2024-08-30 PROCEDURE — 80179 DRUG ASSAY SALICYLATE: CPT

## 2024-08-30 PROCEDURE — 37799 UNLISTED PX VASCULAR SURGERY: CPT

## 2024-08-30 PROCEDURE — 5A09357 ASSISTANCE WITH RESPIRATORY VENTILATION, LESS THAN 24 CONSECUTIVE HOURS, CONTINUOUS POSITIVE AIRWAY PRESSURE: ICD-10-PCS

## 2024-08-30 PROCEDURE — 84100 ASSAY OF PHOSPHORUS: CPT

## 2024-08-30 PROCEDURE — 94640 AIRWAY INHALATION TREATMENT: CPT

## 2024-08-30 PROCEDURE — 87641 MR-STAPH DNA AMP PROBE: CPT

## 2024-08-30 PROCEDURE — 84443 ASSAY THYROID STIM HORMONE: CPT

## 2024-08-30 PROCEDURE — 85025 COMPLETE CBC W/AUTO DIFF WBC: CPT

## 2024-08-30 PROCEDURE — 2580000003 HC RX 258: Performed by: EMERGENCY MEDICINE

## 2024-08-30 PROCEDURE — 93010 ELECTROCARDIOGRAM REPORT: CPT | Performed by: NUCLEAR MEDICINE

## 2024-08-30 PROCEDURE — 83880 ASSAY OF NATRIURETIC PEPTIDE: CPT

## 2024-08-30 PROCEDURE — 94761 N-INVAS EAR/PLS OXIMETRY MLT: CPT

## 2024-08-30 PROCEDURE — 71275 CT ANGIOGRAPHY CHEST: CPT

## 2024-08-30 PROCEDURE — 6360000004 HC RX CONTRAST MEDICATION: Performed by: EMERGENCY MEDICINE

## 2024-08-30 PROCEDURE — 82330 ASSAY OF CALCIUM: CPT

## 2024-08-30 PROCEDURE — 36415 COLL VENOUS BLD VENIPUNCTURE: CPT

## 2024-08-30 PROCEDURE — 99285 EMERGENCY DEPT VISIT HI MDM: CPT

## 2024-08-30 PROCEDURE — 36600 WITHDRAWAL OF ARTERIAL BLOOD: CPT

## 2024-08-30 PROCEDURE — 82803 BLOOD GASES ANY COMBINATION: CPT

## 2024-08-30 PROCEDURE — 85610 PROTHROMBIN TIME: CPT

## 2024-08-30 PROCEDURE — 84439 ASSAY OF FREE THYROXINE: CPT

## 2024-08-30 PROCEDURE — 96374 THER/PROPH/DIAG INJ IV PUSH: CPT

## 2024-08-30 PROCEDURE — 6360000002 HC RX W HCPCS: Performed by: EMERGENCY MEDICINE

## 2024-08-30 RX ORDER — LANOLIN ALCOHOL/MO/W.PET/CERES
3 CREAM (GRAM) TOPICAL NIGHTLY
Status: DISCONTINUED | OUTPATIENT
Start: 2024-08-30 | End: 2024-09-05 | Stop reason: HOSPADM

## 2024-08-30 RX ORDER — ACETAMINOPHEN 325 MG/1
650 TABLET ORAL EVERY 6 HOURS PRN
Status: DISCONTINUED | OUTPATIENT
Start: 2024-08-30 | End: 2024-09-05 | Stop reason: HOSPADM

## 2024-08-30 RX ORDER — ONDANSETRON 4 MG/1
4 TABLET, ORALLY DISINTEGRATING ORAL EVERY 8 HOURS PRN
Status: DISCONTINUED | OUTPATIENT
Start: 2024-08-30 | End: 2024-09-05 | Stop reason: HOSPADM

## 2024-08-30 RX ORDER — ALPRAZOLAM 0.5 MG
0.5 TABLET ORAL 3 TIMES DAILY PRN
Status: DISCONTINUED | OUTPATIENT
Start: 2024-08-30 | End: 2024-09-05 | Stop reason: HOSPADM

## 2024-08-30 RX ORDER — PREDNISONE 20 MG/1
60 TABLET ORAL ONCE
Status: COMPLETED | OUTPATIENT
Start: 2024-08-30 | End: 2024-08-30

## 2024-08-30 RX ORDER — LISINOPRIL 20 MG/1
20 TABLET ORAL 2 TIMES DAILY
Status: DISCONTINUED | OUTPATIENT
Start: 2024-08-30 | End: 2024-09-05 | Stop reason: HOSPADM

## 2024-08-30 RX ORDER — ASPIRIN 81 MG/1
81 TABLET ORAL DAILY
Status: DISCONTINUED | OUTPATIENT
Start: 2024-08-31 | End: 2024-09-05 | Stop reason: HOSPADM

## 2024-08-30 RX ORDER — IOPAMIDOL 755 MG/ML
80 INJECTION, SOLUTION INTRAVASCULAR
Status: COMPLETED | OUTPATIENT
Start: 2024-08-30 | End: 2024-08-30

## 2024-08-30 RX ORDER — HYDROCODONE BITARTRATE AND ACETAMINOPHEN 5; 325 MG/1; MG/1
1 TABLET ORAL EVERY 8 HOURS PRN
Status: DISCONTINUED | OUTPATIENT
Start: 2024-08-30 | End: 2024-09-05 | Stop reason: HOSPADM

## 2024-08-30 RX ORDER — SODIUM CHLORIDE 0.9 % (FLUSH) 0.9 %
5-40 SYRINGE (ML) INJECTION PRN
Status: DISCONTINUED | OUTPATIENT
Start: 2024-08-30 | End: 2024-09-05 | Stop reason: HOSPADM

## 2024-08-30 RX ORDER — ACETAMINOPHEN 500 MG
1000 TABLET ORAL EVERY 6 HOURS PRN
COMMUNITY

## 2024-08-30 RX ORDER — SODIUM CHLORIDE 9 MG/ML
INJECTION, SOLUTION INTRAVENOUS PRN
Status: DISCONTINUED | OUTPATIENT
Start: 2024-08-30 | End: 2024-09-05 | Stop reason: HOSPADM

## 2024-08-30 RX ORDER — CLOPIDOGREL BISULFATE 75 MG/1
75 TABLET ORAL DAILY
Status: DISCONTINUED | OUTPATIENT
Start: 2024-08-30 | End: 2024-09-05 | Stop reason: HOSPADM

## 2024-08-30 RX ORDER — CYCLOBENZAPRINE HCL 10 MG
10 TABLET ORAL 3 TIMES DAILY PRN
Status: DISCONTINUED | OUTPATIENT
Start: 2024-08-30 | End: 2024-09-05 | Stop reason: HOSPADM

## 2024-08-30 RX ORDER — ONDANSETRON 2 MG/ML
4 INJECTION INTRAMUSCULAR; INTRAVENOUS EVERY 6 HOURS PRN
Status: DISCONTINUED | OUTPATIENT
Start: 2024-08-30 | End: 2024-09-05 | Stop reason: HOSPADM

## 2024-08-30 RX ORDER — 0.9 % SODIUM CHLORIDE 0.9 %
1000 INTRAVENOUS SOLUTION INTRAVENOUS ONCE
Status: COMPLETED | OUTPATIENT
Start: 2024-08-30 | End: 2024-08-30

## 2024-08-30 RX ORDER — SODIUM CHLORIDE 0.9 % (FLUSH) 0.9 %
5-40 SYRINGE (ML) INJECTION EVERY 12 HOURS SCHEDULED
Status: DISCONTINUED | OUTPATIENT
Start: 2024-08-30 | End: 2024-09-05 | Stop reason: HOSPADM

## 2024-08-30 RX ORDER — MAGNESIUM SULFATE IN WATER 40 MG/ML
2000 INJECTION, SOLUTION INTRAVENOUS PRN
Status: DISCONTINUED | OUTPATIENT
Start: 2024-08-30 | End: 2024-09-05 | Stop reason: HOSPADM

## 2024-08-30 RX ORDER — ATORVASTATIN CALCIUM 40 MG/1
40 TABLET, FILM COATED ORAL DAILY
Status: DISCONTINUED | OUTPATIENT
Start: 2024-08-30 | End: 2024-09-05 | Stop reason: HOSPADM

## 2024-08-30 RX ORDER — ACETAMINOPHEN 650 MG/1
650 SUPPOSITORY RECTAL EVERY 6 HOURS PRN
Status: DISCONTINUED | OUTPATIENT
Start: 2024-08-30 | End: 2024-09-05 | Stop reason: HOSPADM

## 2024-08-30 RX ORDER — NITROGLYCERIN 20 MG/100ML
50 INJECTION INTRAVENOUS CONTINUOUS
Status: DISCONTINUED | OUTPATIENT
Start: 2024-08-30 | End: 2024-08-31

## 2024-08-30 RX ORDER — BUMETANIDE 1 MG/1
1 TABLET ORAL DAILY
Status: DISCONTINUED | OUTPATIENT
Start: 2024-08-30 | End: 2024-08-30

## 2024-08-30 RX ORDER — BUMETANIDE 0.25 MG/ML
0.5 INJECTION INTRAMUSCULAR; INTRAVENOUS ONCE
Status: COMPLETED | OUTPATIENT
Start: 2024-08-30 | End: 2024-08-30

## 2024-08-30 RX ORDER — PREGABALIN 50 MG/1
50 CAPSULE ORAL 3 TIMES DAILY
Status: DISCONTINUED | OUTPATIENT
Start: 2024-08-30 | End: 2024-09-03

## 2024-08-30 RX ORDER — POTASSIUM CHLORIDE 7.45 MG/ML
10 INJECTION INTRAVENOUS PRN
Status: DISCONTINUED | OUTPATIENT
Start: 2024-08-30 | End: 2024-09-05 | Stop reason: HOSPADM

## 2024-08-30 RX ORDER — ENOXAPARIN SODIUM 100 MG/ML
40 INJECTION SUBCUTANEOUS DAILY
Status: DISCONTINUED | OUTPATIENT
Start: 2024-08-30 | End: 2024-09-03

## 2024-08-30 RX ORDER — NITROGLYCERIN 20 MG/100ML
5-200 INJECTION INTRAVENOUS CONTINUOUS
Status: DISCONTINUED | OUTPATIENT
Start: 2024-08-30 | End: 2024-08-30

## 2024-08-30 RX ORDER — LORAZEPAM 2 MG/ML
1 INJECTION INTRAMUSCULAR ONCE
Status: COMPLETED | OUTPATIENT
Start: 2024-08-30 | End: 2024-08-30

## 2024-08-30 RX ORDER — POLYETHYLENE GLYCOL 3350 17 G/17G
17 POWDER, FOR SOLUTION ORAL DAILY PRN
Status: DISCONTINUED | OUTPATIENT
Start: 2024-08-30 | End: 2024-09-05 | Stop reason: HOSPADM

## 2024-08-30 RX ORDER — POTASSIUM CHLORIDE 1500 MG/1
40 TABLET, EXTENDED RELEASE ORAL PRN
Status: DISCONTINUED | OUTPATIENT
Start: 2024-08-30 | End: 2024-09-05 | Stop reason: HOSPADM

## 2024-08-30 RX ORDER — IPRATROPIUM BROMIDE AND ALBUTEROL SULFATE 2.5; .5 MG/3ML; MG/3ML
1 SOLUTION RESPIRATORY (INHALATION) ONCE
Status: COMPLETED | OUTPATIENT
Start: 2024-08-30 | End: 2024-08-30

## 2024-08-30 RX ADMIN — BUMETANIDE 0.5 MG: 0.25 INJECTION INTRAMUSCULAR; INTRAVENOUS at 01:55

## 2024-08-30 RX ADMIN — PREGABALIN 50 MG: 50 CAPSULE ORAL at 20:33

## 2024-08-30 RX ADMIN — LORAZEPAM 1 MG: 2 INJECTION INTRAMUSCULAR; INTRAVENOUS at 01:52

## 2024-08-30 RX ADMIN — NITROGLYCERIN 50 MCG/MIN: 20 INJECTION INTRAVENOUS at 21:32

## 2024-08-30 RX ADMIN — SODIUM CHLORIDE 1000 ML: 9 INJECTION, SOLUTION INTRAVENOUS at 01:19

## 2024-08-30 RX ADMIN — BUMETANIDE 0.5 MG/HR: 0.25 INJECTION INTRAMUSCULAR; INTRAVENOUS at 06:01

## 2024-08-30 RX ADMIN — ATORVASTATIN CALCIUM 40 MG: 40 TABLET, FILM COATED ORAL at 20:34

## 2024-08-30 RX ADMIN — ALPRAZOLAM 0.5 MG: 0.5 TABLET ORAL at 22:07

## 2024-08-30 RX ADMIN — ALPRAZOLAM 0.5 MG: 0.5 TABLET ORAL at 11:46

## 2024-08-30 RX ADMIN — SODIUM CHLORIDE, PRESERVATIVE FREE 10 ML: 5 INJECTION INTRAVENOUS at 20:34

## 2024-08-30 RX ADMIN — IPRATROPIUM BROMIDE AND ALBUTEROL SULFATE 1 DOSE: 2.5; .5 SOLUTION RESPIRATORY (INHALATION) at 01:30

## 2024-08-30 RX ADMIN — SODIUM CHLORIDE 1000 ML: 9 INJECTION, SOLUTION INTRAVENOUS at 04:27

## 2024-08-30 RX ADMIN — PREDNISONE 60 MG: 20 TABLET ORAL at 01:23

## 2024-08-30 RX ADMIN — HYDROCODONE BITARTRATE AND ACETAMINOPHEN 1 TABLET: 5; 325 TABLET ORAL at 20:33

## 2024-08-30 RX ADMIN — NITROGLYCERIN 50 MCG/MIN: 20 INJECTION INTRAVENOUS at 06:00

## 2024-08-30 RX ADMIN — IOPAMIDOL 80 ML: 755 INJECTION, SOLUTION INTRAVENOUS at 02:03

## 2024-08-30 RX ADMIN — CLOPIDOGREL BISULFATE 75 MG: 75 TABLET ORAL at 20:33

## 2024-08-30 RX ADMIN — ENOXAPARIN SODIUM 40 MG: 100 INJECTION SUBCUTANEOUS at 09:02

## 2024-08-30 RX ADMIN — Medication 3 MG: at 22:07

## 2024-08-30 ASSESSMENT — PAIN DESCRIPTION - DESCRIPTORS
DESCRIPTORS: ACHING
DESCRIPTORS: ACHING

## 2024-08-30 ASSESSMENT — PAIN DESCRIPTION - PAIN TYPE
TYPE: ACUTE PAIN;CHRONIC PAIN
TYPE: ACUTE PAIN

## 2024-08-30 ASSESSMENT — PAIN DESCRIPTION - LOCATION
LOCATION: HEAD;BACK
LOCATION: BACK

## 2024-08-30 ASSESSMENT — PAIN - FUNCTIONAL ASSESSMENT
PAIN_FUNCTIONAL_ASSESSMENT: NONE - DENIES PAIN
PAIN_FUNCTIONAL_ASSESSMENT: ACTIVITIES ARE NOT PREVENTED
PAIN_FUNCTIONAL_ASSESSMENT: ACTIVITIES ARE NOT PREVENTED

## 2024-08-30 ASSESSMENT — PAIN SCALES - GENERAL
PAINLEVEL_OUTOF10: 8
PAINLEVEL_OUTOF10: 7

## 2024-08-30 ASSESSMENT — PAIN DESCRIPTION - FREQUENCY
FREQUENCY: INTERMITTENT
FREQUENCY: INTERMITTENT

## 2024-08-30 ASSESSMENT — PAIN DESCRIPTION - ONSET
ONSET: ON-GOING
ONSET: ON-GOING

## 2024-08-30 ASSESSMENT — PAIN DESCRIPTION - ORIENTATION: ORIENTATION: LOWER

## 2024-08-30 NOTE — ED TRIAGE NOTES
Pt to Ed c/o shortness of breath. Pt states this started an hour ago. Pt states she took 3 baby aspirin 15 min ago. Pt denies wearing O2 at home. Pt states her doctor told her she needs an Aortic valve replacement. On arrival pt O2 at 83%. Pt placed on 6L NC. Pt A&O.

## 2024-08-30 NOTE — SIGNIFICANT EVENT
On evaluation of patient at bedside in ED, patient noted to have significantly worse blood pressure and MAP than noted at time of admission. Patient blood pressures noted to be 80s/60s with MAP in mid 60s after receiving Bumex 0.5mg. Concerns that further diuresis would worsen with further diuresis. Given current vitals, would benefit from transfer to ICU for pressor support during diuresis versus being on Stepdown.    Discuss concerns with patient who understands plan.    Discussed patient case with ICU, who accepts transfer at this time.

## 2024-08-30 NOTE — CARE COORDINATION
Case Management Assessment Initial Evaluation    Date/Time of Evaluation: 2024 8:09 AM  Assessment Completed by: Lety Nelson RN    If patient is discharged prior to next notation, then this note serves as note for discharge by case management.    Patient Name: Jacki Moura                   YOB: 1965  Diagnosis: Acute pulmonary edema (HCC) [J81.0]  Severe aortic regurgitation [I35.1]  Acute hypoxic respiratory failure (HCC) [J96.01]                   Date / Time: 2024 12:57 AM  [facility]     Patient Admission Status: Inpatient   Readmission Risk Low 0-14, Mod 15-19), High > 20: Readmission Risk Score: 23.8    Current PCP: Kyle Smith APRN - CNP  Health Care Decision Makers:   Primary Decision Maker: ZenySergei - Spouse - [phone]    Secondary Decision Maker: ZenyMono - Child - [phone]    Supplemental (Other) Decision Maker: Kailey Moura - Other Relative - [phone]    Additional Case Management Notes: Presented to ED with c/o shortness of breath. Sats 83% on room air. Pt ended up on bipap in ED. BP dropped to 80's/60's after received bumex. Admitted to ICU. ARJUN. Now on 4L O2 with sats 91%. Afebrile. NSR. Ox4. Follows commands. Telemetry. Bumex @ 0.5 mg/hr, nitro @ 50 mcg/min, prn xanax, lovenox, Electrolyte replacement protocols. Received duoneb x1, 60 mg po prednisone x1, and 2L in fld bolus x1 today. Pro-bnp 1663, high trop 40, alb 3.3, alk phos 257, wbc 13.8, hgb 7.9, d-dimer 2675.     Procedures: none    Imagin/30 CXR: Diffuse bilateral interstitial opacities most consistent with recurrent interstitial edema.   CTA Chest: 1. Study is limited by motion artifact. No central pulmonary embolism.  2. Findings consistent with pulmonary edema.    Patient Goals/Plan/Treatment Preferences: Home w/. Denies needs, declines HH.      24 1210   Service Assessment   Patient Orientation Alert and Oriented   Cognition Alert   History  Provided By Patient   Primary Caregiver Self   Accompanied By/Relationship  Sergei   Support Systems Spouse/Significant Other   Patient's Healthcare Decision Maker is: Legal Next of Kin  (Spiritual Care consulted for assistance with HCPOA paperwork)   PCP Verified by CM Yes   Prior Functional Level Assistance with the following:;Housework;Cooking   Current Functional Level Cooking;Housework;Assistance with the following:   Can patient return to prior living arrangement Yes   Ability to make needs known: Good   Family able to assist with home care needs: Yes   Would you like for me to discuss the discharge plan with any other family members/significant others, and if so, who? No   Financial Resources Other (Comment)  ([employer])   Community Resources None   Social/Functional History   Active  Yes   Discharge Planning   Type of Residence House   Living Arrangements Spouse/Significant Other   Current Services Prior To Admission Durable Medical Equipment  (Was not using PTA; but has if needed)   Current DME Prior to Arrival Walker;Bedside Commode   Potential Assistance Needed N/A   DME Ordered? No   Potential Assistance Purchasing Medications No   Type of Home Care Services None   Patient expects to be discharged to: House   Services At/After Discharge   Confirm Follow Up Transport Family   Condition of Participation: Discharge Planning   The Plan for Transition of Care is related to the following treatment goals: \"Go home and take care of myself.\"

## 2024-08-30 NOTE — PROGRESS NOTES
Pt is 59y.o. female, propped up in bed visiting with spouse, in 4D-03. Please refer to ACP note for context, re: AD's.     08/30/24 1250   Encounter Summary   Encounter Overview/Reason Advance Care Planning   Service Provided For Patient and family together   Referral/Consult From Multi-disciplinary team   Support System Spouse   Last Encounter  08/30/24   Complexity of Encounter Moderate   Begin Time 1250   End Time  1303   Total Time Calculated 13 min   Advance Care Planning   Type ACP conversation   Assessment/Intervention/Outcome   Assessment Compromised coping;Calm;Impaired resilience;Peaceful   Intervention Active listening;Prayer (assurance of)/Bath;Explored/Affirmed feelings, thoughts, concerns;Discussed illness injury and it’s impact   Outcome Engaged in conversation;Expressed feelings, needs, and concerns;Expressed Gratitude;Receptive

## 2024-08-30 NOTE — H&P
History & Physical  Internal Medicine Resident         Patient: Jacki Moura 59 y.o. female      : 1965  Date of Admission: 2024  Date of Service: Pt seen/examined on 24 and Admitted to Inpatient with expected LOS greater than two midnights due to medical therapy.       ASSESSMENT AND PLAN  Acute hypoxic respiratory failure, 2/2 volume overload after Bumex cessation: Patient previously on Bumex, stopped 2/2 ARJUN, after which patient developed shortness of breath on  at 1900.  Patient resumed on Bumex at 0.5 Mg in addition to started on BiPAP 2/2 ABG 7.37/36/67/20. D-dimer 2675. CTA chest negative for PE.  Patient requires diuresis, blood pressures soft with MAP in 60s  Discuss patient case with ICU, who accepts admission with plans to start on Bumex gtt for diuresis for pressor support  HFpEF iso severe aortic regurgitation: TTE 2024 monitor for EF 50%, LV normal size, normal wall thickness, low normal to normal wall motion.  Moderate to severe regurgitation of the aortic valve.  Mildly dilated left atrium.  HAGMA: AG 21 on admission. Delta gap 9.0. Lactic acid 3.3 in ED, improved to 0.9 on admission to [facility].  Pending BHB  Salicylate level pending  Will continue to monitor  Chronic Conditions (reviewed and stable unless otherwise stated)   COPD ruled out: Per chart review, stated to have history of COPD. PFTs from 2024 noted for no diagnosis of restrictive or obstructive lung disease.  Chronic normocytic anemia: B12/folate ordered. Will continue to monitor. If hemoglobin < 7, will transfuse.  Hypertension: holding home antihypertensive 2/2 ongoing hypotension  Hyperlipidemia: continue home statin  Severe aortic regurgitation: Patient scheduled for mechanical aortic valve repair on 10/21/2024.  Nonobstructive CAD: continue aspirin, Plavix, and Lipitor  Cushing disease, 2/2 adrenal tumor: Follows with PCP for monitoring  Overweight: BMI 27.30  Resolved  ARJUN, 2/2 volume overload:  Amount: 3 tablets   aspirin 81 MG EC tablet   Yes No   Sig: Take 1 tablet by mouth daily   atorvastatin (LIPITOR) 40 MG tablet   No No   Sig: Take 1 tablet by mouth daily   bumetanide (BUMEX) 1 MG tablet   No No   Sig: Take 1 tablet by mouth daily   Patient not taking: Reported on 2024   clopidogrel (PLAVIX) 75 MG tablet   No No   Sig: Take 1 tablet by mouth daily   cyclobenzaprine (FLEXERIL) 10 MG tablet   No No   Sig: Take 1 tablet by mouth 3 times daily as needed for Muscle spasms   lisinopril (PRINIVIL;ZESTRIL) 20 MG tablet   No No   Sig: Take 1 tablet by mouth 2 times daily   meloxicam (MOBIC) 7.5 MG tablet   No No   Sig: Take 1 tablet by mouth daily   pregabalin (LYRICA) 50 MG capsule   No No   Sig: Take 1 capsule by mouth 3 times daily for 60 days. Max Daily Amount: 150 mg      Facility-Administered Medications: None     Allergies:  Pletal [cilostazol], Bactrim [sulfamethoxazole-trimethoprim], Codeine, Medrol [methylprednisolone], Sulfa antibiotics, Tramadol, and Ultram [tramadol hcl]    Past Medical, Family, Social, Surgical Hx      Diagnosis Date    Arthritis     Coronary artery disease involving native coronary artery of native heart without angina pectoris 2024    Cushing's disease (HCC)     Hyperlipidemia     Hypertension          Procedure Laterality Date    BACK SURGERY      CARDIAC PROCEDURE N/A 2024    Left heart cath / coronary angiography performed by Teja Hayward MD at [facility] CARDIAC CATH LAB    CARDIAC PROCEDURE N/A 2024    Fractional flow reserve (FFR) performed by Teja Hayward MD at [facility] CARDIAC CATH LAB    CARDIAC PROCEDURE N/A 2024    Aortic root aortogram performed by Teja Hayward MD at [facility] CARDIAC CATH LAB     SECTION      x 4    COLONOSCOPY      DILATION AND CURETTAGE OF UTERUS      ENDOSCOPY, COLON, DIAGNOSTIC      FEMORAL ENDARTERECTOMY Left 2021    LEFT  FEMORAL ARTERY ENDARTERECTOMY performed by Zaho Gilliland MD at [facility] OR    FOOT SURGERY

## 2024-08-30 NOTE — PROGRESS NOTES
Patient arrived to unit from ED via bed. Patient transferred to ICU bed and placed on continuous ICU bedside monitor. Patient admitted for Acute pulmonary edema (HCC) [J81.0]  Severe aortic regurgitation [I35.1]  Acute hypoxic respiratory failure (HCC) [J96.01]. Vitals obtained. See flowsheets. Patient's IV access includes 20G right wrist and 20G right forearm. Current infusions and rates of infusion include none. Assessment completed by Edwin ENAMORADO. Two nurse skin assessment completed by Edwin ENAMORADO and Joycelyn ENAMORADO. See flowsheets for assessment details. Policies and procedures of ICU able to be explained to patient at this time. Family member(s)/representative(s) present at time of admission include none. Patient rights explained to family member(s)/representatives and patient, as able. Patient/patient's family member(s)/representative(s) N/A to have physician notified of their admission. All questions posed by patient's family member(s)/representative(s) and patient answered at this time.     0435 Fluid bolus stopped on arrival to ICU.   171

## 2024-08-30 NOTE — ED NOTES
Report received from FEI Carroll. Pt's blood pressure was made aware to Dr. Ojeda via FEI Carroll.

## 2024-08-30 NOTE — ED NOTES
Dr. Ojeda updated on pt's blood pressure. Verbal order per Dr. Ojeda to bolus pt one liter normal saline.

## 2024-08-30 NOTE — PLAN OF CARE
Patient was admitted early this a.m. (8/30) due to shortness of breath and fluid overload.  Patient was on diuretics and diuretics were recently stopped due to ARJUN from previous hospitalization.  Patient came in with fluid overload and started on BiPAP.  Patient was sent to the ICU due to blood pressure instability with diuretics.  Patient is currently on nitro and Bumex drip and tolerating it well.  Patient is on room air and saturating fine.  Patient was transferred out of the ICU today.     We will continue nitro and Bumex at this time.  Will order BMP for now and in the a.m. due to patient being on Bumex drip and will monitor electrolytes.  Potassium prior to Bumex drip being started was 3.7.    Hospitalist will see in the a.m.    Electronically signed by RYLAND Barrios on 8/30/2024 at 5:13 PM

## 2024-08-30 NOTE — CONSULTS
CRITICAL CARE CONSULT NOTE      Patient:  Jacki Moura    Unit/Bed:4D-03/003-A  YOB: 1965  MRN: [id number]   PCP: Kyle mSith APRN - CNP  Date of Admission: 8/30/2024  Chief Complaint:- SOB    Assessment and Plan:    Acute hypoxic respiratory failure (improving): Secondary to pulmonary edema/volume overload. Patient previously on Bumex; held secondary to ARJUN on previous hospitalization. Patient presented with SOB this morning. 8/30 CTA chest w/wo contrast showed findings consistent with pulmonary edema. 8/30 chest x-ray showed diffuse bilateral interstitial opacities most consistent with recurrent interstitial edema. ABG showed pO2 67, CO2 36. D-dimer 2,675; CT chest showed on pulmonary embolism. Started on Bumex 0.5 mg drip and nitroglycerin drip. Patient was initially on BiPAP and weaned to minimal settings; currently saturating at 99% on room air. Currently endorsing no chest pain or SOB.  Continue Bumex drip  Continue nitroglycerin drip   Repeat chest x-ray  Monitor for S/S of impending respiratory failure  Continue Bumex 1 mg p.o upon discontinuation of Bumex drip  Hx of aortic regurgitation: TTE 7/23/2024 showed EF 50%, moderate to severe regurgitation of aortic valve, mildly dilated left atrium. Patient scheduled for mechanical aortic valve repair on 10/21/2024.  Hypertension: Patient intially started on nitroglycerin drip in setting of pulmonary edema in the setting of aortic regurgitation and Bumex cessation. Patient currently hypertensive; currently on nitroglycerin drip. Home lisinopril held secondary to initial hypotension.  Continue nitroglycerin drip; wean as tolerated  Restart lisinopril once nitro drip discontinued  Elevated LFTs: likely shock liver secondary to initial hypotension. 8/4 albumin 3.3, alk phos 257, AST 14, ALT 18. Repeat LFTs tomorrow (8/31)  Chronic normocytic anemia:  Hemoglobin 7.9. B12/folate ordered. Monitor for S/S of active bleeding.  Hyperlipidemia:  signed by Flavia Cook, DO  CRITICAL CARE SPECIALIST

## 2024-08-30 NOTE — ACP (ADVANCE CARE PLANNING)
Advance Care Planning     Advance Care Planning Inpatient Note  Spiritual Care Department    Today's Date: 8/30/2024  Unit: [facility] ICU 4D    Received request from IDT Member.  Upon review of chart and communication with care team, patient's decision making abilities are not in question.. Patient and Spouse was/were present in the room during visit.    Goals of ACP Conversation:  Discuss advance care planning documents    Health Care Decision Makers:       Primary Decision Maker: Sergei Moura - Spouse - [phone]    Secondary Decision Maker: ZenyMono watkins - Child - [phone]    Supplemental (Other) Decision Maker: ZenyKailey - Other Relative - [phone]  Summary:  Documented Next of Kin, per patient report    Advance Care Planning Documents (Patient Wishes):  Healthcare Power of /Advance Directive Appointment of Health Care Agent  Living Will/Advance Directive     Assessment:  Pt is a 59y.o. female, propped up in bed in some pain and discomfort visiting with her , in 4D-03. Jacki shared that she is pretty sure she has documents at home but will have  check and bring in if so. Pt  expressed desire to complete documents here, if he doesn't have any. Following plan formulation,  prayed for pt-met with gratitude by both present.    Interventions:  Requested patient/family to submit existing document for our records: Healthcare Power of /Advance Directive Appointment of Health Care Agent  Living Will/Advance Directive  Provided education on documents for clarity and greater understanding    Care Preferences Communicated:   No    Outcomes/Plan:  Pt will notify spiritual health, once she knows between she and her , who has documents and who does not, then complete them while here.    Electronically signed by Chaplain Rhonda on 8/30/2024 at 2:19 PM

## 2024-08-30 NOTE — SIGNIFICANT EVENT
Patient stable enough to be transferred; will go to room 4K-28. Report called to Dr. Celine Rai; messaged acknowledged.

## 2024-08-31 LAB
ANION GAP SERPL CALC-SCNC: 12 MEQ/L (ref 8–16)
ANION GAP SERPL CALC-SCNC: 20 MEQ/L (ref 8–16)
BUN SERPL-MCNC: 24 MG/DL (ref 7–22)
BUN SERPL-MCNC: 28 MG/DL (ref 7–22)
CALCIUM SERPL-MCNC: 9 MG/DL (ref 8.5–10.5)
CALCIUM SERPL-MCNC: 9.1 MG/DL (ref 8.5–10.5)
CHLORIDE SERPL-SCNC: 93 MEQ/L (ref 98–111)
CHLORIDE SERPL-SCNC: 99 MEQ/L (ref 98–111)
CO2 SERPL-SCNC: 26 MEQ/L (ref 23–33)
CO2 SERPL-SCNC: 26 MEQ/L (ref 23–33)
CREAT SERPL-MCNC: 1 MG/DL (ref 0.4–1.2)
CREAT SERPL-MCNC: 1.2 MG/DL (ref 0.4–1.2)
DEPRECATED RDW RBC AUTO: 43.9 FL (ref 35–45)
ERYTHROCYTE [DISTWIDTH] IN BLOOD BY AUTOMATED COUNT: 13.2 % (ref 11.5–14.5)
GFR SERPL CREATININE-BSD FRML MDRD: 52 ML/MIN/1.73M2
GFR SERPL CREATININE-BSD FRML MDRD: 65 ML/MIN/1.73M2
GLUCOSE SERPL-MCNC: 140 MG/DL (ref 70–108)
GLUCOSE SERPL-MCNC: 99 MG/DL (ref 70–108)
HCT VFR BLD AUTO: 28.3 % (ref 37–47)
HGB BLD-MCNC: 9.2 GM/DL (ref 12–16)
MAGNESIUM SERPL-MCNC: 2.2 MG/DL (ref 1.6–2.4)
MCH RBC QN AUTO: 29.9 PG (ref 26–33)
MCHC RBC AUTO-ENTMCNC: 32.5 GM/DL (ref 32.2–35.5)
MCV RBC AUTO: 91.9 FL (ref 81–99)
PLATELET # BLD AUTO: 297 THOU/MM3 (ref 130–400)
PMV BLD AUTO: 10.9 FL (ref 9.4–12.4)
POTASSIUM SERPL-SCNC: 3.2 MEQ/L (ref 3.5–5.2)
POTASSIUM SERPL-SCNC: 3.6 MEQ/L (ref 3.5–5.2)
RBC # BLD AUTO: 3.08 MILL/MM3 (ref 4.2–5.4)
SODIUM SERPL-SCNC: 137 MEQ/L (ref 135–145)
SODIUM SERPL-SCNC: 139 MEQ/L (ref 135–145)
WBC # BLD AUTO: 22.1 THOU/MM3 (ref 4.8–10.8)

## 2024-08-31 PROCEDURE — 2580000003 HC RX 258

## 2024-08-31 PROCEDURE — 6370000000 HC RX 637 (ALT 250 FOR IP)

## 2024-08-31 PROCEDURE — 2060000000 HC ICU INTERMEDIATE R&B

## 2024-08-31 PROCEDURE — 99232 SBSQ HOSP IP/OBS MODERATE 35: CPT | Performed by: INTERNAL MEDICINE

## 2024-08-31 PROCEDURE — 85027 COMPLETE CBC AUTOMATED: CPT

## 2024-08-31 PROCEDURE — 6360000002 HC RX W HCPCS

## 2024-08-31 PROCEDURE — 36415 COLL VENOUS BLD VENIPUNCTURE: CPT

## 2024-08-31 PROCEDURE — 80048 BASIC METABOLIC PNL TOTAL CA: CPT

## 2024-08-31 PROCEDURE — 83735 ASSAY OF MAGNESIUM: CPT

## 2024-08-31 RX ADMIN — Medication 3 MG: at 19:45

## 2024-08-31 RX ADMIN — ALPRAZOLAM 0.5 MG: 0.5 TABLET ORAL at 09:39

## 2024-08-31 RX ADMIN — ENOXAPARIN SODIUM 40 MG: 100 INJECTION SUBCUTANEOUS at 09:33

## 2024-08-31 RX ADMIN — HYDROCODONE BITARTRATE AND ACETAMINOPHEN 1 TABLET: 5; 325 TABLET ORAL at 06:02

## 2024-08-31 RX ADMIN — SODIUM CHLORIDE, PRESERVATIVE FREE 10 ML: 5 INJECTION INTRAVENOUS at 09:35

## 2024-08-31 RX ADMIN — ACETAMINOPHEN 650 MG: 325 TABLET ORAL at 09:43

## 2024-08-31 RX ADMIN — SODIUM CHLORIDE, PRESERVATIVE FREE 10 ML: 5 INJECTION INTRAVENOUS at 19:45

## 2024-08-31 RX ADMIN — HYDROCODONE BITARTRATE AND ACETAMINOPHEN 1 TABLET: 5; 325 TABLET ORAL at 16:18

## 2024-08-31 RX ADMIN — CLOPIDOGREL BISULFATE 75 MG: 75 TABLET ORAL at 09:34

## 2024-08-31 RX ADMIN — ALPRAZOLAM 0.5 MG: 0.5 TABLET ORAL at 21:16

## 2024-08-31 RX ADMIN — PREGABALIN 50 MG: 50 CAPSULE ORAL at 13:58

## 2024-08-31 RX ADMIN — CYCLOBENZAPRINE 10 MG: 10 TABLET, FILM COATED ORAL at 09:43

## 2024-08-31 RX ADMIN — ASPIRIN 81 MG: 81 TABLET, COATED ORAL at 09:34

## 2024-08-31 RX ADMIN — PREGABALIN 50 MG: 50 CAPSULE ORAL at 09:33

## 2024-08-31 RX ADMIN — CYCLOBENZAPRINE 10 MG: 10 TABLET, FILM COATED ORAL at 21:16

## 2024-08-31 RX ADMIN — ATORVASTATIN CALCIUM 40 MG: 40 TABLET, FILM COATED ORAL at 09:33

## 2024-08-31 RX ADMIN — PREGABALIN 50 MG: 50 CAPSULE ORAL at 19:45

## 2024-08-31 RX ADMIN — CYCLOBENZAPRINE 10 MG: 10 TABLET, FILM COATED ORAL at 16:18

## 2024-08-31 RX ADMIN — ALPRAZOLAM 0.5 MG: 0.5 TABLET ORAL at 16:17

## 2024-08-31 RX ADMIN — BUMETANIDE 0.5 MG/HR: 0.25 INJECTION INTRAMUSCULAR; INTRAVENOUS at 01:01

## 2024-08-31 ASSESSMENT — PAIN DESCRIPTION - FREQUENCY
FREQUENCY: CONTINUOUS
FREQUENCY: INTERMITTENT

## 2024-08-31 ASSESSMENT — PAIN DESCRIPTION - PAIN TYPE
TYPE: CHRONIC PAIN
TYPE: ACUTE PAIN;CHRONIC PAIN

## 2024-08-31 ASSESSMENT — PAIN - FUNCTIONAL ASSESSMENT
PAIN_FUNCTIONAL_ASSESSMENT: ACTIVITIES ARE NOT PREVENTED

## 2024-08-31 ASSESSMENT — PAIN DESCRIPTION - ONSET
ONSET: ON-GOING
ONSET: ON-GOING

## 2024-08-31 ASSESSMENT — PAIN DESCRIPTION - DESCRIPTORS
DESCRIPTORS: ACHING;DISCOMFORT
DESCRIPTORS: ACHING
DESCRIPTORS: ACHING;DISCOMFORT

## 2024-08-31 ASSESSMENT — PAIN DESCRIPTION - LOCATION
LOCATION: HEAD;BACK
LOCATION: NECK;SHOULDER
LOCATION: BACK
LOCATION: HEAD;BACK
LOCATION: BACK

## 2024-08-31 ASSESSMENT — PAIN SCALES - GENERAL
PAINLEVEL_OUTOF10: 5
PAINLEVEL_OUTOF10: 4
PAINLEVEL_OUTOF10: 9
PAINLEVEL_OUTOF10: 4
PAINLEVEL_OUTOF10: 4
PAINLEVEL_OUTOF10: 7
PAINLEVEL_OUTOF10: 6
PAINLEVEL_OUTOF10: 3

## 2024-08-31 ASSESSMENT — PAIN DESCRIPTION - ORIENTATION
ORIENTATION: LOWER
ORIENTATION: LOWER;RIGHT
ORIENTATION: LOWER

## 2024-08-31 NOTE — PROGRESS NOTES
(8/29). Patient was on BiPAP at time of examination; states that she is normally on room air at home. PFT performed 4/2024 negative for obstructive or restrictive lung disease. Patient denied fever, chills, chest pain, numbness/tingling, or weakness at time of examination.       ED course: Initial vitals, lab work, and imaging obtained in ED. Patient given 1L NS bolus, followed by resumption of diuresis with Bumex 0.5mg. CXR noted for bilateral pleural effusion. D-dimer elevated, CTA chest negative for PE. Decision to admit per ED team.      Following admission request, patient blood pressure noted to have decreased from MAP of 70s to MAP in mid 60s. On bedside admission, patient MAP noted to be 64. Discussed that patient would not benefit from admission to Stepdown and would likely require admission to ICU for pressor support while diuresing. Patient understood and agreed; subsequently transferred to ICU.  Subjective:  Patient seen and examined at bed side.Reports no complaints.No acute distress noted.    ROS   Denies fever, nausea/vomiting, chest pain, shortness of breath, headache, dizziness, abnormal urinary symptoms, blood in urine and stool, numbness/tingling.     Scheduled Meds:   sodium chloride flush  5-40 mL IntraVENous 2 times per day    enoxaparin  40 mg SubCUTAneous Daily    melatonin  3 mg Oral Nightly    aspirin  81 mg Oral Daily    atorvastatin  40 mg Oral Daily    clopidogrel  75 mg Oral Daily    [Held by provider] lisinopril  20 mg Oral BID    pregabalin  50 mg Oral TID     Continuous Infusions:   sodium chloride      bumetanide (BUMEX) 12.5 mg in sodium chloride 0.9 % 125 mL infusion 0.5 mg/hr (08/31/24 0101)    nitroGLYCERIN 50 mcg/min (08/30/24 2132)       PHYSICAL EXAMINATION:  T:  97.5.  P:  76. RR:  19. B/P:  107/38. O2 Sat:  100.  I/O:  -400 (8/30)  Body mass index is 27.13 kg/m².   GCS:   15  General:  Currently on BiPAP. Patient responsive to questioning  HEENT:  normocephalic and

## 2024-08-31 NOTE — PLAN OF CARE
Problem: Discharge Planning  Goal: Discharge to home or other facility with appropriate resources  Outcome: Progressing  Flowsheets (Taken 8/30/2024 2214)  Discharge to home or other facility with appropriate resources:   Identify barriers to discharge with patient and caregiver   Arrange for needed discharge resources and transportation as appropriate   Identify discharge learning needs (meds, wound care, etc)   Arrange for interpreters to assist at discharge as needed   Refer to discharge planning if patient needs post-hospital services based on physician order or complex needs related to functional status, cognitive ability or social support system     Problem: Safety - Adult  Goal: Free from fall injury  Outcome: Progressing  Flowsheets (Taken 8/30/2024 2214)  Free From Fall Injury:   Based on caregiver fall risk screen, instruct family/caregiver to ask for assistance with transferring infant if caregiver noted to have fall risk factors   Instruct family/caregiver on patient safety     Problem: Chronic Conditions and Co-morbidities  Goal: Patient's chronic conditions and co-morbidity symptoms are monitored and maintained or improved  Outcome: Progressing  Flowsheets (Taken 8/30/2024 2214)  Care Plan - Patient's Chronic Conditions and Co-Morbidity Symptoms are Monitored and Maintained or Improved:   Monitor and assess patient's chronic conditions and comorbid symptoms for stability, deterioration, or improvement   Collaborate with multidisciplinary team to address chronic and comorbid conditions and prevent exacerbation or deterioration   Update acute care plan with appropriate goals if chronic or comorbid symptoms are exacerbated and prevent overall improvement and discharge     Problem: Pain  Goal: Verbalizes/displays adequate comfort level or baseline comfort level  Outcome: Progressing  Flowsheets (Taken 8/30/2024 2214)  Verbalizes/displays adequate comfort level or baseline comfort level:   Assess pain  using appropriate pain scale   Encourage patient to monitor pain and request assistance   Administer analgesics based on type and severity of pain and evaluate response   Implement non-pharmacological measures as appropriate and evaluate response   Consider cultural and social influences on pain and pain management   Notify Licensed Independent Practitioner if interventions unsuccessful or patient reports new pain     Problem: ABCDS Injury Assessment  Goal: Absence of physical injury  Outcome: Progressing  Flowsheets (Taken 8/30/2024 2214)  Absence of Physical Injury: Implement safety measures based on patient assessment

## 2024-09-01 LAB
ANION GAP SERPL CALC-SCNC: 13 MEQ/L (ref 8–16)
ANION GAP SERPL CALC-SCNC: 21 MEQ/L (ref 8–16)
BUN SERPL-MCNC: 35 MG/DL (ref 7–22)
BUN SERPL-MCNC: 39 MG/DL (ref 7–22)
CALCIUM SERPL-MCNC: 9.6 MG/DL (ref 8.5–10.5)
CALCIUM SERPL-MCNC: 9.6 MG/DL (ref 8.5–10.5)
CHLORIDE SERPL-SCNC: 90 MEQ/L (ref 98–111)
CHLORIDE SERPL-SCNC: 95 MEQ/L (ref 98–111)
CO2 SERPL-SCNC: 24 MEQ/L (ref 23–33)
CO2 SERPL-SCNC: 32 MEQ/L (ref 23–33)
CREAT SERPL-MCNC: 1.2 MG/DL (ref 0.4–1.2)
CREAT SERPL-MCNC: 1.6 MG/DL (ref 0.4–1.2)
GFR SERPL CREATININE-BSD FRML MDRD: 37 ML/MIN/1.73M2
GFR SERPL CREATININE-BSD FRML MDRD: 52 ML/MIN/1.73M2
GLUCOSE SERPL-MCNC: 103 MG/DL (ref 70–108)
GLUCOSE SERPL-MCNC: 107 MG/DL (ref 70–108)
MAGNESIUM SERPL-MCNC: 2 MG/DL (ref 1.6–2.4)
POTASSIUM SERPL-SCNC: 3.2 MEQ/L (ref 3.5–5.2)
POTASSIUM SERPL-SCNC: 4.3 MEQ/L (ref 3.5–5.2)
SODIUM SERPL-SCNC: 135 MEQ/L (ref 135–145)
SODIUM SERPL-SCNC: 140 MEQ/L (ref 135–145)

## 2024-09-01 PROCEDURE — 36415 COLL VENOUS BLD VENIPUNCTURE: CPT

## 2024-09-01 PROCEDURE — 6370000000 HC RX 637 (ALT 250 FOR IP)

## 2024-09-01 PROCEDURE — 6370000000 HC RX 637 (ALT 250 FOR IP): Performed by: INTERNAL MEDICINE

## 2024-09-01 PROCEDURE — 80048 BASIC METABOLIC PNL TOTAL CA: CPT

## 2024-09-01 PROCEDURE — 2580000003 HC RX 258

## 2024-09-01 PROCEDURE — 83735 ASSAY OF MAGNESIUM: CPT

## 2024-09-01 PROCEDURE — 2060000000 HC ICU INTERMEDIATE R&B

## 2024-09-01 PROCEDURE — 6360000002 HC RX W HCPCS

## 2024-09-01 PROCEDURE — 99232 SBSQ HOSP IP/OBS MODERATE 35: CPT | Performed by: INTERNAL MEDICINE

## 2024-09-01 RX ORDER — DOCUSATE SODIUM 100 MG/1
100 CAPSULE, LIQUID FILLED ORAL DAILY
Status: DISCONTINUED | OUTPATIENT
Start: 2024-09-01 | End: 2024-09-05 | Stop reason: HOSPADM

## 2024-09-01 RX ADMIN — CYCLOBENZAPRINE 10 MG: 10 TABLET, FILM COATED ORAL at 20:34

## 2024-09-01 RX ADMIN — HYDROCODONE BITARTRATE AND ACETAMINOPHEN 1 TABLET: 5; 325 TABLET ORAL at 16:39

## 2024-09-01 RX ADMIN — Medication 3 MG: at 20:33

## 2024-09-01 RX ADMIN — ENOXAPARIN SODIUM 40 MG: 100 INJECTION SUBCUTANEOUS at 08:17

## 2024-09-01 RX ADMIN — ALPRAZOLAM 0.5 MG: 0.5 TABLET ORAL at 16:39

## 2024-09-01 RX ADMIN — SODIUM CHLORIDE, PRESERVATIVE FREE 10 ML: 5 INJECTION INTRAVENOUS at 08:18

## 2024-09-01 RX ADMIN — HYDROCODONE BITARTRATE AND ACETAMINOPHEN 1 TABLET: 5; 325 TABLET ORAL at 00:05

## 2024-09-01 RX ADMIN — ALPRAZOLAM 0.5 MG: 0.5 TABLET ORAL at 08:30

## 2024-09-01 RX ADMIN — POTASSIUM CHLORIDE 40 MEQ: 1500 TABLET, EXTENDED RELEASE ORAL at 20:37

## 2024-09-01 RX ADMIN — ALPRAZOLAM 0.5 MG: 0.5 TABLET ORAL at 22:19

## 2024-09-01 RX ADMIN — CLOPIDOGREL BISULFATE 75 MG: 75 TABLET ORAL at 08:17

## 2024-09-01 RX ADMIN — SODIUM CHLORIDE, PRESERVATIVE FREE 10 ML: 5 INJECTION INTRAVENOUS at 20:35

## 2024-09-01 RX ADMIN — PREGABALIN 50 MG: 50 CAPSULE ORAL at 14:35

## 2024-09-01 RX ADMIN — CYCLOBENZAPRINE 10 MG: 10 TABLET, FILM COATED ORAL at 17:47

## 2024-09-01 RX ADMIN — ASPIRIN 81 MG: 81 TABLET, COATED ORAL at 08:17

## 2024-09-01 RX ADMIN — HYDROCODONE BITARTRATE AND ACETAMINOPHEN 1 TABLET: 5; 325 TABLET ORAL at 08:17

## 2024-09-01 RX ADMIN — DOCUSATE SODIUM 100 MG: 100 CAPSULE, LIQUID FILLED ORAL at 20:33

## 2024-09-01 RX ADMIN — PREGABALIN 50 MG: 50 CAPSULE ORAL at 08:17

## 2024-09-01 RX ADMIN — BUMETANIDE 0.5 MG/HR: 0.25 INJECTION INTRAMUSCULAR; INTRAVENOUS at 04:16

## 2024-09-01 RX ADMIN — ATORVASTATIN CALCIUM 40 MG: 40 TABLET, FILM COATED ORAL at 08:17

## 2024-09-01 RX ADMIN — PREGABALIN 50 MG: 50 CAPSULE ORAL at 20:34

## 2024-09-01 ASSESSMENT — PAIN SCALES - GENERAL
PAINLEVEL_OUTOF10: 6
PAINLEVEL_OUTOF10: 2
PAINLEVEL_OUTOF10: 6
PAINLEVEL_OUTOF10: 4
PAINLEVEL_OUTOF10: 7
PAINLEVEL_OUTOF10: 4

## 2024-09-01 ASSESSMENT — PAIN DESCRIPTION - ORIENTATION
ORIENTATION: RIGHT
ORIENTATION: UPPER
ORIENTATION: RIGHT

## 2024-09-01 ASSESSMENT — PAIN DESCRIPTION - DESCRIPTORS
DESCRIPTORS: ACHING
DESCRIPTORS: ACHING

## 2024-09-01 ASSESSMENT — PAIN DESCRIPTION - LOCATION
LOCATION: SHOULDER;BACK
LOCATION: BACK;HEAD;NECK
LOCATION: SHOULDER;NECK;BACK

## 2024-09-01 NOTE — PLAN OF CARE
stability, deterioration, or improvement  9/1/2024 0625 by Jerzy Adams RN  Outcome: Progressing  Flowsheets (Taken 9/1/2024 0625)  Care Plan - Patient's Chronic Conditions and Co-Morbidity Symptoms are Monitored and Maintained or Improved:   Monitor and assess patient's chronic conditions and comorbid symptoms for stability, deterioration, or improvement   Collaborate with multidisciplinary team to address chronic and comorbid conditions and prevent exacerbation or deterioration  9/1/2024 0625 by Jerzy Adams RN  Outcome: Not Progressing  Flowsheets (Taken 9/1/2024 0625)  Care Plan - Patient's Chronic Conditions and Co-Morbidity Symptoms are Monitored and Maintained or Improved:   Monitor and assess patient's chronic conditions and comorbid symptoms for stability, deterioration, or improvement   Collaborate with multidisciplinary team to address chronic and comorbid conditions and prevent exacerbation or deterioration     Problem: Pain  Goal: Verbalizes/displays adequate comfort level or baseline comfort level  9/1/2024 1440 by Adenike Reid RN  Outcome: Progressing  Flowsheets (Taken 9/1/2024 0815)  Verbalizes/displays adequate comfort level or baseline comfort level: Encourage patient to monitor pain and request assistance  9/1/2024 0625 by Jerzy Adams RN  Outcome: Progressing  Flowsheets (Taken 9/1/2024 0625)  Verbalizes/displays adequate comfort level or baseline comfort level:   Encourage patient to monitor pain and request assistance   Administer analgesics based on type and severity of pain and evaluate response  9/1/2024 0625 by Jerzy Adams RN  Outcome: Not Progressing  Flowsheets (Taken 9/1/2024 0625)  Verbalizes/displays adequate comfort level or baseline comfort level:   Encourage patient to monitor pain and request assistance   Administer analgesics based on type and severity of pain and evaluate response     Problem: ABCDS Injury Assessment  Goal: Absence of physical injury  9/1/2024  1440 by Adenike Reid, RN  Outcome: Progressing  9/1/2024 0625 by Jerzy Adams, RN  Outcome: Progressing  Flowsheets (Taken 9/1/2024 0625)  Absence of Physical Injury: Implement safety measures based on patient assessment  9/1/2024 0625 by Jrezy Adams, RN  Outcome: Not Progressing  Flowsheets (Taken 9/1/2024 0625)  Absence of Physical Injury: Implement safety measures based on patient assessment   Care plan reviewed with patient.  Patient verbalizes understanding of the care plan and contributed to goal setting.

## 2024-09-01 NOTE — PLAN OF CARE
Problem: Discharge Planning  Goal: Discharge to home or other facility with appropriate resources  9/1/2024 0625 by Jerzy Adams RN  Outcome: Progressing  Flowsheets (Taken 9/1/2024 0625)  Discharge to home or other facility with appropriate resources:   Identify barriers to discharge with patient and caregiver   Identify discharge learning needs (meds, wound care, etc)     Problem: Safety - Adult  Goal: Free from fall injury  9/1/2024 0625 by Jerzy Adams RN  Outcome: Progressing  Flowsheets (Taken 9/1/2024 0625)  Free From Fall Injury: Instruct family/caregiver on patient safety     Problem: Chronic Conditions and Co-morbidities  Goal: Patient's chronic conditions and co-morbidity symptoms are monitored and maintained or improved  9/1/2024 0625 by Jerzy Adams RN  Outcome: Progressing  Flowsheets (Taken 9/1/2024 0625)  Care Plan - Patient's Chronic Conditions and Co-Morbidity Symptoms are Monitored and Maintained or Improved:   Monitor and assess patient's chronic conditions and comorbid symptoms for stability, deterioration, or improvement   Collaborate with multidisciplinary team to address chronic and comorbid conditions and prevent exacerbation or deterioration     Problem: Pain  Goal: Verbalizes/displays adequate comfort level or baseline comfort level  9/1/2024 0625 by Jerzy Adams RN  Outcome: Progressing  Flowsheets (Taken 9/1/2024 0625)  Verbalizes/displays adequate comfort level or baseline comfort level:   Encourage patient to monitor pain and request assistance   Administer analgesics based on type and severity of pain and evaluate response     Problem: ABCDS Injury Assessment  Goal: Absence of physical injury  9/1/2024 0625 by Jerzy Adams RN  Outcome: Progressing  Flowsheets (Taken 9/1/2024 0625)  Absence of Physical Injury: Implement safety measures based on patient assessment     Problem: Discharge Planning  Goal: Discharge to home or other facility with appropriate

## 2024-09-02 LAB
ANION GAP SERPL CALC-SCNC: 17 MEQ/L (ref 8–16)
BASOPHILS ABSOLUTE: 0.1 THOU/MM3 (ref 0–0.1)
BASOPHILS NFR BLD AUTO: 0.4 %
BUN SERPL-MCNC: 44 MG/DL (ref 7–22)
CALCIUM SERPL-MCNC: 9.1 MG/DL (ref 8.5–10.5)
CHLORIDE SERPL-SCNC: 93 MEQ/L (ref 98–111)
CO2 SERPL-SCNC: 24 MEQ/L (ref 23–33)
CREAT SERPL-MCNC: 1.3 MG/DL (ref 0.4–1.2)
DEPRECATED RDW RBC AUTO: 43.6 FL (ref 35–45)
EOSINOPHIL NFR BLD AUTO: 1.8 %
EOSINOPHILS ABSOLUTE: 0.3 THOU/MM3 (ref 0–0.4)
ERYTHROCYTE [DISTWIDTH] IN BLOOD BY AUTOMATED COUNT: 13.1 % (ref 11.5–14.5)
GFR SERPL CREATININE-BSD FRML MDRD: 47 ML/MIN/1.73M2
GLUCOSE SERPL-MCNC: 112 MG/DL (ref 70–108)
HCT VFR BLD AUTO: 35.4 % (ref 37–47)
HGB BLD-MCNC: 11.2 GM/DL (ref 12–16)
IMM GRANULOCYTES # BLD AUTO: 1.12 THOU/MM3 (ref 0–0.07)
IMM GRANULOCYTES NFR BLD AUTO: 7.2 %
LYMPHOCYTES ABSOLUTE: 3.4 THOU/MM3 (ref 1–4.8)
LYMPHOCYTES NFR BLD AUTO: 21.8 %
MCH RBC QN AUTO: 29 PG (ref 26–33)
MCHC RBC AUTO-ENTMCNC: 31.6 GM/DL (ref 32.2–35.5)
MCV RBC AUTO: 91.7 FL (ref 81–99)
MONOCYTES ABSOLUTE: 1.1 THOU/MM3 (ref 0.4–1.3)
MONOCYTES NFR BLD AUTO: 7.1 %
NEUTROPHILS ABSOLUTE: 9.6 THOU/MM3 (ref 1.8–7.7)
NEUTROPHILS NFR BLD AUTO: 61.7 %
NRBC BLD AUTO-RTO: 0 /100 WBC
PLATELET # BLD AUTO: 428 THOU/MM3 (ref 130–400)
PLATELET BLD QL SMEAR: ADEQUATE
PMV BLD AUTO: 10.6 FL (ref 9.4–12.4)
POTASSIUM SERPL-SCNC: 4 MEQ/L (ref 3.5–5.2)
RBC # BLD AUTO: 3.86 MILL/MM3 (ref 4.2–5.4)
SCAN OF BLOOD SMEAR: NORMAL
SODIUM SERPL-SCNC: 134 MEQ/L (ref 135–145)
WBC # BLD AUTO: 15.6 THOU/MM3 (ref 4.8–10.8)

## 2024-09-02 PROCEDURE — 85025 COMPLETE CBC W/AUTO DIFF WBC: CPT

## 2024-09-02 PROCEDURE — 36415 COLL VENOUS BLD VENIPUNCTURE: CPT

## 2024-09-02 PROCEDURE — 2580000003 HC RX 258

## 2024-09-02 PROCEDURE — 6370000000 HC RX 637 (ALT 250 FOR IP)

## 2024-09-02 PROCEDURE — 6370000000 HC RX 637 (ALT 250 FOR IP): Performed by: INTERNAL MEDICINE

## 2024-09-02 PROCEDURE — 6360000002 HC RX W HCPCS

## 2024-09-02 PROCEDURE — 80048 BASIC METABOLIC PNL TOTAL CA: CPT

## 2024-09-02 PROCEDURE — 99232 SBSQ HOSP IP/OBS MODERATE 35: CPT | Performed by: INTERNAL MEDICINE

## 2024-09-02 PROCEDURE — 2060000000 HC ICU INTERMEDIATE R&B

## 2024-09-02 RX ORDER — BUMETANIDE 1 MG/1
2 TABLET ORAL DAILY
Status: DISCONTINUED | OUTPATIENT
Start: 2024-09-02 | End: 2024-09-04

## 2024-09-02 RX ADMIN — CLOPIDOGREL BISULFATE 75 MG: 75 TABLET ORAL at 09:44

## 2024-09-02 RX ADMIN — HYDROCODONE BITARTRATE AND ACETAMINOPHEN 1 TABLET: 5; 325 TABLET ORAL at 23:22

## 2024-09-02 RX ADMIN — Medication 3 MG: at 20:01

## 2024-09-02 RX ADMIN — PREGABALIN 50 MG: 50 CAPSULE ORAL at 20:01

## 2024-09-02 RX ADMIN — CYCLOBENZAPRINE 10 MG: 10 TABLET, FILM COATED ORAL at 20:01

## 2024-09-02 RX ADMIN — ASPIRIN 81 MG: 81 TABLET, COATED ORAL at 09:44

## 2024-09-02 RX ADMIN — BUMETANIDE 2 MG: 1 TABLET ORAL at 10:45

## 2024-09-02 RX ADMIN — ALPRAZOLAM 0.5 MG: 0.5 TABLET ORAL at 20:01

## 2024-09-02 RX ADMIN — PREGABALIN 50 MG: 50 CAPSULE ORAL at 09:44

## 2024-09-02 RX ADMIN — ATORVASTATIN CALCIUM 40 MG: 40 TABLET, FILM COATED ORAL at 09:44

## 2024-09-02 RX ADMIN — CYCLOBENZAPRINE 10 MG: 10 TABLET, FILM COATED ORAL at 09:44

## 2024-09-02 RX ADMIN — BUMETANIDE 0.5 MG/HR: 0.25 INJECTION INTRAMUSCULAR; INTRAVENOUS at 04:47

## 2024-09-02 RX ADMIN — HYDROCODONE BITARTRATE AND ACETAMINOPHEN 1 TABLET: 5; 325 TABLET ORAL at 03:38

## 2024-09-02 RX ADMIN — ALPRAZOLAM 0.5 MG: 0.5 TABLET ORAL at 09:44

## 2024-09-02 RX ADMIN — PREGABALIN 50 MG: 50 CAPSULE ORAL at 14:22

## 2024-09-02 RX ADMIN — DOCUSATE SODIUM 100 MG: 100 CAPSULE, LIQUID FILLED ORAL at 10:45

## 2024-09-02 RX ADMIN — ENOXAPARIN SODIUM 40 MG: 100 INJECTION SUBCUTANEOUS at 09:44

## 2024-09-02 RX ADMIN — SODIUM CHLORIDE, PRESERVATIVE FREE 10 ML: 5 INJECTION INTRAVENOUS at 09:48

## 2024-09-02 RX ADMIN — SODIUM CHLORIDE, PRESERVATIVE FREE 10 ML: 5 INJECTION INTRAVENOUS at 20:10

## 2024-09-02 ASSESSMENT — PAIN SCALES - GENERAL
PAINLEVEL_OUTOF10: 7
PAINLEVEL_OUTOF10: 4
PAINLEVEL_OUTOF10: 9
PAINLEVEL_OUTOF10: 5
PAINLEVEL_OUTOF10: 5

## 2024-09-02 ASSESSMENT — PAIN DESCRIPTION - DESCRIPTORS
DESCRIPTORS: ACHING

## 2024-09-02 ASSESSMENT — PAIN - FUNCTIONAL ASSESSMENT
PAIN_FUNCTIONAL_ASSESSMENT: ACTIVITIES ARE NOT PREVENTED

## 2024-09-02 ASSESSMENT — PAIN DESCRIPTION - PAIN TYPE
TYPE: CHRONIC PAIN

## 2024-09-02 ASSESSMENT — PAIN DESCRIPTION - FREQUENCY
FREQUENCY: CONTINUOUS

## 2024-09-02 ASSESSMENT — PAIN DESCRIPTION - ONSET
ONSET: ON-GOING

## 2024-09-02 ASSESSMENT — PAIN DESCRIPTION - ORIENTATION
ORIENTATION: RIGHT;LOWER
ORIENTATION: RIGHT;LOWER
ORIENTATION: RIGHT
ORIENTATION: LOWER

## 2024-09-02 ASSESSMENT — PAIN DESCRIPTION - LOCATION
LOCATION: NECK;SHOULDER;BACK
LOCATION: NECK;SHOULDER;BACK
LOCATION: BACK
LOCATION: SHOULDER

## 2024-09-02 NOTE — PROGRESS NOTES
CRITICAL CARE CONSULT NOTE      Patient:  Jacki Moura    Unit/Bed:4K-28/028-A  YOB: 1965  MRN: [id number]   PCP: Kyle Smith APRN - CNP  Date of Admission: 8/30/2024  Chief Complaint:- SOB    Assessment and Plan:    #Acute hypoxic respiratory failure secondary to Volume overload.  - 8/30 CTA chest w/wo contrast showed findings consistent with pulmonary edema.   -8/30 chest x-ray showed diffuse bilateral interstitial opacities most consistent with recurrent interstitial edema.   -ABG showed pO2 67, CO2 36. D-dimer 2,675;   -CT A chest showed on pulmonary embolism.   -Was on Bumex drip and Nitro drip.  - Patient was initially placed on BiPAP and to room air.  -Currently saturating at 99% on room air.   -Discontinue Bumex drip.Wean off of Nitro drip.  -Started on oral bumex.    #Hx of aortic regurgitation:   #Non-obstructive CAD.  #HTN  #HLD  -TTE 7/23/2024 showed EF 50%, moderate to severe regurgitation of aortic valve, mildly dilated left atrium.   -Patient scheduled for mechanical aortic valve repair on 10/21/2024.  -Continue with aspirin,plavix and lipitor.  -Lisinopril held in the setting of Nitro and Bumex drip discontinued.  -Patient started on oral Bumex    #Elevated LFTs POA:   -likely from shocked liver secondary hypotension POA.  -Will repeat labs for AM.    #Chronic normocytic anemia:    -Hemoglobin 7.9->9.2.   -B12/folate ordered pending.  -Monitor with daily CBC.     #Cushing's disease secondary to adrenal tumor:   -She has seen endocrinology an will per patient its looks stable.  -Recommended to follow up with primary endocrinology.    #ARJUN POA:   #HAGMA POA:   -Resolved      INITIAL H AND P AND ICU COURSE:  This is a 60 y/o female with PMHx of non-obstructive CAD, hypertension, hyperlipidemia and Cushing's disease who presented to [facility] ED on 8/30/2024 for shortness of breath. Patient previously on Bumex, but was told to stop secondary to ARJUN. Patient notes that she developed

## 2024-09-02 NOTE — PROGRESS NOTES
CRITICAL CARE CONSULT NOTE      Patient:  Jacki Moura    Unit/Bed:4K-28/028-A  YOB: 1965  MRN: [id number]   PCP: Kyle Smith APRN - CNP  Date of Admission: 8/30/2024  Chief Complaint:- SOB    Assessment and Plan:    #Acute hypoxic respiratory failure secondary to Volume overload.  - 8/30 CTA chest w/wo contrast showed findings consistent with pulmonary edema.   -8/30 chest x-ray showed diffuse bilateral interstitial opacities most consistent with recurrent interstitial edema.   -ABG showed pO2 67, CO2 36. D-dimer 2,675;   -CT A chest showed on pulmonary embolism.   -Was on Bumex drip and Nitro drip.  - Patient was initially placed on BiPAP and to room air.  -Currently saturating at 99% on room air.   -Continue Bumex drip.Wean off of Nitro drip.    #Hx of aortic regurgitation:   #Non-obstructive CAD.  #HTN  #HLD  -TTE 7/23/2024 showed EF 50%, moderate to severe regurgitation of aortic valve, mildly dilated left atrium.   -Patient scheduled for mechanical aortic valve repair on 10/21/2024.  -Continue with aspirin,plavix and lipitor.  -Lisinopril held in the setting of Nitro and Bumex drip.Will resume when off of drip.    #Elevated LFTs POA:   -likely from shocked liver secondary hypotension POA.  -Will repeat labs for AM.    #Chronic normocytic anemia:    -Hemoglobin 7.9->9.2.   -B12/folate ordered pending.  -Monitor with daily CBC.     #Cushing's disease secondary to adrenal tumor:   -She has seen endocrinology an will per patient its looks stable.  -Recommended to follow up with primary endocrinology.    #ARJUN POA:   #HAGMA POA:   -Resolved      INITIAL H AND P AND ICU COURSE:  This is a 60 y/o female with PMHx of non-obstructive CAD, hypertension, hyperlipidemia and Cushing's disease who presented to [facility] ED on 8/30/2024 for shortness of breath. Patient previously on Bumex, but was told to stop secondary to ARJUN. Patient notes that she developed acute shortness of breath at 7:00 pm yesterday  No scleral icterus. PERR  Neck: supple.  No Thyromegaly.  Lungs: clear to auscultation.  No retractions  Cardiac: RRR.  No JVD.  Abdomen: soft.  Nontender.  Extremities:  No clubbing, cyanosis, or edema x 4.    Vasculature: capillary refill < 3 seconds. Palpable dorsalis pedis pulses.  Skin:  warm and dry.  Psych:  Patient responsive to questioning. Affect appropriate  Lymph:  No supraclavicular adenopathy.  Neurologic:  No focal deficit. No seizures.    Data: (All radiographs, tracings, PFTs, and imaging are personally viewed and interpreted unless otherwise noted).   Sodium 137, potassium 3.7, chloride 106, bicarb 20, BUN 19, creatinine 0.8, Ionized calcium 1.07, glucose 124, calcium 7.9  Troponin 40  Albumin 3.3, alk phos 257, AST 14, ALT 18, total bilirubin 0.2, total protein 6.3  Slightly cyclic acid 0.4  WBC 13.8, RBC 2.6, hemoglobin 7.9, hematocrit 25, platelet count 210  8/30 ABG: pH 7.37, pCO2 36, pO2 67, HCO3 20, base excess -4.4  8/30 CTA chest W WO contrast: Study is limited by motion artifact. No central pulmonary embolism. Findings consistent with pulmonary edema.   8/30 chest x-ray: Diffuse bilateral interstitial opacities most consistent with recurrent interstitial edema.  Telemetry shows NSR            Electronically signed by Latha Slater MD

## 2024-09-02 NOTE — PLAN OF CARE
Problem: Discharge Planning  Goal: Discharge to home or other facility with appropriate resources  9/1/2024 2134 by Jerzy Adams RN  Outcome: Progressing  Flowsheets (Taken 9/1/2024 2134)  Discharge to home or other facility with appropriate resources:   Identify barriers to discharge with patient and caregiver   Identify discharge learning needs (meds, wound care, etc)     Problem: Safety - Adult  Goal: Free from fall injury  9/1/2024 2134 by Jerzy Adams RN  Outcome: Progressing  Flowsheets (Taken 9/1/2024 2134)  Free From Fall Injury: Instruct family/caregiver on patient safety     Problem: Chronic Conditions and Co-morbidities  Goal: Patient's chronic conditions and co-morbidity symptoms are monitored and maintained or improved  9/1/2024 2134 by Jerzy Adams RN  Outcome: Progressing  Flowsheets (Taken 9/1/2024 2134)  Care Plan - Patient's Chronic Conditions and Co-Morbidity Symptoms are Monitored and Maintained or Improved:   Monitor and assess patient's chronic conditions and comorbid symptoms for stability, deterioration, or improvement   Collaborate with multidisciplinary team to address chronic and comorbid conditions and prevent exacerbation or deterioration     Problem: Pain  Goal: Verbalizes/displays adequate comfort level or baseline comfort level  9/1/2024 2134 by Jerzy Adams RN  Outcome: Progressing  Flowsheets (Taken 9/1/2024 2134)  Verbalizes/displays adequate comfort level or baseline comfort level:   Encourage patient to monitor pain and request assistance   Administer analgesics based on type and severity of pain and evaluate response     Problem: ABCDS Injury Assessment  Goal: Absence of physical injury  9/1/2024 2134 by Jerzy Adams RN  Outcome: Progressing  Flowsheets (Taken 9/1/2024 2134)  Absence of Physical Injury: Implement safety measures based on patient assessment

## 2024-09-03 ENCOUNTER — PATIENT MESSAGE (OUTPATIENT)
Dept: FAMILY MEDICINE CLINIC | Age: 59
End: 2024-09-03

## 2024-09-03 LAB
ANION GAP SERPL CALC-SCNC: 15 MEQ/L (ref 8–16)
BASOPHILS ABSOLUTE: 0.1 THOU/MM3 (ref 0–0.1)
BASOPHILS NFR BLD AUTO: 0.9 %
BUN SERPL-MCNC: 51 MG/DL (ref 7–22)
CALCIUM SERPL-MCNC: 9.2 MG/DL (ref 8.5–10.5)
CHLORIDE SERPL-SCNC: 91 MEQ/L (ref 98–111)
CO2 SERPL-SCNC: 26 MEQ/L (ref 23–33)
CREAT SERPL-MCNC: 1.7 MG/DL (ref 0.4–1.2)
DEPRECATED RDW RBC AUTO: 44.8 FL (ref 35–45)
EOSINOPHIL NFR BLD AUTO: 2.4 %
EOSINOPHILS ABSOLUTE: 0.4 THOU/MM3 (ref 0–0.4)
ERYTHROCYTE [DISTWIDTH] IN BLOOD BY AUTOMATED COUNT: 13.1 % (ref 11.5–14.5)
GFR SERPL CREATININE-BSD FRML MDRD: 34 ML/MIN/1.73M2
GLUCOSE SERPL-MCNC: 110 MG/DL (ref 70–108)
HCT VFR BLD AUTO: 33.1 % (ref 37–47)
HGB BLD-MCNC: 10.6 GM/DL (ref 12–16)
IMM GRANULOCYTES # BLD AUTO: 1.43 THOU/MM3 (ref 0–0.07)
IMM GRANULOCYTES NFR BLD AUTO: 8.9 %
LYMPHOCYTES ABSOLUTE: 3.6 THOU/MM3 (ref 1–4.8)
LYMPHOCYTES NFR BLD AUTO: 22.3 %
MCH RBC QN AUTO: 29.8 PG (ref 26–33)
MCHC RBC AUTO-ENTMCNC: 32 GM/DL (ref 32.2–35.5)
MCV RBC AUTO: 93 FL (ref 81–99)
MONOCYTES ABSOLUTE: 1.2 THOU/MM3 (ref 0.4–1.3)
MONOCYTES NFR BLD AUTO: 7.4 %
NEUTROPHILS ABSOLUTE: 9.4 THOU/MM3 (ref 1.8–7.7)
NEUTROPHILS NFR BLD AUTO: 58.1 %
NRBC BLD AUTO-RTO: 0 /100 WBC
PLATELET # BLD AUTO: 448 THOU/MM3 (ref 130–400)
PMV BLD AUTO: 10.8 FL (ref 9.4–12.4)
POTASSIUM SERPL-SCNC: 4.9 MEQ/L (ref 3.5–5.2)
RBC # BLD AUTO: 3.56 MILL/MM3 (ref 4.2–5.4)
SCAN OF BLOOD SMEAR: NORMAL
SODIUM SERPL-SCNC: 132 MEQ/L (ref 135–145)
TOXIC GRANULES BLD QL SMEAR: PRESENT
WBC # BLD AUTO: 16.1 THOU/MM3 (ref 4.8–10.8)

## 2024-09-03 PROCEDURE — 99232 SBSQ HOSP IP/OBS MODERATE 35: CPT | Performed by: INTERNAL MEDICINE

## 2024-09-03 PROCEDURE — 80048 BASIC METABOLIC PNL TOTAL CA: CPT

## 2024-09-03 PROCEDURE — 6360000002 HC RX W HCPCS

## 2024-09-03 PROCEDURE — 85025 COMPLETE CBC W/AUTO DIFF WBC: CPT

## 2024-09-03 PROCEDURE — 6370000000 HC RX 637 (ALT 250 FOR IP)

## 2024-09-03 PROCEDURE — 36415 COLL VENOUS BLD VENIPUNCTURE: CPT

## 2024-09-03 PROCEDURE — 2580000003 HC RX 258

## 2024-09-03 PROCEDURE — 6370000000 HC RX 637 (ALT 250 FOR IP): Performed by: INTERNAL MEDICINE

## 2024-09-03 PROCEDURE — 2060000000 HC ICU INTERMEDIATE R&B

## 2024-09-03 RX ORDER — ENOXAPARIN SODIUM 100 MG/ML
30 INJECTION SUBCUTANEOUS DAILY
Status: DISCONTINUED | OUTPATIENT
Start: 2024-09-03 | End: 2024-09-05 | Stop reason: HOSPADM

## 2024-09-03 RX ORDER — PREGABALIN 25 MG/1
25 CAPSULE ORAL 2 TIMES DAILY
Status: DISCONTINUED | OUTPATIENT
Start: 2024-09-03 | End: 2024-09-05 | Stop reason: HOSPADM

## 2024-09-03 RX ADMIN — CYCLOBENZAPRINE 10 MG: 10 TABLET, FILM COATED ORAL at 13:05

## 2024-09-03 RX ADMIN — HYDROCODONE BITARTRATE AND ACETAMINOPHEN 1 TABLET: 5; 325 TABLET ORAL at 16:21

## 2024-09-03 RX ADMIN — Medication 3 MG: at 21:12

## 2024-09-03 RX ADMIN — ALPRAZOLAM 0.5 MG: 0.5 TABLET ORAL at 13:05

## 2024-09-03 RX ADMIN — CYCLOBENZAPRINE 10 MG: 10 TABLET, FILM COATED ORAL at 21:11

## 2024-09-03 RX ADMIN — ALPRAZOLAM 0.5 MG: 0.5 TABLET ORAL at 21:12

## 2024-09-03 RX ADMIN — PREGABALIN 25 MG: 25 CAPSULE ORAL at 21:12

## 2024-09-03 RX ADMIN — DOCUSATE SODIUM 100 MG: 100 CAPSULE, LIQUID FILLED ORAL at 08:21

## 2024-09-03 RX ADMIN — PREGABALIN 25 MG: 25 CAPSULE ORAL at 08:20

## 2024-09-03 RX ADMIN — BUMETANIDE 2 MG: 1 TABLET ORAL at 08:21

## 2024-09-03 RX ADMIN — ASPIRIN 81 MG: 81 TABLET, COATED ORAL at 08:20

## 2024-09-03 RX ADMIN — ALPRAZOLAM 0.5 MG: 0.5 TABLET ORAL at 04:50

## 2024-09-03 RX ADMIN — CLOPIDOGREL BISULFATE 75 MG: 75 TABLET ORAL at 08:21

## 2024-09-03 RX ADMIN — CYCLOBENZAPRINE 10 MG: 10 TABLET, FILM COATED ORAL at 04:42

## 2024-09-03 RX ADMIN — HYDROCODONE BITARTRATE AND ACETAMINOPHEN 1 TABLET: 5; 325 TABLET ORAL at 08:20

## 2024-09-03 RX ADMIN — SODIUM CHLORIDE, PRESERVATIVE FREE 10 ML: 5 INJECTION INTRAVENOUS at 21:20

## 2024-09-03 RX ADMIN — ENOXAPARIN SODIUM 30 MG: 100 INJECTION SUBCUTANEOUS at 08:21

## 2024-09-03 RX ADMIN — ATORVASTATIN CALCIUM 40 MG: 40 TABLET, FILM COATED ORAL at 08:21

## 2024-09-03 RX ADMIN — SODIUM CHLORIDE, PRESERVATIVE FREE 10 ML: 5 INJECTION INTRAVENOUS at 08:21

## 2024-09-03 ASSESSMENT — PAIN - FUNCTIONAL ASSESSMENT
PAIN_FUNCTIONAL_ASSESSMENT: ACTIVITIES ARE NOT PREVENTED

## 2024-09-03 ASSESSMENT — PAIN SCALES - GENERAL
PAINLEVEL_OUTOF10: 9
PAINLEVEL_OUTOF10: 6
PAINLEVEL_OUTOF10: 5
PAINLEVEL_OUTOF10: 5
PAINLEVEL_OUTOF10: 6
PAINLEVEL_OUTOF10: 5
PAINLEVEL_OUTOF10: 7

## 2024-09-03 ASSESSMENT — PAIN DESCRIPTION - PAIN TYPE: TYPE: CHRONIC PAIN

## 2024-09-03 ASSESSMENT — PAIN DESCRIPTION - DESCRIPTORS
DESCRIPTORS: ACHING
DESCRIPTORS: ACHING;SORE
DESCRIPTORS: CRUSHING
DESCRIPTORS: ACHING

## 2024-09-03 ASSESSMENT — PAIN DESCRIPTION - LOCATION
LOCATION: BACK;HEAD;NECK
LOCATION: NECK;SHOULDER;BACK
LOCATION: NECK;SHOULDER
LOCATION: BACK;NECK

## 2024-09-03 ASSESSMENT — PAIN DESCRIPTION - ONSET: ONSET: ON-GOING

## 2024-09-03 ASSESSMENT — PAIN DESCRIPTION - ORIENTATION
ORIENTATION: RIGHT
ORIENTATION: MID
ORIENTATION: UPPER
ORIENTATION: RIGHT;LOWER

## 2024-09-03 ASSESSMENT — PAIN DESCRIPTION - FREQUENCY: FREQUENCY: CONTINUOUS

## 2024-09-03 NOTE — ADT AUTH CERT
Comments  [facility]  [facility] ICU STEPDOWN TELEMETRY 4K  [address] [address]  [phone]  [phone]    ** PLEASE START IP AUTH    RETURN CONTACT INFORMATION    NAME: SHUN APPIAH  PHONE: [phone]  FAX: [phone]    Auth number: N/A  MEMBER ID: [id number] - ([place] [employer])   Last edited by Azul Arce on 24 at 1347     Jacki Moura #[id number] (CSN:[id number]) (:1965 59 y.o. F) (Adm: 24)  STRZ [id number]  PCP    YURI KELLER  Demographics  CommentAddress   [address] [address]    Home Phone   [phone]    Work Phone       Mobile Phone   [phone]             Social Security Number       Insurance Information   BCBS    Marital Status       Judaism   Mandaen (STRZ NONE)               Admission Information    Arrival Date/Time: 2024 0053 Admit Date/Time: 2024 0057 IP Adm. Date/Time: 2024 0322   Admission Type: Emergency Point of Origin: Non-health Care Facility/self Admit Category:    Means of Arrival: Wheelchair Primary Service: Internal Medicine Secondary Service: N/A   Transfer Source:  Service Area: [facility] Unit: STRZ ICU STEPDOWN TELEMETRY 4K   Admit Provider: Pop Herrmann MD Attending Provider: Radha Ojeda MD Referring Provider:      Patient Diagnoses    Reasons for Admission Coded Admission Diagnoses    *Acute pulmonary edema (HCC) [J81.0]    Severe aortic regurgitation [I35.1]    Acute hypoxic respiratory failure (HCC) [J96.01]      Status   No [002]     Insurance Payors as of 9/3/2024    BCBS    Plan: B[employer] OUT OF STATE Group: [id number] Member: [id number]   Effective from: 2020 Subscriber: JACKI MOURA Subscriber ID: [id number]   Guarantor: JACKI MOURA     Documents Filed to Patient    Power of  Living Will Clinical Unknown Study Attachment Consent Form ABN Waiver After Visit Summary Lab  COMPLAINT/ PRESENTATION:  presents to [facility] ED for shortness of breath. Patient previously on Bumex, but was told to stop it 2/2 ARJUN. Patient notes that she developed acute shortness of breath at 1900 on 8/29.       RELEVANT BASELINES: (lab values, vitals, o2 amount/delivery, etc.)  Ra      VITALS/BMI:   /66  Pulse (!) 113  Temp 97.3 °F (36.3 °C) (Axillary)  Resp 28  SpO2 100%      ABNL/PERTINENT LABS/RADIOLOGY/DIAGNOSTIC STUDIES:  PO2 67 mmhg             HCO3 20 mmol/l         Base Excess (Calculated) -4.4 mmol/l        Troponin, High Sensitivity 40 ng/L      Salicylate Lvl 0.4 mg/dL      Glucose 124 mg/dL             Creatinine 0.8 mg/dL         BUN 19 mg/dL         Sodium 137 meq/L         Potassium 3.7 meq/L         Chloride 106 meq/L         CO2 20 meq/L         Calcium 7.9 mg/dL         AST 14 U/L         Alkaline Phosphatase 257 U/L         Total Protein 6.3 g/dL         Albumin 3.3 g/dL         Total Bilirubin 0.2 mg/dL        Calcium, Ionized 1.07 mmol/L      Troponin, High Sensitivity 40 ng/L      D-Dimer, Quant 2675.00 ng/ml FEU      CO2 18 meq/L             Calcium 9.3 mg/dL         AST 23 U/L         Alkaline Phosphatase 286 U/L         Total Protein 8.0 g/dL         Albumin 4.2 g/dL         Total Bilirubin 0.2 mg/dL        RBC 3.38 mill/mm3             Hemoglobin 10.0 gm/dl         Hematocrit 33.0 %         MCV 97.6 fL         MCH 29.6 pg         MCHC 30.3 gm/dl         RDW-CV 13.2 %         RDW-SD 47.6 fL         Platelets 263 thou/mm3         MPV 10.7 fL         Seg Neutrophils 76.0 %         Lymphocytes 16.3 %         Monocytes % 2.3 %         Eosinophils 4.0 %         Basophils 0.7 %         Immature Granulocytes % 0.7 %         Neutrophils Absolute 4.3 thou/mm3         Lymphocytes Absolute 0.9 thou/mm3         Monocytes Absolute 0.1 thou/mm3        Anion Gap 21.0 meq/L      Pro-BNP 1663.0 pg/mL      Lactic Acid, Sepsis 3.3 mmol/L      Magnesium 2.6 mg/dL      Est, Glom Filt Rate 58

## 2024-09-03 NOTE — PROGRESS NOTES
Pharmacy Renal Adjustment    Pharmacy renally adjusted the following medication(s) per P&T approved policy: pregabalin    Recent Labs     09/02/24  0510 09/03/24  0323   BUN 44* 51*   CREATININE 1.3* 1.7*     Estimated Creatinine Clearance: 29 mL/min (A) (based on SCr of 1.7 mg/dL (H)).    Assessment:  ARJUN    Plan:   Decrease pregabalin from 50 mg PO TID to 25 mg BID    Please call pharmacy with any questions.    Margret Marques, PharmD, BCPS   9/3/2024  7:33 AM

## 2024-09-03 NOTE — PROGRESS NOTES
Pharmacist Review and Automatic Dose Adjustment of Prophylactic Enoxaparin or Heparin    Reviewed reason for admission/hospital problem list    The reviewing pharmacist has made an adjustment to the ordered enoxaparin or heparin dose or converted to heparin per the approved [facility] protocol and table as identified below.      Recent Labs     09/01/24  1548 09/02/24  0510 09/03/24  0323   CREATININE 1.6* 1.3* 1.7*     Estimated Creatinine Clearance: 29 mL/min (A) (based on SCr of 1.7 mg/dL (H)).    Recent Labs     09/02/24  0510 09/03/24  0323   HGB 11.2* 10.6*   HCT 35.4* 33.1*   * 448*     No results for input(s): \"INR\" in the last 72 hours.    Height:   Ht Readings from Last 1 Encounters:   08/30/24 1.524 m (5')     Weight:  Wt Readings from Last 1 Encounters:   09/01/24 60 kg (132 lb 3.2 oz)       *Do not exceed enoxaparin 40mg daily or UFH 5000 units SUBQ TID in patients with epidurals,  lumbar drains, or external ventricular drains.    Plan:   Changed enoxaparin 40 mg subq daily to enoxaparin 30 mg subq daily.     Thank you,  Margret Marques, PharmD, BCPS   9/3/2024  7:29 AM

## 2024-09-03 NOTE — PLAN OF CARE
Problem: Discharge Planning  Goal: Discharge to home or other facility with appropriate resources  9/3/2024 0841 by Adenike Reid RN  Outcome: Progressing  9/2/2024 2207 by Harsh Garcia RN  Flowsheets (Taken 9/2/2024 2207)  Discharge to home or other facility with appropriate resources:   Identify barriers to discharge with patient and caregiver   Identify discharge learning needs (meds, wound care, etc)     Problem: Safety - Adult  Goal: Free from fall injury  9/3/2024 0841 by Adenike Reid RN  Outcome: Progressing  9/2/2024 2207 by Harsh Garcia RN  Flowsheets (Taken 9/2/2024 2207)  Free From Fall Injury: Instruct family/caregiver on patient safety     Problem: Chronic Conditions and Co-morbidities  Goal: Patient's chronic conditions and co-morbidity symptoms are monitored and maintained or improved  Outcome: Progressing     Problem: Pain  Goal: Verbalizes/displays adequate comfort level or baseline comfort level  9/3/2024 0841 by Adenike Reid RN  Outcome: Progressing  Flowsheets (Taken 9/3/2024 0817)  Verbalizes/displays adequate comfort level or baseline comfort level: Encourage patient to monitor pain and request assistance  9/2/2024 2207 by Harsh Garcia RN  Flowsheets (Taken 9/2/2024 2207)  Verbalizes/displays adequate comfort level or baseline comfort level:   Encourage patient to monitor pain and request assistance   Administer analgesics based on type and severity of pain and evaluate response   Consider cultural and social influences on pain and pain management   Assess pain using appropriate pain scale   Implement non-pharmacological measures as appropriate and evaluate response     Problem: ABCDS Injury Assessment  Goal: Absence of physical injury  Outcome: Progressing   Care plan reviewed with patient.  Patient verbalizes understanding of the care plan and contributed to goal setting.

## 2024-09-03 NOTE — PLAN OF CARE
Problem: Discharge Planning  Goal: Discharge to home or other facility with appropriate resources  Flowsheets (Taken 9/2/2024 2207)  Discharge to home or other facility with appropriate resources:   Identify barriers to discharge with patient and caregiver   Identify discharge learning needs (meds, wound care, etc)     Problem: Safety - Adult  Goal: Free from fall injury  Flowsheets (Taken 9/2/2024 2207)  Free From Fall Injury: Instruct family/caregiver on patient safety     Problem: Pain  Goal: Verbalizes/displays adequate comfort level or baseline comfort level  Flowsheets (Taken 9/2/2024 2207)  Verbalizes/displays adequate comfort level or baseline comfort level:   Encourage patient to monitor pain and request assistance   Administer analgesics based on type and severity of pain and evaluate response   Consider cultural and social influences on pain and pain management   Assess pain using appropriate pain scale   Implement non-pharmacological measures as appropriate and evaluate response

## 2024-09-03 NOTE — PROGRESS NOTES
CRITICAL CARE CONSULT NOTE      Patient:  Jacki Moura    Unit/Bed:4K-28/028-A  YOB: 1965  MRN: [id number]   PCP: Kyle Smith APRN - CNP  Date of Admission: 8/30/2024  Chief Complaint:- SOB    Assessment and Plan:    #Acute hypoxic respiratory failure secondary to Volume overload.  - 8/30 CTA chest w/wo contrast showed findings consistent with pulmonary edema.   -8/30 chest x-ray showed diffuse bilateral interstitial opacities most consistent with recurrent interstitial edema.   -ABG showed pO2 67, CO2 36. D-dimer 2,675;   -CT A chest showed on pulmonary embolism.   -Was on Bumex drip and Nitro drip.  - Patient was initially placed on BiPAP and to room air.  -Currently saturating at 99% on room air.   -Discontinue Bumex drip.Wean off of Nitro drip.  -Started on oral bumex.Will hold as Cr is worsening.    #Hx of aortic regurgitation:   #Non-obstructive CAD.  #HTN  #HLD  -TTE 7/23/2024 showed EF 50%, moderate to severe regurgitation of aortic valve, mildly dilated left atrium.   -Patient scheduled for mechanical aortic valve repair on 10/21/2024.  -Continue with aspirin,plavix and lipitor.  -Lisinopril held in the setting of Nitro and Bumex drip discontinued.  -Patient started on oral Bumex,but held due to worsening Cr.    #Elevated LFTs POA:   -likely from shocked liver secondary hypotension POA.  -Will repeat labs for AM.    #Chronic normocytic anemia:    -Hemoglobin 7.9->9.2.   -B12/folate ordered pending.  -Monitor with daily CBC.     #Cushing's disease secondary to adrenal tumor:   -She has seen endocrinology an will per patient its looks stable.  -Recommended to follow up with primary endocrinology.    #ARJUN POA:   #HAGMA POA:   -Cr worsening probably from  diuretics.Will Hold.  -Will check BMP for AM.      INITIAL H AND P AND ICU COURSE:  This is a 60 y/o female with PMHx of non-obstructive CAD, hypertension, hyperlipidemia and Cushing's disease who presented to [facility] ED on 8/30/2024 for

## 2024-09-04 LAB
ANION GAP SERPL CALC-SCNC: 15 MEQ/L (ref 8–16)
BUN SERPL-MCNC: 44 MG/DL (ref 7–22)
CALCIUM SERPL-MCNC: 9.3 MG/DL (ref 8.5–10.5)
CHLORIDE SERPL-SCNC: 96 MEQ/L (ref 98–111)
CO2 SERPL-SCNC: 24 MEQ/L (ref 23–33)
CREAT SERPL-MCNC: 1 MG/DL (ref 0.4–1.2)
DEPRECATED RDW RBC AUTO: 44.3 FL (ref 35–45)
ERYTHROCYTE [DISTWIDTH] IN BLOOD BY AUTOMATED COUNT: 13.1 % (ref 11.5–14.5)
FERRITIN SERPL IA-MCNC: 167 NG/ML (ref 10–291)
GFR SERPL CREATININE-BSD FRML MDRD: 65 ML/MIN/1.73M2
GLUCOSE SERPL-MCNC: 111 MG/DL (ref 70–108)
HCT VFR BLD AUTO: 32.8 % (ref 37–47)
HGB BLD-MCNC: 10.4 GM/DL (ref 12–16)
IRON SATN MFR SERPL: 8 % (ref 20–50)
IRON SERPL-MCNC: 28 UG/DL (ref 50–170)
MCH RBC QN AUTO: 29.8 PG (ref 26–33)
MCHC RBC AUTO-ENTMCNC: 31.7 GM/DL (ref 32.2–35.5)
MCV RBC AUTO: 94 FL (ref 81–99)
PLATELET # BLD AUTO: 484 THOU/MM3 (ref 130–400)
PMV BLD AUTO: 10.6 FL (ref 9.4–12.4)
POTASSIUM SERPL-SCNC: 4.9 MEQ/L (ref 3.5–5.2)
RBC # BLD AUTO: 3.49 MILL/MM3 (ref 4.2–5.4)
SODIUM SERPL-SCNC: 135 MEQ/L (ref 135–145)
TIBC SERPL-MCNC: 335 UG/DL (ref 171–450)
WBC # BLD AUTO: 17.7 THOU/MM3 (ref 4.8–10.8)

## 2024-09-04 PROCEDURE — 83540 ASSAY OF IRON: CPT

## 2024-09-04 PROCEDURE — 6370000000 HC RX 637 (ALT 250 FOR IP)

## 2024-09-04 PROCEDURE — 82728 ASSAY OF FERRITIN: CPT

## 2024-09-04 PROCEDURE — 80048 BASIC METABOLIC PNL TOTAL CA: CPT

## 2024-09-04 PROCEDURE — 99232 SBSQ HOSP IP/OBS MODERATE 35: CPT

## 2024-09-04 PROCEDURE — 85027 COMPLETE CBC AUTOMATED: CPT

## 2024-09-04 PROCEDURE — 83550 IRON BINDING TEST: CPT

## 2024-09-04 PROCEDURE — 36415 COLL VENOUS BLD VENIPUNCTURE: CPT

## 2024-09-04 PROCEDURE — 6360000002 HC RX W HCPCS

## 2024-09-04 PROCEDURE — 6370000000 HC RX 637 (ALT 250 FOR IP): Performed by: INTERNAL MEDICINE

## 2024-09-04 PROCEDURE — 2060000000 HC ICU INTERMEDIATE R&B

## 2024-09-04 PROCEDURE — 2580000003 HC RX 258

## 2024-09-04 RX ORDER — BUMETANIDE 1 MG/1
1 TABLET ORAL DAILY
Status: DISCONTINUED | OUTPATIENT
Start: 2024-09-04 | End: 2024-09-05 | Stop reason: HOSPADM

## 2024-09-04 RX ORDER — FERROUS SULFATE 325(65) MG
325 TABLET ORAL EVERY OTHER DAY
Status: DISCONTINUED | OUTPATIENT
Start: 2024-09-04 | End: 2024-09-05 | Stop reason: HOSPADM

## 2024-09-04 RX ADMIN — HYDROCODONE BITARTRATE AND ACETAMINOPHEN 1 TABLET: 5; 325 TABLET ORAL at 06:27

## 2024-09-04 RX ADMIN — ASPIRIN 81 MG: 81 TABLET, COATED ORAL at 08:24

## 2024-09-04 RX ADMIN — PREGABALIN 25 MG: 25 CAPSULE ORAL at 08:24

## 2024-09-04 RX ADMIN — CYCLOBENZAPRINE 10 MG: 10 TABLET, FILM COATED ORAL at 22:28

## 2024-09-04 RX ADMIN — ATORVASTATIN CALCIUM 40 MG: 40 TABLET, FILM COATED ORAL at 08:25

## 2024-09-04 RX ADMIN — CYCLOBENZAPRINE 10 MG: 10 TABLET, FILM COATED ORAL at 00:10

## 2024-09-04 RX ADMIN — HYDROCODONE BITARTRATE AND ACETAMINOPHEN 1 TABLET: 5; 325 TABLET ORAL at 14:33

## 2024-09-04 RX ADMIN — PREGABALIN 25 MG: 25 CAPSULE ORAL at 21:15

## 2024-09-04 RX ADMIN — FERROUS SULFATE TAB 325 MG (65 MG ELEMENTAL FE) 325 MG: 325 (65 FE) TAB at 21:15

## 2024-09-04 RX ADMIN — BUMETANIDE 1 MG: 1 TABLET ORAL at 15:55

## 2024-09-04 RX ADMIN — ALPRAZOLAM 0.5 MG: 0.5 TABLET ORAL at 07:06

## 2024-09-04 RX ADMIN — ENOXAPARIN SODIUM 30 MG: 100 INJECTION SUBCUTANEOUS at 08:25

## 2024-09-04 RX ADMIN — DOCUSATE SODIUM 100 MG: 100 CAPSULE, LIQUID FILLED ORAL at 08:24

## 2024-09-04 RX ADMIN — Medication 3 MG: at 21:15

## 2024-09-04 RX ADMIN — SODIUM CHLORIDE, PRESERVATIVE FREE 10 ML: 5 INJECTION INTRAVENOUS at 08:25

## 2024-09-04 RX ADMIN — CYCLOBENZAPRINE 10 MG: 10 TABLET, FILM COATED ORAL at 15:30

## 2024-09-04 RX ADMIN — CLOPIDOGREL BISULFATE 75 MG: 75 TABLET ORAL at 08:25

## 2024-09-04 RX ADMIN — CYCLOBENZAPRINE 10 MG: 10 TABLET, FILM COATED ORAL at 08:24

## 2024-09-04 RX ADMIN — ALPRAZOLAM 0.5 MG: 0.5 TABLET ORAL at 15:30

## 2024-09-04 RX ADMIN — HYDROCODONE BITARTRATE AND ACETAMINOPHEN 1 TABLET: 5; 325 TABLET ORAL at 22:28

## 2024-09-04 ASSESSMENT — PAIN SCALES - GENERAL
PAINLEVEL_OUTOF10: 7
PAINLEVEL_OUTOF10: 5
PAINLEVEL_OUTOF10: 1
PAINLEVEL_OUTOF10: 7
PAINLEVEL_OUTOF10: 1
PAINLEVEL_OUTOF10: 1
PAINLEVEL_OUTOF10: 6
PAINLEVEL_OUTOF10: 6
PAINLEVEL_OUTOF10: 4
PAINLEVEL_OUTOF10: 1
PAINLEVEL_OUTOF10: 8
PAINLEVEL_OUTOF10: 5
PAINLEVEL_OUTOF10: 0
PAINLEVEL_OUTOF10: 1
PAINLEVEL_OUTOF10: 5

## 2024-09-04 ASSESSMENT — PAIN DESCRIPTION - FREQUENCY
FREQUENCY: CONTINUOUS
FREQUENCY: CONTINUOUS

## 2024-09-04 ASSESSMENT — PAIN DESCRIPTION - DESCRIPTORS
DESCRIPTORS: ACHING

## 2024-09-04 ASSESSMENT — PAIN DESCRIPTION - ORIENTATION
ORIENTATION: RIGHT;LEFT
ORIENTATION: RIGHT
ORIENTATION: RIGHT
ORIENTATION: RIGHT;MID
ORIENTATION: RIGHT;MID

## 2024-09-04 ASSESSMENT — PAIN DESCRIPTION - LOCATION
LOCATION: NECK
LOCATION: NECK
LOCATION: SHOULDER;NECK
LOCATION: NECK
LOCATION: NECK;SHOULDER
LOCATION: SHOULDER;NECK;BACK

## 2024-09-04 ASSESSMENT — PAIN DESCRIPTION - ONSET
ONSET: ON-GOING
ONSET: ON-GOING

## 2024-09-04 ASSESSMENT — PAIN - FUNCTIONAL ASSESSMENT
PAIN_FUNCTIONAL_ASSESSMENT: ACTIVITIES ARE NOT PREVENTED
PAIN_FUNCTIONAL_ASSESSMENT: PREVENTS OR INTERFERES SOME ACTIVE ACTIVITIES AND ADLS
PAIN_FUNCTIONAL_ASSESSMENT: ACTIVITIES ARE NOT PREVENTED
PAIN_FUNCTIONAL_ASSESSMENT: PREVENTS OR INTERFERES SOME ACTIVE ACTIVITIES AND ADLS

## 2024-09-04 ASSESSMENT — PAIN DESCRIPTION - PAIN TYPE
TYPE: CHRONIC PAIN
TYPE: CHRONIC PAIN

## 2024-09-04 ASSESSMENT — PAIN SCALES - WONG BAKER
WONGBAKER_NUMERICALRESPONSE: HURTS A LITTLE BIT

## 2024-09-04 NOTE — PLAN OF CARE
Problem: Chronic Conditions and Co-morbidities  Goal: Patient's chronic conditions and co-morbidity symptoms are monitored and maintained or improved  Recent Flowsheet Documentation  Taken 9/3/2024 2125 by La Bunch, RN  Care Plan - Patient's Chronic Conditions and Co-Morbidity Symptoms are Monitored and Maintained or Improved:   Monitor and assess patient's chronic conditions and comorbid symptoms for stability, deterioration, or improvement   Collaborate with multidisciplinary team to address chronic and comorbid conditions and prevent exacerbation or deterioration  9/3/2024 0841 by Adenike Reid, RN  Outcome: Progressing  Flowsheets (Taken 9/3/2024 0817)  Care Plan - Patient's Chronic Conditions and Co-Morbidity Symptoms are Monitored and Maintained or Improved: Monitor and assess patient's chronic conditions and comorbid symptoms for stability, deterioration, or improvement

## 2024-09-04 NOTE — PROGRESS NOTES
Medicine Progress Note    Patient:  Jacki Moura    Unit/Bed:4K-28/028-A  YOB: 1965  MRN: [id number]   Acct: [id number]   PCP: Kyle Smith APRN - CNP  Date of Admission: 8/30/2024      Assessment / Plan     #ARJUN: Suspected secondary to ACEI and diuresis. Improved with holding of medications. Given valvular dysfunction per below, will re-start Bumex 1mg PO daily and repeat renal function in AM  #Severe AR: ECHO 7/24: mod-severe regurgitation. Patient recently had home diuretics discontinued 2/2 ARJUN. Planned for mechanical AVR 10/21/24. PO bumex re-started at 1mg daily given resolution of ARJUN. Will monitor volume status  #Non-obstructive CAD: LHC 7/24: non-obstructive CAD. Continue with statin.   #HTN: Lisinopril held 2/2 above. Bps stable during admission. Will monitor  #HLD: Statin  #Chronic normocytic anemia: Noted, severe ALCIRA noted on labs. Will initiate iron supplementation every other day  #Leukocytosis: Noted during admission, appears labile WBC OP previously. No active s/s infection, recommend patient follow-up OP for further evaluation  #Elevated LFTs: Noted, PoA. Suspected secondary to congestive hepatopathy in the setting of significant volume overload. Improved with diuresis, recommend repeat for monitoring in the OP setting   #Thrombocytosis: Noted, suspect reactive. Will monitor  #Cushing's disease: Secondary to adrenal tumor. Follows with endocrinology OP  #AHRF: Suspected secondary to volume overload in setting of aortic insufficiency. Patient underwent significant diuresis while admitted with improvement in symptoms. Weaned to RA. Nitro gtt stopped. Will continue to monitor SpO2    LDA: []CVC / []PICC / []Midline / []Anderson / []Drains / []Mediport / []None  Antibiotics: No  Steroids: No  Labs (still needed?): [x]Yes / []No  IVF (still needed?): []Yes / [x]No    Level of care: [x]Step Down / []Med-Surg  Bed Status: [x]Inpatient / []Observation  Telemetry: [x]Yes / []No  PT/OT:  Summary (Last 24 hours) at 9/4/2024 1753  Last data filed at 9/4/2024 1524  Gross per 24 hour   Intake 850 ml   Output 2250 ml   Net -1400 ml       Outpatient Medications:     Scheduled Meds:   bumetanide  1 mg Oral Daily    enoxaparin  30 mg SubCUTAneous Daily    pregabalin  25 mg Oral BID    docusate sodium  100 mg Oral Daily    sodium chloride flush  5-40 mL IntraVENous 2 times per day    melatonin  3 mg Oral Nightly    aspirin  81 mg Oral Daily    atorvastatin  40 mg Oral Daily    clopidogrel  75 mg Oral Daily    [Held by provider] lisinopril  20 mg Oral BID     Continuous Infusions:   sodium chloride       PRN Meds:sodium chloride flush, sodium chloride, potassium chloride **OR** potassium alternative oral replacement **OR** potassium chloride, magnesium sulfate, ondansetron **OR** ondansetron, polyethylene glycol, acetaminophen **OR** acetaminophen, ALPRAZolam, cyclobenzaprine, HYDROcodone-acetaminophen, ALPRAZolam    Physical Exam:     General appearance: No apparent distress, appears stated age and cooperative.  HEENT: Pupils equal, round, and reactive to light. Conjunctivae/corneas clear.  Neck: Supple, with full range of motion. No jugular venous distention. Trachea midline.  Respiratory:  Normal respiratory effort. Clear to auscultation, bilaterally without Rales/Wheezes/Rhonchi.  Cardiovascular: Regular rate and rhythm with normal S1/S2 without murmurs, rubs or gallops.  Abdomen: Soft, non-tender, non-distended with normal bowel sounds.  Musculoskeletal: passive and active ROM x 4 extremities.  Skin: Skin color, texture, turgor normal.  No rashes or lesions.  Extremities: No peripheral edema noted  Neurologic:  Neurovascularly intact without any focal sensory/motor deficits. Cranial nerves: II-XII intact, grossly non-focal.  Psychiatric: Alert and oriented, thought content appropriate, normal insight  Capillary Refill: Brisk,< 3 seconds   Peripheral Pulses: +2 palpable, equal bilaterally     Labs:

## 2024-09-04 NOTE — CARE COORDINATION
9/4/24, 2:53 PM EDT    DISCHARGE ON GOING EVALUATION    [facility] day: 5  Location: [facility]28/028-A Reason for admit: Acute pulmonary edema (HCC) [J81.0]  Severe aortic regurgitation [I35.1]  Acute hypoxic respiratory failure (HCC) [J96.01]     Procedures: none    Imaging since last note: none    Barriers to Discharge: Creatinine up to 1.7 yesterday, bumex stopped. Creat down to 1 today; restarted bumex, if creat stable tomorrow, plan for discharge tomorrow.     On room air. Afebrile. NSR. Ox4. Telemetry. Prn xanax, po bumex 1 mg daily, prn flexeril, prn norco, Electrolyte replacement protocols. Creat 1, wbc 17.7, hgb 10.4, iron 28, iron sat 8.    PCP: Kyle Smith APRN - CNP  Readmission Risk Score: 21.4    Patient Goals/Plan/Treatment Preferences:   Home w/. Denies needs, declines HH.

## 2024-09-04 NOTE — PLAN OF CARE
Problem: Discharge Planning  Goal: Discharge to home or other facility with appropriate resources  Outcome: Progressing  Flowsheets (Taken 9/3/2024 2125 by La Bunch, RN)  Discharge to home or other facility with appropriate resources:   Identify barriers to discharge with patient and caregiver   Arrange for needed discharge resources and transportation as appropriate     Problem: Safety - Adult  Goal: Free from fall injury  Outcome: Progressing  Flowsheets (Taken 9/2/2024 2207 by Harsh Garcia, RN)  Free From Fall Injury: Instruct family/caregiver on patient safety     Problem: Chronic Conditions and Co-morbidities  Goal: Patient's chronic conditions and co-morbidity symptoms are monitored and maintained or improved  Outcome: Progressing  Flowsheets (Taken 9/3/2024 2125 by La Bunch, RN)  Care Plan - Patient's Chronic Conditions and Co-Morbidity Symptoms are Monitored and Maintained or Improved:   Monitor and assess patient's chronic conditions and comorbid symptoms for stability, deterioration, or improvement   Collaborate with multidisciplinary team to address chronic and comorbid conditions and prevent exacerbation or deterioration     Problem: Pain  Goal: Verbalizes/displays adequate comfort level or baseline comfort level  Outcome: Progressing  Flowsheets (Taken 9/3/2024 1521 by Adenike Reid, RN)  Verbalizes/displays adequate comfort level or baseline comfort level: Encourage patient to monitor pain and request assistance     Problem: ABCDS Injury Assessment  Goal: Absence of physical injury  Outcome: Progressing  Flowsheets (Taken 9/1/2024 2134 by Jerzy Adams, RN)  Absence of Physical Injury: Implement safety measures based on patient assessment

## 2024-09-05 VITALS
DIASTOLIC BLOOD PRESSURE: 60 MMHG | TEMPERATURE: 97.6 F | BODY MASS INDEX: 26.79 KG/M2 | RESPIRATION RATE: 18 BRPM | HEIGHT: 60 IN | SYSTOLIC BLOOD PRESSURE: 137 MMHG | HEART RATE: 95 BPM | WEIGHT: 136.47 LBS | OXYGEN SATURATION: 97 %

## 2024-09-05 LAB
ANION GAP SERPL CALC-SCNC: 14 MEQ/L (ref 8–16)
BUN SERPL-MCNC: 37 MG/DL (ref 7–22)
CALCIUM SERPL-MCNC: 10.3 MG/DL (ref 8.5–10.5)
CHLORIDE SERPL-SCNC: 101 MEQ/L (ref 98–111)
CO2 SERPL-SCNC: 24 MEQ/L (ref 23–33)
CREAT SERPL-MCNC: 1 MG/DL (ref 0.4–1.2)
DEPRECATED RDW RBC AUTO: 46.9 FL (ref 35–45)
ERYTHROCYTE [DISTWIDTH] IN BLOOD BY AUTOMATED COUNT: 13.3 % (ref 11.5–14.5)
GFR SERPL CREATININE-BSD FRML MDRD: 65 ML/MIN/1.73M2
GLUCOSE SERPL-MCNC: 108 MG/DL (ref 70–108)
HCT VFR BLD AUTO: 36 % (ref 37–47)
HGB BLD-MCNC: 11.1 GM/DL (ref 12–16)
MCH RBC QN AUTO: 30 PG (ref 26–33)
MCHC RBC AUTO-ENTMCNC: 30.8 GM/DL (ref 32.2–35.5)
MCV RBC AUTO: 97.3 FL (ref 81–99)
PLATELET # BLD AUTO: 524 THOU/MM3 (ref 130–400)
PMV BLD AUTO: 10.5 FL (ref 9.4–12.4)
POTASSIUM SERPL-SCNC: 4.7 MEQ/L (ref 3.5–5.2)
RBC # BLD AUTO: 3.7 MILL/MM3 (ref 4.2–5.4)
SODIUM SERPL-SCNC: 139 MEQ/L (ref 135–145)
WBC # BLD AUTO: 14.9 THOU/MM3 (ref 4.8–10.8)

## 2024-09-05 PROCEDURE — 85027 COMPLETE CBC AUTOMATED: CPT

## 2024-09-05 PROCEDURE — 80048 BASIC METABOLIC PNL TOTAL CA: CPT

## 2024-09-05 PROCEDURE — 6370000000 HC RX 637 (ALT 250 FOR IP)

## 2024-09-05 PROCEDURE — 6370000000 HC RX 637 (ALT 250 FOR IP): Performed by: INTERNAL MEDICINE

## 2024-09-05 PROCEDURE — 6360000002 HC RX W HCPCS

## 2024-09-05 PROCEDURE — 36415 COLL VENOUS BLD VENIPUNCTURE: CPT

## 2024-09-05 PROCEDURE — 99239 HOSP IP/OBS DSCHRG MGMT >30: CPT

## 2024-09-05 RX ORDER — BUMETANIDE 1 MG/1
1 TABLET ORAL DAILY
Qty: 30 TABLET | Refills: 0 | Status: SHIPPED | OUTPATIENT
Start: 2024-09-05

## 2024-09-05 RX ORDER — FERROUS SULFATE 325(65) MG
325 TABLET ORAL EVERY OTHER DAY
Qty: 30 TABLET | Refills: 0 | Status: SHIPPED | OUTPATIENT
Start: 2024-09-06

## 2024-09-05 RX ADMIN — ALPRAZOLAM 0.5 MG: 0.5 TABLET ORAL at 00:46

## 2024-09-05 RX ADMIN — ATORVASTATIN CALCIUM 40 MG: 40 TABLET, FILM COATED ORAL at 08:01

## 2024-09-05 RX ADMIN — DOCUSATE SODIUM 100 MG: 100 CAPSULE, LIQUID FILLED ORAL at 08:01

## 2024-09-05 RX ADMIN — PREGABALIN 25 MG: 25 CAPSULE ORAL at 08:00

## 2024-09-05 RX ADMIN — HYDROCODONE BITARTRATE AND ACETAMINOPHEN 1 TABLET: 5; 325 TABLET ORAL at 04:35

## 2024-09-05 RX ADMIN — CYCLOBENZAPRINE 10 MG: 10 TABLET, FILM COATED ORAL at 04:35

## 2024-09-05 RX ADMIN — ENOXAPARIN SODIUM 30 MG: 100 INJECTION SUBCUTANEOUS at 08:00

## 2024-09-05 RX ADMIN — ASPIRIN 81 MG: 81 TABLET, COATED ORAL at 08:00

## 2024-09-05 RX ADMIN — BUMETANIDE 1 MG: 1 TABLET ORAL at 08:01

## 2024-09-05 RX ADMIN — CLOPIDOGREL BISULFATE 75 MG: 75 TABLET ORAL at 08:00

## 2024-09-05 ASSESSMENT — PAIN DESCRIPTION - ORIENTATION
ORIENTATION: RIGHT;MID

## 2024-09-05 ASSESSMENT — PAIN DESCRIPTION - LOCATION
LOCATION: SHOULDER;NECK

## 2024-09-05 ASSESSMENT — PAIN SCALES - GENERAL
PAINLEVEL_OUTOF10: 0
PAINLEVEL_OUTOF10: 3
PAINLEVEL_OUTOF10: 5
PAINLEVEL_OUTOF10: 5

## 2024-09-05 ASSESSMENT — PAIN - FUNCTIONAL ASSESSMENT
PAIN_FUNCTIONAL_ASSESSMENT: ACTIVITIES ARE NOT PREVENTED

## 2024-09-05 NOTE — PLAN OF CARE
Problem: Discharge Planning  Goal: Discharge to home or other facility with appropriate resources  9/5/2024 0213 by Michelle Ly RN  Outcome: Progressing  Flowsheets (Taken 9/4/2024 2105)  Discharge to home or other facility with appropriate resources:   Identify barriers to discharge with patient and caregiver   Arrange for needed discharge resources and transportation as appropriate   Identify discharge learning needs (meds, wound care, etc)   Refer to discharge planning if patient needs post-hospital services based on physician order or complex needs related to functional status, cognitive ability or social support system  9/4/2024 1247 by Kaylen Fenton RN  Outcome: Progressing  Flowsheets (Taken 9/3/2024 2125 by La Bunch, RN)  Discharge to home or other facility with appropriate resources:   Identify barriers to discharge with patient and caregiver   Arrange for needed discharge resources and transportation as appropriate     Problem: Safety - Adult  Goal: Free from fall injury  9/5/2024 0213 by Michelle Ly RN  Outcome: Progressing  Flowsheets (Taken 9/2/2024 2207 by Harsh Garcia, RN)  Free From Fall Injury: Instruct family/caregiver on patient safety  9/4/2024 1247 by Kaylen Fenton RN  Outcome: Progressing  Flowsheets (Taken 9/2/2024 2207 by Harsh Garcia, RN)  Free From Fall Injury: Instruct family/caregiver on patient safety     Problem: Chronic Conditions and Co-morbidities  Goal: Patient's chronic conditions and co-morbidity symptoms are monitored and maintained or improved  9/5/2024 0213 by Michelle Ly RN  Outcome: Progressing  Flowsheets (Taken 9/4/2024 2105)  Care Plan - Patient's Chronic Conditions and Co-Morbidity Symptoms are Monitored and Maintained or Improved:   Monitor and assess patient's chronic conditions and comorbid symptoms for stability, deterioration, or improvement   Collaborate with multidisciplinary team to address chronic and comorbid conditions and

## 2024-09-05 NOTE — DISCHARGE INSTRUCTIONS
Start Bumex 1mg oral daily  Stop Lisinopril  Start iron pill every other day  Follow-up with PCP  Repeat metabolic panel/liver function tests/CBC 1 week  Kyle botello

## 2024-09-05 NOTE — DISCHARGE SUMMARY
Hospital Medicine Discharge Summary      Patient Identification:  Name: Jacki Moura  : 1965  MRN: [id number]  Account: [id number]    Patient's PCP: Kyle Smith APRN - CNP    Admit Date: 2024    Discharge Date:   24    Admitting Physician: Pop Herrmann MD    Discharge Physician: José Luis Persaud MD        HPI:  Jacki Moura is a 59 y.o. female with PMHx of CAD, HTN, HLD, Cushing's who was admitted to [facility] on 2024 for SOB.      Please see chart for more details regarding HPI.    Hospital Course:   Jacki Moura is a 59 y.o. female who presented to [facility] due to shortness of breath. Patient has known hx of severe AR with plan for mechanical AVR 10/24. She was being maintained on Bumex OP for volume control, however developed ARJUN recently prompting discontinuation. Patient subsequently presented to [facility] due to worsening SOB. On arrival, she was started on BiPAP due to significant pulmonary edema, along with a nitro gtt. She was aggressively diuresed with improvement in symptoms and oxygenation status. Patient did develop mild ARJUN that improved following cessation of ACE/diuretic. Patient bumex was re-started during admission due to concerns for volume control at home if she remained off diuretics with stable renal function. Her lisinopril was discontinued in the interim to surgery to avoid ARJUN with plans to re-evaluate need following procedure with PCP. Patient subsequently discharged home with plans for CTS and PCP follow-up.         The patient was stable for discharge - all consultants were contacted and in agreement with plan for discharge.  Appropriate follow up appointment was arranged prior to discharge.    Please see below or view chart for more details from hospital course.    Discharge Diagnoses:    #ARJUN: Suspected secondary to ACEI and diuresis. Improved with holding of medications. Lisinopril held on discharge. Bumex 1mg  tablet  Commonly known as: FLEXERIL  Take 1 tablet by mouth 3 times daily as needed for Muscle spasms     HYDROcodone-acetaminophen 5-325 MG per tablet  Commonly known as: Norco  Take 1 tablet by mouth every 8 hours as needed for Pain for up to 30 days. Intended supply: 30 days. Take lowest dose possible to manage pain Max Daily Amount: 3 tablets     lisinopril 20 MG tablet  Commonly known as: PRINIVIL;ZESTRIL  Take 1 tablet by mouth 2 times daily     meloxicam 7.5 MG tablet  Commonly known as: Mobic  Take 1 tablet by mouth daily     pregabalin 50 MG capsule  Commonly known as: LYRICA  Take 1 capsule by mouth 3 times daily for 60 days. Max Daily Amount: 150 mg                  Discharge Time:  Time spent on discharge is >30 minutes in the examination, evaluation, counseling, and review of medications and discharge plan. The hospital course was discussed with the patient and all questions and concerns were addressed at that time. The patient was in agreement with and verbalized understanding of the plan of care and had no additional questions or complaints.The patient was instructed to follow-up with any scheduled outpatient appointments or to report to the nearest Emergency Department if new or worsening symptoms should arise.    Thank you Kyle Smith APRN - CNP for the opportunity to be involved in this patient's care.    Signed:    Electronically signed by José Luis Persaud MD on 9/5/2024 at 7:42 AM

## 2024-09-05 NOTE — CARE COORDINATION
9/5/24, 8:19 AM EDT    Patient goals/plan/ treatment preferences discussed by  and .  Patient goals/plan/ treatment preferences reviewed with patient/ family.  Patient/ family verbalize understanding of discharge plan and are in agreement with goal/plan/treatment preferences.  Understanding was demonstrated using the teach back method.  AVS provided by RN at time of discharge, which includes all necessary medical information pertaining to the patients current course of illness, treatment, post-discharge goals of care, and treatment preferences.     Services At/After Discharge: None

## 2024-09-05 NOTE — PROGRESS NOTES
CLINICAL PHARMACY: DISCHARGE MED RECONCILIATION/REVIEW    [facility] Patient?: Yes  Total # of Interventions Recommended: 0     Total # Interventions Accepted: 0  Intervention Severity:   - Level 1 Intervention Present?: No   - Level 2 #: 0   - Level 3 #: 0   Time Spent (min): 15    Fallon Vyas, PharmD  9/5/2024 at 8:52 AM

## 2024-09-06 ENCOUNTER — CARE COORDINATION (OUTPATIENT)
Dept: CASE MANAGEMENT | Age: 59
End: 2024-09-06

## 2024-09-06 DIAGNOSIS — J81.0 ACUTE PULMONARY EDEMA (HCC): Primary | ICD-10-CM

## 2024-09-06 NOTE — PROGRESS NOTES
Physician Progress Note      PATIENT:               HOOD GRIMALDO  CSN #:                  [id number]  :                       1965  ADMIT DATE:       2024 12:57 AM  DISCH DATE:        2024 10:10 AM  RESPONDING  PROVIDER #:        José Luis Persaud MD          QUERY TEXT:    Pt presented with c/o shortness of breath and has documented  hx of HFpEF and   severe aortic regurgitation.  Documented on H&P, acute respiratory failure 2/2 volume overload after Bumex   cessation.  If possible, please document in progress notes if patient is being   treated for:    The medical record reflects the following:  Risk Factors: CHF, AV regurgitation  Clinical Indicators: BNP 1663. CXR showed, Diffuse bilateral interstitial   opacities most consistent with recurrent  interstitial edema.   Per history   and physical exam, diffuse crackles in bilateral lungs.  Treatment: Bumex IV. ICU admit. Supplemental oxygen.  Options provided:  -- Acute pulmonary edema due to Acute on Chronic Diastolic CHF/HFpEF  -- Acute pulmonary edema due to, Please specify cause  -- Other - I will add my own diagnosis  -- Disagree - Not applicable / Not valid  -- Disagree - Clinically unable to determine / Unknown  -- Refer to Clinical Documentation Reviewer    PROVIDER RESPONSE TEXT:    This patient is in acute pulmonary edema due to acute on chronic diastolic   CHF/HFpEF.    Query created by: JACOB LAWRENCE on 2024 2:35 PM      Electronically signed by:  José Luis Persaud MD 2024 10:59 AM

## 2024-09-06 NOTE — CARE COORDINATION
appointment scheduled within 7 days of discharge.   Future Appointments         Provider Specialty Dept Phone    9/12/2024 9:40 AM Kyle Smith APRN - CNP Family Medicine [phone]    9/17/2024 2:15 PM Daniel Alfaro PAAnaC Cardiology [phone]    10/16/2024 10:30 AM [facility] Pre-Admission Testing [phone]    11/19/2024 10:15 AM Maxwell Mcnulty PA-C Cardiothoracic Surgery [phone]    3/12/2025 11:00 AM Joycelyn Mcdermott APRN - CNP Rheumatology [phone]            Care Transition Nurse provided contact information.  No further follow-up call indicated Patient referred back to [facility] for continued management. based on severity of symptoms and risk factors.  Plan for next call: symptom management-CHF, HTN      Naty Herman RN

## 2024-09-06 NOTE — ED PROVIDER NOTES
[facility] ICU STEPDOWN TELEMETRY 4K      EMERGENCY MEDICINE     Pt Name: Jacki Moura  MRN: [id number]  Birthdate 1965  Date of evaluation: 8/30/2024  Provider: MEG ROSALES MD    CHIEF COMPLAINT       Chief Complaint   Patient presents with    Shortness of Breath     HISTORY OF PRESENT ILLNESS   Jacki Moura is a pleasant 59 y.o. female who presents to the emergency department from from home, brought in by EMS for evaluation of shortness of breath.  Patient states that this started acutely 1 hour ago.  She took 3 baby aspirin at home.  Patient states that she is supposed to be getting aortic valve replacement for severe aortic regurgitation.  She does not typically wear oxygen and she does not have any known lung issues.  Patient denies any recent fever, cough, or congestion.  She denies any recent leg swelling or chest pain.  History is limited secondary to respiratory status of the patient.    PASTMEDICAL HISTORY     Past Medical History:   Diagnosis Date    Arthritis     Coronary artery disease involving native coronary artery of native heart without angina pectoris 07/09/2024    Cushing's disease ([facility])     Hyperlipidemia     Hypertension        Patient Active Problem List   Diagnosis Code    Essential hypertension I10    Osteopenia M85.80    Carotid stenosis, non-symptomatic, bilateral I65.23    History of smoking 30 or more pack years Z87.891    PAD (peripheral artery disease) ([facility]) I73.9    Systolic murmur R01.1    Chronic neck pain M54.2, G89.29    Diverticulosis of colon without diverticulitis K57.30    Spondylosis without myelopathy or radiculopathy, lumbosacral region M47.817    Spinal stenosis, lumbar region with neurogenic claudication M48.062    Chronic myofascial pain M79.18, G89.29    Hypercortisolism ([facility]) E24.9    Tachycardia R00.0    Leukocytosis D72.829    Adrenal tumor D49.7    Tobacco abuse Z72.0    Palpitations R00.2    Severe aortic insufficiency I35.1    Atypical chest pain

## 2024-09-08 ENCOUNTER — HOSPITAL ENCOUNTER (INPATIENT)
Age: 59
LOS: 1 days | Discharge: LEFT BEFORE DISCHARGE | DRG: 307 | End: 2024-09-08
Attending: STUDENT IN AN ORGANIZED HEALTH CARE EDUCATION/TRAINING PROGRAM | Admitting: STUDENT IN AN ORGANIZED HEALTH CARE EDUCATION/TRAINING PROGRAM
Payer: COMMERCIAL

## 2024-09-08 ENCOUNTER — APPOINTMENT (OUTPATIENT)
Dept: GENERAL RADIOLOGY | Age: 59
DRG: 307 | End: 2024-09-08
Payer: COMMERCIAL

## 2024-09-08 VITALS
SYSTOLIC BLOOD PRESSURE: 136 MMHG | HEIGHT: 60 IN | WEIGHT: 139.5 LBS | BODY MASS INDEX: 27.39 KG/M2 | RESPIRATION RATE: 16 BRPM | HEART RATE: 86 BPM | TEMPERATURE: 98 F | DIASTOLIC BLOOD PRESSURE: 63 MMHG | OXYGEN SATURATION: 99 %

## 2024-09-08 DIAGNOSIS — R07.9 CHEST PAIN, UNSPECIFIED TYPE: Primary | ICD-10-CM

## 2024-09-08 PROBLEM — I21.4 NSTEMI (NON-ST ELEVATED MYOCARDIAL INFARCTION) (HCC): Status: ACTIVE | Noted: 2024-09-08

## 2024-09-08 LAB
ALBUMIN SERPL BCG-MCNC: 4.1 G/DL (ref 3.5–5.1)
ALP SERPL-CCNC: 131 U/L (ref 38–126)
ALT SERPL W/O P-5'-P-CCNC: 17 U/L (ref 11–66)
ANION GAP SERPL CALC-SCNC: 12 MEQ/L (ref 8–16)
APTT PPP: 31.5 SECONDS (ref 22–38)
AST SERPL-CCNC: 12 U/L (ref 5–40)
BASOPHILS ABSOLUTE: 0.1 THOU/MM3 (ref 0–0.1)
BASOPHILS NFR BLD AUTO: 1 %
BILIRUB CONJ SERPL-MCNC: < 0.1 MG/DL (ref 0.1–13.8)
BILIRUB SERPL-MCNC: < 0.2 MG/DL (ref 0.3–1.2)
BUN SERPL-MCNC: 33 MG/DL (ref 7–22)
CALCIUM SERPL-MCNC: 9.3 MG/DL (ref 8.5–10.5)
CHLORIDE SERPL-SCNC: 103 MEQ/L (ref 98–111)
CO2 SERPL-SCNC: 26 MEQ/L (ref 23–33)
CREAT SERPL-MCNC: 1.1 MG/DL (ref 0.4–1.2)
DEPRECATED RDW RBC AUTO: 46.8 FL (ref 35–45)
DEPRECATED RDW RBC AUTO: 47.8 FL (ref 35–45)
EKG ATRIAL RATE: 102 BPM
EKG ATRIAL RATE: 85 BPM
EKG P AXIS: 58 DEGREES
EKG P AXIS: 73 DEGREES
EKG P-R INTERVAL: 174 MS
EKG P-R INTERVAL: 192 MS
EKG Q-T INTERVAL: 334 MS
EKG Q-T INTERVAL: 376 MS
EKG QRS DURATION: 78 MS
EKG QRS DURATION: 84 MS
EKG QTC CALCULATION (BAZETT): 435 MS
EKG QTC CALCULATION (BAZETT): 447 MS
EKG R AXIS: 21 DEGREES
EKG R AXIS: 31 DEGREES
EKG T AXIS: 4 DEGREES
EKG T AXIS: 42 DEGREES
EKG VENTRICULAR RATE: 102 BPM
EKG VENTRICULAR RATE: 85 BPM
EOSINOPHIL NFR BLD AUTO: 2.4 %
EOSINOPHILS ABSOLUTE: 0.3 THOU/MM3 (ref 0–0.4)
ERYTHROCYTE [DISTWIDTH] IN BLOOD BY AUTOMATED COUNT: 13.3 % (ref 11.5–14.5)
ERYTHROCYTE [DISTWIDTH] IN BLOOD BY AUTOMATED COUNT: 13.4 % (ref 11.5–14.5)
GFR SERPL CREATININE-BSD FRML MDRD: 58 ML/MIN/1.73M2
GLUCOSE SERPL-MCNC: 124 MG/DL (ref 70–108)
HCT VFR BLD AUTO: 30.1 % (ref 37–47)
HCT VFR BLD AUTO: 30.9 % (ref 37–47)
HEPARIN UNFRACTIONATED: 0.04 U/ML (ref 0.3–0.7)
HGB BLD-MCNC: 9.2 GM/DL (ref 12–16)
HGB BLD-MCNC: 9.5 GM/DL (ref 12–16)
IMM GRANULOCYTES # BLD AUTO: 0.25 THOU/MM3 (ref 0–0.07)
IMM GRANULOCYTES NFR BLD AUTO: 2 %
INR PPP: 0.92 (ref 0.85–1.13)
LYMPHOCYTES ABSOLUTE: 4.3 THOU/MM3 (ref 1–4.8)
LYMPHOCYTES NFR BLD AUTO: 34.9 %
MAGNESIUM SERPL-MCNC: 2.5 MG/DL (ref 1.6–2.4)
MCH RBC QN AUTO: 29.4 PG (ref 26–33)
MCH RBC QN AUTO: 29.9 PG (ref 26–33)
MCHC RBC AUTO-ENTMCNC: 30.6 GM/DL (ref 32.2–35.5)
MCHC RBC AUTO-ENTMCNC: 30.7 GM/DL (ref 32.2–35.5)
MCV RBC AUTO: 95.7 FL (ref 81–99)
MCV RBC AUTO: 97.7 FL (ref 81–99)
MONOCYTES ABSOLUTE: 1.4 THOU/MM3 (ref 0.4–1.3)
MONOCYTES NFR BLD AUTO: 11.5 %
NEUTROPHILS ABSOLUTE: 6 THOU/MM3 (ref 1.8–7.7)
NEUTROPHILS NFR BLD AUTO: 48.2 %
NRBC BLD AUTO-RTO: 0 /100 WBC
NT-PROBNP SERPL IA-MCNC: 119.6 PG/ML (ref 0–124)
OSMOLALITY SERPL CALC.SUM OF ELEC: 289.9 MOSMOL/KG (ref 275–300)
PLATELET # BLD AUTO: 507 THOU/MM3 (ref 130–400)
PLATELET # BLD AUTO: 608 THOU/MM3 (ref 130–400)
PMV BLD AUTO: 10.5 FL (ref 9.4–12.4)
PMV BLD AUTO: 11 FL (ref 9.4–12.4)
POTASSIUM SERPL-SCNC: 4.6 MEQ/L (ref 3.5–5.2)
PROT SERPL-MCNC: 6.5 G/DL (ref 6.1–8)
RBC # BLD AUTO: 3.08 MILL/MM3 (ref 4.2–5.4)
RBC # BLD AUTO: 3.23 MILL/MM3 (ref 4.2–5.4)
REASON FOR REJECTION: NORMAL
REJECTED TEST: NORMAL
SODIUM SERPL-SCNC: 141 MEQ/L (ref 135–145)
TROPONIN, HIGH SENSITIVITY: 19 NG/L (ref 0–12)
TROPONIN, HIGH SENSITIVITY: 20 NG/L (ref 0–12)
TROPONIN, HIGH SENSITIVITY: 21 NG/L (ref 0–12)
TROPONIN, HIGH SENSITIVITY: 9 NG/L (ref 0–12)
WBC # BLD AUTO: 12.4 THOU/MM3 (ref 4.8–10.8)
WBC # BLD AUTO: 9.4 THOU/MM3 (ref 4.8–10.8)

## 2024-09-08 PROCEDURE — 99285 EMERGENCY DEPT VISIT HI MDM: CPT

## 2024-09-08 PROCEDURE — 93010 ELECTROCARDIOGRAM REPORT: CPT | Performed by: INTERNAL MEDICINE

## 2024-09-08 PROCEDURE — 36415 COLL VENOUS BLD VENIPUNCTURE: CPT

## 2024-09-08 PROCEDURE — 93005 ELECTROCARDIOGRAM TRACING: CPT | Performed by: NURSE PRACTITIONER

## 2024-09-08 PROCEDURE — 93005 ELECTROCARDIOGRAM TRACING: CPT | Performed by: PHYSICIAN ASSISTANT

## 2024-09-08 PROCEDURE — 83880 ASSAY OF NATRIURETIC PEPTIDE: CPT

## 2024-09-08 PROCEDURE — 2580000003 HC RX 258

## 2024-09-08 PROCEDURE — 6360000002 HC RX W HCPCS

## 2024-09-08 PROCEDURE — 6370000000 HC RX 637 (ALT 250 FOR IP)

## 2024-09-08 PROCEDURE — 85025 COMPLETE CBC W/AUTO DIFF WBC: CPT

## 2024-09-08 PROCEDURE — 84484 ASSAY OF TROPONIN QUANT: CPT

## 2024-09-08 PROCEDURE — 1200000003 HC TELEMETRY R&B

## 2024-09-08 PROCEDURE — 82248 BILIRUBIN DIRECT: CPT

## 2024-09-08 PROCEDURE — 83735 ASSAY OF MAGNESIUM: CPT

## 2024-09-08 PROCEDURE — 85610 PROTHROMBIN TIME: CPT

## 2024-09-08 PROCEDURE — 99223 1ST HOSP IP/OBS HIGH 75: CPT | Performed by: NUCLEAR MEDICINE

## 2024-09-08 PROCEDURE — 99223 1ST HOSP IP/OBS HIGH 75: CPT | Performed by: STUDENT IN AN ORGANIZED HEALTH CARE EDUCATION/TRAINING PROGRAM

## 2024-09-08 PROCEDURE — 85027 COMPLETE CBC AUTOMATED: CPT

## 2024-09-08 PROCEDURE — 80053 COMPREHEN METABOLIC PANEL: CPT

## 2024-09-08 PROCEDURE — 85730 THROMBOPLASTIN TIME PARTIAL: CPT

## 2024-09-08 PROCEDURE — 85520 HEPARIN ASSAY: CPT

## 2024-09-08 PROCEDURE — 71045 X-RAY EXAM CHEST 1 VIEW: CPT

## 2024-09-08 RX ORDER — ACETAMINOPHEN 325 MG/1
650 TABLET ORAL EVERY 6 HOURS PRN
Status: DISCONTINUED | OUTPATIENT
Start: 2024-09-08 | End: 2024-09-08 | Stop reason: HOSPADM

## 2024-09-08 RX ORDER — METOPROLOL SUCCINATE 25 MG/1
25 TABLET, EXTENDED RELEASE ORAL DAILY
Status: DISCONTINUED | OUTPATIENT
Start: 2024-09-08 | End: 2024-09-08 | Stop reason: HOSPADM

## 2024-09-08 RX ORDER — ISOSORBIDE MONONITRATE 30 MG/1
30 TABLET, EXTENDED RELEASE ORAL DAILY
Status: DISCONTINUED | OUTPATIENT
Start: 2024-09-08 | End: 2024-09-08 | Stop reason: HOSPADM

## 2024-09-08 RX ORDER — SODIUM CHLORIDE 0.9 % (FLUSH) 0.9 %
5-40 SYRINGE (ML) INJECTION PRN
Status: DISCONTINUED | OUTPATIENT
Start: 2024-09-08 | End: 2024-09-08 | Stop reason: HOSPADM

## 2024-09-08 RX ORDER — CLOPIDOGREL BISULFATE 75 MG/1
75 TABLET ORAL DAILY
Status: DISCONTINUED | OUTPATIENT
Start: 2024-09-08 | End: 2024-09-08 | Stop reason: HOSPADM

## 2024-09-08 RX ORDER — ASPIRIN 81 MG/1
81 TABLET ORAL DAILY
Status: DISCONTINUED | OUTPATIENT
Start: 2024-09-08 | End: 2024-09-08 | Stop reason: HOSPADM

## 2024-09-08 RX ORDER — HEPARIN SODIUM 1000 [USP'U]/ML
60 INJECTION, SOLUTION INTRAVENOUS; SUBCUTANEOUS ONCE
Status: DISCONTINUED | OUTPATIENT
Start: 2024-09-08 | End: 2024-09-08

## 2024-09-08 RX ORDER — ONDANSETRON 4 MG/1
4 TABLET, ORALLY DISINTEGRATING ORAL EVERY 8 HOURS PRN
Status: DISCONTINUED | OUTPATIENT
Start: 2024-09-08 | End: 2024-09-08 | Stop reason: HOSPADM

## 2024-09-08 RX ORDER — ONDANSETRON 2 MG/ML
4 INJECTION INTRAMUSCULAR; INTRAVENOUS EVERY 6 HOURS PRN
Status: DISCONTINUED | OUTPATIENT
Start: 2024-09-08 | End: 2024-09-08 | Stop reason: HOSPADM

## 2024-09-08 RX ORDER — SODIUM CHLORIDE 9 MG/ML
INJECTION, SOLUTION INTRAVENOUS PRN
Status: DISCONTINUED | OUTPATIENT
Start: 2024-09-08 | End: 2024-09-08 | Stop reason: HOSPADM

## 2024-09-08 RX ORDER — CYCLOBENZAPRINE HCL 10 MG
10 TABLET ORAL 3 TIMES DAILY PRN
Status: DISCONTINUED | OUTPATIENT
Start: 2024-09-08 | End: 2024-09-08 | Stop reason: HOSPADM

## 2024-09-08 RX ORDER — FERROUS SULFATE 325(65) MG
325 TABLET ORAL EVERY OTHER DAY
Status: DISCONTINUED | OUTPATIENT
Start: 2024-09-09 | End: 2024-09-08 | Stop reason: HOSPADM

## 2024-09-08 RX ORDER — MAGNESIUM SULFATE IN WATER 40 MG/ML
2000 INJECTION, SOLUTION INTRAVENOUS PRN
Status: DISCONTINUED | OUTPATIENT
Start: 2024-09-08 | End: 2024-09-08 | Stop reason: HOSPADM

## 2024-09-08 RX ORDER — SODIUM CHLORIDE 0.9 % (FLUSH) 0.9 %
5-40 SYRINGE (ML) INJECTION EVERY 12 HOURS SCHEDULED
Status: DISCONTINUED | OUTPATIENT
Start: 2024-09-08 | End: 2024-09-08 | Stop reason: HOSPADM

## 2024-09-08 RX ORDER — HEPARIN SODIUM 10000 [USP'U]/100ML
5-30 INJECTION, SOLUTION INTRAVENOUS CONTINUOUS
Status: DISCONTINUED | OUTPATIENT
Start: 2024-09-08 | End: 2024-09-08

## 2024-09-08 RX ORDER — HEPARIN SODIUM 1000 [USP'U]/ML
60 INJECTION, SOLUTION INTRAVENOUS; SUBCUTANEOUS PRN
Status: DISCONTINUED | OUTPATIENT
Start: 2024-09-08 | End: 2024-09-08

## 2024-09-08 RX ORDER — ACETAMINOPHEN 650 MG/1
650 SUPPOSITORY RECTAL EVERY 6 HOURS PRN
Status: DISCONTINUED | OUTPATIENT
Start: 2024-09-08 | End: 2024-09-08 | Stop reason: HOSPADM

## 2024-09-08 RX ORDER — HEPARIN SODIUM 1000 [USP'U]/ML
30 INJECTION, SOLUTION INTRAVENOUS; SUBCUTANEOUS PRN
Status: DISCONTINUED | OUTPATIENT
Start: 2024-09-08 | End: 2024-09-08

## 2024-09-08 RX ORDER — NITROGLYCERIN 0.4 MG/1
0.4 TABLET SUBLINGUAL EVERY 5 MIN PRN
Status: DISCONTINUED | OUTPATIENT
Start: 2024-09-08 | End: 2024-09-08

## 2024-09-08 RX ORDER — POTASSIUM CHLORIDE 1500 MG/1
40 TABLET, EXTENDED RELEASE ORAL PRN
Status: DISCONTINUED | OUTPATIENT
Start: 2024-09-08 | End: 2024-09-08 | Stop reason: HOSPADM

## 2024-09-08 RX ORDER — ATORVASTATIN CALCIUM 40 MG/1
40 TABLET, FILM COATED ORAL DAILY
Status: DISCONTINUED | OUTPATIENT
Start: 2024-09-08 | End: 2024-09-08 | Stop reason: HOSPADM

## 2024-09-08 RX ORDER — POLYETHYLENE GLYCOL 3350 17 G/17G
17 POWDER, FOR SOLUTION ORAL DAILY PRN
Status: DISCONTINUED | OUTPATIENT
Start: 2024-09-08 | End: 2024-09-08 | Stop reason: HOSPADM

## 2024-09-08 RX ORDER — PREGABALIN 50 MG/1
50 CAPSULE ORAL 3 TIMES DAILY
Status: DISCONTINUED | OUTPATIENT
Start: 2024-09-08 | End: 2024-09-08 | Stop reason: HOSPADM

## 2024-09-08 RX ORDER — BUMETANIDE 1 MG/1
1 TABLET ORAL DAILY
Status: DISCONTINUED | OUTPATIENT
Start: 2024-09-08 | End: 2024-09-08 | Stop reason: HOSPADM

## 2024-09-08 RX ORDER — POTASSIUM CHLORIDE 7.45 MG/ML
10 INJECTION INTRAVENOUS PRN
Status: DISCONTINUED | OUTPATIENT
Start: 2024-09-08 | End: 2024-09-08 | Stop reason: HOSPADM

## 2024-09-08 RX ORDER — ALPRAZOLAM 0.5 MG
0.5 TABLET ORAL 3 TIMES DAILY PRN
Status: DISCONTINUED | OUTPATIENT
Start: 2024-09-08 | End: 2024-09-08 | Stop reason: HOSPADM

## 2024-09-08 RX ADMIN — ASPIRIN 81 MG: 81 TABLET, COATED ORAL at 09:23

## 2024-09-08 RX ADMIN — CYCLOBENZAPRINE 10 MG: 10 TABLET, FILM COATED ORAL at 09:29

## 2024-09-08 RX ADMIN — CLOPIDOGREL BISULFATE 75 MG: 75 TABLET ORAL at 09:23

## 2024-09-08 RX ADMIN — METOPROLOL SUCCINATE 25 MG: 25 TABLET, FILM COATED, EXTENDED RELEASE ORAL at 09:23

## 2024-09-08 RX ADMIN — BUMETANIDE 1 MG: 1 TABLET ORAL at 09:23

## 2024-09-08 RX ADMIN — PREGABALIN 50 MG: 50 CAPSULE ORAL at 09:23

## 2024-09-08 RX ADMIN — HEPARIN SODIUM 12 UNITS/KG/HR: 10000 INJECTION, SOLUTION INTRAVENOUS at 09:16

## 2024-09-08 RX ADMIN — ISOSORBIDE MONONITRATE 30 MG: 30 TABLET, EXTENDED RELEASE ORAL at 09:23

## 2024-09-08 RX ADMIN — ALPRAZOLAM 0.5 MG: 0.5 TABLET ORAL at 09:30

## 2024-09-08 RX ADMIN — SODIUM CHLORIDE, PRESERVATIVE FREE 10 ML: 5 INJECTION INTRAVENOUS at 09:17

## 2024-09-08 ASSESSMENT — PAIN - FUNCTIONAL ASSESSMENT
PAIN_FUNCTIONAL_ASSESSMENT: 0-10
PAIN_FUNCTIONAL_ASSESSMENT: ACTIVITIES ARE NOT PREVENTED

## 2024-09-08 ASSESSMENT — HEART SCORE: ECG: NORMAL

## 2024-09-08 ASSESSMENT — PAIN DESCRIPTION - ORIENTATION: ORIENTATION: RIGHT

## 2024-09-08 ASSESSMENT — PAIN SCALES - GENERAL
PAINLEVEL_OUTOF10: 7
PAINLEVEL_OUTOF10: 7
PAINLEVEL_OUTOF10: 5

## 2024-09-08 ASSESSMENT — PAIN DESCRIPTION - LOCATION: LOCATION: NECK

## 2024-09-08 ASSESSMENT — PAIN DESCRIPTION - DESCRIPTORS: DESCRIPTORS: ACHING

## 2024-09-09 ENCOUNTER — CARE COORDINATION (OUTPATIENT)
Dept: CASE MANAGEMENT | Age: 59
End: 2024-09-09

## 2024-09-09 DIAGNOSIS — G89.29 CHRONIC MYOFASCIAL PAIN: ICD-10-CM

## 2024-09-09 DIAGNOSIS — M79.18 CHRONIC MYOFASCIAL PAIN: ICD-10-CM

## 2024-09-09 RX ORDER — HYDROCODONE BITARTRATE AND ACETAMINOPHEN 5; 325 MG/1; MG/1
1 TABLET ORAL EVERY 8 HOURS PRN
Qty: 90 TABLET | Refills: 0 | Status: SHIPPED | OUTPATIENT
Start: 2024-09-09 | End: 2024-10-09

## 2024-09-11 ENCOUNTER — HOSPITAL ENCOUNTER (OUTPATIENT)
Age: 59
Discharge: HOME | End: 2024-09-11
Payer: COMMERCIAL

## 2024-09-11 DIAGNOSIS — Z01.818 PRE-OP EXAMINATION: ICD-10-CM

## 2024-09-11 DIAGNOSIS — R73.03: ICD-10-CM

## 2024-09-11 LAB
ALBUMIN SERPL BCG-MCNC: 4.1 G/DL (ref 3.5–5.1)
ALP SERPL-CCNC: 113 U/L (ref 38–126)
ALT SERPL W/O P-5'-P-CCNC: 15 U/L (ref 11–66)
ANION GAP SERPL CALC-SCNC: 11 MEQ/L (ref 8–16)
AST SERPL-CCNC: 14 U/L (ref 5–40)
BASOPHILS ABSOLUTE: 0.1 THOU/MM3 (ref 0–0.1)
BASOPHILS NFR BLD AUTO: 1.1 %
BILIRUB CONJ SERPL-MCNC: < 0.1 MG/DL (ref 0.1–13.8)
BILIRUB SERPL-MCNC: < 0.2 MG/DL (ref 0.3–1.2)
BUN SERPL-MCNC: 20 MG/DL (ref 7–22)
CALCIUM SERPL-MCNC: 9.3 MG/DL (ref 8.5–10.5)
CHLORIDE SERPL-SCNC: 103 MEQ/L (ref 98–111)
CO2 SERPL-SCNC: 25 MEQ/L (ref 23–33)
CREAT SERPL-MCNC: 0.9 MG/DL (ref 0.4–1.2)
DEPRECATED MEAN GLUCOSE BLD GHB EST-ACNC: 114 MG/DL (ref 70–126)
DEPRECATED RDW RBC AUTO: 46.5 FL (ref 35–45)
EOSINOPHIL NFR BLD AUTO: 2.4 %
EOSINOPHILS ABSOLUTE: 0.3 THOU/MM3 (ref 0–0.4)
ERYTHROCYTE [DISTWIDTH] IN BLOOD BY AUTOMATED COUNT: 13.2 % (ref 11.5–14.5)
GFR SERPL CREATININE-BSD FRML MDRD: 73 ML/MIN/1.73M2
GLUCOSE SERPL-MCNC: 107 MG/DL (ref 70–108)
HBA1C MFR BLD HPLC: 5.8 % (ref 4.4–6.4)
HCT VFR BLD AUTO: 30.2 % (ref 37–47)
HGB BLD-MCNC: 9.2 GM/DL (ref 12–16)
IMM GRANULOCYTES # BLD AUTO: 0.11 THOU/MM3 (ref 0–0.07)
IMM GRANULOCYTES NFR BLD AUTO: 0.9 %
LYMPHOCYTES ABSOLUTE: 2.6 THOU/MM3 (ref 1–4.8)
LYMPHOCYTES NFR BLD AUTO: 21.4 %
MCH RBC QN AUTO: 29.5 PG (ref 26–33)
MCHC RBC AUTO-ENTMCNC: 30.5 GM/DL (ref 32.2–35.5)
MCV RBC AUTO: 96.8 FL (ref 81–99)
MONOCYTES ABSOLUTE: 0.9 THOU/MM3 (ref 0.4–1.3)
MONOCYTES NFR BLD AUTO: 7.4 %
NEUTROPHILS ABSOLUTE: 8.1 THOU/MM3 (ref 1.8–7.7)
NEUTROPHILS NFR BLD AUTO: 66.8 %
NRBC BLD AUTO-RTO: 0 /100 WBC
PLATELET # BLD AUTO: 678 THOU/MM3 (ref 130–400)
PMV BLD AUTO: 10.5 FL (ref 9.4–12.4)
POTASSIUM SERPL-SCNC: 4.5 MEQ/L (ref 3.5–5.2)
PROT SERPL-MCNC: 7.4 G/DL (ref 6.1–8)
RBC # BLD AUTO: 3.12 MILL/MM3 (ref 4.2–5.4)
SODIUM SERPL-SCNC: 139 MEQ/L (ref 135–145)
WBC # BLD AUTO: 12.2 THOU/MM3 (ref 4.8–10.8)

## 2024-09-11 PROCEDURE — 82248 BILIRUBIN DIRECT: CPT

## 2024-09-11 PROCEDURE — 36415 COLL VENOUS BLD VENIPUNCTURE: CPT

## 2024-09-11 PROCEDURE — 80053 COMPREHEN METABOLIC PANEL: CPT

## 2024-09-11 PROCEDURE — 83036 HEMOGLOBIN GLYCOSYLATED A1C: CPT

## 2024-09-11 PROCEDURE — 85025 COMPLETE CBC W/AUTO DIFF WBC: CPT

## 2024-09-13 ENCOUNTER — OFFICE VISIT (OUTPATIENT)
Dept: FAMILY MEDICINE CLINIC | Age: 59
End: 2024-09-13

## 2024-09-13 VITALS
TEMPERATURE: 97.8 F | OXYGEN SATURATION: 95 % | SYSTOLIC BLOOD PRESSURE: 100 MMHG | RESPIRATION RATE: 16 BRPM | HEART RATE: 94 BPM | BODY MASS INDEX: 26.76 KG/M2 | WEIGHT: 137 LBS | DIASTOLIC BLOOD PRESSURE: 62 MMHG

## 2024-09-13 DIAGNOSIS — R73.03: ICD-10-CM

## 2024-09-13 DIAGNOSIS — D49.7 HYPERCORTISOLISM DUE TO ADRENAL NEOPLASM (HCC): ICD-10-CM

## 2024-09-13 DIAGNOSIS — Z09 HOSPITAL DISCHARGE FOLLOW-UP: Primary | ICD-10-CM

## 2024-09-13 DIAGNOSIS — E24.8 HYPERCORTISOLISM DUE TO ADRENAL NEOPLASM (HCC): ICD-10-CM

## 2024-09-13 DIAGNOSIS — I50.22 CHRONIC SYSTOLIC (CONGESTIVE) HEART FAILURE (HCC): ICD-10-CM

## 2024-09-16 ENCOUNTER — CARE COORDINATION (OUTPATIENT)
Dept: CARE COORDINATION | Age: 59
End: 2024-09-16

## 2024-09-17 ENCOUNTER — OFFICE VISIT (OUTPATIENT)
Dept: CARDIOLOGY CLINIC | Age: 59
End: 2024-09-17
Payer: COMMERCIAL

## 2024-09-17 VITALS
HEART RATE: 82 BPM | BODY MASS INDEX: 27.41 KG/M2 | SYSTOLIC BLOOD PRESSURE: 132 MMHG | HEIGHT: 60 IN | DIASTOLIC BLOOD PRESSURE: 67 MMHG | WEIGHT: 139.6 LBS

## 2024-09-17 DIAGNOSIS — I65.21 STENOSIS OF RIGHT CAROTID ARTERY: Primary | ICD-10-CM

## 2024-09-17 DIAGNOSIS — I35.1 AORTIC VALVE INSUFFICIENCY, ETIOLOGY OF CARDIAC VALVE DISEASE UNSPECIFIED: ICD-10-CM

## 2024-09-17 DIAGNOSIS — Z72.0 TOBACCO ABUSE: ICD-10-CM

## 2024-09-17 DIAGNOSIS — I10 ESSENTIAL HYPERTENSION: ICD-10-CM

## 2024-09-17 PROCEDURE — 3075F SYST BP GE 130 - 139MM HG: CPT | Performed by: PHYSICIAN ASSISTANT

## 2024-09-17 PROCEDURE — 99214 OFFICE O/P EST MOD 30 MIN: CPT | Performed by: PHYSICIAN ASSISTANT

## 2024-09-17 PROCEDURE — 3078F DIAST BP <80 MM HG: CPT | Performed by: PHYSICIAN ASSISTANT

## 2024-09-19 ENCOUNTER — PREP FOR PROCEDURE (OUTPATIENT)
Dept: CARDIOTHORACIC SURGERY | Age: 59
End: 2024-09-19

## 2024-09-19 DIAGNOSIS — I35.1 SEVERE AORTIC INSUFFICIENCY: Primary | ICD-10-CM

## 2024-09-19 RX ORDER — SODIUM CHLORIDE 0.9 % (FLUSH) 0.9 %
5-40 SYRINGE (ML) INJECTION EVERY 12 HOURS SCHEDULED
Status: CANCELLED | OUTPATIENT
Start: 2024-09-19

## 2024-09-19 RX ORDER — SODIUM CHLORIDE 0.9 % (FLUSH) 0.9 %
5-40 SYRINGE (ML) INJECTION PRN
Status: CANCELLED | OUTPATIENT
Start: 2024-09-19

## 2024-09-19 RX ORDER — METOPROLOL TARTRATE 25 MG/1
12.5 TABLET, FILM COATED ORAL ONCE
Status: CANCELLED | OUTPATIENT
Start: 2024-09-19 | End: 2024-09-19

## 2024-09-19 RX ORDER — SODIUM CHLORIDE 9 MG/ML
INJECTION, SOLUTION INTRAVENOUS PRN
Status: CANCELLED | OUTPATIENT
Start: 2024-09-19

## 2024-09-19 RX ORDER — ASPIRIN 81 MG/1
81 TABLET ORAL DAILY
Status: CANCELLED | OUTPATIENT
Start: 2024-09-19

## 2024-09-19 RX ORDER — SODIUM CHLORIDE 9 MG/ML
INJECTION, SOLUTION INTRAVENOUS CONTINUOUS
Status: CANCELLED | OUTPATIENT
Start: 2024-09-19

## 2024-09-23 ENCOUNTER — PATIENT MESSAGE (OUTPATIENT)
Dept: FAMILY MEDICINE CLINIC | Age: 59
End: 2024-09-23

## 2024-09-23 ENCOUNTER — CARE COORDINATION (OUTPATIENT)
Dept: CARE COORDINATION | Age: 59
End: 2024-09-23

## 2024-09-23 DIAGNOSIS — F41.9 ANXIETY: ICD-10-CM

## 2024-09-23 RX ORDER — ALPRAZOLAM 0.5 MG
0.5 TABLET ORAL 3 TIMES DAILY PRN
Qty: 90 TABLET | Refills: 0 | Status: SHIPPED | OUTPATIENT
Start: 2024-09-23 | End: 2024-10-23

## 2024-09-25 ENCOUNTER — TELEPHONE (OUTPATIENT)
Dept: FAMILY MEDICINE CLINIC | Age: 59
End: 2024-09-25

## 2024-10-02 ENCOUNTER — CARE COORDINATION (OUTPATIENT)
Dept: CARE COORDINATION | Age: 59
End: 2024-10-02

## 2024-10-02 NOTE — CARE COORDINATION
set a quit date  Early symptom recognition and reporting to prevent ED and admissions  Use same or next day appts at PCP office for non-emergency problems       Offered patient enrollment in the Remote Patient Monitoring (RPM) program for in-home monitoring: Yes, but did not enroll at this time: already monitoring with home equipment.     Assessments Completed:   Care Coordination Interventions    Referral from Primary Care Provider: No  Suggested Interventions and Community Resources  Medi Set or Pill Pack: Declined  Occupational Therapy: Declined  Physical Therapy: Declined  Transportation Support: Declined      ,   Congestive Heart Failure Assessment    Do you understand a low sodium diet?: Yes  Do you understand how to read food labels?: Yes  How many restaurant meals do you eat per week?: 1-2  Do you salt your food before tasting it?: No     No patient-reported symptoms      Symptoms:  None: Yes      Symptom course: stable  Weight trend: stable  Salt intake watch compared to last visit: stable      ,   Hypertension - Encounter Level    Symptom course: stable          Medications Reviewed:   Patient denies any changes with medications and reports taking all medications as prescribed.    Advance Care Planning:   Referral to internal ACP facilitator     Care Planning:    Goals Addressed                   This Visit's Progress     Conditions and Symptoms   On track     I will schedule office visits, as directed by my provider.  I will keep my appointment or reschedule if I have to cancel.  I will notify my provider of any barriers to my plan of care.  I will notify my provider of any symptoms that indicate a worsening of my condition.    Barriers: overwhelmed by complexity of regimen  Plan for overcoming my barriers: care coordination  Confidence: 9/10  Anticipated Goal Completion Date: 6/1/2024                 PCP/Specialist follow up:   Future Appointments         Provider Specialty Dept Phone    10/16/2024 10:30

## 2024-10-09 ENCOUNTER — PATIENT MESSAGE (OUTPATIENT)
Dept: FAMILY MEDICINE CLINIC | Age: 59
End: 2024-10-09

## 2024-10-09 ENCOUNTER — TELEPHONE (OUTPATIENT)
Dept: CARDIOTHORACIC SURGERY | Age: 59
End: 2024-10-09

## 2024-10-09 DIAGNOSIS — M79.18 CHRONIC MYOFASCIAL PAIN: ICD-10-CM

## 2024-10-09 DIAGNOSIS — R25.2 MUSCLE CRAMPS: ICD-10-CM

## 2024-10-09 DIAGNOSIS — G89.29 CHRONIC MYOFASCIAL PAIN: ICD-10-CM

## 2024-10-09 PROCEDURE — APPSS30 APP SPLIT SHARED TIME 16-30 MINUTES: Performed by: PHYSICIAN ASSISTANT

## 2024-10-09 RX ORDER — CYCLOBENZAPRINE HCL 10 MG
10 TABLET ORAL 3 TIMES DAILY PRN
Qty: 90 TABLET | Refills: 1 | Status: SHIPPED | OUTPATIENT
Start: 2024-10-09

## 2024-10-09 RX ORDER — HYDROCODONE BITARTRATE AND ACETAMINOPHEN 5; 325 MG/1; MG/1
1 TABLET ORAL EVERY 8 HOURS PRN
Qty: 90 TABLET | Refills: 0 | Status: SHIPPED | OUTPATIENT
Start: 2024-10-09 | End: 2024-11-08

## 2024-10-09 RX ORDER — HYDROCODONE BITARTRATE AND ACETAMINOPHEN 5; 325 MG/1; MG/1
1 TABLET ORAL EVERY 8 HOURS PRN
Qty: 90 TABLET | Refills: 0 | OUTPATIENT
Start: 2024-10-09 | End: 2024-11-08

## 2024-10-09 NOTE — TELEPHONE ENCOUNTER
PROCEDURE: avr     DATE OF SERVICE: 10/21/2024    SERVICE LOCATION: [facility]     CPT CODE: 14259    PHYSICIAN: DR SEYMOUR     DATE PRIOR AUTH SUBMITTED: 10/09/2024    STATUS: approved     CASE NUMBER: [id number]     AUTH NUMBER: [id number]     VALID:  10/21/2024-11/04/2024

## 2024-10-14 ENCOUNTER — PATIENT MESSAGE (OUTPATIENT)
Dept: FAMILY MEDICINE CLINIC | Age: 59
End: 2024-10-14

## 2024-10-14 RX ORDER — ATORVASTATIN CALCIUM 40 MG/1
40 TABLET, FILM COATED ORAL DAILY
Qty: 90 TABLET | Refills: 3 | Status: SHIPPED | OUTPATIENT
Start: 2024-10-14

## 2024-10-16 ENCOUNTER — HOSPITAL ENCOUNTER (OUTPATIENT)
Dept: PREADMISSION TESTING | Age: 59
Discharge: HOME | End: 2024-10-20
Payer: COMMERCIAL

## 2024-10-16 ENCOUNTER — HOSPITAL ENCOUNTER (OUTPATIENT)
Dept: GENERAL RADIOLOGY | Age: 59
Discharge: HOME | End: 2024-10-16
Payer: COMMERCIAL

## 2024-10-16 VITALS
SYSTOLIC BLOOD PRESSURE: 149 MMHG | TEMPERATURE: 97.8 F | WEIGHT: 135.8 LBS | DIASTOLIC BLOOD PRESSURE: 60 MMHG | BODY MASS INDEX: 26.66 KG/M2 | HEART RATE: 88 BPM | OXYGEN SATURATION: 100 % | HEIGHT: 60 IN | RESPIRATION RATE: 20 BRPM

## 2024-10-16 DIAGNOSIS — I35.1 SEVERE AORTIC INSUFFICIENCY: ICD-10-CM

## 2024-10-16 LAB
ABO GROUP BLD: NORMAL
ANION GAP SERPL CALC-SCNC: 16 MEQ/L (ref 8–16)
BILIRUB UR QL STRIP: NEGATIVE
BUN SERPL-MCNC: 19 MG/DL (ref 7–22)
CALCIUM SERPL-MCNC: 9.2 MG/DL (ref 8.5–10.5)
CHARACTER UR: CLEAR
CHLORIDE SERPL-SCNC: 101 MEQ/L (ref 98–111)
CO2 SERPL-SCNC: 21 MEQ/L (ref 23–33)
COLOR UR: YELLOW
CREAT SERPL-MCNC: 0.8 MG/DL (ref 0.4–1.2)
DEPRECATED RDW RBC AUTO: 51.2 FL (ref 35–45)
EKG ATRIAL RATE: 80 BPM
EKG P AXIS: 32 DEGREES
EKG P-R INTERVAL: 172 MS
EKG Q-T INTERVAL: 350 MS
EKG QRS DURATION: 72 MS
EKG QTC CALCULATION (BAZETT): 403 MS
EKG R AXIS: 16 DEGREES
EKG T AXIS: 56 DEGREES
EKG VENTRICULAR RATE: 80 BPM
ERYTHROCYTE [DISTWIDTH] IN BLOOD BY AUTOMATED COUNT: 14.7 % (ref 11.5–14.5)
GFR SERPL CREATININE-BSD FRML MDRD: 84 ML/MIN/1.73M2
GLUCOSE SERPL-MCNC: 108 MG/DL (ref 70–108)
GLUCOSE UR QL STRIP.AUTO: NEGATIVE MG/DL
HCT VFR BLD AUTO: 35.4 % (ref 37–47)
HGB BLD-MCNC: 10.6 GM/DL (ref 12–16)
HGB UR QL STRIP.AUTO: NEGATIVE
IAT IGG-SP REAG SERPL QL: NORMAL
INR PPP: 0.96 (ref 0.85–1.13)
KETONES UR QL STRIP.AUTO: NEGATIVE
LEUKOCYTE ESTERASE UR QL STRIP.AUTO: NEGATIVE
MCH RBC QN AUTO: 28.6 PG (ref 26–33)
MCHC RBC AUTO-ENTMCNC: 29.9 GM/DL (ref 32.2–35.5)
MCV RBC AUTO: 95.4 FL (ref 81–99)
MRSA DNA SPEC QL NAA+PROBE: NEGATIVE
NITRITE UR QL STRIP.AUTO: NEGATIVE
PH UR STRIP.AUTO: 5.5 [PH] (ref 5–9)
PLATELET # BLD AUTO: 762 THOU/MM3 (ref 130–400)
PMV BLD AUTO: 9.6 FL (ref 9.4–12.4)
POTASSIUM SERPL-SCNC: 4.7 MEQ/L (ref 3.5–5.2)
PROT UR STRIP.AUTO-MCNC: NEGATIVE MG/DL
RBC # BLD AUTO: 3.71 MILL/MM3 (ref 4.2–5.4)
SODIUM SERPL-SCNC: 138 MEQ/L (ref 135–145)
SP GR UR REFRACT.AUTO: 1.01 (ref 1–1.03)
UROBILINOGEN UR QL STRIP.AUTO: 0.2 EU/DL (ref 0–1)
WBC # BLD AUTO: 10 THOU/MM3 (ref 4.8–10.8)

## 2024-10-16 PROCEDURE — 85610 PROTHROMBIN TIME: CPT

## 2024-10-16 PROCEDURE — 80048 BASIC METABOLIC PNL TOTAL CA: CPT

## 2024-10-16 PROCEDURE — 71046 X-RAY EXAM CHEST 2 VIEWS: CPT

## 2024-10-16 PROCEDURE — 81003 URINALYSIS AUTO W/O SCOPE: CPT

## 2024-10-16 PROCEDURE — 36415 COLL VENOUS BLD VENIPUNCTURE: CPT

## 2024-10-16 PROCEDURE — 86901 BLOOD TYPING SEROLOGIC RH(D): CPT

## 2024-10-16 PROCEDURE — 86885 COOMBS TEST INDIRECT QUAL: CPT

## 2024-10-16 PROCEDURE — 86923 COMPATIBILITY TEST ELECTRIC: CPT

## 2024-10-16 PROCEDURE — 93005 ELECTROCARDIOGRAM TRACING: CPT

## 2024-10-16 PROCEDURE — 85027 COMPLETE CBC AUTOMATED: CPT

## 2024-10-16 PROCEDURE — 87641 MR-STAPH DNA AMP PROBE: CPT

## 2024-10-16 PROCEDURE — 93010 ELECTROCARDIOGRAM REPORT: CPT | Performed by: INTERNAL MEDICINE

## 2024-10-16 PROCEDURE — 86900 BLOOD TYPING SEROLOGIC ABO: CPT

## 2024-10-16 ASSESSMENT — PAIN DESCRIPTION - PAIN TYPE: TYPE: CHRONIC PAIN

## 2024-10-16 ASSESSMENT — PAIN SCALES - GENERAL: PAINLEVEL_OUTOF10: 6

## 2024-10-16 ASSESSMENT — PAIN DESCRIPTION - FREQUENCY: FREQUENCY: CONTINUOUS

## 2024-10-16 ASSESSMENT — PAIN DESCRIPTION - DESCRIPTORS: DESCRIPTORS: NAGGING

## 2024-10-16 ASSESSMENT — PAIN DESCRIPTION - LOCATION: LOCATION: BACK;NECK;SHOULDER

## 2024-10-16 NOTE — PROGRESS NOTES
Pain-patient states pain 6/10 in right shoulder, neck, lower back that is chronic. States pain is constant nagging. Pain scale and management reviewed and verbalizes understanding.

## 2024-10-16 NOTE — PROGRESS NOTES
Preliminary Discharge Planning Questionnaire  Date of Surgery 10/21/24   Surgeon Pastora/Rick      Having the proper help and care after surgery is very important to your recovery. Who will be able to help you at home when you are discharged from the hospital?    spouse    Name(s) Sergei    How many steps to enter your home?  4    Bathroom on first floor?  No-has a bedside commode    Bedroom on the first floor?  No has queen bed they are going to put on first floor    Do you have an elevated toilet seat to use at home?  No    Do you have a walker to use at home?    Total Joints - with wheels N/A   Spine - with wheels  N/A     Have you been doing home exercises?    *You will go home with some outpatient physical therapy, where do you prefer to go?     This typically will not start until after your post visit to your Dr. (3-4 weeks after surgery)    * What home health agency would you like to use? No preference      List of all Home Health Agencies Available given to patient, with website ( per Social Work Team)      Please have 2 preferences when you come for surgery- 1st and 2nd choice                                                               *Cardiac Rehab plans [facility]

## 2024-10-16 NOTE — PROGRESS NOTES
NPO after midnight,  (which includes no gum, mints or ice chips) except sips of water to take medication as instructed  No smoking or chewing tobacco, marijuana or illicit drugs 24 hours before surgery per anesthesia  Bring insurance info and drivers license  Wear loose, comfortable clean clothing  Do not wear makeup, nail polish, piercings or contact lenses. Do not bring jewelry or valuables. Bring case for glasses. Do not glue in dentures/partials  You may bring cell phone and     Bring medications in original bottles  Routine preop instructions given for pain, hand hygiene, fall prevention, infection prevention, anesthesia, cough and deep breath, MRSA and progressive diet and ambulation  4% Chlorhexidine soap bottle given to patient. Shower and wash hair with chlorhexidine soap morning of surgery- instruction sheet given. No deodorant, lotions, creams or powders.  IS instruction given and return demonstration  Open Heart Book reviewed, questions answered and book give to patient  To help prevent bowel problems after surgery we ask that you drink a hot beverage and eat an Activia yogurt with each meal starting 3-4 days before surgery.  Viewed open heart video  IS : 1500  Follow all instructions give by your physician    Do you have a DNR?   Please bring your Healthcare document or Healthcare Power of  so we can scan it into your chart     needed at discharge  Report to [facility] on 2nd floor  If you would become ill prior to surgery, please call the surgeon right away  Masks are recommended but not required. You may  a new mask by our .

## 2024-10-16 NOTE — PROGRESS NOTES
Open Heart Pre-op Surgery Showering Instructions   To keep your skin as clean as possible and to help prevent infection, please follow these instructions:  1.Shower with a cleanser containing Chlorhexidine Gluconate (CHG) the morning of your surgery.  *Note* when using CHG cleanser do not use it on mucous membranes, such as your genital area. Do not get the soap in your eyes.  CHG is absorbed by cotton washcloths and may cause discoloration to wash cloth.  2.In the shower, wet skin and wash your body and your hair with CHG cleanser.  3.Pay special attention to area(s) where you will have surgery. (ie Chest, both arms and both legs)  4.Ask someone for help if you are unable to wash certain areas of your body.  5.Rinse well.  6.Gently dry with a clean cloth.  7.When you arrive the day of surgery, part of prep for surgery will include clipping of the hair on the front of your body and showering after.     PLEASE REMEMBER:    1. DO NOT SHAVE ANY BODY PARTS from neck down ( including your legs or underarms.) for at least 2 days before surgery.  Shaving can increase your risk of infection.   2. AFTER YOUR SHOWER, do not use any powder, deodorant, perfumes, or lotions prior to surgery.  3. WEAR FRESHLY LAUNDERED pajamas to bed that night and sleep on freshly laundered sheets and pillow cases.

## 2024-10-17 NOTE — PROGRESS NOTES
Sent abnormal EKG to anesthesia for review.  No cardiac clearance ordered.  abnormal Labs RBC 3.74 hbg 10.6 hct 35.4 platelets 762 Await response    Reply ok to proceed per Dr Guerra from anesthesia no new orders

## 2024-10-18 ENCOUNTER — ANESTHESIA EVENT (OUTPATIENT)
Dept: OPERATING ROOM | Age: 59
End: 2024-10-18
Payer: COMMERCIAL

## 2024-10-21 ENCOUNTER — APPOINTMENT (OUTPATIENT)
Dept: GENERAL RADIOLOGY | Age: 59
DRG: 220 | End: 2024-10-21
Attending: THORACIC SURGERY (CARDIOTHORACIC VASCULAR SURGERY)
Payer: COMMERCIAL

## 2024-10-21 ENCOUNTER — HOSPITAL ENCOUNTER (INPATIENT)
Age: 59
LOS: 6 days | Discharge: HOME HEALTH | DRG: 220 | End: 2024-10-27
Attending: THORACIC SURGERY (CARDIOTHORACIC VASCULAR SURGERY) | Admitting: THORACIC SURGERY (CARDIOTHORACIC VASCULAR SURGERY)
Payer: COMMERCIAL

## 2024-10-21 ENCOUNTER — ANESTHESIA (OUTPATIENT)
Dept: OPERATING ROOM | Age: 59
End: 2024-10-21
Payer: COMMERCIAL

## 2024-10-21 DIAGNOSIS — I35.1 MODERATE TO SEVERE AORTIC INSUFFICIENCY: ICD-10-CM

## 2024-10-21 DIAGNOSIS — M79.18 CHRONIC MYOFASCIAL PAIN: ICD-10-CM

## 2024-10-21 DIAGNOSIS — Z95.2 S/P AVR (AORTIC VALVE REPLACEMENT): Primary | ICD-10-CM

## 2024-10-21 DIAGNOSIS — I65.21 CAROTID STENOSIS, RIGHT: ICD-10-CM

## 2024-10-21 DIAGNOSIS — G89.29 CHRONIC MYOFASCIAL PAIN: ICD-10-CM

## 2024-10-21 DIAGNOSIS — F41.9 ANXIETY: ICD-10-CM

## 2024-10-21 PROBLEM — Z98.890 S/P CAROTID ENDARTERECTOMY: Status: ACTIVE | Noted: 2024-10-21

## 2024-10-21 LAB
ACTIVATED CLOTTING TIME: 122 SECONDS (ref 99–130)
ACTIVATED CLOTTING TIME: 155 SECONDS (ref 99–130)
ACTIVATED CLOTTING TIME: 263 SECONDS (ref 99–130)
ACTIVATED CLOTTING TIME: 288 SECONDS (ref 99–130)
ACTIVATED CLOTTING TIME: 564 SECONDS (ref 99–130)
ACTIVATED CLOTTING TIME: 618 SECONDS (ref 99–130)
ACTIVATED CLOTTING TIME: 668 SECONDS (ref 99–130)
ACTIVATED CLOTTING TIME: 748 SECONDS (ref 99–130)
ANION GAP SERPL CALC-SCNC: 16 MEQ/L (ref 8–16)
ARTERIAL PATENCY WRIST A: ABNORMAL
ARTERIAL PATENCY WRIST A: ABNORMAL
BASE EXCESS BLDA CALC-SCNC: -0.3 MMOL/L (ref -2.5–2.5)
BASE EXCESS BLDA CALC-SCNC: -0.9 MMOL/L (ref -2.5–2.5)
BASE EXCESS BLDA CALC-SCNC: -1.5 MMOL/L (ref -2.5–2.5)
BASE EXCESS BLDA CALC-SCNC: -2.2 MMOL/L (ref -2.5–2.5)
BASE EXCESS BLDA CALC-SCNC: 0 MMOL/L (ref -2.5–2.5)
BASE EXCESS BLDA CALC-SCNC: 0.2 MMOL/L (ref -2.5–2.5)
BASE EXCESS BLDA CALC-SCNC: 0.4 MMOL/L (ref -2.5–2.5)
BASE EXCESS BLDA CALC-SCNC: 1 MMOL/L (ref -2.5–2.5)
BASE EXCESS BLDA CALC-SCNC: 3.3 MMOL/L (ref -2–3)
BDY SITE: ABNORMAL
BREATHS SETTING VENT: 16 BPM
BREATHS SETTING VENT: 16 BPM
BUN SERPL-MCNC: 15 MG/DL (ref 7–22)
CA-I BLD ISE-SCNC: 0.86 MMOL/L (ref 1.12–1.32)
CA-I BLD ISE-SCNC: 0.89 MMOL/L (ref 1.12–1.32)
CA-I BLD ISE-SCNC: 0.89 MMOL/L (ref 1.12–1.32)
CA-I BLD ISE-SCNC: 1.04 MMOL/L (ref 1.12–1.32)
CA-I BLD ISE-SCNC: 1.04 MMOL/L (ref 1.12–1.32)
CA-I BLD ISE-SCNC: 1.14 MMOL/L (ref 1.12–1.32)
CA-I BLD ISE-SCNC: 1.16 MMOL/L (ref 1.12–1.32)
CA-I BLD ISE-SCNC: 1.21 MMOL/L (ref 1.12–1.32)
CALCIUM SERPL-MCNC: 7.5 MG/DL (ref 8.5–10.5)
CARTRIDGE COLOR: NORMAL
CHLORIDE SERPL-SCNC: 105 MEQ/L (ref 98–111)
CO2 SERPL-SCNC: 23 MEQ/L (ref 23–33)
COLLECTED BY:: ABNORMAL
CREAT SERPL-MCNC: 0.9 MG/DL (ref 0.4–1.2)
DEPRECATED RDW RBC AUTO: 66.6 FL (ref 35–45)
DEVICE: ABNORMAL
ERYTHROCYTE [DISTWIDTH] IN BLOOD BY AUTOMATED COUNT: 20.8 % (ref 11.5–14.5)
FIO2 ON VENT O2 ANALYZER: 40 %
FIO2 ON VENT O2 ANALYZER: 50 %
FIO2 ON VENT O2 ANALYZER: 80 %
GFR SERPL CREATININE-BSD FRML MDRD: 73 ML/MIN/1.73M2
GLUCOSE BLD STRIP.AUTO-MCNC: 115 MG/DL (ref 70–108)
GLUCOSE BLD STRIP.AUTO-MCNC: 116 MG/DL (ref 70–108)
GLUCOSE BLD STRIP.AUTO-MCNC: 132 MG/DL (ref 70–108)
GLUCOSE BLD STRIP.AUTO-MCNC: 134 MG/DL (ref 70–108)
GLUCOSE BLD STRIP.AUTO-MCNC: 86 MG/DL (ref 70–108)
GLUCOSE BLD STRIP.AUTO-MCNC: 88 MG/DL (ref 70–108)
GLUCOSE BLD STRIP.AUTO-MCNC: 98 MG/DL (ref 70–108)
GLUCOSE BLD-MCNC: 116 MG/DL (ref 70–108)
GLUCOSE BLD-MCNC: 137 MG/DL (ref 70–108)
GLUCOSE BLD-MCNC: 140 MG/DL (ref 70–108)
GLUCOSE BLD-MCNC: 144 MG/DL (ref 70–108)
GLUCOSE BLD-MCNC: 215 MG/DL (ref 70–108)
GLUCOSE BLD-MCNC: 227 MG/DL (ref 70–108)
GLUCOSE BLD-MCNC: 71 MG/DL (ref 70–108)
GLUCOSE BLD-MCNC: 80 MG/DL (ref 70–108)
GLUCOSE BLD-MCNC: 91 MG/DL (ref 70–108)
GLUCOSE SERPL-MCNC: 85 MG/DL (ref 70–108)
HCO3 BLDA-SCNC: 22 MMOL/L (ref 23–28)
HCO3 BLDA-SCNC: 25 MMOL/L (ref 23–28)
HCO3 BLDA-SCNC: 25 MMOL/L (ref 23–28)
HCO3 BLDA-SCNC: 26 MMOL/L (ref 23–28)
HCO3 BLDA-SCNC: 26 MMOL/L (ref 23–28)
HCO3 BLDA-SCNC: 27 MMOL/L (ref 23–28)
HCO3 BLDA-SCNC: 28 MMOL/L (ref 23–28)
HCT VFR BLD AUTO: 21.1 % (ref 37–47)
HCT VFR BLD AUTO: 27.6 % (ref 37–47)
HCT VFR BLD AUTO: 28.5 % (ref 37–47)
HEMOGLOBIN FINGERSTICK, POC: 4.8 G/DL (ref 12–16)
HEMOGLOBIN FINGERSTICK, POC: 7 G/DL (ref 12–16)
HEMOGLOBIN FINGERSTICK, POC: 7.3 G/DL (ref 12–16)
HEMOGLOBIN FINGERSTICK, POC: 8.3 G/DL (ref 12–16)
HGB BLD-MCNC: 6.8 GM/DL (ref 12–16)
HGB BLD-MCNC: 8.6 GM/DL (ref 12–16)
HGB BLD-MCNC: 8.9 GM/DL (ref 12–16)
MAGNESIUM SERPL-MCNC: 3.8 MG/DL (ref 1.6–2.4)
MCH RBC QN AUTO: 27.4 PG (ref 26–33)
MCHC RBC AUTO-ENTMCNC: 31.2 GM/DL (ref 32.2–35.5)
MCV RBC AUTO: 87.7 FL (ref 81–99)
PATHOLOGIST REVIEW: ABNORMAL
PATIENT BOLUS: NORMAL
PATIENT HEPARIN CONCENTRATION: 0
PATIENT HEPARIN CONCENTRATION: 2
PATIENT HEPARIN CONCENTRATION: 3
PATIENT HEPARIN CONCENTRATION: 4
PCO2 BLDA: 36 MMHG (ref 35–45)
PCO2 BLDA: 41 MMHG (ref 35–45)
PCO2 BLDA: 42 MMHG (ref 35–45)
PCO2 BLDA: 52 MMHG (ref 35–45)
PCO2 BLDA: 52 MMHG (ref 35–45)
PCO2 BLDA: 54 MMHG (ref 35–45)
PCO2 BLDA: 57 MMHG (ref 35–45)
PCO2 BLDA: 66 MMHG (ref 35–45)
PCO2 TEMP ADJ BLDMV: 45 MMHG (ref 41–51)
PEEP SETTING VENT: 6 MMHG
PH BLDA: 7.23 [PH] (ref 7.35–7.45)
PH BLDA: 7.27 [PH] (ref 7.35–7.45)
PH BLDA: 7.31 [PH] (ref 7.35–7.45)
PH BLDA: 7.31 [PH] (ref 7.35–7.45)
PH BLDA: 7.33 [PH] (ref 7.35–7.45)
PH BLDA: 7.38 [PH] (ref 7.35–7.45)
PH BLDA: 7.4 [PH] (ref 7.35–7.45)
PH BLDA: 7.4 [PH] (ref 7.35–7.45)
PH BLDMV: 7.4 [PH] (ref 7.31–7.41)
PIP: 24 CMH2O
PIP: 28 CMH2O
PLATELET # BLD AUTO: 256 THOU/MM3 (ref 130–400)
PLATELET # BLD AUTO: 258 THOU/MM3 (ref 130–400)
PLATELET # BLD AUTO: 457 THOU/MM3 (ref 130–400)
PMV BLD AUTO: 10.2 FL (ref 9.4–12.4)
PO2 BLDA: 110 MMHG (ref 71–104)
PO2 BLDA: 149 MMHG (ref 71–104)
PO2 BLDA: 204 MMHG (ref 71–104)
PO2 BLDA: 313 MMHG (ref 71–104)
PO2 BLDA: 346 MMHG (ref 71–104)
PO2 BLDA: 418 MMHG (ref 71–104)
PO2 BLDA: 531 MMHG (ref 71–104)
PO2 BLDA: 597 MMHG (ref 71–104)
PO2 BLDMV: 37 MMHG (ref 25–40)
POTASSIUM BLD-SCNC: 4 MEQ/L (ref 3.5–4.9)
POTASSIUM BLD-SCNC: 4.3 MEQ/L (ref 3.5–4.9)
POTASSIUM BLD-SCNC: 4.9 MEQ/L (ref 3.5–4.9)
POTASSIUM BLD-SCNC: 5.6 MEQ/L (ref 3.5–4.9)
POTASSIUM BLD-SCNC: 6.4 MEQ/L (ref 3.5–4.9)
POTASSIUM BLD-SCNC: 6.8 MEQ/L (ref 3.5–4.9)
POTASSIUM SERPL-SCNC: 3.6 MEQ/L (ref 3.5–5.2)
POTASSIUM SERPL-SCNC: 4.4 MEQ/L (ref 3.5–5.2)
POTASSIUM SERPL-SCNC: 4.5 MEQ/L (ref 3.5–5.2)
PRESSURE SUPPORT SETTING VENT: 12 CMH2O
PROJECTED HEPARIN CONCENTATION: 2
RANGE: NORMAL
RBC # BLD AUTO: 3.25 MILL/MM3 (ref 4.2–5.4)
SAO2 % BLDA: 100 %
SAO2 % BLDA: 97 %
SAO2 % BLDA: 99 %
SAO2 % BLDMV: 71 %
SCAN OF BLOOD SMEAR: NORMAL
SITE: ABNORMAL
SODIUM BLD-SCNC: 136 MEQ/L (ref 138–146)
SODIUM BLD-SCNC: 138 MEQ/L (ref 138–146)
SODIUM BLD-SCNC: 138 MEQ/L (ref 138–146)
SODIUM BLD-SCNC: 139 MEQ/L (ref 138–146)
SODIUM BLD-SCNC: 139 MEQ/L (ref 138–146)
SODIUM BLD-SCNC: 143 MEQ/L (ref 138–146)
SODIUM SERPL-SCNC: 144 MEQ/L (ref 135–145)
VENTILATION MODE VENT: ABNORMAL
WBC # BLD AUTO: 30.7 THOU/MM3 (ref 4.8–10.8)

## 2024-10-21 PROCEDURE — 94761 N-INVAS EAR/PLS OXIMETRY MLT: CPT

## 2024-10-21 PROCEDURE — 02RF0JZ REPLACEMENT OF AORTIC VALVE WITH SYNTHETIC SUBSTITUTE, OPEN APPROACH: ICD-10-PCS | Performed by: THORACIC SURGERY (CARDIOTHORACIC VASCULAR SURGERY)

## 2024-10-21 PROCEDURE — 2580000003 HC RX 258: Performed by: PHYSICIAN ASSISTANT

## 2024-10-21 PROCEDURE — 82803 BLOOD GASES ANY COMBINATION: CPT

## 2024-10-21 PROCEDURE — 3600000018 HC SURGERY OHS ADDTL 15MIN: Performed by: THORACIC SURGERY (CARDIOTHORACIC VASCULAR SURGERY)

## 2024-10-21 PROCEDURE — 85520 HEPARIN ASSAY: CPT

## 2024-10-21 PROCEDURE — C1751 CATH, INF, PER/CENT/MIDLINE: HCPCS | Performed by: THORACIC SURGERY (CARDIOTHORACIC VASCULAR SURGERY)

## 2024-10-21 PROCEDURE — 2580000003 HC RX 258: Performed by: NURSE ANESTHETIST, CERTIFIED REGISTERED

## 2024-10-21 PROCEDURE — 94002 VENT MGMT INPAT INIT DAY: CPT

## 2024-10-21 PROCEDURE — 85027 COMPLETE CBC AUTOMATED: CPT

## 2024-10-21 PROCEDURE — C1889 IMPLANT/INSERT DEVICE, NOC: HCPCS | Performed by: THORACIC SURGERY (CARDIOTHORACIC VASCULAR SURGERY)

## 2024-10-21 PROCEDURE — 6360000002 HC RX W HCPCS: Performed by: THORACIC SURGERY (CARDIOTHORACIC VASCULAR SURGERY)

## 2024-10-21 PROCEDURE — 82947 ASSAY GLUCOSE BLOOD QUANT: CPT

## 2024-10-21 PROCEDURE — 71045 X-RAY EXAM CHEST 1 VIEW: CPT

## 2024-10-21 PROCEDURE — P9045 ALBUMIN (HUMAN), 5%, 250 ML: HCPCS | Performed by: NURSE ANESTHETIST, CERTIFIED REGISTERED

## 2024-10-21 PROCEDURE — 6360000002 HC RX W HCPCS: Performed by: PHYSICIAN ASSISTANT

## 2024-10-21 PROCEDURE — C1729 CATH, DRAINAGE: HCPCS | Performed by: THORACIC SURGERY (CARDIOTHORACIC VASCULAR SURGERY)

## 2024-10-21 PROCEDURE — 2580000003 HC RX 258: Performed by: THORACIC SURGERY (CARDIOTHORACIC VASCULAR SURGERY)

## 2024-10-21 PROCEDURE — 2700000000 HC OXYGEN THERAPY PER DAY

## 2024-10-21 PROCEDURE — 2000000000 HC ICU R&B

## 2024-10-21 PROCEDURE — 33411 REPLACEMENT OF AORTIC VALVE: CPT | Performed by: THORACIC SURGERY (CARDIOTHORACIC VASCULAR SURGERY)

## 2024-10-21 PROCEDURE — 5A1935Z RESPIRATORY VENTILATION, LESS THAN 24 CONSECUTIVE HOURS: ICD-10-PCS | Performed by: THORACIC SURGERY (CARDIOTHORACIC VASCULAR SURGERY)

## 2024-10-21 PROCEDURE — 87086 URINE CULTURE/COLONY COUNT: CPT

## 2024-10-21 PROCEDURE — 37799 UNLISTED PX VASCULAR SURGERY: CPT

## 2024-10-21 PROCEDURE — 88304 TISSUE EXAM BY PATHOLOGIST: CPT

## 2024-10-21 PROCEDURE — 03CK3ZZ EXTIRPATION OF MATTER FROM RIGHT INTERNAL CAROTID ARTERY, PERCUTANEOUS APPROACH: ICD-10-PCS | Performed by: THORACIC SURGERY (CARDIOTHORACIC VASCULAR SURGERY)

## 2024-10-21 PROCEDURE — 87641 MR-STAPH DNA AMP PROBE: CPT

## 2024-10-21 PROCEDURE — 03UK3KZ SUPPLEMENT RIGHT INTERNAL CAROTID ARTERY WITH NONAUTOLOGOUS TISSUE SUBSTITUTE, PERCUTANEOUS APPROACH: ICD-10-PCS | Performed by: THORACIC SURGERY (CARDIOTHORACIC VASCULAR SURGERY)

## 2024-10-21 PROCEDURE — 88305 TISSUE EXAM BY PATHOLOGIST: CPT

## 2024-10-21 PROCEDURE — 6360000002 HC RX W HCPCS: Performed by: NURSE ANESTHETIST, CERTIFIED REGISTERED

## 2024-10-21 PROCEDURE — 2500000003 HC RX 250 WO HCPCS: Performed by: NURSE ANESTHETIST, CERTIFIED REGISTERED

## 2024-10-21 PROCEDURE — 6370000000 HC RX 637 (ALT 250 FOR IP): Performed by: PHYSICIAN ASSISTANT

## 2024-10-21 PROCEDURE — 3700000000 HC ANESTHESIA ATTENDED CARE: Performed by: THORACIC SURGERY (CARDIOTHORACIC VASCULAR SURGERY)

## 2024-10-21 PROCEDURE — 2500000003 HC RX 250 WO HCPCS: Performed by: THORACIC SURGERY (CARDIOTHORACIC VASCULAR SURGERY)

## 2024-10-21 PROCEDURE — 80048 BASIC METABOLIC PNL TOTAL CA: CPT

## 2024-10-21 PROCEDURE — 36415 COLL VENOUS BLD VENIPUNCTURE: CPT

## 2024-10-21 PROCEDURE — 82330 ASSAY OF CALCIUM: CPT

## 2024-10-21 PROCEDURE — 82948 REAGENT STRIP/BLOOD GLUCOSE: CPT

## 2024-10-21 PROCEDURE — 5A1221Z PERFORMANCE OF CARDIAC OUTPUT, CONTINUOUS: ICD-10-PCS | Performed by: THORACIC SURGERY (CARDIOTHORACIC VASCULAR SURGERY)

## 2024-10-21 PROCEDURE — 02UX0JZ SUPPLEMENT THORACIC AORTA, ASCENDING/ARCH WITH SYNTHETIC SUBSTITUTE, OPEN APPROACH: ICD-10-PCS | Performed by: THORACIC SURGERY (CARDIOTHORACIC VASCULAR SURGERY)

## 2024-10-21 PROCEDURE — 3600000008 HC SURGERY OHS BASE: Performed by: THORACIC SURGERY (CARDIOTHORACIC VASCULAR SURGERY)

## 2024-10-21 PROCEDURE — 3700000001 HC ADD 15 MINUTES (ANESTHESIA): Performed by: THORACIC SURGERY (CARDIOTHORACIC VASCULAR SURGERY)

## 2024-10-21 PROCEDURE — 84295 ASSAY OF SERUM SODIUM: CPT

## 2024-10-21 PROCEDURE — 33411 REPLACEMENT OF AORTIC VALVE: CPT | Performed by: PHYSICIAN ASSISTANT

## 2024-10-21 PROCEDURE — 05HY33Z INSERTION OF INFUSION DEVICE INTO UPPER VEIN, PERCUTANEOUS APPROACH: ICD-10-PCS | Performed by: THORACIC SURGERY (CARDIOTHORACIC VASCULAR SURGERY)

## 2024-10-21 PROCEDURE — 85018 HEMOGLOBIN: CPT

## 2024-10-21 PROCEDURE — 2720000010 HC SURG SUPPLY STERILE: Performed by: THORACIC SURGERY (CARDIOTHORACIC VASCULAR SURGERY)

## 2024-10-21 PROCEDURE — 84132 ASSAY OF SERUM POTASSIUM: CPT

## 2024-10-21 PROCEDURE — 83735 ASSAY OF MAGNESIUM: CPT

## 2024-10-21 PROCEDURE — 2709999900 HC NON-CHARGEABLE SUPPLY: Performed by: THORACIC SURGERY (CARDIOTHORACIC VASCULAR SURGERY)

## 2024-10-21 PROCEDURE — 6370000000 HC RX 637 (ALT 250 FOR IP): Performed by: THORACIC SURGERY (CARDIOTHORACIC VASCULAR SURGERY)

## 2024-10-21 PROCEDURE — C1768 GRAFT, VASCULAR: HCPCS | Performed by: THORACIC SURGERY (CARDIOTHORACIC VASCULAR SURGERY)

## 2024-10-21 PROCEDURE — 6370000000 HC RX 637 (ALT 250 FOR IP): Performed by: NURSE ANESTHETIST, CERTIFIED REGISTERED

## 2024-10-21 DEVICE — XENOSURE BIOLOGIC PATCH, 6CM X 8CM, EIFU
Type: IMPLANTABLE DEVICE | Site: AORTIC VALVE | Status: FUNCTIONAL
Brand: XENOSURE BIOLOGIC PATCH

## 2024-10-21 DEVICE — IMPLANTABLE DEVICE: Type: IMPLANTABLE DEVICE | Site: AORTIC VALVE | Status: FUNCTIONAL

## 2024-10-21 DEVICE — XENOSURE BIOLOGIC PATCH, 0.8CM X 8CM, EIFU
Type: IMPLANTABLE DEVICE | Status: FUNCTIONAL
Brand: XENOSURE BIOLOGIC PATCH

## 2024-10-21 RX ORDER — FAMOTIDINE 20 MG/1
20 TABLET, FILM COATED ORAL 2 TIMES DAILY
Status: DISCONTINUED | OUTPATIENT
Start: 2024-10-21 | End: 2024-10-27 | Stop reason: HOSPADM

## 2024-10-21 RX ORDER — SODIUM CHLORIDE, SODIUM LACTATE, POTASSIUM CHLORIDE, CALCIUM CHLORIDE 600; 310; 30; 20 MG/100ML; MG/100ML; MG/100ML; MG/100ML
INJECTION, SOLUTION INTRAVENOUS
Status: DISCONTINUED | OUTPATIENT
Start: 2024-10-21 | End: 2024-10-21 | Stop reason: SDUPTHER

## 2024-10-21 RX ORDER — SENNA AND DOCUSATE SODIUM 50; 8.6 MG/1; MG/1
1 TABLET, FILM COATED ORAL 2 TIMES DAILY
Status: DISCONTINUED | OUTPATIENT
Start: 2024-10-21 | End: 2024-10-27 | Stop reason: HOSPADM

## 2024-10-21 RX ORDER — ALBUMIN, HUMAN INJ 5% 5 %
25 SOLUTION INTRAVENOUS PRN
Status: DISCONTINUED | OUTPATIENT
Start: 2024-10-21 | End: 2024-10-22

## 2024-10-21 RX ORDER — POTASSIUM CHLORIDE 29.8 MG/ML
20 INJECTION INTRAVENOUS PRN
Status: DISCONTINUED | OUTPATIENT
Start: 2024-10-21 | End: 2024-10-22

## 2024-10-21 RX ORDER — HEPARIN SODIUM 1000 [USP'U]/ML
INJECTION, SOLUTION INTRAVENOUS; SUBCUTANEOUS
Status: DISCONTINUED | OUTPATIENT
Start: 2024-10-21 | End: 2024-10-21 | Stop reason: SDUPTHER

## 2024-10-21 RX ORDER — PROTAMINE SULFATE 10 MG/ML
INJECTION, SOLUTION INTRAVENOUS
Status: DISCONTINUED | OUTPATIENT
Start: 2024-10-21 | End: 2024-10-21 | Stop reason: SDUPTHER

## 2024-10-21 RX ORDER — SODIUM CHLORIDE 9 MG/ML
INJECTION, SOLUTION INTRAVENOUS CONTINUOUS
Status: DISCONTINUED | OUTPATIENT
Start: 2024-10-21 | End: 2024-10-21 | Stop reason: HOSPADM

## 2024-10-21 RX ORDER — METOPROLOL TARTRATE 25 MG/1
25 TABLET, FILM COATED ORAL 2 TIMES DAILY
Status: DISCONTINUED | OUTPATIENT
Start: 2024-10-21 | End: 2024-10-23

## 2024-10-21 RX ORDER — OXYCODONE HYDROCHLORIDE 5 MG/1
10 TABLET ORAL EVERY 4 HOURS PRN
Status: DISCONTINUED | OUTPATIENT
Start: 2024-10-21 | End: 2024-10-27 | Stop reason: HOSPADM

## 2024-10-21 RX ORDER — SODIUM CHLORIDE 9 MG/ML
INJECTION, SOLUTION INTRAVENOUS CONTINUOUS
Status: DISCONTINUED | OUTPATIENT
Start: 2024-10-21 | End: 2024-10-22

## 2024-10-21 RX ORDER — ASPIRIN 81 MG/1
81 TABLET ORAL DAILY
Status: DISCONTINUED | OUTPATIENT
Start: 2024-10-21 | End: 2024-10-21 | Stop reason: HOSPADM

## 2024-10-21 RX ORDER — MORPHINE SULFATE 2 MG/ML
2 INJECTION, SOLUTION INTRAMUSCULAR; INTRAVENOUS
Status: DISCONTINUED | OUTPATIENT
Start: 2024-10-21 | End: 2024-10-22

## 2024-10-21 RX ORDER — GLUCAGON 1 MG/ML
1 KIT INJECTION PRN
Status: DISCONTINUED | OUTPATIENT
Start: 2024-10-21 | End: 2024-10-22

## 2024-10-21 RX ORDER — HYDROMORPHONE HYDROCHLORIDE 2 MG/ML
INJECTION, SOLUTION INTRAMUSCULAR; INTRAVENOUS; SUBCUTANEOUS
Status: DISCONTINUED | OUTPATIENT
Start: 2024-10-21 | End: 2024-10-21 | Stop reason: SDUPTHER

## 2024-10-21 RX ORDER — METOPROLOL TARTRATE 1 MG/ML
2.5 INJECTION, SOLUTION INTRAVENOUS EVERY 10 MIN PRN
Status: DISCONTINUED | OUTPATIENT
Start: 2024-10-21 | End: 2024-10-22

## 2024-10-21 RX ORDER — ROCURONIUM BROMIDE 10 MG/ML
INJECTION, SOLUTION INTRAVENOUS
Status: DISCONTINUED | OUTPATIENT
Start: 2024-10-21 | End: 2024-10-21 | Stop reason: SDUPTHER

## 2024-10-21 RX ORDER — PROPOFOL 10 MG/ML
INJECTION, EMULSION INTRAVENOUS
Status: DISCONTINUED | OUTPATIENT
Start: 2024-10-21 | End: 2024-10-21 | Stop reason: SDUPTHER

## 2024-10-21 RX ORDER — AMIODARONE HYDROCHLORIDE 200 MG/1
200 TABLET ORAL 2 TIMES DAILY
Status: DISCONTINUED | OUTPATIENT
Start: 2024-10-21 | End: 2024-10-22

## 2024-10-21 RX ORDER — ALBUMIN, HUMAN INJ 5% 5 %
SOLUTION INTRAVENOUS
Status: DISCONTINUED | OUTPATIENT
Start: 2024-10-21 | End: 2024-10-21 | Stop reason: SDUPTHER

## 2024-10-21 RX ORDER — OXYCODONE HYDROCHLORIDE 5 MG/1
5 TABLET ORAL EVERY 4 HOURS PRN
Status: DISCONTINUED | OUTPATIENT
Start: 2024-10-21 | End: 2024-10-27 | Stop reason: HOSPADM

## 2024-10-21 RX ORDER — SODIUM CHLORIDE, SODIUM GLUCONATE, SODIUM ACETATE, POTASSIUM CHLORIDE AND MAGNESIUM CHLORIDE 526; 502; 368; 37; 30 MG/100ML; MG/100ML; MG/100ML; MG/100ML; MG/100ML
INJECTION, SOLUTION INTRAVENOUS
Status: DISCONTINUED | OUTPATIENT
Start: 2024-10-21 | End: 2024-10-21 | Stop reason: SDUPTHER

## 2024-10-21 RX ORDER — AMINOCAPROIC ACID 250 MG/ML
INJECTION, SOLUTION INTRAVENOUS
Status: DISCONTINUED | OUTPATIENT
Start: 2024-10-21 | End: 2024-10-21 | Stop reason: SDUPTHER

## 2024-10-21 RX ORDER — SODIUM CHLORIDE 0.9 % (FLUSH) 0.9 %
5-40 SYRINGE (ML) INJECTION PRN
Status: DISCONTINUED | OUTPATIENT
Start: 2024-10-21 | End: 2024-10-22

## 2024-10-21 RX ORDER — METOPROLOL TARTRATE 25 MG/1
12.5 TABLET, FILM COATED ORAL ONCE
Status: COMPLETED | OUTPATIENT
Start: 2024-10-21 | End: 2024-10-21

## 2024-10-21 RX ORDER — SODIUM CHLORIDE 0.9 % (FLUSH) 0.9 %
5-40 SYRINGE (ML) INJECTION PRN
Status: DISCONTINUED | OUTPATIENT
Start: 2024-10-21 | End: 2024-10-21 | Stop reason: HOSPADM

## 2024-10-21 RX ORDER — SENNOSIDES 8.8 MG/5ML
10 LIQUID ORAL DAILY PRN
Status: DISCONTINUED | OUTPATIENT
Start: 2024-10-21 | End: 2024-10-27 | Stop reason: HOSPADM

## 2024-10-21 RX ORDER — DEXMEDETOMIDINE HYDROCHLORIDE 4 UG/ML
.1-1.5 INJECTION, SOLUTION INTRAVENOUS CONTINUOUS
Status: DISCONTINUED | OUTPATIENT
Start: 2024-10-21 | End: 2024-10-22

## 2024-10-21 RX ORDER — ONDANSETRON 2 MG/ML
4 INJECTION INTRAMUSCULAR; INTRAVENOUS EVERY 6 HOURS PRN
Status: DISCONTINUED | OUTPATIENT
Start: 2024-10-21 | End: 2024-10-27 | Stop reason: HOSPADM

## 2024-10-21 RX ORDER — MULTIVITAMIN WITH IRON
1 TABLET ORAL
Status: DISCONTINUED | OUTPATIENT
Start: 2024-10-22 | End: 2024-10-27 | Stop reason: HOSPADM

## 2024-10-21 RX ORDER — PROTAMINE SULFATE 10 MG/ML
50 INJECTION, SOLUTION INTRAVENOUS
Status: DISCONTINUED | OUTPATIENT
Start: 2024-10-21 | End: 2024-10-22

## 2024-10-21 RX ORDER — ENOXAPARIN SODIUM 100 MG/ML
40 INJECTION SUBCUTANEOUS DAILY
Status: DISCONTINUED | OUTPATIENT
Start: 2024-10-22 | End: 2024-10-24

## 2024-10-21 RX ORDER — LIDOCAINE HCL/PF 100 MG/5ML
SYRINGE (ML) INJECTION
Status: DISCONTINUED | OUTPATIENT
Start: 2024-10-21 | End: 2024-10-21 | Stop reason: SDUPTHER

## 2024-10-21 RX ORDER — MIDAZOLAM HYDROCHLORIDE 1 MG/ML
INJECTION, SOLUTION INTRAMUSCULAR; INTRAVENOUS
Status: DISCONTINUED | OUTPATIENT
Start: 2024-10-21 | End: 2024-10-21 | Stop reason: SDUPTHER

## 2024-10-21 RX ORDER — SODIUM CHLORIDE 9 MG/ML
INJECTION, SOLUTION INTRAVENOUS PRN
Status: DISCONTINUED | OUTPATIENT
Start: 2024-10-21 | End: 2024-10-21 | Stop reason: HOSPADM

## 2024-10-21 RX ORDER — MAGNESIUM SULFATE IN WATER 40 MG/ML
2000 INJECTION, SOLUTION INTRAVENOUS PRN
Status: DISCONTINUED | OUTPATIENT
Start: 2024-10-21 | End: 2024-10-22

## 2024-10-21 RX ORDER — ASPIRIN 325 MG
325 TABLET, DELAYED RELEASE (ENTERIC COATED) ORAL DAILY
Status: DISCONTINUED | OUTPATIENT
Start: 2024-10-22 | End: 2024-10-22

## 2024-10-21 RX ORDER — SODIUM CHLORIDE 0.9 % (FLUSH) 0.9 %
5-40 SYRINGE (ML) INJECTION EVERY 12 HOURS SCHEDULED
Status: DISCONTINUED | OUTPATIENT
Start: 2024-10-21 | End: 2024-10-22

## 2024-10-21 RX ORDER — PREGABALIN 50 MG/1
50 CAPSULE ORAL 3 TIMES DAILY
Status: DISCONTINUED | OUTPATIENT
Start: 2024-10-22 | End: 2024-10-22

## 2024-10-21 RX ORDER — SODIUM CHLORIDE 0.9 % (FLUSH) 0.9 %
5-40 SYRINGE (ML) INJECTION EVERY 12 HOURS SCHEDULED
Status: DISCONTINUED | OUTPATIENT
Start: 2024-10-21 | End: 2024-10-21 | Stop reason: HOSPADM

## 2024-10-21 RX ORDER — ATORVASTATIN CALCIUM 40 MG/1
40 TABLET, FILM COATED ORAL NIGHTLY
Status: DISCONTINUED | OUTPATIENT
Start: 2024-10-22 | End: 2024-10-27 | Stop reason: HOSPADM

## 2024-10-21 RX ORDER — ALPRAZOLAM 0.5 MG
0.5 TABLET ORAL 3 TIMES DAILY PRN
Status: DISCONTINUED | OUTPATIENT
Start: 2024-10-21 | End: 2024-10-27 | Stop reason: HOSPADM

## 2024-10-21 RX ORDER — FENTANYL CITRATE 50 UG/ML
INJECTION, SOLUTION INTRAMUSCULAR; INTRAVENOUS
Status: DISCONTINUED | OUTPATIENT
Start: 2024-10-21 | End: 2024-10-21 | Stop reason: SDUPTHER

## 2024-10-21 RX ORDER — DEXTROSE MONOHYDRATE 100 MG/ML
INJECTION, SOLUTION INTRAVENOUS CONTINUOUS PRN
Status: DISCONTINUED | OUTPATIENT
Start: 2024-10-21 | End: 2024-10-22

## 2024-10-21 RX ORDER — ONDANSETRON 4 MG/1
4 TABLET, ORALLY DISINTEGRATING ORAL EVERY 8 HOURS PRN
Status: DISCONTINUED | OUTPATIENT
Start: 2024-10-21 | End: 2024-10-27 | Stop reason: HOSPADM

## 2024-10-21 RX ORDER — ALBUTEROL SULFATE 90 UG/1
2 INHALANT RESPIRATORY (INHALATION) EVERY 6 HOURS PRN
Status: DISCONTINUED | OUTPATIENT
Start: 2024-10-21 | End: 2024-10-22

## 2024-10-21 RX ORDER — ATORVASTATIN CALCIUM 20 MG/1
20 TABLET, FILM COATED ORAL NIGHTLY
Status: DISCONTINUED | OUTPATIENT
Start: 2024-10-22 | End: 2024-10-21

## 2024-10-21 RX ORDER — SODIUM CHLORIDE 9 MG/ML
INJECTION, SOLUTION INTRAVENOUS PRN
Status: DISCONTINUED | OUTPATIENT
Start: 2024-10-21 | End: 2024-10-22

## 2024-10-21 RX ADMIN — MIDAZOLAM 3 MG: 1 INJECTION INTRAMUSCULAR; INTRAVENOUS at 11:15

## 2024-10-21 RX ADMIN — ALBUMIN (HUMAN) 12.5 G: 12.5 INJECTION, SOLUTION INTRAVENOUS at 13:58

## 2024-10-21 RX ADMIN — VANCOMYCIN HYDROCHLORIDE 1000 MG: 1 INJECTION, POWDER, LYOPHILIZED, FOR SOLUTION INTRAVENOUS at 08:10

## 2024-10-21 RX ADMIN — METOPROLOL TARTRATE 12.5 MG: 25 TABLET, FILM COATED ORAL at 06:46

## 2024-10-21 RX ADMIN — MORPHINE SULFATE 2 MG: 2 INJECTION, SOLUTION INTRAMUSCULAR; INTRAVENOUS at 15:20

## 2024-10-21 RX ADMIN — MORPHINE SULFATE 2 MG: 2 INJECTION, SOLUTION INTRAMUSCULAR; INTRAVENOUS at 16:44

## 2024-10-21 RX ADMIN — Medication 0.2 MCG/KG/HR: at 17:35

## 2024-10-21 RX ADMIN — PROTAMINE SULFATE 130 MG: 10 INJECTION, SOLUTION INTRAVENOUS at 13:23

## 2024-10-21 RX ADMIN — AMINOCAPROIC ACID 1 G/HR: 250 INJECTION, SOLUTION INTRAVENOUS at 14:30

## 2024-10-21 RX ADMIN — Medication 40 MG: at 07:57

## 2024-10-21 RX ADMIN — FENTANYL CITRATE 100 MCG: 50 INJECTION INTRAMUSCULAR; INTRAVENOUS at 07:52

## 2024-10-21 RX ADMIN — SODIUM CHLORIDE, SODIUM GLUCONATE, SODIUM ACETATE, POTASSIUM CHLORIDE AND MAGNESIUM CHLORIDE: 526; 502; 368; 37; 30 INJECTION, SOLUTION INTRAVENOUS at 08:50

## 2024-10-21 RX ADMIN — OXYCODONE HYDROCHLORIDE 10 MG: 5 TABLET ORAL at 16:55

## 2024-10-21 RX ADMIN — CALCIUM CHLORIDE 1000 MG: 100 INJECTION, SOLUTION INTRAVENOUS at 17:33

## 2024-10-21 RX ADMIN — FENTANYL CITRATE 150 MCG: 50 INJECTION INTRAMUSCULAR; INTRAVENOUS at 09:42

## 2024-10-21 RX ADMIN — MIDAZOLAM 2 MG: 1 INJECTION INTRAMUSCULAR; INTRAVENOUS at 14:37

## 2024-10-21 RX ADMIN — POTASSIUM CHLORIDE 20 MEQ: 29.8 INJECTION, SOLUTION INTRAVENOUS at 23:17

## 2024-10-21 RX ADMIN — POTASSIUM CHLORIDE 20 MEQ: 29.8 INJECTION, SOLUTION INTRAVENOUS at 16:25

## 2024-10-21 RX ADMIN — MORPHINE SULFATE 2 MG: 2 INJECTION, SOLUTION INTRAMUSCULAR; INTRAVENOUS at 18:46

## 2024-10-21 RX ADMIN — FENTANYL CITRATE 250 MCG: 50 INJECTION INTRAMUSCULAR; INTRAVENOUS at 10:49

## 2024-10-21 RX ADMIN — ALPRAZOLAM 0.5 MG: 0.5 TABLET ORAL at 16:56

## 2024-10-21 RX ADMIN — ROCURONIUM BROMIDE 50 MG: 10 INJECTION INTRAVENOUS at 10:42

## 2024-10-21 RX ADMIN — SODIUM CHLORIDE 7.5 MG/HR: 9 INJECTION, SOLUTION INTRAVENOUS at 17:51

## 2024-10-21 RX ADMIN — ROCURONIUM BROMIDE 50 MG: 10 INJECTION INTRAVENOUS at 09:20

## 2024-10-21 RX ADMIN — SODIUM CHLORIDE 10 MG/HR: 9 INJECTION, SOLUTION INTRAVENOUS at 23:35

## 2024-10-21 RX ADMIN — WATER 2000 MG: 1 INJECTION INTRAMUSCULAR; INTRAVENOUS; SUBCUTANEOUS at 08:44

## 2024-10-21 RX ADMIN — SENNOSIDES AND DOCUSATE SODIUM 1 TABLET: 50; 8.6 TABLET ORAL at 22:52

## 2024-10-21 RX ADMIN — WATER 2000 MG: 1 INJECTION INTRAMUSCULAR; INTRAVENOUS; SUBCUTANEOUS at 21:21

## 2024-10-21 RX ADMIN — MIDAZOLAM 2 MG: 1 INJECTION INTRAMUSCULAR; INTRAVENOUS at 07:52

## 2024-10-21 RX ADMIN — SODIUM CHLORIDE: 9 INJECTION, SOLUTION INTRAVENOUS at 06:39

## 2024-10-21 RX ADMIN — Medication 3 UNITS/HR: at 12:01

## 2024-10-21 RX ADMIN — MIDAZOLAM 3 MG: 1 INJECTION INTRAMUSCULAR; INTRAVENOUS at 10:48

## 2024-10-21 RX ADMIN — SODIUM CHLORIDE, SODIUM LACTATE, POTASSIUM CHLORIDE, CALCIUM CHLORIDE: 600; 310; 30; 20 INJECTION, SOLUTION INTRAVENOUS at 08:40

## 2024-10-21 RX ADMIN — POTASSIUM CHLORIDE 20 MEQ: 29.8 INJECTION, SOLUTION INTRAVENOUS at 17:01

## 2024-10-21 RX ADMIN — MORPHINE SULFATE 2 MG: 2 INJECTION, SOLUTION INTRAMUSCULAR; INTRAVENOUS at 15:02

## 2024-10-21 RX ADMIN — ASPIRIN 81 MG: 81 TABLET, COATED ORAL at 06:45

## 2024-10-21 RX ADMIN — HEPARIN SODIUM 6500 UNITS: 1000 INJECTION INTRAVENOUS; SUBCUTANEOUS at 09:35

## 2024-10-21 RX ADMIN — FENTANYL CITRATE 150 MCG: 50 INJECTION INTRAMUSCULAR; INTRAVENOUS at 08:29

## 2024-10-21 RX ADMIN — OXYCODONE HYDROCHLORIDE 10 MG: 5 TABLET ORAL at 21:43

## 2024-10-21 RX ADMIN — SODIUM CHLORIDE 5 MG/HR: 9 INJECTION, SOLUTION INTRAVENOUS at 15:09

## 2024-10-21 RX ADMIN — SODIUM CHLORIDE 12.5 MG/HR: 9 INJECTION, SOLUTION INTRAVENOUS at 20:07

## 2024-10-21 RX ADMIN — METOPROLOL TARTRATE 25 MG: 25 TABLET, FILM COATED ORAL at 22:52

## 2024-10-21 RX ADMIN — EPINEPHRINE 3 MCG/MIN: 1 INJECTION INTRAMUSCULAR; INTRAVENOUS; SUBCUTANEOUS at 13:18

## 2024-10-21 RX ADMIN — WATER 2000 MG: 1 INJECTION INTRAMUSCULAR; INTRAVENOUS; SUBCUTANEOUS at 12:44

## 2024-10-21 RX ADMIN — AMIODARONE HYDROCHLORIDE 200 MG: 200 TABLET ORAL at 22:52

## 2024-10-21 RX ADMIN — PROPOFOL 150 MG: 10 INJECTION, EMULSION INTRAVENOUS at 07:57

## 2024-10-21 RX ADMIN — FAMOTIDINE 20 MG: 20 TABLET, FILM COATED ORAL at 22:52

## 2024-10-21 RX ADMIN — ALBUMIN (HUMAN) 12.5 G: 12.5 INJECTION, SOLUTION INTRAVENOUS at 14:05

## 2024-10-21 RX ADMIN — VANCOMYCIN HYDROCHLORIDE 1000 MG: 1 INJECTION, POWDER, LYOPHILIZED, FOR SOLUTION INTRAVENOUS at 20:43

## 2024-10-21 RX ADMIN — FENTANYL CITRATE 250 MCG: 50 INJECTION INTRAMUSCULAR; INTRAVENOUS at 10:59

## 2024-10-21 RX ADMIN — AMINOCAPROIC ACID 5000 MG: 250 INJECTION, SOLUTION INTRAVENOUS at 13:36

## 2024-10-21 RX ADMIN — HYDROMORPHONE HYDROCHLORIDE 2 MG: 2 INJECTION INTRAMUSCULAR; INTRAVENOUS; SUBCUTANEOUS at 10:51

## 2024-10-21 RX ADMIN — ROCURONIUM BROMIDE 50 MG: 10 INJECTION INTRAVENOUS at 11:15

## 2024-10-21 RX ADMIN — FENTANYL CITRATE 200 MCG: 50 INJECTION INTRAMUSCULAR; INTRAVENOUS at 13:49

## 2024-10-21 RX ADMIN — HEPARIN SODIUM 16500 UNITS: 1000 INJECTION INTRAVENOUS; SUBCUTANEOUS at 10:42

## 2024-10-21 RX ADMIN — AMINOCAPROIC ACID 5000 MG: 250 INJECTION, SOLUTION INTRAVENOUS at 10:36

## 2024-10-21 RX ADMIN — SODIUM CHLORIDE, POTASSIUM CHLORIDE, SODIUM LACTATE AND CALCIUM CHLORIDE: 600; 310; 30; 20 INJECTION, SOLUTION INTRAVENOUS at 08:20

## 2024-10-21 RX ADMIN — FENTANYL CITRATE 100 MCG: 50 INJECTION INTRAMUSCULAR; INTRAVENOUS at 09:45

## 2024-10-21 RX ADMIN — Medication 24 MCG: at 13:46

## 2024-10-21 RX ADMIN — ROCURONIUM BROMIDE 50 MG: 10 INJECTION INTRAVENOUS at 07:57

## 2024-10-21 RX ADMIN — FENTANYL CITRATE 250 MCG: 50 INJECTION INTRAMUSCULAR; INTRAVENOUS at 08:54

## 2024-10-21 ASSESSMENT — PAIN SCALES - GENERAL
PAINLEVEL_OUTOF10: 7
PAINLEVEL_OUTOF10: 8
PAINLEVEL_OUTOF10: 5
PAINLEVEL_OUTOF10: 8
PAINLEVEL_OUTOF10: 10
PAINLEVEL_OUTOF10: 4

## 2024-10-21 ASSESSMENT — PAIN - FUNCTIONAL ASSESSMENT
PAIN_FUNCTIONAL_ASSESSMENT: PREVENTS OR INTERFERES WITH MANY ACTIVE NOT PASSIVE ACTIVITIES
PAIN_FUNCTIONAL_ASSESSMENT: 0-10

## 2024-10-21 ASSESSMENT — PAIN DESCRIPTION - ORIENTATION: ORIENTATION: MID

## 2024-10-21 ASSESSMENT — PAIN DESCRIPTION - DESCRIPTORS: DESCRIPTORS: ACHING

## 2024-10-21 ASSESSMENT — PULMONARY FUNCTION TESTS
PIF_VALUE: 27
PIF_VALUE: 23
PIF_VALUE: 18

## 2024-10-21 ASSESSMENT — PAIN DESCRIPTION - LOCATION: LOCATION: CHEST

## 2024-10-21 NOTE — OP NOTE
DATE: 10/21/24    PATIENT: Jacki Moura    MRN: [id number]     Acct: [id number]    PREOP DIAGNOSIS:   Right internal carotid stenosis  Aortic stenosis    Postop diagnosis:  Right internal carotid stenosis  Aortic stenosis    Procedure:  Right carotid endarterectomy (performed by Dr. Cabrera)  Aortic root enlargement (Nicks technique)  Aortic valve replacement with a 19 mm On-X mechanical prosthesis    SURGEON:  Yonatan Guillory MD    FIRST ASSISTANT OF MEDICAL NECESSITY: Maxwell Mcnulty PA-C    INDICATION:  The patient is a 59 y.o. year-old female who presents with severe symptomatic aortic insufficiency, associated with LV dysfunction in the absence of coronary artery disease. Preoperative imaging demonstrated severe right internal carotid stenosis. Thus, the operative plan was modified to include a right carotid endarterectomy to be performed under the same anesthetic, prior to the open heart procedure. The carotid endarterectomy was performed by Dr. Cabrera, and will be dictated separately.      FINDNGS: There were no intrapericardial adhesions.  The LVOT and root measured only 17 mm by ESTUARDO. In order to accommodate even a 19 mm valve, it was necessary to perform a root enlargement. Post-CPB ESTUARDO confirmed no paravalvular leak, resolution of the patient's preoperative mild mitral regurgitation, and improvement of the LVEF from a baseline of 45% to 60%.    CARDIOPULMONARY BYPASS TIME: 111    CROSSCLAMP TIME: 100    DESCRIPTION:  The patient was prepped and draped in sterile fashion in the supine position. A formal time-out was performed. The right carotid endarterectomy was performed by Dr. Cabrera, and the right neck was left open during the open heart portion of the operation. Heparin was administered.  A median sternotomy was performed. A pericardial well was created. The proximal arch was cannulated with a 21 mm arterial cannula.  A double stage venous cannula was inserted into the right atrium.  A retrograde

## 2024-10-21 NOTE — OP NOTE
Operative Note      Patient: Jacki Moura  YOB: 1965  MRN: [id number]    Date of Procedure: 10/21/2024    Pre-Op Diagnosis Codes:      * Moderate to severe aortic insufficiency [I35.1]     * Carotid stenosis, right [I65.21]  Active Tobacco use  HTN  HLD  PAD    Post-Op Diagnosis: Same       Procedure(s):  Mechanical AVR  RIGHT CAROTID ENDARTERECTOMY with Bovine patch angioplasty    Surgeon(s):  Yonatan Guillory MD Sundaram, Shankar, MD    Assistant:   Physician Assistant: Maxwell Mcnulty PA-C  Based on the complexity of the procedure, the physician assistant was required to assist the surgeons throughout the procedure.    Anesthesia: General    Estimated Blood Loss (mL): less than 100     Complications: None    Specimens:   ID Type Source Tests Collected by Time Destination   A : RIGHT CAROTID PLAQUE Tissue Carotid Arteries SURGICAL PATHOLOGY (Canceled) Yonatan Guillory MD 10/21/2024 1039    B : AORTIC VALVE Tissue Heart SURGICAL PATHOLOGY (Canceled) Yonatan Guillory MD 10/21/2024 1355        Implants:  Implant Name Type Inv. Item Serial No.  Lot No. LRB No. Used Action   GRAFT VASC W0.8XL8CM THK0.35-0.75MM CAR PERICARD PROC BOV - [id number] Vascular grafts GRAFT VASC W0.8XL8CM THK0.35-0.75MM CAR PERICARD PROC BOV  [employer] [id number] Right 1 Implanted   PATCH BIOLOGICAL L 8 X W 6 CM BOVINE PERICARDIUM STERILE XENOSURE - [id number]  PATCH BIOLOGICAL L 8 X W 6 CM BOVINE PERICARDIUM STERILE XENOSURE  [employer]- [id number] N/A 1 Implanted   VALVE AORT H13.3MM OD19MM ID17.4MM FLARE DIA20.1MM SEW RNG - [id number] Aortic valves VALVE AORT H13.3MM OD19MM ID17.4MM FLARE DIA20.1MM SEW RNG [id number] [employer]-  N/A 1 Implanted         Drains:   Chest Tube Mediastinal (Active)       Chest Tube Right Pleural (Active)       Urinary Catheter 10/21/24 (Active)       [REMOVED] External Urinary Catheter (Removed)       Findings:  Infection Present At Time Of Surgery

## 2024-10-21 NOTE — ANESTHESIA PROCEDURE NOTES
Procedure Performed: ESTUARDO       Start Time:        End Time:      Anesthesia Information  Performed Personally  Anesthesiologist:  Ildefonso Tirado MD        Preanesthesia Checklist:  Patient identified, IV assessed, risks and benefits discussed, monitors and equipment assessed, procedure being performed at surgeon's request and anesthesia consent obtained.    General Procedure Information  Diagnostic Indications for Echo:  assessment of ascending aorta, assessment of surgical repair, defect repair evaluation, hemodynamic monitoring and assessment of valve function  Physician Requesting Echo: Yonatan Guillory MD  CPT Code:  52661 [id number]  Location performed:  OR  Intubated  Bite block placed  Heart visualized  Probe Insertion:  Easy  Probe Type:  3D  Modalities:  2D, 3D, pulse wave Doppler, color flow mapping and continuous wave Doppler    Echocardiographic and Doppler Measurements    Ventricles    Ventricle  Cavity Size  Cavity          Dimension  Hypertrophy  Thrombus  Global FXN  EF    RV  normal              LV  normal        mildly impaired (40-49%)  45%       Ventricular Regional Function:  1- Basal Anteroseptal:  hypokinetic  2- Basal Anterior:  hypokinetic  3- Basal Anterolateral:  hypokinetic  4- Basal Inferolateral:  hypokinetic  5- Basal Inferior:  hypokinetic  6- Basal Inferoseptal:  hypokinetic  7- Mid Anteroseptal:  hypokinetic  8- Mid Anterior:  hypokinetic  9- Mid Anterolateral:  hypokinetic  10- Mid Inferolateral:  hypokinetic  11- Mid Inferior:  hypokinetic  12- Mid Inferoseptal:  hypokinetic  13- Apical Anterior:  hypokinetic  14- Apical Lateral:  hypokinetic  15- Apical Inferior:  hypokinetic  16- Apical Septal:  hypokinetic  17- [place]:  hypokinetic        Valves     Valves  Annulus  Stenosis Measurements   Regurg  Leaflet   Morph  Leaflet   Motion Valve Comments    Aortic normal Stenosis none.            severe   normal normal       Mitral normal none             mild normal normal    Tricuspid

## 2024-10-21 NOTE — H&P
Cardiovascular Surgery History and Physical    History Of Present Illness:  59 y.o. female, active smoker with history of peripheral vascular disease, s/p left femoral endarterectomy and bilateral iliofemoral stents, followed with known aortic insufficiency admitted with atypical chest pain beginning in left arm at rest and spreading to the chest, associated with dyspnea and diaphoresis. Pain largely resolved on arrival to ED. LHC showed no obstructive CAD. However, repeat TTE showed a drop in the LVEF to 40%, with moderate-severe AI, and mild MR. Aside from intermittent episodes of atypical pain in hospital, patient clinically stable, on RA.      Past Medical History:       Past Medical History            Diagnosis Date    Arthritis      Coronary artery disease involving native coronary artery of native heart without angina pectoris 2024    Cushing's disease (HCC)      Hyperlipidemia      Hypertension              Past Surgical History:       Past Surgical History             Procedure Laterality Date    BACK SURGERY         SECTION         x 4    COLONOSCOPY        DILATION AND CURETTAGE OF UTERUS       ENDOSCOPY, COLON, DIAGNOSTIC        FEMORAL ENDARTERECTOMY Left 2021     LEFT  FEMORAL ARTERY ENDARTERECTOMY performed by Zhao Gilliland MD at [facility] OR    FOOT SURGERY         bone spurs removed both feet    KNEE SURGERY Left       reconstruction of knee    LUMBAR DISCECTOMY   2020     Dr. Gamino    SKIN BIOPSY        TRANSESOPHAGEAL ECHOCARDIOGRAM N/A 1/10/2024     TRANSESOPHAGEAL ECHOCARDIOGRAM performed by Teja Hayward MD at [facility] ENDOSCOPY            Medications Prior to Admission:      Home Medications           Prior to Admission medications    Medication Sig Start Date End Date Taking? Authorizing Provider   amLODIPine (NORVASC) 10 MG tablet Take 1 tablet by mouth daily 24     Kyle Smith, APRN - CNP   ALPRAZolam (XANAX) 0.5 MG tablet Take 1 tablet by mouth 3 times daily 
tablet by mouth every other day 24  Yes José Luis Persaud MD   pregabalin (LYRICA) 50 MG capsule Take 1 capsule by mouth 3 times daily for 60 days. Max Daily Amount: 150 mg 8/19/24 10/21/24 Yes Kyle Smith APRN - CNP   aspirin 81 MG EC tablet Take 1 tablet by mouth daily   Yes Sebastien Hoskins MD   clopidogrel (PLAVIX) 75 MG tablet Take 1 tablet by mouth daily 24   Kyle Smith APRN - CNP     PastMedical History:  Jacki  has a past medical history of Arthritis, Coronary artery disease involving native coronary artery of native heart without angina pectoris, Cushing's disease (Formerly [facility]), Hyperlipidemia, Hypertension, and PAD (peripheral artery disease) (Formerly [facility]).    Past Surgical History:  The patient  has a past surgical history that includes  section; Foot surgery; Dilation and curettage of uterus (); knee surgery (Left); lumbar discectomy (2020); back surgery; skin biopsy; Colonoscopy; Endoscopy, colon, diagnostic; Femoral Endarterectomy (Left, 2021); transesophageal echocardiogram (N/A, 1/10/2024); Cardiac procedure (N/A, 2024); Cardiac procedure (N/A, 2024); and Cardiac procedure (N/A, 2024).    Allergies:  The patient is allergic to pletal [cilostazol], bactrim [sulfamethoxazole-trimethoprim], codeine, medrol [methylprednisolone], sulfa antibiotics, and ultram [tramadol hcl].    Family History:  This patient's family history includes Alcohol Abuse in her father; Breast Cancer (age of onset: 62) in her maternal aunt; Coronary Art Dis in her brother; Diabetes in her maternal aunt and mother; Heart Disease in her brother, brother, brother, and mother; Parkinsonism in her maternal cousin.    Social History:  Jacki  reports that she has been smoking cigarettes. She started smoking about 43 years ago. She has a 42.7 pack-year smoking history. She has never used smokeless tobacco. She reports that she does not currently use alcohol. She reports current drug use.

## 2024-10-21 NOTE — ANESTHESIA PRE PROCEDURE
Department of Anesthesiology  Preprocedure Note       Name:  Jacki Moura   Age:  59 y.o.  :  1965                                          MRN:  [id number]         Date:  10/21/2024      Surgeon: Surgeon(s):  Yonatan Guillory MD Sundaram, Shankar, MD    Procedure: Procedure(s):  Mechanical AVR  RIGHT CAROTID ENDARTERECTOMY    Medications prior to admission:   Prior to Admission medications    Medication Sig Start Date End Date Taking? Authorizing Provider   diphenhydrAMINE-APAP, sleep, (TYLENOL PM EXTRA STRENGTH PO) Take 2 tablets by mouth nightly as needed   Yes Sebastien Hoskins MD   atorvastatin (LIPITOR) 40 MG tablet Take 1 tablet by mouth daily  Patient taking differently: Take 1 tablet by mouth at bedtime 10/14/24  Yes Kyle Smith APRN - CNP   cyclobenzaprine (FLEXERIL) 10 MG tablet Take 1 tablet by mouth 3 times daily as needed for Muscle spasms 10/9/24  Yes Kyle Smith APRN - CNP   HYDROcodone-acetaminophen (NORCO) 5-325 MG per tablet Take 1 tablet by mouth every 8 hours as needed for Pain for up to 30 days. Intended supply: 30 days. Take lowest dose possible to manage pain Max Daily Amount: 3 tablets 10/9/24 11/8/24 Yes Kyle Smith APRN - CNP   ALPRAZolam (XANAX) 0.5 MG tablet Take 1 tablet by mouth 3 times daily as needed for Sleep or Anxiety for up to 30 days. Max Daily Amount: 1.5 mg 9/23/24 10/23/24 Yes Kyle Smith APRN - CNP   bumetanide (BUMEX) 1 MG tablet Take 1 tablet by mouth daily 24  Yes José Luis Persaud MD   ferrous sulfate (IRON 325) 325 (65 Fe) MG tablet Take 1 tablet by mouth every other day 24  Yes José Luis Persaud MD   pregabalin (LYRICA) 50 MG capsule Take 1 capsule by mouth 3 times daily for 60 days. Max Daily Amount: 150 mg 8/19/24 10/21/24 Yes Kyle Smith APRN - CNP   aspirin 81 MG EC tablet Take 1 tablet by mouth daily   Yes Sebastien Hoskins MD   clopidogrel (PLAVIX) 75 MG tablet Take 1 tablet by mouth daily 24   Luis

## 2024-10-21 NOTE — ANESTHESIA PROCEDURE NOTES
Arterial Line:    An arterial line was placed using ultrasound guidance, in the OR for the following indication(s): continuous blood pressure monitoring and blood sampling needed.    A 20 gauge (size), 12 cm (length), Arrow (type) catheter was placed, Seldinger technique used, into the left brachial artery, secured by suture.  Anesthesia type: General    Events:  patient tolerated procedure well with no complications.    Additional notes:  Left radial arterial line placement attempted.  No visible vessel seen on US.  Decided to place brachial arterial line.  Anesthesiologist: Ildefonso Tirado MD  Performed: Anesthesiologist   Preanesthetic Checklist  Completed: patient identified, IV checked, site marked, risks and benefits discussed, surgical/procedural consents, equipment checked, pre-op evaluation, timeout performed, anesthesia consent given, oxygen available, monitors applied/VS acknowledged, fire risk safety assessment completed and verbalized and blood product R/B/A discussed and consented

## 2024-10-21 NOTE — ANESTHESIA PROCEDURE NOTES
Central Venous Line:    A central venous line was placed using ultrasound guidance, in the OR for the following indication(s): central venous access and CVP monitoring.    Sterility preparation included the following: provider used sterile gloves, gown, hat and mask, hand hygiene performed prior to central venous catheter insertion, sterile gel and probe cover used in ultrasound-guided central venous catheter insertion and maximum sterile barriers used during central venous catheter insertion.    The patient was placed in Trendelenburg position.The left internal jugular vein was prepped.    The site was prepped with Chloraprep.  A 9 Fr (size), 10 (length), introducer single lumen was placed.    During the procedure, the following specific steps were taken: target vein identified, needle advanced into vein and blood aspirated and guidewire advanced into vein.    Intravenous verification was obtained by ultrasound, venous blood return, ESTUARDO and ESTUARDO.    Post insertion care included: all ports aspirated, all ports flushed easily, guidewire removed intact, Biopatch applied, line sutured in place and dressing applied.    During the procedure the patient experienced: patient tolerated procedure well with no complications.      Insertion site scrubbed per usage guidelines?: Yes  Skin prep agent dried for 3 minutes prior to procedure?:yes  Outcomes: uncomplicated  Anesthesia type: generalA(n) non-oximetric, 7.5 (size) Pulmonary Artery Catheter (PAC) was placed through the Introducer CVL in the left internal jugular vein.  The PAC placement was confirmed by pressure tracing changes and ESTUARDO.  The patient experienced the following events during the procedure: patient tolerated procedure well with no complications.PA Cath placed?: Yes  Staffing  Performed: Anesthesiologist   Anesthesiologist: Ildefonso Tirado MD  Performed by: Ildefonso Tirado MD  Authorized by: Ildefonso Tirado MD    Preanesthetic Checklist  Completed: patient  Detail Level: Detailed Biopsy Photograph Reviewed: Yes Number Of Curettages: 3 Size Of Lesion In Cm: 0.6 Size Of Lesion After Curettage: 1.6 Add Intralesional Injection: No Total Volume (Ccs): 1 Anesthesia Type: 2% Xylocaine with 1:100,000 epinephrine Anesthesia Volume In Cc: 2 Cautery Type: electrodesiccation What Was Performed First?: Curettage Final Size Statement: The size of the lesion after curettage was Additional Information: (Optional): The wound was cleaned, and a pressure dressing was applied.  The patient received detailed post-op instructions. Consent was obtained from the patient. The risks, benefits and alternatives to therapy were discussed in detail. Specifically, the risks of infection, scarring, bleeding, prolonged wound healing, nerve injury, incomplete removal, allergy to anesthesia and recurrence were addressed. Alternatives to ED&C, such as: surgical removal and XRT were also discussed.  Prior to the procedure, the treatment site was clearly identified and confirmed by the patient. All components of Universal Protocol/PAUSE Rule completed. Post-Care Instructions: I reviewed with the patient in detail post-care instructions. Patient is to keep the area dry for 48 hours, and not to engage in any swimming until the area is healed. Should the patient develop any fevers, chills, bleeding, severe pain patient will contact the office immediately. Bill As A Line Item Or As Units: Line Item

## 2024-10-22 ENCOUNTER — APPOINTMENT (OUTPATIENT)
Dept: GENERAL RADIOLOGY | Age: 59
DRG: 220 | End: 2024-10-22
Attending: THORACIC SURGERY (CARDIOTHORACIC VASCULAR SURGERY)
Payer: COMMERCIAL

## 2024-10-22 LAB
ANION GAP SERPL CALC-SCNC: 15 MEQ/L (ref 8–16)
BUN SERPL-MCNC: 15 MG/DL (ref 7–22)
CALCIUM SERPL-MCNC: 7.6 MG/DL (ref 8.5–10.5)
CHLORIDE SERPL-SCNC: 108 MEQ/L (ref 98–111)
CO2 SERPL-SCNC: 22 MEQ/L (ref 23–33)
CREAT SERPL-MCNC: 0.6 MG/DL (ref 0.4–1.2)
DEPRECATED RDW RBC AUTO: 67.3 FL (ref 35–45)
EKG ATRIAL RATE: 102 BPM
EKG P AXIS: 62 DEGREES
EKG P-R INTERVAL: 150 MS
EKG Q-T INTERVAL: 342 MS
EKG QRS DURATION: 72 MS
EKG QTC CALCULATION (BAZETT): 445 MS
EKG R AXIS: 13 DEGREES
EKG T AXIS: 5 DEGREES
EKG VENTRICULAR RATE: 102 BPM
ERYTHROCYTE [DISTWIDTH] IN BLOOD BY AUTOMATED COUNT: 21.5 % (ref 11.5–14.5)
GFR SERPL CREATININE-BSD FRML MDRD: > 90 ML/MIN/1.73M2
GLUCOSE BLD STRIP.AUTO-MCNC: 120 MG/DL (ref 70–108)
GLUCOSE BLD STRIP.AUTO-MCNC: 124 MG/DL (ref 70–108)
GLUCOSE BLD STRIP.AUTO-MCNC: 128 MG/DL (ref 70–108)
GLUCOSE BLD STRIP.AUTO-MCNC: 136 MG/DL (ref 70–108)
GLUCOSE BLD STRIP.AUTO-MCNC: 141 MG/DL (ref 70–108)
GLUCOSE BLD STRIP.AUTO-MCNC: 148 MG/DL (ref 70–108)
GLUCOSE BLD STRIP.AUTO-MCNC: 160 MG/DL (ref 70–108)
GLUCOSE BLD STRIP.AUTO-MCNC: 165 MG/DL (ref 70–108)
GLUCOSE SERPL-MCNC: 119 MG/DL (ref 70–108)
HCT VFR BLD AUTO: 31 % (ref 37–47)
HGB BLD-MCNC: 9.9 GM/DL (ref 12–16)
INR PPP: 1.01 (ref 0.85–1.13)
MAGNESIUM SERPL-MCNC: 2.3 MG/DL (ref 1.6–2.4)
MCH RBC QN AUTO: 27.6 PG (ref 26–33)
MCHC RBC AUTO-ENTMCNC: 31.9 GM/DL (ref 32.2–35.5)
MCV RBC AUTO: 86.4 FL (ref 81–99)
MRSA DNA SPEC QL NAA+PROBE: NEGATIVE
PLATELET # BLD AUTO: 251 THOU/MM3 (ref 130–400)
PMV BLD AUTO: 10.5 FL (ref 9.4–12.4)
POTASSIUM SERPL-SCNC: 4.2 MEQ/L (ref 3.5–5.2)
RBC # BLD AUTO: 3.59 MILL/MM3 (ref 4.2–5.4)
SCAN OF BLOOD SMEAR: NORMAL
SODIUM SERPL-SCNC: 145 MEQ/L (ref 135–145)
WBC # BLD AUTO: 31 THOU/MM3 (ref 4.8–10.8)

## 2024-10-22 PROCEDURE — 2060000000 HC ICU INTERMEDIATE R&B

## 2024-10-22 PROCEDURE — 6360000002 HC RX W HCPCS: Performed by: THORACIC SURGERY (CARDIOTHORACIC VASCULAR SURGERY)

## 2024-10-22 PROCEDURE — 82948 REAGENT STRIP/BLOOD GLUCOSE: CPT

## 2024-10-22 PROCEDURE — 85027 COMPLETE CBC AUTOMATED: CPT

## 2024-10-22 PROCEDURE — 2580000003 HC RX 258: Performed by: THORACIC SURGERY (CARDIOTHORACIC VASCULAR SURGERY)

## 2024-10-22 PROCEDURE — 93010 ELECTROCARDIOGRAM REPORT: CPT | Performed by: NUCLEAR MEDICINE

## 2024-10-22 PROCEDURE — APPSS30 APP SPLIT SHARED TIME 16-30 MINUTES: Performed by: PHYSICIAN ASSISTANT

## 2024-10-22 PROCEDURE — 6370000000 HC RX 637 (ALT 250 FOR IP): Performed by: THORACIC SURGERY (CARDIOTHORACIC VASCULAR SURGERY)

## 2024-10-22 PROCEDURE — 97110 THERAPEUTIC EXERCISES: CPT

## 2024-10-22 PROCEDURE — 97166 OT EVAL MOD COMPLEX 45 MIN: CPT

## 2024-10-22 PROCEDURE — 97530 THERAPEUTIC ACTIVITIES: CPT

## 2024-10-22 PROCEDURE — 97163 PT EVAL HIGH COMPLEX 45 MIN: CPT

## 2024-10-22 PROCEDURE — 83735 ASSAY OF MAGNESIUM: CPT

## 2024-10-22 PROCEDURE — 71045 X-RAY EXAM CHEST 1 VIEW: CPT

## 2024-10-22 PROCEDURE — 93005 ELECTROCARDIOGRAM TRACING: CPT | Performed by: THORACIC SURGERY (CARDIOTHORACIC VASCULAR SURGERY)

## 2024-10-22 PROCEDURE — 80048 BASIC METABOLIC PNL TOTAL CA: CPT

## 2024-10-22 PROCEDURE — 36415 COLL VENOUS BLD VENIPUNCTURE: CPT

## 2024-10-22 PROCEDURE — 85610 PROTHROMBIN TIME: CPT

## 2024-10-22 RX ORDER — INSULIN LISPRO 100 [IU]/ML
0-16 INJECTION, SOLUTION INTRAVENOUS; SUBCUTANEOUS
Status: DISCONTINUED | OUTPATIENT
Start: 2024-10-22 | End: 2024-10-23

## 2024-10-22 RX ORDER — WARFARIN SODIUM 2.5 MG/1
2.5 TABLET ORAL DAILY
Status: DISCONTINUED | OUTPATIENT
Start: 2024-10-22 | End: 2024-10-23

## 2024-10-22 RX ORDER — AMIODARONE HYDROCHLORIDE 200 MG/1
200 TABLET ORAL DAILY
Status: DISCONTINUED | OUTPATIENT
Start: 2024-10-22 | End: 2024-10-23

## 2024-10-22 RX ORDER — PREGABALIN 50 MG/1
50 CAPSULE ORAL 3 TIMES DAILY
Status: DISCONTINUED | OUTPATIENT
Start: 2024-10-22 | End: 2024-10-24

## 2024-10-22 RX ORDER — POTASSIUM CHLORIDE 1500 MG/1
10 TABLET, EXTENDED RELEASE ORAL 2 TIMES DAILY WITH MEALS
Status: DISCONTINUED | OUTPATIENT
Start: 2024-10-22 | End: 2024-10-24

## 2024-10-22 RX ORDER — ASPIRIN 81 MG/1
81 TABLET ORAL DAILY
Status: DISCONTINUED | OUTPATIENT
Start: 2024-10-22 | End: 2024-10-27 | Stop reason: HOSPADM

## 2024-10-22 RX ORDER — CYCLOBENZAPRINE HCL 10 MG
10 TABLET ORAL 3 TIMES DAILY PRN
Status: DISCONTINUED | OUTPATIENT
Start: 2024-10-22 | End: 2024-10-27 | Stop reason: HOSPADM

## 2024-10-22 RX ORDER — FUROSEMIDE 10 MG/ML
20 INJECTION INTRAMUSCULAR; INTRAVENOUS 2 TIMES DAILY
Status: DISCONTINUED | OUTPATIENT
Start: 2024-10-22 | End: 2024-10-27 | Stop reason: HOSPADM

## 2024-10-22 RX ADMIN — OXYCODONE HYDROCHLORIDE 10 MG: 5 TABLET ORAL at 09:53

## 2024-10-22 RX ADMIN — ASPIRIN 81 MG: 81 TABLET, COATED ORAL at 08:57

## 2024-10-22 RX ADMIN — OXYCODONE HYDROCHLORIDE 10 MG: 5 TABLET ORAL at 16:05

## 2024-10-22 RX ADMIN — CYCLOBENZAPRINE 10 MG: 10 TABLET, FILM COATED ORAL at 16:28

## 2024-10-22 RX ADMIN — AMIODARONE HYDROCHLORIDE 200 MG: 200 TABLET ORAL at 08:56

## 2024-10-22 RX ADMIN — WATER 2000 MG: 1 INJECTION INTRAMUSCULAR; INTRAVENOUS; SUBCUTANEOUS at 22:11

## 2024-10-22 RX ADMIN — OXYCODONE HYDROCHLORIDE 10 MG: 5 TABLET ORAL at 05:51

## 2024-10-22 RX ADMIN — WARFARIN SODIUM 2.5 MG: 2.5 TABLET ORAL at 18:04

## 2024-10-22 RX ADMIN — OXYCODONE HYDROCHLORIDE 10 MG: 5 TABLET ORAL at 01:45

## 2024-10-22 RX ADMIN — WATER 2000 MG: 1 INJECTION INTRAMUSCULAR; INTRAVENOUS; SUBCUTANEOUS at 05:59

## 2024-10-22 RX ADMIN — METOPROLOL TARTRATE 25 MG: 25 TABLET, FILM COATED ORAL at 22:11

## 2024-10-22 RX ADMIN — VANCOMYCIN HYDROCHLORIDE 1000 MG: 1 INJECTION, POWDER, LYOPHILIZED, FOR SOLUTION INTRAVENOUS at 08:42

## 2024-10-22 RX ADMIN — OXYCODONE HYDROCHLORIDE 10 MG: 5 TABLET ORAL at 20:39

## 2024-10-22 RX ADMIN — FUROSEMIDE 20 MG: 10 INJECTION, SOLUTION INTRAMUSCULAR; INTRAVENOUS at 18:01

## 2024-10-22 RX ADMIN — ENOXAPARIN SODIUM 40 MG: 100 INJECTION SUBCUTANEOUS at 08:56

## 2024-10-22 RX ADMIN — SENNOSIDES AND DOCUSATE SODIUM 1 TABLET: 50; 8.6 TABLET ORAL at 08:57

## 2024-10-22 RX ADMIN — FUROSEMIDE 20 MG: 10 INJECTION, SOLUTION INTRAMUSCULAR; INTRAVENOUS at 08:56

## 2024-10-22 RX ADMIN — ATORVASTATIN CALCIUM 40 MG: 40 TABLET, FILM COATED ORAL at 22:11

## 2024-10-22 RX ADMIN — POTASSIUM CHLORIDE 20 MEQ: 29.8 INJECTION, SOLUTION INTRAVENOUS at 08:43

## 2024-10-22 RX ADMIN — Medication 1 TABLET: at 08:56

## 2024-10-22 RX ADMIN — SODIUM CHLORIDE 12.5 MG/HR: 9 INJECTION, SOLUTION INTRAVENOUS at 02:16

## 2024-10-22 RX ADMIN — ALPRAZOLAM 0.5 MG: 0.5 TABLET ORAL at 03:50

## 2024-10-22 RX ADMIN — WATER 2000 MG: 1 INJECTION INTRAMUSCULAR; INTRAVENOUS; SUBCUTANEOUS at 12:50

## 2024-10-22 RX ADMIN — ONDANSETRON 4 MG: 2 INJECTION INTRAMUSCULAR; INTRAVENOUS at 13:04

## 2024-10-22 RX ADMIN — FAMOTIDINE 20 MG: 20 TABLET, FILM COATED ORAL at 22:11

## 2024-10-22 RX ADMIN — POTASSIUM CHLORIDE 10 MEQ: 1500 TABLET, EXTENDED RELEASE ORAL at 08:57

## 2024-10-22 RX ADMIN — SODIUM CHLORIDE: 9 INJECTION, SOLUTION INTRAVENOUS at 06:36

## 2024-10-22 RX ADMIN — FAMOTIDINE 20 MG: 20 TABLET, FILM COATED ORAL at 08:57

## 2024-10-22 RX ADMIN — SODIUM CHLORIDE, PRESERVATIVE FREE 10 ML: 5 INJECTION INTRAVENOUS at 08:56

## 2024-10-22 RX ADMIN — METOPROLOL TARTRATE 25 MG: 25 TABLET, FILM COATED ORAL at 08:57

## 2024-10-22 RX ADMIN — POTASSIUM CHLORIDE 10 MEQ: 1500 TABLET, EXTENDED RELEASE ORAL at 16:28

## 2024-10-22 RX ADMIN — SODIUM CHLORIDE 12.5 MG/HR: 9 INJECTION, SOLUTION INTRAVENOUS at 04:35

## 2024-10-22 RX ADMIN — SENNOSIDES AND DOCUSATE SODIUM 1 TABLET: 50; 8.6 TABLET ORAL at 22:11

## 2024-10-22 ASSESSMENT — PAIN DESCRIPTION - DESCRIPTORS
DESCRIPTORS: ACHING;DISCOMFORT

## 2024-10-22 ASSESSMENT — PAIN DESCRIPTION - LOCATION
LOCATION: CHEST
LOCATION: CHEST
LOCATION: GENERALIZED
LOCATION: CHEST

## 2024-10-22 ASSESSMENT — PAIN SCALES - GENERAL
PAINLEVEL_OUTOF10: 9
PAINLEVEL_OUTOF10: 8
PAINLEVEL_OUTOF10: 3
PAINLEVEL_OUTOF10: 0
PAINLEVEL_OUTOF10: 9
PAINLEVEL_OUTOF10: 3
PAINLEVEL_OUTOF10: 10
PAINLEVEL_OUTOF10: 8
PAINLEVEL_OUTOF10: 10
PAINLEVEL_OUTOF10: 3
PAINLEVEL_OUTOF10: 8

## 2024-10-22 ASSESSMENT — PAIN DESCRIPTION - ORIENTATION
ORIENTATION: MID
ORIENTATION: RIGHT;LEFT
ORIENTATION: MID

## 2024-10-22 ASSESSMENT — PAIN - FUNCTIONAL ASSESSMENT: PAIN_FUNCTIONAL_ASSESSMENT: PREVENTS OR INTERFERES WITH ALL ACTIVE AND SOME PASSIVE ACTIVITIES

## 2024-10-22 NOTE — ANESTHESIA POSTPROCEDURE EVALUATION
Department of Anesthesiology  Postprocedure Note    Patient: Jacki Moura  MRN: [id number]  YOB: 1965  Date of evaluation: 10/22/2024    Procedure Summary       Date: 10/21/24 Room / Location: [facility] OR 04 / [facility] OR    Anesthesia Start: 0752 Anesthesia Stop: 1458    Procedures:       Mechanical AVR      RIGHT CAROTID ENDARTERECTOMY Diagnosis:       Moderate to severe aortic insufficiency      Carotid stenosis, right      (Moderate to severe aortic insufficiency [I35.1])      (Carotid stenosis, right [I65.21])    Surgeons: Yonatan Guillory MD; Jarad Cabrera MD Responsible Provider: Ildefonso Tirado MD    Anesthesia Type: general ASA Status: 3            Anesthesia Type: No value filed.    Estee Phase I: Estee Score: 10    Estee Phase II:      Anesthesia Post Evaluation    Patient location during evaluation: ICU  Patient participation: complete - patient participated  Level of consciousness: lethargic  Airway patency: patent  Cardiovascular status: hemodynamically stable  Respiratory status: acceptable  Pain management: adequate        No notable events documented.

## 2024-10-22 NOTE — PROCEDURES
PROCEDURE NOTE  Date: 10/22/2024   Name: Jacki Moura  YOB: 1965    Procedures      EKG was completed and handed to RN

## 2024-10-23 ENCOUNTER — APPOINTMENT (OUTPATIENT)
Dept: GENERAL RADIOLOGY | Age: 59
DRG: 220 | End: 2024-10-23
Attending: THORACIC SURGERY (CARDIOTHORACIC VASCULAR SURGERY)
Payer: COMMERCIAL

## 2024-10-23 LAB
ANION GAP SERPL CALC-SCNC: 12 MEQ/L (ref 8–16)
BACTERIA UR CULT: NORMAL
BUN SERPL-MCNC: 9 MG/DL (ref 7–22)
CALCIUM SERPL-MCNC: 8.1 MG/DL (ref 8.5–10.5)
CHLORIDE SERPL-SCNC: 104 MEQ/L (ref 98–111)
CO2 SERPL-SCNC: 21 MEQ/L (ref 23–33)
CREAT SERPL-MCNC: 0.5 MG/DL (ref 0.4–1.2)
DEPRECATED RDW RBC AUTO: 65.5 FL (ref 35–45)
ERYTHROCYTE [DISTWIDTH] IN BLOOD BY AUTOMATED COUNT: 20.4 % (ref 11.5–14.5)
GFR SERPL CREATININE-BSD FRML MDRD: > 90 ML/MIN/1.73M2
GLUCOSE BLD STRIP.AUTO-MCNC: 140 MG/DL (ref 70–108)
GLUCOSE SERPL-MCNC: 148 MG/DL (ref 70–108)
HCT VFR BLD AUTO: 31.1 % (ref 37–47)
HGB BLD-MCNC: 9.5 GM/DL (ref 12–16)
INR PPP: 1.23 (ref 0.85–1.13)
MCH RBC QN AUTO: 27 PG (ref 26–33)
MCHC RBC AUTO-ENTMCNC: 30.5 GM/DL (ref 32.2–35.5)
MCV RBC AUTO: 88.4 FL (ref 81–99)
PLATELET # BLD AUTO: 202 THOU/MM3 (ref 130–400)
PMV BLD AUTO: 10.8 FL (ref 9.4–12.4)
POTASSIUM SERPL-SCNC: 4.4 MEQ/L (ref 3.5–5.2)
RBC # BLD AUTO: 3.52 MILL/MM3 (ref 4.2–5.4)
SODIUM SERPL-SCNC: 137 MEQ/L (ref 135–145)
WBC # BLD AUTO: 20.1 THOU/MM3 (ref 4.8–10.8)

## 2024-10-23 PROCEDURE — 97530 THERAPEUTIC ACTIVITIES: CPT

## 2024-10-23 PROCEDURE — 97535 SELF CARE MNGMENT TRAINING: CPT

## 2024-10-23 PROCEDURE — 6360000002 HC RX W HCPCS: Performed by: THORACIC SURGERY (CARDIOTHORACIC VASCULAR SURGERY)

## 2024-10-23 PROCEDURE — 36415 COLL VENOUS BLD VENIPUNCTURE: CPT

## 2024-10-23 PROCEDURE — 97116 GAIT TRAINING THERAPY: CPT

## 2024-10-23 PROCEDURE — 2060000000 HC ICU INTERMEDIATE R&B

## 2024-10-23 PROCEDURE — 6370000000 HC RX 637 (ALT 250 FOR IP): Performed by: THORACIC SURGERY (CARDIOTHORACIC VASCULAR SURGERY)

## 2024-10-23 PROCEDURE — APPSS30 APP SPLIT SHARED TIME 16-30 MINUTES: Performed by: PHYSICIAN ASSISTANT

## 2024-10-23 PROCEDURE — 2580000003 HC RX 258: Performed by: THORACIC SURGERY (CARDIOTHORACIC VASCULAR SURGERY)

## 2024-10-23 PROCEDURE — 85027 COMPLETE CBC AUTOMATED: CPT

## 2024-10-23 PROCEDURE — 85610 PROTHROMBIN TIME: CPT

## 2024-10-23 PROCEDURE — 97110 THERAPEUTIC EXERCISES: CPT

## 2024-10-23 PROCEDURE — 80048 BASIC METABOLIC PNL TOTAL CA: CPT

## 2024-10-23 PROCEDURE — 82948 REAGENT STRIP/BLOOD GLUCOSE: CPT

## 2024-10-23 PROCEDURE — 71045 X-RAY EXAM CHEST 1 VIEW: CPT

## 2024-10-23 RX ORDER — WARFARIN SODIUM 2 MG/1
2 TABLET ORAL DAILY
Status: DISCONTINUED | OUTPATIENT
Start: 2024-10-23 | End: 2024-10-24

## 2024-10-23 RX ORDER — METOPROLOL TARTRATE 50 MG
50 TABLET ORAL 2 TIMES DAILY
Status: DISCONTINUED | OUTPATIENT
Start: 2024-10-24 | End: 2024-10-24

## 2024-10-23 RX ADMIN — POTASSIUM CHLORIDE 10 MEQ: 1500 TABLET, EXTENDED RELEASE ORAL at 08:54

## 2024-10-23 RX ADMIN — WARFARIN SODIUM 2 MG: 2 TABLET ORAL at 17:50

## 2024-10-23 RX ADMIN — OXYCODONE HYDROCHLORIDE 10 MG: 5 TABLET ORAL at 07:46

## 2024-10-23 RX ADMIN — FAMOTIDINE 20 MG: 20 TABLET, FILM COATED ORAL at 21:08

## 2024-10-23 RX ADMIN — FAMOTIDINE 20 MG: 20 TABLET, FILM COATED ORAL at 08:52

## 2024-10-23 RX ADMIN — OXYCODONE HYDROCHLORIDE 10 MG: 5 TABLET ORAL at 12:05

## 2024-10-23 RX ADMIN — SENNOSIDES AND DOCUSATE SODIUM 1 TABLET: 50; 8.6 TABLET ORAL at 08:52

## 2024-10-23 RX ADMIN — FUROSEMIDE 20 MG: 10 INJECTION, SOLUTION INTRAMUSCULAR; INTRAVENOUS at 17:50

## 2024-10-23 RX ADMIN — SENNOSIDES AND DOCUSATE SODIUM 1 TABLET: 50; 8.6 TABLET ORAL at 21:08

## 2024-10-23 RX ADMIN — ONDANSETRON 4 MG: 2 INJECTION INTRAMUSCULAR; INTRAVENOUS at 13:34

## 2024-10-23 RX ADMIN — FUROSEMIDE 20 MG: 10 INJECTION, SOLUTION INTRAMUSCULAR; INTRAVENOUS at 08:55

## 2024-10-23 RX ADMIN — CYCLOBENZAPRINE 10 MG: 10 TABLET, FILM COATED ORAL at 23:51

## 2024-10-23 RX ADMIN — OXYCODONE HYDROCHLORIDE 10 MG: 5 TABLET ORAL at 03:35

## 2024-10-23 RX ADMIN — POTASSIUM CHLORIDE 10 MEQ: 1500 TABLET, EXTENDED RELEASE ORAL at 17:50

## 2024-10-23 RX ADMIN — METOPROLOL TARTRATE 25 MG: 25 TABLET, FILM COATED ORAL at 21:08

## 2024-10-23 RX ADMIN — WATER 2000 MG: 1 INJECTION INTRAMUSCULAR; INTRAVENOUS; SUBCUTANEOUS at 05:52

## 2024-10-23 RX ADMIN — ENOXAPARIN SODIUM 40 MG: 100 INJECTION SUBCUTANEOUS at 08:50

## 2024-10-23 RX ADMIN — ASPIRIN 81 MG: 81 TABLET, COATED ORAL at 08:52

## 2024-10-23 RX ADMIN — CYCLOBENZAPRINE 10 MG: 10 TABLET, FILM COATED ORAL at 13:39

## 2024-10-23 RX ADMIN — OXYCODONE HYDROCHLORIDE 10 MG: 5 TABLET ORAL at 21:08

## 2024-10-23 RX ADMIN — AMIODARONE HYDROCHLORIDE 200 MG: 200 TABLET ORAL at 08:53

## 2024-10-23 RX ADMIN — ATORVASTATIN CALCIUM 40 MG: 40 TABLET, FILM COATED ORAL at 21:08

## 2024-10-23 RX ADMIN — METOPROLOL TARTRATE 25 MG: 25 TABLET, FILM COATED ORAL at 08:53

## 2024-10-23 RX ADMIN — Medication 1 TABLET: at 08:53

## 2024-10-23 RX ADMIN — ALPRAZOLAM 0.5 MG: 0.5 TABLET ORAL at 13:43

## 2024-10-23 ASSESSMENT — PAIN DESCRIPTION - PAIN TYPE
TYPE: SURGICAL PAIN
TYPE: CHRONIC PAIN
TYPE: ACUTE PAIN
TYPE: SURGICAL PAIN
TYPE: ACUTE PAIN
TYPE: SURGICAL PAIN

## 2024-10-23 ASSESSMENT — PAIN SCALES - GENERAL
PAINLEVEL_OUTOF10: 6
PAINLEVEL_OUTOF10: 10
PAINLEVEL_OUTOF10: 7
PAINLEVEL_OUTOF10: 10
PAINLEVEL_OUTOF10: 10
PAINLEVEL_OUTOF10: 7
PAINLEVEL_OUTOF10: 8
PAINLEVEL_OUTOF10: 9
PAINLEVEL_OUTOF10: 7
PAINLEVEL_OUTOF10: 8
PAINLEVEL_OUTOF10: 5
PAINLEVEL_OUTOF10: 7

## 2024-10-23 ASSESSMENT — PAIN DESCRIPTION - LOCATION
LOCATION: CHEST
LOCATION: BACK
LOCATION: NECK;SHOULDER
LOCATION: CHEST;NECK
LOCATION: BACK
LOCATION: CHEST;ABDOMEN
LOCATION: CHEST

## 2024-10-23 ASSESSMENT — PAIN DESCRIPTION - ORIENTATION
ORIENTATION: RIGHT
ORIENTATION: MID
ORIENTATION: LOWER
ORIENTATION: LOWER
ORIENTATION: MID
ORIENTATION: MID
ORIENTATION: ANTERIOR

## 2024-10-23 ASSESSMENT — PAIN DESCRIPTION - DESCRIPTORS
DESCRIPTORS: SHOOTING;SHARP
DESCRIPTORS: SHARP;ACHING
DESCRIPTORS: ACHING
DESCRIPTORS: SPASM
DESCRIPTORS: ACHING

## 2024-10-23 ASSESSMENT — PAIN - FUNCTIONAL ASSESSMENT
PAIN_FUNCTIONAL_ASSESSMENT: ACTIVITIES ARE NOT PREVENTED
PAIN_FUNCTIONAL_ASSESSMENT: ACTIVITIES ARE NOT PREVENTED
PAIN_FUNCTIONAL_ASSESSMENT: PREVENTS OR INTERFERES SOME ACTIVE ACTIVITIES AND ADLS
PAIN_FUNCTIONAL_ASSESSMENT: ACTIVITIES ARE NOT PREVENTED
PAIN_FUNCTIONAL_ASSESSMENT: PREVENTS OR INTERFERES SOME ACTIVE ACTIVITIES AND ADLS

## 2024-10-23 ASSESSMENT — PAIN DESCRIPTION - FREQUENCY
FREQUENCY: CONTINUOUS

## 2024-10-23 ASSESSMENT — PAIN SCALES - WONG BAKER
WONGBAKER_NUMERICALRESPONSE: HURTS A LITTLE BIT
WONGBAKER_NUMERICALRESPONSE: HURTS A LITTLE BIT

## 2024-10-23 ASSESSMENT — PAIN DESCRIPTION - ONSET
ONSET: ON-GOING

## 2024-10-23 NOTE — DISCHARGE INSTRUCTIONS
Cardiac Rehab:  Cardiac Rehab has been recommended for you. It is an outpatient program of support, exercise, and education led by health professionals and personalized to strengthen your your heart. It helps you make long-term lifestyle changes so you can live a longer healthier life. The program is proven to keep you out of the hospital and reduce your risk of death from heart conditions. The Cardiac Rehab staff will follow-up with you and provide further information regarding the program and hours of operation. For questions or more information, call Cardiac Rehab at [phone].       Discharge Instructions Following Cardiac Surgery for  [facility] Cardiothoracic and Vascular Surgery  Pt Name: Jacki Moura  Medical Record Number: [id number]  Today's Date: 10/25/2024    ACTIVITY/EXERCISE   ?    It will take 2 to 3 months to feel like you did before surgery in terms of energy and strength.  ?    Slowly increase your activity after you go home. Pace yourself, listen to your body. If it hurts, stop.    During the first 2 months, no digging, outside bike riding, or using arm movement on  a stationary bike for sternal precautions.   ?   The limit on how much you can lift, push or pull is 10 pounds.    For the first 4 weeks after surgery, you must not lift anything over 10 pounds. (You cannot lift anything heavier than a gallon of  milk).   Beginning the 5th week after surgery, you may lift, push or pull up to 20 pounds.   ?    Begin your walking program on the first day you are home and record how many times per day and number of minutes on your log. You may climb stairs; there are no limits to stair climbing.   ?    Continue to do your cough and deep breathing exercises and the incentive spirometer 6 times a day as you did in the hospital.   ?    Do not use arms to get up from a seated position. Instead, rock in your chair to give yourself momentum to sit up.   ?    You may begin light house hold chores,

## 2024-10-24 ENCOUNTER — APPOINTMENT (OUTPATIENT)
Dept: GENERAL RADIOLOGY | Age: 59
DRG: 220 | End: 2024-10-24
Attending: THORACIC SURGERY (CARDIOTHORACIC VASCULAR SURGERY)
Payer: COMMERCIAL

## 2024-10-24 LAB
ANION GAP SERPL CALC-SCNC: 11 MEQ/L (ref 8–16)
BUN SERPL-MCNC: 12 MG/DL (ref 7–22)
CALCIUM SERPL-MCNC: 8.6 MG/DL (ref 8.5–10.5)
CHLORIDE SERPL-SCNC: 99 MEQ/L (ref 98–111)
CO2 SERPL-SCNC: 27 MEQ/L (ref 23–33)
CREAT SERPL-MCNC: 0.7 MG/DL (ref 0.4–1.2)
DEPRECATED RDW RBC AUTO: 60.3 FL (ref 35–45)
ERYTHROCYTE [DISTWIDTH] IN BLOOD BY AUTOMATED COUNT: 18.6 % (ref 11.5–14.5)
GFR SERPL CREATININE-BSD FRML MDRD: > 90 ML/MIN/1.73M2
GLUCOSE SERPL-MCNC: 116 MG/DL (ref 70–108)
HCT VFR BLD AUTO: 29.5 % (ref 37–47)
HGB BLD-MCNC: 9.2 GM/DL (ref 12–16)
INR PPP: 2.35 (ref 0.85–1.13)
MCH RBC QN AUTO: 27.6 PG (ref 26–33)
MCHC RBC AUTO-ENTMCNC: 31.2 GM/DL (ref 32.2–35.5)
MCV RBC AUTO: 88.6 FL (ref 81–99)
PLATELET # BLD AUTO: 194 THOU/MM3 (ref 130–400)
PMV BLD AUTO: 10.5 FL (ref 9.4–12.4)
POTASSIUM SERPL-SCNC: 4.9 MEQ/L (ref 3.5–5.2)
RBC # BLD AUTO: 3.33 MILL/MM3 (ref 4.2–5.4)
SODIUM SERPL-SCNC: 137 MEQ/L (ref 135–145)
WBC # BLD AUTO: 17.8 THOU/MM3 (ref 4.8–10.8)

## 2024-10-24 PROCEDURE — 6360000002 HC RX W HCPCS: Performed by: THORACIC SURGERY (CARDIOTHORACIC VASCULAR SURGERY)

## 2024-10-24 PROCEDURE — 85027 COMPLETE CBC AUTOMATED: CPT

## 2024-10-24 PROCEDURE — 97110 THERAPEUTIC EXERCISES: CPT

## 2024-10-24 PROCEDURE — 80048 BASIC METABOLIC PNL TOTAL CA: CPT

## 2024-10-24 PROCEDURE — 71046 X-RAY EXAM CHEST 2 VIEWS: CPT

## 2024-10-24 PROCEDURE — 6370000000 HC RX 637 (ALT 250 FOR IP): Performed by: THORACIC SURGERY (CARDIOTHORACIC VASCULAR SURGERY)

## 2024-10-24 PROCEDURE — 97530 THERAPEUTIC ACTIVITIES: CPT

## 2024-10-24 PROCEDURE — 36415 COLL VENOUS BLD VENIPUNCTURE: CPT

## 2024-10-24 PROCEDURE — 85610 PROTHROMBIN TIME: CPT

## 2024-10-24 PROCEDURE — APPSS30 APP SPLIT SHARED TIME 16-30 MINUTES: Performed by: PHYSICIAN ASSISTANT

## 2024-10-24 PROCEDURE — 97116 GAIT TRAINING THERAPY: CPT

## 2024-10-24 PROCEDURE — 97535 SELF CARE MNGMENT TRAINING: CPT

## 2024-10-24 PROCEDURE — 2060000000 HC ICU INTERMEDIATE R&B

## 2024-10-24 RX ORDER — CARVEDILOL 6.25 MG/1
6.25 TABLET ORAL 2 TIMES DAILY WITH MEALS
Status: DISCONTINUED | OUTPATIENT
Start: 2024-10-24 | End: 2024-10-27 | Stop reason: HOSPADM

## 2024-10-24 RX ADMIN — SENNOSIDES AND DOCUSATE SODIUM 1 TABLET: 50; 8.6 TABLET ORAL at 20:50

## 2024-10-24 RX ADMIN — MAGNESIUM HYDROXIDE 30 ML: 1200 LIQUID ORAL at 20:54

## 2024-10-24 RX ADMIN — ASPIRIN 81 MG: 81 TABLET, COATED ORAL at 08:33

## 2024-10-24 RX ADMIN — OXYCODONE HYDROCHLORIDE 10 MG: 5 TABLET ORAL at 03:47

## 2024-10-24 RX ADMIN — SENNOSIDES AND DOCUSATE SODIUM 1 TABLET: 50; 8.6 TABLET ORAL at 08:48

## 2024-10-24 RX ADMIN — OXYCODONE HYDROCHLORIDE 10 MG: 5 TABLET ORAL at 12:59

## 2024-10-24 RX ADMIN — FUROSEMIDE 20 MG: 10 INJECTION, SOLUTION INTRAMUSCULAR; INTRAVENOUS at 16:06

## 2024-10-24 RX ADMIN — CARVEDILOL 6.25 MG: 6.25 TABLET, FILM COATED ORAL at 08:46

## 2024-10-24 RX ADMIN — FUROSEMIDE 20 MG: 10 INJECTION, SOLUTION INTRAMUSCULAR; INTRAVENOUS at 08:33

## 2024-10-24 RX ADMIN — OXYCODONE HYDROCHLORIDE 10 MG: 5 TABLET ORAL at 08:33

## 2024-10-24 RX ADMIN — CYCLOBENZAPRINE 10 MG: 10 TABLET, FILM COATED ORAL at 19:47

## 2024-10-24 RX ADMIN — ALPRAZOLAM 0.5 MG: 0.5 TABLET ORAL at 21:36

## 2024-10-24 RX ADMIN — CARVEDILOL 6.25 MG: 6.25 TABLET, FILM COATED ORAL at 16:07

## 2024-10-24 RX ADMIN — OXYCODONE HYDROCHLORIDE 10 MG: 5 TABLET ORAL at 21:36

## 2024-10-24 RX ADMIN — CYCLOBENZAPRINE 10 MG: 10 TABLET, FILM COATED ORAL at 10:30

## 2024-10-24 RX ADMIN — FAMOTIDINE 20 MG: 20 TABLET, FILM COATED ORAL at 08:33

## 2024-10-24 RX ADMIN — ATORVASTATIN CALCIUM 40 MG: 40 TABLET, FILM COATED ORAL at 22:15

## 2024-10-24 RX ADMIN — FAMOTIDINE 20 MG: 20 TABLET, FILM COATED ORAL at 20:50

## 2024-10-24 RX ADMIN — OXYCODONE HYDROCHLORIDE 10 MG: 5 TABLET ORAL at 17:30

## 2024-10-24 ASSESSMENT — PAIN SCALES - GENERAL
PAINLEVEL_OUTOF10: 8
PAINLEVEL_OUTOF10: 6
PAINLEVEL_OUTOF10: 7
PAINLEVEL_OUTOF10: 10
PAINLEVEL_OUTOF10: 10
PAINLEVEL_OUTOF10: 6
PAINLEVEL_OUTOF10: 8
PAINLEVEL_OUTOF10: 4
PAINLEVEL_OUTOF10: 8
PAINLEVEL_OUTOF10: 0

## 2024-10-24 ASSESSMENT — PAIN DESCRIPTION - DESCRIPTORS
DESCRIPTORS: ACHING
DESCRIPTORS: ACHING
DESCRIPTORS: STABBING
DESCRIPTORS: STABBING
DESCRIPTORS: ACHING

## 2024-10-24 ASSESSMENT — PAIN DESCRIPTION - ORIENTATION
ORIENTATION: MID;RIGHT
ORIENTATION: RIGHT;MID
ORIENTATION: RIGHT;MID
ORIENTATION: MID
ORIENTATION: MID
ORIENTATION: RIGHT;MID

## 2024-10-24 ASSESSMENT — PAIN DESCRIPTION - PAIN TYPE
TYPE: SURGICAL PAIN

## 2024-10-24 ASSESSMENT — PAIN DESCRIPTION - LOCATION
LOCATION: INCISION
LOCATION: CHEST;NECK
LOCATION: INCISION
LOCATION: CHEST
LOCATION: INCISION;CHEST
LOCATION: CHEST;NECK
LOCATION: INCISION;CHEST
LOCATION: CHEST
LOCATION: CHEST;NECK
LOCATION: CHEST;NECK

## 2024-10-24 ASSESSMENT — PAIN - FUNCTIONAL ASSESSMENT
PAIN_FUNCTIONAL_ASSESSMENT: ACTIVITIES ARE NOT PREVENTED
PAIN_FUNCTIONAL_ASSESSMENT: PREVENTS OR INTERFERES SOME ACTIVE ACTIVITIES AND ADLS
PAIN_FUNCTIONAL_ASSESSMENT: ACTIVITIES ARE NOT PREVENTED
PAIN_FUNCTIONAL_ASSESSMENT: ACTIVITIES ARE NOT PREVENTED

## 2024-10-24 ASSESSMENT — PAIN DESCRIPTION - FREQUENCY
FREQUENCY: CONTINUOUS

## 2024-10-24 ASSESSMENT — PAIN DESCRIPTION - ONSET
ONSET: ON-GOING

## 2024-10-24 ASSESSMENT — PAIN SCALES - WONG BAKER: WONGBAKER_NUMERICALRESPONSE: NO HURT

## 2024-10-25 ENCOUNTER — APPOINTMENT (OUTPATIENT)
Dept: GENERAL RADIOLOGY | Age: 59
DRG: 220 | End: 2024-10-25
Attending: THORACIC SURGERY (CARDIOTHORACIC VASCULAR SURGERY)
Payer: COMMERCIAL

## 2024-10-25 LAB
ANION GAP SERPL CALC-SCNC: 13 MEQ/L (ref 8–16)
BUN SERPL-MCNC: 13 MG/DL (ref 7–22)
CALCIUM SERPL-MCNC: 8.8 MG/DL (ref 8.5–10.5)
CHLORIDE SERPL-SCNC: 96 MEQ/L (ref 98–111)
CO2 SERPL-SCNC: 28 MEQ/L (ref 23–33)
CREAT SERPL-MCNC: 0.6 MG/DL (ref 0.4–1.2)
DEPRECATED RDW RBC AUTO: 56.8 FL (ref 35–45)
ERYTHROCYTE [DISTWIDTH] IN BLOOD BY AUTOMATED COUNT: 17.7 % (ref 11.5–14.5)
GFR SERPL CREATININE-BSD FRML MDRD: > 90 ML/MIN/1.73M2
GLUCOSE SERPL-MCNC: 110 MG/DL (ref 70–108)
HCT VFR BLD AUTO: 30.9 % (ref 37–47)
HGB BLD-MCNC: 9.6 GM/DL (ref 12–16)
INR PPP: 1.59 (ref 0.85–1.13)
MCH RBC QN AUTO: 27.2 PG (ref 26–33)
MCHC RBC AUTO-ENTMCNC: 31.1 GM/DL (ref 32.2–35.5)
MCV RBC AUTO: 87.5 FL (ref 81–99)
PLATELET # BLD AUTO: 213 THOU/MM3 (ref 130–400)
PMV BLD AUTO: 10.9 FL (ref 9.4–12.4)
POTASSIUM SERPL-SCNC: 3.8 MEQ/L (ref 3.5–5.2)
RBC # BLD AUTO: 3.53 MILL/MM3 (ref 4.2–5.4)
SODIUM SERPL-SCNC: 137 MEQ/L (ref 135–145)
WBC # BLD AUTO: 14.1 THOU/MM3 (ref 4.8–10.8)

## 2024-10-25 PROCEDURE — 85610 PROTHROMBIN TIME: CPT

## 2024-10-25 PROCEDURE — 71046 X-RAY EXAM CHEST 2 VIEWS: CPT

## 2024-10-25 PROCEDURE — 97110 THERAPEUTIC EXERCISES: CPT

## 2024-10-25 PROCEDURE — 6360000002 HC RX W HCPCS: Performed by: THORACIC SURGERY (CARDIOTHORACIC VASCULAR SURGERY)

## 2024-10-25 PROCEDURE — 97530 THERAPEUTIC ACTIVITIES: CPT

## 2024-10-25 PROCEDURE — 85027 COMPLETE CBC AUTOMATED: CPT

## 2024-10-25 PROCEDURE — 36415 COLL VENOUS BLD VENIPUNCTURE: CPT

## 2024-10-25 PROCEDURE — 6370000000 HC RX 637 (ALT 250 FOR IP): Performed by: THORACIC SURGERY (CARDIOTHORACIC VASCULAR SURGERY)

## 2024-10-25 PROCEDURE — 80048 BASIC METABOLIC PNL TOTAL CA: CPT

## 2024-10-25 PROCEDURE — APPSS60 APP SPLIT SHARED TIME 46-60 MINUTES: Performed by: PHYSICIAN ASSISTANT

## 2024-10-25 PROCEDURE — 2060000000 HC ICU INTERMEDIATE R&B

## 2024-10-25 PROCEDURE — 97116 GAIT TRAINING THERAPY: CPT

## 2024-10-25 RX ORDER — FUROSEMIDE 20 MG/1
20 TABLET ORAL DAILY
Qty: 30 TABLET | Refills: 0 | Status: SHIPPED | OUTPATIENT
Start: 2024-10-25

## 2024-10-25 RX ORDER — POLYETHYLENE GLYCOL 3350 17 G/17G
17 POWDER, FOR SOLUTION ORAL DAILY
Status: DISCONTINUED | OUTPATIENT
Start: 2024-10-25 | End: 2024-10-27 | Stop reason: HOSPADM

## 2024-10-25 RX ORDER — HYDROCODONE BITARTRATE AND ACETAMINOPHEN 5; 325 MG/1; MG/1
1 TABLET ORAL EVERY 8 HOURS PRN
Qty: 21 TABLET | Refills: 0 | Status: SHIPPED | OUTPATIENT
Start: 2024-10-25 | End: 2024-11-01

## 2024-10-25 RX ORDER — POTASSIUM CHLORIDE 1500 MG/1
20 TABLET, EXTENDED RELEASE ORAL DAILY
Qty: 30 TABLET | Refills: 0 | Status: SHIPPED | OUTPATIENT
Start: 2024-10-25 | End: 2024-10-27

## 2024-10-25 RX ORDER — POTASSIUM CHLORIDE 1500 MG/1
20 TABLET, EXTENDED RELEASE ORAL 2 TIMES DAILY WITH MEALS
Status: DISCONTINUED | OUTPATIENT
Start: 2024-10-25 | End: 2024-10-27 | Stop reason: HOSPADM

## 2024-10-25 RX ORDER — WARFARIN SODIUM 1 MG/1
TABLET ORAL
Qty: 30 TABLET | Refills: 3 | Status: SHIPPED | OUTPATIENT
Start: 2024-10-25 | End: 2024-10-27 | Stop reason: HOSPADM

## 2024-10-25 RX ORDER — CARVEDILOL 6.25 MG/1
6.25 TABLET ORAL 2 TIMES DAILY WITH MEALS
Qty: 60 TABLET | Refills: 3 | Status: SHIPPED | OUTPATIENT
Start: 2024-10-25

## 2024-10-25 RX ORDER — WARFARIN SODIUM 1 MG/1
1 TABLET ORAL DAILY
Status: DISCONTINUED | OUTPATIENT
Start: 2024-10-25 | End: 2024-10-26

## 2024-10-25 RX ORDER — MULTIVITAMIN WITH IRON
1 TABLET ORAL
Qty: 90 TABLET | Refills: 3 | Status: SHIPPED | OUTPATIENT
Start: 2024-10-26

## 2024-10-25 RX ADMIN — OXYCODONE HYDROCHLORIDE 10 MG: 5 TABLET ORAL at 03:32

## 2024-10-25 RX ADMIN — Medication 1 TABLET: at 08:16

## 2024-10-25 RX ADMIN — FAMOTIDINE 20 MG: 20 TABLET, FILM COATED ORAL at 19:57

## 2024-10-25 RX ADMIN — OXYCODONE HYDROCHLORIDE 10 MG: 5 TABLET ORAL at 12:06

## 2024-10-25 RX ADMIN — POTASSIUM CHLORIDE 20 MEQ: 1500 TABLET, EXTENDED RELEASE ORAL at 15:31

## 2024-10-25 RX ADMIN — FAMOTIDINE 20 MG: 20 TABLET, FILM COATED ORAL at 08:16

## 2024-10-25 RX ADMIN — ATORVASTATIN CALCIUM 40 MG: 40 TABLET, FILM COATED ORAL at 20:03

## 2024-10-25 RX ADMIN — CARVEDILOL 6.25 MG: 6.25 TABLET, FILM COATED ORAL at 08:16

## 2024-10-25 RX ADMIN — SENNOSIDES AND DOCUSATE SODIUM 1 TABLET: 50; 8.6 TABLET ORAL at 19:57

## 2024-10-25 RX ADMIN — POTASSIUM CHLORIDE 20 MEQ: 1500 TABLET, EXTENDED RELEASE ORAL at 10:10

## 2024-10-25 RX ADMIN — POLYETHYLENE GLYCOL 3350 17 G: 17 POWDER, FOR SOLUTION ORAL at 19:57

## 2024-10-25 RX ADMIN — OXYCODONE HYDROCHLORIDE 10 MG: 5 TABLET ORAL at 08:15

## 2024-10-25 RX ADMIN — OXYCODONE HYDROCHLORIDE 10 MG: 5 TABLET ORAL at 20:54

## 2024-10-25 RX ADMIN — SENNOSIDES AND DOCUSATE SODIUM 1 TABLET: 50; 8.6 TABLET ORAL at 08:16

## 2024-10-25 RX ADMIN — FUROSEMIDE 20 MG: 10 INJECTION, SOLUTION INTRAMUSCULAR; INTRAVENOUS at 08:16

## 2024-10-25 RX ADMIN — OXYCODONE HYDROCHLORIDE 10 MG: 5 TABLET ORAL at 16:46

## 2024-10-25 RX ADMIN — ASPIRIN 81 MG: 81 TABLET, COATED ORAL at 08:16

## 2024-10-25 RX ADMIN — MAGNESIUM HYDROXIDE 30 ML: 1200 LIQUID ORAL at 12:08

## 2024-10-25 RX ADMIN — ALPRAZOLAM 0.5 MG: 0.5 TABLET ORAL at 23:45

## 2024-10-25 RX ADMIN — WARFARIN SODIUM 1 MG: 1 TABLET ORAL at 16:47

## 2024-10-25 RX ADMIN — CYCLOBENZAPRINE 10 MG: 10 TABLET, FILM COATED ORAL at 13:42

## 2024-10-25 RX ADMIN — CYCLOBENZAPRINE 10 MG: 10 TABLET, FILM COATED ORAL at 23:40

## 2024-10-25 ASSESSMENT — PAIN SCALES - GENERAL
PAINLEVEL_OUTOF10: 7
PAINLEVEL_OUTOF10: 9
PAINLEVEL_OUTOF10: 8
PAINLEVEL_OUTOF10: 8
PAINLEVEL_OUTOF10: 10
PAINLEVEL_OUTOF10: 10
PAINLEVEL_OUTOF10: 8
PAINLEVEL_OUTOF10: 8
PAINLEVEL_OUTOF10: 10

## 2024-10-25 ASSESSMENT — PAIN DESCRIPTION - DESCRIPTORS
DESCRIPTORS: ACHING
DESCRIPTORS: ACHING;STABBING
DESCRIPTORS: ACHING;STABBING
DESCRIPTORS: STABBING
DESCRIPTORS: STABBING

## 2024-10-25 ASSESSMENT — PAIN DESCRIPTION - ORIENTATION
ORIENTATION: MID

## 2024-10-25 ASSESSMENT — PAIN DESCRIPTION - FREQUENCY
FREQUENCY: CONTINUOUS

## 2024-10-25 ASSESSMENT — PAIN SCALES - WONG BAKER
WONGBAKER_NUMERICALRESPONSE: HURTS A LITTLE BIT
WONGBAKER_NUMERICALRESPONSE: NO HURT
WONGBAKER_NUMERICALRESPONSE: HURTS A LITTLE BIT
WONGBAKER_NUMERICALRESPONSE: NO HURT

## 2024-10-25 ASSESSMENT — PAIN DESCRIPTION - LOCATION
LOCATION: INCISION
LOCATION: STERNUM;INCISION
LOCATION: INCISION;STERNUM
LOCATION: INCISION
LOCATION: CHEST
LOCATION: CHEST

## 2024-10-25 ASSESSMENT — PAIN DESCRIPTION - PAIN TYPE
TYPE: SURGICAL PAIN

## 2024-10-25 ASSESSMENT — PAIN - FUNCTIONAL ASSESSMENT
PAIN_FUNCTIONAL_ASSESSMENT: PREVENTS OR INTERFERES SOME ACTIVE ACTIVITIES AND ADLS

## 2024-10-25 ASSESSMENT — PAIN DESCRIPTION - ONSET
ONSET: ON-GOING

## 2024-10-25 NOTE — DISCHARGE SUMMARY
CT/CV Surgery Discharge Summary     Pt Name: Jacki Moura  MRN: [id number]  YOB: 1965  Primary Care Physician: Kyle Smith APRN - CNP    Admit date:  10/21/2024  5:21 AM     Discharge date:  10/26/24     Disposition: Home    Admitting Diagnosis: Right internal carotid stenosis  Aortic stenosis    Discharge Diagnosis: Right internal carotid stenosis  Aortic stenosis    Condition: Stable    Problem List:   Patient Active Problem List   Diagnosis Code    Essential hypertension I10    Osteopenia M85.80    Carotid stenosis, non-symptomatic, bilateral I65.23    History of smoking 30 or more pack years Z87.891    PAD (peripheral artery disease) (Formerly [facility]) I73.9    Systolic murmur R01.1    Chronic neck pain M54.2, G89.29    Diverticulosis of colon without diverticulitis K57.30    Spondylosis without myelopathy or radiculopathy, lumbosacral region M47.817    Spinal stenosis, lumbar region with neurogenic claudication M48.062    Chronic myofascial pain M79.18, G89.29    Hypercortisolism ([facility]) E24.9    Tachycardia R00.0    Leukocytosis D72.829    Adrenal tumor D49.7    Tobacco abuse Z72.0    Palpitations R00.2    Severe aortic insufficiency I35.1    Atypical chest pain R07.89    Left upper arm pain M79.622    Stage 1 type 1 prediabetes R73.03    Anxiety disorder F41.9    Hypercortisolism due to adrenal neoplasm ([facility]) E24.8, D49.7    Chest pain R07.9    Metabolic acidosis E87.20    Normocytic anemia D64.9    Adrenal benign tumor, left D35.02    Chronic right shoulder pain M25.511, G89.29    Cardiomyopathy ([facility]) I42.9    NSTEMI, initial episode of care ([facility]ly [facility]) I21.4    Coronary artery disease involving native coronary artery of native heart without angina pectoris I25.10    Pulmonary edema J81.1    Flash pulmonary edema J81.0    ARJUN (acute kidney injury) ([facility]ly [facility]) N17.9    Chronic systolic (congestive) heart failure I50.22    Acute hypoxic respiratory failure J96.01    NSTEMI (non-ST elevated myocardial infarction)

## 2024-10-25 NOTE — DISCHARGE INSTR - PHARMACY
Warfarin Discharge Instructions:   Date Warfarin Dose   10/26 (tomorrow) 1 mg (1 tablet)   10/27 1 mg (1 tablet)   10/28 0.5 mg (1/2 tablet)     Provider dosing warfarin: [facility]  Next INR date: 10/29

## 2024-10-26 ENCOUNTER — APPOINTMENT (OUTPATIENT)
Dept: GENERAL RADIOLOGY | Age: 59
DRG: 220 | End: 2024-10-26
Attending: THORACIC SURGERY (CARDIOTHORACIC VASCULAR SURGERY)
Payer: COMMERCIAL

## 2024-10-26 LAB
ANION GAP SERPL CALC-SCNC: 14 MEQ/L (ref 8–16)
BUN SERPL-MCNC: 14 MG/DL (ref 7–22)
CALCIUM SERPL-MCNC: 8.9 MG/DL (ref 8.5–10.5)
CHLORIDE SERPL-SCNC: 96 MEQ/L (ref 98–111)
CO2 SERPL-SCNC: 27 MEQ/L (ref 23–33)
CREAT SERPL-MCNC: 0.7 MG/DL (ref 0.4–1.2)
DEPRECATED RDW RBC AUTO: 56.3 FL (ref 35–45)
ERYTHROCYTE [DISTWIDTH] IN BLOOD BY AUTOMATED COUNT: 17.2 % (ref 11.5–14.5)
GFR SERPL CREATININE-BSD FRML MDRD: > 90 ML/MIN/1.73M2
GLUCOSE SERPL-MCNC: 112 MG/DL (ref 70–108)
HCT VFR BLD AUTO: 32.4 % (ref 37–47)
HGB BLD-MCNC: 9.9 GM/DL (ref 12–16)
INR PPP: 1.31 (ref 0.85–1.13)
MCH RBC QN AUTO: 27.2 PG (ref 26–33)
MCHC RBC AUTO-ENTMCNC: 30.6 GM/DL (ref 32.2–35.5)
MCV RBC AUTO: 89 FL (ref 81–99)
PLATELET # BLD AUTO: 245 THOU/MM3 (ref 130–400)
PMV BLD AUTO: 10.3 FL (ref 9.4–12.4)
POTASSIUM SERPL-SCNC: 4.8 MEQ/L (ref 3.5–5.2)
RBC # BLD AUTO: 3.64 MILL/MM3 (ref 4.2–5.4)
SODIUM SERPL-SCNC: 137 MEQ/L (ref 135–145)
WBC # BLD AUTO: 13.2 THOU/MM3 (ref 4.8–10.8)

## 2024-10-26 PROCEDURE — 97530 THERAPEUTIC ACTIVITIES: CPT

## 2024-10-26 PROCEDURE — 36415 COLL VENOUS BLD VENIPUNCTURE: CPT

## 2024-10-26 PROCEDURE — 85610 PROTHROMBIN TIME: CPT

## 2024-10-26 PROCEDURE — 97116 GAIT TRAINING THERAPY: CPT

## 2024-10-26 PROCEDURE — 2060000000 HC ICU INTERMEDIATE R&B

## 2024-10-26 PROCEDURE — 6370000000 HC RX 637 (ALT 250 FOR IP): Performed by: THORACIC SURGERY (CARDIOTHORACIC VASCULAR SURGERY)

## 2024-10-26 PROCEDURE — 80048 BASIC METABOLIC PNL TOTAL CA: CPT

## 2024-10-26 PROCEDURE — 85027 COMPLETE CBC AUTOMATED: CPT

## 2024-10-26 PROCEDURE — 6360000002 HC RX W HCPCS: Performed by: THORACIC SURGERY (CARDIOTHORACIC VASCULAR SURGERY)

## 2024-10-26 PROCEDURE — 71046 X-RAY EXAM CHEST 2 VIEWS: CPT

## 2024-10-26 RX ORDER — WARFARIN SODIUM 2 MG/1
2 TABLET ORAL DAILY
Status: DISCONTINUED | OUTPATIENT
Start: 2024-10-26 | End: 2024-10-27 | Stop reason: HOSPADM

## 2024-10-26 RX ORDER — ENOXAPARIN SODIUM 100 MG/ML
1 INJECTION SUBCUTANEOUS 2 TIMES DAILY
Status: DISCONTINUED | OUTPATIENT
Start: 2024-10-26 | End: 2024-10-27

## 2024-10-26 RX ADMIN — OXYCODONE HYDROCHLORIDE 10 MG: 5 TABLET ORAL at 09:31

## 2024-10-26 RX ADMIN — ATORVASTATIN CALCIUM 40 MG: 40 TABLET, FILM COATED ORAL at 20:18

## 2024-10-26 RX ADMIN — OXYCODONE HYDROCHLORIDE 10 MG: 5 TABLET ORAL at 14:18

## 2024-10-26 RX ADMIN — OXYCODONE HYDROCHLORIDE 10 MG: 5 TABLET ORAL at 18:12

## 2024-10-26 RX ADMIN — ASPIRIN 81 MG: 81 TABLET, COATED ORAL at 07:42

## 2024-10-26 RX ADMIN — ALPRAZOLAM 0.5 MG: 0.5 TABLET ORAL at 16:50

## 2024-10-26 RX ADMIN — CARVEDILOL 6.25 MG: 6.25 TABLET, FILM COATED ORAL at 09:32

## 2024-10-26 RX ADMIN — ENOXAPARIN SODIUM 60 MG: 100 INJECTION SUBCUTANEOUS at 12:11

## 2024-10-26 RX ADMIN — CARVEDILOL 6.25 MG: 6.25 TABLET, FILM COATED ORAL at 16:49

## 2024-10-26 RX ADMIN — FAMOTIDINE 20 MG: 20 TABLET, FILM COATED ORAL at 20:18

## 2024-10-26 RX ADMIN — Medication 1 TABLET: at 07:44

## 2024-10-26 RX ADMIN — SENNOSIDES 17.6 MG: 8.8 LIQUID ORAL at 07:42

## 2024-10-26 RX ADMIN — CYCLOBENZAPRINE 10 MG: 10 TABLET, FILM COATED ORAL at 15:39

## 2024-10-26 RX ADMIN — POTASSIUM CHLORIDE 20 MEQ: 1500 TABLET, EXTENDED RELEASE ORAL at 16:50

## 2024-10-26 RX ADMIN — OXYCODONE HYDROCHLORIDE 10 MG: 5 TABLET ORAL at 05:28

## 2024-10-26 RX ADMIN — OXYCODONE HYDROCHLORIDE 10 MG: 5 TABLET ORAL at 01:10

## 2024-10-26 RX ADMIN — FUROSEMIDE 20 MG: 10 INJECTION, SOLUTION INTRAMUSCULAR; INTRAVENOUS at 09:33

## 2024-10-26 RX ADMIN — SENNOSIDES AND DOCUSATE SODIUM 1 TABLET: 50; 8.6 TABLET ORAL at 20:18

## 2024-10-26 RX ADMIN — ENOXAPARIN SODIUM 60 MG: 100 INJECTION SUBCUTANEOUS at 20:18

## 2024-10-26 RX ADMIN — FAMOTIDINE 20 MG: 20 TABLET, FILM COATED ORAL at 07:42

## 2024-10-26 RX ADMIN — OXYCODONE HYDROCHLORIDE 10 MG: 5 TABLET ORAL at 22:14

## 2024-10-26 RX ADMIN — MAGNESIUM HYDROXIDE 30 ML: 1200 LIQUID ORAL at 07:39

## 2024-10-26 RX ADMIN — ALPRAZOLAM 0.5 MG: 0.5 TABLET ORAL at 23:51

## 2024-10-26 RX ADMIN — FUROSEMIDE 20 MG: 10 INJECTION, SOLUTION INTRAMUSCULAR; INTRAVENOUS at 18:13

## 2024-10-26 RX ADMIN — POTASSIUM CHLORIDE 20 MEQ: 1500 TABLET, EXTENDED RELEASE ORAL at 07:46

## 2024-10-26 RX ADMIN — WARFARIN SODIUM 2 MG: 2 TABLET ORAL at 18:13

## 2024-10-26 RX ADMIN — SENNOSIDES AND DOCUSATE SODIUM 1 TABLET: 50; 8.6 TABLET ORAL at 07:42

## 2024-10-26 RX ADMIN — POLYETHYLENE GLYCOL 3350 17 G: 17 POWDER, FOR SOLUTION ORAL at 07:39

## 2024-10-26 ASSESSMENT — PAIN - FUNCTIONAL ASSESSMENT
PAIN_FUNCTIONAL_ASSESSMENT: ACTIVITIES ARE NOT PREVENTED
PAIN_FUNCTIONAL_ASSESSMENT: PREVENTS OR INTERFERES SOME ACTIVE ACTIVITIES AND ADLS

## 2024-10-26 ASSESSMENT — PAIN SCALES - GENERAL
PAINLEVEL_OUTOF10: 8
PAINLEVEL_OUTOF10: 7
PAINLEVEL_OUTOF10: 8
PAINLEVEL_OUTOF10: 0
PAINLEVEL_OUTOF10: 10
PAINLEVEL_OUTOF10: 7
PAINLEVEL_OUTOF10: 0
PAINLEVEL_OUTOF10: 7
PAINLEVEL_OUTOF10: 6
PAINLEVEL_OUTOF10: 0
PAINLEVEL_OUTOF10: 8
PAINLEVEL_OUTOF10: 7
PAINLEVEL_OUTOF10: 10
PAINLEVEL_OUTOF10: 8

## 2024-10-26 ASSESSMENT — PAIN DESCRIPTION - FREQUENCY
FREQUENCY: CONTINUOUS

## 2024-10-26 ASSESSMENT — PAIN DESCRIPTION - DESCRIPTORS
DESCRIPTORS: ACHING
DESCRIPTORS: ACHING;STABBING
DESCRIPTORS: ACHING
DESCRIPTORS: ACHING;STABBING
DESCRIPTORS: STABBING

## 2024-10-26 ASSESSMENT — PAIN DESCRIPTION - ORIENTATION
ORIENTATION: MID
ORIENTATION: LOWER;MID
ORIENTATION: MID
ORIENTATION: LOWER;MID
ORIENTATION: RIGHT;LEFT;LOWER
ORIENTATION: RIGHT;LEFT;LOWER

## 2024-10-26 ASSESSMENT — PAIN DESCRIPTION - ONSET
ONSET: ON-GOING
ONSET: ON-GOING

## 2024-10-26 ASSESSMENT — PAIN SCALES - WONG BAKER
WONGBAKER_NUMERICALRESPONSE: HURTS A LITTLE BIT
WONGBAKER_NUMERICALRESPONSE: NO HURT
WONGBAKER_NUMERICALRESPONSE: HURTS A LITTLE BIT
WONGBAKER_NUMERICALRESPONSE: NO HURT
WONGBAKER_NUMERICALRESPONSE: HURTS A LITTLE BIT
WONGBAKER_NUMERICALRESPONSE: NO HURT

## 2024-10-26 ASSESSMENT — PAIN DESCRIPTION - LOCATION
LOCATION: CHEST
LOCATION: CHEST
LOCATION: STERNUM
LOCATION: ABDOMEN;STERNUM
LOCATION: ABDOMEN;STERNUM
LOCATION: INCISION
LOCATION: ABDOMEN
LOCATION: STERNUM

## 2024-10-26 ASSESSMENT — PAIN DESCRIPTION - PAIN TYPE
TYPE: SURGICAL PAIN
TYPE: SURGICAL PAIN
TYPE: ACUTE PAIN

## 2024-10-27 ENCOUNTER — APPOINTMENT (OUTPATIENT)
Dept: GENERAL RADIOLOGY | Age: 59
DRG: 220 | End: 2024-10-27
Attending: THORACIC SURGERY (CARDIOTHORACIC VASCULAR SURGERY)
Payer: COMMERCIAL

## 2024-10-27 VITALS
TEMPERATURE: 98.1 F | RESPIRATION RATE: 16 BRPM | WEIGHT: 133.8 LBS | HEART RATE: 93 BPM | SYSTOLIC BLOOD PRESSURE: 116 MMHG | DIASTOLIC BLOOD PRESSURE: 69 MMHG | BODY MASS INDEX: 26.27 KG/M2 | OXYGEN SATURATION: 98 % | HEIGHT: 60 IN

## 2024-10-27 LAB
ANION GAP SERPL CALC-SCNC: 14 MEQ/L (ref 8–16)
BUN SERPL-MCNC: 15 MG/DL (ref 7–22)
CALCIUM SERPL-MCNC: 9 MG/DL (ref 8.5–10.5)
CHLORIDE SERPL-SCNC: 95 MEQ/L (ref 98–111)
CO2 SERPL-SCNC: 28 MEQ/L (ref 23–33)
CREAT SERPL-MCNC: 0.8 MG/DL (ref 0.4–1.2)
DEPRECATED RDW RBC AUTO: 53.1 FL (ref 35–45)
ERYTHROCYTE [DISTWIDTH] IN BLOOD BY AUTOMATED COUNT: 16.7 % (ref 11.5–14.5)
GFR SERPL CREATININE-BSD FRML MDRD: 84 ML/MIN/1.73M2
GLUCOSE SERPL-MCNC: 99 MG/DL (ref 70–108)
HCT VFR BLD AUTO: 33.4 % (ref 37–47)
HGB BLD-MCNC: 10.2 GM/DL (ref 12–16)
INR PPP: 1.49 (ref 0.85–1.13)
MCH RBC QN AUTO: 26.9 PG (ref 26–33)
MCHC RBC AUTO-ENTMCNC: 30.5 GM/DL (ref 32.2–35.5)
MCV RBC AUTO: 88.1 FL (ref 81–99)
PLATELET # BLD AUTO: 285 THOU/MM3 (ref 130–400)
PMV BLD AUTO: 11 FL (ref 9.4–12.4)
POTASSIUM SERPL-SCNC: 4.4 MEQ/L (ref 3.5–5.2)
RBC # BLD AUTO: 3.79 MILL/MM3 (ref 4.2–5.4)
SODIUM SERPL-SCNC: 137 MEQ/L (ref 135–145)
WBC # BLD AUTO: 11.1 THOU/MM3 (ref 4.8–10.8)

## 2024-10-27 PROCEDURE — 6370000000 HC RX 637 (ALT 250 FOR IP): Performed by: THORACIC SURGERY (CARDIOTHORACIC VASCULAR SURGERY)

## 2024-10-27 PROCEDURE — 6360000002 HC RX W HCPCS: Performed by: THORACIC SURGERY (CARDIOTHORACIC VASCULAR SURGERY)

## 2024-10-27 PROCEDURE — 71046 X-RAY EXAM CHEST 2 VIEWS: CPT

## 2024-10-27 PROCEDURE — 36415 COLL VENOUS BLD VENIPUNCTURE: CPT

## 2024-10-27 PROCEDURE — 85610 PROTHROMBIN TIME: CPT

## 2024-10-27 PROCEDURE — 80048 BASIC METABOLIC PNL TOTAL CA: CPT

## 2024-10-27 PROCEDURE — 85027 COMPLETE CBC AUTOMATED: CPT

## 2024-10-27 RX ORDER — WARFARIN SODIUM 2 MG/1
TABLET ORAL
Qty: 30 TABLET | Refills: 3 | Status: SHIPPED | OUTPATIENT
Start: 2024-10-27

## 2024-10-27 RX ORDER — POTASSIUM CHLORIDE 750 MG/1
10 TABLET, EXTENDED RELEASE ORAL DAILY
Qty: 30 TABLET | Refills: 0 | Status: SHIPPED | OUTPATIENT
Start: 2024-10-27

## 2024-10-27 RX ADMIN — ASPIRIN 81 MG: 81 TABLET, COATED ORAL at 07:20

## 2024-10-27 RX ADMIN — CARVEDILOL 6.25 MG: 6.25 TABLET, FILM COATED ORAL at 07:22

## 2024-10-27 RX ADMIN — Medication 1 TABLET: at 07:22

## 2024-10-27 RX ADMIN — OXYCODONE HYDROCHLORIDE 10 MG: 5 TABLET ORAL at 07:21

## 2024-10-27 RX ADMIN — POTASSIUM CHLORIDE 20 MEQ: 1500 TABLET, EXTENDED RELEASE ORAL at 07:31

## 2024-10-27 RX ADMIN — FUROSEMIDE 20 MG: 10 INJECTION, SOLUTION INTRAMUSCULAR; INTRAVENOUS at 07:21

## 2024-10-27 RX ADMIN — OXYCODONE HYDROCHLORIDE 10 MG: 5 TABLET ORAL at 03:03

## 2024-10-27 RX ADMIN — OXYCODONE HYDROCHLORIDE 10 MG: 5 TABLET ORAL at 11:17

## 2024-10-27 RX ADMIN — POLYETHYLENE GLYCOL 3350 17 G: 17 POWDER, FOR SOLUTION ORAL at 07:21

## 2024-10-27 RX ADMIN — SENNOSIDES AND DOCUSATE SODIUM 1 TABLET: 50; 8.6 TABLET ORAL at 07:20

## 2024-10-27 RX ADMIN — FAMOTIDINE 20 MG: 20 TABLET, FILM COATED ORAL at 07:20

## 2024-10-27 RX ADMIN — ENOXAPARIN SODIUM 60 MG: 100 INJECTION SUBCUTANEOUS at 07:21

## 2024-10-27 RX ADMIN — CYCLOBENZAPRINE 10 MG: 10 TABLET, FILM COATED ORAL at 04:52

## 2024-10-27 ASSESSMENT — PAIN DESCRIPTION - DESCRIPTORS
DESCRIPTORS: STABBING
DESCRIPTORS: ACHING;STABBING
DESCRIPTORS: ACHING;SHARP

## 2024-10-27 ASSESSMENT — PAIN DESCRIPTION - FREQUENCY
FREQUENCY: CONTINUOUS
FREQUENCY: CONTINUOUS

## 2024-10-27 ASSESSMENT — PAIN DESCRIPTION - LOCATION
LOCATION: CHEST
LOCATION: CHEST
LOCATION: STERNUM
LOCATION: STERNUM

## 2024-10-27 ASSESSMENT — PAIN SCALES - GENERAL
PAINLEVEL_OUTOF10: 7
PAINLEVEL_OUTOF10: 8
PAINLEVEL_OUTOF10: 8
PAINLEVEL_OUTOF10: 6

## 2024-10-27 ASSESSMENT — PAIN DESCRIPTION - ONSET: ONSET: ON-GOING

## 2024-10-27 ASSESSMENT — PAIN DESCRIPTION - ORIENTATION
ORIENTATION: MID
ORIENTATION: RIGHT;LEFT;LOWER
ORIENTATION: MID
ORIENTATION: LOWER;LEFT;RIGHT

## 2024-10-27 ASSESSMENT — PAIN SCALES - WONG BAKER: WONGBAKER_NUMERICALRESPONSE: NO HURT

## 2024-10-27 ASSESSMENT — PAIN DESCRIPTION - PAIN TYPE
TYPE: SURGICAL PAIN
TYPE: ACUTE PAIN;SURGICAL PAIN

## 2024-10-27 NOTE — FLOWSHEET NOTE
PT discharged home and verbalized understanding of written discharge instructions. PT VSS and denies CP, SOB, or chest discomfort. PT does complain of lower lower left and right chest discomfort. PT had 2 BM's yesterday 10/26. PT continues to be on room air with O2 saturations 94%. PT waiting for her ride now. Will continue to monitor

## 2024-10-27 NOTE — PLAN OF CARE
Problem: Chronic Conditions and Co-morbidities  Goal: Patient's chronic conditions and co-morbidity symptoms are monitored and maintained or improved  10/23/2024 1808 by Whitney Winter, RN  Outcome: Progressing  Flowsheets (Taken 10/23/2024 1808)  Care Plan - Patient's Chronic Conditions and Co-Morbidity Symptoms are Monitored and Maintained or Improved:   Monitor and assess patient's chronic conditions and comorbid symptoms for stability, deterioration, or improvement   Collaborate with multidisciplinary team to address chronic and comorbid conditions and prevent exacerbation or deterioration   Update acute care plan with appropriate goals if chronic or comorbid symptoms are exacerbated and prevent overall improvement and discharge     Problem: Discharge Planning  Goal: Discharge to home or other facility with appropriate resources  10/23/2024 1808 by Whitney Winter, RN  Outcome: Progressing  Flowsheets (Taken 10/23/2024 1808)  Discharge to home or other facility with appropriate resources:   Identify barriers to discharge with patient and caregiver   Identify discharge learning needs (meds, wound care, etc)   Refer to discharge planning if patient needs post-hospital services based on physician order or complex needs related to functional status, cognitive ability or social support system   Arrange for needed discharge resources and transportation as appropriate     Problem: Pain  Goal: Verbalizes/displays adequate comfort level or baseline comfort level  10/23/2024 1808 by Whitney Winter, RN  Outcome: Progressing  Flowsheets (Taken 10/23/2024 1808)  Verbalizes/displays adequate comfort level or baseline comfort level:   Encourage patient to monitor pain and request assistance   Administer analgesics based on type and severity of pain and evaluate response   Consider cultural and social influences on pain and pain management     Problem: Safety - Adult  Goal: Free from fall injury  10/23/2024 1808 by 
  Problem: Chronic Conditions and Co-morbidities  Goal: Patient's chronic conditions and co-morbidity symptoms are monitored and maintained or improved  Outcome: Progressing     Problem: Discharge Planning  Goal: Discharge to home or other facility with appropriate resources  Outcome: Progressing     Problem: Pain  Goal: Verbalizes/displays adequate comfort level or baseline comfort level  Outcome: Progressing     Problem: Safety - Adult  Goal: Free from fall injury  Outcome: Progressing     Problem: Respiratory - Adult  Goal: Will be able to breathe spontaneously, without ventilator support  Description: Will be able to breathe spontaneously, without ventilator support  10/21/2024 1811 by Jade Cevallos RN  Outcome: Progressing  10/21/2024 1523 by Lidna Alfonso, P  Outcome: Progressing     Problem: Skin/Tissue Integrity  Goal: Absence of new skin breakdown  Description: 1.  Monitor for areas of redness and/or skin breakdown  2.  Assess vascular access sites hourly  3.  Every 4-6 hours minimum:  Change oxygen saturation probe site  4.  Every 4-6 hours:  If on nasal continuous positive airway pressure, respiratory therapy assess nares and determine need for appliance change or resting period.  Outcome: Progressing     
  Problem: Chronic Conditions and Co-morbidities  Goal: Patient's chronic conditions and co-morbidity symptoms are monitored and maintained or improved  Outcome: Progressing  Flowsheets (Taken 10/24/2024 2313)  Care Plan - Patient's Chronic Conditions and Co-Morbidity Symptoms are Monitored and Maintained or Improved:   Monitor and assess patient's chronic conditions and comorbid symptoms for stability, deterioration, or improvement   Collaborate with multidisciplinary team to address chronic and comorbid conditions and prevent exacerbation or deterioration   Update acute care plan with appropriate goals if chronic or comorbid symptoms are exacerbated and prevent overall improvement and discharge     Problem: Discharge Planning  Goal: Discharge to home or other facility with appropriate resources  Outcome: Progressing  Flowsheets (Taken 10/24/2024 2313)  Discharge to home or other facility with appropriate resources:   Identify barriers to discharge with patient and caregiver   Identify discharge learning needs (meds, wound care, etc)     Problem: Pain  Goal: Verbalizes/displays adequate comfort level or baseline comfort level  Outcome: Progressing  Flowsheets  Taken 10/24/2024 2313  Verbalizes/displays adequate comfort level or baseline comfort level:   Encourage patient to monitor pain and request assistance   Administer analgesics based on type and severity of pain and evaluate response   Assess pain using appropriate pain scale   Implement non-pharmacological measures as appropriate and evaluate response   Consider cultural and social influences on pain and pain management  Taken 10/24/2024 1947  Verbalizes/displays adequate comfort level or baseline comfort level:   Encourage patient to monitor pain and request assistance   Assess pain using appropriate pain scale     Problem: Safety - Adult  Goal: Free from fall injury  Outcome: Progressing  Flowsheets (Taken 10/24/2024 2313)  Free From Fall Injury:   Instruct 
  Problem: Respiratory - Adult  Goal: Will be able to breathe spontaneously, without ventilator support  Description: Will be able to breathe spontaneously, without ventilator support  Outcome: Progressing                                                Patient Weaning Progress    The patient's vent settings was able to be weaned this shift.      Ventilator settings that were weaned              [] Mode   [] Pressure support weaned   [x] Fio2 weaned   [] Peep weaned      Unable to get agreement for goals because no family is present and patient cannot respond.     
Inpatient Cardiac Rehabilitation Consult    Received consult for Phase II Cardiac Rehabilitation.  Patient needs cardiac rehab due to AVR on 10/21/2024.  Importance of Cardiac Rehab discussed with patient.   Patient questions answered.  We will contact patient at home to schedule evaluation appointment.  Cardiac Rehab brochure given.            
Licensed Independent Practitioner for values outside of normal range   Assess for signs of decreased cardiac output   Administer vasoactive medications as ordered  Taken 10/26/2024 0745 by Radha Terrell RN  Maintains optimal cardiac output and hemodynamic stability:   Monitor blood pressure and heart rate   Monitor urine output and notify Licensed Independent Practitioner for values outside of normal range   Assess for signs of decreased cardiac output  Goal: Absence of cardiac dysrhythmias or at baseline  10/27/2024 0947 by Braulio Barger RN  Outcome: Adequate for Discharge  10/26/2024 2114 by Mono Lo RN  Outcome: Progressing  Flowsheets  Taken 10/26/2024 2114 by Mono Lo RN  Absence of cardiac dysrhythmias or at baseline:   Monitor cardiac rate and rhythm   Assess for signs of decreased cardiac output   Administer antiarrhythmia medication and electrolyte replacement as ordered  Taken 10/26/2024 0745 by Radha Terrell RN  Absence of cardiac dysrhythmias or at baseline:   Monitor cardiac rate and rhythm   Assess for signs of decreased cardiac output     Problem: Gastrointestinal - Adult  Goal: Maintains or returns to baseline bowel function  10/27/2024 0947 by Braulio Barger RN  Outcome: Adequate for Discharge  10/26/2024 2114 by Mono Lo RN  Outcome: Progressing  Flowsheets  Taken 10/26/2024 2114 by Mono Lo RN  Maintains or returns to baseline bowel function:   Assess bowel function   Encourage oral fluids to ensure adequate hydration   Encourage mobilization and activity   Administer ordered medications as needed  Taken 10/26/2024 0745 by Radha Terrell RN  Maintains or returns to baseline bowel function:   Assess bowel function   Encourage oral fluids to ensure adequate hydration  Goal: Maintains adequate nutritional intake  10/27/2024 0947 by Braulio Barger RN  Outcome: Adequate for Discharge  10/26/2024 2114 by Mono Lo RN  Outcome: Progressing  Flowsheets  Taken 
limits  Outcome: Progressing  Flowsheets (Taken 10/24/2024 0459)  Electrolytes maintained within normal limits:   Monitor labs and assess patient for signs and symptoms of electrolyte imbalances   Monitor response to electrolyte replacements, including repeat lab results as appropriate     Problem: Musculoskeletal - Adult  Goal: Return ADL status to a safe level of function  Outcome: Progressing  Flowsheets (Taken 10/24/2024 0459)  Return ADL Status to a Safe Level of Function:   Administer medication as ordered   Assess activities of daily living deficits and provide assistive devices as needed   Assist and instruct patient to increase activity and self care as tolerated     Problem: Nutrition Deficit:  Goal: Optimize nutritional status  10/24/2024 0459 by Graciela Lockhart, RN  Outcome: Progressing  Flowsheets (Taken 10/24/2024 0459)  Nutrient intake appropriate for improving, restoring, or maintaining nutritional needs:   Assess nutritional status and recommend course of action   Monitor oral intake, labs, and treatment plans     
plans  Care plan reviewed with patient.  Patient verbalizes understanding of the plan of care and contribute to goal setting.

## 2024-10-27 NOTE — PROGRESS NOTES
Clinical Pharmacy Note                                               Warfarin Discharge Recommendations    Patient discharged from [facility] today on warfarin.    Warfarin indication:   Mechanical aortic On-X valve  INR goal during admission:  1.5-2.5  Previous home warfarin regimen: n/a  Interacting medications at discharge: aspirin  Coumadin 2 mg tabs    Hospital Warfarin Doses & INR Results    Date INR Warfarin Dose   10/22 1.01 2.5 mg   10/23 1.23 2 mg   10/24 2.35 Held    10/25  1.59 1 mg    10/26  1.31 2 mg         Recent INRs:  Recent Labs     10/27/24  0416   INR 1.49*     Warfarin Discharge Instructions:   Date Warfarin Dose   10/27 (today) 2 mg (1 tablet)   10/28 (tomorrow) 2 mg (1 tablet)   10/27 at 8:20 am Follow up with [facility]          Provider dosing warfarin: [facility]  Next INR date: 10/29      
  CT/CV Surgery Progress Note    10/22/2024 8:13 AM  Surgeon:  Dr. Guillory and Dr. Cabrera    Procedure: POD #1                      S/p R CEA                      S/p Aortic root enlargement and On-X AVR    Subjective:  Ms. Moura is resting comfortably in chair on 2L NC, alert, and in no acute distress. Pt is awake, A+OX 3.  Pt was able to be extubated last evening.  No problem with swallowing, unilateral weakness, visual changes and difficulty with speech.  LINK drain output 52 cc post op.       Intake/Output Summary (Last 24 hours) at 10/22/2024 0813  Last data filed at 10/22/2024 0700  Gross per 24 hour   Intake 5266.25 ml   Output 4393 ml   Net 873.25 ml     Vital Signs: BP 96/65   Pulse 93   Temp 97.2 °F (36.2 °C) (Core)   Resp 16   Ht 1.524 m (5')   Wt 61.7 kg (136 lb)   SpO2 99%   BMI 26.56 kg/m²    Temp (24hrs), Av.9 °F (36.1 °C), Min:96.6 °F (35.9 °C), Max:97.2 °F (36.2 °C)    Labs:   CBC:  Recent Labs     10/21/24  1300 10/21/24  1500 10/22/24  0440   WBC  --  30.7* 31.0*   HGB 6.8* 8.9* 9.9*   HCT 21.1* 28.5* 31.0*   MCV  --  87.7 86.4    256 251   INR  --   --  1.01     BMP:   Recent Labs     10/21/24  1335 10/21/24  1500 10/21/24  1510 10/21/24  2200 10/21/24  2258 10/22/24  0440 10/22/24  0634    144  --   --   --  145  --    K 4.0 3.6  --  4.4  --  4.2  --    CL  --  105  --   --   --  108  --    CO2  --  23  --   --   --  22*  --    BUN  --  15  --   --   --  15  --    CREATININE  --  0.9  --   --   --  0.6  --    MG  --  3.8*  --   --   --  2.3  --    POCGLU  --   --    < >  --    < >  --  148*    < > = values in this interval not displayed.     Last HgA1C:   Lab Results   Component Value Date    LABA1C 5.8 2024     Imaging:  CXR: 10/22/2024  Findings:  Interval extubation.  Enteric tube has been removed.  Multiple chest tubes and mediastinal drains again noted.  Shiloh-Samantha catheter unchanged.  Heart size normal.  No definite pleural effusion or findings of 
  CT/CV Surgery Progress Note    10/24/2024 9:40 AM  Surgeon:  Dr. Guillory and Dr. Cabrera    Procedure: POD #3                      S/p R CEA                      S/p Aortic root enlargement and On-X AVR    Subjective:  Ms. Moura is resting comfortably in chair on 1L NC, alert, and in no acute distress.  PT reports she ambulated in room this morning.  She needs BM yet.  INR 2.35 now.  WBC continues to improve.        Intake/Output Summary (Last 24 hours) at 10/24/2024 0940  Last data filed at 10/24/2024 0347  Gross per 24 hour   Intake 900 ml   Output 1775 ml   Net -875 ml     Vital Signs: /66   Pulse 99   Temp 98.1 °F (36.7 °C) (Oral)   Resp 17   Ht 1.524 m (5')   Wt 61.8 kg (136 lb 3.2 oz)   SpO2 97%   BMI 26.60 kg/m²    Temp (24hrs), Av.3 °F (36.8 °C), Min:98.1 °F (36.7 °C), Max:98.5 °F (36.9 °C)    Labs:   CBC:  Recent Labs     10/22/24  0440 10/23/24  0536 10/24/24  0544   WBC 31.0* 20.1* 17.8*   HGB 9.9* 9.5* 9.2*   HCT 31.0* 31.1* 29.5*   MCV 86.4 88.4 88.6    202 194   INR 1.01 1.23* 2.35*     BMP:   Recent Labs     10/21/24  1500 10/21/24  1510 10/22/24  0440 10/22/24  0634 10/23/24  0536 10/23/24  0810 10/24/24  0544     --  145  --  137  --  137   K 3.6   < > 4.2  --  4.4  --  4.9     --  108  --  104  --  99   CO2 23  --  22*  --  21*  --  27   BUN 15  --  15  --  9  --  12   CREATININE 0.9  --  0.6  --  0.5  --  0.7   MG 3.8*  --  2.3  --   --   --   --    POCGLU  --    < >  --    < >  --  140*  --     < > = values in this interval not displayed.     Last HgA1C:   Lab Results   Component Value Date    LABA1C 5.8 2024     Imaging:  CXR: 10/24/2024  Vascular congestion with cardiomegaly and right effusion without phani   edema.      Scheduled Meds:    carvedilol  6.25 mg Oral BID WC    aspirin  81 mg Oral Daily    furosemide  20 mg IntraVENous BID    multivitamin  1 tablet Oral Daily with breakfast    sennosides-docusate sodium  1 tablet Oral BID    famotidine  
  Cardiovascular Surgery Progress Note      Comfortable on NC  Good hemodynamics, no inotropes  EKG no acute changes  CXR no space issues  Labs ok; WBC 31k represents leukemoid reaction  -Temporary wires removed, start warfarin today  -Routine diuresis  -Transfer floor    
 [facility]  INPATIENT PHYSICAL THERAPY  DAILY NOTE  STRZ ICU STEPDOWN TELEMETRY 4K - 4K-15/015-A      Discharge Recommendations: Home with Home Health PT and Patient would benefit from continued PT at discharge  Equipment Recommendations: No               Time In: 0750  Time Out: 0817  Timed Code Treatment Minutes: 27 Minutes  Minutes: 27          Date: 10/23/2024  Patient Name: Jacki Moura,  Gender:  female        MRN: [id number]  : 1965  (59 y.o.)     Referring Practitioner: Yonatan Guillory MD  Diagnosis: Moderate to severe aortic insufficiency  Additional Pertinent Hx: Per EMR: 59 y.o. female, active smoker with history of peripheral vascular disease, s/p left femoral endarterectomy and bilateral iliofemoral stents, followed with known aortic insufficiency admitted with atypical chest pain beginning in left arm at rest and spreading to the chest, associated with dyspnea and diaphoresis. Pain largely resolved on arrival to ED. LHC showed no obstructive CAD. However, repeat TTE showed a drop in the LVEF to 40%, with moderate-severe AI, and mild MR. Aside from intermittent episodes of atypical pain in hospital, patient clinically stable, on RA. Pt s/p Mechanical AVR  RIGHT CAROTID ENDARTERECTOMY with Bovine patch angioplasty DOS 10/21 with Yonatan Guillory MD Sundaram, Shankar, MD.     Prior Level of Function:  Lives With: Spouse  Type of Home: House  Home Layout: Two level, Laundry in basement  Home Equipment: Walker - 4-Wheeled, Walker - Standard   Bathroom Equipment: Commode    Receives Help From: Family  Ambulation Assistance: Independent  Transfer Assistance: Independent  Active : Yes  Additional Comments: Pt typically indep. She is a questionable historian. Her  works but she states he is planning to take some time off to assist her.    Restrictions/Precautions:  Restrictions/Precautions: Surgical Protocols, General Precautions, Fall Risk  Position Activity 
 [facility]  INPATIENT PHYSICAL THERAPY  DAILY NOTE  STRZ ICU STEPDOWN TELEMETRY 4K - 4K-15/015-A      Discharge Recommendations: Home with Assist as Needed and Home with Home Health PT  Equipment Recommendations: No               Time In: 917  Time Out: 956  Timed Code Treatment Minutes: 39 Minutes  Minutes: 39          Date: 10/25/2024  Patient Name: Jacki Moura,  Gender:  female        MRN: [id number]  : 1965  (59 y.o.)     Referring Practitioner: Yonatan Guillory MD  Diagnosis: Moderate to severe aortic insufficiency  Additional Pertinent Hx: Per EMR: 59 y.o. female, active smoker with history of peripheral vascular disease, s/p left femoral endarterectomy and bilateral iliofemoral stents, followed with known aortic insufficiency admitted with atypical chest pain beginning in left arm at rest and spreading to the chest, associated with dyspnea and diaphoresis. Pain largely resolved on arrival to ED. LHC showed no obstructive CAD. However, repeat TTE showed a drop in the LVEF to 40%, with moderate-severe AI, and mild MR. Aside from intermittent episodes of atypical pain in hospital, patient clinically stable, on RA. Pt s/p Mechanical AVR  RIGHT CAROTID ENDARTERECTOMY with Bovine patch angioplasty DOS 10/21 with Yonatan Guillory MD Sundaram, Shankar, MD.     Prior Level of Function:  Lives With: Spouse  Type of Home: House  Home Layout: Two level, Laundry in basement  Home Equipment: Walker - 4-Wheeled, Walker - Standard   Bathroom Equipment: Commode    Receives Help From: Family  Ambulation Assistance: Independent  Transfer Assistance: Independent  Active : Yes  Additional Comments: Pt typically indep. She is a questionable historian. Her  works but she states he is planning to take some time off to assist her.    Restrictions/Precautions:  Restrictions/Precautions: Surgical Protocols, General Precautions, Fall Risk  Position Activity Restriction  Other position/activity 
 [facility]  INPATIENT PHYSICAL THERAPY  DAILY NOTE  STRZ ICU STEPDOWN TELEMETRY 4K - 4K-15/015-A      Discharge Recommendations: Continue to assess pending progress, Home with Assist as Needed, and Patient would benefit from continued PT at discharge  Equipment Recommendations: No               Time In: 0941  Time Out: 1004  Timed Code Treatment Minutes: 23 Minutes  Minutes: 23          Date: 10/26/2024  Patient Name: Jacki Moura,  Gender:  female        MRN: [id number]  : 1965  (59 y.o.)     Referring Practitioner: Yonatan Guillory MD  Diagnosis: Moderate to severe aortic insufficiency  Additional Pertinent Hx: Per EMR: 59 y.o. female, active smoker with history of peripheral vascular disease, s/p left femoral endarterectomy and bilateral iliofemoral stents, followed with known aortic insufficiency admitted with atypical chest pain beginning in left arm at rest and spreading to the chest, associated with dyspnea and diaphoresis. Pain largely resolved on arrival to ED. LHC showed no obstructive CAD. However, repeat TTE showed a drop in the LVEF to 40%, with moderate-severe AI, and mild MR. Aside from intermittent episodes of atypical pain in hospital, patient clinically stable, on RA. Pt s/p Mechanical AVR  RIGHT CAROTID ENDARTERECTOMY with Bovine patch angioplasty DOS 10/21 with Yonatan Guillory MD Sundaram, Shankar, MD.     Prior Level of Function:  Lives With: Spouse  Type of Home: House  Home Layout: Two level, Laundry in basement  Home Equipment: Walker - 4-Wheeled, Walker - Standard   Bathroom Equipment: Commode    Receives Help From: Family  Ambulation Assistance: Independent  Transfer Assistance: Independent  Active : Yes  Additional Comments: Pt typically indep. She is a questionable historian. Her  works but she states he is planning to take some time off to assist her.    Restrictions/Precautions:  Restrictions/Precautions: Surgical Protocols, General Precautions, 
Comprehensive Nutrition Assessment    Type and Reason for Visit:  Initial, Consult (nutrition ileus prevention)    Nutrition Recommendations/Plan:   ONS initiation: Ensure Compact TID and Nutrition ileus prevention (Activia and Decaf hot beverage TID)  Continue current diet and MVI as ordered   Will reinforce heart healthy diet guidelines as appropriate - information sheets provided 10/22     Malnutrition Assessment:  Malnutrition Status:  At risk for malnutrition (Comment) (10/22/24 1422)    Context:  Acute Illness     Findings of the 6 clinical characteristics of malnutrition:  Energy Intake:  Mild decrease in energy intake (Comment)  Weight Loss:  No significant weight loss     Body Fat Loss:  No significant body fat loss     Muscle Mass Loss:  No significant muscle mass loss    Fluid Accumulation:  Mild Extremities   Strength:  Not Performed    Nutrition Assessment:     Pt. nutritionally compromised AEB decreased appetite/ intake since OHS yesterday.  At risk for further nutrition compromise r/t admit with aortic insufficiency, 10/21/24 right carotid endarterectomy, aortic root enlargement, aortic valve replacement, increased nutrient needs for surgical healing, underlying medical condition (CAD, Cushing's disease, HTN, HLD, PAD).       Nutrition Related Findings:    Pt. Report/Treatments/Miscellaneous: Patient seen and reports has no appetite today, had open heart surgery 10/21, reports had very good appetite/ intake PTA and reports weight stable PTA, denies nausea, reports had only few sips of liquids at breakfast, agrees to trial ONS  GI Status: hypoactive bowel sounds per RN, last BM 10/20   Pertinent Labs: BUN 15, Creatinine 0.6, glucose 119, 148, 165; A1C (9/11/24) 5.8%  Pertinent Meds: lipitor, ancef, lasix, MVI, senokot      Wound Type: Surgical Incision (10/21/24: right carotid endarterectomy, aortic root enlargement, aortic valve replacement)       Current Nutrition Intake & Therapies:        
[facility]  OCCUPATIONAL THERAPY MISSED TREATMENT NOTE  STRZ ICU STEPDOWN TELEMETRY 4K  4K-15/015-A      Date: 10/25/2024  Patient Name: Jacki Moura        CSN: [id number]   : 1965  (59 y.o.)  Gender: female   Referring Practitioner: Yonatan Guillory MD            REASON FOR MISSED TREATMENT:  Attempt 1: patient eating, Attempt 2: patient politely declines stating \" I think I did too much today\" and would like to rest as well as feeling nauseous.  Patient expresses no therapy concerns at this time and hopeful to go home tomorrow.   present, no concerns.  Followed up with nursing as patient is requesting DND sign to allow her to rest.  Will attempt next available time.                
[facility]  INPATIENT OCCUPATIONAL THERAPY  STRZ ICU 4D  EVALUATION      Discharge Recommendations: Home with assist PRN, Home with Home health OT (pending continued progress towards goals)  Equipment Recommendations:   will continue to assess pending progress, discharge location    Time In: 1325  Time Out: 1347  Timed Code Treatment Minutes: 12 Minutes  Minutes: 22          Date: 10/22/2024  Patient Name: Jacki Moura,   Gender: female      MRN: [id number]  : 1965  (59 y.o.)  Referring Practitioner: Yonatan Guillory MD  Diagnosis: Moderate to severe aortic insufficiency  Additional Pertinent Hx: Per H&P: 59 y.o. female, active smoker with history of peripheral vascular disease, s/p left femoral endarterectomy and bilateral iliofemoral stents, followed with known aortic insufficiency admitted with atypical chest pain beginning in left arm at rest and spreading to the chest, associated with dyspnea and diaphoresis. Pain largely resolved on arrival to ED. LHC showed no obstructive CAD. However, repeat TTE showed a drop in the LVEF to 40%, with moderate-severe AI, and mild MR. Pt s/p Right carotid endarterectomy (performed by Dr. Cabrera)  Aortic root enlargement and Aortic valve replacement on 10/21/24.    Restrictions/Precautions:  Restrictions/Precautions: Surgical Protocols, General Precautions, Fall Risk  Position Activity Restriction  Other position/activity restrictions: move in the tube    Subjective  Chart Reviewed: Yes, Orders, Progress Notes, History and Physical, Operative Notes  Patient assessed for rehabilitation services?: Yes  Family / Caregiver Present: No    Subjective: Pt seated in bedside chair upon arrival, drowsy/lethargic although pleasant and cooperative. Pt requesting to return to bed at end of sessions, feels like she needs to \"just get more sleep.\"    Pain: Pt c/o pain over sternum, does not quantify.     Vitals: Oxygen: 90% on RA  Heart Rate: 94 bpm  All vitals 
[facility]   PROGRESS NOTE      Patient: Jacki Moura  Room #: 4K-15/015-A            YOB: 1965  Age: 59 y.o.  Gender: female            Admit Date & Time: 10/21/2024  5:21 AM    Assessment:    The patient welcomed a visit and shared her post operative pain. The patient shared a good disposition and has a positive outlook.     Interventions:  The patient explored her supports that include her  and family. The patient also shared being raised in the [place] and how that is core to her being.     Outcomes:  The patient was thankful for the provided prayer.     Plan:  1.Spiritual care will continue to follow the patient according to [facility] spiritual care SOP.       Electronically signed by Chaplain Frederick, on 10/25/2024 at 2:22 PM.  Spiritual Care Department  [facility]  [phone]     10/25/24 1420   Encounter Summary   Encounter Overview/Reason Initial Encounter   Service Provided For Patient;Significant other;Patient and family together   Referral/Consult From [facility]   Support System Spouse;Children;Family members;Friends/neighbors   Last Encounter  10/25/24   Complexity of Encounter High   Begin Time 1250   End Time  1325   Total Time Calculated 35 min   Spiritual/Emotional needs   Type Spiritual Support;Emotional Distress   Assessment/Intervention/Outcome   Assessment Anxious;Concerns with suffering;Coping;Hopeful;Peaceful   Intervention Active listening;Discussed belief system/Roman Catholic practices/liss;Discussed relationship with God;Discussed meaning/purpose;Prayer (assurance of)/[place]   Outcome Acceptance;Encouraged;Venting emotion   Plan and Referrals   Plan/Referrals Continue to visit, (comment)       
PAT VISIT  Appointment reminder call given  Date: 10/16  Arrival time: 10:15 and location; [facility]st [facility]   Bring Drivers license and insurance  Bring Medications in original bottles  Please be ready to give Urine Sample  If possible bring caregiver for appointment  Take am medications with water unless you are holding any for surgery  Appointment may last 2 hours    
Patient arrived to unit from surgery via bed. Patient transferred to ICU bed and placed on continuous ICU bedside monitor. Patient admitted for Moderate to severe aortic insufficiency [I35.1]  Carotid stenosis, right [I65.21]  Aortic valve insufficiency, etiology of cardiac valve disease unspecified [I35.1]. Vitals obtained. See flowsheets. Patient's IV access includes 20 L hand, 18 right hand. Current infusions and rates of infusion include insulin @ 3 u/hr, Amicar @5000 mg, plasmalyte @ 50mL/hr. Assessment completed by Jade ENAMORADO. Two nurse skin assessment completed by Jade  and Sabiha. See flowsheets for assessment details. Policies and procedures of ICU able to be explained to patient at this time. Family member(s)/representative(s) present at time of admission include . Patient rights explained to family member(s)/representatives and patient, as able. Patient/patient's family member(s)/representative(s) Declined to have physician notified of their admission. All questions posed by patient's family member(s)/representative(s) and patient answered at this time.       1456: epi off  
Patient educated on how to use incentive spirometer. Patient verbalized understanding and demonstrated proper use. Emphasized importance and usage of device, with coughing and deep breathing every 2 hours while awake.        
Patient educated on how to use incentive spirometer. Patient verbalized understanding and demonstrated proper use. Emphasized importance and usage of device, with coughing and deep breathing every 2 hours while awake.         
Patient has been off oxygen now for 24 hours.  Patient is ambulate well.  Patient is had 2 bowel movements.  Her INR is 1.48, should be discharged home on 2 mg of Coumadin a day and be followed up in the Coumadin clinic.  Patient is ambulate well.  She will be discharged home today.  
Patient has been successfully weaned from Mechanical Ventilation.  RSBI before extubation was 34 with SpO2 of 100 on 40% FiO2.  Patient extubated and placed on 3 liters/min via nasal cannula.  Post extubation SpO2 is 100% with HR  91 bpm and RR 16 breaths/min.  Patient had strong cough that was productive of white sputum.  Extubation Well tolerated by patient..  
Patient is doing great.  She was placed on oxygen last night because her sats went to 88.  However this morning her sats are 93 off oxygen.  She is ambulating.  She has been started on Coumadin and her INR is 1.3 she is set up in the Coumadin clinic.  We will discharge the patient later today.  Follow-up in the office.  Discharge summary was done yesterday  
Patient oriented to Same Day department and admitted to Same Day Surgery room 19.   Patient verbalized approval for first name, last initial with physician name on unit whiteboard.     Plan of care reviewed with patient.   Patient room whiteboard filled out and discussed with patient and responsible adult.   Patient and responsible adult offered Same Day Welcome Packet to review.    Call light in reach.   Bed in lowest position, locked, with one bed rail up.   SCDs and warming blanket in place.  Appropriate arm bands on patient.   Bathroom offered.   All questions and concerns of patient addressed.        Meds to Beds:   Patient informed of [facility] Meds to Beds program during admission. Patient is agreeable to program.   Contact information for the pharmacy and the Meds to Beds program:   Name: Jacki   Relationship to patient:patient   Phone number: in [employer]           
Pre-op IS 1500mL  Post-op IS 750mL  
[facility]  STRZ ICU STEPDOWN TELEMETRY 4K  Occupational Therapy  Daily Note    Discharge Recommendations: 24 hour assistance or supervision for safety and Home with Home Health OT  Equipment Recommendations:   will continue to assess pending progress, discharge location       Time In: 1058  Time Out: 1139  Timed Code Treatment Minutes: 41 Minutes  Minutes: 41          Date: 10/23/2024  Patient Name: Jacki Moura,   Gender: female      Room: [facility]15/015-A  MRN: [id number]  : 1965  (59 y.o.)  Referring Practitioner: Yonatan Guillory MD  Diagnosis: Moderate to severe aortic insufficiency  Additional Pertinent Hx: Per H&P: 59 y.o. female, active smoker with history of peripheral vascular disease, s/p left femoral endarterectomy and bilateral iliofemoral stents, followed with known aortic insufficiency admitted with atypical chest pain beginning in left arm at rest and spreading to the chest, associated with dyspnea and diaphoresis. Pain largely resolved on arrival to ED. LHC showed no obstructive CAD. However, repeat TTE showed a drop in the LVEF to 40%, with moderate-severe AI, and mild MR. Pt s/p Right carotid endarterectomy (performed by Dr. Cabrera)  Aortic root enlargement and Aortic valve replacement on 10/21/24.    Restrictions/Precautions:  Restrictions/Precautions: Surgical Protocols, General Precautions, Fall Risk  Position Activity Restriction  Other position/activity restrictions: move in the tube      Social/Functional History:  Lives With: Spouse  Type of Home: House  Home Layout: Two level, Laundry in basement  Home Equipment: Walker - 4-Wheeled, Walker - Standard   Bathroom Equipment: Commode    Receives Help From: Family  Ambulation Assistance: Independent  Transfer Assistance: Independent    Active : Yes  Occupation: Full time employment  Type of Occupation: [employer]  Additional Comments: Pt typically indep. She is a questionable historian. Her  works but she states 
[facility]  INPATIENT PHYSICAL THERAPY  EVALUATION  [facility] ICU 4D - 4D-05/005-A    Discharge Recommendations: Continue to assess pending progress, 24 hour supervision or assist, Patient would benefit from continued therapy after discharge (may benefit from a short therapy stay depending how her mobility progresses)  Equipment Recommendations: No               Time In: 1019  Time Out: 1107  Timed Code Treatment Minutes: 38 Minutes  Minutes: 48          Date: 10/22/2024  Patient Name: Jacki Moura,  Gender:  female        MRN: [id number]  : 1965  (59 y.o.)      Referring Practitioner: Yonatan Guillroy MD  Diagnosis: Moderate to severe aortic insufficiency  Additional Pertinent Hx: Per EMR: 59 y.o. female, active smoker with history of peripheral vascular disease, s/p left femoral endarterectomy and bilateral iliofemoral stents, followed with known aortic insufficiency admitted with atypical chest pain beginning in left arm at rest and spreading to the chest, associated with dyspnea and diaphoresis. Pain largely resolved on arrival to ED. LHC showed no obstructive CAD. However, repeat TTE showed a drop in the LVEF to 40%, with moderate-severe AI, and mild MR. Aside from intermittent episodes of atypical pain in hospital, patient clinically stable, on RA. Pt s/p Mechanical AVR  RIGHT CAROTID ENDARTERECTOMY with Bovine patch angioplasty DOS 10/21 with Yonatan Guillory MD Sundaram, Shankar, MD.     Restrictions/Precautions:  Restrictions/Precautions: Surgical Protocols, General Precautions, Fall Risk  Position Activity Restriction  Other position/activity restrictions: move in the tube    Subjective:  Chart Reviewed: Yes  Patient assessed for rehabilitation services?: Yes  Family / Caregiver Present: No  Subjective: RN approved session, pt agreed for evaluation, she was A&Ox4 but noted to have some slow processing. Increased time for edcuation regarding move in the tube, to monitor vital signs, for 
[employer] (STS) Short-Term Operative Risk Calculator Evaluation    Jacki Moura is scheduled for AVR and patient's information was entered into the STS Operative Risk calculator with the results as shown below.    Procedure Type: Isolated AVR   Perioperative Outcome Estimate %   Operative Mortality 1.68%   Morbidity & Mortality 7.3%   Stroke 1.02%   Renal Failure 1.06%   Reoperation 2.36%   Prolonged Ventilation 5%   Deep Sternal Wound Infection 0.122%   Long Hospital Stay (>14 days) 5.92%   Short Hospital Stay (<6 days)* 47.5%         Clinical Summary       Planned Surgery: Isolated AVR, Elective, First cardiovascular surgery   Demographics: 59 year old, White, female, 152cm   Insurance/Payor: Commercial   Lab Values: Creatinine: 0.9 mg/dL, Hematocrit: 30.2%, WBC Count: 12.2 10³/?L, Platelet Count: 871851 cells/?L   PreOp Medications: GP IIb/IIIa Inhibitor <= 24 hrs   Substance Abuse: Current smoker, Alcohol use: >= 8 drinks/week, Illicit Drug Use   Risk Factors / Comorbidities: Hypertension   Vascular RF: Cerebrovascular Disease: Other CVD   Cardiac Status: Ejection Fraction = 50%   Coronary Artery Disease: Angina equivalent   Valve Disease: Severe AR       Electronically signed by Inocencia Garza PA-C on 10/9/2024 at 9:04 AM  
mobility.    Functional Outcome Measures:  [facility] (6 CLICK) BASIC MOBILITY     AM-PAC Inpatient Mobility without Stair Climbing Raw Score : 15  AM-PAC Inpatient without Stair Climbing T-Scale Score : 43.03     Modified John Scale:  Not Applicable    ASSESSMENT:  Assessment: Patient progressing toward established goals.  Activity Tolerance:  Patient tolerance of  treatment:Good.  Plan: Current Treatment Recommendations: Strengthening, ROM, Balance training, Functional mobility training, Transfer training, Endurance training, Gait training, Neuromuscular re-education, Home exercise program, Equipment evaluation, education, & procurement, Patient/Caregiver education & training, Safety education & training, Therapeutic activities  General Plan:  (6x AVR)    Education:  Learners: Patient  Patient Education: Precautions/Restrictions, Move in the tube precautions    Goals:  Patient Goals : none stated  Short Term Goals  Time Frame for Short Term Goals: by hospital d/c  Short Term Goal 1: Pt to complete supine <->sit mod I for ease getting in bed  Short Term Goal 2: Pt to complete sit <->stand with RW mod I for safe transfers from any surface  Short Term Goal 3: Pt to ambulate >=100' with RW mod I for improved safe mobility within their environment  Short Term Goal 4: .  Long Term Goals  Time Frame for Long Term Goals : NA due to short ELOS    Following session, patient left in safe position with all fall risk precautions in place.       
supervision in prep for ADL completion.  Short Term Goal 2: Pt will complete BADL tasks with supervision and 0 cue for CARLITA precautions to increase independence with self care tasks.  Short Term Goal 3: Pt will tolerate dynamic standing X 10 minutes with supervision in prep for sinkside grooming tasks.  Short Term Goal 4: Pt will complete functional transfers with supervision and 0 cues for CARLITA precautions in prep for toilet/shower transfers.  Long Term Goals  Time Frame for Long Term Goals : not set due to ELOS    Following session, patient left in safe position with all fall risk precautions in place.       
without diverticulitis    Spondylosis without myelopathy or radiculopathy, lumbosacral region    Spinal stenosis, lumbar region with neurogenic claudication    Chronic myofascial pain    Hypercortisolism ([facility])    Tachycardia    Leukocytosis    Adrenal tumor    Tobacco abuse    Palpitations    Severe aortic insufficiency    Atypical chest pain    Left upper arm pain    Stage 1 type 1 prediabetes    Anxiety disorder    Hypercortisolism due to adrenal neoplasm ([facility])    Chest pain    Metabolic acidosis    Normocytic anemia    Adrenal benign tumor, left    Chronic right shoulder pain    Cardiomyopathy ([facility])    NSTEMI, initial episode of care ([facility])    Coronary artery disease involving native coronary artery of native heart without angina pectoris    Pulmonary edema    Flash pulmonary edema    ARJUN (acute kidney injury) ([facility])    Chronic systolic (congestive) heart failure    Acute hypoxic respiratory failure    NSTEMI (non-ST elevated myocardial infarction) ([facility])    Moderate to severe aortic insufficiency    S/P AVR (aortic valve replacement)    S/P carotid endarterectomy     Plan:   Continue medical therapy.  Obtain BP readings from R UE  Removed Chest tubes.  Discontinue العراقي cath.    The plan of care was discussed in detail with Dr. Cabrera and Dr. Pastora Mcnulty PA-C

## 2024-10-28 ENCOUNTER — PATIENT MESSAGE (OUTPATIENT)
Dept: FAMILY MEDICINE CLINIC | Age: 59
End: 2024-10-28

## 2024-10-28 ENCOUNTER — CARE COORDINATION (OUTPATIENT)
Dept: CASE MANAGEMENT | Age: 59
End: 2024-10-28

## 2024-10-28 DIAGNOSIS — F41.9 ANXIETY: ICD-10-CM

## 2024-10-28 DIAGNOSIS — Z95.2 S/P AVR (AORTIC VALVE REPLACEMENT): Primary | ICD-10-CM

## 2024-10-28 DIAGNOSIS — Z98.890 S/P CAROTID ENDARTERECTOMY: ICD-10-CM

## 2024-10-28 RX ORDER — ALPRAZOLAM 0.5 MG
0.5 TABLET ORAL 3 TIMES DAILY PRN
Qty: 90 TABLET | Refills: 0 | Status: SHIPPED | OUTPATIENT
Start: 2024-10-28 | End: 2024-11-27

## 2024-10-28 NOTE — CARE COORDINATION
10/23/24, 2:42 PM EDT    DISCHARGE ON GOING EVALUATION    Jacki [facility] day: 2  Location: -15/015-A Reason for admit: Moderate to severe aortic insufficiency [I35.1]  Carotid stenosis, right [I65.21]  Aortic valve insufficiency, etiology of cardiac valve disease unspecified [I35.1]     Procedures:   10/21 Right carotid endarterectomy (performed by Dr. Cabrera)  Aortic root enlargement (Nicks technique)  Aortic valve replacement with a 19 mm On-X mechanical prosthesis (by Dr Guillory)  10/21 Intubated - 10/21 Extubated  10/23 Chest tubes and LINK removed     Imaging since last note:   10/23 CXR: No significant change from previous exam. Trace pleural effusions and mild pulmonary edema are stable.    Barriers to Discharge: POD #2. Chest tubes and LINK removed today. No BM yet.     On room air. Afebrile. NSR. Ox4. Follows commands x4. CR/PT/OT. Dietitian consulted. Dietary ileus prevention protocol. Telemetry, I&O, daily weight, IS, sternal precautions, wound care, SCDs, ambulate. Prn xanax, asa, lipitor, prn flexeril, lovenox, pepcid, IV lasix 20 mg bid, lopressor, prn IV zofran, prn roxicodone, K+, senokot, senna prn, coumadin - pharmacy dosing, electrolyte replacement protocols. WBC down to 20.1, hgb 9.5. INR 1.23.     PCP: Kyle Smith APRN - CNP  Readmission Risk Score: 24.2    Patient Goals/Plan/Treatment Preferences: Home with . Has DME. New [facility]. Plan for  Coumadin Clinic for coumadin dosing at discharge.    
10/25/24, 1:47 PM EDT    DISCHARGE ON GOING EVALUATION    [facility]  Location: -15/015-A Reason for admit: Moderate to severe aortic insufficiency [I35.1]  Carotid stenosis, right [I65.21]  Aortic valve insufficiency, etiology of cardiac valve disease unspecified [I35.1]     Procedures:   10/21 Right carotid endarterectomy (performed by Dr. Cabrera)  Aortic root enlargement (Nicks technique)  Aortic valve replacement with a 19 mm On-X mechanical prosthesis (by Dr Guillory)  10/21 Intubated - 10/21 Extubated  10/23 Chest tubes and LINK removed     Imaging since last note:   10/24 CXR: Vascular congestion with cardiomegaly and right effusion without phani edema.    Barriers to Discharge: POD #4. No BM yet, plan to discharge once has BM. On room air. Afebrile. NSR. Ox4. Follows commands x4. CR/PT/OT. Dietitian consulted. Dietary ileus prevention protocol. Telemetry, I&O, daily weight, IS, sternal precautions, wound care, SCDs, ambulate. Prn xanax, asa, lipitor, coreg, prn flexeril, pepcid, IV lasix 20 mg bid, prn MOM, prn IV zofran, prn roxicodone, K+, senokot, senna prn, coumadin, electrolyte replacement protocols. WBC 14.1, hgb 9.6, INR 1.59.     PCP: Kyle Smith APRN - CNP  Readmission Risk Score: 23.6    Patient Goals/Plan/Treatment Preferences: Home with . Has DME. [facility]. Plan for  Coumadin Clinic for coumadin dosing at discharge.     
10/28/24, 7:21 AM EDT    Patient goals/plan/ treatment preferences discussed by  and .  Patient goals/plan/ treatment preferences reviewed with patient/ family.  Patient/ family verbalize understanding of discharge plan and are in agreement with goal/plan/treatment preferences.  Understanding was demonstrated using the teach back method.  AVS provided by RN at time of discharge, which includes all necessary medical information pertaining to the patients current course of illness, treatment, post-discharge goals of care, and treatment preferences.     Services At/After Discharge: Home Health and Nursing service- [facility]      Jacki was discharged over the weekend to home with spouse [facility]. Agency aware of discharge.         
DISCHARGE PLANNING EVALUATION  10/22/24, 2:24 PM EDT    Reason for Referral: Discharge planning post OHS.  Decision Maker: Makes own decisions.   Current Services: None reported.  New Services Requested: New HH post OHS.  Family/ Social/ Home environment: Assessment completed with pt and sig other over the phone. Pt resides with danni Mai and he still works. Sig other states pt was totally independent prior to becoming sick, pt was employed. Sig other states pt has not been doing much of anything the past 2 to 3 months due to not feeling well. Sig other has been doing most of the house work, transportation, etc. Sig other states pt does get herself ready in the morning, and ambulates without using dme.   Payment Source:BC/  Transportation at Discharge: Danni Mai  Post-acute (PAC) provider list was provided to patient. Patient was informed of their freedom to choose PAC provider. Discussed and offered to show the patient the relevant PAC Providers quality and resource use measures on Medicare Compare web site via computer based on patient's goals of care and treatment preferences. Questions regarding selection process were answered.      Teach Back Method used with Sergei regarding care plan and discharge planning.  Patient verbalized understanding of the plan of care and contribute to goal setting.       Patient preferences and discharge plan: planning home with Sergei and new HH, sw offered a list, Sergei states he prefers to stay with [facility].    Electronically signed by EMMA Yeager on 10/22/2024 at 2:24 PM          
possible.\"   The Patient and/or Patient Representative was provided with a Choice of Provider? Patient   The Patient and/Or Patient Representative agree with the Discharge Plan? Yes   Freedom of Choice list was provided with basic dialogue that supports the patient's individualized plan of care/goals, treatment preferences, and shares the quality data associated with the providers?  Yes

## 2024-10-28 NOTE — CARE COORDINATION
instructions?: Yes  Do you have all of your prescriptions and are they filled?: Yes  Have you been contacted by a [facility] Pharmacist?: No  Have you scheduled your follow up appointment?: Yes  How are you going to get to your appointment?: Car - family or friend to transport  Do you have support at home?: Partner/Spouse/SO  Do you feel like you have everything you need to keep you well at home?: Yes  Are you an active caregiver in your home?: No  Care Transitions Interventions     Transportation Support: Declined     Registered Dietician: Declined              Follow Up Appointment:   Discussed follow up appointments. Patient has hospital follow up appointment scheduled within 14 days of discharge.  Planned surgery  Future Appointments         Provider Specialty Dept Phone    10/29/2024 8:20 AM Marge Reed [facility] [phone]    11/4/2024 9:40 AM Kyle Smith APRN - CNP Family Medicine [phone]    11/19/2024 10:15 AM Maxwell Mcnulty PA-C Cardiothoracic Surgery [phone]    3/12/2025 11:00 AM Joycelyn Mcdermott APRN - CNP Rheumatology [phone]            Patient closed (handed back to [facility]) from the Care Transitions program on 10/28/24.    Handoff:   Patient referred back to [facility] Radha WU for continued management.     Patient has agreed to contact primary care provider and/or specialist for any further questions, concerns, or needs.    Naty Herman RN

## 2024-10-29 ENCOUNTER — TELEPHONE (OUTPATIENT)
Dept: CARDIOTHORACIC SURGERY | Age: 59
End: 2024-10-29

## 2024-10-29 ENCOUNTER — CARE COORDINATION (OUTPATIENT)
Dept: CARE COORDINATION | Age: 59
End: 2024-10-29

## 2024-10-29 ENCOUNTER — ANTI-COAG VISIT (OUTPATIENT)
Age: 59
End: 2024-10-29

## 2024-10-29 DIAGNOSIS — Z79.01 ANTICOAGULATED ON COUMADIN: ICD-10-CM

## 2024-10-29 DIAGNOSIS — Z51.81 ENCOUNTER FOR THERAPEUTIC DRUG MONITORING: ICD-10-CM

## 2024-10-29 DIAGNOSIS — Z95.2 S/P AVR (AORTIC VALVE REPLACEMENT): Primary | ICD-10-CM

## 2024-10-29 LAB — POC INR: 1.4 (ref 0.8–1.2)

## 2024-10-29 NOTE — TELEPHONE ENCOUNTER
Received FMLA for patient.     S/P AVR W/ Dr. Guillory, and RCEA w/ Dr. Cabrera on 10/21/2024.     Leave type is continuous, starting 10/21/2024 until 01/21/2025.     FMLA completed, and placed on Dr. Guillory's desk for signature.    No need for Dr. Cabrera to sign a for a separate FMLA, since he only takes patients off work for a few weeks s/p carotid endarterectomy, and Dr. Guillory takes patients off work for 3 months s/p open heart procedures including aortic valve repair/replacement.

## 2024-10-29 NOTE — CARE COORDINATION
Request from CTN, Naty Herman, to schedule cardiology f/u appointment.  Appointment has been scheduled for Dec 3 at 1:00. I will notify CTN at this time.    Future Appointments   Date Time Provider Department Center   11/4/2024  9:40 AM Kyle Smith APRN - CNP [facility]   11/19/2024 10:15 AM Maxwell Mcnulty PA-C [facility] CT SURG [facility] [place]   12/3/2024  1:00 PM Daniel Alfaro PA-C N [facility] Heart [facility] - [place]   3/12/2025 11:00 AM Joycelyn Mcdermott APRN - CNP N [facility] Rheum [facility] - [place]

## 2024-10-29 NOTE — PROGRESS NOTES
Medication Management Clinic  [facility]  AnticoagulationClinic  [phone] (phone)  [phone] (fax)     Patient presents to the Anticoagulation clinic today for assessment and initial dosing of warfarin.  Patient has a diagnosis of  ON-X AVR  and has been placed on Coumadin with a goal INR 1.5-2.5.  Pt started Coumadin 10/22/24, was dosed by Dr. Guillory while inpatient.     Jacki Moura was given full education today in the clinic including written materials, supplemental oral instructions, and all questions were answered.  Specifically, Jacki was instructed to notfiy the Anticoagulation clinic immediately if there is any unusual bleeding, such as throwing or coughing up blood, bleeding from the nose, mouth, ears, or pink-red tinge in the urine or if the stools contain bright red blood or are black and tarry.       In addition, Jacki was informed to notify the clinic about any and all medicine changes, including prescriptions, “over-the-counter” or nonprescription medicines, vitamins, herbs, supplements, creams, rubs, eye or ear drops, and injections, whether to be used short- or long-term, within 24 hours.  The patient was also instructed to let all other physicians and pharmacists know that warfarin was started as a double-check against drug interactions.  The patient was further instructed on the effects of vitamin K containing foods on warfarin and the importance of consistency was stressed.  Jacki was also advised to avoid large amounts of alcohol, grapefruit juice or cranberry juice while on warfarin.      HPI:    Medication changes: none  Tablet strength per patient: 2mg  Patient reported dosing regimen over last 1 week: updated on track board.   Missed doses in the last 1-2 weeks: none  Extra doses in the last 1-2 weeks: none  Any problems with bleeding/bruising? No  Any recent falls? No   Any signs or symptoms of DVT/PE or stroke? No  Alcohol use: none - hasn't had any in a few

## 2024-10-30 DIAGNOSIS — Z95.2 S/P AVR (AORTIC VALVE REPLACEMENT): Primary | ICD-10-CM

## 2024-11-04 ENCOUNTER — TELEPHONE (OUTPATIENT)
Age: 59
End: 2024-11-04

## 2024-11-04 ENCOUNTER — APPOINTMENT (OUTPATIENT)
Age: 59
End: 2024-11-04
Payer: COMMERCIAL

## 2024-11-04 ENCOUNTER — OFFICE VISIT (OUTPATIENT)
Dept: FAMILY MEDICINE CLINIC | Age: 59
End: 2024-11-04

## 2024-11-04 VITALS
RESPIRATION RATE: 18 BRPM | TEMPERATURE: 99 F | WEIGHT: 140 LBS | SYSTOLIC BLOOD PRESSURE: 98 MMHG | OXYGEN SATURATION: 98 % | BODY MASS INDEX: 27.34 KG/M2 | DIASTOLIC BLOOD PRESSURE: 58 MMHG | HEART RATE: 95 BPM

## 2024-11-04 VITALS
OXYGEN SATURATION: 98 % | WEIGHT: 140 LBS | SYSTOLIC BLOOD PRESSURE: 98 MMHG | TEMPERATURE: 99 F | HEART RATE: 95 BPM | BODY MASS INDEX: 27.34 KG/M2 | RESPIRATION RATE: 18 BRPM | DIASTOLIC BLOOD PRESSURE: 58 MMHG

## 2024-11-04 DIAGNOSIS — Z87.891 HISTORY OF SMOKING 30 OR MORE PACK YEARS: ICD-10-CM

## 2024-11-04 DIAGNOSIS — E24.9 HYPERCORTISOLISM (HCC): ICD-10-CM

## 2024-11-04 DIAGNOSIS — Z09 HOSPITAL DISCHARGE FOLLOW-UP: Primary | ICD-10-CM

## 2024-11-04 DIAGNOSIS — Z09 HOSPITAL DISCHARGE FOLLOW-UP: ICD-10-CM

## 2024-11-04 DIAGNOSIS — D49.7 ADRENAL TUMOR: ICD-10-CM

## 2024-11-04 DIAGNOSIS — Z95.2 S/P AVR (AORTIC VALVE REPLACEMENT): Primary | ICD-10-CM

## 2024-11-04 DIAGNOSIS — Z98.890 S/P CAROTID ENDARTERECTOMY: ICD-10-CM

## 2024-11-04 PROBLEM — D72.829 LEUKOCYTOSIS: Status: RESOLVED | Noted: 2024-01-08 | Resolved: 2024-11-04

## 2024-11-04 PROBLEM — J96.01 ACUTE HYPOXIC RESPIRATORY FAILURE: Status: RESOLVED | Noted: 2024-08-30 | Resolved: 2024-11-04

## 2024-11-04 PROBLEM — N17.9 AKI (ACUTE KIDNEY INJURY) (HCC): Status: RESOLVED | Noted: 2024-07-24 | Resolved: 2024-11-04

## 2024-11-04 PROBLEM — R01.1 SYSTOLIC MURMUR: Status: RESOLVED | Noted: 2021-06-14 | Resolved: 2024-11-04

## 2024-11-04 PROBLEM — I21.4 NSTEMI (NON-ST ELEVATED MYOCARDIAL INFARCTION) (HCC): Status: RESOLVED | Noted: 2024-09-08 | Resolved: 2024-11-04

## 2024-11-04 PROBLEM — R00.0 TACHYCARDIA: Status: RESOLVED | Noted: 2024-01-08 | Resolved: 2024-11-04

## 2024-11-04 PROBLEM — I35.1 SEVERE AORTIC INSUFFICIENCY: Status: RESOLVED | Noted: 2024-01-09 | Resolved: 2024-11-04

## 2024-11-04 PROBLEM — R07.9 CHEST PAIN: Status: RESOLVED | Noted: 2024-07-05 | Resolved: 2024-11-04

## 2024-11-04 PROBLEM — J81.1 PULMONARY EDEMA: Status: RESOLVED | Noted: 2024-07-22 | Resolved: 2024-11-04

## 2024-11-04 PROBLEM — R00.2 PALPITATIONS: Status: RESOLVED | Noted: 2024-01-08 | Resolved: 2024-11-04

## 2024-11-04 RX ORDER — OXYCODONE AND ACETAMINOPHEN 5; 325 MG/1; MG/1
1 TABLET ORAL EVERY 6 HOURS PRN
Qty: 28 TABLET | Refills: 0 | Status: SHIPPED | OUTPATIENT
Start: 2024-11-04 | End: 2024-11-11

## 2024-11-04 NOTE — PROGRESS NOTES
Adrenal benign tumor, left    Chronic right shoulder pain    Cardiomyopathy (HCC)    NSTEMI, initial episode of care ([facility])    Coronary artery disease involving native coronary artery of native heart without angina pectoris    Flash pulmonary edema    Chronic systolic (congestive) heart failure    Moderate to severe aortic insufficiency    S/P AVR (aortic valve replacement)    Encounter for therapeutic drug monitoring    Anticoagulated on Coumadin       Medications listed as ordered at the time of discharge from hospital     Medication List            Accurate as of November 4, 2024 10:23 AM. If you have any questions, ask your nurse or doctor.                CHANGE how you take these medications      atorvastatin 40 MG tablet  Commonly known as: LIPITOR  Take 1 tablet by mouth daily  What changed: when to take this            CONTINUE taking these medications      ALPRAZolam 0.5 MG tablet  Commonly known as: Xanax  Take 1 tablet by mouth 3 times daily as needed for Sleep or Anxiety for up to 30 days. Max Daily Amount: 1.5 mg     aspirin 81 MG EC tablet     carvedilol 6.25 MG tablet  Commonly known as: COREG  Take 1 tablet by mouth 2 times daily (with meals)     cyclobenzaprine 10 MG tablet  Commonly known as: FLEXERIL  Take 1 tablet by mouth 3 times daily as needed for Muscle spasms     ferrous sulfate 325 (65 Fe) MG tablet  Commonly known as: IRON 325  Take 1 tablet by mouth every other day     furosemide 20 MG tablet  Commonly known as: Lasix  Take 1 tablet by mouth daily     multivitamin Tabs tablet  Take 1 tablet by mouth daily (with breakfast)     oxyCODONE-acetaminophen 5-325 MG per tablet  Commonly known as: Percocet  Take 1 tablet by mouth every 6 hours as needed for Pain for up to 7 days. Intended supply: 7 days. Take lowest dose possible to manage pain Max Daily Amount: 4 tablets     potassium chloride 10 MEQ extended release tablet  Commonly known as: KLOR-CON M  Take 1 tablet by mouth daily

## 2024-11-04 NOTE — PROGRESS NOTES
Post-Discharge Transitional Care  Follow Up      Jacki Moura   YOB: 1965    Date of Office Visit:  11/4/2024  Date of Hospital Admission: 10/21/24  Date of Hospital Discharge: 10/27/24  Risk of hospital readmission (high >=14%. Medium >=10%) :Readmission Risk Score: 21.7      Care management risk score Rising risk (score 2-5) and Complex Care (Scores >=6): No Risk Score On File     Non face to face  following discharge, date last encounter closed (first attempt may have been earlier): 10/28/2024    Call initiated 2 business days of discharge: Yes    ASSESSMENT/PLAN:   S/P AVR (aortic valve replacement)  -     oxyCODONE-acetaminophen (PERCOCET) 5-325 MG per tablet; Take 1 tablet by mouth every 6 hours as needed for Pain for up to 7 days. Intended supply: 7 days. Take lowest dose possible to manage pain Max Daily Amount: 4 tablets, Disp-28 tablet, R-0Normal  Hospital discharge follow-up  -     [place] DISCHARGE MEDS RECONCILED W/ CURRENT OUTPATIENT MED LIST  History of smoking 30 or more pack years  Adrenal tumor  Hypercortisolism (HCC)  S/P carotid endarterectomy  -     oxyCODONE-acetaminophen (PERCOCET) 5-325 MG per tablet; Take 1 tablet by mouth every 6 hours as needed for Pain for up to 7 days. Intended supply: 7 days. Take lowest dose possible to manage pain Max Daily Amount: 4 tablets, Disp-28 tablet, R-0Normal      Medical Decision Making: high complexity  Return in 1 month (on 12/4/2024).           Subjective:   HPI:  Follow up of Hospital problems/diagnosis(es): Status post TAVR, essential hypertension, carotid stenosis, PAD, hypercortisolism due to adrenal neoplasm    Inpatient course: Discharge summary reviewed- see chart.    Interval history/Current status: Patient doing fairly well, somewhat normal appetite and sleeping.  No constipation or diarrhea, no fever or chills.  Surgical suture lines on chest and right carotid area looking very good with no indication of infection.  Due to

## 2024-11-04 NOTE — TELEPHONE ENCOUNTER
Spoke with [facility]. They state they have patient down for home health visit and INR tomorrow 11/5/24.    Spoke with patient. Instructed her to take home dose of Coumadin 2 mg tonight 11/4/24 with INR tomorrow. Patient voiced understanding. Moved patient to tomorrow's schedule. Patient given instructions utilizing the teach back method.    Jose E Freeman PharmD, BCPS  11/4/2024  3:29 PM

## 2024-11-05 ENCOUNTER — ANTI-COAG VISIT (OUTPATIENT)
Age: 59
End: 2024-11-05

## 2024-11-05 DIAGNOSIS — Z79.01 ANTICOAGULATED ON COUMADIN: ICD-10-CM

## 2024-11-05 DIAGNOSIS — Z95.2 S/P AVR (AORTIC VALVE REPLACEMENT): Primary | ICD-10-CM

## 2024-11-05 DIAGNOSIS — Z51.81 ENCOUNTER FOR THERAPEUTIC DRUG MONITORING: ICD-10-CM

## 2024-11-05 NOTE — PROGRESS NOTES
Medication Management Clinic  [facility]  Anticoagulation Clinic  [phone] (phone)  [phone] (fax)        INR drawn by [facility].  Nursing assessment reviewed and appreciated.  Nursing assessment within the normal limits with the exception of the following:  not available at the time of this encounter.     Anticoagulation encounter completed via telephone.     Ms. Jacki Moura is a 59 y.o.  female with history of  On X AVR .    Patient verifies current dosing regimen and tablet strength.  No missed or extra doses.  Patient denies s/s bleeding/bruising/swelling/SOB  No blood in urine or stool.  Eating more vegetables and Vit K foods. Broccoli, etc. Has been using Ensure, plans to do this daily.   No changes in medication/OTC agents/Herbals.  No change in alcohol use.  Currently smoking 0.5 PPD, increased since last visit.   Slightly more active.  Patient denies falls.  No vomiting/diarrhea or acute illness.   No Procedures scheduled in the future at this time.      Assessment:  Lab Results   Component Value Date    INR 1.12 11/05/2024    INR 1.40 (H) 10/29/2024    INR 1.49 (H) 10/27/2024     INR subtherapeutic   Recent Labs     11/05/24  1404   INR 1.12     Goal INR 1.5-2.5    Subtherapeutic INR likely due to increased Vit K intake, increase cigarette use, increased activity level.      Plan:  Coumadin 4mg today and tomorrow, then increase Coumadin to 3mg MWF and 2mg TThSaS.  Recheck INR in 6 day(s) on 11/11/24.  Called [facility], spoke to Kaylen to notify of this date. Patient reminded to call the Anticoagulation Clinic with any signs or symptoms of bleeding or with any medication changes.  Patient given instructions utilizing the teach back method.    Marge Reed, PharmD, BCPS, CACP, CTTS     For Pharmacy Admin Tracking Only    Intervention Detail: Dose Adjustment: 1, reason: Therapy Optimization  Total # of Interventions Recommended: 1  Total # of Interventions Accepted: 1  Time Spent (min):

## 2024-11-07 ENCOUNTER — PATIENT MESSAGE (OUTPATIENT)
Dept: FAMILY MEDICINE CLINIC | Age: 59
End: 2024-11-07

## 2024-11-08 ENCOUNTER — CARE COORDINATION (OUTPATIENT)
Dept: CARE COORDINATION | Age: 59
End: 2024-11-08

## 2024-11-08 DIAGNOSIS — Z98.890 S/P CAROTID ENDARTERECTOMY: ICD-10-CM

## 2024-11-08 DIAGNOSIS — Z95.2 S/P AVR (AORTIC VALVE REPLACEMENT): Primary | ICD-10-CM

## 2024-11-08 RX ORDER — HYDROCODONE BITARTRATE AND ACETAMINOPHEN 10; 325 MG/1; MG/1
1 TABLET ORAL EVERY 8 HOURS PRN
Qty: 42 TABLET | Refills: 0 | Status: SHIPPED | OUTPATIENT
Start: 2024-11-08 | End: 2024-11-22

## 2024-11-08 NOTE — CARE COORDINATION
Attempted to reach patient for continued Care Coordination follow up and education.  Patient was unavailable at the time of my call, and phone line states \"user busy\".

## 2024-11-11 ENCOUNTER — LAB (OUTPATIENT)
Dept: LAB | Age: 59
End: 2024-11-11

## 2024-11-11 ENCOUNTER — ANTI-COAG VISIT (OUTPATIENT)
Age: 59
End: 2024-11-11

## 2024-11-11 DIAGNOSIS — Z95.2 S/P AVR (AORTIC VALVE REPLACEMENT): Primary | ICD-10-CM

## 2024-11-11 DIAGNOSIS — Z51.81 ENCOUNTER FOR THERAPEUTIC DRUG MONITORING: ICD-10-CM

## 2024-11-11 DIAGNOSIS — Z79.01 ANTICOAGULATED ON COUMADIN: ICD-10-CM

## 2024-11-11 LAB — INR PPP: 1.09 (ref 0.85–1.13)

## 2024-11-11 NOTE — PROGRESS NOTES
Medication Management Clinic  [facility]  Anticoagulation Clinic  [phone] (phone)  [phone] (fax)      INR drawn by [facility].  No Nursing assessment to review at this time.      Anticoagulation encounter completed via telephone.     Ms. Jacki Moura is a 59 y.o.  female with history of  aortic valve replacement .    Patient verifies current dosing regimen and tablet strength.  No missed or extra doses.  Patient denies s/s bleeding/bruising/swelling/SOB  No blood in urine or stool.  No dietary changes. Continuing with a couple salads with spinach a week, broccoli and brussels sprouts as side dishes.   No changes in medication/OTC agents/Herbals.  No change in alcohol use or tobacco use. No change continues with no alcohol and 0.5 pack of cigarettes a day.   No change in activity level. No change continues with exercises everyday and walking.  Patient denies falls.  No vomiting/diarrhea or acute illness.   No Procedures scheduled in the future at this time.      Assessment:  Lab Results   Component Value Date    INR 1.09 11/11/2024    INR 1.12 11/05/2024    INR 1.40 (H) 10/29/2024     INR subtherapeutic   Recent Labs     11/11/24  1200   INR 1.09    Goal 1.5-2    Plan:  Coumadin 4mg x2 days then increase Coumadin 2mg MF 3mg STuWThF. 11.8% increase  Recheck INR in 1 week.   Home Health order placed in epic, Printed and given to Natasha. Patient reminded to call the Anticoagulation Clinic with signs or symptoms of bleeding or with any medication changes. Patient given instructions utilizing the teach back method.    For Pharmacy Admin Tracking Only    Intervention Detail: Dose Adjustment: 1, reason: Therapy Optimization  Total # of Interventions Recommended: 1  Total # of Interventions Accepted: 1  Time Spent (min): 20

## 2024-11-15 ENCOUNTER — PATIENT MESSAGE (OUTPATIENT)
Dept: FAMILY MEDICINE CLINIC | Age: 59
End: 2024-11-15

## 2024-11-15 ENCOUNTER — CARE COORDINATION (OUTPATIENT)
Dept: CARE COORDINATION | Age: 59
End: 2024-11-15

## 2024-11-15 DIAGNOSIS — R20.0 NUMBNESS AND TINGLING OF LEFT LEG: ICD-10-CM

## 2024-11-15 DIAGNOSIS — M54.16 CHRONIC RADICULAR LUMBAR PAIN: ICD-10-CM

## 2024-11-15 DIAGNOSIS — R20.2 NUMBNESS AND TINGLING OF LEFT LEG: ICD-10-CM

## 2024-11-15 DIAGNOSIS — G89.29 CHRONIC RADICULAR LUMBAR PAIN: ICD-10-CM

## 2024-11-15 RX ORDER — PREGABALIN 50 MG/1
50 CAPSULE ORAL 3 TIMES DAILY
Qty: 90 CAPSULE | Refills: 1 | Status: SHIPPED | OUTPATIENT
Start: 2024-11-15 | End: 2025-01-14

## 2024-11-15 NOTE — CARE COORDINATION
Ambulatory Care Coordination Note     11/15/2024 1:05 PM     Patient Current Location:  Home: [place] [address]     ACM contacted the patient by telephone. Verified name and  with patient as identifiers.         ACM: Radha Curiel RN     Challenges to be reviewed by the provider   Additional needs identified to be addressed with provider No  none               Method of communication with provider: none.    Utilization: Patient has not had any utilization since our last call.     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 Fractured in  but pain is worse last couple years. MRI done 2023 and was advised PT. PCP referred to [facility] Dr. Guerra. Balance needs paid before they will schedule her. She did see a surgeon in [place] in past, [facility].   Emphysema - now est with pulmonology. Combivent Respimat ordered. Smoking cessation clinic referral. Cut down to 1/2 pack daily. Advised to cut down every couple days.  Wants adrenal gland removed. Working with employer to get this covered by [employer]? Following with endo Dr. Noble      10/21-10/26 admission for right internal carotid stenosis Aortic stenosis  10/21 AVR and right carotid endarterectomy  Incisions healing well. No s/s infection. Denies chest pain, dizziness, SOB. Reviewed CHF zones. No weight gain, edema, SOB. Taking lasix as ordered. Est with coumadin clinic. INR will be drawn by [facility]. Follow up with CV surgeon next week.      Plan -   Plan to graduate after recovered from surgery, resources in place, est with dentist  CHF zones  Smoking cessation  Early symptom recognition and reporting to prevent ED and admissions  Use same or next day appts at PCP office for non-emergency problems     Heart Failure Education outreach Date/Time: 11/15/2024 1:05 PM    Ambulatory Care Manager (ACM) contacted the patient by telephone to perform Ambulatory Care Coordination. Verified name and  with patient as identifiers.

## 2024-11-18 ENCOUNTER — HOSPITAL ENCOUNTER (OUTPATIENT)
Dept: GENERAL RADIOLOGY | Age: 59
Discharge: HOME | End: 2024-11-18
Payer: COMMERCIAL

## 2024-11-18 ENCOUNTER — TELEPHONE (OUTPATIENT)
Age: 59
End: 2024-11-18

## 2024-11-18 ENCOUNTER — APPOINTMENT (OUTPATIENT)
Age: 59
End: 2024-11-18
Payer: COMMERCIAL

## 2024-11-18 ENCOUNTER — HOSPITAL ENCOUNTER (OUTPATIENT)
Age: 59
Discharge: HOME | End: 2024-11-18
Payer: COMMERCIAL

## 2024-11-18 DIAGNOSIS — Z95.2 S/P AVR (AORTIC VALVE REPLACEMENT): ICD-10-CM

## 2024-11-18 LAB
ANION GAP SERPL CALC-SCNC: 12 MEQ/L (ref 8–16)
BUN SERPL-MCNC: 12 MG/DL (ref 7–22)
CALCIUM SERPL-MCNC: 9.2 MG/DL (ref 8.5–10.5)
CHLORIDE SERPL-SCNC: 106 MEQ/L (ref 98–111)
CO2 SERPL-SCNC: 23 MEQ/L (ref 23–33)
CREAT SERPL-MCNC: 0.7 MG/DL (ref 0.4–1.2)
DEPRECATED RDW RBC AUTO: 53.8 FL (ref 35–45)
ERYTHROCYTE [DISTWIDTH] IN BLOOD BY AUTOMATED COUNT: 16.4 % (ref 11.5–14.5)
GFR SERPL CREATININE-BSD FRML MDRD: > 90 ML/MIN/1.73M2
GLUCOSE SERPL-MCNC: 116 MG/DL (ref 70–108)
HCT VFR BLD AUTO: 33.5 % (ref 37–47)
HGB BLD-MCNC: 10 GM/DL (ref 12–16)
MCH RBC QN AUTO: 26.7 PG (ref 26–33)
MCHC RBC AUTO-ENTMCNC: 29.9 GM/DL (ref 32.2–35.5)
MCV RBC AUTO: 89.3 FL (ref 81–99)
PLATELET # BLD AUTO: 492 THOU/MM3 (ref 130–400)
PMV BLD AUTO: 9.9 FL (ref 9.4–12.4)
POTASSIUM SERPL-SCNC: 5.2 MEQ/L (ref 3.5–5.2)
RBC # BLD AUTO: 3.75 MILL/MM3 (ref 4.2–5.4)
SODIUM SERPL-SCNC: 141 MEQ/L (ref 135–145)
WBC # BLD AUTO: 11.6 THOU/MM3 (ref 4.8–10.8)

## 2024-11-18 PROCEDURE — 85027 COMPLETE CBC AUTOMATED: CPT

## 2024-11-18 PROCEDURE — 80048 BASIC METABOLIC PNL TOTAL CA: CPT

## 2024-11-18 PROCEDURE — 36415 COLL VENOUS BLD VENIPUNCTURE: CPT

## 2024-11-18 PROCEDURE — 71046 X-RAY EXAM CHEST 2 VIEWS: CPT

## 2024-11-18 NOTE — TELEPHONE ENCOUNTER
Called and left a message for home health at [phone] regarding if INR was able to be drawn today as I did not see it in process. Ext [phone]    Jacki called at 1520 stating that no one had been out to her house to draw her labs today. I told her that I would try to reach home health again to see if the plan was still for today or a different day.     Called home health extension [phone] and went through the nurses line with voicemail. Also tried [phone] and [phone] with no answer.     Instructed patient if she does not hear back from me to take Coumadin 2mg tonight. Patient voiced understanding.   Patient stated if they were coming tomorrow 11/19/24 to draw INR it should be after 12 o'clock noon due to patient having an appointment at 1015.     Abbie from [facility] left a message and said she was not able to get a hold of the patient this AM. She is reaching out to the patient to set up a time to come out tomorrow 11/19. Called Abbie back and she said she already spoke to patient and she is planning to go first this in the morning. Will move patient's appointment to tomorrow 11/19. Called and spoke to Jacki again. She is planning on them coming tomorrow AM.     For Pharmacy Admin Tracking Only    Intervention Detail: Dose Adjustment: 1, reason: Therapy Optimization  Total # of Interventions Recommended: 1  Total # of Interventions Accepted: 1  Time Spent (min): 10

## 2024-11-19 ENCOUNTER — ANTI-COAG VISIT (OUTPATIENT)
Age: 59
End: 2024-11-19

## 2024-11-19 ENCOUNTER — OFFICE VISIT (OUTPATIENT)
Dept: CARDIOTHORACIC SURGERY | Age: 59
End: 2024-11-19

## 2024-11-19 VITALS
DIASTOLIC BLOOD PRESSURE: 79 MMHG | HEART RATE: 94 BPM | WEIGHT: 136.8 LBS | BODY MASS INDEX: 26.86 KG/M2 | SYSTOLIC BLOOD PRESSURE: 168 MMHG | HEIGHT: 60 IN | OXYGEN SATURATION: 97 %

## 2024-11-19 DIAGNOSIS — R25.2 MUSCLE CRAMPS: ICD-10-CM

## 2024-11-19 DIAGNOSIS — I65.21 STENOSIS OF RIGHT CAROTID ARTERY: Primary | ICD-10-CM

## 2024-11-19 DIAGNOSIS — Z51.81 ENCOUNTER FOR THERAPEUTIC DRUG MONITORING: ICD-10-CM

## 2024-11-19 DIAGNOSIS — F41.9 ANXIETY: ICD-10-CM

## 2024-11-19 DIAGNOSIS — Z95.2 S/P AVR (AORTIC VALVE REPLACEMENT): Primary | ICD-10-CM

## 2024-11-19 DIAGNOSIS — Z79.01 ANTICOAGULATED ON COUMADIN: ICD-10-CM

## 2024-11-19 PROCEDURE — 99024 POSTOP FOLLOW-UP VISIT: CPT | Performed by: PHYSICIAN ASSISTANT

## 2024-11-19 RX ORDER — FERROUS SULFATE 325(65) MG
325 TABLET ORAL EVERY OTHER DAY
Qty: 30 TABLET | Refills: 2 | Status: CANCELLED | OUTPATIENT
Start: 2024-11-19

## 2024-11-19 RX ORDER — ALPRAZOLAM 0.5 MG
0.5 TABLET ORAL 3 TIMES DAILY PRN
Qty: 90 TABLET | Refills: 0 | Status: SHIPPED | OUTPATIENT
Start: 2024-11-19 | End: 2024-12-19

## 2024-11-19 RX ORDER — CYCLOBENZAPRINE HCL 10 MG
10 TABLET ORAL 3 TIMES DAILY PRN
Qty: 90 TABLET | Refills: 1 | Status: SHIPPED | OUTPATIENT
Start: 2024-11-19

## 2024-11-19 RX ORDER — WARFARIN SODIUM 2 MG/1
TABLET ORAL
Qty: 60 TABLET | Refills: 3 | Status: SHIPPED | OUTPATIENT
Start: 2024-11-19

## 2024-11-19 RX ORDER — ENOXAPARIN SODIUM 100 MG/ML
60 INJECTION SUBCUTANEOUS EVERY 12 HOURS
Qty: 12 EACH | Refills: 0 | Status: SHIPPED | OUTPATIENT
Start: 2024-11-19

## 2024-11-19 RX ORDER — FERROUS SULFATE 325(65) MG
325 TABLET ORAL EVERY OTHER DAY
Qty: 30 TABLET | Refills: 2 | Status: SHIPPED | OUTPATIENT
Start: 2024-11-19

## 2024-11-19 NOTE — PROGRESS NOTES
CT/CV Surgery Follow Up Office Visit      Patient's Name/Date of Birth: Jacki Moura / 1965 (59 y.o.)    CC: No chief complaint on file.     PCP: Kyle Smith APRN - CNP    Date: 2024     HPI:   We had the pleasure of seeing Jacki Moura in the office today, as you know this is a very pleasant 59 y.o. year old female S/p mechanical AVR with On-X valve by Dr. Guillory and Right CEA by Dr. aCbrera.  Last INR-1.09 on 24.  She been following with Coumadin clinic and had repeat INR drawn before visit this morning.  I advised and educated her on the importance taking Coumadin as instructed and close follow up with coumadin clinic to stay in therapeutic range.  She continues to smoke but has decreased daily amount.       CXR PA & LATERAL: 24  IMPRESSION:  1. Normal heart size. Metallic sternotomy sutures. Prosthetic heart valve.  2. No acute findings. No infiltrates or effusions are seen.           **This report has been created using voice recognition software.  It may contain  minor errors which are inherent in voice recognition technology.**           Electronically signed by Dr. Mono Soriano           Exam Ended: 24 10:07 EST Last Resulted: 24 10:57 EST            Past Medical History:  Jacki  has a past medical history of Arthritis, Coronary artery disease involving native coronary artery of native heart without angina pectoris, Cushing's disease ([facility]), Hyperlipidemia, Hypertension, and PAD (peripheral artery disease) ([facility]).    Past Surgical History:  The patient  has a past surgical history that includes  section; Foot surgery; Dilation and curettage of uterus (); knee surgery (Left); lumbar discectomy (2020); back surgery; skin biopsy; Colonoscopy; Endoscopy, colon, diagnostic; Femoral Endarterectomy (Left, 2021); transesophageal echocardiogram (N/A, 1/10/2024); Cardiac procedure (N/A, 2024); Cardiac procedure (N/A, 2024);

## 2024-11-19 NOTE — PROGRESS NOTES
Medication Management Clinic  [facility]  Anticoagulation Clinic  [phone] (phone)  [phone] (fax)        INR drawn by [facility].  Nursing assessment reviewed and appreciated.  Nursing assessment within the normal limits with the exception of the following:  Not available at the time of this encounter    Anticoagulation encounter completed via telephone.     Ms. Jacki Moura is a 59 y.o.  female with history of  OnX AVR .    Patient verifies current dosing regimen and tablet strength.  No missed or extra doses.  Patient denies s/s bleeding/bruising/swelling/SOB  No blood in urine or stool.  Vitamin K intake varies.  Ate more green salads last week. Sometimes drinks Ensure.  Last week she drank Fairlife, this week she is planning to resume daily Ensure.  Long conversation with patient about the importance of being consistent with Vitamin K intake.   No changes in OTC agents/Herbals. Stopped Lasix/Potassium today.   No change in alcohol use. Increased tobacco use to 0.5 PPD, was previously at 0.25 PPD.  Activity level increased, no longer needing a daily nap.  States she will be starting cardiac rehab soon.   Patient denies falls.  No vomiting/diarrhea or acute illness.   No Procedures scheduled in the future at this time.      Assessment:  Lab Results   Component Value Date    INR 1.23 (H) 11/19/2024    INR 1.09 11/11/2024    INR 1.12 11/05/2024     INR subtherapeutic   Recent Labs     11/19/24  0925   INR 1.23*     Goal 1.5-2.5    INR remains subtherapeutic despite dose regimen increases, likely due to increase activity level, increased tobacco use, inconsistent Vitamin K intake.   Will bridge with Lovenox due to recent OnX AVR.     Patient states today was her last [facility] visit.     Plan:  Lovenox 60mg SQ Q12h, Rx sent.  Pt instructed to watch the injection technique video at [email or web address].  Coumadin 4mg today and tomorrow,  then increase Coumadin 4mg MF and 3mg all other days.  Recheck INR in 6

## 2024-11-19 NOTE — PATIENT INSTRUCTIONS
You may receive a survey regarding the care you received during your visit.  Your input is valuable to us.  We encourage you to complete and return your survey.  We hope you will choose us in the future for your healthcare needs.    Thank you for choosing [facility]!    Your Medical Assistant Today:  Sil GONZALEZ   Your Provider for Today: Linwood Mcnulty PA-C  Ph. [phone]      Have a Great Day!

## 2024-11-20 DIAGNOSIS — Z95.2 S/P AVR (AORTIC VALVE REPLACEMENT): ICD-10-CM

## 2024-11-20 DIAGNOSIS — Z98.890 S/P CAROTID ENDARTERECTOMY: ICD-10-CM

## 2024-11-20 DIAGNOSIS — F41.9 ANXIETY: ICD-10-CM

## 2024-11-20 RX ORDER — ALPRAZOLAM 0.5 MG
0.5 TABLET ORAL 3 TIMES DAILY PRN
Qty: 90 TABLET | Refills: 0 | OUTPATIENT
Start: 2024-11-20 | End: 2024-12-20

## 2024-11-21 ENCOUNTER — CARE COORDINATION (OUTPATIENT)
Dept: CARE COORDINATION | Age: 59
End: 2024-11-21

## 2024-11-21 RX ORDER — HYDROCODONE BITARTRATE AND ACETAMINOPHEN 10; 325 MG/1; MG/1
1 TABLET ORAL EVERY 8 HOURS PRN
Qty: 21 TABLET | Refills: 0 | Status: SHIPPED | OUTPATIENT
Start: 2024-11-21 | End: 2024-11-28

## 2024-11-21 NOTE — CARE COORDINATION
Ambulatory Care Coordination Note     11/21/2024 1:28 PM     ACM outreach attempt by this ACM today to perform care management follow up . ACM was unable to reach the patient by telephone today;   left voice message requesting a return phone call to this ACM.     ACM: Radha Curiel, RN     Care Summary Note:   Plan -   Plan to graduate after recovered from surgery, resources in place, est with dentist  Lasix and K+ stopped  CHF zones  Smoking cessation  Early symptom recognition and reporting to prevent ED and admissions  Use same or next day appts at PCP office for non-emergency problems       PCP/Specialist follow up:   Future Appointments         Provider Specialty Dept Phone    11/25/2024 9:40 AM (Arrive by 9:30 AM) [facility] DEXA  1 Radiology [phone]    11/25/2024 1:20 PM Sharonda Chen, [facility] [phone]    12/3/2024 1:00 PM Daniel Alfaro PAAnaC Cardiology [phone]    12/5/2024 10:00 AM Kyle Smith APRN - CNP Family Medicine [phone]    12/5/2024  2:30 PM [facility] CARDIOPULMONARY CONFERENCE ROOM Cardiac Rehabilitation [phone]    1/15/2025 10:00 AM (Arrive by 9:30 AM) [facility] VASCULAR LAB ROOM 1 Vascular Surgery [phone]    1/20/2025 9:30 AM Jarad Cabrera MD Vascular Surgery [phone]    3/12/2025 11:00 AM Joycelyn Mcdermott, APRN - CNP Rheumatology [phone]            Follow Up:   Plan for next ACM outreach in approximately 1 week to complete:  - CC Protocol assessments  - disease specific assessments  - goal progression  - education .

## 2024-11-25 ENCOUNTER — HOSPITAL ENCOUNTER (OUTPATIENT)
Dept: WOMENS IMAGING | Age: 59
Discharge: HOME | End: 2024-11-25
Payer: COMMERCIAL

## 2024-11-25 ENCOUNTER — ANTI-COAG VISIT (OUTPATIENT)
Age: 59
End: 2024-11-25
Payer: COMMERCIAL

## 2024-11-25 DIAGNOSIS — Z51.81 ENCOUNTER FOR THERAPEUTIC DRUG MONITORING: ICD-10-CM

## 2024-11-25 DIAGNOSIS — Z95.2 S/P AVR (AORTIC VALVE REPLACEMENT): Primary | ICD-10-CM

## 2024-11-25 DIAGNOSIS — Z79.01 ANTICOAGULATED ON COUMADIN: ICD-10-CM

## 2024-11-25 DIAGNOSIS — M85.80 OSTEOPENIA, UNSPECIFIED LOCATION: ICD-10-CM

## 2024-11-25 LAB — POC INR: 1.8 (ref 0.8–1.2)

## 2024-11-25 PROCEDURE — 85610 PROTHROMBIN TIME: CPT | Performed by: PHARMACIST

## 2024-11-25 PROCEDURE — 77080 DXA BONE DENSITY AXIAL: CPT

## 2024-11-25 PROCEDURE — 99211 OFF/OP EST MAY X REQ PHY/QHP: CPT | Performed by: PHARMACIST

## 2024-11-25 NOTE — PROGRESS NOTES
Medication Management Clinic  [facility]  Anticoagulation Clinic  [phone] (phone)  [phone] (fax)    Ms. Jacki Moura is a 59 y.o.  female with history of  OnX AVR  who presents today for anticoagulation monitoring and adjustment.    Patient verifies current dosing regimen and tablet strength.  No missed or extra doses.  Patient denies s/s bleeding/bruising/swelling/SOB  No blood in urine or stool.  No dietary changes. Reports she has done some online research regarding taking Coumadin and eating consistent amounts of Vitamin K. She has better understanding of education we had given her during home health INR calls.   She has not drank Ensure for the past week, but would like to start.  Instructed to hold off on Ensure until after next visit since first in range INR today.       No changes in medication/OTC agents/Herbals. Requested she check her MV for Vitamin K content when she goes home.    No change in alcohol use or tobacco use.  No change in activity level. Increasing more since surgery   Patient denies falls.  No vomiting/diarrhea or acute illness. Vomited a couple of days ago.      No Procedures scheduled in the future at this time.    INR goal 1.5-2.5    Assessment:   Lab Results   Component Value Date    INR 1.80 (H) 11/25/2024    INR 1.23 (H) 11/19/2024    INR 1.09 11/11/2024     INR therapeutic   Recent Labs     11/25/24  1323   INR 1.80*     Provided patient with clinic education booklet and vitamin K content reference chart.      Plan:  Stop Lovenox. Continue Coumadin 4 mg MF, 3 mg all other days.  Recheck INR in 1 week(s).  Patient reminded to call the Anticoagulation Clinic with any signs or symptoms of bleeding or with any medication changes.  Patient given instructions utilizing the teach back method    After visit summary printed and reviewed with patient.      Discharged ambulatory in no apparent distress.    Yecenia Chen PharmD 11/25/2024 2:24 PM

## 2024-11-29 ENCOUNTER — PATIENT MESSAGE (OUTPATIENT)
Dept: FAMILY MEDICINE CLINIC | Age: 59
End: 2024-11-29

## 2024-11-29 DIAGNOSIS — Z98.890 S/P CAROTID ENDARTERECTOMY: ICD-10-CM

## 2024-11-29 DIAGNOSIS — Z95.2 S/P AVR (AORTIC VALVE REPLACEMENT): ICD-10-CM

## 2024-11-29 RX ORDER — HYDROCODONE BITARTRATE AND ACETAMINOPHEN 10; 325 MG/1; MG/1
1 TABLET ORAL EVERY 8 HOURS PRN
Qty: 21 TABLET | Refills: 0 | Status: SHIPPED | OUTPATIENT
Start: 2024-11-29 | End: 2024-12-06

## 2024-12-02 ENCOUNTER — ANTI-COAG VISIT (OUTPATIENT)
Age: 59
End: 2024-12-02
Payer: COMMERCIAL

## 2024-12-02 DIAGNOSIS — Z95.2 S/P AVR (AORTIC VALVE REPLACEMENT): Primary | ICD-10-CM

## 2024-12-02 DIAGNOSIS — Z51.81 ENCOUNTER FOR THERAPEUTIC DRUG MONITORING: ICD-10-CM

## 2024-12-02 DIAGNOSIS — Z79.01 ANTICOAGULATED ON COUMADIN: ICD-10-CM

## 2024-12-02 LAB — POC INR: 1.6 (ref 0.8–1.2)

## 2024-12-02 PROCEDURE — 99211 OFF/OP EST MAY X REQ PHY/QHP: CPT | Performed by: PHARMACIST

## 2024-12-02 PROCEDURE — 85610 PROTHROMBIN TIME: CPT | Performed by: PHARMACIST

## 2024-12-02 NOTE — PROGRESS NOTES
Medication Management Clinic  [facility]  Anticoagulation Clinic  [phone] (phone)  [phone] (fax)    Ms. Jacki Moura is a 59 y.o.  female with history of  onX AVR  who presents today for anticoagulation monitoring and adjustment. Goal INR 1.5-2.0    Patient verifies current dosing regimen and tablet strength.  No missed or extra doses.  Patient denies s/s bleeding/bruising/swelling/SOB  No blood in urine or stool.  No dietary changes.  No changes in medication/OTC agents/Herbals.  No change in alcohol use or tobacco use.  No change in activity level.  Patient denies falls.  No vomiting/diarrhea or acute illness.   No Procedures scheduled in the future at this time.    Assessment:   Lab Results   Component Value Date    INR 1.60 (H) 12/02/2024    INR 1.80 (H) 11/25/2024    INR 1.23 (H) 11/19/2024     INR therapeutic   Recent Labs     12/02/24  1347   INR 1.60*         Plan:  Continue Coumadin 4mg every Monday and Friday; 3mg all other days.  Recheck INR in 2 week(s).  Patient reminded to call the Anticoagulation Clinic with any signs or symptoms of bleeding or with any medication changes.  Patient given instructions utilizing the teach back method.        After visit summary printed and reviewed with patient.      Discharged ambulatory in no apparent distress.    Krystian Milligan RP, PharmD, BCPS  Clinical Pharmacy Specialist  12/2/2024 1:57 PM    For Pharmacy Admin Tracking Only    Time Spent (min): 20

## 2024-12-03 ENCOUNTER — OFFICE VISIT (OUTPATIENT)
Dept: CARDIOLOGY CLINIC | Age: 59
End: 2024-12-03
Payer: COMMERCIAL

## 2024-12-03 ENCOUNTER — CARE COORDINATION (OUTPATIENT)
Dept: CARE COORDINATION | Age: 59
End: 2024-12-03

## 2024-12-03 VITALS
BODY MASS INDEX: 27.29 KG/M2 | HEIGHT: 60 IN | DIASTOLIC BLOOD PRESSURE: 70 MMHG | WEIGHT: 139 LBS | HEART RATE: 87 BPM | SYSTOLIC BLOOD PRESSURE: 130 MMHG

## 2024-12-03 DIAGNOSIS — I50.32 HEART FAILURE WITH IMPROVED EJECTION FRACTION (HFIMPEF) (HCC): ICD-10-CM

## 2024-12-03 DIAGNOSIS — Z95.2 H/O MECHANICAL AORTIC VALVE REPLACEMENT: Primary | ICD-10-CM

## 2024-12-03 DIAGNOSIS — Z98.890 HISTORY OF RIGHT-SIDED CAROTID ENDARTERECTOMY: ICD-10-CM

## 2024-12-03 PROCEDURE — 99214 OFFICE O/P EST MOD 30 MIN: CPT | Performed by: PHYSICIAN ASSISTANT

## 2024-12-03 PROCEDURE — 3078F DIAST BP <80 MM HG: CPT | Performed by: PHYSICIAN ASSISTANT

## 2024-12-03 PROCEDURE — 3075F SYST BP GE 130 - 139MM HG: CPT | Performed by: PHYSICIAN ASSISTANT

## 2024-12-03 NOTE — PROGRESS NOTES
[facility] PHYSICIANS [place] SPECIALTY  [facility] CARDIOLOGY  [address]  [address]  [place] [address]  Dept: [phone]  Dept Fax: [phone]  Loc: [phone]    Chief Complaint   Patient presents with    Follow-up     Office visit 9/17/2024  Patient with a history of severe aortic regurgitation and right carotid stenosis presents for follow-up.  Patient is getting prepared to undergo a mechanical aortic valve replacement and a right carotid endarterectomy which are to be done during the same surgery.  She states since starting on a fluid restriction she has not had any further exacerbations of congestive heart failure.  She does get some occasional left-sided chest discomfort that radiates to her left arm.  She did have a heart catheterization which revealed nonobstructive coronary artery disease.  Cardiologist:  Dr. Hayward    History of Present Illness  The patient is a 59-year-old female who presents for follow-up after recent right carotid endarterectomy and mechanical aortic valve replacement.    She reports that her surgical wound has healed well, with no drainage or complications. She is not experiencing any postoperative issues such as arrhythmias. Her heart failure symptoms have improved significantly following the valve replacement. She has been mindful of her salt and fluid intake, which has resulted in some weight loss.      She is currently on a regimen of aspirin 81 mg daily, atorvastatin 40 mg at night, carvedilol 6.25 mg twice a day, Coumadin, and iron every other day. She was on Lovenox for a week and had her INR levels checked yesterday and last week. She is now scheduled for INR checks every two weeks.    She was taken off all her blood pressure medications before the surgery, but her blood pressure has been slightly elevated since then. She was previously on three different types of blood pressure medications. She monitors her weight and blood pressure at home

## 2024-12-03 NOTE — CARE COORDINATION
Ambulatory Care Coordination Note     12/3/2024 10:58 AM     ACM outreach attempt by this ACM today to perform care management follow up . ACM was unable to reach the patient by telephone today;   left voice message requesting a return phone call to this ACM.     ACM: Radha Curiel RN     Care Summary Note:   Plan -   Plan to graduate after recovered from surgery, resources in place, est with dentist  Lasix and K+ stopped  CHF zones  Smoking cessation  Early symptom recognition and reporting to prevent ED and admissions  Use same or next day appts at PCP office for non-emergency problems    PCP/Specialist follow up:   Future Appointments         Provider Specialty Dept Phone    12/3/2024 1:00 PM Daniel Alfaro PA-C Cardiology [phone]    12/5/2024 10:00 AM Kyle Smith APRN - CNP Family Medicine [phone]    12/5/2024  2:30 PM [facility] CARDIOPULMONARY CONFERENCE ROOM Cardiac Rehabilitation [phone]    12/16/2024 2:20 PM Mame Brooks, [facility] [phone]    1/15/2025 10:00 AM (Arrive by 9:30 AM) [facility] VASCULAR LAB ROOM 1 Vascular Surgery [phone]    1/20/2025 9:30 AM Jarad Cabrera MD Vascular Surgery [phone]    3/12/2025 11:00 AM Joycelyn Mcdermott APRN - CNP Rheumatology [phone]            Follow Up:   Plan for next ACM outreach in approximately 1 week to complete:  - CC Protocol assessments  - disease specific assessments  - goal progression  - education .

## 2024-12-05 ENCOUNTER — HOSPITAL ENCOUNTER (OUTPATIENT)
Dept: CARDIAC REHAB | Age: 59
Setting detail: THERAPIES SERIES
Discharge: HOME | End: 2024-12-05
Payer: COMMERCIAL

## 2024-12-05 ENCOUNTER — OFFICE VISIT (OUTPATIENT)
Dept: FAMILY MEDICINE CLINIC | Age: 59
End: 2024-12-05

## 2024-12-05 VITALS
SYSTOLIC BLOOD PRESSURE: 108 MMHG | TEMPERATURE: 97.4 F | BODY MASS INDEX: 27.01 KG/M2 | HEIGHT: 60 IN | HEART RATE: 86 BPM | WEIGHT: 137.6 LBS | RESPIRATION RATE: 18 BRPM | DIASTOLIC BLOOD PRESSURE: 72 MMHG

## 2024-12-05 DIAGNOSIS — M54.2 CHRONIC NECK PAIN: Primary | ICD-10-CM

## 2024-12-05 DIAGNOSIS — G89.29 CHRONIC NECK PAIN: Primary | ICD-10-CM

## 2024-12-05 DIAGNOSIS — Z98.890 S/P CAROTID ENDARTERECTOMY: ICD-10-CM

## 2024-12-05 DIAGNOSIS — Z95.2 S/P AVR (AORTIC VALVE REPLACEMENT): ICD-10-CM

## 2024-12-05 PROBLEM — M79.622 LEFT UPPER ARM PAIN: Status: RESOLVED | Noted: 2024-01-09 | Resolved: 2024-12-05

## 2024-12-05 PROCEDURE — G0422 INTENS CARDIAC REHAB W/EXERC: HCPCS

## 2024-12-05 RX ORDER — HYDROCODONE BITARTRATE AND ACETAMINOPHEN 5; 325 MG/1; MG/1
1 TABLET ORAL EVERY 8 HOURS PRN
Qty: 90 TABLET | Refills: 0 | Status: SHIPPED | OUTPATIENT
Start: 2024-12-05 | End: 2025-01-04

## 2024-12-05 ASSESSMENT — ENCOUNTER SYMPTOMS
SORE THROAT: 0
SHORTNESS OF BREATH: 0
VOMITING: 0
ABDOMINAL PAIN: 0
NAUSEA: 0
EYE DISCHARGE: 0
BACK PAIN: 0
TROUBLE SWALLOWING: 0
COUGH: 0
DIARRHEA: 0
CONSTIPATION: 0
EYE PAIN: 0
EYE REDNESS: 0
RHINORRHEA: 0
ALLERGIC/IMMUNOLOGIC NEGATIVE: 1
WHEEZING: 0

## 2024-12-05 ASSESSMENT — PATIENT HEALTH QUESTIONNAIRE - PHQ9
9. THOUGHTS THAT YOU WOULD BE BETTER OFF DEAD, OR OF HURTING YOURSELF: NOT AT ALL
SUM OF ALL RESPONSES TO PHQ QUESTIONS 1-9: 8
7. TROUBLE CONCENTRATING ON THINGS, SUCH AS READING THE NEWSPAPER OR WATCHING TELEVISION: NOT AT ALL
SUM OF ALL RESPONSES TO PHQ QUESTIONS 1-9: 8
6. FEELING BAD ABOUT YOURSELF - OR THAT YOU ARE A FAILURE OR HAVE LET YOURSELF OR YOUR FAMILY DOWN: NOT AT ALL
5. POOR APPETITE OR OVEREATING: SEVERAL DAYS
3. TROUBLE FALLING OR STAYING ASLEEP: NEARLY EVERY DAY
2. FEELING DOWN, DEPRESSED OR HOPELESS: SEVERAL DAYS
10. IF YOU CHECKED OFF ANY PROBLEMS, HOW DIFFICULT HAVE THESE PROBLEMS MADE IT FOR YOU TO DO YOUR WORK, TAKE CARE OF THINGS AT HOME, OR GET ALONG WITH OTHER PEOPLE: NOT DIFFICULT AT ALL
1. LITTLE INTEREST OR PLEASURE IN DOING THINGS: SEVERAL DAYS
8. MOVING OR SPEAKING SO SLOWLY THAT OTHER PEOPLE COULD HAVE NOTICED. OR THE OPPOSITE, BEING SO FIGETY OR RESTLESS THAT YOU HAVE BEEN MOVING AROUND A LOT MORE THAN USUAL: NOT AT ALL
SUM OF ALL RESPONSES TO PHQ9 QUESTIONS 1 & 2: 2
SUM OF ALL RESPONSES TO PHQ QUESTIONS 1-9: 8
4. FEELING TIRED OR HAVING LITTLE ENERGY: MORE THAN HALF THE DAYS
SUM OF ALL RESPONSES TO PHQ QUESTIONS 1-9: 8

## 2024-12-05 NOTE — PLAN OF CARE
at increasing workloads  [] associated symptoms  Monitored telemetry has revealed     [] documented arrhythmia at increasing workloads  [] associated symptoms  Monitored telemetry has revealed     [] documented arrhythmia at increasing workloads  [] associated symptoms  Monitored telemetry has revealed     [] documented arrhythmia at increasing workloads  [] associated symptoms    Physician Response    [x] Cardiac rehab is reasonably and medically necessary for continuous cardiac monitoring surveillance  of patient's cardiac activity  [x] Initiate continuous telemetry monitoring and notify me with any concerns  [] Other   Physician Response    [x] Cardiac rehab is reasonably and medically necessary for continuous cardiac monitoring surveillance  of patient's cardiac activity  [x] Continue continuous telemetry monitoring and notify me with any concerns  [] Other     Physician Response    [x] Cardiac rehab is reasonably and medically necessary for continuous cardiac monitoring surveillance  of patient's cardiac activity  [x] Continue continuous telemetry monitoring and notify me with any concerns   [] Other     Physician Response    [x] Cardiac rehab is reasonably and medically necessary for continuous cardiac monitoring surveillance  of patient's cardiac activity  [x] Continue continuous telemetry monitoring and notify me with any concerns   [] Other     Physician Response    [x] Cardiac rehab is reasonably and medically necessary for continuous cardiac monitoring surveillance  of patient's cardiac activity  [x] Continue continuous telemetry monitoring and notify me with any concerns   [] Other

## 2024-12-05 NOTE — PROGRESS NOTES
Multiple Vitamin (MULTIVITAMIN) TABS tablet, Take 1 tablet by mouth daily (with breakfast), Disp: 90 tablet, Rfl: 3    diphenhydrAMINE-APAP, sleep, (TYLENOL PM EXTRA STRENGTH PO), Take 2 tablets by mouth at bedtime, Disp: , Rfl:     atorvastatin (LIPITOR) 40 MG tablet, Take 1 tablet by mouth daily (Patient taking differently: Take 1 tablet by mouth at bedtime), Disp: 90 tablet, Rfl: 3    aspirin 81 MG EC tablet, Take 1 tablet by mouth daily, Disp: , Rfl:     The patient is allergic to pletal [cilostazol], bactrim [sulfamethoxazole-trimethoprim], codeine, medrol [methylprednisolone], sulfa antibiotics, and ultram [tramadol hcl].    Past Medical History  Jacki  has a past medical history of Arthritis, Coronary artery disease involving native coronary artery of native heart without angina pectoris, Cushing's disease ([facility]), Hyperlipidemia, Hypertension, and PAD (peripheral artery disease) ([facility]).    Subjective:      Review of Systems   Constitutional:  Negative for activity change, fatigue and fever.   HENT:  Negative for congestion, ear pain, rhinorrhea, sore throat and trouble swallowing.    Eyes:  Negative for pain, discharge and redness.   Respiratory:  Negative for cough, shortness of breath and wheezing.    Cardiovascular: Negative.    Gastrointestinal:  Negative for abdominal pain, constipation, diarrhea, nausea and vomiting.   Endocrine: Negative.    Genitourinary:  Negative for dysuria, frequency and urgency.   Musculoskeletal:  Positive for arthralgias, myalgias and neck pain. Negative for back pain.   Skin:  Negative for rash.   Allergic/Immunologic: Negative.    Neurological:  Negative for dizziness, tremors, weakness and headaches.   Hematological: Negative.    Psychiatric/Behavioral:  Negative for dysphoric mood and sleep disturbance. The patient is not nervous/anxious.        Objective:     /72 (Site: Right Upper Arm, Position: Sitting, Cuff Size: Medium Adult)   Pulse 86   Temp 97.4 °F (36.3 °C)

## 2024-12-10 ENCOUNTER — CARE COORDINATION (OUTPATIENT)
Dept: CARE COORDINATION | Age: 59
End: 2024-12-10

## 2024-12-10 NOTE — CARE COORDINATION
Ambulatory Care Coordination Note     12/10/2024 9:12 AM     Patient Current Location:  Home: [place] [address]     ACM contacted the patient by telephone. Verified name and  with patient as identifiers.     Patient graduated from the High Risk Care Management program on 12/10/2024.  Patient verbalizes confidence in the ability to self-manage at this time. has the ability to self manage at this time..  Care management goals have been completed. No further Ambulatory Care Manager follow up scheduled.      ACM: Radha Curiel RN     Challenges to be reviewed by the provider   Additional needs identified to be addressed with provider No  none               Method of communication with provider: none.    Utilization: Patient has not had any utilization since our last call.     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 Fractured in  but pain is worse last couple years. MRI done 2023 and was advised PT. PCP referred to UNRULY Guerra. Balance needs paid before they will schedule her. She did see a surgeon in [place] in past, Ortho Neuro.   Emphysema - now est with pulmonology. Combivent Respimat ordered. Smoking cessation clinic referral. Cut down to 1/2 pack daily. Advised to cut down every couple days.  Wants adrenal gland removed. Following with endo Dr. Noble      10/21-10/26 admission for right internal carotid stenosis Aortic stenosis  10/21 AVR and right carotid endarterectomy  Incisions healing well. No s/s infection. Denies chest pain, dizziness, SOB. Reviewed CHF zones. No weight gain, edema, SOB. Lasix and K+ stopped. She is tracking daily weight  Est with coumadin clinic.   Started cardiac rehab    Established with specialists including dentist with future appts. Started cardiac rehab. No new barriers. Education complete and goals met. Good understanding of zone management and symptom monitoring. No recent utilization since planned surgery. Has a return to work

## 2024-12-11 ENCOUNTER — HOSPITAL ENCOUNTER (OUTPATIENT)
Dept: CARDIAC REHAB | Age: 59
Setting detail: THERAPIES SERIES
Discharge: HOME | End: 2024-12-11
Payer: COMMERCIAL

## 2024-12-11 PROCEDURE — G0422 INTENS CARDIAC REHAB W/EXERC: HCPCS

## 2024-12-13 ENCOUNTER — HOSPITAL ENCOUNTER (OUTPATIENT)
Dept: CARDIAC REHAB | Age: 59
Setting detail: THERAPIES SERIES
Discharge: HOME | End: 2024-12-13
Payer: COMMERCIAL

## 2024-12-13 PROCEDURE — G0422 INTENS CARDIAC REHAB W/EXERC: HCPCS

## 2024-12-16 ENCOUNTER — ANTI-COAG VISIT (OUTPATIENT)
Age: 59
End: 2024-12-16
Payer: COMMERCIAL

## 2024-12-16 ENCOUNTER — HOSPITAL ENCOUNTER (OUTPATIENT)
Dept: CARDIAC REHAB | Age: 59
Setting detail: THERAPIES SERIES
Discharge: HOME | End: 2024-12-16
Payer: COMMERCIAL

## 2024-12-16 DIAGNOSIS — Z51.81 ENCOUNTER FOR THERAPEUTIC DRUG MONITORING: ICD-10-CM

## 2024-12-16 DIAGNOSIS — Z95.2 S/P AVR (AORTIC VALVE REPLACEMENT): Primary | ICD-10-CM

## 2024-12-16 DIAGNOSIS — Z79.01 ANTICOAGULATED ON COUMADIN: ICD-10-CM

## 2024-12-16 LAB — POC INR: 1.4 (ref 0.8–1.2)

## 2024-12-16 PROCEDURE — 85610 PROTHROMBIN TIME: CPT | Performed by: PHARMACIST

## 2024-12-16 PROCEDURE — 99211 OFF/OP EST MAY X REQ PHY/QHP: CPT | Performed by: PHARMACIST

## 2024-12-16 PROCEDURE — G0422 INTENS CARDIAC REHAB W/EXERC: HCPCS

## 2024-12-16 NOTE — PROGRESS NOTES
Medication Management Clinic  [facility]  Anticoagulation Clinic  [phone] (phone)  [phone] (fax)    Ms. Jacki Moura is a 59 y.o.  female with history of  onX AVR  who presents today for anticoagulation monitoring and adjustment.    Patient verifies current dosing regimen and tablet strength.  Missed dose on 12/11.  Patient denies s/s bleeding/bruising/swelling/SOB.  No blood in urine or stool.  No dietary changes.  No changes in medication/OTC agents/Herbals.  No change in alcohol use or tobacco use.  Increased activity (started cardiac rehab last week - goes three times a week). Plans to continue this until Jan 10th.   Patient denies falls.  No vomiting/diarrhea or acute illness.   No Procedures scheduled in the future at this time.    Assessment:   INR goal 1.5-2  Lab Results   Component Value Date    INR 1.40 (H) 12/16/2024    INR 1.60 (H) 12/02/2024    INR 1.80 (H) 11/25/2024     INR subtherapeutic   Recent Labs     12/16/24  1428   INR 1.40*     Patient interview completed and discussed with pharmacist by NEVIN Calderón PharmD Candidate 2025.    Plan:  Coumadin 6mg x1 then continue Coumadin 4mg MF 3mg STuWthS.  Recheck INR in 2 week(s).  Patient reminded to call the Anticoagulation Clinic with any signs or symptoms of bleeding or with any medication changes.  Patient given instructions utilizing the teach back method.    After visit summary printed and reviewed with patient.      Discharged ambulatory in no apparent distress.    For Pharmacy Admin Tracking Only    Intervention Detail: Dose Adjustment: 1, reason: Therapy Optimization  Total # of Interventions Recommended: 1  Total # of Interventions Accepted: 1  Time Spent (min): 20

## 2024-12-18 ENCOUNTER — HOSPITAL ENCOUNTER (OUTPATIENT)
Dept: CARDIAC REHAB | Age: 59
Setting detail: THERAPIES SERIES
Discharge: HOME | End: 2024-12-18
Payer: COMMERCIAL

## 2024-12-18 PROCEDURE — G0422 INTENS CARDIAC REHAB W/EXERC: HCPCS

## 2024-12-19 DIAGNOSIS — F41.9 ANXIETY: ICD-10-CM

## 2024-12-20 ENCOUNTER — HOSPITAL ENCOUNTER (OUTPATIENT)
Dept: CARDIAC REHAB | Age: 59
Setting detail: THERAPIES SERIES
Discharge: HOME | End: 2024-12-20
Payer: COMMERCIAL

## 2024-12-20 PROCEDURE — G0422 INTENS CARDIAC REHAB W/EXERC: HCPCS

## 2024-12-20 RX ORDER — ALPRAZOLAM 0.5 MG
0.5 TABLET ORAL 3 TIMES DAILY PRN
Qty: 90 TABLET | Refills: 0 | Status: SHIPPED | OUTPATIENT
Start: 2024-12-20 | End: 2025-01-19

## 2024-12-23 ENCOUNTER — APPOINTMENT (OUTPATIENT)
Dept: CARDIAC REHAB | Age: 59
End: 2024-12-23
Payer: COMMERCIAL

## 2024-12-25 ENCOUNTER — APPOINTMENT (OUTPATIENT)
Dept: CARDIAC REHAB | Age: 59
End: 2024-12-25
Payer: COMMERCIAL

## 2024-12-27 ENCOUNTER — HOSPITAL ENCOUNTER (OUTPATIENT)
Dept: CARDIAC REHAB | Age: 59
Setting detail: THERAPIES SERIES
End: 2024-12-27
Payer: COMMERCIAL

## 2024-12-30 ENCOUNTER — ANTI-COAG VISIT (OUTPATIENT)
Age: 59
End: 2024-12-30
Payer: COMMERCIAL

## 2024-12-30 ENCOUNTER — APPOINTMENT (OUTPATIENT)
Dept: CARDIAC REHAB | Age: 59
End: 2024-12-30
Payer: COMMERCIAL

## 2024-12-30 DIAGNOSIS — Z51.81 ENCOUNTER FOR THERAPEUTIC DRUG MONITORING: ICD-10-CM

## 2024-12-30 DIAGNOSIS — Z79.01 ANTICOAGULATED ON COUMADIN: ICD-10-CM

## 2024-12-30 DIAGNOSIS — Z95.2 S/P AVR (AORTIC VALVE REPLACEMENT): Primary | ICD-10-CM

## 2024-12-30 LAB — POC INR: 1.1 (ref 0.8–1.2)

## 2024-12-30 PROCEDURE — 85610 PROTHROMBIN TIME: CPT

## 2024-12-30 PROCEDURE — 99212 OFFICE O/P EST SF 10 MIN: CPT

## 2024-12-30 NOTE — PROGRESS NOTES
Optimization  Total # of Interventions Recommended: 1  Total # of Interventions Accepted: 1  Time Spent (min):  23

## 2025-01-02 DIAGNOSIS — Z98.890 S/P CAROTID ENDARTERECTOMY: ICD-10-CM

## 2025-01-02 DIAGNOSIS — M54.2 CHRONIC NECK PAIN: ICD-10-CM

## 2025-01-02 DIAGNOSIS — Z95.2 S/P AVR (AORTIC VALVE REPLACEMENT): ICD-10-CM

## 2025-01-02 DIAGNOSIS — G89.29 CHRONIC NECK PAIN: ICD-10-CM

## 2025-01-02 RX ORDER — HYDROCODONE BITARTRATE AND ACETAMINOPHEN 5; 325 MG/1; MG/1
1 TABLET ORAL EVERY 8 HOURS PRN
Qty: 90 TABLET | Refills: 0 | Status: SHIPPED | OUTPATIENT
Start: 2025-01-02 | End: 2025-02-01

## 2025-01-02 ASSESSMENT — PATIENT HEALTH QUESTIONNAIRE - PHQ9
SUM OF ALL RESPONSES TO PHQ QUESTIONS 1-9: 0
2. FEELING DOWN, DEPRESSED OR HOPELESS: NOT AT ALL
SUM OF ALL RESPONSES TO PHQ QUESTIONS 1-9: 0
1. LITTLE INTEREST OR PLEASURE IN DOING THINGS: NOT AT ALL
SUM OF ALL RESPONSES TO PHQ9 QUESTIONS 1 & 2: 0
SUM OF ALL RESPONSES TO PHQ9 QUESTIONS 1 & 2: 0
1. LITTLE INTEREST OR PLEASURE IN DOING THINGS: NOT AT ALL
2. FEELING DOWN, DEPRESSED OR HOPELESS: NOT AT ALL

## 2025-01-03 ENCOUNTER — HOSPITAL ENCOUNTER (OUTPATIENT)
Dept: CARDIAC REHAB | Age: 60
Setting detail: THERAPIES SERIES
Discharge: HOME | End: 2025-01-03

## 2025-01-03 ENCOUNTER — OFFICE VISIT (OUTPATIENT)
Dept: FAMILY MEDICINE CLINIC | Age: 60
End: 2025-01-03

## 2025-01-03 VITALS
WEIGHT: 138.4 LBS | HEIGHT: 60 IN | BODY MASS INDEX: 27.17 KG/M2 | DIASTOLIC BLOOD PRESSURE: 84 MMHG | RESPIRATION RATE: 16 BRPM | SYSTOLIC BLOOD PRESSURE: 124 MMHG | HEART RATE: 95 BPM

## 2025-01-03 DIAGNOSIS — D49.7 HYPERCORTISOLISM DUE TO ADRENAL NEOPLASM (HCC): ICD-10-CM

## 2025-01-03 DIAGNOSIS — J43.2 CENTRILOBULAR EMPHYSEMA (HCC): Primary | ICD-10-CM

## 2025-01-03 DIAGNOSIS — I50.32 HEART FAILURE WITH IMPROVED EJECTION FRACTION (HFIMPEF) (HCC): ICD-10-CM

## 2025-01-03 DIAGNOSIS — E24.8 HYPERCORTISOLISM DUE TO ADRENAL NEOPLASM (HCC): ICD-10-CM

## 2025-01-03 ASSESSMENT — ENCOUNTER SYMPTOMS
WHEEZING: 0
BACK PAIN: 0
TROUBLE SWALLOWING: 0
CONSTIPATION: 0
DIARRHEA: 0
ALLERGIC/IMMUNOLOGIC NEGATIVE: 1
EYE REDNESS: 0
SORE THROAT: 0
VOMITING: 0
NAUSEA: 0
ABDOMINAL PAIN: 0
EYE DISCHARGE: 0
EYE PAIN: 0
COUGH: 0
RHINORRHEA: 0
SHORTNESS OF BREATH: 0

## 2025-01-03 NOTE — PLAN OF CARE
Educational Videos    Exercise  [] Improve Performance  [] Intro to Yoga  Medical  [] Aging Enhancing Your QoL  [] Biology of Weight Control  [] Decoding Lab Results  [] Diseases of Our Time - Overview  [] Sleep Disorders    Nutrition  [] Dining Out - Part 2  [] Fueling a Healthy Body  [] Menu Workshop  [] Planning Your Eating Strategy  [] Targeting Your Nutrition Priorities  [] Vitamins & Minerals    Healthy Mind-Set  [x] Smoking Cessation    Culinary  []Becoming a Pritikin    [] Cooking - Breakfast & Snacks  [] Cooking -Dinner & Sides  [] Cooking -Healhty Salads & Dressing  [] Cooking -Soups & Desserts    Overview  [] The Pritikin Solution Additional Educational Videos Completed    [] Yes    [x] No    If no, please explain:       *Goals* *Goals*   Goal Status   [x] Initial   [] In Progress   [] Met Goal Status   [] Initial   [x] In Progress   [] Met   Other Core Component Goals:  Jacki's other core component goal return to work by the end of January    Progress/Achievement of goals:  Jacki  [x] achieved set goals  [] Has not achieved set goal  **     Monitored telemetry has revealed NSR    [] documented arrhythmia at increasing workloads  [] associated symptoms  Monitored telemetry has revealed NSR    [] documented arrhythmia at increasing workloads  [] associated symptoms    Physician Response    [x] Cardiac rehab is reasonably and medically necessary for continuous cardiac monitoring surveillance  of patient's cardiac activity  [x] Initiate continuous telemetry monitoring and notify me with any concerns  [] Other        Cosigned by: Teja Hayward MD at 12/6/2024  8:39 AM     Revision History    Date/Time User Provider Type Action   12/6/2024  8:39 AM Teja Hayward MD Physician Cosign   12/5/2024  3:31 PM Nelson Huffman EP Exercise Physiologist Sign

## 2025-01-03 NOTE — PROGRESS NOTES
to pletal [cilostazol], bactrim [sulfamethoxazole-trimethoprim], codeine, medrol [methylprednisolone], sulfa antibiotics, and ultram [tramadol hcl].    Past Medical History  Jacki  has a past medical history of Arthritis, Coronary artery disease involving native coronary artery of native heart without angina pectoris, Cushing's disease ([facility]), Hyperlipidemia, Hypertension, and PAD (peripheral artery disease) ([facility]).    Subjective:      Review of Systems   Constitutional:  Negative for activity change, fatigue and fever.   HENT:  Negative for congestion, ear pain, rhinorrhea, sore throat and trouble swallowing.    Eyes:  Negative for pain, discharge and redness.   Respiratory:  Negative for cough, shortness of breath and wheezing.    Cardiovascular: Negative.    Gastrointestinal:  Negative for abdominal pain, constipation, diarrhea, nausea and vomiting.   Endocrine: Negative.    Genitourinary:  Negative for dysuria, frequency and urgency.   Musculoskeletal:  Positive for myalgias. Negative for arthralgias and back pain.   Skin:  Negative for rash.   Allergic/Immunologic: Negative.    Neurological:  Negative for dizziness, tremors, weakness and headaches.   Hematological: Negative.    Psychiatric/Behavioral:  Negative for dysphoric mood and sleep disturbance. The patient is not nervous/anxious.        Objective:     /84 (Site: Right Upper Arm, Position: Sitting, Cuff Size: Medium Adult)   Pulse 95   Resp 16   Ht 1.524 m (5')   Wt 62.8 kg (138 lb 6.4 oz)   BMI 27.03 kg/m²     Physical Exam  Vitals and nursing note reviewed.   Constitutional:       General: She is not in acute distress.     Appearance: She is well-developed. She is not ill-appearing or diaphoretic.   HENT:      Right Ear: External ear normal.      Left Ear: External ear normal.      Nose: Nose normal.   Eyes:      General:         Right eye: No discharge.         Left eye: No discharge.      Conjunctiva/sclera: Conjunctivae normal.

## 2025-01-09 ENCOUNTER — ANTI-COAG VISIT (OUTPATIENT)
Age: 60
End: 2025-01-09
Payer: COMMERCIAL

## 2025-01-09 DIAGNOSIS — Z79.01 ANTICOAGULATED ON COUMADIN: ICD-10-CM

## 2025-01-09 DIAGNOSIS — Z51.81 ENCOUNTER FOR THERAPEUTIC DRUG MONITORING: Primary | ICD-10-CM

## 2025-01-09 LAB — POC INR: 1.5 (ref 0.8–1.2)

## 2025-01-09 PROCEDURE — 99211 OFF/OP EST MAY X REQ PHY/QHP: CPT

## 2025-01-09 PROCEDURE — 85610 PROTHROMBIN TIME: CPT

## 2025-01-09 NOTE — PROGRESS NOTES
Medication Management Clinic  [facility]  Anticoagulation Clinic  [phone] (phone)  [phone] (fax)    Ms. Jacki Moura is a 59 y.o.  female with history of  AVR who presents today for anticoagulation monitoring and adjustment.    Patient verifies current dosing regimen and tablet strength.  No missed or extra doses.  Patient denies s/s bleeding/bruising/swelling/SOB  No blood in urine or stool.  Patient reports sauerkraut on 1/1. Unaware of Vitamin K content, provided education on Vitamin K containing greens  No changes in medication/OTC agents/Herbals.  Patient reports some champagne and 1 shot of liquor on 1/1/25  No change in activity level.  Patient denies falls.  Patient reports diarrhea on 1/5 that has since resolved  No Procedures scheduled in the future at this time.    Assessment:   Lab Results   Component Value Date    INR 1.50 (H) 01/09/2025    INR 1.10 12/30/2024    INR 1.40 (H) 12/16/2024     INR therapeutic   Recent Labs     01/09/25  0903   INR 1.50*      Goal: 1.5-2.0    Plan:  Continue Coumadin 3 mg every Mon, Wed, Fri; 4 mg all other days. Recheck INR in 1 week. Patient reminded to call the Anticoagulation Clinic with any signs or symptoms of bleeding or with any medication changes.  Patient given instructions utilizing the teach back method.     After visit summary printed and reviewed with patient.      Discharged ambulatory in no apparent distress.    For Pharmacy Admin Tracking Only    Time Spent (min): 20    Fallon Vyas PharmD  1/9/2025 at 9:04 AM

## 2025-01-15 ENCOUNTER — HOSPITAL ENCOUNTER (OUTPATIENT)
Dept: INTERVENTIONAL RADIOLOGY/VASCULAR | Age: 60
Discharge: HOME | End: 2025-01-17
Payer: COMMERCIAL

## 2025-01-15 ENCOUNTER — ANTI-COAG VISIT (OUTPATIENT)
Age: 60
End: 2025-01-15
Payer: COMMERCIAL

## 2025-01-15 DIAGNOSIS — Z51.81 ENCOUNTER FOR THERAPEUTIC DRUG MONITORING: Primary | ICD-10-CM

## 2025-01-15 DIAGNOSIS — Z79.01 ANTICOAGULATED ON COUMADIN: ICD-10-CM

## 2025-01-15 DIAGNOSIS — I65.21 STENOSIS OF RIGHT CAROTID ARTERY: ICD-10-CM

## 2025-01-15 LAB — POC INR: 1.5 (ref 0.8–1.2)

## 2025-01-15 PROCEDURE — 85610 PROTHROMBIN TIME: CPT | Performed by: PHARMACIST

## 2025-01-15 PROCEDURE — 99211 OFF/OP EST MAY X REQ PHY/QHP: CPT | Performed by: PHARMACIST

## 2025-01-15 PROCEDURE — 93880 EXTRACRANIAL BILAT STUDY: CPT

## 2025-01-15 NOTE — PROGRESS NOTES
Medication Management Clinic  [facility]  Anticoagulation Clinic  [phone] (phone)  [phone] (fax)    Ms. Jacki Moura is a 59 y.o.  female with history of Mechanical AVR who presents today for anticoagulation monitoring and adjustment.    Patient verifies current dosing regimen and tablet strength.  No missed or extra doses.  Patient denies s/s bleeding/bruising/swelling/SOB  No blood in urine or stool.  No dietary changes.  No changes in medication/OTC agents/Herbals.  No change in alcohol use or tobacco use.  No change in activity level.  Patient denies falls.  No vomiting/diarrhea or acute illness.   No Procedures scheduled in the future at this time.    POCT: 1.5    Assessment:   Lab Results   Component Value Date    INR 1.50 (H) 01/15/2025    INR 1.50 (H) 01/09/2025    INR 1.10 12/30/2024     INR therapeutic   Recent Labs     01/15/25  0847   INR 1.50*         Plan:  Increase to Coumadin 3 mg MF, 4 mg TWThSaSu (4% increase as pt therapeutic but staying at low end or below for multiple sequential INR checks).  Recheck INR in 2 week(s).  Patient reminded to call the Anticoagulation Clinic with any signs or symptoms of bleeding or with any medication changes.  Patient given instructions utilizing the teach back method.    After visit summary printed and reviewed with patient.      Discharged ambulatory in no apparent distress.    For Pharmacy Admin Tracking Only    Time Spent (min): 20

## 2025-01-19 DIAGNOSIS — M54.16 CHRONIC RADICULAR LUMBAR PAIN: ICD-10-CM

## 2025-01-19 DIAGNOSIS — R20.2 NUMBNESS AND TINGLING OF LEFT LEG: ICD-10-CM

## 2025-01-19 DIAGNOSIS — R20.0 NUMBNESS AND TINGLING OF LEFT LEG: ICD-10-CM

## 2025-01-19 DIAGNOSIS — G89.29 CHRONIC RADICULAR LUMBAR PAIN: ICD-10-CM

## 2025-01-20 ENCOUNTER — OFFICE VISIT (OUTPATIENT)
Age: 60
End: 2025-01-20
Payer: COMMERCIAL

## 2025-01-20 VITALS
HEIGHT: 60 IN | DIASTOLIC BLOOD PRESSURE: 69 MMHG | SYSTOLIC BLOOD PRESSURE: 102 MMHG | WEIGHT: 139.4 LBS | BODY MASS INDEX: 27.37 KG/M2 | HEART RATE: 80 BPM

## 2025-01-20 DIAGNOSIS — I70.201 STENOSIS OF RIGHT POPLITEAL ARTERY (HCC): ICD-10-CM

## 2025-01-20 DIAGNOSIS — I65.21 STENOSIS OF RIGHT CAROTID ARTERY: Primary | ICD-10-CM

## 2025-01-20 DIAGNOSIS — I73.9 PAD (PERIPHERAL ARTERY DISEASE) (HCC): ICD-10-CM

## 2025-01-20 PROCEDURE — 3078F DIAST BP <80 MM HG: CPT | Performed by: THORACIC SURGERY (CARDIOTHORACIC VASCULAR SURGERY)

## 2025-01-20 PROCEDURE — 3074F SYST BP LT 130 MM HG: CPT | Performed by: THORACIC SURGERY (CARDIOTHORACIC VASCULAR SURGERY)

## 2025-01-20 PROCEDURE — 99213 OFFICE O/P EST LOW 20 MIN: CPT | Performed by: THORACIC SURGERY (CARDIOTHORACIC VASCULAR SURGERY)

## 2025-01-20 RX ORDER — PREGABALIN 50 MG/1
50 CAPSULE ORAL 3 TIMES DAILY
Qty: 90 CAPSULE | Refills: 1 | Status: SHIPPED | OUTPATIENT
Start: 2025-01-20 | End: 2025-03-21

## 2025-01-20 RX ORDER — ALPRAZOLAM 0.5 MG
0.5 TABLET ORAL 3 TIMES DAILY PRN
COMMUNITY
Start: 2024-12-23 | End: 2025-01-21 | Stop reason: SDUPTHER

## 2025-01-20 NOTE — PROGRESS NOTES
11/18/2024 09:55 AM    MCH 26.7 11/18/2024 09:55 AM    MCHC 29.9 11/18/2024 09:55 AM     11/18/2024 09:55 AM    MPV 9.9 11/18/2024 09:55 AM       Lab Results   Component Value Date/Time     11/18/2024 09:55 AM    K 5.2 11/18/2024 09:55 AM    K 4.0 09/02/2024 05:10 AM     11/18/2024 09:55 AM    CO2 23 11/18/2024 09:55 AM    BUN 12 11/18/2024 09:55 AM    CREATININE 0.7 11/18/2024 09:55 AM    CALCIUM 9.2 11/18/2024 09:55 AM    LABGLOM > 90 11/18/2024 09:55 AM    LABGLOM 74 03/25/2024 12:31 PM    GLUCOSE 116 11/18/2024 09:55 AM       Lab Results   Component Value Date/Time    ALKPHOS 113 09/11/2024 12:08 PM    ALT 15 09/11/2024 12:08 PM    AST 14 09/11/2024 12:08 PM    BILITOT <0.2 09/11/2024 12:08 PM    BILIDIR <0.1 09/11/2024 12:08 PM       Lab Results   Component Value Date/Time    MG 2.3 10/22/2024 04:40 AM       Lab Results   Component Value Date    INR 1.50 (H) 01/15/2025    INR 1.50 (H) 01/09/2025    INR 1.10 12/30/2024         Lab Results   Component Value Date/Time    LABA1C 5.8 09/11/2024 12:08 PM       Lab Results   Component Value Date/Time    TRIG 91 07/06/2024 03:43 AM    HDL 35 07/06/2024 03:43 AM       Lab Results   Component Value Date/Time    TSH 0.733 08/30/2024 01:00 AM         Testing Reviewed:      Venus Reflux: No results found for this or any previous visit.    Carotid Duplex: No results found for this or any previous visit.    Lower Extremity Arterial Duplex:   Results for orders placed or performed during the hospital encounter of 04/18/24   Vascular duplex lower extremity arteries bilateral    Narrative    PROCEDURE: VAS DUP LOWER EXTREMITY ARTERIES BILATERAL    CLINICAL INFORMATION: Atherectomy right SFA and popliteal artery. Angioplasty stenting right popliteal artery, angioplasty and entire length right SFA and stenting right common iliac artery. Prior angioplasty distal left adnexal iliac artery, stenting   mid segment left SFA, stenting proximal left external iliac

## 2025-01-20 NOTE — PATIENT INSTRUCTIONS
If you receive a survey asking about your care experience, please respond. Your answers will help ensure you receive high-quality care at this office. Thank you!    Your Medical Assistant today: Yu PINZON  Thank you for coming to our office! It was a pleasure to serve you.

## 2025-01-21 ENCOUNTER — PATIENT MESSAGE (OUTPATIENT)
Dept: FAMILY MEDICINE CLINIC | Age: 60
End: 2025-01-21

## 2025-01-21 DIAGNOSIS — F41.9 ANXIETY: ICD-10-CM

## 2025-01-21 DIAGNOSIS — G89.29 CHRONIC NECK PAIN: Primary | ICD-10-CM

## 2025-01-21 DIAGNOSIS — M54.2 CHRONIC NECK PAIN: Primary | ICD-10-CM

## 2025-01-21 RX ORDER — ALPRAZOLAM 0.5 MG
0.5 TABLET ORAL 3 TIMES DAILY PRN
Qty: 90 TABLET | Refills: 1 | Status: SHIPPED | OUTPATIENT
Start: 2025-01-21 | End: 2025-03-22

## 2025-01-27 ENCOUNTER — TELEPHONE (OUTPATIENT)
Dept: FAMILY MEDICINE CLINIC | Age: 60
End: 2025-01-27

## 2025-01-27 NOTE — TELEPHONE ENCOUNTER
FROM LEONARDO KELLER- Please call patient and find out when she is getting with her cardiologist next.  She stated in the message that we could not move forward with her treatment for hypercortisolism until she saw her cardiologist.  She does need to have a visit with me again because we have a number of medication changes to make if she goes ahead with the new medicine

## 2025-01-28 DIAGNOSIS — M54.2 CHRONIC NECK PAIN: ICD-10-CM

## 2025-01-28 DIAGNOSIS — G89.29 CHRONIC NECK PAIN: ICD-10-CM

## 2025-01-28 DIAGNOSIS — Z95.2 S/P AVR (AORTIC VALVE REPLACEMENT): ICD-10-CM

## 2025-01-28 DIAGNOSIS — Z98.890 S/P CAROTID ENDARTERECTOMY: ICD-10-CM

## 2025-01-29 ENCOUNTER — ANTI-COAG VISIT (OUTPATIENT)
Age: 60
End: 2025-01-29
Payer: COMMERCIAL

## 2025-01-29 DIAGNOSIS — Z79.01 ANTICOAGULATED ON COUMADIN: ICD-10-CM

## 2025-01-29 DIAGNOSIS — Z51.81 ENCOUNTER FOR THERAPEUTIC DRUG MONITORING: Primary | ICD-10-CM

## 2025-01-29 LAB — POC INR: 1.9 (ref 0.8–1.2)

## 2025-01-29 PROCEDURE — 85610 PROTHROMBIN TIME: CPT | Performed by: PHARMACIST

## 2025-01-29 PROCEDURE — 99211 OFF/OP EST MAY X REQ PHY/QHP: CPT | Performed by: PHARMACIST

## 2025-01-29 RX ORDER — WARFARIN SODIUM 2 MG/1
TABLET ORAL
Qty: 60 TABLET | Refills: 3 | Status: SHIPPED | OUTPATIENT
Start: 2025-01-29

## 2025-01-29 NOTE — PROGRESS NOTES
Medication Management Clinic  [facility]  Anticoagulation Clinic  [phone] (phone)  [phone] (fax)    Ms. Jacki Moura is a 59 y.o.  female with history of Mechanical AVR who presents today for anticoagulation monitoring and adjustment.    Patient verifies current dosing regimen and tablet strength.  No missed or extra doses.  Patient denies s/s bleeding/bruising/swelling/SOB  No blood in urine or stool.  No dietary changes.  No changes in medication/OTC agents/Herbals.  No change in alcohol use or tobacco use. Occasional 2 beers. Not daily. Tobacco use declining to 5-10 cigarettes per day. Leaves them behind so she does not have them with her.   No change in activity level, except increasing # of steps per day as she is back to work full time at [employer].  Patient denies falls.  No vomiting/diarrhea or acute illness.   No Procedures scheduled in the future at this time.    Assessment:   Lab Results   Component Value Date    INR 1.90 (H) 01/29/2025    INR 1.50 (H) 01/15/2025    INR 1.50 (H) 01/09/2025     INR therapeutic   Recent Labs     01/29/25  0908   INR 1.90*         Plan:  Continue Coumadin 3 mg MF, 4 mg TWThSaSu.  Recheck INR in 3 week(s).  Patient reminded to call the Anticoagulation Clinic with any signs or symptoms of bleeding or with any medication changes.  Patient given instructions utilizing the teach back method.    After visit summary printed and reviewed with patient.      Discharged ambulatory in no apparent distress.    For Pharmacy Admin Tracking Only    Time Spent (min): 20

## 2025-01-31 RX ORDER — HYDROCODONE BITARTRATE AND ACETAMINOPHEN 5; 325 MG/1; MG/1
1 TABLET ORAL EVERY 8 HOURS PRN
Qty: 90 TABLET | Refills: 0 | Status: SHIPPED | OUTPATIENT
Start: 2025-01-31 | End: 2025-03-02

## 2025-02-19 ENCOUNTER — ANTI-COAG VISIT (OUTPATIENT)
Age: 60
End: 2025-02-19
Payer: COMMERCIAL

## 2025-02-19 DIAGNOSIS — Z95.2 H/O AORTIC VALVE REPLACEMENT: ICD-10-CM

## 2025-02-19 DIAGNOSIS — Z79.01 ANTICOAGULATED ON COUMADIN: ICD-10-CM

## 2025-02-19 DIAGNOSIS — Z51.81 ENCOUNTER FOR THERAPEUTIC DRUG MONITORING: Primary | ICD-10-CM

## 2025-02-19 LAB — POC INR: 1.5 (ref 0.8–1.2)

## 2025-02-19 PROCEDURE — 85610 PROTHROMBIN TIME: CPT

## 2025-02-19 PROCEDURE — 99211 OFF/OP EST MAY X REQ PHY/QHP: CPT

## 2025-02-19 RX ORDER — ACETAMINOPHEN 500 MG
500 TABLET ORAL EVERY 6 HOURS PRN
COMMUNITY

## 2025-02-19 NOTE — PROGRESS NOTES
Medication Management Clinic  [facility]  Anticoagulation Clinic  [phone] (phone)  [phone] (fax)    Ms. Jacki Moura is a 60 y.o.  female with history of AVR (On-X AVR), per referral from VEENA Mcnulty PA-C, who presents today for Warfarin monitoring and adjustment (3 week visit).    Patient verifies current dosing regimen and tablet strength.  No missed or extra doses.  Patient denies swelling.  Sometimes has SOB at work. Thought nose was running the other day, but it was actually a little blood.  Reminded of need to keep nasal membrane moist and how to do so. Has humidifier, but doesn't use it. Also started using ACT gum for dry mouth.  Had to use Nitro. at 0300 recently for left arm pain - relieved with 1.  PCP has told her to contact cardiologist. Revealed dime-sized reddish-purple bruise top of right shoulder (where shoulder is deteriorating); no lump palpated.  States sometimes it fills with blood and breaks open - PCP aware.  No blood in urine or stool.  No dietary changes.  No changes in medication/OTC agents/herbals. Added Tylenol to list - typically uses between Norco doses for deteriorating right shoulder; hurting more (PCP aware).  Reminded not to exceed 3,000 mg each day, including what's in Norco and Tylenol PM.  No change in alcohol use or tobacco use.  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 Has a little more stress.  Patient denies falls.  No vomiting/diarrhea or acute illness.  For past week, sometimes gets nauseated in A.M. after having her tea, then spits up some phlegm.  No procedures scheduled in the future at this time.      Assessment:   Lab Results   Component Value Date    INR 1.50 (H) 02/19/2025    INR 1.90 (H) 01/29/2025    INR 1.50 (H) 01/15/2025     INR therapeutic - goal 1.5-2.0.  Recent Labs     02/19/25  0931   INR 1.50*        Plan:  POCT INR performed/result reviewed.  Continue PO Coumadin 3 mg MF, 4 mg TWThSS.  Recheck INR in 4

## 2025-02-25 ENCOUNTER — TELEPHONE (OUTPATIENT)
Dept: FAMILY MEDICINE CLINIC | Age: 60
End: 2025-02-25

## 2025-02-25 NOTE — TELEPHONE ENCOUNTER
Left message for call back, or schedule through [employer], message sent to patient.  
x  
Universal Safety Interventions

## 2025-02-26 ENCOUNTER — OFFICE VISIT (OUTPATIENT)
Dept: FAMILY MEDICINE CLINIC | Age: 60
End: 2025-02-26

## 2025-02-26 VITALS
OXYGEN SATURATION: 97 % | DIASTOLIC BLOOD PRESSURE: 68 MMHG | SYSTOLIC BLOOD PRESSURE: 100 MMHG | HEART RATE: 81 BPM | WEIGHT: 145.2 LBS | BODY MASS INDEX: 28.51 KG/M2 | RESPIRATION RATE: 16 BRPM | HEIGHT: 60 IN

## 2025-02-26 DIAGNOSIS — Z95.2 S/P AVR (AORTIC VALVE REPLACEMENT): ICD-10-CM

## 2025-02-26 DIAGNOSIS — G89.29 CHRONIC NECK PAIN: ICD-10-CM

## 2025-02-26 DIAGNOSIS — Z00.00 WELL ADULT EXAM: Primary | ICD-10-CM

## 2025-02-26 DIAGNOSIS — E24.8 HYPERCORTISOLISM DUE TO ADRENAL NEOPLASM: ICD-10-CM

## 2025-02-26 DIAGNOSIS — D49.7 HYPERCORTISOLISM DUE TO ADRENAL NEOPLASM: ICD-10-CM

## 2025-02-26 DIAGNOSIS — Z98.890 S/P CAROTID ENDARTERECTOMY: ICD-10-CM

## 2025-02-26 DIAGNOSIS — M85.811 OSTEOPENIA OF RIGHT SHOULDER: ICD-10-CM

## 2025-02-26 DIAGNOSIS — M54.2 CHRONIC NECK PAIN: ICD-10-CM

## 2025-02-26 RX ORDER — HYDROCODONE BITARTRATE AND ACETAMINOPHEN 5; 325 MG/1; MG/1
1 TABLET ORAL EVERY 8 HOURS PRN
Qty: 90 TABLET | Refills: 0 | Status: SHIPPED | OUTPATIENT
Start: 2025-03-02 | End: 2025-04-01

## 2025-02-26 ASSESSMENT — ENCOUNTER SYMPTOMS
SORE THROAT: 0
EYE DISCHARGE: 0
NAUSEA: 0
SHORTNESS OF BREATH: 0
EYE REDNESS: 0
ABDOMINAL PAIN: 0
DIARRHEA: 0
ALLERGIC/IMMUNOLOGIC NEGATIVE: 1
BACK PAIN: 0
COUGH: 0
VOMITING: 0
CONSTIPATION: 0
TROUBLE SWALLOWING: 0
WHEEZING: 0
EYE PAIN: 0
RHINORRHEA: 0

## 2025-02-26 NOTE — PROGRESS NOTES
tablet by mouth 2 times daily (with meals), Disp: 60 tablet, Rfl: 3    Multiple Vitamin (MULTIVITAMIN) TABS tablet, Take 1 tablet by mouth daily (with breakfast), Disp: 90 tablet, Rfl: 3    diphenhydrAMINE-APAP, sleep, (TYLENOL PM EXTRA STRENGTH PO), Take 2 tablets by mouth at bedtime, Disp: , Rfl:     atorvastatin (LIPITOR) 40 MG tablet, Take 1 tablet by mouth daily (Patient taking differently: Take 1 tablet by mouth at bedtime), Disp: 90 tablet, Rfl: 3    aspirin 81 MG EC tablet, Take 1 tablet by mouth daily, Disp: , Rfl:     The patient is allergic to pletal [cilostazol], bactrim [sulfamethoxazole-trimethoprim], codeine, medrol [methylprednisolone], sulfa antibiotics, and ultram [tramadol hcl].    Past Medical History  Jacki  has a past medical history of Coronary artery disease involving native coronary artery of native heart without angina pectoris and PAD (peripheral artery disease).    Subjective:      Review of Systems   Constitutional:  Negative for activity change, fatigue and fever.   HENT:  Negative for congestion, ear pain, rhinorrhea, sore throat and trouble swallowing.    Eyes:  Negative for pain, discharge and redness.   Respiratory:  Negative for cough, shortness of breath and wheezing.    Cardiovascular: Negative.    Gastrointestinal:  Negative for abdominal pain, constipation, diarrhea, nausea and vomiting.   Endocrine: Negative.    Genitourinary:  Negative for dysuria, frequency and urgency.   Musculoskeletal:  Positive for arthralgias. Negative for back pain and myalgias.   Skin:  Negative for rash.   Allergic/Immunologic: Negative.    Neurological:  Negative for dizziness, tremors, weakness and headaches.   Hematological: Negative.    Psychiatric/Behavioral:  Negative for dysphoric mood and sleep disturbance. The patient is not nervous/anxious.        Objective:     /68   Pulse 81   Resp 16   Ht 1.524 m (5')   Wt 65.9 kg (145 lb 3.2 oz)   SpO2 97%   BMI 28.36 kg/m²     Physical

## 2025-02-27 ENCOUNTER — CARE COORDINATION (OUTPATIENT)
Dept: CARE COORDINATION | Age: 60
End: 2025-02-27

## 2025-02-27 NOTE — CARE COORDINATION
Ambulatory Care Coordination Note     2025 11:17 AM     Patient Current Location:  Home: [place] [address]     This patient was received as a referral from Provider.    ACM contacted the patient by telephone. Verified name and  with patient as identifiers. Provided introduction to self, and explanation of the ACM role.   Patient accepted care management services at this time.          ACM: Radha Curiel RN     Challenges to be reviewed by the provider   Additional needs identified to be addressed with provider Yes  plan               Method of communication with provider: none.    Utilization: Initial Call - N/A    Care Summary Note:   Ambulatory care management referral from PCP for remote patient monitoring. PCP wants an at home INR monitoring device for coumadin. PCP would need to be agreeable to manage coumadin. Joycelyn will check on insurance coverage. Unlikely to enroll with RPM if there is a cost. Lives independently with  and daughter. Drives herself. Working two days at a time.   RPM has co-pay of $50 or 20%. Patient declines at this time. PCP advised to cancel order for at home INR machine as this will require weekly follow up and not necessary for the long term.   Cardiac hx - Recent surgeries for mechanical aortic valve replacement and right carotid endarterectomy. Multiple heart caths, CHF improved, BP improved. EF 50% 2024. Cardiology Dr. Hayward. Taking ASA, atorvastatin, carvedilol, coumadin.  Adrenal tumor -  endo was involved. Lost to follow up with hospitalizations. PCP considering Korlym.   Right shoulder pain - AVN possible. Referral to [facility] Dr. Guerra. Appt 3/26.     Plan -   3/12 rheumatology  3/19 CC  3/26 at 10 [facility]  Monitor BP  CHF zones  Early symptom recognition and reporting to prevent ED and admissions  Use acute care appts with PCP office for non-emergency problems    [facility] offered patent enrollment in the [facility] Remote Patient Monitoring (RPM)

## 2025-02-28 ENCOUNTER — CARE COORDINATION (OUTPATIENT)
Dept: CARE COORDINATION | Age: 60
End: 2025-02-28

## 2025-02-28 NOTE — TELEPHONE ENCOUNTER
Please call patient let her know we are going to discontinue the order for the Coumadin machine at home as this requires weekly checks and this is not going to be necessary for the long-term

## 2025-03-03 RX ORDER — CARVEDILOL 6.25 MG/1
6.25 TABLET ORAL 2 TIMES DAILY WITH MEALS
Qty: 60 TABLET | Refills: 0 | Status: SHIPPED | OUTPATIENT
Start: 2025-03-03

## 2025-03-05 ENCOUNTER — TELEPHONE (OUTPATIENT)
Dept: FAMILY MEDICINE CLINIC | Age: 60
End: 2025-03-05

## 2025-03-05 NOTE — TELEPHONE ENCOUNTER
[facility] has denied an appeal for Korlym. I have attached a copy for you to read.

## 2025-03-06 ENCOUNTER — CARE COORDINATION (OUTPATIENT)
Dept: CARE COORDINATION | Age: 60
End: 2025-03-06

## 2025-03-06 NOTE — CARE COORDINATION
Attempted to reach patient for continued Care Coordination follow up and education.  Patient was unavailable at the time of my call, and the call keeps failing.

## 2025-03-12 ENCOUNTER — OFFICE VISIT (OUTPATIENT)
Age: 60
End: 2025-03-12
Payer: COMMERCIAL

## 2025-03-12 VITALS
HEIGHT: 60 IN | SYSTOLIC BLOOD PRESSURE: 116 MMHG | OXYGEN SATURATION: 99 % | HEART RATE: 84 BPM | BODY MASS INDEX: 27.47 KG/M2 | DIASTOLIC BLOOD PRESSURE: 62 MMHG | WEIGHT: 139.9 LBS

## 2025-03-12 DIAGNOSIS — M85.80 OSTEOPENIA, UNSPECIFIED LOCATION: Primary | ICD-10-CM

## 2025-03-12 DIAGNOSIS — E55.9 VITAMIN D DEFICIENCY: ICD-10-CM

## 2025-03-12 DIAGNOSIS — G89.29 CHRONIC RIGHT SHOULDER PAIN: ICD-10-CM

## 2025-03-12 DIAGNOSIS — M79.642 BILATERAL HAND PAIN: ICD-10-CM

## 2025-03-12 DIAGNOSIS — M25.511 CHRONIC RIGHT SHOULDER PAIN: ICD-10-CM

## 2025-03-12 DIAGNOSIS — M79.641 BILATERAL HAND PAIN: ICD-10-CM

## 2025-03-12 DIAGNOSIS — Z72.0 TOBACCO ABUSE: ICD-10-CM

## 2025-03-12 PROCEDURE — 99214 OFFICE O/P EST MOD 30 MIN: CPT | Performed by: NURSE PRACTITIONER

## 2025-03-12 PROCEDURE — 3078F DIAST BP <80 MM HG: CPT | Performed by: NURSE PRACTITIONER

## 2025-03-12 PROCEDURE — 3074F SYST BP LT 130 MM HG: CPT | Performed by: NURSE PRACTITIONER

## 2025-03-12 RX ORDER — ZOLEDRONIC ACID 0.05 MG/ML
5 INJECTION, SOLUTION INTRAVENOUS ONCE
OUTPATIENT
Start: 2025-03-12 | End: 2025-03-12

## 2025-03-12 RX ORDER — DIPHENHYDRAMINE HYDROCHLORIDE 50 MG/ML
50 INJECTION INTRAMUSCULAR; INTRAVENOUS
OUTPATIENT
Start: 2025-03-12

## 2025-03-12 RX ORDER — SODIUM CHLORIDE 9 MG/ML
5-250 INJECTION, SOLUTION INTRAVENOUS PRN
OUTPATIENT
Start: 2025-03-12

## 2025-03-12 RX ORDER — HEPARIN SODIUM (PORCINE) LOCK FLUSH IV SOLN 100 UNIT/ML 100 UNIT/ML
500 SOLUTION INTRAVENOUS PRN
OUTPATIENT
Start: 2025-03-12

## 2025-03-12 RX ORDER — EPINEPHRINE 1 MG/ML
0.3 INJECTION, SOLUTION, CONCENTRATE INTRAVENOUS PRN
OUTPATIENT
Start: 2025-03-12

## 2025-03-12 RX ORDER — ALBUTEROL SULFATE 90 UG/1
4 INHALANT RESPIRATORY (INHALATION) PRN
OUTPATIENT
Start: 2025-03-12

## 2025-03-12 RX ORDER — SODIUM CHLORIDE 9 MG/ML
INJECTION, SOLUTION INTRAVENOUS CONTINUOUS
OUTPATIENT
Start: 2025-03-12

## 2025-03-12 RX ORDER — ACETAMINOPHEN 325 MG/1
650 TABLET ORAL
OUTPATIENT
Start: 2025-03-12

## 2025-03-12 RX ORDER — SODIUM CHLORIDE 0.9 % (FLUSH) 0.9 %
5-40 SYRINGE (ML) INJECTION PRN
OUTPATIENT
Start: 2025-03-12

## 2025-03-12 RX ORDER — ONDANSETRON 2 MG/ML
8 INJECTION INTRAMUSCULAR; INTRAVENOUS
OUTPATIENT
Start: 2025-03-12

## 2025-03-12 RX ORDER — FAMOTIDINE 10 MG/ML
20 INJECTION, SOLUTION INTRAVENOUS
OUTPATIENT
Start: 2025-03-12

## 2025-03-12 ASSESSMENT — ENCOUNTER SYMPTOMS
EYES NEGATIVE: 1
GASTROINTESTINAL NEGATIVE: 1

## 2025-03-12 NOTE — PROGRESS NOTES
[facility]  Bone Fragility Follow up     Visit Date: 3/12/2025  MRN: [id number]  Cc:   Chief Complaint   Patient presents with    Follow-up     1-year f/u for Osteopenia.        HPI:   Jacki Moura  is a(n)60 y.o. female here today for follow up of Osteopenia. No falls or fractures. Taking calcium and vitamin D. Did have not reclast redosed last year - referral notes state they could not reach her to schedule.     Had open heart surgery for aortic calve replacement in October.     Seeing Dr. Guerra for right shoulder pain that has been on ongoing issues for a few years. Saw Jonah Bahena in the past w/ no specific recommendations at that time.     Also complains of some bilat hand pain and stiffness that comes and goes. Started about 6-8 months ago.  Very mild pain, 2-3/10. No alleviating or aggravating factors. + AM stiffness lasting ????    ROS:  Review of Systems   Constitutional: Negative.    HENT: Negative.     Eyes: Negative.    Cardiovascular: Negative.    Gastrointestinal: Negative.    Endocrine: Negative.    Genitourinary: Negative.    Musculoskeletal:  Positive for arthralgias.   Skin: Negative.    Neurological: Negative.    Psychiatric/Behavioral: Negative.           PAST MEDICAL HISTORY  Past Medical History:   Diagnosis Date    Coronary artery disease involving native coronary artery of native heart without angina pectoris 07/09/2024    PAD (peripheral artery disease)        SOCIAL HISTORY  Social History     Socioeconomic History    Marital status:      Spouse name: Sergei    Number of children: 4   Tobacco Use    Smoking status: Every Day     Current packs/day: 0.25     Average packs/day: 1 pack/day for 44.2 years (42.8 ttl pk-yrs)     Types: Cigarettes     Start date: 1981    Smokeless tobacco: Never    Tobacco comments:     2 cigarettes a day   Vaping Use    Vaping status: Never Used   Substance and Sexual Activity    Alcohol use: Not Currently     Comment: none in last

## 2025-03-14 ENCOUNTER — CARE COORDINATION (OUTPATIENT)
Dept: CARE COORDINATION | Age: 60
End: 2025-03-14

## 2025-03-14 NOTE — CARE COORDINATION
Attempted to reach patient for continued Care Coordination follow up and education.  Patient was unavailable at the time of my call, and a generic voicemail message was left asking patient to return my call at [phone].

## 2025-03-19 ENCOUNTER — HOSPITAL ENCOUNTER (OUTPATIENT)
Age: 60
Discharge: HOME | End: 2025-03-19
Payer: COMMERCIAL

## 2025-03-19 ENCOUNTER — ANTI-COAG VISIT (OUTPATIENT)
Age: 60
End: 2025-03-19
Payer: COMMERCIAL

## 2025-03-19 ENCOUNTER — RESULTS FOLLOW-UP (OUTPATIENT)
Age: 60
End: 2025-03-19

## 2025-03-19 DIAGNOSIS — Z79.01 ANTICOAGULATED ON COUMADIN: ICD-10-CM

## 2025-03-19 DIAGNOSIS — E55.9 VITAMIN D DEFICIENCY: ICD-10-CM

## 2025-03-19 DIAGNOSIS — Z51.81 ENCOUNTER FOR THERAPEUTIC DRUG MONITORING: Primary | ICD-10-CM

## 2025-03-19 DIAGNOSIS — M85.80 OSTEOPENIA, UNSPECIFIED LOCATION: ICD-10-CM

## 2025-03-19 DIAGNOSIS — Z95.2 H/O AORTIC VALVE REPLACEMENT: ICD-10-CM

## 2025-03-19 LAB
25(OH)D3 SERPL-MCNC: 33 NG/ML (ref 30–100)
ANION GAP SERPL CALC-SCNC: 11 MEQ/L (ref 8–16)
BUN SERPL-MCNC: 15 MG/DL (ref 8–23)
CALCIUM SERPL-MCNC: 9.3 MG/DL (ref 8.8–10.2)
CHLORIDE SERPL-SCNC: 103 MEQ/L (ref 98–111)
CO2 SERPL-SCNC: 22 MEQ/L (ref 22–29)
CREAT SERPL-MCNC: 0.8 MG/DL (ref 0.5–0.9)
GFR SERPL CREATININE-BSD FRML MDRD: 84 ML/MIN/1.73M2
GLUCOSE SERPL-MCNC: 123 MG/DL (ref 74–109)
POC INR: 1.5 (ref 0.8–1.2)
POTASSIUM SERPL-SCNC: 4.6 MEQ/L (ref 3.5–5.2)
SODIUM SERPL-SCNC: 136 MEQ/L (ref 135–145)

## 2025-03-19 PROCEDURE — 82306 VITAMIN D 25 HYDROXY: CPT

## 2025-03-19 PROCEDURE — 85610 PROTHROMBIN TIME: CPT

## 2025-03-19 PROCEDURE — 36415 COLL VENOUS BLD VENIPUNCTURE: CPT

## 2025-03-19 PROCEDURE — 99211 OFF/OP EST MAY X REQ PHY/QHP: CPT

## 2025-03-19 PROCEDURE — 80048 BASIC METABOLIC PNL TOTAL CA: CPT

## 2025-03-19 NOTE — PROGRESS NOTES
with any medication changes.  Patient given instructions utilizing the teach back method.   After visit summary printed and reviewed with patient.      Discharged ambulatory in no apparent distress.    For Pharmacy Admin Tracking Only    Time Spent (min): 25

## 2025-03-20 ENCOUNTER — PATIENT MESSAGE (OUTPATIENT)
Dept: FAMILY MEDICINE CLINIC | Age: 60
End: 2025-03-20

## 2025-03-20 DIAGNOSIS — F41.9 ANXIETY: ICD-10-CM

## 2025-03-21 ENCOUNTER — CARE COORDINATION (OUTPATIENT)
Dept: CARE COORDINATION | Age: 60
End: 2025-03-21

## 2025-03-21 RX ORDER — ALPRAZOLAM 0.5 MG
0.5 TABLET ORAL 3 TIMES DAILY PRN
Qty: 90 TABLET | Refills: 1 | Status: SHIPPED | OUTPATIENT
Start: 2025-03-21 | End: 2025-05-20

## 2025-03-21 NOTE — TELEPHONE ENCOUNTER
It appears her insurance does not cover the nicotine patches and I do not know where she might be able to get them for free.  There might be some ideas online as there are often national efforts by different organizations to provide them for free

## 2025-03-21 NOTE — CARE COORDINATION
Ambulatory Care Coordination Note     3/21/2025 11:37 AM     ACM outreach attempt by this ACM today to perform care management follow up . ACM was unable to reach the patient by telephone today;   left voice message requesting a return phone call to this ACM.     ACM: Radha Curiel RN     Care Summary Note:   Plan -   3/26 at 10 [facility]  Monitor BP  CHF zones  Early symptom recognition and reporting to prevent ED and admissions  Use acute care appts with PCP office for non-emergency problems    PCP/Specialist follow up:   Future Appointments         Provider Specialty Dept Phone    4/9/2025 9:40 AM Jose E Freeman, [facility] [phone]    6/26/2025 1:00 PM Teja Hayward MD Cardiology [phone]    7/23/2025 9:00 AM (Arrive by 8:30 AM) [facility] VASCULAR LAB ROOM 1 Vascular Surgery [phone]    7/23/2025 9:30 AM (Arrive by 9:00 AM) [facility] VASCULAR LAB ROOM 1 Vascular Surgery [phone]    7/23/2025 11:45 AM Celine Vitale MD Vascular Surgery [phone]    3/18/2026 11:00 AM Joycelyn Mcdermott, APRN - [facility] Rheumatology [phone]            Follow Up:   Plan for next ACM outreach in approximately 1 week to complete:  - CC Protocol assessments  - disease specific assessments  - goal progression  - education   - follow-up appointment with [facility] .

## 2025-03-24 DIAGNOSIS — M54.16 CHRONIC RADICULAR LUMBAR PAIN: ICD-10-CM

## 2025-03-24 DIAGNOSIS — R20.0 NUMBNESS AND TINGLING OF LEFT LEG: ICD-10-CM

## 2025-03-24 DIAGNOSIS — G89.29 CHRONIC RADICULAR LUMBAR PAIN: ICD-10-CM

## 2025-03-24 DIAGNOSIS — R25.2 MUSCLE CRAMPS: ICD-10-CM

## 2025-03-24 DIAGNOSIS — R20.2 NUMBNESS AND TINGLING OF LEFT LEG: ICD-10-CM

## 2025-03-24 RX ORDER — CYCLOBENZAPRINE HCL 10 MG
TABLET ORAL
Qty: 90 TABLET | Refills: 0 | Status: SHIPPED | OUTPATIENT
Start: 2025-03-24

## 2025-03-25 RX ORDER — PREGABALIN 50 MG/1
CAPSULE ORAL
Qty: 90 CAPSULE | Refills: 2 | Status: SHIPPED | OUTPATIENT
Start: 2025-03-25 | End: 2025-04-24

## 2025-03-26 RX ORDER — CARVEDILOL 6.25 MG/1
6.25 TABLET ORAL 2 TIMES DAILY WITH MEALS
Qty: 180 TABLET | Refills: 0 | Status: SHIPPED | OUTPATIENT
Start: 2025-03-26

## 2025-03-28 ENCOUNTER — CARE COORDINATION (OUTPATIENT)
Dept: CARE COORDINATION | Age: 60
End: 2025-03-28

## 2025-03-28 DIAGNOSIS — D49.7 HYPERCORTISOLISM DUE TO ADRENAL NEOPLASM: ICD-10-CM

## 2025-03-28 DIAGNOSIS — E24.8 HYPERCORTISOLISM DUE TO ADRENAL NEOPLASM: ICD-10-CM

## 2025-03-28 DIAGNOSIS — E24.8 HYPERCORTISOLISM DUE TO ADRENAL NEOPLASM: Primary | ICD-10-CM

## 2025-03-28 DIAGNOSIS — D49.7 HYPERCORTISOLISM DUE TO ADRENAL NEOPLASM: Primary | ICD-10-CM

## 2025-03-28 DIAGNOSIS — F41.9 ANXIETY: Primary | ICD-10-CM

## 2025-03-28 RX ORDER — LORAZEPAM 1 MG/1
1 TABLET ORAL EVERY 8 HOURS PRN
Qty: 90 TABLET | Refills: 1 | Status: SHIPPED | OUTPATIENT
Start: 2025-03-28 | End: 2025-05-27

## 2025-03-28 NOTE — CARE COORDINATION
Ambulatory Care Coordination Note     3/28/2025 10:12 AM     ACM outreach attempt by this ACM today to perform care management follow up . ACM was unable to reach the patient by telephone today;   left voice message requesting a return phone call to this ACM.     ACM: Radha Curiel RN     Care Summary Note:   Plan -   3/26 at 10 OIO  Monitor BP  CHF zones  Early symptom recognition and reporting to prevent ED and admissions  Use acute care appts with PCP office for non-emergency problems    PCP/Specialist follow up:   Future Appointments         Provider Specialty Dept Phone    4/9/2025 9:40 AM Jose E Freeman, [facility] [phone]    6/26/2025 1:00 PM Teja Hayward MD Cardiology [phone]    7/23/2025 9:00 AM (Arrive by 8:30 AM) [facility] VASCULAR LAB ROOM 1 Vascular Surgery [phone]    7/23/2025 9:30 AM (Arrive by 9:00 AM) [facility] VASCULAR LAB ROOM 1 Vascular Surgery [phone]    7/23/2025 11:45 AM Celine Vitale MD Vascular Surgery [phone]    3/18/2026 11:00 AM Joycelyn Mcdermott, APRN - [facility] Rheumatology [phone]            Follow Up:   Plan for next ACM outreach in approximately 1 week to complete:  - CC Protocol assessments  - disease specific assessments  - goal progression  - education   - follow-up appointment with OIO for shoulder .

## 2025-03-31 DIAGNOSIS — M54.2 CHRONIC NECK PAIN: ICD-10-CM

## 2025-03-31 DIAGNOSIS — Z98.890 S/P CAROTID ENDARTERECTOMY: ICD-10-CM

## 2025-03-31 DIAGNOSIS — G89.29 CHRONIC NECK PAIN: ICD-10-CM

## 2025-03-31 DIAGNOSIS — Z95.2 S/P AVR (AORTIC VALVE REPLACEMENT): ICD-10-CM

## 2025-03-31 RX ORDER — HYDROCODONE BITARTRATE AND ACETAMINOPHEN 5; 325 MG/1; MG/1
1 TABLET ORAL EVERY 8 HOURS PRN
Qty: 90 TABLET | Refills: 0 | Status: SHIPPED | OUTPATIENT
Start: 2025-03-31 | End: 2025-04-30

## 2025-04-02 RX ORDER — WARFARIN SODIUM 2 MG/1
TABLET ORAL
Qty: 60 TABLET | Refills: 3 | Status: SHIPPED | OUTPATIENT
Start: 2025-04-02

## 2025-04-03 ENCOUNTER — RESULTS FOLLOW-UP (OUTPATIENT)
Dept: FAMILY MEDICINE CLINIC | Age: 60
End: 2025-04-03

## 2025-04-03 ENCOUNTER — HOSPITAL ENCOUNTER (OUTPATIENT)
Age: 60
Discharge: HOME | End: 2025-04-03
Payer: COMMERCIAL

## 2025-04-03 DIAGNOSIS — E24.8 HYPERCORTISOLISM DUE TO ADRENAL NEOPLASM: ICD-10-CM

## 2025-04-03 DIAGNOSIS — D49.7 HYPERCORTISOLISM DUE TO ADRENAL NEOPLASM: ICD-10-CM

## 2025-04-03 DIAGNOSIS — E24.8 HYPERCORTISOLISM DUE TO ADRENAL NEOPLASM: Primary | ICD-10-CM

## 2025-04-03 DIAGNOSIS — D49.7 HYPERCORTISOLISM DUE TO ADRENAL NEOPLASM: Primary | ICD-10-CM

## 2025-04-03 LAB
ANION GAP SERPL CALC-SCNC: 9 MEQ/L (ref 8–16)
BUN SERPL-MCNC: 17 MG/DL (ref 8–23)
CALCIUM SERPL-MCNC: 9.8 MG/DL (ref 8.8–10.2)
CHLORIDE SERPL-SCNC: 103 MEQ/L (ref 98–111)
CO2 SERPL-SCNC: 28 MEQ/L (ref 22–29)
CREAT SERPL-MCNC: 0.8 MG/DL (ref 0.5–0.9)
GFR SERPL CREATININE-BSD FRML MDRD: 84 ML/MIN/1.73M2
GLUCOSE SERPL-MCNC: 92 MG/DL (ref 74–109)
POTASSIUM SERPL-SCNC: 5.1 MEQ/L (ref 3.5–5.2)
SHBG SERPL-SCNC: 65 NMOL/L (ref 17–125)
SODIUM SERPL-SCNC: 140 MEQ/L (ref 135–145)
TESTOST FREE MFR SERPL: ABNORMAL PG/ML (ref 0.6–3.8)
TESTOST SERPL-MCNC: < 3 NG/DL (ref 3–41)

## 2025-04-03 PROCEDURE — 84402 ASSAY OF FREE TESTOSTERONE: CPT

## 2025-04-03 PROCEDURE — 84403 ASSAY OF TOTAL TESTOSTERONE: CPT

## 2025-04-03 PROCEDURE — 36415 COLL VENOUS BLD VENIPUNCTURE: CPT

## 2025-04-03 PROCEDURE — 80048 BASIC METABOLIC PNL TOTAL CA: CPT

## 2025-04-03 PROCEDURE — 84270 ASSAY OF SEX HORMONE GLOBUL: CPT

## 2025-04-03 NOTE — RESULT ENCOUNTER NOTE
Notified of normal labs and to repeat potassium in 2 weeks, she will do this at a [facility] facility.

## 2025-04-04 ENCOUNTER — CARE COORDINATION (OUTPATIENT)
Dept: CARE COORDINATION | Age: 60
End: 2025-04-04

## 2025-04-04 NOTE — CARE COORDINATION
Ambulatory Care Coordination Note     4/4/2025 10:28 AM     patient outreach attempt by this ACM today to perform care management follow up . ACM was unable to reach the patient by telephone today;   left voice message requesting a return phone call to this ACM.     Patient closed (unable to reach patient) from the High Risk Care Management program on 4/4/2025.  Patient  UTR .  Care management goals have been completed. No further Ambulatory Care Manager follow up scheduled.

## 2025-04-09 ENCOUNTER — ANTI-COAG VISIT (OUTPATIENT)
Age: 60
End: 2025-04-09
Payer: COMMERCIAL

## 2025-04-09 DIAGNOSIS — Z79.01 ANTICOAGULATED ON COUMADIN: ICD-10-CM

## 2025-04-09 DIAGNOSIS — Z51.81 ENCOUNTER FOR THERAPEUTIC DRUG MONITORING: Primary | ICD-10-CM

## 2025-04-09 DIAGNOSIS — Z95.2 H/O AORTIC VALVE REPLACEMENT: ICD-10-CM

## 2025-04-09 LAB — POC INR: 2.5 (ref 0.8–1.2)

## 2025-04-09 PROCEDURE — 85610 PROTHROMBIN TIME: CPT

## 2025-04-09 PROCEDURE — 99212 OFFICE O/P EST SF 10 MIN: CPT

## 2025-04-09 RX ORDER — MIFEPRISTONE 300 MG/1
300 TABLET ORAL
COMMUNITY

## 2025-04-09 NOTE — PROGRESS NOTES
summary printed and reviewed with patient.      Discharged ambulatory in no apparent distress.    For Pharmacy Admin Tracking Only    Intervention Detail: Dose Adjustment: 1, reason: Interaction  Total # of Interventions Recommended: 1  Total # of Interventions Accepted: 1  Time Spent (min):  30

## 2025-04-16 ENCOUNTER — ANTI-COAG VISIT (OUTPATIENT)
Age: 60
End: 2025-04-16
Payer: COMMERCIAL

## 2025-04-16 DIAGNOSIS — Z95.2 H/O AORTIC VALVE REPLACEMENT: ICD-10-CM

## 2025-04-16 DIAGNOSIS — Z51.81 ENCOUNTER FOR THERAPEUTIC DRUG MONITORING: Primary | ICD-10-CM

## 2025-04-16 DIAGNOSIS — Z79.01 ANTICOAGULATED ON COUMADIN: ICD-10-CM

## 2025-04-16 LAB — POC INR: 1.4 (ref 0.8–1.2)

## 2025-04-16 PROCEDURE — 99211 OFF/OP EST MAY X REQ PHY/QHP: CPT | Performed by: PHARMACIST

## 2025-04-16 PROCEDURE — 85610 PROTHROMBIN TIME: CPT | Performed by: PHARMACIST

## 2025-04-16 NOTE — PROGRESS NOTES
Medication Management Clinic  [facility]  Anticoagulation Clinic  [phone] (phone)  [phone] (fax)    Ms. Jacki Moura is a 60 y.o.  female with history of  OnX AVR  who presents today for anticoagulation monitoring and adjustment.    Patient verifies current dosing regimen and tablet strength.  No missed or extra doses.  Patient denies s/s bleeding/bruising/swelling/SOB  No blood in urine or stool.  No dietary changes.  No changes in medication/OTC agents/Herbals.Started Korlym on 4/7 which may increase INR  + change in alcohol use or tobacco use.--Continues 1/2 pack per day; 1-2 beers a few times a week  No change in activity level.  Patient denies falls.  No vomiting/diarrhea or acute illness.   No Procedures scheduled in the future at this time.      Assessment:   Lab Results   Component Value Date    INR 1.40 (H) 04/16/2025    INR 2.50 (H) 04/09/2025    INR 1.50 (H) 03/19/2025     INR subtherapeutic   Recent Labs     04/16/25  1354   INR 1.40*       INR goal: 1.5-2.0    Patient received 20 mg in the past 7 days.    Plan:  Continue Coumadin 2 mg MWF, 4 mg TThSS (22 mg/week).  Recheck INR in 2 week(s).  Patient reminded to call the Anticoagulation Clinic with any signs or symptoms of bleeding or with any medication changes.  Patient given instructions utilizing the teach back method.      After visit summary printed and reviewed with patient.      Discharged ambulatory in no apparent distress.        For Pharmacy Admin Tracking Only  Time Spent (min): 20

## 2025-04-17 ENCOUNTER — HOSPITAL ENCOUNTER (OUTPATIENT)
Age: 60
Discharge: HOME | End: 2025-04-17
Payer: COMMERCIAL

## 2025-04-17 LAB — POTASSIUM SERPL-SCNC: 4.1 MEQ/L (ref 3.5–5.2)

## 2025-04-17 PROCEDURE — 36415 COLL VENOUS BLD VENIPUNCTURE: CPT

## 2025-04-17 PROCEDURE — 84132 ASSAY OF SERUM POTASSIUM: CPT

## 2025-04-18 ENCOUNTER — RESULTS FOLLOW-UP (OUTPATIENT)
Dept: FAMILY MEDICINE CLINIC | Age: 60
End: 2025-04-18

## 2025-04-25 DIAGNOSIS — G89.29 CHRONIC NECK PAIN: ICD-10-CM

## 2025-04-25 DIAGNOSIS — R20.2 NUMBNESS AND TINGLING OF LEFT LEG: ICD-10-CM

## 2025-04-25 DIAGNOSIS — Z98.890 S/P CAROTID ENDARTERECTOMY: ICD-10-CM

## 2025-04-25 DIAGNOSIS — R25.2 MUSCLE CRAMPS: ICD-10-CM

## 2025-04-25 DIAGNOSIS — M54.2 CHRONIC NECK PAIN: ICD-10-CM

## 2025-04-25 DIAGNOSIS — Z95.2 S/P AVR (AORTIC VALVE REPLACEMENT): ICD-10-CM

## 2025-04-25 DIAGNOSIS — M54.16 CHRONIC RADICULAR LUMBAR PAIN: ICD-10-CM

## 2025-04-25 DIAGNOSIS — R20.0 NUMBNESS AND TINGLING OF LEFT LEG: ICD-10-CM

## 2025-04-25 DIAGNOSIS — G89.29 CHRONIC RADICULAR LUMBAR PAIN: ICD-10-CM

## 2025-04-25 RX ORDER — CYCLOBENZAPRINE HCL 10 MG
TABLET ORAL
Qty: 90 TABLET | Refills: 0 | Status: SHIPPED | OUTPATIENT
Start: 2025-04-25

## 2025-04-25 RX ORDER — PREGABALIN 50 MG/1
CAPSULE ORAL
Qty: 90 CAPSULE | Refills: 2 | OUTPATIENT
Start: 2025-04-25 | End: 2025-05-25

## 2025-04-25 NOTE — TELEPHONE ENCOUNTER
Last seen 2/26/25  Last Norco Rx #90 3/31-4/30/25  Hold till 4/28/25    Pt informed she will need a 3 month f/u by 5/23/25

## 2025-04-25 NOTE — TELEPHONE ENCOUNTER
Last seen 2/26/25    Pregabalin was last filled 3/25/25 for 90/2rf.  Pt should have enough through May. Rx refused.

## 2025-04-28 RX ORDER — HYDROCODONE BITARTRATE AND ACETAMINOPHEN 5; 325 MG/1; MG/1
1 TABLET ORAL EVERY 8 HOURS PRN
Qty: 90 TABLET | Refills: 0 | Status: SHIPPED | OUTPATIENT
Start: 2025-04-28 | End: 2025-05-28

## 2025-04-30 ENCOUNTER — ANTI-COAG VISIT (OUTPATIENT)
Age: 60
End: 2025-04-30
Payer: COMMERCIAL

## 2025-04-30 ENCOUNTER — HOSPITAL ENCOUNTER (OUTPATIENT)
Age: 60
Discharge: HOME | End: 2025-04-30
Payer: COMMERCIAL

## 2025-04-30 ENCOUNTER — OFFICE VISIT (OUTPATIENT)
Dept: FAMILY MEDICINE CLINIC | Age: 60
End: 2025-04-30
Payer: COMMERCIAL

## 2025-04-30 VITALS
TEMPERATURE: 97.9 F | SYSTOLIC BLOOD PRESSURE: 126 MMHG | DIASTOLIC BLOOD PRESSURE: 74 MMHG | HEART RATE: 78 BPM | WEIGHT: 139 LBS | BODY MASS INDEX: 27.15 KG/M2

## 2025-04-30 DIAGNOSIS — R20.0 NUMBNESS AND TINGLING OF LEFT LEG: ICD-10-CM

## 2025-04-30 DIAGNOSIS — G89.29 CHRONIC RADICULAR LUMBAR PAIN: ICD-10-CM

## 2025-04-30 DIAGNOSIS — F41.9 ANXIETY: ICD-10-CM

## 2025-04-30 DIAGNOSIS — M54.16 CHRONIC RADICULAR LUMBAR PAIN: ICD-10-CM

## 2025-04-30 DIAGNOSIS — Z51.81 ENCOUNTER FOR THERAPEUTIC DRUG MONITORING: Primary | ICD-10-CM

## 2025-04-30 DIAGNOSIS — R20.0 NUMBNESS AND TINGLING IN LEFT ARM: ICD-10-CM

## 2025-04-30 DIAGNOSIS — R20.2 NUMBNESS AND TINGLING IN LEFT ARM: ICD-10-CM

## 2025-04-30 DIAGNOSIS — Z79.01 ANTICOAGULATED ON COUMADIN: ICD-10-CM

## 2025-04-30 DIAGNOSIS — D49.7 HYPERCORTISOLISM DUE TO ADRENAL NEOPLASM: ICD-10-CM

## 2025-04-30 DIAGNOSIS — R20.2 NUMBNESS AND TINGLING OF LEFT LEG: ICD-10-CM

## 2025-04-30 DIAGNOSIS — E24.8 HYPERCORTISOLISM DUE TO ADRENAL NEOPLASM: ICD-10-CM

## 2025-04-30 DIAGNOSIS — Z95.2 H/O AORTIC VALVE REPLACEMENT: ICD-10-CM

## 2025-04-30 DIAGNOSIS — Z00.00 WELL ADULT EXAM: ICD-10-CM

## 2025-04-30 DIAGNOSIS — Z00.00 WELL ADULT EXAM: Primary | ICD-10-CM

## 2025-04-30 LAB
DEPRECATED MEAN GLUCOSE BLD GHB EST-ACNC: 108 MG/DL (ref 70–126)
HBA1C MFR BLD HPLC: 5.6 % (ref 4–6)
MAGNESIUM SERPL-MCNC: 1.9 MG/DL (ref 1.6–2.6)
POC INR: 1.5 (ref 0.8–1.2)
TSH SERPL DL<=0.05 MIU/L-ACNC: 1.67 UIU/ML (ref 0.27–4.2)

## 2025-04-30 PROCEDURE — 85610 PROTHROMBIN TIME: CPT | Performed by: PHARMACIST

## 2025-04-30 PROCEDURE — 99396 PREV VISIT EST AGE 40-64: CPT | Performed by: NURSE PRACTITIONER

## 2025-04-30 PROCEDURE — 83735 ASSAY OF MAGNESIUM: CPT

## 2025-04-30 PROCEDURE — 83036 HEMOGLOBIN GLYCOSYLATED A1C: CPT

## 2025-04-30 PROCEDURE — 84443 ASSAY THYROID STIM HORMONE: CPT

## 2025-04-30 PROCEDURE — 99211 OFF/OP EST MAY X REQ PHY/QHP: CPT | Performed by: PHARMACIST

## 2025-04-30 PROCEDURE — 3078F DIAST BP <80 MM HG: CPT | Performed by: NURSE PRACTITIONER

## 2025-04-30 PROCEDURE — 3074F SYST BP LT 130 MM HG: CPT | Performed by: NURSE PRACTITIONER

## 2025-04-30 PROCEDURE — 36415 COLL VENOUS BLD VENIPUNCTURE: CPT

## 2025-04-30 ASSESSMENT — ENCOUNTER SYMPTOMS
RHINORRHEA: 0
TROUBLE SWALLOWING: 0
VOMITING: 0
WHEEZING: 0
SHORTNESS OF BREATH: 0
DIARRHEA: 0
ALLERGIC/IMMUNOLOGIC NEGATIVE: 1
COUGH: 0
NAUSEA: 0
ABDOMINAL PAIN: 0
SORE THROAT: 0
EYE PAIN: 0
EYE DISCHARGE: 0
CONSTIPATION: 0
BACK PAIN: 0
EYE REDNESS: 0

## 2025-04-30 NOTE — PROGRESS NOTES
[facility] PROFESSIONAL SERVS  [facility]  [address] [address]  Dept: [phone]  Dept Fax: [phone]  Loc: [phone]     Visit Date:  4/30/2025      Patient:  Jacki Moura  YOB: 1965    HPI:     Chief Complaint   Patient presents with    3 Month Follow-Up       Patient here for annual well adult exam.  Continues to be treated with Korlym for hypercortisolism.    Patient also here for 3-month regular periodic follow-up and refills on pain management and treatment with opiates.  Patient states current dosing is adequate for the control desired without indication of side effect, addiction, constipation or other.    Patient also here for 3-month regular periodic follow-up and refills on anxiety management and treatment with benzodiazepines.  Patient states current dosing is adequate for the control desired without indication of side effect such as sedation, lethargy, addiction or more anxiety.        History of Present Illness  The patient presents for evaluation of left arm pain, nausea, and low blood pressure.    Persistent discomfort in the left arm is reported, attributed to overuse due to compensating for the right arm. This discomfort has been present for approximately a month. A similar sensation was recalled during a previous episode of congestive heart failure, where the pain originated in the arm and subsequently radiated upwards. However, the current pain is localized to the arm. He is right-handed and his occupation involves stocking and bagging tasks, which require significant manual labor. He works 32 hours a week. No EMG, neurological, or cardiovascular evaluations for the arms have been conducted, but such evaluations have been done for the legs.    Occasional morning nausea is reported, sometimes resulting in minor vomiting or expectoration of phlegm. A recent syncopal episode at work was characterized by sweating and

## 2025-04-30 NOTE — PROGRESS NOTES
Medication Management Clinic  [facility]  Anticoagulation Clinic  [phone] (phone)  [phone] (fax)    Ms. Jacki Moura is a 60 y.o.  female with history of  aortic valve replacement  who presents today for anticoagulation monitoring and adjustment.    Patient verifies current dosing regimen and tablet strength.  No missed or extra doses.  Patient denies s/s bleeding/bruising/swelling/SOB  No blood in urine or stool.  No dietary changes.  No changes in medication/OTC agents/Herbals.-PCP today decreased coreg 6.25 mg BID to 6.25 mg daily.  Started Korlym 4/7 (increase INR).    No change in alcohol use or tobacco use.  No change in activity level.  Patient denies falls.  No vomiting/diarrhea or acute illness.   No Procedures scheduled in the future at this time.      Assessment:   Lab Results   Component Value Date    INR 1.50 (H) 04/30/2025    INR 1.40 (H) 04/16/2025    INR 2.50 (H) 04/09/2025     INR therapeutic   Recent Labs     04/30/25  1347   INR 1.50*     INR goal: 1.5-2.0    1st therapeutic INR following dose reduction due to drug interaction.    Plan:  Continue Coumadin 2 mg MWF, 4 mg TThSS.  Recheck INR in 2 week(s).  Patient reminded to call the Anticoagulation Clinic with any signs or symptoms of bleeding or with any medication changes.  Patient given instructions utilizing the teach back method.    After visit summary printed and reviewed with patient.      Discharged ambulatory in no apparent distress.        For Pharmacy Admin Tracking Only  Time Spent (min): 20    Bebe Benton, JemmaD, BCPS  4/30/2025  1:51 PM

## 2025-05-01 ENCOUNTER — RESULTS FOLLOW-UP (OUTPATIENT)
Dept: FAMILY MEDICINE CLINIC | Age: 60
End: 2025-05-01

## 2025-05-14 ENCOUNTER — ANTI-COAG VISIT (OUTPATIENT)
Age: 60
End: 2025-05-14
Payer: COMMERCIAL

## 2025-05-14 DIAGNOSIS — Z51.81 ENCOUNTER FOR THERAPEUTIC DRUG MONITORING: Primary | ICD-10-CM

## 2025-05-14 DIAGNOSIS — Z95.2 H/O AORTIC VALVE REPLACEMENT: ICD-10-CM

## 2025-05-14 DIAGNOSIS — Z79.01 ANTICOAGULATED ON COUMADIN: ICD-10-CM

## 2025-05-14 LAB — POC INR: 1.4 (ref 0.8–1.2)

## 2025-05-14 PROCEDURE — 85610 PROTHROMBIN TIME: CPT | Performed by: PHARMACIST

## 2025-05-14 PROCEDURE — 99211 OFF/OP EST MAY X REQ PHY/QHP: CPT | Performed by: PHARMACIST

## 2025-05-14 NOTE — PROGRESS NOTES
Medication Management Clinic  [facility]  Anticoagulation Clinic  [phone] (phone)  [phone] (fax)    Ms. Jacki Moura is a 60 y.o.  female with history of   aortic valve replacement   who presents today for anticoagulation monitoring and adjustment.    Patient verifies current dosing regimen and tablet strength.  No missed or extra doses.  Patient denies s/s bleeding/bruising/swelling/SOB  No blood in urine or stool.  No dietary changes. Had salmon 5/4. Consistent daily vegetables in diet.   No changes in medication/OTC agents/Herbals. Coreg 6.25 mg decreased to 3.125 mg (1/2 tablet) BID. Korlym continues 300 mg Q48H.  Chews Act gum for dry mouth- does not contain Vitamin K  No change in alcohol use or tobacco use. Drinks 2 beers 2 times a week. Continues to spoke 1PPD, unchanged.    No change in activity level. Increased stress, thinking abiout applying for disability.    Patient denies falls.  No vomiting/diarrhea or acute illness.   No Procedures scheduled in the future at this time.    Assessment:   Lab Results   Component Value Date    INR 1.40 (H) 05/14/2025    INR 1.50 (H) 04/30/2025    INR 1.40 (H) 04/16/2025     INR subtherapeutic   Recent Labs     05/14/25  1310   INR 1.40*     INR goal: 1.5-2.0    Plan:  Coumadin 3 mg today then continue Coumadin 2 mg MWF, 4 mg TTHSaSu.  Recheck INR in 2 week(s).  Patient reminded to call the Anticoagulation Clinic with any signs or symptoms of bleeding or with any medication changes.  Patient given instructions utilizing the teach back method.    After visit summary printed and reviewed with patient.      Discharged ambulatory in no apparent distress.    Sharonda Chen PharmD 5/14/2025 1:38 PM    For Pharmacy Admin Tracking Only    Intervention Detail: Dose Adjustment: 1, reason: Therapy Optimization  Total # of Interventions Recommended: 1  Total # of Interventions Accepted: 1  Time Spent (min): 20

## 2025-05-19 RX ORDER — FERROUS SULFATE 325(65) MG
325 TABLET ORAL EVERY OTHER DAY
Qty: 90 TABLET | Refills: 1 | Status: SHIPPED | OUTPATIENT
Start: 2025-05-19

## 2025-05-21 DIAGNOSIS — R25.2 MUSCLE CRAMPS: ICD-10-CM

## 2025-05-21 DIAGNOSIS — F41.9 ANXIETY: ICD-10-CM

## 2025-05-21 RX ORDER — CYCLOBENZAPRINE HCL 10 MG
TABLET ORAL
Qty: 90 TABLET | Refills: 0 | Status: SHIPPED | OUTPATIENT
Start: 2025-05-21

## 2025-05-22 DIAGNOSIS — R20.0 NUMBNESS AND TINGLING OF RIGHT ARM: ICD-10-CM

## 2025-05-22 DIAGNOSIS — R20.2 NUMBNESS AND TINGLING IN LEFT ARM: Primary | ICD-10-CM

## 2025-05-22 DIAGNOSIS — R20.0 NUMBNESS AND TINGLING IN LEFT ARM: Primary | ICD-10-CM

## 2025-05-22 DIAGNOSIS — R20.2 NUMBNESS AND TINGLING OF RIGHT ARM: ICD-10-CM

## 2025-05-23 RX ORDER — LORAZEPAM 1 MG/1
1 TABLET ORAL EVERY 8 HOURS PRN
Qty: 90 TABLET | Refills: 1 | Status: SHIPPED | OUTPATIENT
Start: 2025-05-23 | End: 2025-07-22

## 2025-05-27 DIAGNOSIS — Z98.890 S/P CAROTID ENDARTERECTOMY: ICD-10-CM

## 2025-05-27 DIAGNOSIS — M54.2 CHRONIC NECK PAIN: ICD-10-CM

## 2025-05-27 DIAGNOSIS — Z95.2 S/P AVR (AORTIC VALVE REPLACEMENT): ICD-10-CM

## 2025-05-27 DIAGNOSIS — G89.29 CHRONIC NECK PAIN: ICD-10-CM

## 2025-05-27 RX ORDER — HYDROCODONE BITARTRATE AND ACETAMINOPHEN 5; 325 MG/1; MG/1
1 TABLET ORAL EVERY 8 HOURS PRN
Qty: 90 TABLET | Refills: 0 | Status: SHIPPED | OUTPATIENT
Start: 2025-05-27 | End: 2025-06-26

## 2025-05-28 ENCOUNTER — HOSPITAL ENCOUNTER (OUTPATIENT)
Age: 60
Discharge: HOME | End: 2025-05-28
Payer: COMMERCIAL

## 2025-05-28 ENCOUNTER — ANTI-COAG VISIT (OUTPATIENT)
Age: 60
End: 2025-05-28
Payer: COMMERCIAL

## 2025-05-28 DIAGNOSIS — E24.8 HYPERCORTISOLISM DUE TO ADRENAL NEOPLASM: ICD-10-CM

## 2025-05-28 DIAGNOSIS — M54.16 CHRONIC RADICULAR LUMBAR PAIN: ICD-10-CM

## 2025-05-28 DIAGNOSIS — D49.7 HYPERCORTISOLISM DUE TO ADRENAL NEOPLASM: ICD-10-CM

## 2025-05-28 DIAGNOSIS — Z79.01 ANTICOAGULATED ON COUMADIN: ICD-10-CM

## 2025-05-28 DIAGNOSIS — Z95.2 H/O AORTIC VALVE REPLACEMENT: ICD-10-CM

## 2025-05-28 DIAGNOSIS — G89.29 CHRONIC RADICULAR LUMBAR PAIN: ICD-10-CM

## 2025-05-28 DIAGNOSIS — Z51.81 ENCOUNTER FOR THERAPEUTIC DRUG MONITORING: Primary | ICD-10-CM

## 2025-05-28 DIAGNOSIS — Z00.00 WELL ADULT EXAM: ICD-10-CM

## 2025-05-28 LAB
ALBUMIN SERPL BCG-MCNC: 4 G/DL (ref 3.4–4.9)
ALP SERPL-CCNC: 75 U/L (ref 38–126)
ALT SERPL W/O P-5'-P-CCNC: 16 U/L (ref 10–35)
ANION GAP SERPL CALC-SCNC: 6 MEQ/L (ref 8–16)
AST SERPL-CCNC: 19 U/L (ref 10–35)
BASOPHILS ABSOLUTE: 0.1 THOU/MM3 (ref 0–0.1)
BASOPHILS NFR BLD AUTO: 0.5 %
BILIRUB CONJ SERPL-MCNC: 0.2 MG/DL (ref 0–0.2)
BILIRUB SERPL-MCNC: < 0.2 MG/DL (ref 0.3–1.2)
BUN SERPL-MCNC: 12 MG/DL (ref 8–23)
CALCIUM SERPL-MCNC: 8.7 MG/DL (ref 8.8–10.2)
CHLORIDE SERPL-SCNC: 104 MEQ/L (ref 98–111)
CO2 SERPL-SCNC: 30 MEQ/L (ref 22–29)
CREAT SERPL-MCNC: 0.9 MG/DL (ref 0.5–0.9)
DEPRECATED RDW RBC AUTO: 46.8 FL (ref 35–45)
EOSINOPHIL NFR BLD AUTO: 2.3 %
EOSINOPHILS ABSOLUTE: 0.3 THOU/MM3 (ref 0–0.4)
ERYTHROCYTE [DISTWIDTH] IN BLOOD BY AUTOMATED COUNT: 12.8 % (ref 11.5–14.5)
GFR SERPL CREATININE-BSD FRML MDRD: 73 ML/MIN/1.73M2
GLUCOSE SERPL-MCNC: 101 MG/DL (ref 74–109)
HCT VFR BLD AUTO: 39.8 % (ref 37–47)
HGB BLD-MCNC: 12.8 GM/DL (ref 12–16)
IMM GRANULOCYTES # BLD AUTO: 0.06 THOU/MM3 (ref 0–0.07)
IMM GRANULOCYTES NFR BLD AUTO: 0.5 %
LYMPHOCYTES ABSOLUTE: 2.4 THOU/MM3 (ref 1–4.8)
LYMPHOCYTES NFR BLD AUTO: 22.2 %
MCH RBC QN AUTO: 31.8 PG (ref 26–33)
MCHC RBC AUTO-ENTMCNC: 32.2 GM/DL (ref 32.2–35.5)
MCV RBC AUTO: 99 FL (ref 81–99)
MONOCYTES ABSOLUTE: 0.9 THOU/MM3 (ref 0.4–1.3)
MONOCYTES NFR BLD AUTO: 7.8 %
NEUTROPHILS ABSOLUTE: 7.3 THOU/MM3 (ref 1.8–7.7)
NEUTROPHILS NFR BLD AUTO: 66.7 %
NRBC BLD AUTO-RTO: 0 /100 WBC
PLATELET # BLD AUTO: 257 THOU/MM3 (ref 130–400)
PMV BLD AUTO: 10.6 FL (ref 9.4–12.4)
POC INR: 2.3 (ref 0.8–1.2)
POTASSIUM SERPL-SCNC: 5.1 MEQ/L (ref 3.5–5.2)
PROT SERPL-MCNC: 6.6 G/DL (ref 6.4–8.3)
RBC # BLD AUTO: 4.02 MILL/MM3 (ref 4.2–5.4)
SODIUM SERPL-SCNC: 140 MEQ/L (ref 135–145)
WBC # BLD AUTO: 11 THOU/MM3 (ref 4.8–10.8)

## 2025-05-28 PROCEDURE — 82248 BILIRUBIN DIRECT: CPT

## 2025-05-28 PROCEDURE — 99212 OFFICE O/P EST SF 10 MIN: CPT | Performed by: PHARMACIST

## 2025-05-28 PROCEDURE — 85610 PROTHROMBIN TIME: CPT | Performed by: PHARMACIST

## 2025-05-28 PROCEDURE — 80053 COMPREHEN METABOLIC PANEL: CPT

## 2025-05-28 PROCEDURE — 85025 COMPLETE CBC W/AUTO DIFF WBC: CPT

## 2025-05-28 PROCEDURE — 36415 COLL VENOUS BLD VENIPUNCTURE: CPT

## 2025-05-28 NOTE — PROGRESS NOTES
Medication Management Clinic  [facility]  Anticoagulation Clinic  [phone] (phone)  [phone] (fax)    Ms. Jacki Moura is a 60 y.o.  female with history of  OnX valve  who presents today for anticoagulation monitoring and adjustment.    Patient verifies current dosing regimen and tablet strength.  No missed or extra doses.  Patient denies s/s bleeding/bruising/swelling/SOB  No blood in urine or stool.  No dietary changes.  No changes in medication/OTC agents/Herbals.Patient increased her Coreg dose back to 6.25 mg BID  No change in alcohol use or tobacco use.Patient reports 5-7 cigarettes per day; same alcohol use a few times a week   No change in activity level.increased stress due to quitting her job   Patient denies falls.  No vomiting/diarrhea or acute illness.   No Procedures scheduled in the future at this time.      Assessment:   Lab Results   Component Value Date    INR 2.30 (H) 05/28/2025    INR 1.40 (H) 05/14/2025    INR 1.50 (H) 04/30/2025     INR supratherapeutic   Recent Labs     05/28/25  1306   INR 2.30*     INR goal: 1.5-2.0    Patient presents with supratherapeutic INR today. INR was subtherapeutic at the previous check.     Plan:  Coumadin 1 mg x 1 then continue Coumadin 2 mg MWF, 4 mg TThSS.  Recheck INR in 2 week(s).  Patient reminded to call the Anticoagulation Clinic with any signs or symptoms of bleeding or with any medication changes.  Patient given instructions utilizing the teach back method.      After visit summary printed and reviewed with patient.      Discharged ambulatory in no apparent distress.        For Pharmacy Admin Tracking Only    Intervention Detail: Dose Adjustment: 1, reason: Therapy De-escalation  Total # of Interventions Recommended: 1  Total # of Interventions Accepted: 1  Time Spent (min): 20    Bebe Benton, JemmaD, BCPS  5/28/2025  1:25 PM

## 2025-06-11 ENCOUNTER — PROCEDURE VISIT (OUTPATIENT)
Dept: NEUROLOGY | Age: 60
End: 2025-06-11

## 2025-06-11 ENCOUNTER — ANTI-COAG VISIT (OUTPATIENT)
Age: 60
End: 2025-06-11
Payer: COMMERCIAL

## 2025-06-11 DIAGNOSIS — Z51.81 ENCOUNTER FOR THERAPEUTIC DRUG MONITORING: Primary | ICD-10-CM

## 2025-06-11 DIAGNOSIS — Z79.01 ANTICOAGULATED ON COUMADIN: ICD-10-CM

## 2025-06-11 DIAGNOSIS — M54.12 CERVICAL RADICULOPATHY: ICD-10-CM

## 2025-06-11 DIAGNOSIS — Z95.2 H/O AORTIC VALVE REPLACEMENT: ICD-10-CM

## 2025-06-11 DIAGNOSIS — M79.601 RIGHT ARM PAIN: Primary | ICD-10-CM

## 2025-06-11 LAB — POC INR: 2.9 (ref 0.8–1.2)

## 2025-06-11 PROCEDURE — 99212 OFFICE O/P EST SF 10 MIN: CPT

## 2025-06-11 PROCEDURE — 85610 PROTHROMBIN TIME: CPT

## 2025-06-11 NOTE — PROGRESS NOTES
Medication Management Clinic  [facility]  Anticoagulation Clinic  [phone] (phone)  [phone] (fax)    Ms. Jacki Moura is a 60 y.o.  female with history of On-X AVR, per referral from VEENA Mcnulty PA-C, who presents today for Warfarin monitoring and adjustment (2 week visit).    Patient verifies current dosing regimen and tablet strength.  No missed or extra doses.  Patient denies bleeding/bruising/swelling. Notices usual SOB with talking.  No blood in urine or stool.  No dietary changes.  Changes in medication/OTC agents/herbals: using 4 mg Nicotine gum - also has the 2 mg, but apparently didn't use it. Started Korlym 4/7 - states it may be increasing (reminded to call this clinic).   No change in alcohol use. Decreased cigarettes to 5/day - was 1/2 pack per day 2 weeks ago (contrary to last note).  Using a sucker during visit.  No change in activity level. Has higher stress level - insurance issues.  Trying to get disability  Patient denies falls.  No vomiting or acute illness.  Had diarrhea most of 6/9 - took nothing. Recommended Imodium, not Pepto-Bismol.  No procedures scheduled in the future at this time. Had EMG this morning - states he found problem in her neck.  C/o memory issues.    Assessment:       INR goal: 1.5-2.0  Lab Results   Component Value Date    INR 2.90 (H) 06/11/2025    INR 2.30 (H) 05/28/2025    INR 1.40 (H) 05/14/2025     INR supratherapeutic   Recent Labs     06/11/25  1302   INR 2.90*      Plan:  POCT INR performed/result reviewed.  Hold today, W, then decrease PO Coumadin to 4 mg MWF, 2 mg TThSS (from 2 mg MWF, 4 mg TThSS=9.1% decrease).  Recheck INR in 2 week(s). (Report given - orders entered by [facility], located within [facility] PharmD.)  Patient reminded to call the Anticoagulation Clinic with any signs or symptoms of bleeding or with any medication changes.  Patient given instructions utilizing the teach back method.     After visit summary printed and reviewed with patient.

## 2025-06-23 RX ORDER — CARVEDILOL 6.25 MG/1
6.25 TABLET ORAL 2 TIMES DAILY WITH MEALS
Qty: 180 TABLET | Refills: 0 | Status: SHIPPED | OUTPATIENT
Start: 2025-06-23

## 2025-06-24 ENCOUNTER — TELEPHONE (OUTPATIENT)
Age: 60
End: 2025-06-24

## 2025-06-24 ENCOUNTER — CLINICAL DOCUMENTATION (OUTPATIENT)
Facility: HOSPITAL | Age: 60
End: 2025-06-24

## 2025-06-24 NOTE — PROGRESS NOTES
Contacted Joycelyn Mcdermott CNP office [phone] and left VM on CMA Line.  Reference to: Reclast Referral    [employer] is out of network for STRZ. There are no OON benefits, Reclast will not be covered.  Notified [facility] is In-Network.     Contacted Patient Jacki [phone].Left VM to return call on above.   Notified patient through My Chart Message.     Closing Referral    Electronically signed by JUNIE CRAVEN, RN on 6/24/25 at 2:54 PM

## 2025-06-24 NOTE — PROGRESS NOTES
RE: Referral [id number] Reclast    Received phone call from Shruthi Matamoros and Rajni Leon's office.  Spoke with Patient.  Patient's insurance is changing as of 7/1/25    New Insurance:  [employer].    Shruthi states she will obtain new cards and scan into media.    We will re-open referral after 7/1/2025 and request Prior Auth to resubmit for new auth for Reclast and updated dosing.(300mg) .    Follow Up Action:    Open referral 7/1/2025    Electronically signed by JUNIE CRAVEN, RN on 6/24/25 at 4:12 PM

## 2025-06-26 ENCOUNTER — ANTI-COAG VISIT (OUTPATIENT)
Age: 60
End: 2025-06-26
Payer: COMMERCIAL

## 2025-06-26 DIAGNOSIS — Z51.81 ENCOUNTER FOR THERAPEUTIC DRUG MONITORING: Primary | ICD-10-CM

## 2025-06-26 DIAGNOSIS — G89.29 CHRONIC NECK PAIN: ICD-10-CM

## 2025-06-26 DIAGNOSIS — Z98.890 S/P CAROTID ENDARTERECTOMY: ICD-10-CM

## 2025-06-26 DIAGNOSIS — Z95.2 H/O AORTIC VALVE REPLACEMENT: ICD-10-CM

## 2025-06-26 DIAGNOSIS — M54.2 CHRONIC NECK PAIN: ICD-10-CM

## 2025-06-26 DIAGNOSIS — Z95.2 S/P AVR (AORTIC VALVE REPLACEMENT): ICD-10-CM

## 2025-06-26 DIAGNOSIS — Z79.01 ANTICOAGULATED ON COUMADIN: ICD-10-CM

## 2025-06-26 LAB — POC INR: 1.8 (ref 0.8–1.2)

## 2025-06-26 PROCEDURE — 85610 PROTHROMBIN TIME: CPT

## 2025-06-26 PROCEDURE — 99211 OFF/OP EST MAY X REQ PHY/QHP: CPT

## 2025-06-26 RX ORDER — HYDROCODONE BITARTRATE AND ACETAMINOPHEN 5; 325 MG/1; MG/1
1 TABLET ORAL EVERY 8 HOURS PRN
Qty: 90 TABLET | Refills: 0 | Status: SHIPPED | OUTPATIENT
Start: 2025-06-26 | End: 2025-07-26

## 2025-06-26 NOTE — PROGRESS NOTES
Medication Management Clinic  [facility]  Anticoagulation Clinic  [phone] (phone)  [phone] (fax)    Ms. Jacki Moura is a 60 y.o.  female with history of On-X AVR, per referral from VEENA Mcnulty PA-C, who presents today for Warfarin monitoring and adjustment (2 week visit after decreasing dose by 9.1% - at least 1 hour early for today's visit).    Patient verifies current dosing regimen and tablet strength.  No missed (except as ordered last visit) or extra doses.  Patient denies bleeding/bruising/swelling/SOB. Has been having dizziness - has notified doctor. Seems a little groggy today; stated nothing has gone right today.   No blood in urine or stool.  No dietary changes.  Started Korlym 4/7; per clinic pharmacist, can raise INR.  Last visit, stated was using 4 mg Nicorette gum; today says never used.  Started using Lidocaine patches again.  No change in tobacco use since last visit. Had less alcohol.  No change in activity level.  Patient denies falls.  No vomiting/diarrhea or acute illness.   No procedures scheduled in the future at this time.  Sees cardiologist today.    Assessment:       INR goal: 1.5-2.0  Lab Results   Component Value Date    INR 1.80 (H) 06/26/2025    INR 2.90 (H) 06/11/2025    INR 2.30 (H) 05/28/2025     INR therapeutic   Recent Labs     06/26/25  1008   INR 1.80*      Plan:  POCT INR performed/result reviewed.  Continue PO Coumadin 4 mg MWF, 2 mg TThSS.  Recheck INR in 3 week(s). (Report given - orders received from/verified with YEN Reed RPh., PharmD.)  Patient reminded to call the Anticoagulation Clinic with any signs or symptoms of bleeding or with any medication changes.  Patient given instructions utilizing the teach back method.     After visit summary printed and reviewed with patient.      Discharged ambulatory in no apparent distress.    For Pharmacy Admin Tracking Only    Time Spent (min): 23

## 2025-06-27 DIAGNOSIS — M54.2 CHRONIC NECK PAIN: ICD-10-CM

## 2025-06-27 DIAGNOSIS — Z98.890 S/P CAROTID ENDARTERECTOMY: ICD-10-CM

## 2025-06-27 DIAGNOSIS — R25.2 MUSCLE CRAMPS: ICD-10-CM

## 2025-06-27 DIAGNOSIS — G89.29 CHRONIC NECK PAIN: ICD-10-CM

## 2025-06-27 DIAGNOSIS — Z95.2 S/P AVR (AORTIC VALVE REPLACEMENT): ICD-10-CM

## 2025-06-29 DIAGNOSIS — R20.0 NUMBNESS AND TINGLING OF LEFT LEG: ICD-10-CM

## 2025-06-29 DIAGNOSIS — M54.16 CHRONIC RADICULAR LUMBAR PAIN: ICD-10-CM

## 2025-06-29 DIAGNOSIS — G89.29 CHRONIC RADICULAR LUMBAR PAIN: ICD-10-CM

## 2025-06-29 DIAGNOSIS — R25.2 MUSCLE CRAMPS: ICD-10-CM

## 2025-06-29 DIAGNOSIS — R20.2 NUMBNESS AND TINGLING OF LEFT LEG: ICD-10-CM

## 2025-06-30 ENCOUNTER — TELEPHONE (OUTPATIENT)
Dept: FAMILY MEDICINE CLINIC | Age: 60
End: 2025-06-30

## 2025-06-30 ENCOUNTER — PATIENT MESSAGE (OUTPATIENT)
Dept: FAMILY MEDICINE CLINIC | Age: 60
End: 2025-06-30

## 2025-06-30 DIAGNOSIS — R25.2 MUSCLE CRAMPS: ICD-10-CM

## 2025-06-30 DIAGNOSIS — E24.8 HYPERCORTISOLISM DUE TO ADRENAL NEOPLASM: Primary | ICD-10-CM

## 2025-06-30 DIAGNOSIS — D49.7 HYPERCORTISOLISM DUE TO ADRENAL NEOPLASM: Primary | ICD-10-CM

## 2025-06-30 RX ORDER — CYCLOBENZAPRINE HCL 10 MG
10 TABLET ORAL 2 TIMES DAILY PRN
Qty: 90 TABLET | Refills: 0 | Status: SHIPPED | OUTPATIENT
Start: 2025-06-30 | End: 2025-06-30 | Stop reason: SDUPTHER

## 2025-06-30 RX ORDER — PREGABALIN 50 MG/1
50 CAPSULE ORAL 2 TIMES DAILY
Qty: 90 CAPSULE | Refills: 0 | Status: SHIPPED | OUTPATIENT
Start: 2025-06-30 | End: 2025-09-28

## 2025-06-30 RX ORDER — CYCLOBENZAPRINE HCL 10 MG
10 TABLET ORAL 2 TIMES DAILY PRN
Qty: 90 TABLET | Refills: 0 | Status: SHIPPED | OUTPATIENT
Start: 2025-06-30 | End: 2025-09-28

## 2025-06-30 RX ORDER — CYCLOBENZAPRINE HCL 10 MG
TABLET ORAL
Qty: 90 TABLET | Refills: 0 | OUTPATIENT
Start: 2025-06-30

## 2025-06-30 RX ORDER — HYDROCODONE BITARTRATE AND ACETAMINOPHEN 5; 325 MG/1; MG/1
1 TABLET ORAL EVERY 8 HOURS PRN
Qty: 90 TABLET | Refills: 0 | OUTPATIENT
Start: 2025-06-30 | End: 2025-07-30

## 2025-06-30 NOTE — TELEPHONE ENCOUNTER
Patient requesting Nerve conduction test results and also order for potassium draw & dosing. Please advise pt message

## 2025-06-30 NOTE — TELEPHONE ENCOUNTER
Please notify patient that I am going to refill her Lyrica and Flexeril but I am going to reduce both by 1 dose per day as we need to get her off of some of these medications.

## 2025-07-01 ENCOUNTER — CLINICAL DOCUMENTATION (OUTPATIENT)
Facility: HOSPITAL | Age: 60
End: 2025-07-01

## 2025-07-01 NOTE — PROGRESS NOTES
Patient has new insurance as of 7/1/25.  Re- opening referral for reclast to submit for Prior Authorization.   Sending to Prior Auth Team.      Electronically signed by JUNIE CRAVEN RN on 7/1/25 at 4:07 PM

## 2025-07-10 ENCOUNTER — HOSPITAL ENCOUNTER (OUTPATIENT)
Age: 60
Discharge: HOME | End: 2025-07-10
Payer: COMMERCIAL

## 2025-07-10 DIAGNOSIS — D49.7 HYPERCORTISOLISM DUE TO ADRENAL NEOPLASM: Primary | ICD-10-CM

## 2025-07-10 DIAGNOSIS — E24.8 HYPERCORTISOLISM DUE TO ADRENAL NEOPLASM: Primary | ICD-10-CM

## 2025-07-10 DIAGNOSIS — E24.8 HYPERCORTISOLISM DUE TO ADRENAL NEOPLASM: ICD-10-CM

## 2025-07-10 DIAGNOSIS — D49.7 HYPERCORTISOLISM DUE TO ADRENAL NEOPLASM: ICD-10-CM

## 2025-07-10 LAB — POTASSIUM SERPL-SCNC: 4.4 MEQ/L (ref 3.5–5.2)

## 2025-07-10 PROCEDURE — 84132 ASSAY OF SERUM POTASSIUM: CPT

## 2025-07-10 PROCEDURE — 36415 COLL VENOUS BLD VENIPUNCTURE: CPT

## 2025-07-10 RX ORDER — MIFEPRISTONE 300 MG/1
300 TABLET ORAL DAILY
Qty: 90 TABLET | Refills: 0 | Status: SHIPPED | OUTPATIENT
Start: 2025-07-10 | End: 2025-10-08

## 2025-07-15 NOTE — PROGRESS NOTES
[facility] PHYSICIANS [place] SPECIAL[facility]  [facility] CARDIOLOGY  [address]  [address]  [place] [address]  Dept: [phone]  Dept Fax: [phone]  Loc: [phone]    Visit Date: 2025    Jacki Moura is a 60 y.o. female who presents todayfor:  Chief Complaint   Patient presents with    Follow-up     S/p mechanical aortic valve   Having left arm pain, runs out of breathe when talking never had that issue before      Cardiologist: Yesy    Hx of severe AR s/p mechanical AVR, right carotid endart    HPI: 6 month f/u  Arm pains both sides at times. Avascular necrosis of the right shoulder. Some chest discomfort. BP is typcially 140s-160s, higher today. She has difficulty with BP in her right arm.   Past Surgical History:   Procedure Laterality Date    AORTIC MITRAL VALVE REPLACEMENT N/A 10/21/2024    Mechanical AVR performed by Yonatan Guillory MD at [facility] OR    BACK SURGERY      CARDIAC PROCEDURE N/A 2024    Left heart cath / coronary angiography performed by Teja Hayward MD at [facility] CARDIAC CATH LAB    CARDIAC PROCEDURE N/A 2024    Fractional flow reserve (FFR) performed by Teja Hayward MD at [facility] CARDIAC CATH LAB    CARDIAC PROCEDURE N/A 2024    Aortic root aortogram performed by Teja Hayward MD at [facility] CARDIAC CATH LAB    CAROTID ENDARTERECTOMY N/A 10/21/2024    RIGHT CAROTID ENDARTERECTOMY performed by Jarad Cabrera MD at [facility] OR     SECTION      x 4    COLONOSCOPY      DILATION AND CURETTAGE OF UTERUS      ENDOSCOPY, COLON, DIAGNOSTIC      FEMORAL ENDARTERECTOMY Left 2021    LEFT  FEMORAL ARTERY ENDARTERECTOMY performed by Zhao Gilliland MD at [facility] OR    FOOT SURGERY      bone spurs removed both feet    KNEE SURGERY Left     reconstruction of knee    LUMBAR DISCECTOMY  2020    Dr. Gamino    SKIN BIOPSY      TRANSESOPHAGEAL ECHOCARDIOGRAM N/A 01/10/2024    TRANSESOPHAGEAL ECHOCARDIOGRAM performed by Teja Hayward MD at [facility] ENDOSCOPY

## 2025-07-16 ENCOUNTER — CLINICAL DOCUMENTATION (OUTPATIENT)
Facility: HOSPITAL | Age: 60
End: 2025-07-16

## 2025-07-16 ENCOUNTER — OFFICE VISIT (OUTPATIENT)
Dept: CARDIOLOGY CLINIC | Age: 60
End: 2025-07-16
Payer: COMMERCIAL

## 2025-07-16 VITALS
HEIGHT: 60 IN | WEIGHT: 139 LBS | SYSTOLIC BLOOD PRESSURE: 192 MMHG | HEART RATE: 88 BPM | BODY MASS INDEX: 27.29 KG/M2 | DIASTOLIC BLOOD PRESSURE: 100 MMHG

## 2025-07-16 DIAGNOSIS — I10 ESSENTIAL HYPERTENSION: ICD-10-CM

## 2025-07-16 DIAGNOSIS — Z95.2 H/O AORTIC VALVE REPLACEMENT: Primary | ICD-10-CM

## 2025-07-16 DIAGNOSIS — Z72.0 TOBACCO ABUSE: ICD-10-CM

## 2025-07-16 DIAGNOSIS — R07.9 CHEST PAIN, UNSPECIFIED TYPE: ICD-10-CM

## 2025-07-16 PROCEDURE — 3080F DIAST BP >= 90 MM HG: CPT | Performed by: STUDENT IN AN ORGANIZED HEALTH CARE EDUCATION/TRAINING PROGRAM

## 2025-07-16 PROCEDURE — 99214 OFFICE O/P EST MOD 30 MIN: CPT | Performed by: STUDENT IN AN ORGANIZED HEALTH CARE EDUCATION/TRAINING PROGRAM

## 2025-07-16 PROCEDURE — 3077F SYST BP >= 140 MM HG: CPT | Performed by: STUDENT IN AN ORGANIZED HEALTH CARE EDUCATION/TRAINING PROGRAM

## 2025-07-16 NOTE — PROGRESS NOTES
[employer] is denying SOC for medication Reclast. (Med is approved, denying for Site of Care in the Hospital Outpatient setting).     Reconsideration faxed to [employer] LYNN Fax [phone].    Notified Joycelyn GUAJARDO CNP and Shruthi BALDERAS RN of above via [employer] Message  Contacted Alan Echeverria [phone], left VM to return call.    Follow Up Action:  Awaiting Reconsideration Determination    Electronically signed by JUNIE CRAVEN, RN on 7/16/25 at 3:42 PM    [FreeTextEntry1] : NIH STROKE SCALE \par \par Item	 Score \par \par 1 a.	Level of Consciousness	 0 \par 1 b.          LOC Questions	 0 \par 1 c.	LOC Commands	 0 \par 2.	Best Gaze	 1 \par 3.	Visual	                 0 \par 4.	Facial Palsy              0 \par 5 a.	Motor Arm - Left	 0 \par 5 b.	Motor Arm - Right	 0 \par 6 a.	Motor Leg - Left	 0 \par 6 b.	Motor Leg - Right	 0 \par 7.	Limb Ataxia	 0 \par 8.	Sensory	                 0 \par 9.	Language	 0 \par 10.	Dysarthria	 0 \par 11.	Extinction and Inattention 	 0 \par __________________________________________ \par \par TOTAL	 1 \par \par mRS: 0 No symptoms at all \par \par \par Neurologic Exam: \par \par Mental status: Awake, alert and oriented x 4. Recent and remote memory intact.  \par \par Language: Follows all commands. Naming, repetition and comprehension intact. Attention/concentration intact. No dysarthria, no aphasia. Fund of knowledge appropriate.  \par \par Cranial nerves: Pupils equally round and reactive to light, Left eye lateral inferior quadrant defect. No nystagmus, EOMI, face symmetric, hearing intact bilaterally, palate elevation symmetric, tongue was midline, sternocleidomastoid/ shoulder shrug strength bilaterally 5/5.  \par \par Motor: No drifting in all extremities. Normal bulk and tone, strength 5/5 in bilateral upper and lower extremities.  strength 5/5.  \par \par Sensation: Intact to light touch. No neglect.  \par \par Coordination: No dysmetria on finger-to-nose and heel-to-shin.  \par \par Gait: Steady.\par

## 2025-07-16 NOTE — PROGRESS NOTES
Fax received from [facility].  States an Appeal needs submitted, reconsideration not applicable.  Attached     Action Items Completed:   Appeal Filed to [facility] via fax [phone]     Follow Up Action:  Waiting for Determination     Electronically signed by JUNIE CRAVEN RN on 7/16/25 at 5:00 PM

## 2025-07-17 ENCOUNTER — ANTI-COAG VISIT (OUTPATIENT)
Age: 60
End: 2025-07-17
Payer: COMMERCIAL

## 2025-07-17 DIAGNOSIS — Z95.2 H/O AORTIC VALVE REPLACEMENT: ICD-10-CM

## 2025-07-17 DIAGNOSIS — Z51.81 ENCOUNTER FOR THERAPEUTIC DRUG MONITORING: Primary | ICD-10-CM

## 2025-07-17 DIAGNOSIS — Z79.01 ANTICOAGULATED ON COUMADIN: ICD-10-CM

## 2025-07-17 LAB — POC INR: 2.1 (ref 0.8–1.2)

## 2025-07-17 PROCEDURE — 85610 PROTHROMBIN TIME: CPT

## 2025-07-17 PROCEDURE — 99211 OFF/OP EST MAY X REQ PHY/QHP: CPT

## 2025-07-17 NOTE — PROGRESS NOTES
Medication Management Clinic  [facility]  Anticoagulation Clinic  [phone] (phone)  [phone] (fax)    Ms. Jacki Moura is a 60 y.o.  female with history of On-X AVR who presents today for anticoagulation monitoring and adjustment.    Patient verifies current dosing regimen and tablet strength.  No missed or extra doses.  Patient denies s/s bleeding/bruising/swelling/SOB  No blood in urine or stool.  No dietary changes.  Recent dosing changes from Dr. Taylor per patient:   Increased mifepristone from every other day to daily.   Decreased Flexeril from TID PRN to BID PRN. Decreased Lyrica from TID to BID  No change in tobacco use.   Stopped drinking beer  No change in activity level.  Patient fell into her bushes recently, patient stated she did not hit her head.  No vomiting or acute illness. - Mild diarrhea on 07/14  Patient is having an ultrasound on legs taken in the next couple weeks  Patient notes more stress recently    Assessment:   Lab Results   Component Value Date    INR 2.10 (H) 07/17/2025    INR 1.80 (H) 06/26/2025    INR 2.90 (H) 06/11/2025     INR supratherapeutic   Recent Labs     07/17/25  1001   INR 2.10*      INR goal: 1.5-2.0    Patient interview completed and discussed with pharmacist by Chago Little, JmemaD Candidate 2026    Plan:  Take 1 mg today then decrease Coumadin 3 mg Wed 4 mg MoFr and 2 mg SuTuThSa (5% decrease).  Recheck INR in 2 week(s).      Patient reminded to call the Anticoagulation Clinic with signs or symptoms of bleeding or with any medication changes.  Patient given instructions utilizing the teach back method.     After visit summary printed and reviewed with patient.      Discharged ambulatory in no apparent distress.    For Pharmacy Admin Tracking Only    Intervention Detail: Dose Adjustment: 1, reason: Therapy Optimization  Total # of Interventions Recommended: 1  Total # of Interventions Accepted: 1  Time Spent (min): 20

## 2025-07-19 DIAGNOSIS — F41.9 ANXIETY: ICD-10-CM

## 2025-07-21 ENCOUNTER — OFFICE VISIT (OUTPATIENT)
Dept: FAMILY MEDICINE CLINIC | Age: 60
End: 2025-07-21

## 2025-07-21 ENCOUNTER — CARE COORDINATION (OUTPATIENT)
Dept: CARE COORDINATION | Age: 60
End: 2025-07-21

## 2025-07-21 VITALS
WEIGHT: 140.1 LBS | OXYGEN SATURATION: 97 % | HEART RATE: 86 BPM | DIASTOLIC BLOOD PRESSURE: 80 MMHG | TEMPERATURE: 97.9 F | SYSTOLIC BLOOD PRESSURE: 150 MMHG | RESPIRATION RATE: 16 BRPM | HEIGHT: 60 IN | BODY MASS INDEX: 27.51 KG/M2

## 2025-07-21 DIAGNOSIS — R25.2 MUSCLE CRAMPS: ICD-10-CM

## 2025-07-21 DIAGNOSIS — M62.89 PELVIC FLOOR DYSFUNCTION IN FEMALE: ICD-10-CM

## 2025-07-21 DIAGNOSIS — M25.511 CHRONIC RIGHT SHOULDER PAIN: Primary | ICD-10-CM

## 2025-07-21 DIAGNOSIS — Z98.890 S/P CAROTID ENDARTERECTOMY: ICD-10-CM

## 2025-07-21 DIAGNOSIS — G89.29 CHRONIC RIGHT SHOULDER PAIN: Primary | ICD-10-CM

## 2025-07-21 DIAGNOSIS — E24.8 HYPERCORTISOLISM DUE TO ADRENAL NEOPLASM: ICD-10-CM

## 2025-07-21 DIAGNOSIS — M54.2 CHRONIC NECK PAIN: ICD-10-CM

## 2025-07-21 DIAGNOSIS — I50.32 HEART FAILURE WITH IMPROVED EJECTION FRACTION (HFIMPEF) (HCC): ICD-10-CM

## 2025-07-21 DIAGNOSIS — D49.7 HYPERCORTISOLISM DUE TO ADRENAL NEOPLASM: ICD-10-CM

## 2025-07-21 DIAGNOSIS — G89.29 CHRONIC NECK PAIN: ICD-10-CM

## 2025-07-21 DIAGNOSIS — Z95.2 S/P AVR (AORTIC VALVE REPLACEMENT): ICD-10-CM

## 2025-07-21 RX ORDER — LORAZEPAM 1 MG/1
1 TABLET ORAL EVERY 8 HOURS PRN
Qty: 90 TABLET | Refills: 1 | Status: SHIPPED | OUTPATIENT
Start: 2025-07-21 | End: 2025-09-19

## 2025-07-21 ASSESSMENT — ENCOUNTER SYMPTOMS
ALLERGIC/IMMUNOLOGIC NEGATIVE: 1
EYE DISCHARGE: 0
SORE THROAT: 0
EYE PAIN: 0
BACK PAIN: 0
ABDOMINAL PAIN: 0
EYE REDNESS: 0
DIARRHEA: 0
WHEEZING: 0
CONSTIPATION: 0
VOMITING: 0
TROUBLE SWALLOWING: 0
RHINORRHEA: 0
NAUSEA: 0
SHORTNESS OF BREATH: 0
COUGH: 0

## 2025-07-21 NOTE — PROGRESS NOTES
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    Subjective:      Review of Systems   Constitutional:  Negative for activity change, fatigue and fever.   HENT:  Negative for congestion, ear pain, rhinorrhea, sore throat and trouble swallowing.    Eyes:  Negative for pain, discharge and redness.   Respiratory:  Negative for cough, shortness of breath and wheezing.    Cardiovascular: Negative.    Gastrointestinal:  Negative for abdominal pain, constipation, diarrhea, nausea and vomiting.   Endocrine: Negative.    Genitourinary:  Positive for urgency. Negative for dysuria and frequency.   Musculoskeletal:  Positive for arthralgias, myalgias and neck pain. Negative for back pain and joint swelling.   Skin:  Negative for rash.   Allergic/Immunologic: Negative.    Neurological:  Negative for dizziness, tremors, weakness and headaches.   Hematological: Negative.    Psychiatric/Behavioral:  Negative for dysphoric mood and sleep disturbance. The patient is not nervous/anxious.        Objective:     BP (!) 150/80 (BP Site: Left Upper Arm, Patient Position: Sitting, BP Cuff Size: Medium Adult)   Pulse 86   Temp 97.9 °F (36.6 °C) (Oral)   Resp 16   Ht 1.524 m (5')   Wt 63.5 kg (140 lb 1.6 oz)   SpO2 97%   BMI 27.36 kg/m²     Physical Exam  Vitals and nursing note reviewed.   Constitutional:       General: She is not in acute distress.     Appearance: She is well-developed. She is not ill-appearing or diaphoretic.   HENT:      Right Ear: External ear normal.      Left Ear: External ear normal.      Nose: Nose normal.   Eyes:      General:         Right eye: No discharge.         Left eye: No discharge.      Conjunctiva/sclera: Conjunctivae normal.      Pupils: Pupils are equal, round, and reactive to light.   Neck:      Vascular: No JVD.   Cardiovascular:      Rate and Rhythm: Normal rate and regular rhythm.   Pulmonary:      Effort: Pulmonary effort is normal. No respiratory

## 2025-07-21 NOTE — CARE COORDINATION
Ambulatory Care Coordination Note     7/21/2025 4:09 PM     ACM outreach attempt by this ACM today to offer care management services. ACM was unable to reach the patient by telephone today;   left voice message requesting a return phone call to this ACM.     ACM: Radha Curiel RN     Care Summary Note:   Referral from PCP for ambulatory care management    PCP/Specialist follow up:   Future Appointments         Provider Specialty Dept Phone    7/23/2025 9:00 AM (Arrive by 8:30 AM) [facility] VASCULAR LAB ROOM 1 Vascular Surgery [phone]    7/23/2025 9:30 AM (Arrive by 9:00 AM) [facility] VASCULAR LAB ROOM 1 Vascular Surgery [phone]    7/23/2025 11:45 AM Maxwell Mcnulty PA-C Vascular Surgery [phone]    7/31/2025 10:00 AM Sharonda Chen, [facility] [phone]    8/12/2025 10:30 AM (Arrive by 10:00 AM) [facility] NUC MED HC  INJECT  RM1 Radiology [phone]    8/12/2025 11:00 AM (Arrive by 10:30 AM) [facility] NUCLEAR MEDICINE HEART CENTER ROOM 1 Radiology [phone]    8/12/2025 11:30 AM (Arrive by 11:15 AM) [facility] STRESS LAB 1 Cardiology [phone]    8/12/2025 12:00 PM (Arrive by 11:30 AM) [facility] ROOM 1 Radiology [phone]    2/2/2026 10:30 AM Teja Hayward MD Cardiology [phone]    3/18/2026 11:00 AM Joycelyn Mcdermott, APRN - [facility] Rheumatology [phone]            Follow Up:   Plan for next ACM outreach in approximately 1 week to complete:  - outreach attempt to offer care management services.

## 2025-07-23 ENCOUNTER — APPOINTMENT (OUTPATIENT)
Dept: INTERVENTIONAL RADIOLOGY/VASCULAR | Age: 60
End: 2025-07-23
Attending: THORACIC SURGERY (CARDIOTHORACIC VASCULAR SURGERY)
Payer: COMMERCIAL

## 2025-07-23 ENCOUNTER — TELEPHONE (OUTPATIENT)
Age: 60
End: 2025-07-23

## 2025-07-23 ENCOUNTER — HOSPITAL ENCOUNTER (OUTPATIENT)
Dept: INTERVENTIONAL RADIOLOGY/VASCULAR | Age: 60
Discharge: HOME | End: 2025-07-25
Attending: THORACIC SURGERY (CARDIOTHORACIC VASCULAR SURGERY)
Payer: COMMERCIAL

## 2025-07-23 ENCOUNTER — CLINICAL DOCUMENTATION (OUTPATIENT)
Facility: HOSPITAL | Age: 60
End: 2025-07-23

## 2025-07-23 ENCOUNTER — OFFICE VISIT (OUTPATIENT)
Age: 60
End: 2025-07-23
Payer: COMMERCIAL

## 2025-07-23 VITALS
WEIGHT: 140.6 LBS | BODY MASS INDEX: 27.61 KG/M2 | HEIGHT: 60 IN | SYSTOLIC BLOOD PRESSURE: 147 MMHG | DIASTOLIC BLOOD PRESSURE: 63 MMHG | HEART RATE: 86 BPM

## 2025-07-23 DIAGNOSIS — I65.23 CAROTID STENOSIS, NON-SYMPTOMATIC, BILATERAL: Primary | ICD-10-CM

## 2025-07-23 DIAGNOSIS — I65.21 STENOSIS OF RIGHT CAROTID ARTERY: ICD-10-CM

## 2025-07-23 DIAGNOSIS — I73.9 PAD (PERIPHERAL ARTERY DISEASE): ICD-10-CM

## 2025-07-23 DIAGNOSIS — I70.201: ICD-10-CM

## 2025-07-23 PROCEDURE — 3074F SYST BP LT 130 MM HG: CPT | Performed by: PHYSICIAN ASSISTANT

## 2025-07-23 PROCEDURE — 99214 OFFICE O/P EST MOD 30 MIN: CPT | Performed by: PHYSICIAN ASSISTANT

## 2025-07-23 PROCEDURE — 93925 LOWER EXTREMITY STUDY: CPT

## 2025-07-23 PROCEDURE — 3078F DIAST BP <80 MM HG: CPT | Performed by: PHYSICIAN ASSISTANT

## 2025-07-23 PROCEDURE — 93880 EXTRACRANIAL BILAT STUDY: CPT

## 2025-07-23 RX ORDER — HYDROCODONE BITARTRATE AND ACETAMINOPHEN 5; 325 MG/1; MG/1
1 TABLET ORAL EVERY 8 HOURS PRN
Qty: 90 TABLET | Refills: 0 | Status: SHIPPED | OUTPATIENT
Start: 2025-07-23 | End: 2025-08-22

## 2025-07-23 NOTE — H&P
Formulation and discussion of sedation / procedure plans, risks, benefits, side effects and alternatives with patient and/or responsible adult completed.     Electronically signed by Gricelda Henson MD on 2/21/2022 at 9:52 AM Usha is a 14 month old male who presents for cardiac consultation.    Usha is accompanied by mother  Interpretation service was not necessary.    HISTORY OF PRESENT ILLNESS  Usha now 14 months-old born at 36 weeks gestation.  Patient was product of twin pregnancy.  Postnatally baby was noted to have moderate size ductus arteriosus for which patient was asked to have initial consultation with us.  His PDA spontaneously closed and he had mild left pulmonary artery stenosis.  The patient's mother reports that he has been in good health, with no current issues. He is active and mobile, as evidenced by his ability to run around. He recently had an episode of hand, foot, and mouth disease, which has since resolved. However, he still bears some minor scratches from the illness.    According to mother patient has been taking formula without any difficulty. Denies baby having pallor cyanosis breathing difficulty sweating episode.    Referring Physician:  Yari Madrid MD    REVIEW OF SYSTEMS  Review of Systems   Constitutional:  Negative for activity change, appetite change, diaphoresis and irritability.   HENT:  Negative for congestion and trouble swallowing.    Respiratory:  Negative for choking, wheezing and stridor.    Cardiovascular:  Negative for cyanosis.   Gastrointestinal:  Negative for abdominal distention and vomiting.   Skin:  Negative for color change and pallor.        PROBLEM LIST  Patient Active Problem List   Diagnosis   • Café au lait spot   • PDA (patent ductus arteriosus) (CMD)   • Heart murmur of    • Left pulmonary artery stenosis (CMD)   • Concealed penis       PAST MEDICAL, SURGICAL, FAMILY, AND SOCIAL HISTORY    Past Medical History:   Diagnosis Date   • 36 weeks gestation of pregnancy (CMD) 2024   • Abnormal prenatal ultrasound 2024    Limited cardiac views     • Monochorionic diamniotic twin pregnancy (CMD) 2024       No past surgical history on file.    Family  History   Problem Relation Age of Onset   • Patient is unaware of any medical problems Mother    • Scoliosis Father    • Patient is unaware of any medical problems Sister    • Patient is unaware of any medical problems Sister    • Patient is unaware of any medical problems Sister    • Patient is unaware of any medical problems Brother    • Heart Maternal Aunt         Hole in the Heart, Stent at age 6 y/o   • Hypertension Maternal Grandmother    • Hypertension Paternal Grandmother    • Congestive Heart Failure Maternal Grandfather    • Heart murmur Maternal Grandfather    • Hypertension Maternal Grandfather    • Patient is unaware of any medical problems Identical Twin    • Myocardial Infarction Maternal Great-Grandfather         Social History     Tobacco Use   • Smoking status: Never     Passive exposure: Never   • Smokeless tobacco: Never   Substance Use Topics   • Alcohol use: Not on file       Current Outpatient Medications   Medication Sig Dispense Refill   • erythromycin (ILOTYCIN) ophthalmic ointment Place 0.5 inches into both eyes 4 times daily. 3.5 g 0   • cholecalciferol (VITAMIN D) 10 mcg (400 units)/mL oral liquid Take 1 mL by mouth daily. (Patient not taking: Reported on 2024)       No current facility-administered medications for this visit.       ALLERGIES:  No Known Allergies     PHYSICAL EXAM  Visit Vitals  BP 83/46 (BP Location: RUE - Right upper extremity, Patient Position: Supine, Cuff Size: Pediatric)   Pulse 119   Ht 31.73\" (80.6 cm)   Wt 11.3 kg (24 lb 12.8 oz)   SpO2 98%   BMI 17.32 kg/m²       Physical Exam  Constitutional:       General: He is not in acute distress.     Appearance: Normal appearance. He is well-developed. He is not diaphoretic.   HENT:      Head: Normocephalic and atraumatic.   Eyes:      Pupils: Pupils are equal, round, and reactive to light.   Neck:      Thyroid: No thyromegaly.      Vascular: No JVD.      Trachea: No tracheal deviation.   Cardiovascular:      Rate  and Rhythm: Normal rate and regular rhythm.      Pulses:           Radial pulses are 2+ on the right side and 2+ on the left side.        Femoral pulses are 2+ on the right side and 2+ on the left side.  Pulmonary:      Effort: Pulmonary effort is normal. No respiratory distress.      Breath sounds: Normal breath sounds. No stridor. No wheezing or rales.   Chest:      Chest wall: No tenderness.   Abdominal:      General: Bowel sounds are normal. There is no distension.      Palpations: Abdomen is soft. There is no mass.      Tenderness: There is no abdominal tenderness. There is no guarding or rebound.   Musculoskeletal:         General: No tenderness. Normal range of motion.      Cervical back: Normal range of motion.   Skin:     General: Skin is warm and dry.      Coloration: Skin is not pale.      Findings: No erythema or rash.   Neurological:      Mental Status: He is alert.      Gait: Gait is intact.           DIAGNOSTIC TESTING REVIEWED    EKG: Normal sinus rhythm  T wave inversion,     ECHOCARDIOGRAM:   Normal echocardiogram for age.   Normal intracardiac connections with no apparent septal defects.   Normal valve structure and function.   Normal biventricular size and function, with no ventricular hypertrophy.   No evidence of pulmonary hypertension.   No pericardial or obvious pleural effusion.       ASSESSMENT AND PLAN  Problem List Items Addressed This Visit        Cardiac and Vasculature    Left pulmonary artery stenosis (CMD) - Primary    1. Patent ductus arteriosus.  The PDA has successfully closed, and the left lung artery is demonstrating satisfactory growth. There is no need for further follow-up with us or any cardiologist unless new symptoms arise. He should be treated as a normal child. If desired, a follow-up visit can be scheduled when he is 4-5 years old to re-evaluate.             Relevant Orders    Electrocardiogram 12-Lead    CONGENITAL TRANSTHORACIC ECHO (TTE) COMPLETE (PEDS)        ADDITIONAL CONSIDERATIONS  1.  May participate in age-appropriate activities  2. SBE: According to the American Heart Association guidelines, SBE prophylaxis is not needed.  3. Surgery/Anesthesia: Based on the available history and data, the patient is at no greater cardiac risk than the general population for surgery, anesthesia, or sedation.  4. Immunizations: Routine immunizations should be provided, including influenza vaccination for patients of appropriate age.         FOLLOW UP  Return in about 4 years (around 7/23/2029).     Thank you for allowing me to participate in the care of Usha.  Please contact me   if you have any questions.      Copy to: Yari Madrid MD

## 2025-07-23 NOTE — PROGRESS NOTES
[facility] [place] [facility]  [facility] VASCULAR SURGERY  [address], [address]  [place] [address]  Dept: [phone]  Dept Fax: [phone]  Loc: [phone]    Visit Date: 7/23/2025    Ms. Moura is a 60 y.o.female  who presented for:  Chief Complaint   Patient presents with    Follow-up       HPI:   HPI   Ms. Loza returns to clinic for follow-up after her right carotid endarterectomy on 10/21/2024.  This procedure was done simultaneously in conjunction with Dr. Guillory, who then performed a mechanical aortic valve replacement for critical aortic stenosis. She previously saw Dr. Cabrera in January 2025 and was instructed to return to the office in six months with a repeat carotid duplex and LE doppler. She remains on aspirin and Coumadin.  She reports no complaints and does not have any swallowing difficulty or any problems with speech or hoarseness. States she is filing for disability because the physical work makes it difficult to work. Previously worked at [employer]  and [employer]     In addition to the above, the patient has a known history of bilateral severe arterial occlusive disease status post right SFA stent and left femoral-popliteal bypass.  These were done for rest pain on the left and severe claudication on the right.  She continues to have some claudication of both legs with going up steps. Some improvement since she quit working.  She unfortunately still continues to smoke about a 5 cigarettes a day but is decreased from previous.  She unfortunately also is subjected to secondhand smoke from her  who smokes heavily in the house.  She is trying to quit completely.     Vascular Duplex LE 7/23/25  PROCEDURE: VAS DUP LOWER EXTREMITY ARTERIES BILATERAL     CLINICAL INFORMATION: 60-year-old female with a history of peripheral artery  disease     COMPARISON: No prior study.     TECHNIQUE: Multiple grayscale and color flow sonographic images of

## 2025-07-23 NOTE — PROGRESS NOTES
Contacted [facility] [place] [employer] [phone] and spoke with Lashay CAIN    Action Items Completed:   Checking status on [employer] Appeal - Denial for SOC.  Reference number: [id number]  [facility]: [id number]  Case Reference Number: [id number]    Authorization status still pending  Expected determination date: July 26th.     Call reference number: [id number]    Follow Up Action:   Waiting determination    Electronically signed by JUNIE CRAVEN RN on 7/23/25 at 2:40 PM

## 2025-07-23 NOTE — TELEPHONE ENCOUNTER
[employer] originally submitted the prior auth for this study with the wrong clinical information, prior auth was denied. Patient was already at [facility] getting ultrasound completed.     [employer] did not reach out to vascular surgery to inform them of this denial, vascular surgery was only made aware of denial because the insurance company reach out to their office.     I reviewed the request that was submitted, and the questions were all answered incorrectly by [employer], and documentation from another provider was uploaded, but no documentation from the requesting provider.     I resubmitted the prior auth, and was able to get it approved.     Should patient receive a denial letter in the mail, and calls the office for further information, please tell her to disregard the letter, and that the request was resubmitted, and approved. I did call patient and leave her a message, asking her to return my call so that I could give her the heads up.     See below.

## 2025-07-28 ENCOUNTER — CARE COORDINATION (OUTPATIENT)
Dept: CARE COORDINATION | Age: 60
End: 2025-07-28

## 2025-07-28 NOTE — CARE COORDINATION
Ambulatory Care Coordination Note     7/28/2025 10:08 AM     ACM outreach attempt by this ACM today to offer care management services. ACM was unable to reach the patient by telephone today;   left voice message requesting a return phone call to this ACM.     ACM: Radha Curiel RN     Care Summary Note:   2nd attempt. Referral from PCP    PCP/Specialist follow up:   Future Appointments         Provider Specialty Dept Phone    7/31/2025 10:00 AM Sharonda Chen [facility] [phone]    8/12/2025 10:30 AM (Arrive by 10:00 AM) [facility] NUC MED HC  INJECT  RM1 Radiology [phone]    8/12/2025 11:00 AM (Arrive by 10:30 AM) [facility] NUCLEAR MEDICINE HEART CENTER ROOM 1 Radiology [phone]    8/12/2025 11:30 AM (Arrive by 11:15 AM) [facility] STRESS LAB 1 Cardiology [phone]    8/12/2025 12:00 PM (Arrive by 11:30 AM) [facility] NUCLEAR MEDICINE HEART CENTER ROOM 1 Radiology [phone]    9/22/2025 3:30 PM Edith Diaz, PT Physical Therapy [phone]    10/6/2025 2:00 PM Edith Diaz, PT Physical Therapy [phone]    10/20/2025 1:00 PM Edith Diaz, PT Physical Therapy [phone]    11/3/2025 2:00 PM Edith Diaz, PT Physical Therapy [phone]    11/17/2025 2:00 PM Edith Diaz, PT Physical Therapy [phone]    12/1/2025 2:00 PM Edith Diaz, PT Physical Therapy [phone]    1/23/2026 1:00 PM (Arrive by 12:30 PM) [facility] VASCULAR LAB ROOM 1 Vascular Surgery [phone]    1/28/2026 11:00 AM Maxwell Mcnulty PA-C Vascular Surgery [phone]    2/2/2026 10:30 AM Teja Hayward MD Cardiology [phone]    3/18/2026 11:00 AM Joycelyn Mcdermott, APRN - CNP Rheumatology [phone]            Follow Up:   Plan for next ACM outreach in approximately 1 week to complete:  - outreach attempt to offer care management services.

## 2025-07-29 ENCOUNTER — CLINICAL DOCUMENTATION (OUTPATIENT)
Facility: HOSPITAL | Age: 60
End: 2025-07-29

## 2025-07-29 NOTE — PROGRESS NOTES
Contacted [facility] [employer] [phone] and spoke with Oleg DOBSON     Action Items Completed:   Checking status on [employer] Appeal -  Reference number: [id number]  [facility]: [id number]    Appeal DENIED for SOC.  Requested denial be faxed to me. Provided my fax number.      States 24-48hrs to be received.    Call Reference number: [id number]    Reached out to Provider Jonathon GUAJARDO CNP and Shruthi BALDERAS RN via inbasket  message. Notified of above, and will refer to AIS.    Reached out to Jacki [phone] and shared above, voiced understanding and agreeable to AIS.    Emailed Darshana Gonzalez and Jonah GONZALEZ with [facility], shared above and am forwarding referral to her.     Follow Up Action:   Follow to see if she gets approved for HI     Electronically signed by JUNIE CRAVEN, RN on 7/29/25 at 11:33 AM

## 2025-07-31 ENCOUNTER — ANTI-COAG VISIT (OUTPATIENT)
Age: 60
End: 2025-07-31
Payer: COMMERCIAL

## 2025-07-31 DIAGNOSIS — Z79.01 ANTICOAGULATED ON COUMADIN: ICD-10-CM

## 2025-07-31 DIAGNOSIS — Z51.81 ENCOUNTER FOR THERAPEUTIC DRUG MONITORING: Primary | ICD-10-CM

## 2025-07-31 DIAGNOSIS — Z95.2 H/O AORTIC VALVE REPLACEMENT: ICD-10-CM

## 2025-07-31 LAB — POC INR: 3.3 (ref 0.8–1.2)

## 2025-07-31 PROCEDURE — 99212 OFFICE O/P EST SF 10 MIN: CPT | Performed by: PHARMACIST

## 2025-07-31 PROCEDURE — 85610 PROTHROMBIN TIME: CPT | Performed by: PHARMACIST

## 2025-07-31 NOTE — PROGRESS NOTES
Medication Management Clinic  [facility]  Anticoagulation Clinic  [phone] (phone)  [phone] (fax)    Ms. Jacki Moura is a 60 y.o.  female with history of On-X AVR who presents today for anticoagulation monitoring and adjustment.    Patient verifies current dosing regimen and tablet strength.  No missed or extra doses.  Patient denies s/s bleeding/bruising/SOB. Patient has had some hand swelling on and off.   No blood in urine or stool.  No dietary changes.  No changes in OTC agents/Herbals. Patient states her Lyrica was decreased to 50 mg BID. She now takes Korlym 300 mg daily   Patient has not been drinking beer. She states she has decreased her smoking down to 5-10 cigarettes/day and is going to entirely quit tomorrow.  No change in activity level.  Patient denies falls.  Patient had a cold/flu-like symptoms last week. She reports both vomiting and diarrhea which have resolved.   No Procedures scheduled in the future at this time.    Assessment:   Lab Results   Component Value Date    INR 3.30 (H) 07/31/2025    INR 2.10 (H) 07/17/2025    INR 1.80 (H) 06/26/2025     INR supratherapeutic   Recent Labs     07/31/25  1002   INR 3.30*     INR goal: 1.5-2.0    Patient presents with second consecutive supratherapeutic INR despite a recent dose adjustment. Patient would likely benefit from further reduction due to smoking cessation.     Plan:  HOLD Coumadin x 1 dose today 7/31/25 then decrease Coumadin from 4 mg MF, 3 mg W and 2 mg TuThSaSu to Coumadin 4 mg W and 2 mg MTuThFSaSu (15.8% decrease).  Recheck INR in 2 week(s).  Patient reminded to call the Anticoagulation Clinic with signs or symptoms of bleeding or with any medication changes.  Patient given instructions utilizing the teach back method.    After visit summary printed and reviewed with patient.      Discharged ambulatory in no apparent distress.    For Pharmacy Admin Tracking Only    Intervention Detail: Dose Adjustment: 1, reason:

## 2025-08-01 ENCOUNTER — CARE COORDINATION (OUTPATIENT)
Dept: CARE COORDINATION | Age: 60
End: 2025-08-01

## 2025-08-01 NOTE — CARE COORDINATION
Ambulatory Care Coordination Note     8/1/2025 9:05 AM     patient outreach attempt by this ACM today to offer care management services. ACM was unable to reach the patient by telephone today;   left voice message requesting a return phone call to this ACM.  My chart message read by patient  3rd attempt to reach for ACM/RPM referral from PCP     Patient closed (unable to reach patient) from the High Risk Care Management program on 8/1/2025.  Patient UTR.  Care management goals have been completed. No further Ambulatory Care Manager follow up scheduled.

## 2025-08-05 RX ORDER — WARFARIN SODIUM 2 MG/1
TABLET ORAL
Qty: 40 TABLET | Refills: 5 | Status: SHIPPED | OUTPATIENT
Start: 2025-08-05

## 2025-08-08 DIAGNOSIS — G89.29 CHRONIC RADICULAR LUMBAR PAIN: ICD-10-CM

## 2025-08-08 DIAGNOSIS — R20.2 NUMBNESS AND TINGLING OF LEFT LEG: ICD-10-CM

## 2025-08-08 DIAGNOSIS — R20.0 NUMBNESS AND TINGLING OF LEFT LEG: ICD-10-CM

## 2025-08-08 DIAGNOSIS — M54.16 CHRONIC RADICULAR LUMBAR PAIN: ICD-10-CM

## 2025-08-08 RX ORDER — PREGABALIN 50 MG/1
50 CAPSULE ORAL 2 TIMES DAILY
Qty: 180 CAPSULE | Refills: 1 | Status: SHIPPED | OUTPATIENT
Start: 2025-08-08 | End: 2025-11-06

## 2025-08-11 DIAGNOSIS — R25.2 MUSCLE CRAMPS: ICD-10-CM

## 2025-08-12 ENCOUNTER — HOSPITAL ENCOUNTER (OUTPATIENT)
Dept: NUCLEAR MEDICINE | Age: 60
Discharge: HOME | End: 2025-08-12
Payer: COMMERCIAL

## 2025-08-12 ENCOUNTER — HOSPITAL ENCOUNTER (OUTPATIENT)
Age: 60
Discharge: HOME | End: 2025-08-14
Payer: COMMERCIAL

## 2025-08-12 VITALS — WEIGHT: 137.8 LBS | BODY MASS INDEX: 26.91 KG/M2

## 2025-08-12 DIAGNOSIS — R07.9 CHEST PAIN, UNSPECIFIED TYPE: ICD-10-CM

## 2025-08-12 LAB
NUC STRESS EJECTION FRACTION: 47 %
STRESS BASELINE DIAS BP: 71 MMHG
STRESS BASELINE HR: 80 BPM
STRESS BASELINE SYS BP: 132 MMHG
STRESS ESTIMATED WORKLOAD: 1 METS
STRESS PEAK DIAS BP: 71 MMHG
STRESS PEAK SYS BP: 132 MMHG
STRESS PERCENT HR ACHIEVED: 59 %
STRESS POST PEAK HR: 95 BPM
STRESS RATE PRESSURE PRODUCT: NORMAL BPM*MMHG
STRESS STAGE 1 BP: NORMAL MMHG
STRESS STAGE 1 DURATION: 1 MIN:SEC
STRESS STAGE 1 HR: 84 BPM
STRESS STAGE 2 BP: NORMAL MMHG
STRESS STAGE 2 DURATION: 1 MIN:SEC
STRESS STAGE 2 HR: 88 BPM
STRESS STAGE 3 BP: NORMAL MMHG
STRESS STAGE 3 DURATION: 1 MIN:SEC
STRESS STAGE 3 HR: 92 BPM
STRESS STAGE RECOVERY 1 BP: NORMAL MMHG
STRESS STAGE RECOVERY 1 DURATION: 1 MIN:SEC
STRESS STAGE RECOVERY 1 HR: 92 BPM
STRESS STAGE RECOVERY 2 BP: NORMAL MMHG
STRESS STAGE RECOVERY 2 DURATION: 1 MIN:SEC
STRESS STAGE RECOVERY 2 HR: 92 BPM
STRESS STAGE RECOVERY 3 BP: NORMAL MMHG
STRESS STAGE RECOVERY 3 DURATION: 1 MIN:SEC
STRESS STAGE RECOVERY 3 HR: 93 BPM
STRESS STAGE RECOVERY 4 BP: NORMAL MMHG
STRESS STAGE RECOVERY 4 COMMENTS: NORMAL
STRESS STAGE RECOVERY 4 DURATION: 2 MIN:SEC
STRESS STAGE RECOVERY 4 HR: 95 BPM
STRESS TARGET HR: 160 BPM
TID: 1.14

## 2025-08-12 PROCEDURE — 3430000000 HC RX DIAGNOSTIC RADIOPHARMACEUTICAL: Performed by: INTERNAL MEDICINE

## 2025-08-12 PROCEDURE — 93016 CV STRESS TEST SUPVJ ONLY: CPT | Performed by: INTERNAL MEDICINE

## 2025-08-12 PROCEDURE — 93018 CV STRESS TEST I&R ONLY: CPT | Performed by: INTERNAL MEDICINE

## 2025-08-12 PROCEDURE — A9500 TC99M SESTAMIBI: HCPCS | Performed by: INTERNAL MEDICINE

## 2025-08-12 PROCEDURE — 78452 HT MUSCLE IMAGE SPECT MULT: CPT

## 2025-08-12 PROCEDURE — 6360000002 HC RX W HCPCS: Performed by: STUDENT IN AN ORGANIZED HEALTH CARE EDUCATION/TRAINING PROGRAM

## 2025-08-12 PROCEDURE — 93017 CV STRESS TEST TRACING ONLY: CPT

## 2025-08-12 PROCEDURE — 78452 HT MUSCLE IMAGE SPECT MULT: CPT | Performed by: INTERNAL MEDICINE

## 2025-08-12 RX ORDER — CYCLOBENZAPRINE HCL 10 MG
10 TABLET ORAL 2 TIMES DAILY PRN
Qty: 180 TABLET | Refills: 0 | Status: SHIPPED | OUTPATIENT
Start: 2025-08-12 | End: 2025-11-10

## 2025-08-12 RX ORDER — TETRAKIS(2-METHOXYISOBUTYLISOCYANIDE)COPPER(I) TETRAFLUOROBORATE 1 MG/ML
31 INJECTION, POWDER, LYOPHILIZED, FOR SOLUTION INTRAVENOUS
Status: COMPLETED | OUTPATIENT
Start: 2025-08-12 | End: 2025-08-12

## 2025-08-12 RX ORDER — TETRAKIS(2-METHOXYISOBUTYLISOCYANIDE)COPPER(I) TETRAFLUOROBORATE 1 MG/ML
9.1 INJECTION, POWDER, LYOPHILIZED, FOR SOLUTION INTRAVENOUS
Status: COMPLETED | OUTPATIENT
Start: 2025-08-12 | End: 2025-08-12

## 2025-08-12 RX ORDER — REGADENOSON 0.08 MG/ML
0.4 INJECTION, SOLUTION INTRAVENOUS
Status: COMPLETED | OUTPATIENT
Start: 2025-08-12 | End: 2025-08-12

## 2025-08-12 RX ADMIN — Medication 31 MILLICURIE: at 11:08

## 2025-08-12 RX ADMIN — Medication 9.1 MILLICURIE: at 10:07

## 2025-08-12 RX ADMIN — REGADENOSON 0.4 MG: 0.08 INJECTION, SOLUTION INTRAVENOUS at 11:01

## 2025-08-13 ENCOUNTER — RESULTS FOLLOW-UP (OUTPATIENT)
Dept: CARDIOLOGY CLINIC | Age: 60
End: 2025-08-13

## 2025-08-13 DIAGNOSIS — R94.39 ABNORMAL STRESS TEST: Primary | ICD-10-CM

## 2025-08-14 ENCOUNTER — ANTI-COAG VISIT (OUTPATIENT)
Age: 60
End: 2025-08-14
Payer: COMMERCIAL

## 2025-08-14 DIAGNOSIS — Z95.2 H/O AORTIC VALVE REPLACEMENT: ICD-10-CM

## 2025-08-14 DIAGNOSIS — Z51.81 ENCOUNTER FOR THERAPEUTIC DRUG MONITORING: Primary | ICD-10-CM

## 2025-08-14 DIAGNOSIS — Z79.01 ANTICOAGULATED ON COUMADIN: ICD-10-CM

## 2025-08-14 LAB — POC INR: 3.9 (ref 0.8–1.2)

## 2025-08-14 PROCEDURE — 85610 PROTHROMBIN TIME: CPT

## 2025-08-14 PROCEDURE — 99212 OFFICE O/P EST SF 10 MIN: CPT

## 2025-08-15 ENCOUNTER — TELEPHONE (OUTPATIENT)
Dept: CARDIOLOGY CLINIC | Age: 60
End: 2025-08-15

## 2025-08-15 PROBLEM — R94.39 ABNORMAL STRESS TEST: Status: ACTIVE | Noted: 2025-08-13

## 2025-08-18 ENCOUNTER — TELEPHONE (OUTPATIENT)
Age: 60
End: 2025-08-18

## 2025-08-20 ENCOUNTER — ANTI-COAG VISIT (OUTPATIENT)
Age: 60
End: 2025-08-20
Payer: COMMERCIAL

## 2025-08-20 ENCOUNTER — HOSPITAL ENCOUNTER (OUTPATIENT)
Age: 60
Discharge: HOME | End: 2025-08-20

## 2025-08-20 VITALS
OXYGEN SATURATION: 98 % | TEMPERATURE: 97.8 F | HEART RATE: 80 BPM | SYSTOLIC BLOOD PRESSURE: 126 MMHG | DIASTOLIC BLOOD PRESSURE: 72 MMHG | RESPIRATION RATE: 16 BRPM

## 2025-08-20 DIAGNOSIS — Z79.01 ANTICOAGULATED ON COUMADIN: ICD-10-CM

## 2025-08-20 DIAGNOSIS — M85.80 OSTEOPENIA, UNSPECIFIED LOCATION: Primary | ICD-10-CM

## 2025-08-20 DIAGNOSIS — Z95.2 H/O AORTIC VALVE REPLACEMENT: ICD-10-CM

## 2025-08-20 DIAGNOSIS — Z51.81 ENCOUNTER FOR THERAPEUTIC DRUG MONITORING: Primary | ICD-10-CM

## 2025-08-20 LAB — POC INR: 1.8 (ref 0.8–1.2)

## 2025-08-20 PROCEDURE — 85610 PROTHROMBIN TIME: CPT | Performed by: PHARMACIST

## 2025-08-20 PROCEDURE — 99202 OFFICE O/P NEW SF 15 MIN: CPT | Performed by: PHARMACIST

## 2025-08-20 RX ORDER — ACETAMINOPHEN/DIPHENHYDRAMINE 500MG-25MG
1 TABLET ORAL NIGHTLY PRN
COMMUNITY

## 2025-08-20 RX ORDER — ZOLEDRONIC ACID 0.05 MG/ML
5 INJECTION, SOLUTION INTRAVENOUS ONCE
Status: COMPLETED | OUTPATIENT
Start: 2025-08-20 | End: 2025-08-20

## 2025-08-20 RX ORDER — SODIUM CHLORIDE 0.9 % (FLUSH) 0.9 %
5-40 SYRINGE (ML) INJECTION PRN
Status: DISCONTINUED | OUTPATIENT
Start: 2025-08-20 | End: 2025-08-21 | Stop reason: HOSPADM

## 2025-08-20 RX ORDER — ENOXAPARIN SODIUM 100 MG/ML
60 INJECTION SUBCUTANEOUS 2 TIMES DAILY
Qty: 16 EACH | Refills: 0 | Status: SHIPPED | OUTPATIENT
Start: 2025-08-20

## 2025-08-20 RX ORDER — DIPHENHYDRAMINE HYDROCHLORIDE 50 MG/ML
50 INJECTION, SOLUTION INTRAMUSCULAR; INTRAVENOUS
OUTPATIENT
Start: 2026-08-19

## 2025-08-20 RX ORDER — EPINEPHRINE 1 MG/ML
0.3 INJECTION, SOLUTION, CONCENTRATE INTRAVENOUS PRN
OUTPATIENT
Start: 2026-08-19

## 2025-08-20 RX ORDER — SODIUM CHLORIDE 0.9 % (FLUSH) 0.9 %
5-40 SYRINGE (ML) INJECTION PRN
OUTPATIENT
Start: 2026-08-19

## 2025-08-20 RX ORDER — ACETAMINOPHEN 325 MG/1
650 TABLET ORAL
OUTPATIENT
Start: 2026-08-19

## 2025-08-20 RX ORDER — SODIUM CHLORIDE 9 MG/ML
INJECTION, SOLUTION INTRAVENOUS CONTINUOUS
OUTPATIENT
Start: 2026-08-19

## 2025-08-20 RX ORDER — ZOLEDRONIC ACID 0.05 MG/ML
5 INJECTION, SOLUTION INTRAVENOUS ONCE
OUTPATIENT
Start: 2026-08-19 | End: 2026-08-19

## 2025-08-20 RX ORDER — ONDANSETRON 2 MG/ML
8 INJECTION INTRAMUSCULAR; INTRAVENOUS
OUTPATIENT
Start: 2026-08-19

## 2025-08-20 RX ADMIN — Medication 10 ML: at 09:01

## 2025-08-20 RX ADMIN — ZOLEDRONIC ACID 5 MG: 0.05 INJECTION, SOLUTION INTRAVENOUS at 08:44

## 2025-08-20 RX ADMIN — Medication 10 ML: at 08:39

## 2025-08-20 ASSESSMENT — PAIN DESCRIPTION - ONSET: ONSET: ON-GOING

## 2025-08-20 ASSESSMENT — PAIN DESCRIPTION - LOCATION: LOCATION: NECK;SHOULDER

## 2025-08-20 ASSESSMENT — PAIN DESCRIPTION - DESCRIPTORS: DESCRIPTORS: ACHING

## 2025-08-20 ASSESSMENT — PAIN DESCRIPTION - ORIENTATION: ORIENTATION: RIGHT;LEFT

## 2025-08-20 ASSESSMENT — PAIN DESCRIPTION - PAIN TYPE: TYPE: CHRONIC PAIN

## 2025-08-20 ASSESSMENT — PAIN SCALES - GENERAL: PAINLEVEL_OUTOF10: 7

## 2025-08-20 ASSESSMENT — PAIN DESCRIPTION - FREQUENCY: FREQUENCY: CONTINUOUS

## 2025-08-22 ENCOUNTER — HOSPITAL ENCOUNTER (OUTPATIENT)
Dept: OTHER | Age: 60
Discharge: HOME | End: 2025-08-22
Payer: COMMERCIAL

## 2025-08-22 DIAGNOSIS — D49.7 HYPERCORTISOLISM DUE TO ADRENAL NEOPLASM: ICD-10-CM

## 2025-08-22 DIAGNOSIS — E24.8 HYPERCORTISOLISM DUE TO ADRENAL NEOPLASM: ICD-10-CM

## 2025-08-22 LAB
ANION GAP SERPL CALC-SCNC: 11 MEQ/L (ref 8–16)
BUN SERPL-MCNC: 16 MG/DL (ref 8–23)
CALCIUM SERPL-MCNC: 9.6 MG/DL (ref 8.5–10.5)
CHLORIDE SERPL-SCNC: 103 MEQ/L (ref 98–111)
CO2 SERPL-SCNC: 26 MEQ/L (ref 22–29)
CREAT SERPL-MCNC: 1 MG/DL (ref 0.5–0.9)
GFR SERPL CREATININE-BSD FRML MDRD: 64 ML/MIN/1.73M2
GLUCOSE SERPL-MCNC: 97 MG/DL (ref 74–109)
POTASSIUM SERPL-SCNC: 4.2 MEQ/L (ref 3.5–5.2)
SODIUM SERPL-SCNC: 140 MEQ/L (ref 135–145)

## 2025-08-22 PROCEDURE — 80048 BASIC METABOLIC PNL TOTAL CA: CPT

## 2025-08-22 PROCEDURE — 36415 COLL VENOUS BLD VENIPUNCTURE: CPT

## 2025-08-25 ENCOUNTER — OFFICE VISIT (OUTPATIENT)
Dept: FAMILY MEDICINE CLINIC | Age: 60
End: 2025-08-25
Payer: COMMERCIAL

## 2025-08-25 VITALS
DIASTOLIC BLOOD PRESSURE: 82 MMHG | WEIGHT: 136 LBS | SYSTOLIC BLOOD PRESSURE: 114 MMHG | BODY MASS INDEX: 26.56 KG/M2 | TEMPERATURE: 98.4 F | HEART RATE: 81 BPM | RESPIRATION RATE: 16 BRPM

## 2025-08-25 DIAGNOSIS — D35.02 ADRENAL BENIGN TUMOR, LEFT: ICD-10-CM

## 2025-08-25 DIAGNOSIS — S50.02XA CONTUSION OF LEFT ELBOW, INITIAL ENCOUNTER: ICD-10-CM

## 2025-08-25 DIAGNOSIS — G89.29 CHRONIC NECK PAIN: Primary | ICD-10-CM

## 2025-08-25 DIAGNOSIS — D35.02 ADRENAL ADENOMA, LEFT: ICD-10-CM

## 2025-08-25 DIAGNOSIS — M54.2 CHRONIC NECK PAIN: Primary | ICD-10-CM

## 2025-08-25 PROCEDURE — 3074F SYST BP LT 130 MM HG: CPT | Performed by: NURSE PRACTITIONER

## 2025-08-25 PROCEDURE — 3078F DIAST BP <80 MM HG: CPT | Performed by: NURSE PRACTITIONER

## 2025-08-25 PROCEDURE — 99214 OFFICE O/P EST MOD 30 MIN: CPT | Performed by: NURSE PRACTITIONER

## 2025-08-25 RX ORDER — HYDROCODONE BITARTRATE AND ACETAMINOPHEN 5; 325 MG/1; MG/1
1 TABLET ORAL EVERY 8 HOURS PRN
Qty: 90 TABLET | Refills: 0 | Status: SHIPPED | OUTPATIENT
Start: 2025-08-25 | End: 2025-09-24

## 2025-08-25 ASSESSMENT — ENCOUNTER SYMPTOMS
EYE REDNESS: 0
SORE THROAT: 0
BACK PAIN: 0
TROUBLE SWALLOWING: 0
WHEEZING: 0
RHINORRHEA: 0
NAUSEA: 1
CONSTIPATION: 0
VOMITING: 0
COUGH: 0
EYE DISCHARGE: 0
SHORTNESS OF BREATH: 0
ALLERGIC/IMMUNOLOGIC NEGATIVE: 1
DIARRHEA: 0
EYE PAIN: 0
ABDOMINAL PAIN: 0

## 2025-08-27 ENCOUNTER — HOSPITAL ENCOUNTER (OUTPATIENT)
Dept: WOMENS IMAGING | Age: 60
Discharge: HOME | End: 2025-08-27
Payer: COMMERCIAL

## 2025-08-27 VITALS — HEIGHT: 60 IN | WEIGHT: 138 LBS | BODY MASS INDEX: 27.09 KG/M2

## 2025-08-27 DIAGNOSIS — Z12.31 VISIT FOR SCREENING MAMMOGRAM: ICD-10-CM

## 2025-08-27 PROCEDURE — 77063 BREAST TOMOSYNTHESIS BI: CPT

## 2025-09-02 ENCOUNTER — HOSPITAL ENCOUNTER (OUTPATIENT)
Age: 60
Setting detail: OUTPATIENT SURGERY
Discharge: HOME | End: 2025-09-02
Attending: INTERNAL MEDICINE | Admitting: INTERNAL MEDICINE
Payer: COMMERCIAL

## 2025-09-02 VITALS
HEART RATE: 100 BPM | BODY MASS INDEX: 25.72 KG/M2 | WEIGHT: 131 LBS | DIASTOLIC BLOOD PRESSURE: 83 MMHG | OXYGEN SATURATION: 94 % | HEIGHT: 60 IN | RESPIRATION RATE: 20 BRPM | TEMPERATURE: 98 F | SYSTOLIC BLOOD PRESSURE: 145 MMHG

## 2025-09-02 DIAGNOSIS — R94.39 ABNORMAL STRESS TEST: ICD-10-CM

## 2025-09-02 LAB
ABO GROUP BLD: NORMAL
ANION GAP SERPL CALC-SCNC: 13 MEQ/L (ref 8–16)
APTT PPP: 36.5 SECONDS (ref 22–38)
BUN SERPL-MCNC: 13 MG/DL (ref 8–23)
CALCIUM SERPL-MCNC: 9.7 MG/DL (ref 8.5–10.5)
CHLORIDE SERPL-SCNC: 105 MEQ/L (ref 98–111)
CO2 SERPL-SCNC: 23 MEQ/L (ref 22–29)
CREAT SERPL-MCNC: 1.1 MG/DL (ref 0.5–0.9)
DEPRECATED RDW RBC AUTO: 46.9 FL (ref 35–45)
ECHO BSA: 1.59 M2
EKG ATRIAL RATE: 79 BPM
EKG P AXIS: -37 DEGREES
EKG P-R INTERVAL: 148 MS
EKG Q-T INTERVAL: 374 MS
EKG QRS DURATION: 78 MS
EKG QTC CALCULATION (BAZETT): 428 MS
EKG R AXIS: 61 DEGREES
EKG T AXIS: 88 DEGREES
EKG VENTRICULAR RATE: 79 BPM
ERYTHROCYTE [DISTWIDTH] IN BLOOD BY AUTOMATED COUNT: 13.2 % (ref 11.5–14.5)
GFR SERPL CREATININE-BSD FRML MDRD: 57 ML/MIN/1.73M2
GLUCOSE SERPL-MCNC: 115 MG/DL (ref 74–109)
HCT VFR BLD AUTO: 45.7 % (ref 37–47)
HGB BLD-MCNC: 15 GM/DL (ref 12–16)
IAT IGG-SP REAG SERPL QL: NORMAL
INR PPP: 0.96 (ref 0.85–1.13)
MCH RBC QN AUTO: 32.1 PG (ref 26–33)
MCHC RBC AUTO-ENTMCNC: 32.8 GM/DL (ref 32.2–35.5)
MCV RBC AUTO: 97.9 FL (ref 81–99)
PLATELET # BLD AUTO: 344 THOU/MM3 (ref 130–400)
PMV BLD AUTO: 10.5 FL (ref 9.4–12.4)
POTASSIUM SERPL-SCNC: 4.2 MEQ/L (ref 3.5–5.2)
PROTHROMBIN TIME: 11.2 SECONDS (ref 10–13.5)
RBC # BLD AUTO: 4.67 MILL/MM3 (ref 4.2–5.4)
RH BLD: NORMAL
SODIUM SERPL-SCNC: 141 MEQ/L (ref 135–145)
WBC # BLD AUTO: 10.9 THOU/MM3 (ref 4.8–10.8)

## 2025-09-02 PROCEDURE — 85027 COMPLETE CBC AUTOMATED: CPT

## 2025-09-02 PROCEDURE — C1760 CLOSURE DEV, VASC: HCPCS | Performed by: INTERNAL MEDICINE

## 2025-09-02 PROCEDURE — C1887 CATHETER, GUIDING: HCPCS | Performed by: INTERNAL MEDICINE

## 2025-09-02 PROCEDURE — 99152 MOD SED SAME PHYS/QHP 5/>YRS: CPT | Performed by: INTERNAL MEDICINE

## 2025-09-02 PROCEDURE — 80048 BASIC METABOLIC PNL TOTAL CA: CPT

## 2025-09-02 PROCEDURE — 2709999900 HC NON-CHARGEABLE SUPPLY: Performed by: INTERNAL MEDICINE

## 2025-09-02 PROCEDURE — 36415 COLL VENOUS BLD VENIPUNCTURE: CPT

## 2025-09-02 PROCEDURE — 85730 THROMBOPLASTIN TIME PARTIAL: CPT

## 2025-09-02 PROCEDURE — C1894 INTRO/SHEATH, NON-LASER: HCPCS | Performed by: INTERNAL MEDICINE

## 2025-09-02 PROCEDURE — 85610 PROTHROMBIN TIME: CPT

## 2025-09-02 PROCEDURE — 93571 IV DOP VEL&/PRESS C FLO 1ST: CPT | Performed by: INTERNAL MEDICINE

## 2025-09-02 PROCEDURE — 6360000002 HC RX W HCPCS: Performed by: INTERNAL MEDICINE

## 2025-09-02 PROCEDURE — 7100000011 HC PHASE II RECOVERY - ADDTL 15 MIN: Performed by: INTERNAL MEDICINE

## 2025-09-02 PROCEDURE — 2580000003 HC RX 258: Performed by: STUDENT IN AN ORGANIZED HEALTH CARE EDUCATION/TRAINING PROGRAM

## 2025-09-02 PROCEDURE — 86901 BLOOD TYPING SEROLOGIC RH(D): CPT

## 2025-09-02 PROCEDURE — 7100000010 HC PHASE II RECOVERY - FIRST 15 MIN: Performed by: INTERNAL MEDICINE

## 2025-09-02 PROCEDURE — 99153 MOD SED SAME PHYS/QHP EA: CPT | Performed by: INTERNAL MEDICINE

## 2025-09-02 PROCEDURE — 86900 BLOOD TYPING SEROLOGIC ABO: CPT

## 2025-09-02 PROCEDURE — G0269 OCCLUSIVE DEVICE IN VEIN ART: HCPCS | Performed by: INTERNAL MEDICINE

## 2025-09-02 PROCEDURE — 86885 COOMBS TEST INDIRECT QUAL: CPT

## 2025-09-02 PROCEDURE — C1769 GUIDE WIRE: HCPCS | Performed by: INTERNAL MEDICINE

## 2025-09-02 PROCEDURE — 6370000000 HC RX 637 (ALT 250 FOR IP): Performed by: INTERNAL MEDICINE

## 2025-09-02 PROCEDURE — 93454 CORONARY ARTERY ANGIO S&I: CPT | Performed by: INTERNAL MEDICINE

## 2025-09-02 PROCEDURE — 93005 ELECTROCARDIOGRAM TRACING: CPT | Performed by: STUDENT IN AN ORGANIZED HEALTH CARE EDUCATION/TRAINING PROGRAM

## 2025-09-02 PROCEDURE — 6360000004 HC RX CONTRAST MEDICATION: Performed by: INTERNAL MEDICINE

## 2025-09-02 RX ORDER — SODIUM CHLORIDE 9 MG/ML
INJECTION, SOLUTION INTRAVENOUS CONTINUOUS
Status: DISCONTINUED | OUTPATIENT
Start: 2025-09-02 | End: 2025-09-02 | Stop reason: HOSPADM

## 2025-09-02 RX ORDER — SODIUM CHLORIDE 0.9 % (FLUSH) 0.9 %
5-40 SYRINGE (ML) INJECTION PRN
Status: DISCONTINUED | OUTPATIENT
Start: 2025-09-02 | End: 2025-09-02 | Stop reason: HOSPADM

## 2025-09-02 RX ORDER — LIDOCAINE HYDROCHLORIDE 20 MG/ML
INJECTION, SOLUTION EPIDURAL; INFILTRATION; INTRACAUDAL; PERINEURAL PRN
Status: DISCONTINUED | OUTPATIENT
Start: 2025-09-02 | End: 2025-09-02 | Stop reason: HOSPADM

## 2025-09-02 RX ORDER — MIDAZOLAM HYDROCHLORIDE 1 MG/ML
INJECTION, SOLUTION INTRAMUSCULAR; INTRAVENOUS PRN
Status: DISCONTINUED | OUTPATIENT
Start: 2025-09-02 | End: 2025-09-02 | Stop reason: HOSPADM

## 2025-09-02 RX ORDER — CYCLOBENZAPRINE HCL 10 MG
10 TABLET ORAL ONCE
Status: COMPLETED | OUTPATIENT
Start: 2025-09-02 | End: 2025-09-02

## 2025-09-02 RX ORDER — ATROPINE SULFATE 0.4 MG/ML
0.5 INJECTION, SOLUTION INTRAVENOUS
Status: DISCONTINUED | OUTPATIENT
Start: 2025-09-02 | End: 2025-09-02 | Stop reason: HOSPADM

## 2025-09-02 RX ORDER — HYDROCODONE BITARTRATE AND ACETAMINOPHEN 5; 325 MG/1; MG/1
1 TABLET ORAL ONCE
Status: COMPLETED | OUTPATIENT
Start: 2025-09-02 | End: 2025-09-02

## 2025-09-02 RX ORDER — LISINOPRIL 10 MG/1
10 TABLET ORAL DAILY
Status: DISCONTINUED | OUTPATIENT
Start: 2025-09-02 | End: 2025-09-02 | Stop reason: HOSPADM

## 2025-09-02 RX ORDER — SODIUM CHLORIDE 0.9 % (FLUSH) 0.9 %
5-40 SYRINGE (ML) INJECTION EVERY 12 HOURS SCHEDULED
Status: DISCONTINUED | OUTPATIENT
Start: 2025-09-02 | End: 2025-09-02 | Stop reason: HOSPADM

## 2025-09-02 RX ORDER — SODIUM CHLORIDE 9 MG/ML
INJECTION, SOLUTION INTRAVENOUS CONTINUOUS
Status: ACTIVE | OUTPATIENT
Start: 2025-09-02 | End: 2025-09-02

## 2025-09-02 RX ORDER — ADENOSINE 3 MG/ML
INJECTION, SOLUTION INTRAVENOUS PRN
Status: DISCONTINUED | OUTPATIENT
Start: 2025-09-02 | End: 2025-09-02 | Stop reason: HOSPADM

## 2025-09-02 RX ORDER — LORAZEPAM 0.5 MG/1
1 TABLET ORAL ONCE
Status: COMPLETED | OUTPATIENT
Start: 2025-09-02 | End: 2025-09-02

## 2025-09-02 RX ORDER — LISINOPRIL 10 MG/1
10 TABLET ORAL ONCE
Status: DISCONTINUED | OUTPATIENT
Start: 2025-09-02 | End: 2025-09-02 | Stop reason: SDUPTHER

## 2025-09-02 RX ORDER — FENTANYL CITRATE 50 UG/ML
INJECTION, SOLUTION INTRAMUSCULAR; INTRAVENOUS PRN
Status: DISCONTINUED | OUTPATIENT
Start: 2025-09-02 | End: 2025-09-02 | Stop reason: HOSPADM

## 2025-09-02 RX ORDER — ASPIRIN 325 MG
325 TABLET ORAL ONCE
Status: DISCONTINUED | OUTPATIENT
Start: 2025-09-02 | End: 2025-09-02 | Stop reason: HOSPADM

## 2025-09-02 RX ORDER — ACETAMINOPHEN 325 MG/1
650 TABLET ORAL EVERY 4 HOURS PRN
Status: DISCONTINUED | OUTPATIENT
Start: 2025-09-02 | End: 2025-09-02 | Stop reason: HOSPADM

## 2025-09-02 RX ORDER — HYDRALAZINE HYDROCHLORIDE 20 MG/ML
INJECTION INTRAMUSCULAR; INTRAVENOUS PRN
Status: DISCONTINUED | OUTPATIENT
Start: 2025-09-02 | End: 2025-09-02 | Stop reason: HOSPADM

## 2025-09-02 RX ORDER — NITROGLYCERIN 0.4 MG/1
0.4 TABLET SUBLINGUAL EVERY 5 MIN PRN
Status: DISCONTINUED | OUTPATIENT
Start: 2025-09-02 | End: 2025-09-02 | Stop reason: HOSPADM

## 2025-09-02 RX ORDER — HYDROCORTISONE SODIUM SUCCINATE 100 MG/2ML
200 INJECTION INTRAMUSCULAR; INTRAVENOUS ONCE
Status: DISCONTINUED | OUTPATIENT
Start: 2025-09-02 | End: 2025-09-02

## 2025-09-02 RX ORDER — LISINOPRIL 10 MG/1
10 TABLET ORAL DAILY
Qty: 30 TABLET | Refills: 3 | Status: SHIPPED | OUTPATIENT
Start: 2025-09-02

## 2025-09-02 RX ORDER — IOPAMIDOL 755 MG/ML
INJECTION, SOLUTION INTRAVASCULAR PRN
Status: DISCONTINUED | OUTPATIENT
Start: 2025-09-02 | End: 2025-09-02 | Stop reason: HOSPADM

## 2025-09-02 RX ORDER — SODIUM CHLORIDE 9 MG/ML
INJECTION, SOLUTION INTRAVENOUS PRN
Status: DISCONTINUED | OUTPATIENT
Start: 2025-09-02 | End: 2025-09-02 | Stop reason: HOSPADM

## 2025-09-02 RX ORDER — DIPHENHYDRAMINE HYDROCHLORIDE 50 MG/ML
50 INJECTION, SOLUTION INTRAMUSCULAR; INTRAVENOUS ONCE
Status: DISCONTINUED | OUTPATIENT
Start: 2025-09-02 | End: 2025-09-02

## 2025-09-02 RX ORDER — NITROGLYCERIN 20 MG/100ML
INJECTION INTRAVENOUS CONTINUOUS PRN
Status: COMPLETED | OUTPATIENT
Start: 2025-09-02 | End: 2025-09-02

## 2025-09-02 RX ORDER — HEPARIN SODIUM 1000 [USP'U]/ML
INJECTION, SOLUTION INTRAVENOUS; SUBCUTANEOUS PRN
Status: DISCONTINUED | OUTPATIENT
Start: 2025-09-02 | End: 2025-09-02 | Stop reason: HOSPADM

## 2025-09-02 RX ADMIN — CYCLOBENZAPRINE 10 MG: 10 TABLET, FILM COATED ORAL at 09:56

## 2025-09-02 RX ADMIN — HYDROCODONE BITARTRATE AND ACETAMINOPHEN 1 TABLET: 5; 325 TABLET ORAL at 11:19

## 2025-09-02 RX ADMIN — SODIUM CHLORIDE: 0.9 INJECTION, SOLUTION INTRAVENOUS at 06:55

## 2025-09-02 RX ADMIN — LISINOPRIL 10 MG: 10 TABLET ORAL at 09:56

## 2025-09-02 RX ADMIN — LORAZEPAM 1 MG: 0.5 TABLET ORAL at 07:33

## 2025-09-02 ASSESSMENT — PAIN DESCRIPTION - LOCATION
LOCATION: BACK
LOCATION: BACK;NECK

## 2025-09-02 ASSESSMENT — PAIN DESCRIPTION - DESCRIPTORS
DESCRIPTORS: SHARP;SPASM;CRAMPING
DESCRIPTORS: ACHING

## 2025-09-02 ASSESSMENT — PAIN - FUNCTIONAL ASSESSMENT
PAIN_FUNCTIONAL_ASSESSMENT: 0-10
PAIN_FUNCTIONAL_ASSESSMENT: 0-10

## 2025-09-02 ASSESSMENT — PAIN DESCRIPTION - ORIENTATION: ORIENTATION: LOWER

## 2025-09-02 ASSESSMENT — PAIN SCALES - GENERAL
PAINLEVEL_OUTOF10: 8
PAINLEVEL_OUTOF10: 6

## (undated) PROCEDURE — 4A033BC MEASUREMENT OF ARTERIAL PRESSURE, CORONARY, PERCUTANEOUS APPROACH: ICD-10-PCS

## (undated) DEVICE — DECANTER FLD 9IN ST BG FOR ASEP TRNSF OF FLD

## (undated) DEVICE — AGENT HEMSTAT W2XL14IN OXIDIZED REGENERATED CELOS ABSRB FOR

## (undated) DEVICE — SHEATH INTRO 5FR L10CM MINI GWIRE L45CM 0.035IN COR KINK

## (undated) DEVICE — LOOP VES W13MM THK09MM MINI RED SIL FLD REPELLENT

## (undated) DEVICE — DRAIN SURG W10MMXL20CM SIL FULL PERF HUBLESS FLAT RADPQ

## (undated) DEVICE — DUAL STAGE VENOUS RETURN CANNULA: Brand: EDWARDS LIFESCIENCES DUAL STAGE VENOUS DRAINAGE CANNULA

## (undated) DEVICE — FOGARTY SPRING CLIPS 6MM: Brand: FOGARTY SOFTJAW

## (undated) DEVICE — CATHETER ETER GUID 5FR L100CM 3DRC CRV ENH VIS RADPQ MRK ROBUST

## (undated) DEVICE — PACK-MAJOR

## (undated) DEVICE — COVER US PRB W5XL96IN LTX W/ GEL

## (undated) DEVICE — COVER,LIGHT HANDLE,FLX,2/PK: Brand: MEDLINE INDUSTRIES, INC.

## (undated) DEVICE — PACK PROCEDURE SURG SET UP SRMC

## (undated) DEVICE — DRAPE KIT RAMAPR RADIATION SHIELD

## (undated) DEVICE — SUTURE PROL 7-0 L24IN NONABSORBABLE BLU VISI-BLACK L9.3MM M8304

## (undated) DEVICE — SET AUTOTRNS C175ML BOWL BTM OUTLT RESERVOIRXTRA

## (undated) DEVICE — 3M™ IOBAN™ 2 ANTIMICROBIAL INCISE DRAPE 6651EZ: Brand: IOBAN™ 2

## (undated) DEVICE — CATHETER VENT L15 IN L2.75 IN OD16 FR SIL BULL TIP GWIRE

## (undated) DEVICE — SHEET, T, LAPAROTOMY, STERILE: Brand: MEDLINE

## (undated) DEVICE — EXTENSION SET 20 IN 3 CC PINCH CLMP 2 PC M LL STRL

## (undated) DEVICE — INTENDED FOR TISSUE SEPARATION, AND OTHER PROCEDURES THAT REQUIRE A SHARP SURGICAL BLADE TO PUNCTURE OR CUT.: Brand: BARD-PARKER ® CARBON RIB-BACK BLADES

## (undated) DEVICE — TOWEL,OR,DSP,ST,WHITE,DLX,XR,4/PK,20PK/C: Brand: MEDLINE

## (undated) DEVICE — SPONGE LAP W18XL18IN WHT COT 4 PLY FLD STRUNG RADPQ DISP ST 2 PER PACK

## (undated) DEVICE — CATHETER 5FR CORDIS PIG 145DEG 110CM

## (undated) DEVICE — VASCULAR: Brand: MEDLINE INDUSTRIES, INC.

## (undated) DEVICE — BAND COMPR L24CM REG CLR PLAS HEMSTAT EXT HK AND LOOP RETEN

## (undated) DEVICE — 260 CM J TIP WIRE .035

## (undated) DEVICE — KIT ANGIO W/ AT P65 PREM HND CTRL FOR CNTRST DEL ANGIOTOUCH

## (undated) DEVICE — Device

## (undated) DEVICE — OFF - ST. RITAS VASC: Brand: MEDLINE INDUSTRIES, INC.

## (undated) DEVICE — CATHETER URETH RND TIP 20 FRX16 IN RADIOPAQUE DOVER

## (undated) DEVICE — SUTURE PERMAHAND SZ 4-0 L18IN NONABSORBABLE BLK SILK BRAID A183H

## (undated) DEVICE — BASIC SINGLE BASIN BTC-LF: Brand: MEDLINE INDUSTRIES, INC.

## (undated) DEVICE — GLIDESHEATH SLENDER ACCESS KIT: Brand: GLIDESHEATH SLENDER

## (undated) DEVICE — APPLICATOR MEDICATED 26 CC SOLUTION HI LT ORNG CHLORAPREP

## (undated) DEVICE — HYPODERMIC SAFETY NEEDLE: Brand: MAGELLAN

## (undated) DEVICE — BLOOD TRANSFER PACK 600 CC W/ CPLR

## (undated) DEVICE — MANIFOLD IV 5 GANG CUST

## (undated) DEVICE — SUMP INTCARD SUCT AD 20FR PERICARD MAYO STYL FLX VERSATILE

## (undated) DEVICE — THERMOFLECT CAP, UNIV, 100EA                                TS THERMOFLECT BOUFFANT CAP, ELASTIC, SILVER, 5/BG, 20 BG/CS: Brand: THERMOFLECT

## (undated) DEVICE — CATHETER VENT 20FR L15IN TIP PERF 2.75IN BULL W/ 1/4IN SLIP

## (undated) DEVICE — TIBURON NEONATAL DRAPE: Brand: CONVERTORS

## (undated) DEVICE — PRESSURE GUIDEWIRE: Brand: COMET™ II

## (undated) DEVICE — DISSECTOR ENDO L13CM CVD JAW CRDLSS SONICISION

## (undated) DEVICE — PAD GEN USE BORDERED ADH 14IN 2IN AND 12IN 4IN GZ UNIV ST

## (undated) DEVICE — CATHETER CV 3 LUMEN MED 7 FRX16 CM MULT MED

## (undated) DEVICE — DRAIN SURG FLAT W7MMXL20CM FULL PERF

## (undated) DEVICE — APPLIER LIG CLP M L11IN TI STR RNG HNDL FOR 20 CLP DISP

## (undated) DEVICE — 4-PORT MANIFOLD: Brand: NEPTUNE 2

## (undated) DEVICE — TUBING PRESSURE MONITORING FIX M TO M LUER

## (undated) DEVICE — TAPE UMBILICAL W1/16XL30IN COTTON ROUND NONRADIOPAQUE

## (undated) DEVICE — 3M™ STERI-DRAPE™ INSTRUMENT POUCH 1018: Brand: STERI-DRAPE™

## (undated) DEVICE — AGENT HEMOSTATIC SURGIFLOW MATRIX KIT W/THROMBIN

## (undated) DEVICE — CATHETER COR DIAG PIGTAILS PIG 145 CRV 5FR 110CM 6 SIDE H

## (undated) DEVICE — DRAIN SURG 19FR 0.25IN SIL RND W/ TRCR INDIC DOT RADPQ FULL

## (undated) DEVICE — CATHETER ETER IV 18GA L125IN POLYUR STR RADPQ INTROCAN SFTY

## (undated) DEVICE — GOWN,SIRUS,NON REINFRCD,LARGE,SET IN SL: Brand: MEDLINE

## (undated) DEVICE — SPONGE LAP W18XL18IN WHT COT 4 PLY FLD STRUNG RADPQ DISP ST

## (undated) DEVICE — APPLICATOR MEDICATED 26 CC SOLUTION CLR STRL CHLORAPREP

## (undated) DEVICE — SUTURE PERMAHAND SZ 2-0 L30IN NONABSORBABLE BLK SH L26MM C016D

## (undated) DEVICE — CARDIAC CATH LAB PACK LF

## (undated) DEVICE — 150CM STANDARD JWIRE

## (undated) DEVICE — SUTURE PERMA-HAND SZ 2-0 L30IN NONABSORBABLE BLK L26MM SH K833H

## (undated) DEVICE — ELECTRODE PT RET AD L9FT HI MOIST COND ADH HYDRGEL CORDED

## (undated) DEVICE — SYRINGE MED 10ML LUERLOCK TIP W/O SFTY DISP

## (undated) DEVICE — DRAPE,INSTRUMENT,MAGNETIC,10X16: Brand: MEDLINE

## (undated) DEVICE — Device: Brand: MEDEX

## (undated) DEVICE — SUTURE PERMAHAND SZ 2-0 L12X18IN NONABSORBABLE BLK SILK A185H

## (undated) DEVICE — PRESSURE MONITORING SET: Brand: TRUWAVE

## (undated) DEVICE — MARKER,SKIN,WI/RULER AND LABELS: Brand: MEDLINE

## (undated) DEVICE — KIT ART LN 20GA L12CM FEP RADPQ 0.025X13.75IN SPR GWIRE

## (undated) DEVICE — 1LYRTR 16FR10ML100%SILTMPS SNP: Brand: MEDLINE INDUSTRIES, INC.

## (undated) DEVICE — GLOVE ORANGE PI 8   MSG9080

## (undated) DEVICE — THORACIC CATHETER,STRAIGHT: Brand: ARGYLE

## (undated) DEVICE — DEVICE CLSR 5FR BIOABSRB FULL INTEGR RAP HEMSTAS FOR FEM

## (undated) DEVICE — FOGARTY - HYDRAGRIP SURGICAL - CLAMP INSERTS: Brand: FOGARTY SOFTJAW

## (undated) DEVICE — CATHETER DIAG 5FR L100CM LUMN ID0.047IN JL4 CRV 0 SIDE H

## (undated) DEVICE — PINNACLE INTRODUCER SHEATH: Brand: PINNACLE

## (undated) DEVICE — CATHETER 5FR JL3.5 CORDIS 100CM

## (undated) DEVICE — CATHETER IV 14GA L1.75IN OD2.146MM ID1.740MM ORNG VIALON

## (undated) DEVICE — SYRINGE MED 30ML STD CLR PLAS LUERLOCK TIP N CTRL DISP

## (undated) DEVICE — THORACIC CATHETER,RIGHT ANGLE: Brand: ARGYLE

## (undated) DEVICE — TOWEL,OR,DSP,ST,BLUE,STD,4/PK,20PK/CS: Brand: MEDLINE

## (undated) DEVICE — GLOVE SURG SZ 7.5 L11.73IN FNGR THK9.8MIL STRW LTX POLYMER

## (undated) DEVICE — COR-KNOT MINI® COMBO KITBASE PACKAGE TYPE - KITEACH STERILE PACKAGE KIT CONTAINS (2) SINGLE PATIENT USE COR-KNOT MINI® DEVICES AND (12) COR-KNOT® QUICK LOADS®.: Brand: COR-KNOT MINI®

## (undated) DEVICE — SET CATH 20GA L1.5IN RAD ART POLYUR RADPQ W/ INTEGR 0.018IN

## (undated) DEVICE — SURGICAL PROCEDURE PACK OXGNTR ST MARYS

## (undated) DEVICE — DRESSING TRNSPAR W5XL4.5IN FLM SHT SEMIPERMEABLE WIND

## (undated) DEVICE — SUTURE VICRYL + SZ 3-0 L27IN ABSRB UD L26MM SH 1/2 CIR VCP416H

## (undated) DEVICE — INTRODUCER SHTH STIFF 4 FRX9 CM 7 CM SET NIT MICRO-STICK

## (undated) DEVICE — GOWN,SIRUS,NONRNF,SETINSLV,XL,20/CS: Brand: MEDLINE

## (undated) DEVICE — GLOVE SURG SZ 65 THK91MIL LTX FREE SYN POLYISOPRENE

## (undated) DEVICE — MICRO TIP WIPE: Brand: DEVON

## (undated) DEVICE — SUTURE PERMAHAND SZ 3-0 L30IN NONABSORBABLE BLK SH L26MM K832H

## (undated) DEVICE — GLOVE ORTHO 8   MSG9480

## (undated) DEVICE — EZ GLIDE AORTIC CANNULA: Brand: EDWARDS LIFESCIENCES EZ GLIDE AORTIC CANNULA

## (undated) DEVICE — BLADE ES L4IN INSUL EDGE

## (undated) DEVICE — KIT MFLD ISOLATN NACL CNTRST PRT TBNG SPIK W/ PRSS TRNSDUC

## (undated) DEVICE — VALVE AORT H14.7MM OD21MM ID19.4MM FLARE DIA22.2MM SEW RNG: Type: IMPLANTABLE DEVICE | Site: AORTIC VALVE | Status: NON-FUNCTIONAL

## (undated) DEVICE — CATHETER DIAG 5FR L100CM SPEC 3 DRC CRV SZ DBL BRAID WIRE

## (undated) DEVICE — PRESSURE MONITORING LINES 2FT. (61CM) M/F: Brand: PRESSURE MONITORING LINES

## (undated) DEVICE — DISSECTOR LAP DIA5MM BLNT TIP ENDOPATH

## (undated) DEVICE — SPLINT ARMBRD W3XL10.5IN POLYFOAM DLX A LN

## (undated) DEVICE — SUTURE PROL SZ 4-0 L30IN NONABSORBABLE BLU SH-1 L22MM 1/2 8526H

## (undated) DEVICE — CANNULA PRFSN 12.5IN 15FR CS ADLT RTRGD

## (undated) DEVICE — APPLIER CLP L9.375IN APER 2.1MM CLS L3.8MM 20 SM TI CLP

## (undated) DEVICE — LOOP VES W25MM THK1MM MAXI RED SIL FLD REPELLENT 100 PER

## (undated) DEVICE — 3M™ IOBAN™ 2 ANTIMICROBIAL INCISE DRAPE 6650EZ: Brand: IOBAN™ 2

## (undated) DEVICE — BOOT,SUTURE,STANDARD,YELLOW-IN-BLUE: Brand: MEDLINE

## (undated) DEVICE — CANNULA PVC RCSP 15FR SLD STYL W/ HNDL OVERALL LEN 11 IN

## (undated) DEVICE — CANNULA PERF L5.5IN DIA9FR AORT ROOT AG STD TIP W/ VENT LN

## (undated) DEVICE — Z DISCONTINUED USE 2425483 (LOW STOCK PER MEDLINE) TAPE UMB L18IN DIA1/8IN WHT COT NONABSORBABLE W/O NDL FOR

## (undated) DEVICE — GUIDEWIRE VASC L150CM DIA0.035IN FLX END L7CM J 3MM PTFE

## (undated) DEVICE — SUTURE PERMAHAND SZ 3-0 L18IN NONABSORBABLE BLK L26MM SH C013D

## (undated) DEVICE — COVER ARMBRD W13XL28.5IN IMPERV BLU FOR OP RM

## (undated) DEVICE — PACK,UNIVERSAL,NO GOWNS: Brand: MEDLINE

## (undated) DEVICE — CELL SAVER PACK

## (undated) DEVICE — 35 ML SYRINGE LUER-LOCK TIP: Brand: MONOJECT

## (undated) DEVICE — CAROTID ARTERY SHUNT KIT,RADIOPAQUE LINE, STRAIGHT
Type: IMPLANTABLE DEVICE | Status: NON-FUNCTIONAL
Brand: ARGYLE
Removed: 2024-10-21

## (undated) DEVICE — TRAY F TEMP SENS 16FR CATHETER

## (undated) DEVICE — SYRINGE IRRIG 60ML SFT PLIABLE BLB EZ TO GRP 1 HND USE W/

## (undated) DEVICE — VALVE HEMSTAS W/ GWIRE INSRTN TOOL GRDIAN II NC

## (undated) DEVICE — EVACUATOR SURG 100CC SIL BLB SUCT RESVR FOR CLS WND DRNGE

## (undated) DEVICE — CANNULA PERF 20FR L10IN SIL COR ART OSTIAL SFT BLB SHP TIP

## (undated) DEVICE — 4F (1.0MM ID) X 9CM STIFF4F (1.0 MICRO-STICK®INTRODUCER SE WITH NITINOL GUIDEWIREWITH NITIN WITH RADIOPAQUE TIPWITH RADIOPAQ: Brand: MICRO-STICK SETMICRO-STICK SET

## (undated) DEVICE — CO-SET DELIVERY SYSTEM FOR 123 ROOM TEMPATURE INJECTATE: Brand: CO-SET+

## (undated) DEVICE — GLOVE ORANGE PI 7 1/2   MSG9075

## (undated) DEVICE — SUTURE PERMAHAND SZ 3-0 L18IN NONABSORBABLE BLK SILK BRAID A184H

## (undated) DEVICE — SPONGE GZ W4XL4IN COT 12 PLY TYP VII WVN C FLD DSGN

## (undated) DEVICE — DRAIN SURG SGL COLL PT TB FOR ATS BG OASIS

## (undated) DEVICE — CATHETER 5FR JR4 CORDIS 100CM

## (undated) DEVICE — TOTAL TRAY, DB, 100% SILI FOLEY, 16FR 10: Brand: MEDLINE

## (undated) DEVICE — 500ML,PRESSURE INFUSER W/STOPCOCK: Brand: MEDLINE

## (undated) DEVICE — CONNECTOR HAD 1/2X1/2IN EQL STR

## (undated) DEVICE — DRAIN SURG L3/8-1/2IN DIA3/16IN SIL CARD CONN 1:1 BLAK

## (undated) DEVICE — SUTURE MONOCRYL + SZ 4-0 L27IN ABSRB UD L19MM PS-2 3/8 CIR MCP426H

## (undated) DEVICE — CONNECTOR DRNGE 3/8 1/2X3/16X3/16IN BASE L5MM ARM L10-13MM

## (undated) DEVICE — 9F INTROFLEX MAX BARRIER: Brand: MEDLINE

## (undated) DEVICE — SET TRNQT STD 12FR TB LEN 7 IN 2 RED 2 BLU 2 CLR 16FR 2 L

## (undated) DEVICE — OPEN HEART SUPPLEMENT: Brand: MEDLINE INDUSTRIES, INC.

## (undated) DEVICE — GUIDEWIRE SURG PRESSURE COMET II